# Patient Record
Sex: FEMALE | Race: WHITE | Employment: UNEMPLOYED | URBAN - NONMETROPOLITAN AREA
[De-identification: names, ages, dates, MRNs, and addresses within clinical notes are randomized per-mention and may not be internally consistent; named-entity substitution may affect disease eponyms.]

---

## 2018-04-22 ENCOUNTER — APPOINTMENT (OUTPATIENT)
Dept: CT IMAGING | Age: 48
End: 2018-04-22
Payer: COMMERCIAL

## 2018-04-22 ENCOUNTER — APPOINTMENT (OUTPATIENT)
Dept: GENERAL RADIOLOGY | Age: 48
End: 2018-04-22
Payer: COMMERCIAL

## 2018-04-22 ENCOUNTER — HOSPITAL ENCOUNTER (EMERGENCY)
Age: 48
Discharge: HOME | End: 2018-04-22
Attending: EMERGENCY MEDICINE
Payer: COMMERCIAL

## 2018-04-22 VITALS
TEMPERATURE: 97.9 F | HEART RATE: 118 BPM | HEIGHT: 65 IN | OXYGEN SATURATION: 98 % | DIASTOLIC BLOOD PRESSURE: 74 MMHG | BODY MASS INDEX: 46.65 KG/M2 | RESPIRATION RATE: 20 BRPM | SYSTOLIC BLOOD PRESSURE: 155 MMHG | WEIGHT: 280 LBS

## 2018-04-22 DIAGNOSIS — S40.012A CONTUSION OF LEFT SHOULDER, INITIAL ENCOUNTER: ICD-10-CM

## 2018-04-22 DIAGNOSIS — S09.90XA INJURY OF HEAD, INITIAL ENCOUNTER: Primary | ICD-10-CM

## 2018-04-22 LAB
BILIRUBIN URINE: NEGATIVE
COLOR: YELLOW
COMMENT UA: ABNORMAL
GLUCOSE URINE: NEGATIVE
KETONES, URINE: NEGATIVE
LEUKOCYTE ESTERASE, URINE: NEGATIVE
NITRITE, URINE: NEGATIVE
PH UA: 7 (ref 5–9)
PROTEIN UA: NEGATIVE
SPECIFIC GRAVITY UA: <1.005 (ref 1.01–1.02)
TURBIDITY: CLEAR
URINE HGB: NEGATIVE
UROBILINOGEN, URINE: NORMAL

## 2018-04-22 PROCEDURE — 73030 X-RAY EXAM OF SHOULDER: CPT

## 2018-04-22 PROCEDURE — 90471 IMMUNIZATION ADMIN: CPT | Performed by: EMERGENCY MEDICINE

## 2018-04-22 PROCEDURE — 81003 URINALYSIS AUTO W/O SCOPE: CPT

## 2018-04-22 PROCEDURE — 6360000002 HC RX W HCPCS: Performed by: EMERGENCY MEDICINE

## 2018-04-22 PROCEDURE — 70450 CT HEAD/BRAIN W/O DYE: CPT

## 2018-04-22 PROCEDURE — 90715 TDAP VACCINE 7 YRS/> IM: CPT | Performed by: EMERGENCY MEDICINE

## 2018-04-22 PROCEDURE — 99284 EMERGENCY DEPT VISIT MOD MDM: CPT

## 2018-04-22 RX ORDER — LISINOPRIL 20 MG/1
20 TABLET ORAL DAILY
Status: ON HOLD | COMMUNITY
End: 2021-01-04 | Stop reason: HOSPADM

## 2018-04-22 RX ADMIN — TETANUS TOXOID, REDUCED DIPHTHERIA TOXOID AND ACELLULAR PERTUSSIS VACCINE, ADSORBED 0.5 ML: 5; 2.5; 8; 8; 2.5 SUSPENSION INTRAMUSCULAR at 09:02

## 2018-04-22 ASSESSMENT — PAIN DESCRIPTION - LOCATION: LOCATION: GENERALIZED

## 2018-04-22 ASSESSMENT — PAIN SCALES - GENERAL: PAINLEVEL_OUTOF10: 4

## 2018-04-22 ASSESSMENT — PAIN DESCRIPTION - PAIN TYPE: TYPE: ACUTE PAIN

## 2019-05-13 ENCOUNTER — HOSPITAL ENCOUNTER (INPATIENT)
Age: 49
LOS: 1 days | Discharge: HOME | DRG: 206 | End: 2019-05-15
Attending: FAMILY MEDICINE | Admitting: FAMILY MEDICINE
Payer: COMMERCIAL

## 2019-05-13 ENCOUNTER — APPOINTMENT (OUTPATIENT)
Dept: CT IMAGING | Age: 49
DRG: 206 | End: 2019-05-13
Payer: COMMERCIAL
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
## 2019-05-13 DIAGNOSIS — D69.1 PLATELET DYSFUNCTION (HCC): ICD-10-CM

## 2019-05-13 DIAGNOSIS — D69.9 BLEEDING TENDENCY (HCC): ICD-10-CM

## 2019-05-13 DIAGNOSIS — S27.321A CONTUSION OF LEFT LUNG, INITIAL ENCOUNTER: ICD-10-CM

## 2019-05-13 PROBLEM — S22.42XD: Status: ACTIVE | Noted: 2019-05-13
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
## 2019-05-13 LAB
ANION GAP SERPL CALCULATED.3IONS-SCNC: 15 MMOL/L (ref 9–17)
BUN BLDV-MCNC: 8 MG/DL (ref 6–20)
BUN/CREAT BLD: 11 (ref 9–20)
CALCIUM SERPL-MCNC: 9.6 MG/DL (ref 8.6–10.4)
CHLORIDE BLD-SCNC: 95 MMOL/L (ref 98–107)
CO2: 23 MMOL/L (ref 20–31)
CREAT SERPL-MCNC: 0.76 MG/DL (ref 0.5–0.9)
GFR AFRICAN AMERICAN: >60 ML/MIN
GFR NON-AFRICAN AMERICAN: >60 ML/MIN
GFR SERPL CREATININE-BSD FRML MDRD: ABNORMAL ML/MIN/{1.73_M2}
GFR SERPL CREATININE-BSD FRML MDRD: ABNORMAL ML/MIN/{1.73_M2}
GLUCOSE BLD-MCNC: 86 MG/DL (ref 70–99)
HCT VFR BLD CALC: 34.5 % (ref 36.3–47.1)
HEMOGLOBIN: 11.4 G/DL (ref 11.9–15.1)
INR BLD: 1 (ref 0.9–1.2)
MCH RBC QN AUTO: 28.9 PG (ref 25.2–33.5)
MCHC RBC AUTO-ENTMCNC: 33 G/DL (ref 28.4–34.8)
MCV RBC AUTO: 87.3 FL (ref 82.6–102.9)
NRBC AUTOMATED: 0 PER 100 WBC
PDW BLD-RTO: 12.6 % (ref 11.8–14.4)
PLATELET # BLD: 496 K/UL (ref 138–453)
PMV BLD AUTO: 8.3 FL (ref 8.1–13.5)
POTASSIUM SERPL-SCNC: 4.3 MMOL/L (ref 3.7–5.3)
PROTHROMBIN TIME: 10.3 SEC (ref 9.7–12.2)
RBC # BLD: 3.95 M/UL (ref 3.95–5.11)
SODIUM BLD-SCNC: 133 MMOL/L (ref 135–144)
WBC # BLD: 10.5 K/UL (ref 3.5–11.3)

## 2019-05-13 PROCEDURE — G0378 HOSPITAL OBSERVATION PER HR: HCPCS

## 2019-05-13 PROCEDURE — 74177 CT ABD & PELVIS W/CONTRAST: CPT

## 2019-05-13 PROCEDURE — 36415 COLL VENOUS BLD VENIPUNCTURE: CPT

## 2019-05-13 PROCEDURE — 2580000003 HC RX 258: Performed by: FAMILY MEDICINE

## 2019-05-13 PROCEDURE — 70450 CT HEAD/BRAIN W/O DYE: CPT

## 2019-05-13 PROCEDURE — 72125 CT NECK SPINE W/O DYE: CPT

## 2019-05-13 PROCEDURE — 94664 DEMO&/EVAL PT USE INHALER: CPT

## 2019-05-13 PROCEDURE — 6370000000 HC RX 637 (ALT 250 FOR IP): Performed by: FAMILY MEDICINE

## 2019-05-13 PROCEDURE — 6360000004 HC RX CONTRAST MEDICATION: Performed by: PHYSICIAN ASSISTANT

## 2019-05-13 PROCEDURE — 93005 ELECTROCARDIOGRAM TRACING: CPT

## 2019-05-13 PROCEDURE — 85027 COMPLETE CBC AUTOMATED: CPT

## 2019-05-13 PROCEDURE — 99285 EMERGENCY DEPT VISIT HI MDM: CPT

## 2019-05-13 PROCEDURE — 85610 PROTHROMBIN TIME: CPT

## 2019-05-13 PROCEDURE — 80048 BASIC METABOLIC PNL TOTAL CA: CPT

## 2019-05-13 RX ORDER — SODIUM CHLORIDE 0.9 % (FLUSH) 0.9 %
10 SYRINGE (ML) INJECTION EVERY 12 HOURS SCHEDULED
Status: DISCONTINUED | OUTPATIENT
Start: 2019-05-13 | End: 2019-05-15 | Stop reason: HOSPADM

## 2019-05-13 RX ORDER — ONDANSETRON 2 MG/ML
4 INJECTION INTRAMUSCULAR; INTRAVENOUS EVERY 6 HOURS PRN
Status: DISCONTINUED | OUTPATIENT
Start: 2019-05-13 | End: 2019-05-15 | Stop reason: HOSPADM

## 2019-05-13 RX ORDER — SODIUM CHLORIDE 0.9 % (FLUSH) 0.9 %
10 SYRINGE (ML) INJECTION PRN
Status: DISCONTINUED | OUTPATIENT
Start: 2019-05-13 | End: 2019-05-15 | Stop reason: HOSPADM

## 2019-05-13 RX ORDER — DESMOPRESSIN ACETATE 150/SPRAY
2 AEROSOL, SPRAY WITH PUMP (EA) NASAL ONCE
Status: DISCONTINUED | OUTPATIENT
Start: 2019-05-13 | End: 2019-05-14

## 2019-05-13 RX ORDER — ALBUTEROL SULFATE 90 UG/1
2 AEROSOL, METERED RESPIRATORY (INHALATION) EVERY 6 HOURS PRN
Status: DISCONTINUED | OUTPATIENT
Start: 2019-05-13 | End: 2019-05-15 | Stop reason: HOSPADM

## 2019-05-13 RX ORDER — SERTRALINE HYDROCHLORIDE 25 MG/1
50 TABLET, FILM COATED ORAL DAILY
Status: DISCONTINUED | OUTPATIENT
Start: 2019-05-14 | End: 2019-05-15 | Stop reason: HOSPADM

## 2019-05-13 RX ORDER — HYDROCODONE BITARTRATE AND ACETAMINOPHEN 5; 325 MG/1; MG/1
1 TABLET ORAL EVERY 6 HOURS PRN
Status: DISCONTINUED | OUTPATIENT
Start: 2019-05-13 | End: 2019-05-15 | Stop reason: HOSPADM

## 2019-05-13 RX ORDER — FAMOTIDINE 20 MG/1
20 TABLET, FILM COATED ORAL 2 TIMES DAILY
Status: DISCONTINUED | OUTPATIENT
Start: 2019-05-13 | End: 2019-05-15 | Stop reason: HOSPADM

## 2019-05-13 RX ORDER — LISINOPRIL 20 MG/1
20 TABLET ORAL DAILY
Status: DISCONTINUED | OUTPATIENT
Start: 2019-05-14 | End: 2019-05-15 | Stop reason: HOSPADM

## 2019-05-13 RX ORDER — QUETIAPINE FUMARATE 200 MG/1
200 TABLET, FILM COATED ORAL NIGHTLY
Status: ON HOLD | COMMUNITY
End: 2021-01-04 | Stop reason: HOSPADM

## 2019-05-13 RX ORDER — SODIUM CHLORIDE 9 MG/ML
INJECTION, SOLUTION INTRAVENOUS CONTINUOUS
Status: DISCONTINUED | OUTPATIENT
Start: 2019-05-13 | End: 2019-05-14

## 2019-05-13 RX ORDER — AMINOCAPROIC ACID 500 MG/1
500 TABLET ORAL EVERY 6 HOURS
Status: DISCONTINUED | OUTPATIENT
Start: 2019-05-13 | End: 2019-05-14

## 2019-05-13 RX ORDER — BUPROPION HYDROCHLORIDE 150 MG/1
300 TABLET ORAL DAILY
Status: DISCONTINUED | OUTPATIENT
Start: 2019-05-14 | End: 2019-05-15 | Stop reason: HOSPADM

## 2019-05-13 RX ORDER — LAMOTRIGINE 100 MG/1
400 TABLET ORAL DAILY
Status: DISCONTINUED | OUTPATIENT
Start: 2019-05-14 | End: 2019-05-15 | Stop reason: HOSPADM

## 2019-05-13 RX ADMIN — IOPAMIDOL 100 ML: 755 INJECTION, SOLUTION INTRAVENOUS at 17:41

## 2019-05-13 RX ADMIN — SODIUM CHLORIDE: 9 INJECTION, SOLUTION INTRAVENOUS at 23:17

## 2019-05-13 RX ADMIN — FAMOTIDINE 20 MG: 20 TABLET ORAL at 23:17

## 2019-05-13 RX ADMIN — HYDROCODONE BITARTRATE AND ACETAMINOPHEN 1 TABLET: 5; 325 TABLET ORAL at 23:17

## 2019-05-13 ASSESSMENT — PAIN SCALES - GENERAL
PAINLEVEL_OUTOF10: 4
PAINLEVEL_OUTOF10: 7
PAINLEVEL_OUTOF10: 2

## 2019-05-13 ASSESSMENT — ENCOUNTER SYMPTOMS
NAUSEA: 0
VOMITING: 0
RHINORRHEA: 0
BACK PAIN: 0
ABDOMINAL PAIN: 0
WHEEZING: 0
CHEST TIGHTNESS: 0
EYE DISCHARGE: 0
CONSTIPATION: 0
SORE THROAT: 0
COUGH: 0
SHORTNESS OF BREATH: 0
BLOOD IN STOOL: 0
EYE REDNESS: 0
DIARRHEA: 0

## 2019-05-13 ASSESSMENT — PAIN DESCRIPTION - PAIN TYPE
TYPE: ACUTE PAIN
TYPE: ACUTE PAIN

## 2019-05-13 ASSESSMENT — PAIN DESCRIPTION - DESCRIPTORS: DESCRIPTORS: ACHING;SORE

## 2019-05-13 ASSESSMENT — PAIN DESCRIPTION - FREQUENCY
FREQUENCY: CONTINUOUS
FREQUENCY: CONTINUOUS

## 2019-05-13 ASSESSMENT — PAIN DESCRIPTION - ORIENTATION
ORIENTATION: RIGHT
ORIENTATION: RIGHT

## 2019-05-13 ASSESSMENT — PAIN DESCRIPTION - LOCATION
LOCATION: BREAST
LOCATION: BREAST

## 2019-05-13 ASSESSMENT — PAIN - FUNCTIONAL ASSESSMENT: PAIN_FUNCTIONAL_ASSESSMENT: PREVENTS OR INTERFERES SOME ACTIVE ACTIVITIES AND ADLS

## 2019-05-13 ASSESSMENT — PAIN DESCRIPTION - PROGRESSION: CLINICAL_PROGRESSION: GRADUALLY WORSENING

## 2019-05-13 NOTE — ED PROVIDER NOTES
redness and visual disturbance. Respiratory: Negative for cough, chest tightness, shortness of breath and wheezing. Cardiovascular: Negative for chest pain and palpitations. Gastrointestinal: Negative for abdominal pain, blood in stool, constipation, diarrhea, nausea and vomiting. Endocrine: Negative for polydipsia, polyphagia and polyuria. Genitourinary: Negative for decreased urine volume, difficulty urinating, dysuria, frequency and hematuria. Musculoskeletal: Positive for arthralgias. Negative for back pain and myalgias. Skin: Negative for pallor and rash. Allergic/Immunologic: Negative for food allergies and immunocompromised state. Neurological: Positive for dizziness. Negative for syncope, weakness and light-headedness. Hematological: Negative for adenopathy. Does not bruise/bleed easily. Psychiatric/Behavioral: Negative for behavioral problems and suicidal ideas. The patient is not nervous/anxious. Except as noted above the remainder of the review of systems was reviewed and negative.        PAST MEDICAL HISTORY     Past Medical History:   Diagnosis Date    Asthma     Bipolar 1 disorder ([facility] [address]     Delta storage pool disease ([facility] [address]     Hypertension     Psychiatric problem          SURGICALHISTORY       Past Surgical History:   Procedure Laterality Date     SECTION  ,    Miscarriage    [facility] GASTRIC BYPASS SURGERY      HYSTERECTOMY      KNEE SURGERY  1984    LAPAROSCOPY  1993    TONSILLECTOMY AND ADENOIDECTOMY  1988         CURRENT MEDICATIONS       Previous Medications    AMINOCAPROIC ACID (AMICAR) 500 MG TABLET    Take 1 tablet by mouth every 6 hours for 2 days After surgery    BUPROPION (WELLBUTRIN XL) 300 MG XL TABLET    Take 300 mg by mouth daily    DESMOPRESSIN (STIMATE) 1.5 MG/ML SOLN NASAL SOLUTION    2 sprays by Nasal route once for 1 dose The night before and the morning of surgery    LAMOTRIGINE (LAMICTAL) 200 MG TABLET    Take 400 mg by mouth daily 2 tabs    LISINOPRIL (PRINIVIL;ZESTRIL) 20 MG TABLET    Take 20 mg by mouth daily    PROAIR  (90 BASE) MCG/ACT INHALER    Inhale 2 puffs into the lungs every 6 hours as needed     SERTRALINE (ZOLOFT) 50 MG TABLET    Take 50 mg by mouth daily       ALLERGIES     Pcn [penicillins] and Sulfa antibiotics    FAMILY HISTORY       Family History   Adopted: Yes   Family history unknown: Yes          SOCIAL HISTORY       Social History     Socioeconomic History    Marital status:      Spouse name: None    Number of children: None    Years of education: None    Highest education level: None   Occupational History    None   Social Needs    Financial resource strain: None    Food insecurity:     Worry: None     Inability: None    Transportation needs:     Medical: None     Non-medical: None   Tobacco Use    Smoking status: Never Smoker    Smokeless tobacco: Never Used   Substance and Sexual Activity    Alcohol use:  Yes    Drug use: No    Sexual activity: Yes     Partners: Male   Lifestyle    Physical activity:     Days per week: None     Minutes per session: None    Stress: None   Relationships    Social connections:     Talks on phone: None     Gets together: None     Attends Judaism service: None     Active member of club or organization: None     Attends meetings of clubs or organizations: None     Relationship status: None    Intimate partner violence:     Fear of current or ex partner: None     Emotionally abused: None     Physically abused: None     Forced sexual activity: None   Other Topics Concern    None   Social History Narrative    None       SCREENINGS    [place] Coma Scale  Eye Opening: Spontaneous  Best Verbal Response: Oriented  Best Motor Response: Obeys commands  Antonia Coma Scale Score: 15 @FLOW([id number])@      PHYSICAL EXAM    (up to 7 for level 4, 8 or more for level 5)     ED Triage Vitals [05/13/19 1610]   BP Temp Temp Source Pulse Resp SpO2 Height Weight   (!) 153/97 98.5 °F (36.9 °C) Tympanic 90 18 100 % 5' 5\" (1.651 m) 275 lb (124.7 kg)       Physical Exam   Constitutional: She is oriented to person, place, and time. She appears well-developed and well-nourished. No distress. She is not intubated. HENT:   Head: Normocephalic and atraumatic. Right Ear: External ear normal.   Left Ear: External ear normal.   Mouth/Throat: Oropharynx is clear and moist. No oropharyngeal exudate. Eyes: Pupils are equal, round, and reactive to light. Conjunctivae and EOM are normal. Right eye exhibits no discharge. Left eye exhibits no discharge. No scleral icterus. Neck: Normal range of motion and full passive range of motion without pain. Neck supple. No spinous process tenderness and no muscular tenderness present. No neck rigidity. No tracheal deviation, no edema, no erythema and normal range of motion present. Cardiovascular: Normal rate, regular rhythm and intact distal pulses. Exam reveals no gallop and no friction rub. No murmur heard. Pulmonary/Chest: Effort normal and breath sounds normal. No accessory muscle usage or stridor. No apnea, no tachypnea and no bradypnea. She is not intubated. No respiratory distress. She has no decreased breath sounds. She has no wheezes. She has no rhonchi. She has no rales. She exhibits tenderness and bony tenderness. She exhibits no crepitus. Abdominal: Soft. Bowel sounds are normal. She exhibits no shifting dullness, no distension, no abdominal bruit and no mass. There is no tenderness. There is no rigidity, no rebound, no guarding, no CVA tenderness, no tenderness at McBurney's point and negative Salinas's sign. Musculoskeletal: Normal range of motion. She exhibits no edema, tenderness or deformity. Full range of motion of bilateral upper and lower extremity. There is no midline bony spinal tenderness. No step-off. Intact distal pulses and sensation of the less than 3 seconds.    Neurological:

## 2019-05-13 NOTE — ED NOTES
Patient provided with water at this time with verbal permission from [facility].      Abbie Maynard RN  05/13/19 [id number]

## 2019-05-13 NOTE — ED NOTES
Bed: 01B  Expected date:   Expected time:   Means of arrival:   Comments:  WILLIAN Oneil RN  05/13/19 [id number]

## 2019-05-13 NOTE — ED NOTES
Debora Parr at bedside updating patient and family on plan for admission.      Vila Phalen, RN  05/13/19 1477

## 2019-05-14 LAB
ABO/RH: NORMAL
ANTIBODY SCREEN: NEGATIVE
ARM BAND NUMBER: NORMAL
EKG ATRIAL RATE: 88 BPM
EKG P AXIS: 69 DEGREES
EKG P-R INTERVAL: 168 MS
EKG Q-T INTERVAL: 386 MS
EKG QRS DURATION: 110 MS
EKG QTC CALCULATION (BAZETT): 467 MS
EKG R AXIS: 46 DEGREES
EKG T AXIS: 10 DEGREES
EKG VENTRICULAR RATE: 88 BPM
EXPIRATION DATE: NORMAL
HCT VFR BLD CALC: 31.1 % (ref 36.3–47.1)
HCT VFR BLD CALC: 34 % (ref 36.3–47.1)
HEMOGLOBIN: 10.2 G/DL (ref 11.9–15.1)
HEMOGLOBIN: 11 G/DL (ref 11.9–15.1)
MCH RBC QN AUTO: 29.1 PG (ref 25.2–33.5)
MCHC RBC AUTO-ENTMCNC: 32.8 G/DL (ref 28.4–34.8)
MCV RBC AUTO: 88.9 FL (ref 82.6–102.9)
NRBC AUTOMATED: 0 PER 100 WBC
PDW BLD-RTO: 12.9 % (ref 11.8–14.4)
PLATELET # BLD: 441 K/UL (ref 138–453)
PMV BLD AUTO: 8.4 FL (ref 8.1–13.5)
RBC # BLD: 3.5 M/UL (ref 3.95–5.11)
WBC # BLD: 8.3 K/UL (ref 3.5–11.3)

## 2019-05-14 PROCEDURE — 36430 TRANSFUSION BLD/BLD COMPNT: CPT

## 2019-05-14 PROCEDURE — 86900 BLOOD TYPING SEROLOGIC ABO: CPT

## 2019-05-14 PROCEDURE — 86850 RBC ANTIBODY SCREEN: CPT

## 2019-05-14 PROCEDURE — 1200000000 HC SEMI PRIVATE

## 2019-05-14 PROCEDURE — 85018 HEMOGLOBIN: CPT

## 2019-05-14 PROCEDURE — 86901 BLOOD TYPING SEROLOGIC RH(D): CPT

## 2019-05-14 PROCEDURE — 85014 HEMATOCRIT: CPT

## 2019-05-14 PROCEDURE — P9037 PLATE PHERES LEUKOREDU IRRAD: HCPCS

## 2019-05-14 PROCEDURE — 6370000000 HC RX 637 (ALT 250 FOR IP): Performed by: FAMILY MEDICINE

## 2019-05-14 PROCEDURE — 85027 COMPLETE CBC AUTOMATED: CPT

## 2019-05-14 PROCEDURE — 2580000003 HC RX 258: Performed by: FAMILY MEDICINE

## 2019-05-14 PROCEDURE — G0378 HOSPITAL OBSERVATION PER HR: HCPCS

## 2019-05-14 PROCEDURE — 36415 COLL VENOUS BLD VENIPUNCTURE: CPT

## 2019-05-14 RX ORDER — HYDROCODONE BITARTRATE AND ACETAMINOPHEN 5; 325 MG/1; MG/1
2 TABLET ORAL EVERY 6 HOURS PRN
Status: DISCONTINUED | OUTPATIENT
Start: 2019-05-14 | End: 2019-05-15 | Stop reason: HOSPADM

## 2019-05-14 RX ORDER — 0.9 % SODIUM CHLORIDE 0.9 %
250 INTRAVENOUS SOLUTION INTRAVENOUS ONCE
Status: DISCONTINUED | OUTPATIENT
Start: 2019-05-14 | End: 2019-05-15 | Stop reason: HOSPADM

## 2019-05-14 RX ADMIN — HYDROCODONE BITARTRATE AND ACETAMINOPHEN 2 TABLET: 5; 325 TABLET ORAL at 09:53

## 2019-05-14 RX ADMIN — SERTRALINE HYDROCHLORIDE 50 MG: 25 TABLET ORAL at 21:47

## 2019-05-14 RX ADMIN — Medication 10 ML: at 21:48

## 2019-05-14 RX ADMIN — HYDROCODONE BITARTRATE AND ACETAMINOPHEN 1 TABLET: 5; 325 TABLET ORAL at 05:07

## 2019-05-14 RX ADMIN — LAMOTRIGINE 400 MG: 100 TABLET ORAL at 21:48

## 2019-05-14 RX ADMIN — BUPROPION HYDROCHLORIDE 300 MG: 150 TABLET, FILM COATED, EXTENDED RELEASE ORAL at 08:38

## 2019-05-14 RX ADMIN — FAMOTIDINE 20 MG: 20 TABLET ORAL at 08:38

## 2019-05-14 RX ADMIN — FAMOTIDINE 20 MG: 20 TABLET ORAL at 21:47

## 2019-05-14 RX ADMIN — HYDROCODONE BITARTRATE AND ACETAMINOPHEN 2 TABLET: 5; 325 TABLET ORAL at 16:12

## 2019-05-14 RX ADMIN — LISINOPRIL 20 MG: 20 TABLET ORAL at 08:38

## 2019-05-14 RX ADMIN — HYDROCODONE BITARTRATE AND ACETAMINOPHEN 2 TABLET: 5; 325 TABLET ORAL at 22:24

## 2019-05-14 ASSESSMENT — PAIN DESCRIPTION - ORIENTATION
ORIENTATION: RIGHT;LEFT
ORIENTATION: RIGHT

## 2019-05-14 ASSESSMENT — PAIN DESCRIPTION - FREQUENCY: FREQUENCY: CONTINUOUS

## 2019-05-14 ASSESSMENT — PAIN SCALES - GENERAL
PAINLEVEL_OUTOF10: 0
PAINLEVEL_OUTOF10: 4
PAINLEVEL_OUTOF10: 6
PAINLEVEL_OUTOF10: 9
PAINLEVEL_OUTOF10: 6
PAINLEVEL_OUTOF10: 4
PAINLEVEL_OUTOF10: 8

## 2019-05-14 ASSESSMENT — PAIN DESCRIPTION - LOCATION
LOCATION: BREAST
LOCATION: BREAST

## 2019-05-14 ASSESSMENT — PAIN - FUNCTIONAL ASSESSMENT: PAIN_FUNCTIONAL_ASSESSMENT: ACTIVITIES ARE NOT PREVENTED

## 2019-05-14 ASSESSMENT — PAIN DESCRIPTION - PAIN TYPE
TYPE: ACUTE PAIN;CHRONIC PAIN
TYPE: ACUTE PAIN

## 2019-05-14 ASSESSMENT — PAIN DESCRIPTION - DESCRIPTORS
DESCRIPTORS: ACHING
DESCRIPTORS: ACHING

## 2019-05-14 ASSESSMENT — PAIN DESCRIPTION - ONSET: ONSET: ON-GOING
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
## 2019-05-14 NOTE — PLAN OF CARE
Problem: Pain:  Goal: Pain level will decrease  Description  Pain level will decrease  Note:   Pain to right breast is being well managed with norco.     Problem: Bleeding:  Goal: Will show no signs and symptoms of excessive bleeding  Description  Will show no signs and symptoms of excessive bleeding  Note:   No active signs of bleeding     Problem: Gas Exchange - Impaired:  Goal: Levels of oxygenation will improve  Description  Levels of oxygenation will improve  Note:   O2 sat 98% on room air

## 2019-05-14 NOTE — CARE COORDINATION
Updated Dr Christal Yepez per phone- nurses concerns that patient seems paler and bruising darker. Advised platelets coming around 1-2:00. Also advised that lab stated the blood when spun seemed more anemic. Order received to draw hgb at this time.

## 2019-05-14 NOTE — PROGRESS NOTES
can use MP. Notify physician if condition deteriorates. MDI THERAPY with  2 actuations of [physician-ordered bronchodilator(s)] via spacer TID Albuterol and PRNq4 hrs. If unable to utilize MDI: HHN [physician-ordered bronchodilator(s)] TID and Albuterol PRN q4 hrs. Notify physician if condition deteriorates. MDI THERAPY with  [physician-ordered bronchodilator(s)] via spacer TID PRN. If unable to utilize MDI: HHN [physician-ordered bronchodilator(s)] TID PRN. Notify physician if condition deteriorates. If Acuity Level is 2, 3, or 4 in any of the following:    [] COUGH     [] SURGICAL HISTORY (SURG HX)  [] CHEST XRAY (CXR)    Goal: Improvement in sputum mobilization in patients with ineffective airway clearance. Reverse atelectasis. [] Bronchopulmonary Hygiene Protocol    Total Acuity:   16-32  []  Secondary Assessment in 24 hrs Total Acuity:  9-15  []  Secondary Assessment in 24 hrs Total Acuity:  4-8  []  Secondary Assessment in 48 hrs Total Acuity:  0-3  []  Secondary Assessment in 72 hrs   METANEB QID with [physician-ordered bronchodilator(s)] if CXR Acuity = 4; otherwise:  PD&P, PEP, or Vest QID & PRN  NT Sxn PRN for ineffective cough  METANEB QID with [physician-ordered bronchodilator(s)] if CXR Acuity = 4; otherwise:  PD&P, PEP, or Vest TID & PRN  NT Sxn PRN for ineffective cough  Instruct patient to self-perform IS q1hr WA   Directed Cough self-performed q1hr WA     If Acuity Level is 2 or above in the following:    [] PULMONARY HISTORY (PULM HX)    Goal: Assist patient in quitting smoking to slow or stop the progression of lung disease.     [] Smoking Cessation Protocol    SMOKING CESSATION EDUCATION provided according to policy KT_383: (noa with an X)  ____Yes    ____ No     ____ NA    Smoking Cessation Booklet given:  ____Yes  ____No ____Patient Nikki Garrison
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
## 2019-05-15 VITALS
SYSTOLIC BLOOD PRESSURE: 117 MMHG | TEMPERATURE: 97.9 F | HEART RATE: 88 BPM | HEIGHT: 65 IN | OXYGEN SATURATION: 96 % | WEIGHT: 257.1 LBS | RESPIRATION RATE: 18 BRPM | DIASTOLIC BLOOD PRESSURE: 76 MMHG | BODY MASS INDEX: 42.84 KG/M2

## 2019-05-15 LAB
ABSOLUTE EOS #: 0.21 K/UL (ref 0–0.44)
ABSOLUTE IMMATURE GRANULOCYTE: 0.03 K/UL (ref 0–0.3)
ABSOLUTE LYMPH #: 1.84 K/UL (ref 1.1–3.7)
ABSOLUTE MONO #: 0.59 K/UL (ref 0.1–1.2)
BASOPHILS # BLD: 1 % (ref 0–2)
BASOPHILS ABSOLUTE: 0.05 K/UL (ref 0–0.2)
BLD PROD TYP BPU: NORMAL
DIFFERENTIAL TYPE: ABNORMAL
DISPENSE STATUS BLOOD BANK: NORMAL
EOSINOPHILS RELATIVE PERCENT: 3 % (ref 1–4)
HCT VFR BLD CALC: 32.3 % (ref 36.3–47.1)
HEMOGLOBIN: 10.5 G/DL (ref 11.9–15.1)
IMMATURE GRANULOCYTES: 1 %
LYMPHOCYTES # BLD: 30 % (ref 24–43)
MCH RBC QN AUTO: 29 PG (ref 25.2–33.5)
MCHC RBC AUTO-ENTMCNC: 32.5 G/DL (ref 28.4–34.8)
MCV RBC AUTO: 89.2 FL (ref 82.6–102.9)
MONOCYTES # BLD: 10 % (ref 3–12)
NRBC AUTOMATED: 0 PER 100 WBC
PDW BLD-RTO: 13 % (ref 11.8–14.4)
PLATELET # BLD: 432 K/UL (ref 138–453)
PLATELET ESTIMATE: ABNORMAL
PMV BLD AUTO: 8.4 FL (ref 8.1–13.5)
RBC # BLD: 3.62 M/UL (ref 3.95–5.11)
RBC # BLD: ABNORMAL 10*6/UL
SEG NEUTROPHILS: 55 % (ref 36–65)
SEGMENTED NEUTROPHILS ABSOLUTE COUNT: 3.51 K/UL (ref 1.5–8.1)
TRANSFUSION STATUS: NORMAL
UNIT DIVISION: 0
UNIT NUMBER: NORMAL
WBC # BLD: 6.2 K/UL (ref 3.5–11.3)
WBC # BLD: ABNORMAL 10*3/UL

## 2019-05-15 PROCEDURE — 36415 COLL VENOUS BLD VENIPUNCTURE: CPT

## 2019-05-15 PROCEDURE — 85025 COMPLETE CBC W/AUTO DIFF WBC: CPT

## 2019-05-15 RX ORDER — HYDROCODONE BITARTRATE AND ACETAMINOPHEN 5; 325 MG/1; MG/1
1 TABLET ORAL EVERY 6 HOURS PRN
Qty: 20 TABLET | Refills: 0 | Status: SHIPPED | OUTPATIENT
Start: 2019-05-15 | End: 2019-05-20

## 2019-05-15 ASSESSMENT — PAIN SCALES - GENERAL: PAINLEVEL_OUTOF10: 3
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
## 2019-05-15 NOTE — CARE COORDINATION
Discussed case with [facility], will discuss with Dr Lillie Ordonez to reassure the patient.

## 2019-05-15 NOTE — PLAN OF CARE
Problem: Pain:  Goal: Pain level will decrease  Description  Pain level will decrease  Outcome: Ongoing  Note:   Pain assessed with hourly rounding and PRN. Pain meds adminstered as needed and reassessed. Pt offered diversional activities, repositioning, and comfort items. Problem: Bleeding:  Goal: Will show no signs and symptoms of excessive bleeding  Description  Will show no signs and symptoms of excessive bleeding  Outcome: Ongoing  Note:   Patient closely monitored for any signs of bleeding with labs. Problem: Gas Exchange - Impaired:  Goal: Levels of oxygenation will improve  Description  Levels of oxygenation will improve  Outcome: Ongoing  Note:   Patient's breathing is normal, O2 saturation is over 95% on Room Air. Will continue to monitor.

## 2019-05-15 NOTE — PROGRESS NOTES
Patient discharged home prior to Dani Joiner visit.     [address], Dani Joiner  5/15/2019
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
## 2019-05-21 ENCOUNTER — HOSPITAL ENCOUNTER (OUTPATIENT)
Age: 49
Discharge: HOME | End: 2019-05-21
Payer: COMMERCIAL

## 2019-05-21 DIAGNOSIS — D69.1 PLATELET DYSFUNCTION (HCC): ICD-10-CM

## 2019-05-21 DIAGNOSIS — D69.9 BLEEDING TENDENCY (HCC): ICD-10-CM

## 2019-05-21 LAB
ABSOLUTE EOS #: 0.1 K/UL (ref 0–0.44)
ABSOLUTE IMMATURE GRANULOCYTE: 0.1 K/UL (ref 0–0.3)
ABSOLUTE LYMPH #: 2.4 K/UL (ref 1.1–3.7)
ABSOLUTE MONO #: 0.4 K/UL (ref 0.1–1.2)
BASOPHILS # BLD: 0 % (ref 0–2)
BASOPHILS ABSOLUTE: 0 K/UL (ref 0–0.2)
DIFFERENTIAL TYPE: ABNORMAL
EOSINOPHILS RELATIVE PERCENT: 1 % (ref 1–4)
HCT VFR BLD CALC: 34 % (ref 36.3–47.1)
HEMOGLOBIN: 11 G/DL (ref 11.9–15.1)
IMMATURE GRANULOCYTES: 1 %
LYMPHOCYTES # BLD: 24 % (ref 24–43)
MCH RBC QN AUTO: 29.5 PG (ref 25.2–33.5)
MCHC RBC AUTO-ENTMCNC: 32.4 G/DL (ref 28.4–34.8)
MCV RBC AUTO: 91.2 FL (ref 82.6–102.9)
MONOCYTES # BLD: 4 % (ref 3–12)
MORPHOLOGY: NORMAL
NRBC AUTOMATED: 0 PER 100 WBC
PDW BLD-RTO: 12.7 % (ref 11.8–14.4)
PLATELET # BLD: 475 K/UL (ref 138–453)
PLATELET ESTIMATE: ABNORMAL
PMV BLD AUTO: 8.7 FL (ref 8.1–13.5)
RBC # BLD: 3.73 M/UL (ref 3.95–5.11)
RBC # BLD: ABNORMAL 10*6/UL
SEG NEUTROPHILS: 70 % (ref 36–65)
SEGMENTED NEUTROPHILS ABSOLUTE COUNT: 7 K/UL (ref 1.5–8.1)
WBC # BLD: 10 K/UL (ref 3.5–11.3)
WBC # BLD: ABNORMAL 10*3/UL

## 2019-05-21 PROCEDURE — 85025 COMPLETE CBC W/AUTO DIFF WBC: CPT

## 2019-05-21 PROCEDURE — 36415 COLL VENOUS BLD VENIPUNCTURE: CPT

## 2019-05-22 ENCOUNTER — OFFICE VISIT (OUTPATIENT)
Dept: ONCOLOGY | Age: 49
End: 2019-05-22
Payer: COMMERCIAL

## 2019-05-22 VITALS
TEMPERATURE: 98.6 F | HEIGHT: 65 IN | BODY MASS INDEX: 43.25 KG/M2 | WEIGHT: 259.6 LBS | RESPIRATION RATE: 20 BRPM | SYSTOLIC BLOOD PRESSURE: 145 MMHG | HEART RATE: 103 BPM | DIASTOLIC BLOOD PRESSURE: 83 MMHG

## 2019-05-22 DIAGNOSIS — D69.9 BLEEDING TENDENCY (HCC): ICD-10-CM

## 2019-05-22 DIAGNOSIS — D69.1 PLATELET DYSFUNCTION (HCC): Primary | ICD-10-CM
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
## 2019-05-22 PROCEDURE — 1111F DSCHRG MED/CURRENT MED MERGE: CPT | Performed by: INTERNAL MEDICINE

## 2019-05-22 PROCEDURE — 99214 OFFICE O/P EST MOD 30 MIN: CPT | Performed by: INTERNAL MEDICINE

## 2019-05-22 PROCEDURE — G8427 DOCREV CUR MEDS BY ELIG CLIN: HCPCS | Performed by: INTERNAL MEDICINE

## 2019-05-22 PROCEDURE — G8419 CALC BMI OUT NRM PARAM NOF/U: HCPCS | Performed by: INTERNAL MEDICINE

## 2019-05-22 PROCEDURE — 1036F TOBACCO NON-USER: CPT | Performed by: INTERNAL MEDICINE
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
## 2020-01-03 ENCOUNTER — TELEPHONE (OUTPATIENT)
Dept: GASTROENTEROLOGY | Age: 50
End: 2020-01-03

## 2020-01-03 NOTE — TELEPHONE ENCOUNTER
She has known platelet dysfunction with a \"delta granule storage pool deficiency\" and was last seen by hematology on 5/22/2019. At that time, she was premedicated for an upcoming surgery. Prior to scheduling her, we need input from hematology relative to the risk of bleeding and what needs to be be done prior to and during the procedure.

## 2020-01-09 RX ORDER — DESMOPRESSIN ACETATE 150/SPRAY
2 AEROSOL, SPRAY WITH PUMP (EA) NASAL 2 TIMES DAILY
Qty: 1 BOTTLE | Refills: 0 | Status: SHIPPED | OUTPATIENT
Start: 2020-01-09 | End: 2020-02-12

## 2020-01-09 NOTE — TELEPHONE ENCOUNTER
Patient called because she is going to have EGD/Colonoscopy by Dr Kilo Ramon and because of her delta granule storage pool deficiency they would like to know if Dr Les Kincaid would like to give any prophylactic medication before procedure. Discussed with Dr Les Kincaid and will give desmopressin nasal spray twice daily for 2 days prior to procedure. Called and spoke to Dr Keller Needs office and to patient. Prescription sent.

## 2020-01-09 NOTE — TELEPHONE ENCOUNTER
Patient called to see what the status was on getting schedule .  Explained to her we are waiting on a response for DR Varma

## 2020-01-21 NOTE — TELEPHONE ENCOUNTER
Dr. Roblero Estimable do you feel we are ready to schedule or do you need more information? Please advise.

## 2020-01-21 NOTE — TELEPHONE ENCOUNTER
I need to know whether there is anything more I need to do (such as testing or other precautionary measures at the time of or immediately before the procedure) or does the patient getting this medication from the hematology service allow me to proceed as usual?

## 2020-01-23 PROBLEM — R10.9 ABDOMINAL PAIN: Status: ACTIVE | Noted: 2020-01-23

## 2020-01-23 PROBLEM — R19.4 CHANGE IN BOWEL HABIT: Status: ACTIVE | Noted: 2020-01-23

## 2020-01-23 RX ORDER — SODIUM, POTASSIUM,MAG SULFATES 17.5-3.13G
SOLUTION, RECONSTITUTED, ORAL ORAL
Qty: 2 BOTTLE | Refills: 0 | Status: ON HOLD | OUTPATIENT
Start: 2020-01-23 | End: 2020-02-03 | Stop reason: HOSPADM

## 2020-01-23 NOTE — TELEPHONE ENCOUNTER
Discussed with Dr. Ani Kelley. He states no additional testing, interventions or precautions need to be taken aside from patient taking desmopressin nasal spray. Proceed with procedure.

## 2020-01-23 NOTE — TELEPHONE ENCOUNTER
Dr. Noman Roach, patient was wondering about EGD as well. It does look like EGD was marked on the referral sent to us. In the meantime, she has been scheduled 02/03/2020. Prep instructions mailed to patient and order faxed to surgery. If EGD approved we will also need to fax that order to surgery. Please advise.

## 2020-02-03 ENCOUNTER — ANESTHESIA (OUTPATIENT)
Dept: OPERATING ROOM | Age: 50
End: 2020-02-03
Payer: COMMERCIAL

## 2020-02-03 ENCOUNTER — HOSPITAL ENCOUNTER (OUTPATIENT)
Age: 50
Setting detail: OUTPATIENT SURGERY
Discharge: HOME | End: 2020-02-03
Attending: INTERNAL MEDICINE | Admitting: INTERNAL MEDICINE
Payer: COMMERCIAL

## 2020-02-03 ENCOUNTER — ANESTHESIA EVENT (OUTPATIENT)
Dept: OPERATING ROOM | Age: 50
End: 2020-02-03
Payer: COMMERCIAL

## 2020-02-03 VITALS
HEART RATE: 68 BPM | WEIGHT: 243 LBS | SYSTOLIC BLOOD PRESSURE: 122 MMHG | OXYGEN SATURATION: 98 % | HEIGHT: 65 IN | TEMPERATURE: 96.8 F | BODY MASS INDEX: 40.48 KG/M2 | RESPIRATION RATE: 16 BRPM | DIASTOLIC BLOOD PRESSURE: 76 MMHG

## 2020-02-03 VITALS
DIASTOLIC BLOOD PRESSURE: 59 MMHG | OXYGEN SATURATION: 96 % | RESPIRATION RATE: 13 BRPM | SYSTOLIC BLOOD PRESSURE: 129 MMHG

## 2020-02-03 PROCEDURE — 43239 EGD BIOPSY SINGLE/MULTIPLE: CPT | Performed by: INTERNAL MEDICINE

## 2020-02-03 PROCEDURE — 2709999900 HC NON-CHARGEABLE SUPPLY: Performed by: INTERNAL MEDICINE

## 2020-02-03 PROCEDURE — 7100000011 HC PHASE II RECOVERY - ADDTL 15 MIN: Performed by: INTERNAL MEDICINE

## 2020-02-03 PROCEDURE — 6360000002 HC RX W HCPCS: Performed by: NURSE ANESTHETIST, CERTIFIED REGISTERED

## 2020-02-03 PROCEDURE — 2580000003 HC RX 258: Performed by: INTERNAL MEDICINE

## 2020-02-03 PROCEDURE — 3700000000 HC ANESTHESIA ATTENDED CARE: Performed by: INTERNAL MEDICINE

## 2020-02-03 PROCEDURE — 88305 TISSUE EXAM BY PATHOLOGIST: CPT

## 2020-02-03 PROCEDURE — 87077 CULTURE AEROBIC IDENTIFY: CPT

## 2020-02-03 PROCEDURE — 2500000003 HC RX 250 WO HCPCS: Performed by: NURSE ANESTHETIST, CERTIFIED REGISTERED

## 2020-02-03 PROCEDURE — 45380 COLONOSCOPY AND BIOPSY: CPT | Performed by: INTERNAL MEDICINE

## 2020-02-03 PROCEDURE — 3609012400 HC EGD TRANSORAL BIOPSY SINGLE/MULTIPLE: Performed by: INTERNAL MEDICINE

## 2020-02-03 PROCEDURE — 7100000010 HC PHASE II RECOVERY - FIRST 15 MIN: Performed by: INTERNAL MEDICINE

## 2020-02-03 PROCEDURE — 3609010300 HC COLONOSCOPY W/BIOPSY SINGLE/MULTIPLE: Performed by: INTERNAL MEDICINE

## 2020-02-03 PROCEDURE — 3700000001 HC ADD 15 MINUTES (ANESTHESIA): Performed by: INTERNAL MEDICINE

## 2020-02-03 RX ORDER — PROPOFOL 10 MG/ML
INJECTION, EMULSION INTRAVENOUS CONTINUOUS PRN
Status: DISCONTINUED | OUTPATIENT
Start: 2020-02-03 | End: 2020-02-03 | Stop reason: SDUPTHER

## 2020-02-03 RX ORDER — LIDOCAINE HYDROCHLORIDE 20 MG/ML
INJECTION, SOLUTION EPIDURAL; INFILTRATION; INTRACAUDAL; PERINEURAL PRN
Status: DISCONTINUED | OUTPATIENT
Start: 2020-02-03 | End: 2020-02-03 | Stop reason: SDUPTHER

## 2020-02-03 RX ORDER — SODIUM CHLORIDE, SODIUM LACTATE, POTASSIUM CHLORIDE, CALCIUM CHLORIDE 600; 310; 30; 20 MG/100ML; MG/100ML; MG/100ML; MG/100ML
INJECTION, SOLUTION INTRAVENOUS CONTINUOUS
Status: DISCONTINUED | OUTPATIENT
Start: 2020-02-03 | End: 2020-02-03 | Stop reason: HOSPADM

## 2020-02-03 RX ORDER — AMLODIPINE BESYLATE 5 MG/1
5 TABLET ORAL DAILY
Status: ON HOLD | COMMUNITY
End: 2021-01-04 | Stop reason: HOSPADM

## 2020-02-03 RX ORDER — PROPOFOL 10 MG/ML
INJECTION, EMULSION INTRAVENOUS PRN
Status: DISCONTINUED | OUTPATIENT
Start: 2020-02-03 | End: 2020-02-03 | Stop reason: SDUPTHER

## 2020-02-03 RX ORDER — FENTANYL CITRATE 50 UG/ML
INJECTION, SOLUTION INTRAMUSCULAR; INTRAVENOUS PRN
Status: DISCONTINUED | OUTPATIENT
Start: 2020-02-03 | End: 2020-02-03 | Stop reason: SDUPTHER

## 2020-02-03 RX ADMIN — LIDOCAINE HYDROCHLORIDE 40 MG: 20 INJECTION, SOLUTION EPIDURAL; INFILTRATION; INTRACAUDAL; PERINEURAL at 12:45

## 2020-02-03 RX ADMIN — FENTANYL CITRATE 50 MCG: 50 INJECTION INTRAMUSCULAR; INTRAVENOUS at 12:53

## 2020-02-03 RX ADMIN — PROPOFOL 30 MG: 10 INJECTION, EMULSION INTRAVENOUS at 13:00

## 2020-02-03 RX ADMIN — FENTANYL CITRATE 25 MCG: 50 INJECTION INTRAMUSCULAR; INTRAVENOUS at 12:58

## 2020-02-03 RX ADMIN — PROPOFOL 50 MG: 10 INJECTION, EMULSION INTRAVENOUS at 12:52

## 2020-02-03 RX ADMIN — PROPOFOL 100 MG: 10 INJECTION, EMULSION INTRAVENOUS at 12:50

## 2020-02-03 RX ADMIN — PROPOFOL 40 MG: 10 INJECTION, EMULSION INTRAVENOUS at 13:10

## 2020-02-03 RX ADMIN — PROPOFOL 40 MG: 10 INJECTION, EMULSION INTRAVENOUS at 12:57

## 2020-02-03 RX ADMIN — LIDOCAINE HYDROCHLORIDE 100 MG: 20 INJECTION, SOLUTION EPIDURAL; INFILTRATION; INTRACAUDAL; PERINEURAL at 12:50

## 2020-02-03 RX ADMIN — PROPOFOL 50 MG: 10 INJECTION, EMULSION INTRAVENOUS at 12:54

## 2020-02-03 RX ADMIN — PROPOFOL 200 MCG/KG/MIN: 10 INJECTION, EMULSION INTRAVENOUS at 13:01

## 2020-02-03 RX ADMIN — SODIUM CHLORIDE, POTASSIUM CHLORIDE, SODIUM LACTATE AND CALCIUM CHLORIDE: 600; 310; 30; 20 INJECTION, SOLUTION INTRAVENOUS at 11:35

## 2020-02-03 RX ADMIN — FENTANYL CITRATE 25 MCG: 50 INJECTION INTRAMUSCULAR; INTRAVENOUS at 13:08

## 2020-02-03 RX ADMIN — PROPOFOL 50 MG: 10 INJECTION, EMULSION INTRAVENOUS at 12:53

## 2020-02-03 RX ADMIN — PROPOFOL 30 MG: 10 INJECTION, EMULSION INTRAVENOUS at 12:59

## 2020-02-03 ASSESSMENT — PAIN SCALES - GENERAL
PAINLEVEL_OUTOF10: 0

## 2020-02-03 ASSESSMENT — PAIN - FUNCTIONAL ASSESSMENT: PAIN_FUNCTIONAL_ASSESSMENT: 0-10

## 2020-02-03 NOTE — OP NOTE
PROCEDURE NOTE    DATE OF PROCEDURE: 2/3/2020    ENDOSCOPIST: Estephania Arreaga. Gris Nava MD, Adrian Shaw    ASSISTANT: None    PREOPERATIVE DIAGNOSIS: chronic diarrhea, chronic abdominal pain, history of gastric bypass    POSTOPERATIVE DIAGNOSIS: hemorrhoids internal, Small in size    OPERATION: Colonoscopy with biopsy    ANESTHESIA: MAC     ESTIMATED BLOOD LOSS: Minimal    COMPLICATIONS: None. SPECIMENS: were obtained    HISTORY: The patient is a 52y.o. year old female with history of above preop diagnosis. Colonoscopy with possible biopsy or polypectomy has been recommended and I explained the risk, benefits, expected outcome, and alternatives to the procedure. Risks include but are not limited to bleeding, infection, respiratory distress, hypotension, and perforation of the colon. The patient understands and is in agreement. PROCEDURE: The patient was given monitored anesthesia care. The patient was given oxygen by nasal cannula. The colonoscope was inserted per rectum and advanced under direct vision to the cecum without difficulty. Findings:  Cecum/Ascending colon: normal    Transverse colon: normal    Descending/Sigmoid colon: normal    Rectum/Anus: examined in normal and retroflexed positions and was abnormal: hemorrhoids    Random biopsies were taken throughout the colon    The colon was decompressed and the scope was removed. The patient tolerated the procedure well.      Electronically signed by Deidre Rosa MD  on 2/3/2020 at 1:42 PM

## 2020-02-03 NOTE — ANESTHESIA PRE PROCEDURE
Department of Anesthesiology  Preprocedure Note       Name:  Nicole Reich   Age:  52 y.o.  :  1970                                          MRN:  [id number]         Date:  2/3/2020      Surgeon: Christa Boast):  Brenden Perez MD    Procedure: COLONOSCOPY DIAGNOSTIC (N/A )  EGD ESOPHAGOGASTRODUODENOSCOPY (N/A )    Medications prior to admission:   Prior to Admission medications    Medication Sig Start Date End Date Taking? Authorizing Provider   amLODIPine (NORVASC) 5 MG tablet Take 5 mg by mouth daily   Yes Historical Provider, MD   desmopressin (STIMATE) 1.5 MG/ML SOLN nasal solution 2 sprays by Nasal route 2 times daily Starting 2 days before procedure 20  Yes Erik Voss MD   QUEtiapine (SEROQUEL) 200 MG tablet Take 200 mg by mouth nightly   Yes Historical Provider, MD   lisinopril (PRINIVIL;ZESTRIL) 20 MG tablet Take 20 mg by mouth daily   Yes Historical Provider, MD   buPROPion (WELLBUTRIN XL) 300 MG XL tablet Take 300 mg by mouth daily 3/5/16  Yes Historical Provider, MD   sertraline (ZOLOFT) 50 MG tablet Take 50 mg by mouth daily 3/5/16  Yes Historical Provider, MD   lamoTRIgine (LAMICTAL) 200 MG tablet Take 400 mg by mouth daily 2 tabs 14  Yes Historical Provider, MD   Na Sulfate-K Sulfate-Mg Sulf (SUPREP BOWEL PREP KIT) 17.5-3.13-1.6 GM/177ML SOLN Take as directed 20   Brenden Perez MD   PROAIR  (90 BASE) MCG/ACT inhaler Inhale 2 puffs into the lungs every 6 hours as needed  14   Historical Provider, MD       Current medications:    Current Facility-Administered Medications   Medication Dose Route Frequency Provider Last Rate Last Dose    lactated ringers infusion   Intravenous Continuous Brenden Perez  mL/hr at 20 1135         Allergies:     Allergies   Allergen Reactions    Pcn [Penicillins]     Sulfa Antibiotics Other (See Comments)     Inflammation in the eye       Problem List:    Patient Active Problem List   Diagnosis Code    Bleeding Replete as needed    Add oral potassium  Add oral minute magnesium  Follow-up BMP   34.0 05/21/2019    MCV 91.2 05/21/2019    RDW 12.7 05/21/2019     05/21/2019       CMP:   Lab Results   Component Value Date     05/13/2019    K 4.3 05/13/2019    CL 95 05/13/2019    CO2 23 05/13/2019    BUN 8 05/13/2019    CREATININE 0.76 05/13/2019    GFRAA >60 05/13/2019    LABGLOM >60 05/13/2019    GLUCOSE 86 05/13/2019    CALCIUM 9.6 05/13/2019       POC Tests: No results for input(s): POCGLU, POCNA, POCK, POCCL, POCBUN, POCHEMO, POCHCT in the last 72 hours. Coags:   Lab Results   Component Value Date    PROTIME 10.3 05/13/2019    INR 1.0 05/13/2019       HCG (If Applicable): No results found for: PREGTESTUR, PREGSERUM, HCG, HCGQUANT     ABGs: No results found for: PHART, PO2ART, GPE5XKE, PXV3AJD, BEART, Q3EYEDTC     Type & Screen (If Applicable):  No results found for: LABABO, LABRH    Anesthesia Evaluation   no history of anesthetic complications:   Airway: Mallampati: III  TM distance: <3 FB   Neck ROM: full  Mouth opening: > = 3 FB Dental: normal exam         Pulmonary:normal exam  breath sounds clear to auscultation  (+) asthma:                            Cardiovascular:  Exercise tolerance: good (>4 METS),   (+) hypertension:,                   Neuro/Psych:   Negative Neuro/Psych ROS              GI/Hepatic/Renal:   (+) GERD: poorly controlled,           Endo/Other:    (+) blood dyscrasia (delta storage pool disease)::., .                 Abdominal:   (+) obese,         Vascular: negative vascular ROS. Anesthesia Plan      general and TIVA     ASA 3       Induction: intravenous. Anesthetic plan and risks discussed with patient.                       KYLEE Morrissey - CRNA   2/3/2020

## 2020-02-03 NOTE — ANESTHESIA POSTPROCEDURE EVALUATION
Department of Anesthesiology  Postprocedure Note    Patient: Glen Cho  MRN: [id number]  YOB: 1970  Date of evaluation: 2/3/2020  Time:  2:07 PM     Procedure Summary     Date:  02/03/20 Room / Location:  [address] [place] [address]    Anesthesia Start:  1243 Anesthesia Stop:  1330    Procedures:       COLONOSCOPY WITH BIOPSY (N/A )      EGD BIOPSY (N/A ) Diagnosis:  (DIAGNOSTIC, ABDOMINAL PAIN,UNSPECIFIED ABDOMINAL LOCATION, CHANGE IN BOWEL HABIT)    Surgeon:  Gardenia Barron MD Responsible Provider:  KYLEE Gonzalez CRNA    Anesthesia Type:  general, TIVA ASA Status:  3          Anesthesia Type: general, TIVA    Cricket Phase I:      Cricket Phase II:      Last vitals: Reviewed and per EMR flowsheets.        Anesthesia Post Evaluation    Patient location during evaluation: PACU  Patient participation: complete - patient participated  Level of consciousness: awake and alert  Pain score: 0  Airway patency: patent  Nausea & Vomiting: no nausea and no vomiting  Complications: no  Cardiovascular status: blood pressure returned to baseline  Respiratory status: acceptable and room air  Hydration status: stable

## 2020-02-03 NOTE — H&P
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[facility]     Hypertension     Psychiatric problem      Past Surgical History:   Procedure Laterality Date     SECTION  ,    Miscarriage    [address]    GASTRIC BYPASS SURGERY      HYSTERECTOMY      KNEE SURGERY      LAPAROSCOPY      TONSILLECTOMY AND ADENOIDECTOMY       Current Facility-Administered Medications   Medication Dose Route Frequency Provider Last Rate Last Dose    lactated ringers infusion   Intravenous Continuous Carlin Monday,  mL/hr at 20 1135       Allergies   Allergen Reactions    Pcn [Penicillins]     Sulfa Antibiotics Other (See Comments)     Inflammation in the eye     Family History   Adopted: Yes   Family history unknown: Yes     Social History     Socioeconomic History    Marital status:      Spouse name: Not on file    Number of children: Not on file    Years of education: Not on file    Highest education level: Not on file   Occupational History    Not on file   Social Needs    Financial resource strain: Not on file    Food insecurity:     Worry: Not on file     Inability: Not on file    Transportation needs:     Medical: Not on file     Non-medical: Not on file   Tobacco Use    Smoking status: Never Smoker    Smokeless tobacco: Never Used   Substance and Sexual Activity    Alcohol use: Yes     Comment: no alcohol since Sept    Drug use: No    Sexual activity: Yes     Partners: Male   Lifestyle    Physical activity:     Days per week: Not on file     Minutes per session: Not on file    Stress: Not on file   Relationships    Social connections:     Talks on phone: Not on file     Gets together: Not on

## 2020-02-04 LAB
DIRECT EXAM: NEGATIVE
Lab: NORMAL
SPECIMEN DESCRIPTION: NORMAL

## 2020-02-05 LAB — SURGICAL PATHOLOGY REPORT: NORMAL

## 2020-02-12 RX ORDER — DESMOPRESSIN ACETATE 1.5 MG/ML
SPRAY, METERED NASAL
Qty: 3 ML | Refills: 0 | Status: ON HOLD
Start: 2020-02-12 | End: 2021-01-04 | Stop reason: HOSPADM

## 2020-11-30 ENCOUNTER — APPOINTMENT (OUTPATIENT)
Dept: CT IMAGING | Age: 50
DRG: 064 | End: 2020-11-30
Payer: COMMERCIAL

## 2020-11-30 ENCOUNTER — APPOINTMENT (OUTPATIENT)
Dept: MRI IMAGING | Age: 50
DRG: 064 | End: 2020-11-30
Payer: COMMERCIAL

## 2020-11-30 ENCOUNTER — HOSPITAL ENCOUNTER (INPATIENT)
Age: 50
LOS: 9 days | Discharge: NURSING, REHAB OR LONG-TERM CARE | DRG: 064 | End: 2020-12-09
Attending: EMERGENCY MEDICINE | Admitting: PSYCHIATRY & NEUROLOGY
Payer: COMMERCIAL

## 2020-11-30 PROBLEM — I62.9 INTRACRANIAL HEMORRHAGE (HCC): Status: ACTIVE | Noted: 2020-11-30

## 2020-11-30 LAB
% CKMB: 1.3 % (ref 0–3)
ABO/RH: NORMAL
ABSOLUTE EOS #: <0.03 K/UL (ref 0–0.44)
ABSOLUTE IMMATURE GRANULOCYTE: 0.04 K/UL (ref 0–0.3)
ABSOLUTE LYMPH #: 1.15 K/UL (ref 1.1–3.7)
ABSOLUTE MONO #: 0.73 K/UL (ref 0.1–1.2)
ALBUMIN SERPL-MCNC: 4.4 G/DL (ref 3.5–5.2)
ALBUMIN/GLOBULIN RATIO: 1.2 (ref 1–2.5)
ALLEN TEST: ABNORMAL
ALP BLD-CCNC: 126 U/L (ref 35–104)
ALT SERPL-CCNC: 24 U/L (ref 5–33)
AMPHETAMINE SCREEN URINE: NEGATIVE
ANION GAP SERPL CALCULATED.3IONS-SCNC: 20 MMOL/L (ref 9–17)
ANION GAP: 16 MMOL/L (ref 7–16)
ANTIBODY SCREEN: NEGATIVE
ARM BAND NUMBER: NORMAL
AST SERPL-CCNC: 51 U/L
BARBITURATE SCREEN URINE: NEGATIVE
BASOPHILS # BLD: 1 % (ref 0–2)
BASOPHILS ABSOLUTE: 0.06 K/UL (ref 0–0.2)
BENZODIAZEPINE SCREEN, URINE: NEGATIVE
BILIRUB SERPL-MCNC: 0.5 MG/DL (ref 0.3–1.2)
BILIRUBIN DIRECT: 0.17 MG/DL
BILIRUBIN URINE: NEGATIVE
BILIRUBIN, INDIRECT: 0.33 MG/DL (ref 0–1)
BUN BLDV-MCNC: 5 MG/DL (ref 6–20)
BUN/CREAT BLD: ABNORMAL (ref 9–20)
BUPRENORPHINE URINE: NORMAL
CALCIUM SERPL-MCNC: 9.1 MG/DL (ref 8.6–10.4)
CANNABINOID SCREEN URINE: NEGATIVE
CHLORIDE BLD-SCNC: 96 MMOL/L (ref 98–107)
CHOLESTEROL/HDL RATIO: 1.7
CHOLESTEROL: 247 MG/DL
CK MB: 3.8 NG/ML
CKMB INTERPRETATION: ABNORMAL
CO2: 21 MMOL/L (ref 20–31)
COCAINE METABOLITE, URINE: NEGATIVE
COLLAGEN ADENOSINE-5'-DIPHOSPHATE (ADP) TIME: 139 SEC (ref 67–112)
COLLAGEN EPINEPHRINE TIME: >300 SEC (ref 85–172)
COLOR: YELLOW
COMMENT UA: ABNORMAL
CREAT SERPL-MCNC: 0.56 MG/DL (ref 0.5–0.9)
DIFFERENTIAL TYPE: ABNORMAL
EOSINOPHILS RELATIVE PERCENT: 0 % (ref 1–4)
ESTIMATED AVERAGE GLUCOSE: 97 MG/DL
ETHANOL PERCENT: <0.01 %
ETHANOL: <10 MG/DL
EXPIRATION DATE: NORMAL
FIO2: ABNORMAL
GFR AFRICAN AMERICAN: >60 ML/MIN
GFR NON-AFRICAN AMERICAN: >60 ML/MIN
GFR NON-AFRICAN AMERICAN: >60 ML/MIN
GFR SERPL CREATININE-BSD FRML MDRD: >60 ML/MIN
GFR SERPL CREATININE-BSD FRML MDRD: ABNORMAL ML/MIN/{1.73_M2}
GFR SERPL CREATININE-BSD FRML MDRD: ABNORMAL ML/MIN/{1.73_M2}
GFR SERPL CREATININE-BSD FRML MDRD: NORMAL ML/MIN/{1.73_M2}
GLOBULIN: ABNORMAL G/DL (ref 1.5–3.8)
GLUCOSE BLD-MCNC: 155 MG/DL (ref 70–99)
GLUCOSE BLD-MCNC: 157 MG/DL (ref 74–100)
GLUCOSE URINE: NEGATIVE
HAV IGM SER IA-ACNC: NONREACTIVE
HBA1C MFR BLD: 5 % (ref 4–6)
HCO3 VENOUS: 24.3 MMOL/L (ref 22–29)
HCT VFR BLD CALC: 38.6 % (ref 36.3–47.1)
HDLC SERPL-MCNC: 142 MG/DL
HEMOGLOBIN: 12.9 G/DL (ref 11.9–15.1)
HEPATITIS B CORE IGM ANTIBODY: NONREACTIVE
HEPATITIS B SURFACE ANTIGEN: NONREACTIVE
HEPATITIS C ANTIBODY: NONREACTIVE
IMMATURE GRANULOCYTES: 0 %
INR BLD: 1
KETONES, URINE: ABNORMAL
LDL CHOLESTEROL: 90 MG/DL (ref 0–130)
LEUKOCYTE ESTERASE, URINE: NEGATIVE
LYMPHOCYTES # BLD: 11 % (ref 24–43)
MCH RBC QN AUTO: 29.4 PG (ref 25.2–33.5)
MCHC RBC AUTO-ENTMCNC: 33.4 G/DL (ref 28.4–34.8)
MCV RBC AUTO: 87.9 FL (ref 82.6–102.9)
MDMA URINE: NORMAL
METHADONE SCREEN, URINE: NEGATIVE
METHAMPHETAMINE, URINE: NORMAL
MODE: ABNORMAL
MONOCYTES # BLD: 7 % (ref 3–12)
MRSA, DNA, NASAL: NORMAL
MYOGLOBIN: 114 NG/ML (ref 25–58)
NEGATIVE BASE EXCESS, VEN: ABNORMAL (ref 0–2)
NITRITE, URINE: NEGATIVE
NRBC AUTOMATED: 0 PER 100 WBC
O2 DEVICE/FLOW/%: ABNORMAL
O2 SAT, VEN: 94 % (ref 60–85)
OPIATES, URINE: NEGATIVE
OXYCODONE SCREEN URINE: NEGATIVE
PARTIAL THROMBOPLASTIN TIME: 25.1 SEC (ref 20.5–30.5)
PATIENT TEMP: ABNORMAL
PCO2, VEN: 32 MM HG (ref 41–51)
PDW BLD-RTO: 13.3 % (ref 11.8–14.4)
PH UA: 7.5 (ref 5–8)
PH VENOUS: 7.49 (ref 7.32–7.43)
PHENCYCLIDINE, URINE: NEGATIVE
PLATELET # BLD: 307 K/UL (ref 138–453)
PLATELET ESTIMATE: ABNORMAL
PLATELET FUNCTION INTERP: ABNORMAL
PMV BLD AUTO: 8.9 FL (ref 8.1–13.5)
PO2, VEN: 63 MM HG (ref 30–50)
POC CHLORIDE: 99 MMOL/L (ref 98–107)
POC CREATININE: 0.58 MG/DL (ref 0.51–1.19)
POC HEMATOCRIT: 41 % (ref 36–46)
POC HEMOGLOBIN: 14 G/DL (ref 12–16)
POC IONIZED CALCIUM: 1.07 MMOL/L (ref 1.15–1.33)
POC LACTIC ACID: 3.82 MMOL/L (ref 0.56–1.39)
POC PCO2 TEMP: ABNORMAL MM HG
POC PH TEMP: ABNORMAL
POC PO2 TEMP: ABNORMAL MM HG
POC POTASSIUM: 2.9 MMOL/L (ref 3.5–4.5)
POC SODIUM: 139 MMOL/L (ref 138–146)
POSITIVE BASE EXCESS, VEN: 2 (ref 0–3)
POTASSIUM SERPL-SCNC: 3.1 MMOL/L (ref 3.7–5.3)
PROPOXYPHENE, URINE: NORMAL
PROTEIN UA: NEGATIVE
PROTHROMBIN TIME: 10.2 SEC (ref 9–12)
RBC # BLD: 4.39 M/UL (ref 3.95–5.11)
RBC # BLD: ABNORMAL 10*6/UL
SAMPLE SITE: ABNORMAL
SARS-COV-2, RAPID: NOT DETECTED
SARS-COV-2: NORMAL
SARS-COV-2: NORMAL
SEG NEUTROPHILS: 81 % (ref 36–65)
SEGMENTED NEUTROPHILS ABSOLUTE COUNT: 8.43 K/UL (ref 1.5–8.1)
SODIUM BLD-SCNC: 137 MMOL/L (ref 135–144)
SOURCE: NORMAL
SPECIFIC GRAVITY UA: 1.03 (ref 1–1.03)
SPECIMEN DESCRIPTION: NORMAL
TEST INFORMATION: NORMAL
TOTAL CK: 284 U/L (ref 26–192)
TOTAL CO2, VENOUS: 25 MMOL/L (ref 23–30)
TOTAL PROTEIN: 8.2 G/DL (ref 6.4–8.3)
TRICYCLIC ANTIDEPRESSANTS, UR: NORMAL
TRIGL SERPL-MCNC: 77 MG/DL
TROPONIN INTERP: ABNORMAL
TROPONIN T: ABNORMAL NG/ML
TROPONIN, HIGH SENSITIVITY: 9 NG/L (ref 0–14)
TSH SERPL DL<=0.05 MIU/L-ACNC: 1.34 MIU/L (ref 0.3–5)
TURBIDITY: CLEAR
URINE HGB: NEGATIVE
UROBILINOGEN, URINE: NORMAL
VLDLC SERPL CALC-MCNC: ABNORMAL MG/DL (ref 1–30)
WBC # BLD: 10.4 K/UL (ref 3.5–11.3)
WBC # BLD: ABNORMAL 10*3/UL

## 2020-11-30 PROCEDURE — 82565 ASSAY OF CREATININE: CPT

## 2020-11-30 PROCEDURE — 82803 BLOOD GASES ANY COMBINATION: CPT

## 2020-11-30 PROCEDURE — 81003 URINALYSIS AUTO W/O SCOPE: CPT

## 2020-11-30 PROCEDURE — 6370000000 HC RX 637 (ALT 250 FOR IP): Performed by: NURSE PRACTITIONER

## 2020-11-30 PROCEDURE — G0480 DRUG TEST DEF 1-7 CLASSES: HCPCS

## 2020-11-30 PROCEDURE — C9248 INJ, CLEVIDIPINE BUTYRATE: HCPCS | Performed by: STUDENT IN AN ORGANIZED HEALTH CARE EDUCATION/TRAINING PROGRAM

## 2020-11-30 PROCEDURE — 82553 CREATINE MB FRACTION: CPT

## 2020-11-30 PROCEDURE — 2580000003 HC RX 258: Performed by: NURSE PRACTITIONER

## 2020-11-30 PROCEDURE — 83874 ASSAY OF MYOGLOBIN: CPT

## 2020-11-30 PROCEDURE — 6360000002 HC RX W HCPCS: Performed by: NURSE PRACTITIONER

## 2020-11-30 PROCEDURE — 80307 DRUG TEST PRSMV CHEM ANLYZR: CPT

## 2020-11-30 PROCEDURE — 99255 IP/OBS CONSLTJ NEW/EST HI 80: CPT | Performed by: INTERNAL MEDICINE

## 2020-11-30 PROCEDURE — 83036 HEMOGLOBIN GLYCOSYLATED A1C: CPT

## 2020-11-30 PROCEDURE — 70553 MRI BRAIN STEM W/O & W/DYE: CPT

## 2020-11-30 PROCEDURE — 80061 LIPID PANEL: CPT

## 2020-11-30 PROCEDURE — 6360000002 HC RX W HCPCS: Performed by: STUDENT IN AN ORGANIZED HEALTH CARE EDUCATION/TRAINING PROGRAM

## 2020-11-30 PROCEDURE — 99291 CRITICAL CARE FIRST HOUR: CPT | Performed by: PSYCHIATRY & NEUROLOGY

## 2020-11-30 PROCEDURE — 87641 MR-STAPH DNA AMP PROBE: CPT

## 2020-11-30 PROCEDURE — 85610 PROTHROMBIN TIME: CPT

## 2020-11-30 PROCEDURE — 99285 EMERGENCY DEPT VISIT HI MDM: CPT

## 2020-11-30 PROCEDURE — 93005 ELECTROCARDIOGRAM TRACING: CPT | Performed by: PSYCHIATRY & NEUROLOGY

## 2020-11-30 PROCEDURE — 70450 CT HEAD/BRAIN W/O DYE: CPT

## 2020-11-30 PROCEDURE — 86901 BLOOD TYPING SEROLOGIC RH(D): CPT

## 2020-11-30 PROCEDURE — 99255 IP/OBS CONSLTJ NEW/EST HI 80: CPT | Performed by: PSYCHIATRY & NEUROLOGY

## 2020-11-30 PROCEDURE — 36430 TRANSFUSION BLD/BLD COMPNT: CPT

## 2020-11-30 PROCEDURE — 85025 COMPLETE CBC W/AUTO DIFF WBC: CPT

## 2020-11-30 PROCEDURE — 96375 TX/PRO/DX INJ NEW DRUG ADDON: CPT

## 2020-11-30 PROCEDURE — 6360000002 HC RX W HCPCS

## 2020-11-30 PROCEDURE — 85576 BLOOD PLATELET AGGREGATION: CPT

## 2020-11-30 PROCEDURE — 84443 ASSAY THYROID STIM HORMONE: CPT

## 2020-11-30 PROCEDURE — 80048 BASIC METABOLIC PNL TOTAL CA: CPT

## 2020-11-30 PROCEDURE — 84484 ASSAY OF TROPONIN QUANT: CPT

## 2020-11-30 PROCEDURE — 85014 HEMATOCRIT: CPT

## 2020-11-30 PROCEDURE — 2000000003 HC NEURO ICU R&B

## 2020-11-30 PROCEDURE — 86900 BLOOD TYPING SEROLOGIC ABO: CPT

## 2020-11-30 PROCEDURE — 82550 ASSAY OF CK (CPK): CPT

## 2020-11-30 PROCEDURE — 82435 ASSAY OF BLOOD CHLORIDE: CPT

## 2020-11-30 PROCEDURE — 84295 ASSAY OF SERUM SODIUM: CPT

## 2020-11-30 PROCEDURE — 85730 THROMBOPLASTIN TIME PARTIAL: CPT

## 2020-11-30 PROCEDURE — 36415 COLL VENOUS BLD VENIPUNCTURE: CPT

## 2020-11-30 PROCEDURE — 80074 ACUTE HEPATITIS PANEL: CPT

## 2020-11-30 PROCEDURE — 92523 SPEECH SOUND LANG COMPREHEN: CPT

## 2020-11-30 PROCEDURE — 82947 ASSAY GLUCOSE BLOOD QUANT: CPT

## 2020-11-30 PROCEDURE — 2500000003 HC RX 250 WO HCPCS: Performed by: STUDENT IN AN ORGANIZED HEALTH CARE EDUCATION/TRAINING PROGRAM

## 2020-11-30 PROCEDURE — 6360000004 HC RX CONTRAST MEDICATION: Performed by: STUDENT IN AN ORGANIZED HEALTH CARE EDUCATION/TRAINING PROGRAM

## 2020-11-30 PROCEDURE — C9248 INJ, CLEVIDIPINE BUTYRATE: HCPCS | Performed by: NURSE PRACTITIONER

## 2020-11-30 PROCEDURE — 51702 INSERT TEMP BLADDER CATH: CPT

## 2020-11-30 PROCEDURE — P9037 PLATE PHERES LEUKOREDU IRRAD: HCPCS

## 2020-11-30 PROCEDURE — 96374 THER/PROPH/DIAG INJ IV PUSH: CPT

## 2020-11-30 PROCEDURE — 6370000000 HC RX 637 (ALT 250 FOR IP): Performed by: PSYCHIATRY & NEUROLOGY

## 2020-11-30 PROCEDURE — 6370000000 HC RX 637 (ALT 250 FOR IP): Performed by: STUDENT IN AN ORGANIZED HEALTH CARE EDUCATION/TRAINING PROGRAM

## 2020-11-30 PROCEDURE — 70496 CT ANGIOGRAPHY HEAD: CPT

## 2020-11-30 PROCEDURE — A9579 GAD-BASE MR CONTRAST NOS,1ML: HCPCS | Performed by: STUDENT IN AN ORGANIZED HEALTH CARE EDUCATION/TRAINING PROGRAM

## 2020-11-30 PROCEDURE — 2500000003 HC RX 250 WO HCPCS: Performed by: NURSE PRACTITIONER

## 2020-11-30 PROCEDURE — 86850 RBC ANTIBODY SCREEN: CPT

## 2020-11-30 PROCEDURE — APPNB45 APP NON BILLABLE 31-45 MINUTES: Performed by: NURSE PRACTITIONER

## 2020-11-30 PROCEDURE — 84132 ASSAY OF SERUM POTASSIUM: CPT

## 2020-11-30 PROCEDURE — 80076 HEPATIC FUNCTION PANEL: CPT

## 2020-11-30 PROCEDURE — 99223 1ST HOSP IP/OBS HIGH 75: CPT | Performed by: NEUROLOGICAL SURGERY

## 2020-11-30 PROCEDURE — 82330 ASSAY OF CALCIUM: CPT

## 2020-11-30 PROCEDURE — 83605 ASSAY OF LACTIC ACID: CPT

## 2020-11-30 PROCEDURE — U0002 COVID-19 LAB TEST NON-CDC: HCPCS

## 2020-11-30 RX ORDER — BUPROPION HYDROCHLORIDE 150 MG/1
300 TABLET ORAL DAILY
Status: DISCONTINUED | OUTPATIENT
Start: 2020-11-30 | End: 2020-12-09 | Stop reason: HOSPADM

## 2020-11-30 RX ORDER — LISINOPRIL 20 MG/1
20 TABLET ORAL DAILY
Status: DISCONTINUED | OUTPATIENT
Start: 2020-11-30 | End: 2020-12-03

## 2020-11-30 RX ORDER — LAMOTRIGINE 100 MG/1
400 TABLET ORAL DAILY
Status: DISCONTINUED | OUTPATIENT
Start: 2020-11-30 | End: 2020-12-09 | Stop reason: HOSPADM

## 2020-11-30 RX ORDER — POTASSIUM CHLORIDE 7.45 MG/ML
10 INJECTION INTRAVENOUS
Status: DISCONTINUED | OUTPATIENT
Start: 2020-11-30 | End: 2020-11-30

## 2020-11-30 RX ORDER — SODIUM CHLORIDE 0.9 % (FLUSH) 0.9 %
10 SYRINGE (ML) INJECTION PRN
Status: DISCONTINUED | OUTPATIENT
Start: 2020-11-30 | End: 2020-12-09 | Stop reason: HOSPADM

## 2020-11-30 RX ORDER — FENTANYL CITRATE 50 UG/ML
50 INJECTION, SOLUTION INTRAMUSCULAR; INTRAVENOUS ONCE
Status: COMPLETED | OUTPATIENT
Start: 2020-11-30 | End: 2020-11-30

## 2020-11-30 RX ORDER — LORAZEPAM 2 MG/ML
1 INJECTION INTRAMUSCULAR ONCE
Status: DISCONTINUED | OUTPATIENT
Start: 2020-11-30 | End: 2020-11-30

## 2020-11-30 RX ORDER — FOLIC ACID 1 MG/1
1 TABLET ORAL DAILY
Status: DISCONTINUED | OUTPATIENT
Start: 2020-11-30 | End: 2020-12-09 | Stop reason: HOSPADM

## 2020-11-30 RX ORDER — ACETAMINOPHEN 325 MG/1
650 TABLET ORAL EVERY 4 HOURS PRN
Status: DISCONTINUED | OUTPATIENT
Start: 2020-11-30 | End: 2020-12-07

## 2020-11-30 RX ORDER — DEXMEDETOMIDINE HYDROCHLORIDE 4 UG/ML
0.2 INJECTION, SOLUTION INTRAVENOUS CONTINUOUS
Status: DISCONTINUED | OUTPATIENT
Start: 2020-11-30 | End: 2020-12-01

## 2020-11-30 RX ORDER — IPRATROPIUM BROMIDE AND ALBUTEROL SULFATE 2.5; .5 MG/3ML; MG/3ML
1 SOLUTION RESPIRATORY (INHALATION) EVERY 4 HOURS PRN
Status: DISCONTINUED | OUTPATIENT
Start: 2020-11-30 | End: 2020-12-09 | Stop reason: HOSPADM

## 2020-11-30 RX ORDER — MIDAZOLAM HYDROCHLORIDE 2 MG/2ML
1 INJECTION, SOLUTION INTRAMUSCULAR; INTRAVENOUS ONCE
Status: COMPLETED | OUTPATIENT
Start: 2020-11-30 | End: 2020-11-30

## 2020-11-30 RX ORDER — POTASSIUM CHLORIDE 20 MEQ/1
40 TABLET, EXTENDED RELEASE ORAL ONCE
Status: COMPLETED | OUTPATIENT
Start: 2020-11-30 | End: 2020-11-30

## 2020-11-30 RX ORDER — LORAZEPAM 1 MG/1
1 TABLET ORAL EVERY 4 HOURS
Status: DISCONTINUED | OUTPATIENT
Start: 2020-11-30 | End: 2020-11-30

## 2020-11-30 RX ORDER — SODIUM CHLORIDE 0.9 % (FLUSH) 0.9 %
10 SYRINGE (ML) INJECTION EVERY 12 HOURS SCHEDULED
Status: DISCONTINUED | OUTPATIENT
Start: 2020-11-30 | End: 2020-12-09 | Stop reason: HOSPADM

## 2020-11-30 RX ORDER — LORAZEPAM 2 MG/ML
1 INJECTION INTRAMUSCULAR ONCE
Status: COMPLETED | OUTPATIENT
Start: 2020-11-30 | End: 2020-11-30

## 2020-11-30 RX ORDER — LEVETIRACETAM 5 MG/ML
500 INJECTION INTRAVASCULAR EVERY 12 HOURS
Status: DISCONTINUED | OUTPATIENT
Start: 2020-11-30 | End: 2020-11-30

## 2020-11-30 RX ORDER — 0.9 % SODIUM CHLORIDE 0.9 %
20 INTRAVENOUS SOLUTION INTRAVENOUS ONCE
Status: DISCONTINUED | OUTPATIENT
Start: 2020-11-30 | End: 2020-12-09 | Stop reason: HOSPADM

## 2020-11-30 RX ORDER — ONDANSETRON 2 MG/ML
4 INJECTION INTRAMUSCULAR; INTRAVENOUS EVERY 6 HOURS PRN
Status: DISCONTINUED | OUTPATIENT
Start: 2020-11-30 | End: 2020-12-09 | Stop reason: HOSPADM

## 2020-11-30 RX ORDER — DEXMEDETOMIDINE HYDROCHLORIDE 4 UG/ML
0.2 INJECTION, SOLUTION INTRAVENOUS CONTINUOUS
Status: DISCONTINUED | OUTPATIENT
Start: 2020-11-30 | End: 2020-11-30

## 2020-11-30 RX ORDER — OXYCODONE HYDROCHLORIDE 5 MG/1
5 TABLET ORAL EVERY 6 HOURS PRN
Status: DISCONTINUED | OUTPATIENT
Start: 2020-11-30 | End: 2020-12-04

## 2020-11-30 RX ORDER — AMLODIPINE BESYLATE 5 MG/1
5 TABLET ORAL DAILY
Status: DISCONTINUED | OUTPATIENT
Start: 2020-11-30 | End: 2020-12-02

## 2020-11-30 RX ORDER — ROPINIROLE 1 MG/1
1 TABLET, FILM COATED ORAL NIGHTLY
Status: DISCONTINUED | OUTPATIENT
Start: 2020-11-30 | End: 2020-12-09 | Stop reason: HOSPADM

## 2020-11-30 RX ORDER — FENTANYL CITRATE 50 UG/ML
50 INJECTION, SOLUTION INTRAMUSCULAR; INTRAVENOUS ONCE
Status: DISCONTINUED | OUTPATIENT
Start: 2020-11-30 | End: 2020-11-30

## 2020-11-30 RX ORDER — MANNITOL 20 G/100ML
25 INJECTION, SOLUTION INTRAVENOUS ONCE
Status: COMPLETED | OUTPATIENT
Start: 2020-11-30 | End: 2020-11-30

## 2020-11-30 RX ORDER — SODIUM CHLORIDE 9 MG/ML
INJECTION, SOLUTION INTRAVENOUS CONTINUOUS
Status: DISCONTINUED | OUTPATIENT
Start: 2020-11-30 | End: 2020-12-03

## 2020-11-30 RX ORDER — POTASSIUM CHLORIDE 7.45 MG/ML
10 INJECTION INTRAVENOUS
Status: COMPLETED | OUTPATIENT
Start: 2020-11-30 | End: 2020-12-01

## 2020-11-30 RX ORDER — LORAZEPAM 1 MG/1
1 TABLET ORAL EVERY 4 HOURS PRN
Status: DISCONTINUED | OUTPATIENT
Start: 2020-11-30 | End: 2020-12-07

## 2020-11-30 RX ORDER — FENTANYL CITRATE 50 UG/ML
INJECTION, SOLUTION INTRAMUSCULAR; INTRAVENOUS
Status: COMPLETED
Start: 2020-11-30 | End: 2020-11-30

## 2020-11-30 RX ADMIN — THIAMINE HYDROCHLORIDE 500 MG: 100 INJECTION, SOLUTION INTRAMUSCULAR; INTRAVENOUS at 13:02

## 2020-11-30 RX ADMIN — CLEVIPIDINE 10 MG/HR: 0.5 EMULSION INTRAVENOUS at 12:05

## 2020-11-30 RX ADMIN — SODIUM CHLORIDE, PRESERVATIVE FREE 10 ML: 5 INJECTION INTRAVENOUS at 20:52

## 2020-11-30 RX ADMIN — FENTANYL CITRATE 50 MCG: 50 INJECTION, SOLUTION INTRAMUSCULAR; INTRAVENOUS at 11:27

## 2020-11-30 RX ADMIN — LAMOTRIGINE 400 MG: 100 TABLET ORAL at 12:54

## 2020-11-30 RX ADMIN — ONDANSETRON 4 MG: 2 INJECTION INTRAMUSCULAR; INTRAVENOUS at 11:22

## 2020-11-30 RX ADMIN — CLEVIPIDINE 2 MG/HR: 0.5 EMULSION INTRAVENOUS at 06:53

## 2020-11-30 RX ADMIN — LEVETIRACETAM 500 MG: 5 INJECTION INTRAVENOUS at 12:55

## 2020-11-30 RX ADMIN — MIDAZOLAM HYDROCHLORIDE 1 MG: 1 INJECTION, SOLUTION INTRAMUSCULAR; INTRAVENOUS at 15:42

## 2020-11-30 RX ADMIN — DESMOPRESSIN ACETATE 40 MCG: 4 SOLUTION INTRAVENOUS at 09:12

## 2020-11-30 RX ADMIN — ROPINIROLE HYDROCHLORIDE 1 MG: 1 TABLET, FILM COATED ORAL at 20:52

## 2020-11-30 RX ADMIN — POTASSIUM CHLORIDE 10 MEQ: 7.46 INJECTION, SOLUTION INTRAVENOUS at 20:52

## 2020-11-30 RX ADMIN — LISINOPRIL 20 MG: 20 TABLET ORAL at 12:54

## 2020-11-30 RX ADMIN — LORAZEPAM 1 MG: 2 INJECTION INTRAMUSCULAR; INTRAVENOUS at 07:51

## 2020-11-30 RX ADMIN — OXYCODONE HYDROCHLORIDE 5 MG: 5 TABLET ORAL at 16:18

## 2020-11-30 RX ADMIN — AMLODIPINE BESYLATE 5 MG: 5 TABLET ORAL at 12:54

## 2020-11-30 RX ADMIN — CLEVIPIDINE 10 MG/HR: 0.5 EMULSION INTRAVENOUS at 16:18

## 2020-11-30 RX ADMIN — POTASSIUM CHLORIDE 40 MEQ: 1500 TABLET, EXTENDED RELEASE ORAL at 20:50

## 2020-11-30 RX ADMIN — SERTRALINE 50 MG: 50 TABLET, FILM COATED ORAL at 12:54

## 2020-11-30 RX ADMIN — GADOTERIDOL 20 ML: 279.3 INJECTION, SOLUTION INTRAVENOUS at 16:03

## 2020-11-30 RX ADMIN — SODIUM CHLORIDE: 9 INJECTION, SOLUTION INTRAVENOUS at 09:13

## 2020-11-30 RX ADMIN — POTASSIUM CHLORIDE 10 MEQ: 7.46 INJECTION, SOLUTION INTRAVENOUS at 22:05

## 2020-11-30 RX ADMIN — LORAZEPAM 1 MG: 1 TABLET ORAL at 21:11

## 2020-11-30 RX ADMIN — DEXMEDETOMIDINE HYDROCHLORIDE 0.2 MCG/KG/HR: 400 INJECTION INTRAVENOUS at 11:32

## 2020-11-30 RX ADMIN — ACETAMINOPHEN 650 MG: 325 TABLET ORAL at 21:11

## 2020-11-30 RX ADMIN — LORAZEPAM 1 MG: 2 INJECTION INTRAMUSCULAR; INTRAVENOUS at 06:50

## 2020-11-30 RX ADMIN — FOLIC ACID 1 MG: 1 TABLET ORAL at 12:54

## 2020-11-30 RX ADMIN — IOPAMIDOL 90 ML: 755 INJECTION, SOLUTION INTRAVENOUS at 07:41

## 2020-11-30 RX ADMIN — BUPROPION HYDROCHLORIDE 300 MG: 150 TABLET, EXTENDED RELEASE ORAL at 12:54

## 2020-11-30 RX ADMIN — POTASSIUM CHLORIDE 10 MEQ: 7.46 INJECTION, SOLUTION INTRAVENOUS at 23:02

## 2020-11-30 RX ADMIN — MANNITOL 25 G: 20 INJECTION, SOLUTION INTRAVENOUS at 07:53

## 2020-11-30 ASSESSMENT — ENCOUNTER SYMPTOMS
SHORTNESS OF BREATH: 0
EYE REDNESS: 0
COUGH: 1
RHINORRHEA: 0
EYE ITCHING: 0
SORE THROAT: 0
VOMITING: 0
ABDOMINAL PAIN: 0
NAUSEA: 0

## 2020-11-30 ASSESSMENT — PAIN SCALES - GENERAL
PAINLEVEL_OUTOF10: 5
PAINLEVEL_OUTOF10: 4
PAINLEVEL_OUTOF10: 5

## 2020-11-30 ASSESSMENT — PAIN DESCRIPTION - FREQUENCY: FREQUENCY: CONTINUOUS

## 2020-11-30 ASSESSMENT — PAIN DESCRIPTION - LOCATION: LOCATION: HEAD;NECK

## 2020-11-30 ASSESSMENT — PAIN DESCRIPTION - PAIN TYPE: TYPE: ACUTE PAIN

## 2020-11-30 ASSESSMENT — PAIN DESCRIPTION - ORIENTATION: ORIENTATION: POSTERIOR

## 2020-11-30 ASSESSMENT — PAIN DESCRIPTION - DESCRIPTORS: DESCRIPTORS: THROBBING

## 2020-11-30 NOTE — ED NOTES
Report received from [facility]. Pt transferred from [facility] for hemorrhagic stroke. Pt is alert and oriented x4. Pt with left arm paralysis, decreased sensation. Pt with slight left facial droop. Pt is extremely restless and anxious.       Kacey Alexander RN  11/30/20 [id number]

## 2020-11-30 NOTE — ED NOTES
Neuro at bedside to evaluate pt.       [address] [facility], [address] [place] [address]  11/30/20 [id number]

## 2020-11-30 NOTE — CONSULTS
Department of Neurosurgery                                                 Reason for Consult:  [facility]  Requesting Physician:  ED  Neurosurgeon:  Dr Russ López From:  patient    CHIEF COMPLAINT:         Weakness and incontinence    HISTORY OF PRESENT ILLNESS:       48 y.o. female with history of platelet coagulopathy presenting from outside hospital.  Patient is a poor historian but reports that she went home last night with mild weakness in her left arm and leg but then progressed. As of this morning apparently she had fairly dense hemiplegia of the left side and subsequently presented to the hospital.  CT revealed large intra-axial hemorrhage of the right parieto-occipital region along with subarachnoid hemorrhage and surrounding perilesional edema. Some midline shift along with headache. Patient reportedly lucid and was started on nicardipine and Keppra. Patient able to provide any other further detailed history. States that she is numb on her left side and unable to move it.       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[facility]     Hypertension     Psychiatric problem        Past Surgical History:        Procedure Laterality Date     SECTION  1047,3032    Miscarriage     CHOLECYSTECTOMY      COLONOSCOPY N/A 2/3/2020    -random bx(normal)hemorrhoids    GASTRIC BYPASS SURGERY      HYSTERECTOMY      KNEE SURGERY      LAPAROSCOPY      TONSILLECTOMY AND ADENOIDECTOMY      UPPER GASTROINTESTINAL ENDOSCOPY N/A 2/3/2020    (normal,neg H-Pylori)normal post-bypass endoscopy       Social History:   Social History     Socioeconomic History    Marital status:      Spouse name: Not on file    Number of children: Not on file    Years of education: Not on file    Highest education level: Not on file   Occupational History    Not on file   Social Needs    Financial resource strain: Not on file    Food insecurity     Worry: Not on file     Inability: Not on file    Transportation needs     Medical: Not on file     Non-medical: Not on file   Tobacco Use    Smoking status: Never Smoker    Smokeless tobacco: Never Used   Substance and Sexual Activity    Alcohol use: Yes     Comment: no alcohol since Sept    Drug use: No    Sexual activity: Yes     Partners: Male   Lifestyle    Physical activity     Days per week: Not on file     Minutes per session: Not on file    Stress: Not on file   Relationships    Social connections     Talks on phone: Not on file     Gets together: Not on file     Attends Gnosticist service: Not on file     Active member of club or organization: Not on file     Attends meetings of clubs or organizations: Not on file     Relationship status: Not on file    Intimate partner violence     Fear of current or ex partner: Not on file     Emotionally abused: Not on file     Physically abused: Not on file     Forced sexual activity: Not on file   Other Topics Concern    Not on file   Social History Narrative    Not on file       Family History:       Adopted: Yes   Family history unknown: Yes       Allergies:  Pcn [penicillins] and Sulfa antibiotics    Home Medications:  Prior to Admission medications    Medication Sig Start Date End Date Taking?  Authorizing Provider   STIMATE 1.5 MG/ML SOLN nasal solution INSTILL 2 SPRAYS INTO THE NOSE TWICE DAILY STARTING TWO DAYS BEFORE PROCEDURE 2/12/20   Serafin Campos MD   amLODIPine (NORVASC) 5 MG tablet Take 5 mg by mouth daily    Historical Provider, MD   QUEtiapine (SEROQUEL) 200 MG tablet Take 200 mg by mouth nightly    Historical Provider, MD   lisinopril (PRINIVIL;ZESTRIL) 20 MG tablet Take 20 mg by mouth daily    Historical Provider, MD   buPROPion (WELLBUTRIN XL) 300 MG XL tablet Take 300 mg by mouth daily 3/5/16   Historical Provider, MD sertraline (ZOLOFT) 50 MG tablet Take 50 mg by mouth daily 3/5/16   Historical Provider, MD   PROAIR  (90 BASE) MCG/ACT inhaler Inhale 2 puffs into the lungs every 6 hours as needed  9/30/14   Historical Provider, MD   lamoTRIgine (LAMICTAL) 200 MG tablet Take 400 mg by mouth daily 2 tabs 11/5/14   Historical Provider, MD       Current Medications:   Current Facility-Administered Medications: clevidipine (CLEVIPREX) infusion, 2 mg/hr, Intravenous, Continuous  sodium chloride flush 0.9 % injection 10 mL, 10 mL, Intravenous, 2 times per day  sodium chloride flush 0.9 % injection 10 mL, 10 mL, Intravenous, PRN  acetaminophen (TYLENOL) tablet 650 mg, 650 mg, Oral, Q4H PRN  perflutren lipid microspheres (DEFINITY) injection 1.65 mg, 1.5 mL, Intravenous, ONCE PRN  0.9 % sodium chloride infusion, , Intravenous, Continuous  ondansetron (ZOFRAN) injection 4 mg, 4 mg, Intravenous, Q6H PRN  0.9 % sodium chloride bolus, 20 mL, Intravenous, Once  dexmedetomidine (PRECEDEX) 400 mcg in sodium chloride 0.9 % 100 mL infusion, 0.2 mcg/kg/hr, Intravenous, Continuous  LORazepam (ATIVAN) injection 1 mg, 1 mg, Intravenous, Once  desmopressin (DDAVP) 40 mcg in sodium chloride 0.9 % 50 mL IVPB, 40 mcg, Intravenous, Once    REVIEW OF SYSTEMS:       CONSTITUTIONAL: negative for fatigue, malaise, wt loss or gain   EYES: negative for double vision, photophobia, tunnel vision   HEENT: negative for tinnitus, hearing loss, sore throat, otorrhea, rhinorrhea   RESPIRATORY: negative for cough, shortness of breath, hemptysis   CARDIOVASCULAR: negative for chest pain, palpitations   GASTROINTESTINAL: negative for nausea, vomiting, diarrhea, hematamesis, melena   GENITOURINARY: negative for incontinence, urinary retention   MUSCULOSKELETAL: negative for new or worsened neck or back pain   NEUROLOGICAL: negative for seizures, new numbness, tingling, weakness, paresthesias   PSYCHIATRIC: negative for new or worsened anxiety or depression Review of systems otherwise negative. PHYSICAL EXAM:       BP (!) 168/88   Pulse 115   Temp 98.4 °F (36.9 °C)   Resp 22   Ht 5' 5\" (1.651 m)   Wt 235 lb (106.6 kg)   SpO2 96%   BMI 39.11 kg/m²     CONSTITUTIONAL: no apparent distress, appears stated age   HEAD: normocephalic, atraumatic, no kaur sign or racoon eyes   ENT: moist mucous membranes, no rhinorrhea or otorrhea   NECK: supple, symmetric, no midline tenderness to palpation   BACK: without midline tenderness, step-offs or deformities   LUNGS: Normoxic, no accessorizing, equal chest rise and fall   CARDIOVASCULAR: regular rate and rhythm per telemetry   ABDOMEN: Soft, non-tender, non-distended    NEUROLOGIC:  EYE OPENING     Spontaneous - 4 [x]       To voice - 3 []       To pain - 2 []       None - 1 []    VERBAL RESPONSE     Appropriate, oriented - 5 []       Dazed or confused - 4 [x]       Syllables, expletives - 3 []       Grunts - 2 []       None - 1 []    MOTOR RESPONSE     Spontaneous, command - 6 [x]       Localizes pain - 5 []       Withdraws pain - 4 []       Abnormal flexion - 3 []       Abnormal extension - 2 []       None - 1 []            Total GCS: 14   Mental Status: Awake alert slightly confused but I spontaneously open. Cranial Nerves:    Pupils equal and reactive to light at 4 mm  Extraocular motion intact  No nystagmus  Face symmetric  Shrug symmetric  Tongue and uvula midline   tone symmetric, V1-3 sensation intact/symmetric to light touch  Hearing symmetric    Motor Exam:    Followed commands readily with right side. 5 out of 5  biceps triceps wrist extension iliopsoas quadriceps plantar flexion dorsiflexion. Did not follow with the left side but briskly withdrew to painful stimulus. Appeared to be localizing while lying in bed spontaneously.     Sensory:    Right Upper Extremity:  normal  Left Upper Extremity: Abnormal  Right Lower Extremity:  normal  Left Lower Extremity: Abnormal  Hyporeflexic left side. SKIN: no rash       LABS AND IMAGING:     CBC with Differential:    Lab Results   Component Value Date    WBC 10.4 11/30/2020    RBC 4.39 11/30/2020    HGB 12.9 11/30/2020    HCT 38.6 11/30/2020     11/30/2020    MCV 87.9 11/30/2020    MCH 29.4 11/30/2020    MCHC 33.4 11/30/2020    RDW 13.3 11/30/2020    LYMPHOPCT 11 11/30/2020    MONOPCT 7 11/30/2020    BASOPCT 1 11/30/2020    MONOSABS 0.73 11/30/2020    LYMPHSABS 1.15 11/30/2020    EOSABS <0.03 11/30/2020    BASOSABS 0.06 11/30/2020    DIFFTYPE NOT REPORTED 11/30/2020     BMP:    Lab Results   Component Value Date     11/30/2020    K 3.1 11/30/2020    CL 96 11/30/2020    CO2 21 11/30/2020    BUN 5 11/30/2020    CREATININE 0.56 11/30/2020    CALCIUM 9.1 11/30/2020    GFRAA >60 11/30/2020    LABGLOM >60 11/30/2020    GLUCOSE 155 11/30/2020       Radiology Review: Head CT reveals large right parieto-occipital intraparenchymal hemorrhage with scattered subarachnoid along with perilesional edema. ASSESSMENT AND PLAN:       Spontaneous right parieto-occipital intraparenchymal hemorrhage (40cc/IPH with IVH; ICH 2)    Cta h/n now  Rpt scan 6hrs  Monitor clinically  SBP < 140  Platelet & ddavp transfusion. Hematology consult    Minimal effacement of posterior body of R lat ventricle, vasogenic edema with local mass effect/no MLS in GCS 14 patient -- no surgical indications at current & patient with inherent coagulopathy precluding surgery. Maximal efforts toward avoiding expansion & medical treatment of mass effect to avoid high risk surgical intervention.      DO KRYSTINA Lovelace pager [phone]  11/30/2020  8:57 AM

## 2020-11-30 NOTE — FLOWSHEET NOTE
[facility] CARE DEPARTMENT - [address]     Emergency/Trauma Note    PATIENT NAME: Anthony William    Shift date: 11/30/20  Shift day: Monday   Shift # 1    Room # 2187/0156-39   Name: Anthony William            Age: 48 y.o. Gender: female          Denominational: [address] [place] [address] of Buddhism: [address] [place] [id number]  Trauma/Incident type: Stroke Alert  Admit Date & Time: 11/30/2020  6:38 AM    PATIENT/EVENT DESCRIPTION:  Anthony William is a 48 y.o. female who arrived via ground ambulance as transfer from [facility]. Per report the patient arrives with left sided deficits, headache and facial droop. She is diagnosed with a hemorraghic stroke and is admitted to the Neuro ICU unit. Pt to be admitted to 0528/0528-01. SPIRITUAL ASSESSMENT/INTERVENTION:     11/30/20 1047   Encounter Summary   Services provided to: Patient and family together   Referral/Consult From: Multi-disciplinary team   Support System Spouse   Place of [address] [place] [address] Energy No   Continue Visiting   (11/30/20)   Complexity of Encounter Low   Length of Encounter 15 minutes   Spiritual Assessment Completed Yes   Crisis   Type Stroke Alert   Assessment Calm; Approachable;Coping   Intervention Active listening;Explored feelings, thoughts, concerns;Nurtured hope;Prayer;Sustaining presence/ Ministry of presence; Discussed belief system/Buddhist practices/yves   Outcome Acceptance;Comfort;Expressed gratitude     I spoke to the patient and her , Mera Perry. I provided emotional and spiritual support and they were grateful. Patient was laying in her bed with her eyes closed but talked to me and answered my questions. She expresses anxiety about her condition and plan of care. I asked if a prayer would be helpful to her and she agreed. PATIENT BELONGINGS:  With patient    ANY BELONGINGS OF SIGNIFICANT VALUE NOTED:  No.     REGISTRATION STAFF NOTIFIED?   Yes    WHAT IS YOUR SPIRITUAL CARE PLAN FOR THIS PATIENT?:  Chaplains are available for further spiritual and emotional support as requested by the patient or family.        Electronically signed by Aguilar Dia on 11/30/2020 at [facility]  [phone]

## 2020-11-30 NOTE — ED NOTES
RN banded patient, labeled pink top  and filled out blood bank form to be sent     Donn Rodriguez  11/30/20 [phone]
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
## 2020-11-30 NOTE — ED PROVIDER NOTES
[facility]     Emergency Department     Faculty Attestation    I performed a history and physical examination of the patient and discussed management with the resident. I have reviewed and agree with the residents findings including all diagnostic interpretations, and treatment plans as written. Any areas of disagreement are noted on the chart. I was personally present for the key portions of any procedures. I have documented in the chart those procedures where I was not present during the key portions. I have reviewed the emergency nurses triage note. I agree with the chief complaint, past medical history, past surgical history, allergies, medications, social and family history as documented unless otherwise noted below. Documentation of the HPI, Physical Exam and Medical Decision Making performed by scribmarcel is based on my personal performance of the HPI, PE and MDM. For Physician Assistant/ Nurse Practitioner cases/documentation I have personally evaluated this patient and have completed at least one if not all key elements of the E/M (history, physical exam, and MDM). Additional findings are as noted.     47 yo F transfer from [facility], last known well 0001, pt had L side weakness, incontinence, mild ha, hx htn, dx iph previous facility,   No injury, no cp, no fever,   pe sbp 180, gcs 15, l facial droop, L upper lower extremity weakness, limited L purposeful movement, spasm like movement to trunk, no cervical tenderness, abdomen non tender, no distension, no rigidity, no calf tenderness, no calf swelling,     -cardene gtt, stroke alert, eval started, neuro icu admit, care turned over to day shift, neuro critical care    EKG Interpretation    Interpreted by me  Sinus tachycardia, heart rate 120, no ischemia, normal axis, QT corrected 477    CRITICAL CARE: There was a high probability of clinically significant/life threatening deterioration in this patient's condition which required my urgent intervention. Total critical care time was 20 minutes. This excludes any time for separately reportable procedures.        [address], DO  11/30/20 [facility], DO  11/30/20 [facility], DO  11/30/20 [id number]

## 2020-11-30 NOTE — PROGRESS NOTES
Physical Therapy  DATE: 2020    NAME: Anthony William  MRN: [id number]   : 1970    Patient not seen this date for Physical Therapy due to:  [] Blood transfusion in progress  [] Hemodialysis  [] Patient Declined  [] Spine Precautions   [x] Strict Bedrest- RN notified. PT will check back as time allows. [] Surgery/ Procedure  [] Testing      [] Other        [] PT is being discontinued at this time. Patient independent. No further needs. [] PT is being discontinued at this time due to declining physical/ medical status. Therapy is not appropriate at this time.     Brina Barillas, PT

## 2020-11-30 NOTE — ED PROVIDER NOTES
[facility] ED  Emergency Department Encounter  Emergency Medicine Resident     Pt Name: Gissel Falk  MRN: [id number]  [place] 1970  Date ofevaluation: 20  PCP:  Prieto Swenson MD    CHIEF COMPLAINT       Chief Complaint   Patient presents with    Cerebrovascular Accident     LKW 0000 on 2020       HISTORY OF PRESENT ILLNESS  (Location/Symptom, Timing/Onset, Context/Setting, Quality, Duration, Modifying Factors, Severity, Associated signs/symptoms)     Gissel Falk is a 48 y.o. female who presents with intracranial hemorrhage. .  Patient apparently went to bed approximate last night with some weakness in her left arm and leg but this is progressed. She was seen at [facility] where she was found to have a large intra-axial hemorrhage in the right parieto-occipital lobe with a subarachnoid hemorrhage extending caudally. There was some resulting mild midline shift to the left. She was transferred here for further care. Patient reports a 4/10 headache as well as some \"jumpiness \". No history of similar problems. Does have history of bipolar disorder as well as hypertension and a coagulopathy. She is given IV Keppra and started on nicardipine drip and transferred here for neurosurgery evaluation. PAST MEDICAL / SURGICAL / SOCIAL / FAMILY HISTORY      has a past medical history of Asthma, Bipolar 1 disorder ([facility] [address] [employer] pool disease [facility]), Hypertension, and Psychiatric problem. has a past surgical history that includes  section (,); Gastric bypass surgery (); Tonsillectomy and adenoidectomy (); Cholecystectomy (); knee surgery (); Hysterectomy (); laparoscopy (); Colonoscopy (N/A, 2/3/2020); and Upper gastrointestinal endoscopy (N/A, 2/3/2020).     Social History     Socioeconomic History    Marital status:      Spouse name: Not on file    Number of children: Not on file    Years of education: Not on file    Highest education level: Not on file   Occupational History    Not on file   Social Needs    Financial resource strain: Not on file    Food insecurity     Worry: Not on file     Inability: Not on file    Transportation needs     Medical: Not on file     Non-medical: Not on file   Tobacco Use    Smoking status: Never Smoker    Smokeless tobacco: Never Used   Substance and Sexual Activity    Alcohol use: Yes     Comment: no alcohol since Sept    Drug use: No    Sexual activity: Yes     Partners: Male   Lifestyle    Physical activity     Days per week: Not on file     Minutes per session: Not on file    Stress: Not on file   Relationships    Social connections     Talks on phone: Not on file     Gets together: Not on file     Attends Jewish service: Not on file     Active member of club or organization: Not on file     Attends meetings of clubs or organizations: Not on file     Relationship status: Not on file    Intimate partner violence     Fear of current or ex partner: Not on file     Emotionally abused: Not on file     Physically abused: Not on file     Forced sexual activity: Not on file   Other Topics Concern    Not on file   Social History Narrative    Not on file       Family History   Adopted: Yes   Family history unknown: Yes       Allergies:  Pcn [penicillins] and Sulfa antibiotics    Home Medications:  Prior to Admission medications    Medication Sig Start Date End Date Taking?  Authorizing Provider   STIMATE 1.5 MG/ML SOLN nasal solution INSTILL 2 SPRAYS INTO THE NOSE TWICE DAILY STARTING TWO DAYS BEFORE PROCEDURE 2/12/20   Mary Jane Campos MD   amLODIPine (NORVASC) 5 MG tablet Take 5 mg by mouth daily    Historical Provider, MD   QUEtiapine (SEROQUEL) 200 MG tablet Take 200 mg by mouth nightly    Historical Provider, MD   lisinopril (PRINIVIL;ZESTRIL) 20 MG tablet Take 20 mg by mouth daily    Historical Provider, MD   buPROPion (WELLBUTRIN XL) 300 MG XL tablet Take 300 mg by mouth daily 3/5/16   Historical Provider, MD   sertraline (ZOLOFT) 50 MG tablet Take 50 mg by mouth daily 3/5/16   Historical Provider, MD   PROAIR  (90 BASE) MCG/ACT inhaler Inhale 2 puffs into the lungs every 6 hours as needed  9/30/14   Historical Provider, MD   lamoTRIgine (LAMICTAL) 200 MG tablet Take 400 mg by mouth daily 2 tabs 11/5/14   Historical Provider, MD       REVIEW OF SYSTEMS    (2-9 systems for level 4, 10 or more for level 5)      Review of Systems   Constitutional: Negative for chills and fever. HENT: Negative for rhinorrhea and sore throat. Eyes: Negative for redness and itching. Respiratory: Positive for cough. Negative for shortness of breath. Cardiovascular: Negative for chest pain. Gastrointestinal: Negative for abdominal pain, nausea and vomiting. Musculoskeletal: Negative for arthralgias and myalgias. Allergic/Immunologic: Negative for environmental allergies and food allergies. Neurological: Positive for weakness, numbness and headaches. Hematological: Bruises/bleeds easily. Psychiatric/Behavioral: The patient is nervous/anxious. PHYSICAL EXAM   (up to 7 for level 4, 8 or more for level 5)      INITIAL VITALS:   BP (!) 168/88   Pulse 115   Temp 98.4 °F (36.9 °C)   Resp 22   Ht 5' 5\" (1.651 m)   Wt 235 lb (106.6 kg)   SpO2 96%   BMI 39.11 kg/m²     Physical Exam  Vitals signs and nursing note reviewed. Constitutional:       General: She is not in acute distress. Appearance: Normal appearance. She is not ill-appearing, toxic-appearing or diaphoretic. HENT:      Head: Normocephalic and atraumatic. Mouth/Throat:      Mouth: Mucous membranes are moist.      Pharynx: Oropharynx is clear. No oropharyngeal exudate or posterior oropharyngeal erythema. Eyes:      General: No scleral icterus. Extraocular Movements: Extraocular movements intact. Pupils: Pupils are equal, round, and reactive to light.    Neck:      Musculoskeletal: Neck supple. Cardiovascular:      Rate and Rhythm: Normal rate and regular rhythm. Pulmonary:      Effort: Pulmonary effort is normal. No respiratory distress. Breath sounds: Normal breath sounds. No stridor. No wheezing, rhonchi or rales. Abdominal:      General: There is no distension. Palpations: Abdomen is soft. There is no mass. Tenderness: There is no abdominal tenderness. There is no guarding or rebound. Musculoskeletal:      Right lower leg: No edema. Left lower leg: No edema. Skin:     General: Skin is warm and dry. Findings: No rash (over exposed skin). Neurological:      Mental Status: She is alert and oriented to person, place, and time. Comments: EOMI. PERRL. Sensation intact throughout face. Smile symmetric. Uvula and palate rise midline. Shoulder shrug only on R. Tongue protrudes midline. Reports decreased sensation over the L arm and leg. Marked weakness in L arm and L leg. 5/5 strength in RUE and RLE.    Psychiatric:         Mood and Affect: Mood normal.         Behavior: Behavior normal.         DIAGNOSTICS     PLAN (LABS / IMAGING / EKG):  Orders Placed This Encounter   Procedures    CTA HEAD NECK W CONTRAST    CT HEAD WO CONTRAST    CT HEAD WO CONTRAST    STROKE PANEL    Hemoglobin and hematocrit, blood    SODIUM (POC)    POTASSIUM (POC)    CHLORIDE (POC)    CALCIUM, IONIC (POC)    COVID-19    PLATELET FUNCTION TEST    VITAL SIGNS PER TRANSFUSION PROTOCOL    TRANSFUSION REACTION MANAGEMENT    Verify informed consent    Inpatient consult to Stroke Team    Inpatient consult to Neurocritical care    Inpatient consult to Neurosurgery    Inpatient consult to Hem/Onc    Venous Blood Gas, POC    Creatinine W/GFR Point of Care    Lactic Acid, POC    POCT Glucose    Anion Gap (Calc) POC    TYPE AND SCREEN    PREPARE PLATELETS, 1 Product    PATIENT STATUS (FROM ED OR OR/PROCEDURAL) Inpatient       MEDICATIONS ORDERED:  Orders Placed This Encounter   Medications    clevidipine (CLEVIPREX) infusion     Order Specific Question:   Please select a reason the therapeutic interchange was not accepted:      Answer:   [facility] to [facility]hpande: fentaNYL (SUBLIMAZE) injection 50 mcg    LORazepam (ATIVAN) injection 1 mg    iopamidol (ISOVUE-370) 76 % injection 90 mL    mannitol 20 % IVPB 25 g    LORazepam (ATIVAN) injection 1 mg    0.9 % sodium chloride bolus    dexmedetomidine (PRECEDEX) 400 mcg in sodium chloride 0.9 % 100 mL infusion    LORazepam (ATIVAN) injection 1 mg       DIAGNOSTIC RESULTS / EMERGENCYDEPARTMENT COURSE / MDM     LABS:  Results for orders placed or performed during the hospital encounter of 11/30/20   STROKE PANEL   Result Value Ref Range    Glucose 155 (H) 70 - 99 mg/dL    BUN 5 (L) 6 - 20 mg/dL    CREATININE 0.56 0.50 - 0.90 mg/dL    Bun/Cre Ratio NOT REPORTED 9 - 20    Calcium 9.1 8.6 - 10.4 mg/dL    Sodium 137 135 - 144 mmol/L    Potassium 3.1 (L) 3.7 - 5.3 mmol/L    Chloride 96 (L) 98 - 107 mmol/L    CO2 21 20 - 31 mmol/L    Anion Gap 20 (H) 9 - 17 mmol/L    GFR Non-African American >60 >60 mL/min    GFR African American >60 >60 mL/min    GFR Comment          GFR Staging NOT REPORTED     WBC 10.4 3.5 - 11.3 k/uL    RBC 4.39 3.95 - 5.11 m/uL    Hemoglobin 12.9 11.9 - 15.1 g/dL    Hematocrit 38.6 36.3 - 47.1 %    MCV 87.9 82.6 - 102.9 fL    MCH 29.4 25.2 - 33.5 pg    MCHC 33.4 28.4 - 34.8 g/dL    RDW 13.3 11.8 - 14.4 %    Platelets 525 685 - 554 k/uL    MPV 8.9 8.1 - 13.5 fL    NRBC Automated 0.0 0.0 per 100 WBC    Total  (H) 26 - 192 U/L    CK-MB 3.8 <5.4 ng/mL    % CKMB 1.3 0.0 - 3.0 %    CKMB Interpretation COMPATIBLE WITH SKELETAL MUSCLE ORIGIN     Differential Type NOT REPORTED     Seg Neutrophils 81 (H) 36 - 65 %    Lymphocytes 11 (L) 24 - 43 %    Monocytes 7 3 - 12 %    Eosinophils % 0 (L) 1 - 4 %    Basophils 1 0 - 2 %    Immature Granulocytes 0 0 %    Segs Absolute 8.43 (H) 1.50 - 8.10 k/uL GFR Non-African American >60 >60 mL/min    GFR Comment         Lactic Acid, POC   Result Value Ref Range    POC Lactic Acid 3.82 (H) 0.56 - 1.39 mmol/L   POCT Glucose   Result Value Ref Range    POC Glucose 157 (H) 74 - 100 mg/dL   Anion Gap (Calc) POC   Result Value Ref Range    Anion Gap 16 7 - 16 mmol/L       RADIOLOGY:  CTA HEAD NECK W CONTRAST   Preliminary Result   No intracranial large vessel occlusion or aneurysm. No gross vascular   malformation is detected      No significant cervical carotid stenosis      Large right parietal intraparenchymal hematoma. Moderate subarachnoid   hemorrhage within the right sylvian fissure. 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 Romayne Schilling on 11/30/2020   at 8:10 a.m.         CT HEAD WO CONTRAST   Final Result   Large intraparenchymal hematoma within the right parietal lobe measuring 5.4   x 3.1 cm resulting in slight midline shift from right to left. Moderate subarachnoid hemorrhage within the sylvian fissure and moderate   intraventricular hemorrhage within the right occipital horn.       The results of the examination were discussed with Dr. Romayne Schilling on 11/30/2020   at 7:48 a.m.         CT HEAD WO CONTRAST    (Results Pending)      EKG  Rhythm: sinus tachycardia  Rate: tachycardia  Axis: normal  Ectopy: none  Conduction: normal  ST Segments: no acute change  T Waves: no acute change  Q Waves: none    Clinical Impression: When compared to EKG dated 5/13/19, no acute changes and non-specific EKG    Normal Interval Reference:  P-wave <110 ms  -200 ms  QRS <100 ms  QT <420 ms  QTc 330-470 ms    All EKG's are interpreted by the Emergency Department Physician who either signs or Co-signsthis chart in the absence of a cardiologist.    EMERGENCY DEPARTMENT COURSE:         MDM: 48year old female presenting with L intraparenchymal hemorrhage from [facility]. On exam she appears uncomfortable is nontoxic in no acute distress. Alert and oriented x3. She is hypertensive tachycardic vitals otherwise unremarkable. Heart regular rate and rhythm, lungs are vacillation bilateral.  And soft nontender. She does have marked weakness in her left arm and left leg. Is able to move her leg against gravity, however unable to wiggle toes. 0/5  strength on left. Full strength on right side. No dysarthria noted. Cranial nerves II through XII are intact with the exception of left shoulder shrug 0/5 strength. However able to fully rotate her neck. Will start clevidipine drip, call stroke alert, admit to neuro critical care for further evaluation and management. PROCEDURES:  none    CONSULTS:  IP CONSULT TO STROKE TEAM  IP CONSULT TO NEUROCRITICAL CARE  IP CONSULT TO NEUROSURGERY  IP CONSULT TO HEM/ONC  IP CONSULT TO PHARMACY  PHARMACY TO CHANGE BASE FLUIDS    FINAL IMPRESSION      1. Intracranial hemorrhage ([facility] [address]          DISPOSITION / PLAN     DISPOSITION Admitted 11/30/2020 07:21:31 AM      PATIENT REFERRED TO:  No follow-up provider specified.     DISCHARGE MEDICATIONS:  New Prescriptions    No medications on file       Evgeny Gonzalez DO  Emergency Medicine Resident  [facility]    (Please note that portions of this note were completed with a voice recognition program.  Efforts were made to edit thedictations but occasionally words are mis-transcribed.)       Evgeny Gonzalez DO  Resident  11/30/20 [id number]

## 2020-11-30 NOTE — H&P
Neuro ICU History & Physical    Patient Name: Lakia Esposito  Patient : 1970  Room/Bed:   Code Status: FULL  Allergies: Allergies   Allergen Reactions    Pcn [Penicillins]     Sulfa Antibiotics Other (See Comments)     Inflammation in the eye       CHIEF COMPLAINT     Left sided weakness, Headache    HPI    History Obtained From: Patient, EMR    The patient is a 48 y.o. female with a history of HTN, Delta storage pool disease, bipolar disorder, and alcohol abuse who presented as a transfer from [facility] ED after CT head revealed right parietal ICH. Initially presented to [facility] ED with left sided weakness and headache. LKW around midnight; patient states her left upper extremity \"went limp\" at that time. Patient states she went to sleep and woke up around 0400 this morning and noticed her left lower extremity was also weak. Reports she developed a headache by the time EMS arrived. CT Head at [facility] ED revealed large right parietal lobe ICH with moderate SAH. Noted to be hypertensive at [facility] ED, started on Cardene infusion. Patient had recently run out of her Lisinopril. Loaded with 1g Keppra. Transferred to [facility] for further evaluation and management. On arrival to ED, -190's. Unclear whether patient arrived on Cardene infusion but she was started on Clevidipine infusion in ED. CT Head showed stable right parietal ICH with SAH and new intraventricular hemorrhage in the right occipital horn. CTA Head/Neck unremarkable. Given 25g Mannitol x1 per Stroke team.  Neurosurgery and Hem/Onc consulted while in ED. Given 40mcg DDAVP x1 and transfused 1 pack of platelets. Patient denies head trauma or falls. GCS 14.  Opens eyes to voice, oriented x3. Noted to have dense left upper and lower extremity weaknes. Patient has a history of alcohol abuse.   She went to rehab last year and states she no longer drinks daily but still drinks occasionally, last drank vodka last night. ICH score: 2 (Volume ~41ml, +IVH)    Admitted to the Neuro ICU for close monitoring. Patient very restless and fidgity. Started on Precedex infusion. Patient reports history of restless legs but states she is not prescribed medication; started on Requip nightly. Concern for possible alcohol withdraw; Ativan 1mg Q4h ordered. Patient complained of headache and nausea. Clinical exam remains stable. Repeat CT head this afternoon stable. MRI Brain w/wo contrast with no underlying mass or AVM.     Admitted to ICU From: ED  Reason for ICU Admission:  [address]       PATIENT HISTORY   Past Medical History:        Diagnosis Date    Asthma     [facility] ([facility]     Delta storage pool disease ([facility]     Hypertension     Psychiatric problem        Past Surgical History:        Procedure Laterality Date     SECTION  9802,1381    Miscarriage     CHOLECYSTECTOMY      COLONOSCOPY N/A 2/3/2020     bx(normal)hemorrhoids    GASTRIC BYPASS SURGERY      HYSTERECTOMY      KNEE SURGERY      LAPAROSCOPY      TONSILLECTOMY AND ADENOIDECTOMY      UPPER GASTROINTESTINAL ENDOSCOPY N/A 2/3/2020    (normal,neg H-Pylori)normal post-bypass endoscopy       Social History:   Social History     Socioeconomic History    Marital status:      Spouse name: Not on file    Number of children: Not on file    Years of education: Not on file    Highest education level: Not on file   Occupational History    Not on file   Social Needs    Financial resource strain: Not on file    Food insecurity     Worry: Not on file     Inability: Not on file    Transportation needs     Medical: Not on file     Non-medical: Not on file   Tobacco Use    Smoking status: Never Smoker    Smokeless tobacco: Never Used   Substance and Sexual Activity    Alcohol use: Yes     Comment: no alcohol since Sept    Drug use: No    Sexual activity: Yes     Partners: Male   Lifestyle    Physical activity     Days per week: Not on file     Minutes per session: Not on file    Stress: Not on file   Relationships    Social connections     Talks on phone: Not on file     Gets together: Not on file     Attends [employer] service: Not on file     Active member of club or organization: Not on file     Attends meetings of clubs or organizations: Not on file     Relationship status: Not on file    Intimate partner violence     Fear of current or ex partner: Not on file     Emotionally abused: Not on file     Physically abused: Not on file     Forced sexual activity: Not on file   Other Topics Concern    Not on file   Social History Narrative    Not on file       Family History:       Adopted: Yes   Family history unknown: Yes       Allergies:    Pcn [penicillins] and Sulfa antibiotics    Medications Prior to Admission:    Not in a hospital admission. Current Medications:  Current Facility-Administered Medications: clevidipine (CLEVIPREX) infusion, 2 mg/hr, Intravenous, Continuous  iopamidol (ISOVUE-370) 76 % injection 90 mL, 90 mL, Intravenous, ONCE PRN  mannitol 20 % IVPB 25 g, 25 g, Intravenous, Once    REVIEW OF SYSTEMS     CONSTITUTIONAL: negative for fatigue and malaise   EYES: negative for double vision and photophobia    HEENT: negative for tinnitus and sore throat   RESPIRATORY: negative for cough, shortness of breath   CARDIOVASCULAR: negative for chest pain, palpitations, or syncope   GASTROINTESTINAL: negative for abdominal pain, nausea, vomiting, diarrhea, or constipation    GENITOURINARY: negative for incontinence or retention    MUSCULOSKELETAL: negative for neck or back pain, negative for extremity pain   NEUROLOGICAL: Positive left sided weakness and numbness. Negative for seizures, headaches, confusion, aphasia, dysarthria   PSYCHIATRIC: Positive restless, anxious.  negative for agitation, hallucination, SI/HI   SKIN Negative for spontaneous contusions, rashes, or lesions      PHYSICAL EXAM:     BP (!) 168/88   Pulse 115   Temp 98.4 °F (36.9 °C)   Resp 22   Ht 5' 5\" (1.651 m)   Wt 235 lb (106.6 kg)   SpO2 96%   BMI 39.11 kg/m²     PHYSICAL EXAM:  CONSTITUTIONAL:   female, appears state age. Drowsy but awakens briskly to voice and oriented x 3. GCS 14. Restless, moving around the bed. No dysarthria. No aphasia. HEAD:  normocephalic, atraumatic    EYES:  PERRL, Right gaze preference but able to cross midline   ENT:  moist mucous membranes   LUNGS:  Equal air entry bilaterally, clear   CARDIOVASCULAR:  normal s1 / s2, RRR, distal pulses intact   ABDOMEN:  Soft, no rigidity, normal bowel   NECK supple, symmetric   EXTREMITIES Normal ROM with no deformities   NEUROLOGIC:  Mental Status:  Drowsy but awakens to voice. Oriented x3. Cranial Nerves:    II: Visual fields:  Difficult to assess, absent blink to threat on left  III: Pupils:  equal, round, reactive to light  III,IV,VI: Extra Ocular Movements: abnormal - right gaze preference but able to cross midline  V: Facial sensation:  abnormal - decreased on the left  VII: Facial strength: abnormal - left facial droop    Motor Exam:    Drift:  present - left upper and lower extremity drift    Motor exam is 5 out of 5 right upper and right lower extremities  No movement to pain in the left upper extremity. 1/5 strength to left lower extremity, minimal movement, not able to antigravity. Sensory:    Touch:    Right Upper Extremity:  normal  Left Upper Extremity:  abnormal - unable to detect normal touch  Right Lower Extremity:  normal  Left Lower Extremity:  abnormal - unable to detect normal touch     SKIN Rash to bilateral upper extremities from hand to anticub   NIH Stroke Scale Total (if not done complete detailed one below):    1a.  Level of consciousness:  1 - not alert but arousable by minor stimulation to obey, answer or respond  1b.   Level of consciousness questions:  0 - answers both extension. Right parietal and sylvian fissure subarachnoid hemorrhage without significant change. 2. 3 mm right to left midline shift without significant change from the previous study. Ct Head Wo Contrast  Result Date: 11/30/2020  Large intraparenchymal hematoma within the right parietal lobe measuring 5.4 x 3.1 cm resulting in slight midline shift from right to left. Moderate subarachnoid hemorrhage within the sylvian fissure and moderate intraventricular hemorrhage within the right occipital horn. The results of the examination were discussed with Dr. Magdaleno Payan on 11/30/2020 at 7:48 a.m. Cta Head Neck W Contrast  Result Date: 11/30/2020  No intracranial large vessel occlusion or aneurysm. No gross vascular malformation is detected. No significant cervical carotid stenosis. Large right parietal intraparenchymal hematoma. Moderate subarachnoid hemorrhage within the right sylvian fissure. Moderate right occipital lobe hemorrhage. 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 The results of the examination were discussed with Dr. Magdaleno Payan on 11/30/2020 at 8:10 a.m.     Mri Brain W Wo Contrast  Result Date: 11/30/2020  No intracranial mass. Labs and Images reviewed with:    [] Fariba Montgomery MD    [] Teresa Gresham MD  [] Jose Carmona MD  --[] there are no new interval images to review. ASSESSMENT AND PLAN:       The patient is a 47 yo female with a history of HTN, Delta storage pool disease, bipolar disorder, and alcohol abuse who was admitted to the Neuro ICU for management of a large right parietal ICH with subarachnoid and intraventricular blood. Given DDAVP and 1 pack of platelets in setting of platelet disorder. Initially presented to [facility] ED with left sided weakness and headache. Hypertensive with -190's on arrival to ED.     NEUROLOGIC:  - Acute right parietal ICH with intraventricular extension and subarachnoid hemorrhage component  - Etiology unclear; possibly secondary to coaguloapthy secondary to platelet disorder and hypertension  - Transfused 1 pack platelets and given 46HDJ DDAVP x1  - CT Head this afternoon was stable  - MRI Brain with/without contrast showed no underlying mass or AVM  - Continue Keppra 500mg Q12h empirically x 7days  - F/U Repeat CT Head in AM  - Neurosurgery following; appreciate recs  - Goal SBP<160  - History of ETOH abuse; Folic Acid& Thiamine replacement, Ativan 1mg Q4h PRN, Precedex infusion for restlessness/concern for withdraw  - Requip 1mg QHS for restless legs  - Neuro checks per protocol    CARDIOVASCULAR:  - Goal SBP<160  - Wean Clevidipine infusion  - Started on home Norvasc 5mg QD and Lisinopril 20mg QD  - Trop 9  - F/U Echocardiogram  - Lipid panel pending  - Continue telemetry    PULMONARY:  - Maintaining O2 sats on room air  - History of asthma  - Duonebs PRN    RENAL/FLUID/ELECTROLYTE:  - Normal renal functioning  - BUN 5/ Creatinine 0.56  - Monitor I&O  - Patten in place for acute retention; consider void trial 12/1  - IVF: [email or web address]  - Hypokalemia, K 3.1; replace with total 80meq KCl  - Replace electrolytes PRN  - Daily BMP    GI/NUTRITION:  NUTRITION:  No diet orders on file   - Clear liquid diet  - Bowel regimen: Milk of Mag PRN  - GI prophylaxis: N/A    ID:  - Afebrile on arrival  - No leukocytosis, WBC   - Continue to monitor for fevers  - Daily CBC    HEME:   - History of delta storage pool disease  - Given 40mcg DDAVPx1, 1 pack platelets  - Hem/Onc following; appreciate recs  - H&H 12.9/38.6  - Platelets 351  - Daily CBC    ENDOCRINE:  - Continue to monitor blood glucose, goal <180  - Hemoglobin A1C 5.0  - F/U TSH    OTHER:  - History of bipolar disorder; Lamictal 400mg BID, Zoloft 50mg QD  - PT/OT/ST    PROPHYLAXIS:  Stress ulcer: N/A    DVT PROPHYLAXIS:  - SCD sleeves - Thigh High   - No chemoprophylaxis anticoagulation at this time due to [address]     DISPOSITION: Admit to Neuro ICU for management of ICH.       Elton Charles, APRN - [facility]  Neuro Critical Care Service   Pager [phone]  11/30/2020     7:41 AM

## 2020-11-30 NOTE — CONSULTS
Today's Date: 11/30/2020  Patient Name: Shiloh Caal  Date of admission: 11/30/2020  6:38 AM  Patient's age: 48 y.o., 1970  Admission Dx: Intracranial hemorrhage ([facility] [I62.9]    Reason for Consult: history of delta pool deficiency  Requesting Physician: Emily Escalera MD    CHIEF COMPLAINT:  Left sided weakness    History Obtained From:  patient, electronic medical record    HISTORY OF PRESENT ILLNESS:      The patient is a 48 y.o.  female who is admitted to the hospital for acute hemorrhagic CVA. She was transferred from [facility] for Neurosurgical evaluation. She reports waking up at midnight with LUE weakness, after which she went back to sleep. She woke around 3 AM and noticed LLE weakness in addition to previous LUE weakness. She also reports headache and tremors on the right side. She has a reported past medical history of Delta pool storage disease. However, records from [address] [place] [address] dated Aug 2011 showed pathology report showing average of 4.27 DG/PL which was indicated to be normal.     She reports prior major bleeding episode (retroperitoneal bleed) post gall bladder surgery requiring several days of hospitalization in 2011. [facility] dx testing was negative. No prior hx of brain bleeds. She denies taking NSAIDs and did not recently start any new medication. Other pertinent past medical hx includes essential hypertension. At presentation, found to be markedly hypertensive with LUE and RUE weakness. CT head WO contrast showed a large right parieto-occipital hemorrhage with moderated SAH, as well as a mild midline shift from right to left. Labs showed platelet count of 125. Platelet function testing ordered and pending. Platelet transfusion ordered. Past Medical History:   has a past medical history of Asthma, Bipolar 1 disorder ([facility] [address] Delta storage pool disease [facility]), Hypertension, and Psychiatric problem.     Past Surgical History:   has a past surgical history that includes  section (4869,0440); Gastric bypass surgery (); Tonsillectomy and adenoidectomy (); Cholecystectomy (); knee surgery (); Hysterectomy (); laparoscopy (); Colonoscopy (N/A, 2/3/2020); and Upper gastrointestinal endoscopy (N/A, 2/3/2020). Medications:    Prior to Admission medications    Medication Sig Start Date End Date Taking?  Authorizing Provider   STIMATE 1.5 MG/ML SOLN nasal solution INSTILL 2 SPRAYS INTO THE NOSE TWICE DAILY STARTING TWO DAYS BEFORE PROCEDURE 20   Chino Campos MD   amLODIPine (NORVASC) 5 MG tablet Take 5 mg by mouth daily    Historical Provider, MD   QUEtiapine (SEROQUEL) 200 MG tablet Take 200 mg by mouth nightly    Historical Provider, MD   lisinopril (PRINIVIL;ZESTRIL) 20 MG tablet Take 20 mg by mouth daily    Historical Provider, MD   buPROPion (WELLBUTRIN XL) 300 MG XL tablet Take 300 mg by mouth daily 3/5/16   Historical Provider, MD   sertraline (ZOLOFT) 50 MG tablet Take 50 mg by mouth daily 3/5/16   Historical Provider, MD   PROAIR  (90 BASE) MCG/ACT inhaler Inhale 2 puffs into the lungs every 6 hours as needed  14   Historical Provider, MD   lamoTRIgine (LAMICTAL) 200 MG tablet Take 400 mg by mouth daily 2 tabs 14   Historical Provider, MD     Current Facility-Administered Medications   Medication Dose Route Frequency Provider Last Rate Last Dose    clevidipine (CLEVIPREX) infusion  2 mg/hr Intravenous Continuous Glorine Cousin, APRN - CNP 20 mL/hr at 20 0852 10 mg/hr at 20 0852    sodium chloride flush 0.9 % injection 10 mL  10 mL Intravenous 2 times per day Glorine Cousin, APRN - CNP        sodium chloride flush 0.9 % injection 10 mL  10 mL Intravenous PRN Glorine Cousin, APRN - CNP        acetaminophen (TYLENOL) tablet 650 mg  650 mg Oral Q4H PRN Glorine Cousin, APRN - CNP        perflutren lipid microspheres (DEFINITY) injection 1.65 mg  1.5 mL Intravenous ONCE PRN Schriever Blonder, APRN - CNP        0.9 % sodium chloride infusion   Intravenous Continuous Elton Blonder, APRN - CNP        [facility] injection 4 mg  4 mg Intravenous Q6H PRN Elton Blonder, APRN - CNP        0.9 % sodium chloride bolus  20 mL Intravenous Once Francis Hare MD        dexmedetomidine (PRECEDEX) 400 mcg in sodium chloride 0.9 % 100 mL infusion  0.2 mcg/kg/hr Intravenous Continuous Jace , DO 5.3 mL/hr at 11/30/20 0855 0.2 mcg/kg/hr at 11/30/20 0855    LORazepam (ATIVAN) injection 1 mg  1 mg Intravenous Once Jace , DO        desmopressin (DDAVP) 40 mcg in sodium chloride 0.9 % 50 mL IVPB  40 mcg Intravenous Once Elton Willnder, APRN - CNP           Allergies:  Pcn [penicillins] and Sulfa antibiotics    Social History:   reports that she has never smoked. She has never used smokeless tobacco. She reports current alcohol use. She reports that she does not use drugs. Family History: She was adopted. Family history is unknown by patient. REVIEW OF SYSTEMS:      Constitutional: No fever or chills.  No night sweats, no weight loss   Eyes: No eye discharge, double vision, or eye pain   HEENT: negative for sore mouth, sore throat, hoarseness and voice change   Respiratory: negative for cough , sputum, dyspnea, wheezing, hemoptysis, chest pain   Cardiovascular: negative for chest pain, dyspnea, palpitations, orthopnea, PND   Gastrointestinal: negative for nausea, vomiting, diarrhea, constipation, abdominal pain, Dysphagia, hematemesis and hematochezia   Genitourinary: negative for frequency, dysuria, nocturia, and hematuria   Integument:  negative for rash, skin lesions  Hematologic/Lymphatic:  Positive for easy bruising, negative for open bleeding, lymphadenopathy, or petechiae   Endocrine: negative for heat or cold intolerance,weight changes, change in bowel habits and hair loss   Musculoskeletal: negative for myalgias, arthralgias, pain, joint swelling,and bone pain   Neurological: positive for restless leg, positive for headaches and weakness, negative for dizziness, seizures    PHYSICAL EXAM:        BP (!) 168/88   Pulse 115   Temp 98.4 °F (36.9 °C)   Resp 22   Ht 5' 5\" (1.651 m)   Wt 235 lb (106.6 kg)   SpO2 96%   BMI 39.11 kg/m²    Temp (24hrs), Av.4 °F (36.9 °C), Min:98.4 °F (36.9 °C), Max:98.4 °F (36.9 °C)      General appearance - well appearing, intermittently shaking right leg. Mental status - alert and cooperative   Eyes - pupils equal and reactive, extraocular eye movements intact   Ears - not examined  Mouth - mucous membranes dry, pharynx normal without lesions   Neck - supple, no significant adenopathy   Lymphatics - no palpable lymphadenopathy, no hepatosplenomegaly   Chest - clear to auscultation, no wheezes, rales or rhonchi, symmetric air entry   Heart - normal rate, regular rhythm, normal S1, S2, no murmurs  Abdomen - soft, nontender, nondistended, no masses or organomegaly   Neurological - alert, oriented, normal speech, LUE and LLE weakness. Musculoskeletal - no joint tenderness, deformity or swelling   Extremities - peripheral pulses normal, no pedal edema, no clubbing or cyanosis   Skin - normal coloration and turgor, no rashes, no suspicious skin lesions noted ,      DATA:      Labs:     CBC:   Recent Labs     2051   WBC 10.4   HGB 12.9   HCT 38.6        BMP:   Recent Labs     20  0639 2051   NA  --  137   K  --  3.1*   CO2  --  21   BUN  --  5*   CREATININE 0.58 0.56   LABGLOM >60 >60   GLUCOSE  --  155*     PT/INR:   Recent Labs     20   PROTIME 10.2   INR 1.0     APTT:  Recent Labs     20   APTT 25.1     LIVER PROFILE:No results for input(s): AST, ALT, LABALBU in the last 72 hours. IMAGING DATA:      Primary Problem  Acute intracranial hemorrhage.     Active Hospital Problems    Diagnosis Date Noted    [facility] [I62.9] 2020         IMPRESSION: 1. Acute right intraparenchymal and subarachnoid hemorrhage. Likely from poorly controlled hypertension. 2. Delta pool storage deficiency disorder. 3. Hypertensive emergency    RECOMMENDATIONS:  1. Transfuse with platelets. 2. Administer Desmopressin. 3. Follow platelet function studies  4. Management of intracranial hemorrhage and hypertensive emergency per Neurosurgery and Neuro-critical care. Thank you for asking us to see this patient. Domenic Del Castillo MD  PGY-3 IM Resident  11/30/2020,8:57 AM     Attending Physician Statement   I have discussed the care of Jamee Alberts, including pertinent history and exam findings with the resident. I have reviewed the key elements of all parts of the encounter with the resident. I have seen and examined the patient with the resident. I agree with the assessment and plan and status of the problem list as documented. Although patient is known to have platelet dysfunction, however this myself is not expected to be the only cause for patient's continuous intracranial hemorrhage. However for the time being we will continue DDAVP and platelet transfusion as needed. Close monitoring for her active intracranial hemorrhage. Surgical intervention will be determined by neurosurgery                            [address] [place] [address] [facility]                          This note is created with the assistance of a speech recognition program.  While intending to generate a document that actually reflects the content of the visit, the document can still have some errors including those of syntax and sound a like substitutions which may escape proof reading. It such instances, actual meaning can be extrapolated by contextual diversion.

## 2020-11-30 NOTE — ED NOTES
47 yo F   Last known well 0001 11/30/20, woke with L side weakness, incontinent,   Ct IPH R parietal & temporal, mild shift,   190 / 102 starting nicardipine, keppra 1 gm,   Hx of Delta pool storage deficiency,   Gcs 15,      Cloteal Georges [place], RN  11/30/20 8611

## 2020-12-01 ENCOUNTER — APPOINTMENT (OUTPATIENT)
Dept: CT IMAGING | Age: 50
DRG: 064 | End: 2020-12-01
Payer: COMMERCIAL

## 2020-12-01 LAB
ANION GAP SERPL CALCULATED.3IONS-SCNC: 14 MMOL/L (ref 9–17)
BLD PROD TYP BPU: NORMAL
BUN BLDV-MCNC: 13 MG/DL (ref 6–20)
BUN/CREAT BLD: ABNORMAL (ref 9–20)
CALCIUM SERPL-MCNC: 8 MG/DL (ref 8.6–10.4)
CHLORIDE BLD-SCNC: 100 MMOL/L (ref 98–107)
CO2: 22 MMOL/L (ref 20–31)
CREAT SERPL-MCNC: 0.86 MG/DL (ref 0.5–0.9)
DISPENSE STATUS BLOOD BANK: NORMAL
EKG ATRIAL RATE: 120 BPM
EKG P AXIS: 68 DEGREES
EKG P-R INTERVAL: 148 MS
EKG Q-T INTERVAL: 338 MS
EKG QRS DURATION: 70 MS
EKG QTC CALCULATION (BAZETT): 477 MS
EKG R AXIS: 66 DEGREES
EKG T AXIS: 42 DEGREES
EKG VENTRICULAR RATE: 120 BPM
GFR AFRICAN AMERICAN: >60 ML/MIN
GFR NON-AFRICAN AMERICAN: >60 ML/MIN
GFR SERPL CREATININE-BSD FRML MDRD: ABNORMAL ML/MIN/{1.73_M2}
GFR SERPL CREATININE-BSD FRML MDRD: ABNORMAL ML/MIN/{1.73_M2}
GLUCOSE BLD-MCNC: 107 MG/DL (ref 70–99)
HCT VFR BLD CALC: 33.2 % (ref 36.3–47.1)
HEMOGLOBIN: 10.4 G/DL (ref 11.9–15.1)
MCH RBC QN AUTO: 29.1 PG (ref 25.2–33.5)
MCHC RBC AUTO-ENTMCNC: 31.3 G/DL (ref 28.4–34.8)
MCV RBC AUTO: 93 FL (ref 82.6–102.9)
NRBC AUTOMATED: 0 PER 100 WBC
PDW BLD-RTO: 14 % (ref 11.8–14.4)
PLATELET # BLD: 219 K/UL (ref 138–453)
PMV BLD AUTO: 9.2 FL (ref 8.1–13.5)
POTASSIUM SERPL-SCNC: 3.8 MMOL/L (ref 3.7–5.3)
RBC # BLD: 3.57 M/UL (ref 3.95–5.11)
SODIUM BLD-SCNC: 136 MMOL/L (ref 135–144)
TRANSFUSION STATUS: NORMAL
UNIT DIVISION: 0
UNIT NUMBER: NORMAL
WBC # BLD: 9 K/UL (ref 3.5–11.3)

## 2020-12-01 PROCEDURE — 97163 PT EVAL HIGH COMPLEX 45 MIN: CPT

## 2020-12-01 PROCEDURE — 2580000003 HC RX 258: Performed by: STUDENT IN AN ORGANIZED HEALTH CARE EDUCATION/TRAINING PROGRAM

## 2020-12-01 PROCEDURE — 97535 SELF CARE MNGMENT TRAINING: CPT

## 2020-12-01 PROCEDURE — 2500000003 HC RX 250 WO HCPCS: Performed by: STUDENT IN AN ORGANIZED HEALTH CARE EDUCATION/TRAINING PROGRAM

## 2020-12-01 PROCEDURE — 99232 SBSQ HOSP IP/OBS MODERATE 35: CPT | Performed by: NEUROLOGICAL SURGERY

## 2020-12-01 PROCEDURE — 36430 TRANSFUSION BLD/BLD COMPNT: CPT

## 2020-12-01 PROCEDURE — APPSS15 APP SPLIT SHARED TIME 0-15 MINUTES: Performed by: NURSE PRACTITIONER

## 2020-12-01 PROCEDURE — APPNB45 APP NON BILLABLE 31-45 MINUTES: Performed by: NURSE PRACTITIONER

## 2020-12-01 PROCEDURE — 70450 CT HEAD/BRAIN W/O DYE: CPT

## 2020-12-01 PROCEDURE — 99254 IP/OBS CNSLTJ NEW/EST MOD 60: CPT | Performed by: PHYSICAL MEDICINE & REHABILITATION

## 2020-12-01 PROCEDURE — 97530 THERAPEUTIC ACTIVITIES: CPT

## 2020-12-01 PROCEDURE — 2000000003 HC NEURO ICU R&B

## 2020-12-01 PROCEDURE — P9037 PLATE PHERES LEUKOREDU IRRAD: HCPCS

## 2020-12-01 PROCEDURE — 36415 COLL VENOUS BLD VENIPUNCTURE: CPT

## 2020-12-01 PROCEDURE — 99233 SBSQ HOSP IP/OBS HIGH 50: CPT | Performed by: PSYCHIATRY & NEUROLOGY

## 2020-12-01 PROCEDURE — 6360000002 HC RX W HCPCS: Performed by: NURSE PRACTITIONER

## 2020-12-01 PROCEDURE — 80048 BASIC METABOLIC PNL TOTAL CA: CPT

## 2020-12-01 PROCEDURE — 2580000003 HC RX 258: Performed by: NURSE PRACTITIONER

## 2020-12-01 PROCEDURE — 6370000000 HC RX 637 (ALT 250 FOR IP): Performed by: GENERAL PRACTICE

## 2020-12-01 PROCEDURE — 85027 COMPLETE CBC AUTOMATED: CPT

## 2020-12-01 PROCEDURE — 99232 SBSQ HOSP IP/OBS MODERATE 35: CPT | Performed by: INTERNAL MEDICINE

## 2020-12-01 PROCEDURE — 6370000000 HC RX 637 (ALT 250 FOR IP): Performed by: NURSE PRACTITIONER

## 2020-12-01 PROCEDURE — 6360000002 HC RX W HCPCS

## 2020-12-01 PROCEDURE — 6370000000 HC RX 637 (ALT 250 FOR IP): Performed by: PSYCHIATRY & NEUROLOGY

## 2020-12-01 PROCEDURE — 6370000000 HC RX 637 (ALT 250 FOR IP): Performed by: STUDENT IN AN ORGANIZED HEALTH CARE EDUCATION/TRAINING PROGRAM

## 2020-12-01 PROCEDURE — 97167 OT EVAL HIGH COMPLEX 60 MIN: CPT

## 2020-12-01 RX ORDER — MENTHOL AND METHYL SALICYLATE 10; 30 G/100G; G/100G
CREAM TOPICAL 3 TIMES DAILY PRN
Status: DISCONTINUED | OUTPATIENT
Start: 2020-12-01 | End: 2020-12-09 | Stop reason: HOSPADM

## 2020-12-01 RX ORDER — DEXMEDETOMIDINE HYDROCHLORIDE 4 UG/ML
0.2 INJECTION, SOLUTION INTRAVENOUS CONTINUOUS
Status: DISCONTINUED | OUTPATIENT
Start: 2020-12-01 | End: 2020-12-03

## 2020-12-01 RX ORDER — FENTANYL CITRATE 50 UG/ML
INJECTION, SOLUTION INTRAMUSCULAR; INTRAVENOUS
Status: COMPLETED
Start: 2020-12-01 | End: 2020-12-01

## 2020-12-01 RX ORDER — 0.9 % SODIUM CHLORIDE 0.9 %
500 INTRAVENOUS SOLUTION INTRAVENOUS ONCE
Status: COMPLETED | OUTPATIENT
Start: 2020-12-01 | End: 2020-12-01

## 2020-12-01 RX ORDER — SENNA AND DOCUSATE SODIUM 50; 8.6 MG/1; MG/1
2 TABLET, FILM COATED ORAL DAILY
Status: DISCONTINUED | OUTPATIENT
Start: 2020-12-01 | End: 2020-12-09 | Stop reason: HOSPADM

## 2020-12-01 RX ORDER — FENTANYL CITRATE 50 UG/ML
25 INJECTION, SOLUTION INTRAMUSCULAR; INTRAVENOUS ONCE
Status: COMPLETED | OUTPATIENT
Start: 2020-12-01 | End: 2020-12-01

## 2020-12-01 RX ORDER — CALCIUM GLUCONATE 20 MG/ML
2 INJECTION, SOLUTION INTRAVENOUS ONCE
Status: COMPLETED | OUTPATIENT
Start: 2020-12-01 | End: 2020-12-01

## 2020-12-01 RX ORDER — CHLORDIAZEPOXIDE HYDROCHLORIDE 5 MG/1
5 CAPSULE, GELATIN COATED ORAL 4 TIMES DAILY
Status: DISCONTINUED | OUTPATIENT
Start: 2020-12-01 | End: 2020-12-05

## 2020-12-01 RX ORDER — 0.9 % SODIUM CHLORIDE 0.9 %
20 INTRAVENOUS SOLUTION INTRAVENOUS ONCE
Status: COMPLETED | OUTPATIENT
Start: 2020-12-01 | End: 2020-12-01

## 2020-12-01 RX ADMIN — DOCUSATE SODIUM 50MG AND SENNOSIDES 8.6MG 2 TABLET: 8.6; 5 TABLET, FILM COATED ORAL at 17:42

## 2020-12-01 RX ADMIN — AMLODIPINE BESYLATE 5 MG: 5 TABLET ORAL at 08:18

## 2020-12-01 RX ADMIN — SODIUM CHLORIDE, PRESERVATIVE FREE 10 ML: 5 INJECTION INTRAVENOUS at 08:19

## 2020-12-01 RX ADMIN — FENTANYL CITRATE 25 MCG: 50 INJECTION, SOLUTION INTRAMUSCULAR; INTRAVENOUS at 14:10

## 2020-12-01 RX ADMIN — ACETAMINOPHEN 650 MG: 325 TABLET ORAL at 01:27

## 2020-12-01 RX ADMIN — THIAMINE HYDROCHLORIDE 500 MG: 100 INJECTION, SOLUTION INTRAMUSCULAR; INTRAVENOUS at 08:19

## 2020-12-01 RX ADMIN — MENTHOL AND METHYL SALICYLATE: 10; 30 CREAM TOPICAL at 10:04

## 2020-12-01 RX ADMIN — POTASSIUM CHLORIDE 10 MEQ: 7.46 INJECTION, SOLUTION INTRAVENOUS at 00:10

## 2020-12-01 RX ADMIN — CHLORDIAZEPOXIDE HYDROCHLORIDE 5 MG: 5 CAPSULE ORAL at 20:41

## 2020-12-01 RX ADMIN — ROPINIROLE HYDROCHLORIDE 1 MG: 1 TABLET, FILM COATED ORAL at 20:41

## 2020-12-01 RX ADMIN — OXYCODONE HYDROCHLORIDE 5 MG: 5 TABLET ORAL at 11:38

## 2020-12-01 RX ADMIN — CALCIUM GLUCONATE 2 G: 20 INJECTION, SOLUTION INTRAVENOUS at 15:03

## 2020-12-01 RX ADMIN — CHLORDIAZEPOXIDE HYDROCHLORIDE 5 MG: 5 CAPSULE ORAL at 16:28

## 2020-12-01 RX ADMIN — SODIUM CHLORIDE, PRESERVATIVE FREE 10 ML: 5 INJECTION INTRAVENOUS at 21:35

## 2020-12-01 RX ADMIN — BUPROPION HYDROCHLORIDE 300 MG: 150 TABLET, EXTENDED RELEASE ORAL at 08:18

## 2020-12-01 RX ADMIN — DEXMEDETOMIDINE HYDROCHLORIDE 0.2 MCG/KG/HR: 400 INJECTION INTRAVENOUS at 19:36

## 2020-12-01 RX ADMIN — SERTRALINE 50 MG: 50 TABLET, FILM COATED ORAL at 08:18

## 2020-12-01 RX ADMIN — LORAZEPAM 1 MG: 1 TABLET ORAL at 17:37

## 2020-12-01 RX ADMIN — LAMOTRIGINE 400 MG: 100 TABLET ORAL at 08:19

## 2020-12-01 RX ADMIN — SODIUM CHLORIDE 500 ML: 0.9 INJECTION, SOLUTION INTRAVENOUS at 01:23

## 2020-12-01 RX ADMIN — SODIUM CHLORIDE 20 ML: 0.9 INJECTION, SOLUTION INTRAVENOUS at 12:41

## 2020-12-01 RX ADMIN — ACETAMINOPHEN 650 MG: 325 TABLET ORAL at 07:29

## 2020-12-01 RX ADMIN — LISINOPRIL 20 MG: 20 TABLET ORAL at 08:18

## 2020-12-01 RX ADMIN — FOLIC ACID 1 MG: 1 TABLET ORAL at 08:18

## 2020-12-01 RX ADMIN — OXYCODONE HYDROCHLORIDE 5 MG: 5 TABLET ORAL at 20:41

## 2020-12-01 RX ADMIN — ONDANSETRON 4 MG: 2 INJECTION INTRAMUSCULAR; INTRAVENOUS at 13:52

## 2020-12-01 ASSESSMENT — PAIN DESCRIPTION - LOCATION
LOCATION: BACK
LOCATION: BACK
LOCATION: HEAD
LOCATION: BACK
LOCATION: BACK

## 2020-12-01 ASSESSMENT — PAIN DESCRIPTION - PAIN TYPE
TYPE: CHRONIC PAIN
TYPE: ACUTE PAIN
TYPE: ACUTE PAIN

## 2020-12-01 ASSESSMENT — PAIN SCALES - GENERAL
PAINLEVEL_OUTOF10: 8
PAINLEVEL_OUTOF10: 5
PAINLEVEL_OUTOF10: 6
PAINLEVEL_OUTOF10: 6
PAINLEVEL_OUTOF10: 5
PAINLEVEL_OUTOF10: 4
PAINLEVEL_OUTOF10: 10
PAINLEVEL_OUTOF10: 5
PAINLEVEL_OUTOF10: 6

## 2020-12-01 ASSESSMENT — PAIN DESCRIPTION - ORIENTATION
ORIENTATION: LOWER
ORIENTATION: LOWER
ORIENTATION: OTHER (COMMENT)

## 2020-12-01 ASSESSMENT — PAIN DESCRIPTION - DESCRIPTORS: DESCRIPTORS: DISCOMFORT;HEADACHE

## 2020-12-01 ASSESSMENT — PAIN - FUNCTIONAL ASSESSMENT: PAIN_FUNCTIONAL_ASSESSMENT: ACTIVITIES ARE NOT PREVENTED

## 2020-12-01 ASSESSMENT — PAIN DESCRIPTION - FREQUENCY: FREQUENCY: CONTINUOUS

## 2020-12-01 NOTE — CARE COORDINATION
Case Management Initial Discharge Plan  Alecia Donaldson,             Met with:patient to discuss discharge plans. Information verified: address, contacts, phone number, , insurance Yes    Emergency Contact/Next of Kin name & number: timo Brand [phone]    PCP: Chula Villar MD  Date of last visit: 1 year ago    Insurance Provider: [employer] mutual    Discharge Planning    Living Arrangements:  Spouse/Significant Other   Support Systems:  Spouse/Significant Other    Home has 2 stories  few stairs to climb to get into front door, flightstairs to climb to reach second floor  Location of bedroom/bathroom in home upper level    Patient able to perform ADL's:Independent    Current Services (outpatient & in home) none   DME equipment: none   DME provider: n/a    Receiving oral anticoagulation therapy? No    If indicated:   Physician managing anticoagulation treatment:   Where does patient obtain lab work for ATC treatment? Potential Assistance Needed:  N/A    Patient agreeable to home care: if needed  Freedom of choice provided:  yes    Prior SNF/Rehab Placement and Facility:   Agreeable to SNF/Rehab: Yes  [place] of choice provided: yes     Evaluation: no    Expected Discharge date:  20    Patient expects to be discharged to:  home  Follow Up Appointment: Best Day/ Time: Monday AM    Transportation provider: self/family  Transportation arrangements needed for discharge: No    Readmission Risk              Risk of Unplanned Readmission:        13             Does patient have a readmission risk score greater than 14?: No  If yes, follow-up appointment must be made within 7 days of discharge.      Goals of Care: safety    Discharge Plan: ARU vs SNF pending therapies          Electronically signed by Sarabia RN on 20 at 1:12 PM EST

## 2020-12-01 NOTE — PROGRESS NOTES
Date:                           12/1/2020  Patient name:           Kylee Borjas  Date of admission:  11/30/2020  6:38 AM  MRN:   [id number]  YOB: 1970  PCP:                           Payton Whelan MD        Reason for consult: Delta Pool storage deficiency    Subjective:   Patient seen and examined at bedside. She reports back pain, as well as headache, mostly in the right temporal and occipital region. No further episodes of vomiting today. Repeat CT of the head WO contrast showed stable findings as compared to 11/30/2020. REVIEW OF SYSTEMS:  General: no fever or night sweats  ENT: No double or blurred vision, no hearing problem, no dysphagia or sore throat   Respiratory: No chest pain, no shortness of breath, no cough or hemoptysis. Cardiovascular: Denies chest pain, PND or orthopnea. No LE swelling or palpitations. Gastrointestinal:    Positive for nausea, no vomiting, abdominal pain, diarrhea or constipation. Genitourinary: Denies dysuria, hematuria, frequency, urgency or incontinence. Neurological: Positive for headaches, positive for left upper and lower limb hemiparesis, no decreased LOC   Musculoskeletal: Positive for back pain, no joint swelling.    Skin: Positive for rash on arms and legs  Psych: Denies hallucinations or intentions to harm self        Objective:     Vitals: /76   Pulse 60   Temp 98.2 °F (36.8 °C) (Oral)   Resp 16   Ht 5' 5\" (1.651 m)   Wt 235 lb (106.6 kg)   SpO2 99%   BMI 39.11 kg/m²   General appearance -appears slightly somnolent, no in pain or distress  Mental status -oriented x3  Eyes - pupils equal and reactive, extraocular eye movements intact  Mouth - mucous membranes moist, pharynx normal without lesions  Neck - supple, no significant adenopathy  Lymphatics - no palpable lymphadenopathy, no hepatosplenomegaly  Chest - clear to auscultation, no wheezes, rales or rhonchi, symmetric air entry  Heart - normal rate, regular rhythm, normal S1, S2, no murmurs  Abdomen - soft, nontender, nondistended, no masses or organomegaly  Neurological - alert, oriented, normal speech, no focal findings or movement disorder noted  Extremities - peripheral pulses normal, no pedal edema, no clubbing or cyanosis  Skin - normal coloration and turgor, no rashes, no suspicious skin lesions noted         Data:    No intake/output data recorded. In: 2366 [I.V.:2366]  Out: 535 [Urine:535]    CBC:   Recent Labs     11/30/20 0651 12/01/20 0422   WBC 10.4 9.0   HGB 12.9 10.4*    219     BMP:    Recent Labs     11/30/20 0639 11/30/20 0651 12/01/20 0422   NA  --  137 136   K  --  3.1* 3.8   CL  --  96* 100   CO2  --  21 22   BUN  --  5* 13   CREATININE 0.58 0.56 0.86   GLUCOSE  --  155* 107*     Hepatic:   Recent Labs     11/30/20 0651   AST 51*   ALT 24   BILITOT 0.50   ALKPHOS 126*     INR:   Recent Labs     11/30/20 0651   INR 1.0     PTT:No results for input(s): PTT in the last 72 hours. 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 Acute intracranial hemorrhage. 2. Delta pool storage disorder  3. Hypertensive emergency  4. Alcohol withdrawal        Plan     1. Transfuse with platelets and administer desmopressin as needed. Other management per neuro critical care.     Yoanna Linder MD  PGY-3 IM Resident  12/1/2020,10:52 AM      Attending Physician Statement   I have discussed the care of [employer], including pertinent history and exam findings with the resident. I have reviewed the key elements of all parts of the encounter with the resident. I have seen and examined the patient with the resident. I agree with the assessment and plan and status of the problem list as documented.                                [address] [facility]

## 2020-12-01 NOTE — PROGRESS NOTES
Physical Therapy    Facility/Department: [address]  Initial Assessment    NAME: Kemal Leiva  : 1970  MRN: [id number]    Date of Service: 2020   Initially presented to [facility] ED with left sided weakness and headache. LKW around midnight 2020; patient states her left upper extremity \"went limp\" at that time. Patient states she went to sleep and woke up around 0400 2020 and noticed her left lower extremity was also weak. Reports she developed a headache by the time EMS arrived. CT Head at [facility] ED revealed large right parietal lobe ICH with moderate SAH. Discharge Recommendations:    Further therapy recommended at discharge and the patient should be able to tolerate at least 3 hours per day over 5 days or 15 hours over 7 days. PT Equipment Recommendations  Other: TBD    Assessment   Body structures, Functions, Activity limitations: Decreased functional mobility ; Decreased strength;Decreased safe awareness;Decreased endurance;Decreased balance; Increased pain;Decreased sensation  Assessment: Patient pleasant and cooperative throughout PT evaluation. Pt required maxAx2 for bed mobility and demo'd poor sitting tolerance. Pt would benefit from continued therapy to increase independence and return to previous level of function. Prognosis: Fair  Decision Making: High Complexity  PT Education: Goals; General Safety;PT Role;Plan of Care;Transfer Training  REQUIRES PT FOLLOW UP: Yes  Activity Tolerance  Activity Tolerance: Patient limited by fatigue       Patient Diagnosis(es): The encounter diagnosis was Intracranial hemorrhage ([facility] [address] has a past medical history of Asthma, Bipolar 1 disorder ([facility] [address] Delta storage pool disease [facility]), Hypertension, and Psychiatric problem. has a past surgical history that includes  section ([id number],[id number]); Gastric bypass surgery (); Tonsillectomy and adenoidectomy (); Cholecystectomy (); knee surgery ();  Hysterectomy (); laparoscopy (1993); Colonoscopy (N/A, 2/3/2020); and Upper gastrointestinal endoscopy (N/A, 2/3/2020). Restrictions  Restrictions/Precautions  Restrictions/Precautions: General Precautions, Seizure, Fall Risk, Up as Tolerated  Required Braces or Orthoses?: No  Position Activity Restriction  Other position/activity restrictions: up with assist; 3L O2 via nasal cannula  Vision/Hearing  Vision: Impaired(pt has difficulty reaching for/finding objects that are in front of her)  Hearing: Within functional limits     Subjective  General  Patient assessed for rehabilitation services?: Yes  Response To Previous Treatment: Not applicable  Family / Caregiver Present: No  Pain Screening  Patient Currently in Pain: Yes  Pain Assessment  Pain Assessment: 0-10  Pain Level: 5  Pain Type: Chronic pain  Pain Location: Back  Vital Signs  Patient Currently in Pain: Yes     Orientation  Orientation  Overall Orientation Status: Within Functional Limits  Social/Functional History  Social/Functional History  Lives With: Spouse(2 adult children, neither lives at home)  Type of Home: House  Home Layout: Multi-level, Bed/Bath upstairs(3 levels. Half bath on main level.)  Home Access: Stairs to enter without rails  Entrance Stairs - Number of Steps: 4  Bathroom Shower/Tub: Tub/Shower unit  Bathroom Toilet: Standard  Bathroom Equipment: (No equiptment)  Home Equipment: (No AD)  ADL Assistance: Independent  Homemaking Assistance: Independent  Homemaking Responsibilities: Yes  Ambulation Assistance: Independent  Transfer Assistance: Independent  Active : Yes  Mode of Transportation: SUV  Occupation: Part time employment  Type of occupation:   Leisure & Hobbies: relax  Additional Comments:  is a teacher and works full time.   Objective     Observation/Palpation  Posture: Fair    AROM RLE (degrees)  RLE AROM: WFL  PROM LLE (degrees)  LLE PROM: WFL  AROM RUE (degrees)  RUE AROM : WFL  PROM LUE (degrees)  LUE PROM: [facility]  Strength RLE  Strength RLE: WFL  Strength LLE  Strength LLE: Exception  Comment: no active movement noted  Strength RUE  Strength RUE: WFL  Strength LUE  Strength LUE: Exception  Comment: no active movement noted  Tone RLE  RLE Tone: Normotonic  Tone LLE  LLE Tone: Normotonic  Sensation  Overall Sensation Status: Impaired(no sensation in LUE/LLE)  Bed mobility  Supine to Sit: Maximum assistance;2 Person assistance(HOB max elevated)  Sit to Supine: Maximum assistance;2 Person assistance  Transfers  Comment: did not attempt d/t poor sitting tolerance  Ambulation  Ambulation?: No(did not attempt d/t poor sitting tolerance)     Balance  Posture: Fair  Sitting - Static: Poor;+(pt dangled EOB for 8' with maxA to maintain balance)  Sitting - Dynamic: Poor;-  Exercises  Comments: L calf stretch 3x20s, LLE PROM x10     Plan   Plan  Times per week: 5-6x per week  Times per day: Daily  Current Treatment Recommendations: Strengthening, ROM, Balance Training, Functional Mobility Training, Transfer Training  Safety Devices  Type of devices:  All fall risk precautions in place, Bed alarm in place, Call light within reach, Left in bed, Nurse notified  Restraints  Initially in place: No  AM-PAC Score  AM-PAC Inpatient Mobility Raw Score : 8 (12/01/20 1123)  AM-PAC Inpatient T-Scale Score : 28.52 (12/01/20 1123)  Mobility Inpatient CMS 0-100% Score: 86.62 (12/01/20 1123)  Mobility Inpatient CMS G-Code Modifier : CM (12/01/20 1123)  Goals  Short term goals  Time Frame for Short term goals: 12 visits  Short term goal 1: pt will perform bed mobility with SBA  Short term goal 2: pt will dangle EOB for 20 minutes with SBA  Short term goal 3: pt will perform transfers with [place]  Short term goal 4: progress mobility as appropriate  Patient Goals   Patient goals : to regain use of L arm and L leg     Therapy Time   Individual Concurrent Group Co-treatment   Time In 0922         Time Out 0959         Minutes 37         Timed Code Treatment Minutes: 12 Minutes    This treatment/evaluation completed by signing SPT. Signing PT agrees with treatment and documentation.   Myra Leigh, Student Physical Therapist

## 2020-12-01 NOTE — PROGRESS NOTES
Daily Progress Note  Neuro Critical Care    Patient Name: Gabe Zarco  Patient : 1970  Room/Bed: 4384/3927-92  Code Status: FULL  Allergies: Allergies   Allergen Reactions    Pcn [Penicillins]     Sulfa Antibiotics Other (See Comments)     Inflammation in the eye       CHIEF COMPLAINT:      Headache, Right sided weakness     INTERVAL HISTORY    Initial Presentation (Admitted 20): The patient is a 48 y.o. female with a history of HTN, Delta storage pool disease, bipolar disorder, and alcohol abuse who presented as a transfer from [facility] ED after CT head revealed right parietal ICH. Initially presented to [facility] ED with left sided weakness and headache. LKW around midnight; patient states her left upper extremity \"went limp\" at that time. Patient states she went to sleep and woke up around 0400 this morning and noticed her left lower extremity was also weak. Reports she developed a headache by the time EMS arrived. CT Head at [facility] ED revealed large right parietal lobe ICH with moderate SAH. Noted to be hypertensive at [facility] ED, started on Cardene infusion. Patient had recently run out of her Lisinopril. Loaded with 1g Keppra. Transferred to [facility] for further evaluation and management. On arrival to ED, -190's. Unclear whether patient arrived on Cardene infusion but she was started on Clevidipine infusion in ED. CT Head showed stable right parietal ICH with SAH and new intraventricular hemorrhage in the right occipital horn. CTA Head/Neck unremarkable. Given 25g Mannitol x1 per Stroke team.  Neurosurgery and Hem/Onc consulted while in ED. Given 40mcg DDAVP x1 and transfused 1 pack of platelets. Patient denies head trauma or falls. GCS 14.  Opens eyes to voice, oriented x3. Noted to have dense left upper and lower extremity weaknes. Patient has a history of alcohol abuse.   She went to rehab last year and states she no longer drinks daily but still drinks occasionally, last drank vodka last night.       ICH score: 2 (Volume ~41ml, +IVH)     Admitted to the Neuro ICU for close monitoring. Patient very restless and fidgity. Started on Precedex infusion. Patient reports history of restless legs but states she is not prescribed medication; started on Requip nightly. Concern for possible alcohol withdraw; Ativan 1mg Q4h ordered. Patient complained of headache and nausea. Clinical exam remains stable. Repeat CT head this afternoon stable. MRI Brain w/wo contrast with no underlying mass or AVM. Last 24h:   Remained on Precedex infusion overnight. Nursing reports patient less restless and slept well overnight. Mildly hypotensive overnight, improved with 500cc fluid bolus. Repeat CT Head this morning was overall stable except small amount of acute hemorrhage in left occipital horn. Given 1 pack platelets. Clinical exam remains stable. In the afternoon patient complained of severe headache with nausea. Repeat CT head stable. Patient getting more restless this afternoon; start Librium 5mg 4XD for suspected alcohol withdraw.     CURRENT MEDICATIONS:  SCHEDULED MEDICATIONS:   sodium chloride flush  10 mL Intravenous 2 times per day    sodium chloride  20 mL Intravenous Once    folic acid  1 mg Oral Daily    thiamine (VITAMIN B1) IVPB  500 mg Intravenous Q24H    amLODIPine  5 mg Oral Daily    buPROPion  300 mg Oral Daily    lamoTRIgine  400 mg Oral Daily    lisinopril  20 mg Oral Daily    sertraline  50 mg Oral Daily    rOPINIRole  1 mg Oral Nightly     CONTINUOUS INFUSIONS:   clevidipine Stopped (11/30/20 1717)    sodium chloride 75 mL/hr at 11/30/20 0913    dexmedetomidine 0.2 mcg/kg/hr (11/30/20 1747)     PRN MEDICATIONS:   sodium chloride flush, acetaminophen, perflutren lipid microspheres, ondansetron, ipratropium-albuterol, LORazepam, [Held by provider] oxyCODONE, sodium chloride flush, magnesium hydroxide    VITALS:  Temperature Range: Temp: 98.2 °F (36.8 °C) Temp  Av.5 °F (36.9 °C)  Min: 98.2 °F (36.8 °C)  Max: 98.8 °F (37.1 °C)  BP Range: Systolic (93MLR), OXC:159 , Min:85 , GRS:684     Diastolic (89VKT), OOJ:93, Min:37, Max:134    Pulse Range: Pulse  Av.7  Min: 64  Max: 139  Respiration Range: Resp  Av.7  Min: 16  Max: 29  Current Pulse Ox: SpO2: 97 %  24HR Pulse Ox Range: SpO2  Av.8 %  Min: 92 %  Max: 100 %  Patient Vitals for the past 12 hrs:   BP Temp Temp src Pulse Resp SpO2   20 0700 123/75 -- -- 66 17 97 %   20 0600 (!) 102/51 -- -- 64 16 99 %   20 0520 (!) 100/52 -- -- -- -- --   20 0400 (!) 94/50 98.2 °F (36.8 °C) Oral 65 17 99 %   20 0300 (!) 95/48 -- -- 66 17 99 %   20 0245 (!) 97/50 -- -- 66 17 --   20 0230 93/66 -- -- 67 18 --   20 0215 103/61 -- -- 70 19 --   20 0200 (!) 99/51 -- -- 77 16 99 %   20 0145 (!) 110/55 -- -- 70 17 --   20 0100 (!) 89/48 -- -- 71 17 98 %   20 0000 117/66 98.6 °F (37 °C) Oral 74 17 100 %   20 2300 (!) 119/52 -- -- 72 18 100 %   20 2200 (!) 116/38 -- -- 76 18 98 %   20 2100 (!) 155/88 -- -- 92 17 99 %   11/30/20 2000 129/71 98.3 °F (36.8 °C) Oral 98 20 99 %     Estimated body mass index is 39.11 kg/m² as calculated from the following:    Height as of this encounter: 5' 5\" (1.651 m).     Weight as of this encounter: 235 lb (106.6 kg).  []<16 Severe malnutrition  []16-16.99 Moderate malnutrition  []17-18.49 Mild malnutrition  []18.5-24.9 Normal  []25-29.9 Overweight (not obese)  []30-34.9 Obese class 1 (Low Risk)  [x]35-39.9 Obese class 2 (Moderate Risk)  []?40 Obese class 3 (High Risk)    RECENT LABS:   Lab Results   Component Value Date    WBC 9.0 2020    HGB 10.4 (L) 2020    HCT 33.2 (L) 2020     2020    CHOL 247 (H) 2020    TRIG 77 2020     2020    ALT 24 2020    AST 51 (H) 2020     2020    K 3.8 2020  12/01/2020    CREATININE 0.86 12/01/2020    BUN 13 12/01/2020    CO2 22 12/01/2020    TSH 1.34 11/30/2020    INR 1.0 11/30/2020    LABA1C 5.0 11/30/2020     24 HOUR INTAKE/OUTPUT:    Intake/Output Summary (Last 24 hours) at 12/1/2020 5057  Last data filed at 12/1/2020 0600  Gross per 24 hour   Intake 3194 ml   Output 1735 ml   Net 1459 ml       IMAGING:   Ct Head Wo Contrast  Result Date: 12/1/2020  Redemonstration of intraparenchymal hemorrhage in the right parietooccipital region that is stable compared to prior exam.  Stable edema and similar leftward midline shift. Similar right cerebral hemisphere subarachnoid hemorrhage. Similar intraventricular hemorrhagic products layering in the lateral ventricles. Findings were discussed with Dr. Duy Wyman At 2:53 pm on 12/1/2020. Ct Head Wo Contrast  Result Date: 12/1/2020  No significant interval change compared to CT head done November 30, 2020. Similar-appearing acute intraparenchymal hematoma centered in the right parietal lobe with surrounding vasogenic edema as well as scattered right-sided acute subarachnoid hemorrhage and hemorrhage layering in the occipital horns of the lateral ventricles. Stable right to left midline shift measuring 3 mm. Mri Brain W Wo Contrast  Result Date: 11/30/2020  No intracranial mass. Labs and Images reviewed with:  [] Dr. Yari Fernandez    [x] Dr. Ashu Jay  [] Dr. Telma Valencia  [] There are no new interval images to review. PHYSICAL EXAM       CONSTITUTIONAL:  Alert and oriented x 3. Resting comfortably. GCS 15. Nontoxic. No dysarthria. No aphasia.    HEAD:  normocephalic, atraumatic    EYES:  PERRL, EOMI.   ENT:  moist mucous membranes   NECK:  supple, symmetric   LUNGS:  Equal air entry bilaterally, clear   CARDIOVASCULAR:  normal s1 / s2, RRR, distal pulses intact   ABDOMEN:  Soft, no rigidity   NEUROLOGIC:  Mental Status:  A & O x3, Awake             Cranial Nerves:    II: Visual fields:  Normal, intact blink to threat  III: Pupils:  equal, round, reactive to light  III,IV,VI: Extra Ocular Movements: intact  V: Facial sensation:  abnormal - decreased sensation on the left  VII: Facial strength: abnormal - left facial droop    Motor Exam:    Drift:  present - left upper and left lower extremities  Tone:  abnormal - decreased LUE    5/5 strength to the right upper and right lower extremities  0/5 strength to left upper extremity  1/5 strength to left lower extremity, withdraws to pain, minimal spontaneous movement    Sensory:    Touch:    Right Upper Extremity:  normal  Left Upper Extremity:  abnormal - severely decreased  Right Lower Extremity:  normal  Left Lower Extremity:  abnormal - severely decreased     NIH Stroke Scale Total (if not done complete detailed one below):    1a.  Level of consciousness:  0 - alert; keenly responsive  1b. Level of consciousness questions:  0 - answers both questions correctly  1c. Level of consciousness questions:  0 - performs both tasks correctly  2. Best Gaze:  0 - normal  3. Visual:  0 - no visual loss  4. Facial Palsy:  2 - partial paralysis (total or near total paralysis of the lower face)  5a. Motor left arm:  4 - no movement  5b. Motor right arm:  0 - no drift, limb holds 90 (or 45) degrees for full 10 seconds  6a. Motor left leg:  3 - no effort against gravity; leg falls to bed immediately  6b. Motor right le - no drift; leg holds 30 degree position for full 5 seconds  7. Limb Ataxia:  0 - absent  8. Sensory:  2 - severe to total sensory loss; patient is not aware of being touched in face, arm, leg  9. Best Language:  0 - no aphasia, normal  10. Dysarthria:  0 - normal  11. Extinction and Inattention:  1 - visual, tactile, auditory, spatial or personal inattention or extinction to bilateral simultaneous stimulation in one of the sensory modalities   TOTAL:   12    DRAINS:  [x] There are no drains for Neuro Critical Care to monitor at this time. ASSESSMENT AND PLAN:       The patient is a 49 yo female with a history of HTN, Delta storage pool disease, bipolar disorder, and alcohol abuse who was admitted to the Neuro ICU for management of a large right parietal ICH with subarachnoid and intraventricular blood. Given DDAVP and 1 pack of platelets in setting of platelet disorder. Initially presented to [facility] ED with left sided weakness and headache. Hypertensive with -190's on arrival to ED.     NEUROLOGIC:  - Acute right parietal ICH with intraventricular extension and subarachnoid hemorrhage component  - Etiology unclear; possibly secondary to coaguloapthy in setting of platelet disorder and hypertension  - No underlying mass or vascular malformation on MRI brain with/without contrast  - CT Head stable except some acute hemorrhage now in the left occipital horn  - Transfuse 1 pack platelets today  - Stop Keppra as patient is already on Lamictal for bipolar which covers her for seizure prophylaxis   - Neurosurgery following; appreciate recs  - Goal SBP<160  - History of ETOH abuse;  Folic Acid& Thiamine replacement, Librium 5mg 4XD, Ativan 1mg Q4h PRN  - Roxicodone 6mg Q6h PRN for headache  - Requip 1mg QHS for restless legs  - Neuro checks per protocol     CARDIOVASCULAR:  - Goal SBP<160  - Continue home Norvasc 5mg QD and Lisinopril 20mg QD  - Trop 9  - F/U Echocardiogram  - Lipid panel; LDL 90, Cholesterol 247  - Consider statin therapy  - Continue telemetry     PULMONARY:  - Maintaining O2 sats on room air  - History of asthma  - Duonebs PRN     RENAL/FLUID/ELECTROLYTE:  - Normal renal functioning  - BUN 13/ Creatinine 0.86  - Monitor I&O; 3343/1735  - Remove wall catheter  - IVF: [email or web address], stop when taking adequate PO  - Hypocalcemia, Ca 8.0; give 2g calcium gluconate  - Replace electrolytes PRN  - Daily BMP     GI/NUTRITION:  NUTRITION:  - Advance to General Diet  - Poor appetite; start nutrition supplements  - Bowel regimen: Senokot-S daily, Milk of Mag PRN  - GI prophylaxis: N/A     ID:  - Afebrile, Tmax 37.0  - No leukocytosis, WBC 9.0  - UA 11/30 negative  - COVID-19 negative 11/30  - Continue to monitor for fevers  - Daily CBC     HEME:   - History of delta storage pool disease  - Transfuse 1 pack platelets today  - Hem/Onc following; appreciate recs  - H&H 10.4/33.2 (stable looking back through labs)  - Platelets 930  - Daily CBC     ENDOCRINE:  - Continue to monitor blood glucose, goal <180  - Hemoglobin A1C 5.0  - TSH 1.34     OTHER:  - History of bipolar disorder; Lamictal 400mg BID, Zoloft 50mg QD  - PT/OT/ST   - PM&R consulted     PROPHYLAXIS:  Stress ulcer: N/A     DVT PROPHYLAXIS:  - SCD sleeves - Thigh High   - No chemoprophylaxis anticoagulation at this time due to [address], consider 12/2.     DISPOSITION:  [x] To remain ICU for close neurological monitoring. We will continue to follow along. For any changes in exam or patient status please contact Neuro Critical Care.       KYLEE Parra - [facility]  Neuro Critical Care  Pager [phone]  12/1/2020     7:29 AM

## 2020-12-01 NOTE — PROGRESS NOTES
disorder ([facility] [address] Delta storage pool disease [facility]), Hypertension, and Psychiatric problem. has a past surgical history that includes  section (4352,8665); Gastric bypass surgery (); Tonsillectomy and adenoidectomy (); Cholecystectomy (); knee surgery (); Hysterectomy (); laparoscopy (); Colonoscopy (N/A, 2/3/2020); and Upper gastrointestinal endoscopy (N/A, 2/3/2020). Restrictions  Restrictions/Precautions  Restrictions/Precautions: General Precautions, Seizure, Fall Risk, Up as Tolerated  Required Braces or Orthoses?: No  Position Activity Restriction  Other position/activity restrictions: up with assist; 3L O2 via nasal cannula    Subjective   General  Patient assessed for rehabilitation services?: Yes  Family / Caregiver Present: No  General Comment  Comments: RN ok'd for therapy visit this AM. Pt agreeable to session, pleasent/cooperative throughout. Patient Currently in Pain: Yes  Pain Assessment  Pain Assessment: 0-10  Pain Level: 6  Pain Type: Chronic pain  Pain Location: Back  Pain Orientation: Lower  Vital Signs  Pulse: 108  Resp: 20  BP: (!) 141/58  MAP (mmHg): 89  Patient Currently in Pain: Yes  Oxygen Therapy  SpO2: 99 %     Social/Functional History  Social/Functional History  Lives With: Spouse(2 adult children, neither lives at home)  Type of Home: House  Home Layout: Multi-level, Bed/Bath upstairs(3 levels.  Half bath on main level.)  Home Access: Stairs to enter without rails  Entrance Stairs - Number of Steps: 4  Bathroom Shower/Tub: Tub/Shower unit  Bathroom Toilet: Standard  Bathroom Equipment: (No equiptment)  Home Equipment: (No AD)  ADL Assistance: Independent  Homemaking Assistance: Independent  Homemaking Responsibilities: Yes  Ambulation Assistance: Independent  Transfer Assistance: Independent  Active : Yes  Mode of Transportation: SUV  Occupation: Part time employment  Type of occupation:   Leisure & Hobbies: relax  Additional Comments:  is a teacher and works full time. Objective   Vision: Impaired(WFL for simple vision screening; however, pt observed having difficult time seeing objects placed in front of her.)  Vision Exceptions: Wears glasses at all times(Does not have glasses with her)    Hearing: Within functional limits    Orientation  Overall Orientation Status: Within Functional Limits     Observation/Palpation  Posture: Fair    Balance  Sitting Balance: Maximum assistance(Max A to maintain sitting balance for ~10 minutes. Static sitting EOB and to complete simple grooming task. Pt with significant L lateral lean. PROM completed.)  Standing Balance  Comment: UAT    Functional Mobility  Functional Mobility Comments: UAT    ADL  Grooming: Maximum assistance;Stand by assistance(Required MAX A for sitting balance to don chapstick sitting EOB. Pt opened and closed chapstick with R hand without assistance. When provided chapstick pt asked to grab it on L side. Pt negleting but with cues attended to L side.)  UE Bathing: Setup; Increased time to complete;Maximum assistance;Verbal cueing  LE Bathing: Maximum assistance; Increased time to complete;Setup;Verbal cueing  UE Dressing: Moderate assistance;Setup;Verbal cueing; Increased time to complete  LE Dressing: Maximum assistance;Verbal cueing; Increased time to complete;Setup  Toileting: Maximum assistance;Setup; Increased time to complete     Tone RUE  RUE Tone: Normotonic  Tone LUE  LUE Tone: Hypertonic(Slight spasticity noted during PROM.)    Coordination  Movements Are Fluid And Coordinated: Yes  Coordination and Movement description: Left UE;Fine motor impairments  Quality of Movement Other  Comment: Pt unable to demonstrate any coordination with LUE d/t unable to move LUE.      Bed mobility  Rolling to Left: Moderate assistance(Assisted with RUE)  Rolling to Right: Maximum assistance(Unable to assist with LUE to  hold onto bed rail)  Supine to Sit: Maximum assistance;2 Person assistance  Sit to Supine: Maximum assistance;2 Person assistance  Scooting: Maximal assistance(Attempted to scoot, but unable to requiring Max A.)  Comment: HOB elevated completely. Max A x 2 required. Pt with posterior and L lateral leaning requiring Max A to maintain sitting after supine to sit transfer. Pt slightly drowsy throughout requiring cues for safety, initiation and sequencing. Transfers  Transfer Comments: Pt not appropriate to attempt stand this date d/t being unable to tolerate sitting balance and reporting significant low back pain. Cognition  Overall Cognitive Status: Exceptions  Initiation: Requires cues for some  Sequencing: Requires cues for some  Cognition Comment: Pt drowsy throughout session requiring repeated cues and some cueing for intiation and sequencing of tasks. Sensation  Overall Sensation Status: Impaired(no sensation in LUE/LLE)        LUE PROM (degrees)  LUE PROM: WFL  LUE AROM (degrees)  LUE AROM : Exceptions(No AROM or AAROM in LUE)    Left Hand PROM (degrees)  Left Hand PROM: WFL  Left Hand AROM (degrees)  Left Hand AROM: Exceptions(No AROM or AAROM)    RUE PROM (degrees)  RUE PROM: WFL  RUE AROM (degrees)  RUE AROM : WFL  Right Hand AROM (degrees)  Right Hand AROM: WFL    LUE Strength  Gross LUE Strength: Exceptions to [facility]  L Hand General: 0/5  LUE Strength Comment: No strength or contraction observed in LUE this date.   RUE Strength  Gross RUE Strength: WFL  R Hand General: 5/5  RUE Strength Comment: Grossly 5/5        Plan   Plan  Times per week: 4-5x/week  Specific instructions for Next Treatment: Introduce adaptive strategies and techniques for ADLs with LUE deficits, continue to address L side neglect  Current Treatment Recommendations: Strengthening, Endurance Training, Patient/Caregiver Education & Training, ROM, Equipment Evaluation, Education, & procurement, Self-Care / ADL, Balance Training, Functional Mobility Training, Safety Education & Training, Home Management Training, Cognitive/Perceptual Training, Positioning      AM-PAC Score        AM-PAC Inpatient Daily Activity Raw Score: 13 (12/01/20 1221)  AM-PAC Inpatient ADL T-Scale Score : 32.03 (12/01/20 1221)  ADL Inpatient CMS 0-100% Score: 63.03 (12/01/20 1221)  ADL Inpatient CMS G-Code Modifier : CL (12/01/20 1221)    Goals  Short term goals  Time Frame for Short term goals: By discharge, pt will:  Short term goal 1: Demonstrate bed mobility with Mod A and use of bedrails PRN  Short term goal 2: Demostrate sitting balance for +10 minutes with Min A  Short term goal 3: Complete simple ADL sitting EOB with Min A, setup provided, use of DME and adpative strategis/techniques PRN, <3 cues  Short term goal 4: Attend to L side with <2 VCs during ADL/functional tasks       Therapy Time   Individual Concurrent Group Co-treatment   Time In 0922         Time Out 0959         Minutes 37         Timed Code Treatment Minutes: 23 Minutes       RUBI Ignacio/L

## 2020-12-01 NOTE — PROGRESS NOTES
Neurosurgery AALIYAH/Resident    Daily Progress Note   CC:  Chief Complaint   Patient presents with   Blase Liming Cerebrovascular Accident     LKW 0000 on 11/30/2020 12/1/2020  6:22 AM    Chart reviewed. No acute events overnight. No new complaints. sitting at side of bed this morning with PT. Reports she is feeling a little better than yesterday. Still having left sided weakness.  Still reports headache which is stable and has not become any worse     Vitals:    12/01/20 0300 12/01/20 0400 12/01/20 0520 12/01/20 0600   BP: (!) 95/48 (!) 94/50 (!) 100/52 (!) 102/51   Pulse: 66 65  64   Resp: 17 17  16   Temp:  98.2 °F (36.8 °C)     TempSrc:  Oral     SpO2: 99% 99%  99%   Weight:       Height:           PE:   AOx3   PERRL, EOMI, 4 mm reactive bilaterally   Cranial Nerves:    II: Visual acuity:  normal  III: Pupils:  equal, round, reactive to light  III,IV,VI: Extra Ocular Movements:intact  V: Facial sensation:  decreased on left side   VII: Facial strength: left side facial droop  VIII: Hearing:  intact  IX: Palate:  intact  XI: Shoulder shrug: decreased on left side   XII: Tongue movement: intact      Motor   Left side flaccid, unable to raise arm and move left leg  Full movement to right arm and leg   Sensation: decreased on left side         Lab Results   Component Value Date    WBC 9.0 12/01/2020    HGB 10.4 (L) 12/01/2020    HCT 33.2 (L) 12/01/2020     12/01/2020    CHOL 247 (H) 11/30/2020    TRIG 77 11/30/2020     11/30/2020    ALT 24 11/30/2020    AST 51 (H) 11/30/2020     12/01/2020    K 3.8 12/01/2020     12/01/2020    CREATININE 0.86 12/01/2020    BUN 13 12/01/2020    CO2 22 12/01/2020    TSH 1.34 11/30/2020    INR 1.0 11/30/2020    LABA1C 5.0 11/30/2020       A/P  48 y.o. female who presents with Large right parieto-opoccipital intraparenchymal hemorrhage, scattered subarachnoid along with perilesional edema    -  Repeat CT head from this morning is stable in appearance of intraparenchymal hematoma  - received DDAVP and platelets yesterday  - SBP <140  - every hour neuro checks   - call neurosurgery if there is a change in patients neurological exam       Please contact neurosurgery with any changes in patients neurologic status.        Ilan Rodriguez CNP  12/1/20  6:22 AM

## 2020-12-01 NOTE — CONSULTS
Physical Medicine & Rehabilitation  Consult Note      Admitting Physician: Yeimy Comer MD    Primary Care Provider: Brad Castro MD     Reason for Consult:  Acute Inpatient Rehabilitation    Chief Complaint: CVA    History of Present Illness:  Referring Provider is requesting an evaluation for appropriate placement upon discharge from acute care. Ms. Ellen Foster is a 48 y.o. handed female who was admitted to [facility] on 11/30/2020 with Cerebrovascular Accident ([address] on 11/30/2020)       51-year-old female with history of delta storage pool disease, bipolar disorder and alcohol abuse who developed acute left-sided weakness and found have a right parieto-occipital intraparenchymal hemorrhage CT head outlying ED revealed large right parietal lobe intracranial hemorrhage with moderate subarachnoid hemorrhage. She is notably hypertensive. Cardene infusion started. Repeat CT showed right parietal and cranial hemorrhage subarachnoid hemorrhage and new intraventricular hemorrhage in the right occipital horn. CTA head and neck unremarkable. [place] Coma Scale 4. There is no to have dense left upper and lower extreme weakness. She has history of alcohol abuse-notes she was in rehab but no longer drinks daily but still drinks occasionally-last drank vodka at night prior to incident. .  She was started on Precedex infusion. Concern of alcohol withdrawal.    Neurosurgery-repeat CT head stable, received DDAVP and platelets 58/05 continue hourly neuro checks    Hematology/oncology-Dr. Cruz Small disorder-transfuse platelets and administer desmopressin as needed    CT head 12/1/2020  Impression:          No significant interval change compared to CT head done November 30, 2020.    Similar-appearing acute intraparenchymal hematoma centered in the right   parietal lobe with surrounding vasogenic edema as well as scattered   right-sided acute subarachnoid hemorrhage and hemorrhage layering in the   occipital horns of the lateral ventricles. Stable right to left midline shift measuring 3 mm. Review of Systems:  Constitutional: negative for anorexia, chills, fatigue, fevers, sweats and weight loss  Eyes: negative for redness and visual disturbance  Ears, nose, mouth, throat, and face: negative for earaches, sore throat and tinnitus  Respiratory: negative for cough and shortness of breath  Cardiovascular: negative for chest pain, dyspnea and palpitations  Gastrointestinal: negative for abdominal pain, change in bowel habits, constipation, nausea and vomiting  Genitourinary:negative for dysuria, frequency, hesitancy and urinary incontinence  Integument/breast: negative for pruritus and rash  Musculoskeletal:negative for muscle weakness and stiff joints  Neurological: negative for dizziness, headaches and weakness  Behavioral/Psych: negative for decreased appetite, depression and fatigue    Functional History:  PTA: Independent with all activities.     Current:  PT:  Restrictions/Precautions: General Precautions, Seizure, Fall Risk, Up as Tolerated  Other position/activity restrictions: up with assist; 3L O2 via nasal cannula   Transfers  Comment: did not attempt d/t poor sitting tolerance       Transfers  Comment: did not attempt d/t poor sitting tolerance  Ambulation  Ambulation?: No(did not attempt d/t poor sitting tolerance)     Overall Sensation Status: Impaired(no sensation in LUE/LLE)  Bed mobility  Supine to Sit: Maximum assistance;2 Person assistance(HOB max elevated)  Sit to Supine: Maximum assistance;2 Person assistance  Transfers  Comment: did not attempt d/t poor sitting tolerance  Ambulation  Ambulation?: No(did not attempt d/t poor sitting tolerance)  Balance  Posture: Fair  Sitting - Static: Poor;+(pt dangled EOB for 8' with maxA to maintain balance)  Sitting - Dynamic: Poor;-       OT:   ADL  Grooming: Maximum assistance;Stand by assistance(Required MAX A for sitting balance to don chapstick sitting EOB. Pt opened and closed chapstick with R hand without assistance. When provided chapstick pt asked to grab it on L side. Pt negleting but with cues attended to L side.)  UE Bathing: Setup; Increased time to complete;Maximum assistance;Verbal cueing  LE Bathing: Maximum assistance; Increased time to complete;Setup;Verbal cueing  UE Dressing: Moderate assistance;Setup;Verbal cueing; Increased time to complete  LE Dressing: Maximum assistance;Verbal cueing; Increased time to complete;Setup  Toileting: Maximum assistance;Setup; Increased time to complete       ST:    Pt presents with no apparent cognitive deficits at this time. No dysarthria noted, no oral motor deficits. No further ST is recommended. Verbal and written education provided    Past Medical History:        Diagnosis Date    Asthma     Bipolar 1 disorder ([facility] [address]     Delta storage pool disease ([facility] [address]     Hypertension     Psychiatric problem        Past Surgical History:        Procedure Laterality Date     SECTION  2436,8847    Miscarriage     CHOLECYSTECTOMY      COLONOSCOPY N/A 2/3/2020     bx(normal)hemorrhoids    GASTRIC BYPASS SURGERY      HYSTERECTOMY      KNEE SURGERY      LAPAROSCOPY      TONSILLECTOMY AND ADENOIDECTOMY      UPPER GASTROINTESTINAL ENDOSCOPY N/A 2/3/2020    (normal,neg H-Pylori)normal post-bypass endoscopy       Allergies:     Allergies   Allergen Reactions    Pcn [Penicillins]     Sulfa Antibiotics Other (See Comments)     Inflammation in the eye        Current Medications:   Current Facility-Administered Medications: menthol-methyl salicylate (ICY HOT) 76-95 % external cream, , Apply externally, TID PRN  calcium gluconate 2 g in sodium chloride 100 mL, 2 g, Intravenous, Once  0.9 % sodium chloride bolus, 20 mL, Intravenous, Once  sodium chloride flush 0.9 % injection 10 mL, 10 mL, Intravenous, 2 times per day  sodium chloride flush 0.9 % injection 10 mL, 10 mL, Intravenous, PRN  acetaminophen (TYLENOL) tablet 650 mg, 650 mg, Oral, Q4H PRN  perflutren lipid microspheres (DEFINITY) injection 1.65 mg, 1.5 mL, Intravenous, ONCE PRN  0.9 % sodium chloride infusion, , Intravenous, Continuous  ondansetron (ZOFRAN) injection 4 mg, 4 mg, Intravenous, Q6H PRN  0.9 % sodium chloride bolus, 20 mL, Intravenous, Once  folic acid (FOLVITE) tablet 1 mg, 1 mg, Oral, Daily  thiamine (B-1) 500 mg in sodium chloride 0.9 % 100 mL IVPB, 500 mg, Intravenous, Q24H  amLODIPine (NORVASC) tablet 5 mg, 5 mg, Oral, Daily  buPROPion (WELLBUTRIN XL) extended release tablet 300 mg, 300 mg, Oral, Daily  lamoTRIgine (LAMICTAL) tablet 400 mg, 400 mg, Oral, Daily  lisinopril (PRINIVIL;ZESTRIL) tablet 20 mg, 20 mg, Oral, Daily  ipratropium-albuterol (DUONEB) nebulizer solution 1 ampule, 1 ampule, Inhalation, Q4H PRN  sertraline (ZOLOFT) tablet 50 mg, 50 mg, Oral, Daily  LORazepam (ATIVAN) tablet 1 mg, 1 mg, Oral, Q4H PRN  rOPINIRole (REQUIP) tablet 1 mg, 1 mg, Oral, Nightly  oxyCODONE (ROXICODONE) immediate release tablet 5 mg, 5 mg, Oral, Q6H PRN  sodium chloride flush 0.9 % injection 10 mL, 10 mL, Intravenous, PRN  magnesium hydroxide (MILK OF MAGNESIA) 400 MG/5ML suspension 30 mL, 30 mL, Oral, Daily PRN    Social History:  Social History     Socioeconomic History    Marital status:      Spouse name: Not on file    Number of children: Not on file    Years of education: Not on file    Highest education level: Not on file   Occupational History    Not on file   Social Needs    Financial resource strain: Not on file    Food insecurity     Worry: Not on file     Inability: Not on file    Transportation needs     Medical: Not on file     Non-medical: Not on file   Tobacco Use    Smoking status: Never Smoker    Smokeless tobacco: Never Used   Substance and Sexual Activity    Alcohol use: Yes     Comment: no alcohol since Sept    Drug use: No    Sexual activity: Yes     Partners: Male   Lifestyle    Physical activity     Days per week: Not on file     Minutes per session: Not on file    Stress: Not on file   Relationships    Social connections     Talks on phone: Not on file     Gets together: Not on file     Attends Sabianism service: Not on file     Active member of club or organization: Not on file     Attends meetings of clubs or organizations: Not on file     Relationship status: Not on file    Intimate partner violence     Fear of current or ex partner: Not on file     Emotionally abused: Not on file     Physically abused: Not on file     Forced sexual activity: Not on file   Other Topics Concern    Not on file   Social History Narrative    Not on file     Lives With: Spouse(2 adult children, neither lives at home)  Type of Home: House  Home Layout: Multi-level, Bed/Bath upstairs(3 levels. 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 Family History:       Adopted: Yes   Family history unknown: Yes           Physical Exam:    BP (!) 163/77   Pulse 80   Temp 97.7 °F (36.5 °C) (Oral)   Resp 19   Ht 5' 5\" (1.651 m)   Wt 235 lb (106.6 kg)   SpO2 96%   BMI 39.11 kg/m²     General appearance: alert, appears stated age, cooperative, and no distress  Head: Normocephalic, without obvious abnormality, atraumatic  Eyes: conjunctivae clear.    Throat: lips, mucosa, and tongue normal.  Neck: no adenopathy and supple, symmetrical, trachea midline. Lungs: clear to auscultation bilaterally. Heart: regular rate and rhythm, no murmur. Abdomen: soft, non-tender; bowel sounds normal.  Extremities: extremities normal, atraumatic, no edema, normal tone. Mental status: Alert, orientedX3, thought content appropriate. Sensory: Right upper lower extremity normal sensation, left upper and lower extremity questionable decreased  Motor: Muscle tone and bulk are normal bilaterally. No pronator drift. Left upper and lower extremity 0/5, right side 5/5          Diagnostics:  CBC   Lab Results   Component Value Date    WBC 9.0 12/01/2020    RBC 3.57 12/01/2020    HGB 10.4 12/01/2020    HCT 33.2 12/01/2020    MCV 93.0 12/01/2020    RDW 14.0 12/01/2020     12/01/2020     BMP    Lab Results   Component Value Date     12/01/2020    K 3.8 12/01/2020     12/01/2020    CO2 22 12/01/2020    BUN 13 12/01/2020     Uric Acid  No components found for: URIC  VITAMIN B12 No components found for: B12  PT/INR  No results found for: PTINR    Radiology:     Impression: Ms. Génesis Kahn is a 48 y.o. male with a history of <principal problem not specified>    1. Large right parietal lobe intracranial hemorrhage with moderate subarachnoid hemorrhage and intraventricular hemorrhage in the right occipital lobe status post TPA  2. Bipolar disorder-Wellbutrin, Zoloft, Ativan as needed for agitation/anxiety  3. Alcohol abuse-being monitored for withdrawal, has the shakes-nursing notes possible alcohol withdrawal  Delta storage pool disease, tach platelets today Keppra stopped as patient on Lamictal. Diagnosis: Hemorrhagic CVA  2. Therapy: Max assist 2 person supine to sit, did not attempt ambulation, mod to max assist ADLs  3. Medical  Necessity: As above  4. Support: Clarify,  works  11. Rehab recommendation: Would benefit from acute inpatient rehabilitation when medically ready  6.  DVT proph: DVT prophylaxis when cleared by neurosurgery, if no DVT prophylaxis-will need Doppler screen    It was my pleasure to evaluate Rebeca Donaldson today. Please call with questions. Charly Ambrose. Paul Ojeda MD          This note is created with the assistance of a speech recognition program.  While intending to generate a document that actually reflects the content of the visit, the document can still have some errors including those of syntax and sound a like substitutions which may escape proof reading.   In such instances, actual meaning can be extrapolated by contextual diversion

## 2020-12-02 LAB
ANION GAP SERPL CALCULATED.3IONS-SCNC: 13 MMOL/L (ref 9–17)
BLD PROD TYP BPU: NORMAL
BUN BLDV-MCNC: 6 MG/DL (ref 6–20)
BUN/CREAT BLD: ABNORMAL (ref 9–20)
CALCIUM SERPL-MCNC: 8.9 MG/DL (ref 8.6–10.4)
CHLORIDE BLD-SCNC: 95 MMOL/L (ref 98–107)
CO2: 26 MMOL/L (ref 20–31)
CREAT SERPL-MCNC: 0.53 MG/DL (ref 0.5–0.9)
DISPENSE STATUS BLOOD BANK: NORMAL
FOLATE: 14.7 NG/ML
GFR AFRICAN AMERICAN: >60 ML/MIN
GFR NON-AFRICAN AMERICAN: >60 ML/MIN
GFR SERPL CREATININE-BSD FRML MDRD: ABNORMAL ML/MIN/{1.73_M2}
GFR SERPL CREATININE-BSD FRML MDRD: ABNORMAL ML/MIN/{1.73_M2}
GLUCOSE BLD-MCNC: 100 MG/DL (ref 70–99)
HCT VFR BLD CALC: 34.9 % (ref 36.3–47.1)
HEMOGLOBIN: 11.1 G/DL (ref 11.9–15.1)
IRON SATURATION: 19 % (ref 20–55)
IRON: 65 UG/DL (ref 37–145)
LV EF: 55 %
LVEF MODALITY: NORMAL
MCH RBC QN AUTO: 29 PG (ref 25.2–33.5)
MCHC RBC AUTO-ENTMCNC: 31.8 G/DL (ref 28.4–34.8)
MCV RBC AUTO: 91.1 FL (ref 82.6–102.9)
NRBC AUTOMATED: 0 PER 100 WBC
PDW BLD-RTO: 13.3 % (ref 11.8–14.4)
PLATELET # BLD: 234 K/UL (ref 138–453)
PMV BLD AUTO: 9.4 FL (ref 8.1–13.5)
POTASSIUM SERPL-SCNC: 3.3 MMOL/L (ref 3.7–5.3)
RBC # BLD: 3.83 M/UL (ref 3.95–5.11)
SODIUM BLD-SCNC: 134 MMOL/L (ref 135–144)
TOTAL IRON BINDING CAPACITY: 345 UG/DL (ref 250–450)
TRANSFUSION STATUS: NORMAL
UNIT DIVISION: 0
UNIT NUMBER: NORMAL
UNSATURATED IRON BINDING CAPACITY: 280 UG/DL (ref 112–347)
VITAMIN B-12: 208 PG/ML (ref 232–1245)
WBC # BLD: 7 K/UL (ref 3.5–11.3)

## 2020-12-02 PROCEDURE — 6370000000 HC RX 637 (ALT 250 FOR IP): Performed by: NURSE PRACTITIONER

## 2020-12-02 PROCEDURE — APPNB60 APP NON BILLABLE TIME 46-60 MINS: Performed by: NURSE PRACTITIONER

## 2020-12-02 PROCEDURE — 83550 IRON BINDING TEST: CPT

## 2020-12-02 PROCEDURE — 2500000003 HC RX 250 WO HCPCS: Performed by: STUDENT IN AN ORGANIZED HEALTH CARE EDUCATION/TRAINING PROGRAM

## 2020-12-02 PROCEDURE — 2000000003 HC NEURO ICU R&B

## 2020-12-02 PROCEDURE — 97140 MANUAL THERAPY 1/> REGIONS: CPT

## 2020-12-02 PROCEDURE — 93306 TTE W/DOPPLER COMPLETE: CPT

## 2020-12-02 PROCEDURE — 6360000002 HC RX W HCPCS: Performed by: PSYCHIATRY & NEUROLOGY

## 2020-12-02 PROCEDURE — 2580000003 HC RX 258: Performed by: NURSE PRACTITIONER

## 2020-12-02 PROCEDURE — 99232 SBSQ HOSP IP/OBS MODERATE 35: CPT | Performed by: PSYCHIATRY & NEUROLOGY

## 2020-12-02 PROCEDURE — 36415 COLL VENOUS BLD VENIPUNCTURE: CPT

## 2020-12-02 PROCEDURE — 82607 VITAMIN B-12: CPT

## 2020-12-02 PROCEDURE — 6370000000 HC RX 637 (ALT 250 FOR IP): Performed by: PSYCHIATRY & NEUROLOGY

## 2020-12-02 PROCEDURE — 6360000002 HC RX W HCPCS: Performed by: NURSE PRACTITIONER

## 2020-12-02 PROCEDURE — 6370000000 HC RX 637 (ALT 250 FOR IP): Performed by: STUDENT IN AN ORGANIZED HEALTH CARE EDUCATION/TRAINING PROGRAM

## 2020-12-02 PROCEDURE — 97535 SELF CARE MNGMENT TRAINING: CPT

## 2020-12-02 PROCEDURE — 82746 ASSAY OF FOLIC ACID SERUM: CPT

## 2020-12-02 PROCEDURE — 85027 COMPLETE CBC AUTOMATED: CPT

## 2020-12-02 PROCEDURE — 99232 SBSQ HOSP IP/OBS MODERATE 35: CPT | Performed by: INTERNAL MEDICINE

## 2020-12-02 PROCEDURE — 97110 THERAPEUTIC EXERCISES: CPT

## 2020-12-02 PROCEDURE — 97530 THERAPEUTIC ACTIVITIES: CPT

## 2020-12-02 PROCEDURE — 83540 ASSAY OF IRON: CPT

## 2020-12-02 PROCEDURE — 80048 BASIC METABOLIC PNL TOTAL CA: CPT

## 2020-12-02 RX ORDER — DIAZEPAM 5 MG/1
5 TABLET ORAL EVERY 8 HOURS PRN
Status: DISCONTINUED | OUTPATIENT
Start: 2020-12-02 | End: 2020-12-03

## 2020-12-02 RX ORDER — THIAMINE MONONITRATE (VIT B1) 100 MG
100 TABLET ORAL DAILY
Status: DISCONTINUED | OUTPATIENT
Start: 2020-12-03 | End: 2020-12-09 | Stop reason: HOSPADM

## 2020-12-02 RX ORDER — HEPARIN SODIUM 5000 [USP'U]/ML
5000 INJECTION, SOLUTION INTRAVENOUS; SUBCUTANEOUS EVERY 8 HOURS SCHEDULED
Status: DISCONTINUED | OUTPATIENT
Start: 2020-12-02 | End: 2020-12-08

## 2020-12-02 RX ORDER — POTASSIUM CHLORIDE 20 MEQ/1
40 TABLET, EXTENDED RELEASE ORAL ONCE
Status: COMPLETED | OUTPATIENT
Start: 2020-12-02 | End: 2020-12-02

## 2020-12-02 RX ORDER — CYANOCOBALAMIN 1000 UG/ML
1000 INJECTION INTRAMUSCULAR; SUBCUTANEOUS WEEKLY
Status: DISCONTINUED | OUTPATIENT
Start: 2020-12-09 | End: 2020-12-09 | Stop reason: HOSPADM

## 2020-12-02 RX ORDER — CYANOCOBALAMIN 1000 UG/ML
1000 INJECTION INTRAMUSCULAR; SUBCUTANEOUS DAILY
Status: DISCONTINUED | OUTPATIENT
Start: 2020-12-02 | End: 2020-12-08

## 2020-12-02 RX ORDER — AMLODIPINE BESYLATE 10 MG/1
10 TABLET ORAL DAILY
Status: DISCONTINUED | OUTPATIENT
Start: 2020-12-02 | End: 2020-12-09 | Stop reason: HOSPADM

## 2020-12-02 RX ORDER — CYANOCOBALAMIN 1000 UG/ML
1000 INJECTION INTRAMUSCULAR; SUBCUTANEOUS
Status: DISCONTINUED | OUTPATIENT
Start: 2021-02-03 | End: 2020-12-09 | Stop reason: HOSPADM

## 2020-12-02 RX ADMIN — POTASSIUM CHLORIDE 40 MEQ: 1500 TABLET, EXTENDED RELEASE ORAL at 08:21

## 2020-12-02 RX ADMIN — AMLODIPINE BESYLATE 10 MG: 10 TABLET ORAL at 08:29

## 2020-12-02 RX ADMIN — DEXMEDETOMIDINE HYDROCHLORIDE 0.2 MCG/KG/HR: 400 INJECTION INTRAVENOUS at 10:26

## 2020-12-02 RX ADMIN — ROPINIROLE HYDROCHLORIDE 1 MG: 1 TABLET, FILM COATED ORAL at 20:07

## 2020-12-02 RX ADMIN — OXYCODONE HYDROCHLORIDE 5 MG: 5 TABLET ORAL at 03:08

## 2020-12-02 RX ADMIN — SODIUM CHLORIDE, PRESERVATIVE FREE 10 ML: 5 INJECTION INTRAVENOUS at 20:10

## 2020-12-02 RX ADMIN — HEPARIN SODIUM 5000 UNITS: 5000 INJECTION INTRAVENOUS; SUBCUTANEOUS at 13:34

## 2020-12-02 RX ADMIN — LAMOTRIGINE 400 MG: 100 TABLET ORAL at 08:22

## 2020-12-02 RX ADMIN — ACETAMINOPHEN 650 MG: 325 TABLET ORAL at 20:07

## 2020-12-02 RX ADMIN — SERTRALINE 50 MG: 50 TABLET, FILM COATED ORAL at 08:30

## 2020-12-02 RX ADMIN — LORAZEPAM 1 MG: 1 TABLET ORAL at 20:07

## 2020-12-02 RX ADMIN — DIAZEPAM 5 MG: 5 TABLET ORAL at 08:29

## 2020-12-02 RX ADMIN — HEPARIN SODIUM 5000 UNITS: 5000 INJECTION INTRAVENOUS; SUBCUTANEOUS at 20:07

## 2020-12-02 RX ADMIN — CHLORDIAZEPOXIDE HYDROCHLORIDE 5 MG: 5 CAPSULE ORAL at 17:10

## 2020-12-02 RX ADMIN — CYANOCOBALAMIN 1000 MCG: 1000 INJECTION, SOLUTION INTRAMUSCULAR at 11:09

## 2020-12-02 RX ADMIN — DIAZEPAM 5 MG: 5 TABLET ORAL at 17:10

## 2020-12-02 RX ADMIN — CHLORDIAZEPOXIDE HYDROCHLORIDE 5 MG: 5 CAPSULE ORAL at 12:35

## 2020-12-02 RX ADMIN — DOCUSATE SODIUM 50MG AND SENNOSIDES 8.6MG 2 TABLET: 8.6; 5 TABLET, FILM COATED ORAL at 08:22

## 2020-12-02 RX ADMIN — OXYCODONE HYDROCHLORIDE 5 MG: 5 TABLET ORAL at 16:24

## 2020-12-02 RX ADMIN — CHLORDIAZEPOXIDE HYDROCHLORIDE 5 MG: 5 CAPSULE ORAL at 20:07

## 2020-12-02 RX ADMIN — BUPROPION HYDROCHLORIDE 300 MG: 150 TABLET, EXTENDED RELEASE ORAL at 08:22

## 2020-12-02 RX ADMIN — SODIUM CHLORIDE, PRESERVATIVE FREE 10 ML: 5 INJECTION INTRAVENOUS at 08:39

## 2020-12-02 RX ADMIN — FOLIC ACID 1 MG: 1 TABLET ORAL at 08:22

## 2020-12-02 RX ADMIN — LISINOPRIL 20 MG: 20 TABLET ORAL at 08:22

## 2020-12-02 RX ADMIN — OXYCODONE HYDROCHLORIDE 5 MG: 5 TABLET ORAL at 10:26

## 2020-12-02 RX ADMIN — HEPARIN SODIUM 5000 UNITS: 5000 INJECTION INTRAVENOUS; SUBCUTANEOUS at 08:31

## 2020-12-02 RX ADMIN — THIAMINE HYDROCHLORIDE 500 MG: 100 INJECTION, SOLUTION INTRAMUSCULAR; INTRAVENOUS at 08:30

## 2020-12-02 RX ADMIN — CHLORDIAZEPOXIDE HYDROCHLORIDE 5 MG: 5 CAPSULE ORAL at 08:20

## 2020-12-02 ASSESSMENT — PAIN DESCRIPTION - PROGRESSION: CLINICAL_PROGRESSION: NOT CHANGED

## 2020-12-02 ASSESSMENT — PAIN SCALES - GENERAL
PAINLEVEL_OUTOF10: 5
PAINLEVEL_OUTOF10: 3
PAINLEVEL_OUTOF10: 7
PAINLEVEL_OUTOF10: 7
PAINLEVEL_OUTOF10: 6
PAINLEVEL_OUTOF10: 7
PAINLEVEL_OUTOF10: 7

## 2020-12-02 ASSESSMENT — PAIN DESCRIPTION - FREQUENCY: FREQUENCY: CONTINUOUS

## 2020-12-02 ASSESSMENT — PAIN DESCRIPTION - ONSET: ONSET: ON-GOING

## 2020-12-02 ASSESSMENT — PAIN - FUNCTIONAL ASSESSMENT: PAIN_FUNCTIONAL_ASSESSMENT: PREVENTS OR INTERFERES SOME ACTIVE ACTIVITIES AND ADLS

## 2020-12-02 ASSESSMENT — PAIN DESCRIPTION - LOCATION
LOCATION: HEAD
LOCATION: ARM;HEAD

## 2020-12-02 ASSESSMENT — PAIN DESCRIPTION - PAIN TYPE
TYPE: ACUTE PAIN

## 2020-12-02 ASSESSMENT — PAIN DESCRIPTION - ORIENTATION
ORIENTATION: LEFT
ORIENTATION: ANTERIOR

## 2020-12-02 ASSESSMENT — PAIN SCALES - WONG BAKER: WONGBAKER_NUMERICALRESPONSE: 8

## 2020-12-02 ASSESSMENT — PAIN DESCRIPTION - DESCRIPTORS
DESCRIPTORS: HEADACHE
DESCRIPTORS: HEADACHE

## 2020-12-02 NOTE — PROGRESS NOTES
Physician Progress Note      Kalli Forrester  CSN #:                  [id number]  :                       1970  ADMIT DATE:       2020 6:38 AM  DISCH DATE:  RESPONDING  PROVIDER #:        Hayde PICHARDO - THIAGO          QUERY TEXT:    Pt admitted with Right SAH. Pt noted to have CT head-  with vasogenic   edema surrounding the relative size of the hematoma,  slight midline shift   right to left of 3 mm, Sulcal effacement throughout the right cerebral   hemisphere. Mass effect on the right lateral ventricle MRI brain - same   vasogenic edema and slight midline shift which appears stable from initial   bleed. and the use of Mannitol IV x 1 in the ED on initial evaluation from   transfer from OSH. If clinically significant, please document in progress   notes and discharge summary if you are evaluating/treating any     The medical record reflects the following:  Risk Factors: alcohol abuse with withdrawal, HTN emergency, platelet disorder  Clinical Indicators: LUE and LLE weakness,  CT head-  with vasogenic edema   surrounding the relative size of the hematoma,  slight midline shift right to   left of 3 mm, Sulcal effacement throughout the right cerebral hemisphere. Mass effect on the right lateral ventricle MRI brain - same vasogenic edema   and slight midline shift which appears stable from initial bleed. SBP -   120-175, left hemiplegia. Treatment: IV - Mannitol, radiology series to f/u on SAH, NS - no surgical   intervention, IV Precedex gtt. Thank You,  contact if questions or concerns  SURI Stewart-  email - [email or web address]  cell -[phone]  office - [phone]  Options provided:  -- Cerebral edema  -- Brain compression  -- Cerebral edema and Brain compression  -- Other - I will add my own diagnosis  -- Disagree - Not applicable / Not valid  -- Disagree - Clinically unable to determine / Unknown  -- Refer to Clinical Documentation Reviewer    PROVIDER RESPONSE TEXT:    This patient has cerebral edema.     Query created by: Pricilla Ramires on 12/1/2020 12:52 PM      Electronically signed by:  Vanesa Porras CNP 12/2/2020 2:31 PM

## 2020-12-02 NOTE — PROGRESS NOTES
Daily Progress Note  Neuro Critical Care    Patient Name: Shiloh Caal  Patient : 1970  Room/Bed: 7183/4041-85  Code Status: Full  Allergies: Allergies   Allergen Reactions    Pcn [Penicillins]     Sulfa Antibiotics Other (See Comments)     Inflammation in the eye       CHIEF COMPLAINT:      Headache, left sided weakness     INTERVAL HISTORY    Initial Presentation (Admitted 20): The patient is a 49 yo female with history of HTN, delta storage pool disease, bipolar disorder, and alcohol abuse who presented as a transfer from [facility] ED after CT head revealed right parietal ICH. Initially presented to [facility] with left sided weakness and headache. LKW around midnight, patient states her left upper extremity \"went limp\" at that time. Patient states she went to sleep and woke up around 0400 and noticed her left lower extremity was also weak. Reports she developed a headache by the time EMS arrived. CT head at [facility] ED revealed large right parietal lobe ICH with moderate SAH. Noted to be hypertensive at [facility] and started on Cardene infusion. Patient had recently run out of her Lisinopril. Loaded with 1g Keppra. Transferred to [facility]  for further evaluation and management. On arrival to ED, -190's. Unclear whether patient arrived on Cardene infusion but she was stared on Clevidipine infusion in ED. CT Head showed stable right parietal ICH with SAH and new intraventricular hemorrhage in the right occipital horn.  CTA Head/Neck unremarkable.  Given 25g Mannitol x1 per Stroke team.  Neurosurgery and Hem/Onc consulted while in ED.  Given 40mcg DDAVP x1 and transfused 1 pack of platelets.  Patient denies head trauma or falls.   GCS 14.  Opens eyes to voice, oriented x3.  Noted to have dense left upper and lower extremity weaknes.  Patient has a history of alcohol abuse.  She went to rehab last year and states she no longer drinks daily but still drinks occasionally, last drank vodka last night.       ICH score: 2 (Volume ~41ml, +IVH)    Hospital Course:   : Repeat CT Head this morning was overall stable except small amount of acute hemorrhage in left occipital horn. Given 1 pack platelets. Restless and agitated at times with diaphoresis and tachycardia. Precedex infusion continued and started on Librium 5mg QID. Last 24h:   Overnight, patient continued to complain of headaches, responded well to roxicodone. Neck stiffness noted this morning, will start Valium 5 mg q8h prn. Vitamin B12 level low, will start replacement. Echocardiogram revealed normal ventricular systolic function, EF 55%. Norvasc increased to 10 mg daily to keep SBP less than 160. Continue Precedex and Librium for now. PM&R recommending IP rehab when appropriate.      CURRENT MEDICATIONS:  SCHEDULED MEDICATIONS:   amLODIPine  10 mg Oral Daily    heparin (porcine)  5,000 Units Subcutaneous 3 times per day    potassium chloride  40 mEq Oral Once    [START ON 12/3/2020] thiamine  100 mg Oral Daily    chlordiazePOXIDE  5 mg Oral 4x Daily    sennosides-docusate sodium  2 tablet Oral Daily    sodium chloride flush  10 mL Intravenous 2 times per day    sodium chloride  20 mL Intravenous Once    folic acid  1 mg Oral Daily    thiamine (VITAMIN B1) IVPB  500 mg Intravenous Q24H    buPROPion  300 mg Oral Daily    lamoTRIgine  400 mg Oral Daily    lisinopril  20 mg Oral Daily    sertraline  50 mg Oral Daily    rOPINIRole  1 mg Oral Nightly     CONTINUOUS INFUSIONS:   dexmedetomidine 0.2 mcg/kg/hr (20 1936)    sodium chloride 75 mL/hr at 20 0913     PRN MEDICATIONS:   menthol-methyl salicylate, sodium chloride flush, acetaminophen, perflutren lipid microspheres, ondansetron, ipratropium-albuterol, LORazepam, oxyCODONE, sodium chloride flush, magnesium hydroxide    VITALS:  Temperature Range: Temp: 97.9 °F (36.6 °C) Temp  Av.9 °F (36.6 °C)  Min: 97.7 °F (36.5 °C)  Max: 98.1 °F (36.7 °C)  BP Range: Systolic (24BME), APM:797 , Min:117 , QNA:583     Diastolic (77VUY), HBJ:81, Min:54, Max:129    Pulse Range: Pulse  Av.1  Min: 59  Max: 108  Respiration Range: Resp  Av.7  Min: 14  Max: 24  Current Pulse Ox: SpO2: 94 %  24HR Pulse Ox Range: SpO2  Av.5 %  Min: 93 %  Max: 100 %  Patient Vitals for the past 12 hrs:   BP Temp Temp src Pulse Resp SpO2   20 0635 (!) 142/81 -- -- 60 14 --   20 0535 124/68 -- -- 63 16 --   20 0435 123/70 97.9 °F (36.6 °C) Oral 62 16 --   20 0335 136/81 -- -- 65 17 --   20 0235 (!) 151/91 -- -- 84 21 --   20 0135 130/77 -- -- 64 16 --   20 0100 -- -- -- 65 16 94 %   20 0050 (!) 143/74 -- -- 65 16 --   20 0045 -- -- -- 66 17 --   20 0040 -- -- -- 71 17 --   20 0035 (!) 164/95 -- -- 86 17 --   20 0000 -- -- -- 65 16 95 %   20 2335 (!) 150/90 98.1 °F (36.7 °C) Oral 67 18 --   20 2300 -- -- -- 67 17 94 %   20 2235 135/78 -- -- 69 17 --   20 2200 -- -- -- 71 18 93 %   20 2135 (!) 148/80 -- -- 75 19 --   20 2100 (!) 166/78 -- -- 87 14 98 %   20 203 (!) 175/104 97.9 °F (36.6 °C) Oral 94 23 --   20 -- -- -- -- -- 97 %     Estimated body mass index is 39.11 kg/m² as calculated from the following:    Height as of this encounter: 5' 5\" (1.651 m).     Weight as of this encounter: 235 lb (106.6 kg).  []<16 Severe malnutrition  []16-16.99 Moderate malnutrition  []17-18.49 Mild malnutrition  []18.5-24.9 Normal  []25-29.9 Overweight (not obese)  []30-34.9 Obese class 1 (Low Risk)  [x]35-39.9 Obese class 2 (Moderate Risk)  []?40 Obese class 3 (High Risk)    RECENT LABS:   Lab Results   Component Value Date    WBC 7.0 2020    HGB 11.1 (L) 2020    HCT 34.9 (L) 2020     2020    CHOL 247 (H) 2020    TRIG 77 2020     2020    ALT 24 2020    AST 51 (H) 2020     (L) 2020    K 3.3 (L) 12/02/2020    CL 95 (L) 12/02/2020    CREATININE 0.53 12/02/2020    BUN 6 12/02/2020    CO2 26 12/02/2020    TSH 1.34 11/30/2020    INR 1.0 11/30/2020    LABA1C 5.0 11/30/2020     24 HOUR INTAKE/OUTPUT:    Intake/Output Summary (Last 24 hours) at 12/2/2020 6132  Last data filed at 12/2/2020 0500  Gross per 24 hour   Intake 2839 ml   Output 2925 ml   Net -86 ml       IMAGING:   CT HEAD WO CONTRAST   Final Result   Redemonstration of intraparenchymal hemorrhage in the right parietooccipital   region that is stable compared to prior exam.  Stable edema and similar   leftward midline shift. Similar right cerebral hemisphere subarachnoid hemorrhage. Similar intraventricular hemorrhagic products layering in the lateral   ventricles. Findings were discussed with Dr. Beryle Contras At 2:53 pm on 12/1/2020. CT HEAD WO CONTRAST   Final Result   No significant interval change compared to CT head done November 30, 2020. Similar-appearing acute intraparenchymal hematoma centered in the right   parietal lobe with surrounding vasogenic edema as well as scattered   right-sided acute subarachnoid hemorrhage and hemorrhage layering in the   occipital horns of the lateral ventricles. Stable right to left midline shift measuring 3 mm. MRI BRAIN W WO CONTRAST   Final Result   No intracranial mass. CT HEAD WO CONTRAST   Final Result   1. Overall stable exam with a large acute right parietal intraparenchymal   hematoma and intraventricular extension. Right parietal and sylvian fissure   subarachnoid hemorrhage without significant change. 2. 3 mm right to left midline shift without significant change from the   previous study. CTA HEAD NECK W CONTRAST   Final Result   No intracranial large vessel occlusion or aneurysm. No gross vascular   malformation is detected. No significant cervical carotid stenosis. Large right parietal intraparenchymal hematoma.   Moderate subarachnoid hemorrhage within the right sylvian fissure. Moderate right occipital lobe   hemorrhage. 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 The results of the examination were discussed with Dr. Miryam Posey on 11/30/2020   at 8:10 a.m.         CT HEAD WO CONTRAST   Final Result   Large intraparenchymal hematoma within the right parietal lobe measuring 5.4   x 3.1 cm resulting in slight midline shift from right to left. Moderate subarachnoid hemorrhage within the sylvian fissure and moderate   intraventricular hemorrhage within the right occipital horn. The results of the examination were discussed with Dr. Miryam Posey on 11/30/2020   at 7:48 a.m.         CT HEAD WO CONTRAST    (Results Pending)       Labs and Images reviewed with:  [] Dr. Gerardo Skaggs. Rebecca    [x] Dr. Libby Steel  [] Dr. Rosalind Jackson  [] There are no new interval images to review. PHYSICAL EXAM       CONSTITUTIONAL:  Well developed, well nourished, alert and oriented x 3, in no acute distress. GCS 15. Nontoxic. No dysarthria. No aphasia. HEAD:  normocephalic, atraumatic    EYES:  PERRLA, EOMI.   ENT:  moist mucous membranes   NECK:  supple, symmetric   LUNGS:  Equal air entry bilaterally, clear   CARDIOVASCULAR:  normal s1 / s2, RRR, distal pulses intact   ABDOMEN:  Soft, no rigidity   NEUROLOGIC:  Mental Status:  A & O x3,awake             Cranial Nerves:    III: Pupils:  equal, round, reactive to light  III,IV,VI: Extra Ocular Movements: intact  V: Facial sensation:  intact  VII: Facial strength: abnormal left facial droop    Motor Exam:    Drift:  present - left upper and left lower  Tone:  abnormal - increased left upper and left lower    Motor exam is 5 out of 5 all extremities with the exception of 0 out of 5 left upper and 1 out of 5 left lower extremity.      Sensory:    Touch:    Right Upper Extremity:  normal  Left Upper Extremity:  abnormal - decreased  Right Lower Extremity:  normal  Left Lower Extremity:  abnormal - decreased       DRAINS:  [x] There are no drains for Neuro Critical Care to monitor at this time. The patient is a 47 yo female with a history of HTN, Delta storage pool disease, bipolar disorder, and alcohol abuse who was admitted to the Neuro ICU for management of a large right parietal ICH with subarachnoid and intraventricular blood.  Given DDAVP and 1 pack of platelets in setting of platelet disorder.  Initially presented to [facility] ED with left sided weakness and headache.  Hypertensive with -190's on arrival to ED.     NEUROLOGIC:  - Acute right parietal ICH with intraventricular extension and subarachnoid hemorrhage component with surrounding cerebral edema  - Etiology unclear; possibly secondary to coaguloapthy in setting of platelet disorder and hypertension  - No underlying mass or vascular malformation on MRI brain with/without contrast  - CT Head stable except some acute hemorrhage now in the left occipital horn  - s/p Platelet transfusion 12/1  - Stop Keppra as patient is already on Lamictal for bipolar which covers her for seizure prophylaxis   - Neurosurgery following; appreciate recs  - Goal SBP<160  - History of ETOH abuse;  Folic Acid& Thiamine replacement, Librium 5mg 4XD, Ativan 1mg Q4h PRN, Precedex prn  - Roxicodone 6mg Q6h PRN for headache  - Requip 1mg QHS for restless legs  - Neuro checks per protocol     CARDIOVASCULAR:  - Goal SBP<160  - Continue home Norvasc 10mg QD and Lisinopril 20mg QD  - Trop 9  - Echocardiogram: normal function, EF 55%  - Lipid panel; LDL 90, Cholesterol 247  - Consider statin therapy  - Continue telemetry     PULMONARY:  - Maintaining O2 sats on room air  - History of asthma  - Duonebs PRN     RENAL/FLUID/ELECTROLYTE:  - Normal renal functioning  - BUN 6/ Creatinine 0.53  - Monitor I&O; 0894/0448  - IVF: [email or web address]  - Hypokalemia, 3.3; replaced with 40 mEq potassium chloride  - Replace electrolytes PRN  - Daily BMP     GI/NUTRITION:  NUTRITION:  - Advance to General Diet  - Poor appetite; continue nutrition supplements  - Bowel regimen: Senokot-S daily, Milk of Mag PRN  - GI prophylaxis: N/A     ID:  - Afebrile, Tmax 36.7  - No leukocytosis, WBC 7.0  - UA 11/30 negative  - COVID-19 negative 11/30  - Continue to monitor for fevers  - Daily CBC     HEME:   - History of delta storage pool disease  - Hem/Onc following; appreciate recs  - H&H 11.1/34.9  - Platelets 234  - Daily CBC     ENDOCRINE:  - Continue to monitor blood glucose, goal <180  - Hemoglobin A1C 5.0  - TSH 1.34     OTHER:  - History of bipolar disorder; Lamictal 400mg BID, Zoloft 50mg QD  - PT/OT/ST   - PM&R consulted - recommend IP rehab     PROPHYLAXIS:  Stress ulcer: N/A     DVT PROPHYLAXIS:  - SCD sleeves - Thigh High   - Start Heparin 5000 units Q8    DISPOSITION:  [x] To remain ICU: close neurological monitoring  [] OK for out of ICU from Neuro Critical Care standpoint    We will continue to follow along. For any changes in exam or patient status please contact Neuro Critical Care.       KYLEE García - CNP  Neuro Critical Care  Pager [phone]  12/2/2020     7:33 AM

## 2020-12-02 NOTE — PROGRESS NOTES
Date:                           12/2/2020  Patient name:           Danyel Washburn  Date of admission:  11/30/2020  6:38 AM  MRN:   [id number]  YOB: 1970  PCP:                           Clive Anderson MD        Reason for consult: Delta Pool storage deficiency    Subjective:   Patient seen and examined at bedside. She reports no new complaints today. Still has a headache. Otherwise feels better. REVIEW OF SYSTEMS:  General: no fever or night sweats  ENT: No double or blurred vision, no hearing problem, no dysphagia or sore throat   Respiratory: No chest pain, no shortness of breath, no cough or hemoptysis. Cardiovascular: Denies chest pain, PND or orthopnea. No LE swelling or palpitations. Gastrointestinal:    Positive for nausea, no vomiting, abdominal pain, diarrhea or constipation. Genitourinary: Denies dysuria, hematuria, frequency, urgency or incontinence. Neurological: Positive for headaches, positive for left upper and lower limb hemiparesis, no decreased LOC   Musculoskeletal: Positive for back pain, no joint swelling.    Skin: Positive for rash on arms and legs  Psych: Denies hallucinations or intentions to harm self        Objective:     Vitals: /78   Pulse 72   Temp 97.9 °F (36.6 °C) (Oral)   Resp 14   Ht 5' 5\" (1.651 m)   Wt 235 lb (106.6 kg)   SpO2 98%   BMI 39.11 kg/m²   General appearance -alert, in mild painful distress  Mental status -oriented x3  Eyes - pupils equal and reactive, extraocular eye movements intact  Mouth - mucous membranes moist, pharynx normal without lesions  Neck - supple, no significant adenopathy  Lymphatics - no palpable lymphadenopathy, no hepatosplenomegaly  Chest - clear to auscultation, no wheezes, rales or rhonchi, symmetric air entry  Heart - normal rate, regular rhythm, normal S1, S2, no murmurs  Abdomen - soft, nontender, nondistended, no masses or organomegaly  Neurological - alert, oriented, normal speech, no focal findings or movement disorder noted  Extremities - peripheral pulses normal, no pedal edema, no clubbing or cyanosis  Skin - normal coloration and turgor, no rashes, no suspicious skin lesions noted         Data:    No intake/output data recorded. In: 2839 [P.O.:720; I.V.:2119]  Out: 2400 [Urine:2400]    CBC:   Recent Labs     11/30/20  0651 12/01/20 0422 12/02/20 0423   WBC 10.4 9.0 7.0   HGB 12.9 10.4* 11.1*    219 234     BMP:    Recent Labs     11/30/20  0651 12/01/20 0422 12/02/20 0423    136 134*   K 3.1* 3.8 3.3*   CL 96* 100 95*   CO2 21 22 26   BUN 5* 13 6   CREATININE 0.56 0.86 0.53   GLUCOSE 155* 107* 100*     Hepatic:   Recent Labs     11/30/20  0651   AST 51*   ALT 24   BILITOT 0.50   ALKPHOS 126*     INR:   Recent Labs     11/30/20  0651   INR 1.0     PTT:No results for input(s): PTT in the last 72 hours. Problem Lists:   Primary Problem:  Acute intracranial hemorrhage  Current Problems:  Active Hospital Problems    Diagnosis Date Noted    Vasogenic edema (HCC) [G93.6]     IVH (intraventricular hemorrhage) ([address] [I61.5]     Intraparenchymal hemorrhage of brain ([address] [I61.9]     Intracranial hemorrhage ([address] [I62.9] 11/30/2020    Alcohol withdrawal syndrome without complication (HCC) [K02.449]      PMH:  Past Medical History:   Diagnosis Date    Asthma     Bipolar 1 disorder ([address]     Delta storage pool disease ([address]     Hypertension     Psychiatric problem       Allergies: Allergies   Allergen Reactions    Pcn [Penicillins]     Sulfa Antibiotics Other (See Comments)     Inflammation in the eye        Assessment    1. Acute intracranial hemorrhage. 2. Delta pool storage disorder  3. Hypertensive emergency  4. Alcohol withdrawal        Plan     1. Transfuse with platelets and administer desmopressin as needed. Other management per neuro critical care.     Thaddeus Lucio MD  PGY-3 IM Resident  12/2/2020,9:28 AM    Attending Physician Statement   I have discussed the care of [address], including pertinent history and exam findings with the resident. I have reviewed the key elements of all parts of the encounter with the resident. I have seen and examined the patient with the resident. I agree with the assessment and plan and status of the problem list as documented.                                [facility]

## 2020-12-02 NOTE — PROGRESS NOTES
position/activity restrictions: up with assist; 3L O2 via nasal cannula  Subjective   General  Response To Previous Treatment: Patient with no complaints from previous session.   Family / Caregiver Present: No  Pain Screening  Patient Currently in Pain: Yes  Pain Assessment  Pain Assessment: 0-10  Pain Level: 7  Patient's Stated Pain Goal: No pain  Pain Type: Acute pain  Pain Location: Head  Pain Descriptors: Headache  Pain Frequency: Continuous  Pain Onset: On-going  Clinical Progression: Not changed  Functional Pain Assessment: Prevents or interferes some active activities and ADLs  Vital Signs  Patient Currently in Pain: Yes       Orientation  Orientation  Overall Orientation Status: Within Functional Limits     Objective   Bed mobility  Rolling to Left: Moderate assistance  Rolling to Right: Maximum assistance  Supine to Sit: Moderate assistance(HOB elevated)  Sit to Supine: Maximum assistance  Scooting: Maximal assistance;2 Person assistance(to boost up in bed)  Transfers  Sit to Stand: Unable to assess  Stand to sit: Unable to assess  Bed to Chair: Unable to assess  Stand Pivot Transfers: Unable to assess  Comment: unsafe to attempt with only one staff member  Ambulation  Ambulation?: No  Stairs/Curb  Stairs?: No     Balance  Posture: Poor  Sitting - Static: Poor  Sitting - Dynamic: Poor  Other exercises  Other exercises 1: PROM MARIO/LE's x 10 reps; increased flexor tone LUE, increased extensor tone LLE  Other exercises 2: worked on trunk control ~25 minutes, daquan lateral lean on R elbow to improve trunk posture, 10 reps x 2 with mod A+1/ also A/P trunk     Goals  Short term goals  Time Frame for Short term goals: 12 visits  Short term goal 1: pt will perform bed mobility with min A+2  Short term goal 2: pt will dangle EOB for 20 minutes with SBA  Short term goal 3: transfers with mod A+2  Short term goal 4: WC mobility x 25' with SBA+1  Short term goal 5: progress to standing/gait activities with mod A+2 with appropriate device--start with lg lopez  Patient Goals   Patient goals : to regain use of L arm and L leg    Plan    Plan  Times per week: 5-6x per week  Times per day: Daily  Current Treatment Recommendations: Strengthening, ROM, Balance Training, Functional Mobility Training, Transfer Training, Wheelchair Mobility Training, Gait Training, Neuromuscular Re-education, Pain Management, Home Exercise Program, Safety Education & Training, Patient/Caregiver Education & Training, Positioning  Safety Devices  Type of devices: Bed alarm in place, Call light within reach, Patient at risk for falls, Left in bed, Nurse notified  Restraints  Initially in place: No     Therapy Time   Individual Concurrent Group Co-treatment   Time In 1017         Time Out 1101         Minutes 44                 Yaneli Jain, PT

## 2020-12-02 NOTE — PROGRESS NOTES
Occupational Therapy  [address]  Daily [address] Note  NAME: Merlene Frankel  : 1970  MRN: [id number]    Date of Service: 2020    Discharge Recommendations:  Patient would benefit from continued therapy after discharge     Assessment   Performance deficits / Impairments: Decreased functional mobility ; Decreased safe awareness;Decreased balance;Decreased ADL status; Decreased coordination;Decreased cognition;Decreased vision/visual deficit; Decreased posture;Decreased ROM; Decreased endurance;Decreased high-level IADLs;Decreased strength;Decreased sensation;Decreased fine motor control  Prognosis: Good  Decision Making: High Complexity  Patient Education: importance of OOB activity, importance of L UE positioning and ROM, proprioceptive input,  visual scanning - fair/poor return  REQUIRES OT FOLLOW UP: Yes  Activity Tolerance  Activity Tolerance: Patient limited by pain; Patient limited by fatigue  Activity Tolerance: Limited by decreased LUE/LLE  deficits  Safety Devices  Safety Devices in place: Yes  Type of devices: Nurse notified; All fall risk precautions in place; Bed alarm in place; Patient at risk for falls;Call light within reach; Left in bed  Restraints  Initially in place: No         Patient Diagnosis(es): The encounter diagnosis was Intracranial hemorrhage ([facility] [address] has a past medical history of Asthma, Bipolar 1 disorder ([facility] [address] Delta storage pool disease [facility]), Hypertension, and Psychiatric problem. has a past surgical history that includes  section (7629,5356); Gastric bypass surgery (); Tonsillectomy and adenoidectomy (); Cholecystectomy (); knee surgery (); Hysterectomy (); laparoscopy (); Colonoscopy (N/A, 2/3/2020); and Upper gastrointestinal endoscopy (N/A, 2/3/2020).     Restrictions  Restrictions/Precautions  Restrictions/Precautions: General Precautions, Seizure, Fall Risk, Up as Tolerated  Required Braces or Orthoses?: No  Position Activity Restriction  Other position/activity restrictions: up with assistance; L hypertonicity    Subjective   General  Patient assessed for rehabilitation services?: Yes  Family / Caregiver Present: No  General Comment  Comments: RN ok'd patient for OT treatment. Pt cooperative and agreeable. Pain Assessment  Pain Assessment: Faces  Atwood-Baker Pain Rating: Hurts whole lot  Pain Location: Arm;Head  Pain Orientation: Left  Pain Descriptors: Headache  Non-Pharmaceutical Pain Intervention(s): Emotional support; Ambulation/Increased Activity; Therapeutic presence  Response to Pain Intervention: Patient Satisfied  Vital Signs  Patient Currently in Pain: Yes   Orientation     Objective    ADL  Feeding: Setup;Modified independent (Pt able to reach for cup of water with HOB raised using the R hand)  Grooming: Moderate assistance(OT facilitated combing hair sitting EOB using the R UE; Pt fatiguing quickly noted to get hypertonic on the side and was retired to supine d/t reports of increased pain)  Additional Comments: Pt able to reach for chapstick on the L side, noted to have visual motor deficits; unidirectional nystagmus observed to the L        Balance  Sitting Balance: Maximum assistance(Pt sat EOB for ~6 minutes at Mod A, progressing to CGA with use of R UE for support.  Noted to have posterior L lateral lean affecting baalance)  Standing Balance: Unable to assess(comment)(d/t safety concerns)  Bed mobility  Sit to Supine: Maximum assistance;2 Person assistance  Scooting: Dependent/Total;2 Person assistance        Cognition  Overall Cognitive Status: Exceptions  Safety Judgement: Decreased awareness of need for safety  Insights: Decreased awareness of deficits  Initiation: Requires cues for some  Sequencing: Requires cues for some     Type of ROM/Therapeutic Exercise  Type of ROM/Therapeutic Exercise: PROM  Comment: OT facilitated L UE PROM prior to activity engagement and positioning with towels to facilitate functional position of the L UE  Exercises  Shoulder Flexion: X  Elbow Flexion: X  Elbow Extension: X  Wrist Extension: X  Finger Extension: X       Plan   Plan  Times per week: 4-5x/week  Specific instructions for Next Treatment: Introduce adaptive strategies and techniques for ADLs with LUE deficits, continue to address L side neglect  Current Treatment Recommendations: Strengthening, Endurance Training, Patient/Caregiver Education & Training, ROM, Equipment Evaluation, Education, & procurement, Self-Care / ADL, Balance Training, Functional Mobility Training, Safety Education & Training, Home Management Training, Cognitive/Perceptual Training, Positioning    Goals  Short term goals  Time Frame for Short term goals: By discharge, pt will:  Short term goal 1: Demonstrate bed mobility with Mod A and use of bedrails PRN  Short term goal 2: Demostrate sitting balance for +10 minutes with Min A  Short term goal 3: Complete simple ADL sitting EOB with Min A, setup provided, use of DME and adpative strategis/techniques PRN, <3 cues  Short term goal 4: Attend to L side with <2 VCs during ADL/functional tasks  Short term goal 5: participate in 5+ min of visual motor/scanning tasks to increase independence with functional tasks       Therapy Time   Individual Concurrent Group Co-treatment   Time In 1446         Time Out 1514         Minutes 28         Timed Code Treatment Minutes: 25 Minutes       RUBI Kc/JACK

## 2020-12-02 NOTE — PLAN OF CARE
Problem: Pain:  Goal: Pain level will decrease  Description: Pain level will decrease  12/2/2020 1138 by Minnie Hazel RN  Outcome: Ongoing  12/2/2020 0745 by Radha Cabrera RN  Outcome: Ongoing  Goal: Control of acute pain  Description: Control of acute pain  12/2/2020 1138 by Minnie Hazel RN  Outcome: Ongoing  12/2/2020 0745 by Radha Cabrera RN  Outcome: Ongoing  Goal: Control of chronic pain  Description: Control of chronic pain  12/2/2020 1138 by Minnie Hazel RN  Outcome: Ongoing  12/2/2020 0745 by Radha Cabrera RN  Outcome: Ongoing

## 2020-12-03 LAB
-: NORMAL
ANION GAP SERPL CALCULATED.3IONS-SCNC: 13 MMOL/L (ref 9–17)
BUN BLDV-MCNC: 6 MG/DL (ref 6–20)
BUN/CREAT BLD: ABNORMAL (ref 9–20)
CALCIUM SERPL-MCNC: 9.4 MG/DL (ref 8.6–10.4)
CHLORIDE BLD-SCNC: 102 MMOL/L (ref 98–107)
CO2: 22 MMOL/L (ref 20–31)
CREAT SERPL-MCNC: 0.56 MG/DL (ref 0.5–0.9)
GFR AFRICAN AMERICAN: >60 ML/MIN
GFR NON-AFRICAN AMERICAN: >60 ML/MIN
GFR SERPL CREATININE-BSD FRML MDRD: ABNORMAL ML/MIN/{1.73_M2}
GFR SERPL CREATININE-BSD FRML MDRD: ABNORMAL ML/MIN/{1.73_M2}
GLUCOSE BLD-MCNC: 100 MG/DL (ref 70–99)
HCT VFR BLD CALC: 34.5 % (ref 36.3–47.1)
HEMOGLOBIN: 11.5 G/DL (ref 11.9–15.1)
MCH RBC QN AUTO: 29.5 PG (ref 25.2–33.5)
MCHC RBC AUTO-ENTMCNC: 33.3 G/DL (ref 28.4–34.8)
MCV RBC AUTO: 88.5 FL (ref 82.6–102.9)
NRBC AUTOMATED: 0 PER 100 WBC
PDW BLD-RTO: 13.3 % (ref 11.8–14.4)
PLATELET # BLD: 254 K/UL (ref 138–453)
PMV BLD AUTO: 9.4 FL (ref 8.1–13.5)
POTASSIUM SERPL-SCNC: 3.4 MMOL/L (ref 3.7–5.3)
RBC # BLD: 3.9 M/UL (ref 3.95–5.11)
REASON FOR REJECTION: NORMAL
SODIUM BLD-SCNC: 137 MMOL/L (ref 135–144)
WBC # BLD: 5.5 K/UL (ref 3.5–11.3)
ZZ NTE CLEAN UP: ORDERED TEST: NORMAL
ZZ NTE WITH NAME CLEAN UP: SPECIMEN SOURCE: NORMAL

## 2020-12-03 PROCEDURE — 99231 SBSQ HOSP IP/OBS SF/LOW 25: CPT | Performed by: INTERNAL MEDICINE

## 2020-12-03 PROCEDURE — 97530 THERAPEUTIC ACTIVITIES: CPT

## 2020-12-03 PROCEDURE — 6370000000 HC RX 637 (ALT 250 FOR IP): Performed by: NURSE PRACTITIONER

## 2020-12-03 PROCEDURE — 99232 SBSQ HOSP IP/OBS MODERATE 35: CPT | Performed by: PHYSICAL MEDICINE & REHABILITATION

## 2020-12-03 PROCEDURE — 2000000003 HC NEURO ICU R&B

## 2020-12-03 PROCEDURE — 80048 BASIC METABOLIC PNL TOTAL CA: CPT

## 2020-12-03 PROCEDURE — 2580000003 HC RX 258: Performed by: NURSE PRACTITIONER

## 2020-12-03 PROCEDURE — 99232 SBSQ HOSP IP/OBS MODERATE 35: CPT | Performed by: PSYCHIATRY & NEUROLOGY

## 2020-12-03 PROCEDURE — 6370000000 HC RX 637 (ALT 250 FOR IP): Performed by: STUDENT IN AN ORGANIZED HEALTH CARE EDUCATION/TRAINING PROGRAM

## 2020-12-03 PROCEDURE — 6360000002 HC RX W HCPCS: Performed by: PSYCHIATRY & NEUROLOGY

## 2020-12-03 PROCEDURE — 6370000000 HC RX 637 (ALT 250 FOR IP): Performed by: PSYCHIATRY & NEUROLOGY

## 2020-12-03 PROCEDURE — 36415 COLL VENOUS BLD VENIPUNCTURE: CPT

## 2020-12-03 PROCEDURE — 97110 THERAPEUTIC EXERCISES: CPT

## 2020-12-03 PROCEDURE — 2500000003 HC RX 250 WO HCPCS: Performed by: STUDENT IN AN ORGANIZED HEALTH CARE EDUCATION/TRAINING PROGRAM

## 2020-12-03 PROCEDURE — 6360000002 HC RX W HCPCS: Performed by: STUDENT IN AN ORGANIZED HEALTH CARE EDUCATION/TRAINING PROGRAM

## 2020-12-03 PROCEDURE — 85027 COMPLETE CBC AUTOMATED: CPT

## 2020-12-03 PROCEDURE — 97535 SELF CARE MNGMENT TRAINING: CPT

## 2020-12-03 RX ORDER — MAGNESIUM SULFATE 1 G/100ML
1 INJECTION INTRAVENOUS ONCE
Status: COMPLETED | OUTPATIENT
Start: 2020-12-03 | End: 2020-12-04

## 2020-12-03 RX ORDER — LISINOPRIL 20 MG/1
40 TABLET ORAL DAILY
Status: DISCONTINUED | OUTPATIENT
Start: 2020-12-03 | End: 2020-12-09 | Stop reason: HOSPADM

## 2020-12-03 RX ORDER — DIAZEPAM 5 MG/1
5 TABLET ORAL EVERY 12 HOURS PRN
Status: DISCONTINUED | OUTPATIENT
Start: 2020-12-03 | End: 2020-12-03

## 2020-12-03 RX ORDER — METHOCARBAMOL 500 MG/1
1000 TABLET, FILM COATED ORAL 4 TIMES DAILY
Status: DISCONTINUED | OUTPATIENT
Start: 2020-12-03 | End: 2020-12-03

## 2020-12-03 RX ORDER — BACLOFEN 10 MG/1
5 TABLET ORAL 3 TIMES DAILY
Status: DISCONTINUED | OUTPATIENT
Start: 2020-12-03 | End: 2020-12-09 | Stop reason: HOSPADM

## 2020-12-03 RX ORDER — LABETALOL HYDROCHLORIDE 5 MG/ML
10 INJECTION, SOLUTION INTRAVENOUS
Status: DISPENSED | OUTPATIENT
Start: 2020-12-03 | End: 2020-12-04

## 2020-12-03 RX ADMIN — OXYCODONE HYDROCHLORIDE 5 MG: 5 TABLET ORAL at 12:31

## 2020-12-03 RX ADMIN — ACETAMINOPHEN 650 MG: 325 TABLET ORAL at 03:36

## 2020-12-03 RX ADMIN — CYANOCOBALAMIN 1000 MCG: 1000 INJECTION, SOLUTION INTRAMUSCULAR at 09:05

## 2020-12-03 RX ADMIN — LORAZEPAM 1 MG: 1 TABLET ORAL at 15:52

## 2020-12-03 RX ADMIN — SERTRALINE 50 MG: 50 TABLET, FILM COATED ORAL at 09:06

## 2020-12-03 RX ADMIN — AMLODIPINE BESYLATE 10 MG: 10 TABLET ORAL at 09:06

## 2020-12-03 RX ADMIN — CHLORDIAZEPOXIDE HYDROCHLORIDE 5 MG: 5 CAPSULE ORAL at 09:05

## 2020-12-03 RX ADMIN — BACLOFEN 5 MG: 10 TABLET ORAL at 20:19

## 2020-12-03 RX ADMIN — LISINOPRIL 40 MG: 20 TABLET ORAL at 12:31

## 2020-12-03 RX ADMIN — ACETAMINOPHEN 650 MG: 325 TABLET ORAL at 10:31

## 2020-12-03 RX ADMIN — SODIUM CHLORIDE, PRESERVATIVE FREE 10 ML: 5 INJECTION INTRAVENOUS at 09:10

## 2020-12-03 RX ADMIN — HEPARIN SODIUM 5000 UNITS: 5000 INJECTION INTRAVENOUS; SUBCUTANEOUS at 14:35

## 2020-12-03 RX ADMIN — LORAZEPAM 1 MG: 1 TABLET ORAL at 20:19

## 2020-12-03 RX ADMIN — POTASSIUM BICARBONATE 40 MEQ: 782 TABLET, EFFERVESCENT ORAL at 17:49

## 2020-12-03 RX ADMIN — MAGNESIUM SULFATE HEPTAHYDRATE 1 G: 1 INJECTION, SOLUTION INTRAVENOUS at 23:07

## 2020-12-03 RX ADMIN — Medication 10 MG: at 23:07

## 2020-12-03 RX ADMIN — CHLORDIAZEPOXIDE HYDROCHLORIDE 5 MG: 5 CAPSULE ORAL at 20:19

## 2020-12-03 RX ADMIN — LAMOTRIGINE 400 MG: 100 TABLET ORAL at 09:06

## 2020-12-03 RX ADMIN — CHLORDIAZEPOXIDE HYDROCHLORIDE 5 MG: 5 CAPSULE ORAL at 17:49

## 2020-12-03 RX ADMIN — HEPARIN SODIUM 5000 UNITS: 5000 INJECTION INTRAVENOUS; SUBCUTANEOUS at 20:26

## 2020-12-03 RX ADMIN — ROPINIROLE HYDROCHLORIDE 1 MG: 1 TABLET, FILM COATED ORAL at 20:19

## 2020-12-03 RX ADMIN — OXYCODONE HYDROCHLORIDE 5 MG: 5 TABLET ORAL at 06:14

## 2020-12-03 RX ADMIN — BUPROPION HYDROCHLORIDE 300 MG: 150 TABLET, EXTENDED RELEASE ORAL at 09:06

## 2020-12-03 RX ADMIN — DIAZEPAM 5 MG: 5 TABLET ORAL at 03:36

## 2020-12-03 RX ADMIN — Medication 100 MG: at 12:31

## 2020-12-03 RX ADMIN — FOLIC ACID 1 MG: 1 TABLET ORAL at 09:06

## 2020-12-03 RX ADMIN — SODIUM CHLORIDE, PRESERVATIVE FREE 10 ML: 5 INJECTION INTRAVENOUS at 20:19

## 2020-12-03 RX ADMIN — BACLOFEN 5 MG: 10 TABLET ORAL at 10:31

## 2020-12-03 RX ADMIN — DOCUSATE SODIUM 50MG AND SENNOSIDES 8.6MG 2 TABLET: 8.6; 5 TABLET, FILM COATED ORAL at 09:06

## 2020-12-03 RX ADMIN — OXYCODONE HYDROCHLORIDE 5 MG: 5 TABLET ORAL at 18:28

## 2020-12-03 RX ADMIN — HEPARIN SODIUM 5000 UNITS: 5000 INJECTION INTRAVENOUS; SUBCUTANEOUS at 05:54

## 2020-12-03 RX ADMIN — CHLORDIAZEPOXIDE HYDROCHLORIDE 5 MG: 5 CAPSULE ORAL at 14:35

## 2020-12-03 RX ADMIN — ACETAMINOPHEN 650 MG: 325 TABLET ORAL at 20:19

## 2020-12-03 RX ADMIN — OXYCODONE HYDROCHLORIDE 5 MG: 5 TABLET ORAL at 00:58

## 2020-12-03 RX ADMIN — BACLOFEN 5 MG: 10 TABLET ORAL at 14:35

## 2020-12-03 ASSESSMENT — PAIN DESCRIPTION - LOCATION
LOCATION: HEAD

## 2020-12-03 ASSESSMENT — PAIN SCALES - GENERAL
PAINLEVEL_OUTOF10: 4
PAINLEVEL_OUTOF10: 9
PAINLEVEL_OUTOF10: 7
PAINLEVEL_OUTOF10: 5
PAINLEVEL_OUTOF10: 7
PAINLEVEL_OUTOF10: 7
PAINLEVEL_OUTOF10: 8
PAINLEVEL_OUTOF10: 8
PAINLEVEL_OUTOF10: 9

## 2020-12-03 ASSESSMENT — PAIN DESCRIPTION - PAIN TYPE
TYPE: ACUTE PAIN

## 2020-12-03 ASSESSMENT — PAIN DESCRIPTION - ONSET
ONSET: ON-GOING
ONSET: ON-GOING

## 2020-12-03 ASSESSMENT — PAIN DESCRIPTION - PROGRESSION
CLINICAL_PROGRESSION: NOT CHANGED
CLINICAL_PROGRESSION: NOT CHANGED

## 2020-12-03 ASSESSMENT — PAIN - FUNCTIONAL ASSESSMENT
PAIN_FUNCTIONAL_ASSESSMENT: ACTIVITIES ARE NOT PREVENTED
PAIN_FUNCTIONAL_ASSESSMENT: ACTIVITIES ARE NOT PREVENTED

## 2020-12-03 ASSESSMENT — PAIN DESCRIPTION - FREQUENCY
FREQUENCY: CONTINUOUS

## 2020-12-03 ASSESSMENT — PAIN DESCRIPTION - DESCRIPTORS
DESCRIPTORS: HEADACHE
DESCRIPTORS: ACHING;HEADACHE
DESCRIPTORS: ACHING;HEADACHE

## 2020-12-03 ASSESSMENT — PAIN DESCRIPTION - ORIENTATION
ORIENTATION: RIGHT;LEFT;POSTERIOR
ORIENTATION: POSTERIOR

## 2020-12-03 NOTE — PROGRESS NOTES
Occupational Therapy  Facility/Department: [address]  Daily Treatment Note  NAME: Araceli Hall  : 1970  MRN: [id number]    Date of Service: 12/3/2020    Discharge Recommendations:  Patient would benefit from continued therapy after discharge   Ed on OT services, bed mob, transfer safety, ADLs, lg jessica technique- good return       Assessment   Performance deficits / Impairments: Decreased functional mobility ; Decreased ADL status; Decreased ROM; Decreased strength;Decreased safe awareness;Decreased endurance;Decreased balance;Decreased high-level IADLs;Decreased fine motor control;Decreased coordination;Decreased posture  Prognosis: Good  REQUIRES OT FOLLOW UP: Yes  Activity Tolerance  Activity Tolerance: Patient limited by pain; Patient limited by fatigue  Safety Devices  Safety Devices in place: Yes  Type of devices: All fall risk precautions in place; Bed alarm in place;Call light within reach; Left in bed;Nurse notified         Patient Diagnosis(es): The encounter diagnosis was Intracranial hemorrhage ([facility] [address] has a past medical history of Asthma, Bipolar 1 disorder ([facility] [address] Delta storage pool disease [facility]), Hypertension, and Psychiatric problem. has a past surgical history that includes  section (9488,4668); Gastric bypass surgery (); Tonsillectomy and adenoidectomy (); Cholecystectomy (); knee surgery (); Hysterectomy (); laparoscopy (); Colonoscopy (N/A, 2/3/2020); and Upper gastrointestinal endoscopy (N/A, 2/3/2020).     Restrictions  Restrictions/Precautions  Restrictions/Precautions: General Precautions, Seizure, Fall Risk, Up as Tolerated  Required Braces or Orthoses?: No  Position Activity Restriction  Other position/activity restrictions: up with assistance; L hypertonicity  Subjective   General  Patient assessed for rehabilitation services?: Yes  Family / Caregiver Present: No    Pain Assessment  Pain Level: 8  Pain Type: Acute pain  Pain Location: Head  Pain & Training, Home Management Training, Cognitive/Perceptual Training, Positioning             Goals  Short term goals  Time Frame for Short term goals: By discharge, pt will:  Short term goal 1: Demonstrate bed mobility with Mod A and use of bedrails PRN  Short term goal 2: Demostrate sitting balance for +10 minutes with Min A  Short term goal 3: Complete simple ADL sitting EOB with Min A, setup provided, use of DME and adpative strategis/techniques PRN, <3 cues  Short term goal 4: Attend to L side with <2 VCs during ADL/functional tasks  Short term goal 5: participate in 5+ min of visual motor/scanning tasks to increase independence with functional tasks       Therapy Time   Individual Concurrent Group Co-treatment   Time In 1450         Time Out 1540         Minutes 50               Time code min:  50 min  ROBERTA Santana /JACK

## 2020-12-03 NOTE — PROGRESS NOTES
using the R hand)  Grooming: Moderate assistance(OT facilitated combing hair sitting EOB using the R UE; Pt fatiguing quickly noted to get hypertonic on the side and was retired to supine d/t reports of increased pain)  UE Bathing: Setup, Increased time to complete, Maximum assistance, Verbal cueing  LE Bathing: Maximum assistance, Increased time to complete, Setup, Verbal cueing  UE Dressing: Moderate assistance, Setup, Verbal cueing, Increased time to complete  LE Dressing: Maximum assistance, Verbal cueing, Increased time to complete, Setup  Toileting: Maximum assistance, Setup, Increased time to complete  Additional Comments: Pt able to reach for chapstick on the L side, noted to have visual motor deficits; unidirectional nystagmus observed to the L         Balance  Sitting Balance: Maximum assistance(Pt sat EOB for ~6 minutes at Mod A, progressing to CGA with use of R UE for support. Noted to have posterior L lateral lean affecting baalance)  Standing Balance: Unable to assess(comment)(d/t safety concerns)   Standing Balance  Comment: UAT  Functional Mobility  Functional Mobility Comments: UAT  Apparatus Needs  Apparatus Needs: O2  Bed mobility  Rolling to Left: Moderate assistance  Rolling to Right: Maximum assistance  Supine to Sit: Moderate assistance(HOB elevated)  Sit to Supine: Maximum assistance, 2 Person assistance  Scooting: Dependent/Total, 2 Person assistance  Comment: HOB elevated completely. Max A x 2 required. Pt with posterior and L lateral leaning requiring Max A to maintain sitting after supine to sit transfer. Pt slightly drowsy throughout requiring cues for safety, initiation and sequencing. Transfers  Transfer Comments: Pt not appropriate to attempt stand this date d/t being unable to tolerate sitting balance and reporting significant low back pain.                    ST:            Objective:  BP (!) 145/74   Pulse 68   Temp 97.9 °F (36.6 °C) (Oral)   Resp 17   Ht 5' 5\" (1.651 m)   Wt WBC 9.0 7.0 5.5   RBC 3.57* 3.83* 3.90*   HGB 10.4* 11.1* 11.5*   HCT 33.2* 34.9* 34.5*   MCV 93.0 91.1 88.5   RDW 14.0 13.3 13.3    234 254     BMP:   Recent Labs     12/01/20  0422 12/02/20  0423    134*   K 3.8 3.3*    95*   CO2 22 26   BUN 13 6   CREATININE 0.86 0.53     BNP: No results for input(s): BNP in the last 72 hours. PT/INR: No results for input(s): PROTIME, INR in the last 72 hours. APTT: No results for input(s): APTT in the last 72 hours. CARDIAC ENZYMES: No results for input(s): CKMB, CKMBINDEX, TROPONINT in the last 72 hours. Invalid input(s): CKTOTAL;3  FASTING LIPID PANEL:  Lab Results   Component Value Date    CHOL 247 (H) 11/30/2020     11/30/2020    TRIG 77 11/30/2020     LIVER PROFILE: No results for input(s): AST, ALT, ALB, BILIDIR, BILITOT, ALKPHOS in the last 72 hours. I/O (24Hr): Intake/Output Summary (Last 24 hours) at 12/3/2020 1216  Last data filed at 12/3/2020 0400  Gross per 24 hour   Intake 1490 ml   Output 2450 ml   Net -960 ml       Glu last 24 hour  No results for input(s): POCGLU in the last 72 hours. No results for input(s): CLARITYU, COLORU, PHUR, SPECGRAV, PROTEINU, RBCUA, BLOODU, BACTERIA, NITRU, WBCUA, LEUKOCYTESUR, YEAST, GLUCOSEU, BILIRUBINUR in the last 72 hours. Lives With: Spouse(2 adult children, neither lives at home)  Type of Home: House  Home Layout: Multi-level, Bed/Bath upstairs(3 levels.  Half bath on main level.)  Home Access: Stairs to enter without rails  Entrance Stairs - Number of Steps: 4  Bathroom Shower/Tub: Tub/Shower unit  Bathroom Toilet: Standard  Bathroom Equipment: (No equiptment)  Home Equipment: (No AD)  ADL Assistance: Independent  Homemaking Assistance: Independent  Homemaking Responsibilities: Yes  Ambulation Assistance: Independent  Transfer Assistance: Independent  Active : Yes  Mode of Transportation: SUV  Occupation: Part time employment  Type of occupation:   Leisure & Hobbies: relax  Additional Comments:  is a teacher and works full time. Impression/Plan:    Impression: Ms. Lakia Esposito is a 48 y.o. male with a history of <principal problem not specified>     1. Large right parietal lobe intracranial hemorrhage with moderate subarachnoid hemorrhage and intraventricular hemorrhage in the right occipital lobe status post TPA mild spasticity started baclofen  2. Bipolar disorder-Wellbutrin, Zoloft, Ativan, Librium as needed for agitation/anxiety  3. Alcohol abuse-being monitored for withdrawal, has the shakes-nursing notes possible alcohol withdrawal  Delta storage pool disease, tach platelets today Keppra stopped as patient on Lamictal.   4.   Hypertension-Norvasc, lisinopril  5. Restless leg-Requip    Recommendations  1. Diagnosis-hemorrhagic CVA  2. Therapy: Max assist 2 person supine to sit, did not attempt stand/ambulation, mod to max assist ADLs  3. Medical  Necessity: As above  4. Support: Clarify,  works  11. Rehab recommendation: Would benefit from acute inpatient rehabilitation when medically ready depending on support  6. DVT proph:  Subcu heparin     It was my pleasure to evaluate Milagros Jayant Anika today. Please call with questions. Luisa Ganser. Anibal Garcia MD       This note is created with the assistance of a speech recognition program.  While intending to generate a document that actually reflects the content of the visit, the document can still have some errors including those of syntax and sound a like substitutions which may escape proof reading.   In such instances, actual meaning can be extrapolated by contextual diversion

## 2020-12-03 NOTE — PROGRESS NOTES
Daily Progress Note  Neuro Critical Care    Patient Name: Rui Begum  Patient : 1970  Room/Bed: 3303/4818-87  Code Status: Full  Allergies: Allergies   Allergen Reactions    Pcn [Penicillins]     Sulfa Antibiotics Other (See Comments)     Inflammation in the eye       CHIEF COMPLAINT:      HA, left sided weakness     INTERVAL HISTORY    Initial Presentation (Admitted 20): The patient is a 47 yo female with history of HTN, delta storage pool disease, bipolar disorder, and alcohol abuse who presented as a transfer from [facility] ED after CT head revealed right parietal ICH. Initially presented to [facility] with left sided weakness and headache. LKW around midnight, patient states her left upper extremity \"went limp\" at that time. Patient states she went to sleep and woke up around 0400 and noticed her left lower extremity was also weak. Reports she developed a headache by the time EMS arrived. CT head at [facility] ED revealed large right parietal lobe ICH with moderate SAH. Noted to be hypertensive at [facility] and started on Cardene infusion. Patient had recently run out of her Lisinopril. Loaded with 1g Keppra. Transferred to [facility] for further evaluation and management. On arrival to ED, -190's. Unclear whether patient arrived on Cardene infusion but she was stared on Clevidipine infusion in ED. CT Head showed stable right parietal ICH with SAH and new intraventricular hemorrhage in the right occipital horn.  CTA Head/Neck unremarkable.  Given 25g Mannitol x1 per Stroke team.  Neurosurgery and Hem/Onc consulted while in ED.  Given 40mcg DDAVP x1 and transfused 1 pack of platelets.  Patient denies head trauma or falls.   GCS 14.  Opens eyes to voice, oriented x3.  Noted to have dense left upper and lower extremity weaknes.  Patient has a history of alcohol abuse.  She went to rehab last year and states she no longer drinks daily but still drinks occasionally, last drank vodka last night.       ICH score: 2 (Volume ~41ml, +IVH)     Hospital Course:   12/1: Repeat CT Head this morning was overall stable except small amount of acute hemorrhage in left occipital horn.  Given 1 pack platelets. Restless and agitated at times with diaphoresis and tachycardia. Precedex infusion continued and started on Librium 5mg QID.     12/2: intermittent HA. Started roxicocone. Started valium 5m q8h PRN. Vit B12, low and replaced. Echo showed normal EF 55%. Norvasc increased to 10mg daily to keep SBP < 160. Continue Precedex and librium to help with restlessness and withdrawal sx.      Last 24h:   No acute events overnight. Did state that headache has been worse throughout the night despite alternating doses of Tylenol and oxycodone. Per nursing staff CIWA score between 8 and 10 throughout the night. Mild right upper extremity tremor and muscle spasms. Patient complaining of being very thirsty and drinking a lot of fluid. IV fluids discontinued as patient's urine output is greater than 1 L overnight. Continues to be on Precedex 0.2 mcg/kg/h. Did receive Librium for withdrawal symptoms and 1 dose of Ativan early last night for anxiety. Today will add baclofen for increased spacticity and DC valium as pt is also receiving librium and ativan. Will also put left wrist and left foot/ankle in brace. Start on ensure. Continue to monitor for constipation; no bowel movement since admission. Increased lisinopril. Encouraged pt to work with PT to get up to chair and increase mobility.      CURRENT MEDICATIONS:  SCHEDULED MEDICATIONS:   lisinopril  40 mg Oral Daily    amLODIPine  10 mg Oral Daily    heparin (porcine)  5,000 Units Subcutaneous 3 times per day    thiamine  100 mg Oral Daily    cyanocobalamin  1,000 mcg Intramuscular Daily    Followed by   Tinnie Free ON 12/9/2020] cyanocobalamin  1,000 mcg Intramuscular Weekly    Followed by   Tinnie Free ON 2/3/2021] cyanocobalamin  1,000 mcg Intramuscular Q30 Days    chlordiazePOXIDE  5 mg Oral 4x Daily    sennosides-docusate sodium  2 tablet Oral Daily    sodium chloride flush  10 mL Intravenous 2 times per day    sodium chloride  20 mL Intravenous Once    folic acid  1 mg Oral Daily    buPROPion  300 mg Oral Daily    lamoTRIgine  400 mg Oral Daily    sertraline  50 mg Oral Daily    rOPINIRole  1 mg Oral Nightly     CONTINUOUS INFUSIONS:   dexmedetomidine 0.2 mcg/kg/hr (20 1026)     PRN MEDICATIONS:   diazePAM, menthol-methyl salicylate, sodium chloride flush, acetaminophen, perflutren lipid microspheres, ondansetron, ipratropium-albuterol, LORazepam, oxyCODONE, sodium chloride flush, magnesium hydroxide    VITALS:  Temperature Range: Temp: 98.2 °F (36.8 °C) Temp  Av.9 °F (36.6 °C)  Min: 97.6 °F (36.4 °C)  Max: 98.2 °F (36.8 °C)  BP Range: Systolic (57VQK), EMMANUEL:491 , Min:122 , CUO:629     Diastolic (04OPB), MWE:27, Min:64, Max:86    Pulse Range: Pulse  Av.2  Min: 59  Max: 81  Respiration Range: Resp  Av.3  Min: 15  Max: 22  Current Pulse Ox: SpO2: 99 %  24HR Pulse Ox Range: SpO2  Av.1 %  Min: 94 %  Max: 99 %  Patient Vitals for the past 12 hrs:   BP Temp Temp src Pulse Resp SpO2   20 0626 (!) 152/73 -- -- 59 15 --   20 0526 (!) 140/76 -- -- 69 22 99 %   20 0426 (!) 158/73 98.2 °F (36.8 °C) Oral 64 15 98 %   20 0326 (!) 154/83 -- -- 64 15 95 %   20 0226 (!) 159/84 -- -- 67 18 96 %   20 0126 (!) 152/82 -- -- 68 19 96 %   20 0026 137/86 98 °F (36.7 °C) Oral 67 18 94 %   12/02/20 2326 (!) 150/76 -- -- 67 18 95 %   20 (!) 151/68 -- -- 70 18 96 %   20 125/68 -- -- 65 18 95 %   20 -- -- -- -- -- 95 %   20 (!) 162/85 97.6 °F (36.4 °C) Oral 75 18 --     Estimated body mass index is 39.11 kg/m² as calculated from the following:    Height as of this encounter: 5' 5\" (1.651 m).     Weight as of this encounter: 235 lb (106.6 kg).  []<16 Severe large acute right parietal intraparenchymal   hematoma and intraventricular extension. Right parietal and sylvian fissure   subarachnoid hemorrhage without significant change. 2. 3 mm right to left midline shift without significant change from the   previous study. CTA HEAD NECK W CONTRAST   Final Result   No intracranial large vessel occlusion or aneurysm. No gross vascular   malformation is detected. No significant cervical carotid stenosis. Large right parietal intraparenchymal hematoma. Moderate subarachnoid   hemorrhage within the right sylvian fissure. Moderate right occipital lobe   hemorrhage. 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 The results of the examination were discussed with Dr. Magdaleno Payan on 11/30/2020   at 8:10 a.m.         CT HEAD WO CONTRAST   Final Result   Large intraparenchymal hematoma within the right parietal lobe measuring 5.4   x 3.1 cm resulting in slight midline shift from right to left. Moderate subarachnoid hemorrhage within the sylvian fissure and moderate   intraventricular hemorrhage within the right occipital horn. The results of the examination were discussed with Dr. Magdaleno Payan on 11/30/2020   at 7:48 a.m.         CT HEAD WO CONTRAST    (Results Pending)         Labs and Images reviewed with:  [] Dr. Maurice Becker. Rebecca    [x] Dr. Lester Bone  [] Dr. Jose Carmona  [] There are no new interval images to review. PHYSICAL EXAM       CONSTITUTIONAL:  Well developed, well nourished, alert and oriented x 3, in no acute distress. GCS 15. Nontoxic. No dysarthria. No aphasia.    HEAD:  normocephalic, atraumatic    EYES:  PERRLA, EOMI.   ENT:  moist mucous membranes   NECK:  supple, symmetric   LUNGS:  Equal air entry bilaterally   CARDIOVASCULAR:  normal s1 / s2, RRR, distal pulses intact   ABDOMEN:  Soft, no rigidity   NEUROLOGIC:  Mental Status:  A & O x3, somnolent but wakes briskly to verbal stimuli Cranial Nerves:    III: Pupils:  equal, round, reactive to light  III,IV,VI: Extra Ocular Movements: intact  V: Facial sensation:  abnormal, subjective paresthesias left side of face  VII: Facial strength: abnormal, mild left facial droop  XI: Shoulder shrug:  abnormal, left-sided weakness    Motor Exam:    Drift: Absent drift right upper extremity  Tone: Hypertonicity of left lower extremity    5/5 strength right upper and right lower extremity. 0/5 strength in left upper and left lower extremity. Sensory:    Touch:    Right Upper Extremity:  normal  Left Upper Extremity:  abnormal -decreased sensation to light touch  Right Lower Extremity:  normal  Left Lower Extremity:  abnormal -decrease sensation to light touch    Plantar Response:  Right:  downgoing  Left:  upgoing    Coordination/Dysmetria:  Finger to Nose:   Right:  normal          DRAINS:  [x] There are no drains for Neuro Critical Care to monitor at this time. ASSESSMENT AND PLAN:       The patient is a 49 yo female with a history of HTN, Delta storage pool disease, bipolar disorder, and alcohol abuse who was admitted to the Neuro ICU for management of a large right parietal ICH with subarachnoid and intraventricular blood.  Given DDAVP and 1 pack of platelets in setting of platelet disorder.  Initially presented to [facility] ED with left sided weakness and headache.  Hypertensive with -190's on arrival to ED.     NEUROLOGIC:  - Acute right parietal ICH with intraventricular extension and subarachnoid hemorrhage component with surrounding cerebral edema  - Etiology unclear; possibly secondary to coaguloapthy in setting of platelet disorder and hypertension  - No underlying mass or vascular malformation on MRI brain with/without contrast  - CT Head 12/1 stable except some acute hemorrhage now in the left occipital horn  - s/p Platelet transfusion 12/1  - continue Lamictal for seizure prophylaxis  - Neurosurgery following; appreciate recs  - Goal SBP<160  - History of ETOH abuse; Folic Acid& Thiamine replacement, Librium 5mg 4XD, Ativan 1mg Q4h PRN, Precedex prn  - Roxicodone 6mg Q6h PRN for headache  - Requip 1mg QHS for restless legs  - Neuro checks per protocol    CARDIOVASCULAR:  - Goal SBP<160  - Continue home Norvasc 10mg QD  - increase Lisinopril to 40mg QD  - Echocardiogram: normal function, EF 55%  - Lipid panel; LDL 90, Cholesterol 247  - Consider statin therapy  - Continue telemetry    PULMONARY:  - Maintaining O2 sats on room air  - History of asthma  - Duonebs PRN    RENAL/FLUID/ELECTROLYTE:  - hypokalemia yesterda 3.3; BMP pending   - Urine output overnight > 1L  - IVF: DC due to pt tolerating PO and drinking a lot  - Replace electrolytes PRN  - Daily BMP    GI/NUTRITION:  NUTRITION:  DIET GENERAL;  - Advance to General Diet; will add ensure   - Bowel regimen: Senokot-S daily, Milk of Mag PRN   - no BM since admission, will monitor  - GI prophylaxis: N/A    ID:  - Afebrile, Tmax 36.6  - No leukocytosis, WBC 5.5  - UA 11/30 negative  - COVID-19 negative 11/30  - Continue to monitor for fevers  - Daily CBC    HEME:   - H&H 11.5/34.5  - Platelets 032  - Daily CBC    ENDOCRINE:  - Continue to monitor blood glucose, goal <180  - A1C 5.0    OTHER:  - History of bipolar disorder; Lamictal 400mg BID, Zoloft 50mg QD  - PT/OT/ST   - PM&R consulted - recommend IP rehab    PROPHYLAXIS:  Stress ulcer: N/A     DVT PROPHYLAXIS:  - SCD sleeves - Thigh High   - Start Heparin 5000 units Q8    DISPOSITION:  [x] To remain ICU: continue monitoring  [] OK for out of ICU from Neuro Critical Care standpoint    We will continue to follow along. For any changes in exam or patient status please contact Neuro Critical Care.       Merlene Echevarria DO  Neuro Critical Care  Pager [phone]  12/3/2020     8:07 AM

## 2020-12-03 NOTE — PROGRESS NOTES
Date:                           12/3/2020  Patient name:           Angelica Machado  Date of admission:  11/30/2020  6:38 AM  MRN:   [id number]  YOB: 1970  PCP:                           Mei Frey MD        Reason for consult: Delta Pool storage deficiency    Subjective: The patient is seen and examined, she is in ICU complaining of some headache. She just received some pain medication. No fever or chills, no night sweats, no bleeding or bruising. REVIEW OF SYSTEMS:  General: no fever or night sweats  ENT: No double or blurred vision, no hearing problem, no dysphagia or sore throat   Respiratory: No chest pain, no shortness of breath, no cough or hemoptysis. Cardiovascular: Denies chest pain, PND or orthopnea. No LE swelling or palpitations. Gastrointestinal:    Positive for nausea, no vomiting, abdominal pain, diarrhea or constipation. Genitourinary: Denies dysuria, hematuria, frequency, urgency or incontinence. Neurological: Positive for headaches, positive for left upper and lower limb hemiparesis, no decreased LOC   Musculoskeletal: Positive for back pain, no joint swelling.    Skin: Positive for rash on arms and legs  Psych: Denies hallucinations or intentions to harm self        Objective:     Vitals: BP (!) 158/77   Pulse 82   Temp 98.2 °F (36.8 °C) (Oral)   Resp 18   Ht 5' 5\" (1.651 m)   Wt 235 lb (106.6 kg)   SpO2 97%   BMI 39.11 kg/m²   General appearance -alert, has moderate headache  Mental status -oriented x3  Eyes - pupils equal and reactive, extraocular eye movements intact  Mouth - mucous membranes moist, pharynx normal without lesions  Neck - supple, no significant adenopathy  Lymphatics - no palpable lymphadenopathy, no hepatosplenomegaly  Chest - clear to auscultation, no wheezes, rales or rhonchi, symmetric air entry  Heart - normal rate, regular rhythm, normal S1, S2, no murmurs  Abdomen - soft, nontender, nondistended, no masses or organomegaly  Neurological - alert, oriented, normal speech, no focal findings or movement disorder noted  Extremities - peripheral pulses normal, no pedal edema, no clubbing or cyanosis  Skin - normal coloration and turgor, no rashes, no suspicious skin lesions noted         Data:    No intake/output data recorded. In: 767 [P.O.:480; I.V.:287]  Out: 1850 [Urine:1850]    CBC:   Recent Labs     12/01/20 0422 12/02/20 0423 12/03/20  0340   WBC 9.0 7.0 5.5   HGB 10.4* 11.1* 11.5*    234 254     BMP:    Recent Labs     12/01/20 0422 12/02/20 0423 12/03/20  1114    134* 137   K 3.8 3.3* 3.4*    95* 102   CO2 22 26 22   BUN 13 6 6   CREATININE 0.86 0.53 0.56   GLUCOSE 107* 100* 100*             Problem Lists:   Primary Problem:  Acute intracranial hemorrhage  Current Problems:  Active Hospital Problems    Diagnosis Date Noted    Vasogenic edema (HCC) [G93.6]     IVH (intraventricular hemorrhage) (HCC) [I61.5]     Intraparenchymal hemorrhage of brain (HCC) [I61.9]     Intracranial hemorrhage ([facility] [I62.9] 11/30/2020    Alcohol withdrawal syndrome without complication (HCC) [W13.851]      PMH:  Past Medical History:   Diagnosis Date    Asthma     Bipolar 1 disorder ([facility]     Delta storage pool disease ([facility] [facility]     Hypertension     Psychiatric problem       Allergies: Allergies   Allergen Reactions    Pcn [Penicillins]     Sulfa Antibiotics Other (See Comments)     Inflammation in the eye        Assessment    1. Acute intracranial hemorrhage. 2. Delta pool storage disorder  3. Hypertensive emergency  4. Alcohol withdrawal        Plan     The patient condition seems to be stable  3 with current treatment plan, plan to go to rehab. No evidence of active bleeding. I would recommend transfusion of platelets if there is any suspicion of more bleeding.

## 2020-12-03 NOTE — PROGRESS NOTES
Cholecystectomy (); knee surgery (); Hysterectomy (); laparoscopy (); Colonoscopy (N/A, 2/3/2020); and Upper gastrointestinal endoscopy (N/A, 2/3/2020). Restrictions  Restrictions/Precautions  Restrictions/Precautions: General Precautions, Seizure, Fall Risk, Up as Tolerated  Required Braces or Orthoses?: No  Position Activity Restriction  Other position/activity restrictions: up with assistance; L hypertonicity  Subjective   General  Response To Previous Treatment: Patient with no complaints from previous session.   Family / Caregiver Present: No  Pain Screening  Patient Currently in Pain: Yes  Pain Assessment  Patient's Stated Pain Goal: No pain  Pain Type: Acute pain  Pain Location: Head  Pain Descriptors: Headache  Pain Frequency: Continuous  Pain Onset: On-going  Clinical Progression: Not changed  Functional Pain Assessment: Activities are not prevented  Vital Signs  Patient Currently in Pain: Yes       Orientation  Orientation  Overall Orientation Status: Within Functional Limits     Objective   Bed mobility  Rolling to Right: Maximum assistance(pt needed assistance with her MARIO/LEs to roll to her R)  Supine to Sit: Moderate assistance;Maximum assistance  Scootin Person assistance;Maximal assistance  Transfers  Sit to Stand: Maximum Assistance;2 Person Assistance(lg stedy)  Stand to sit: Maximum Assistance;2 Person Assistance(lg stedy)  Bed to Chair: Dependent/Total;2 Person Assistance(lg stedy)  Stand Pivot Transfers: Dependent/Total;2 Person Assistance(lg stedy)  Ambulation  Ambulation?: No  Stairs/Curb  Stairs?: No     Balance  Posture: Poor  Sitting - Static: Poor  Sitting - Dynamic: Poor  Standing - Static: Poor  Standing - Dynamic: Poor  Exercises  Comments: L calf stretch 3x20s, LLE PROM x10  Other exercises  Other exercises 1: PROM MARIO/LE's x 10 reps; increased flexor tone LUE, increased extensor tone LLE  Other exercises 2: worked on trunk control ~10 minutes, daquan lateral

## 2020-12-03 NOTE — PLAN OF CARE
Problem: Falls - Risk of:  Goal: Will remain free from falls  Description: Will remain free from falls  Outcome: Ongoing  Note: Patient remains free from falls this shift. Problem: HEMODYNAMIC STATUS  Goal: Patient has stable vital signs and fluid balance  Outcome: Ongoing  Note: Patient with stable vital signs and fluid balance.

## 2020-12-04 LAB
ANION GAP SERPL CALCULATED.3IONS-SCNC: 14 MMOL/L (ref 9–17)
BUN BLDV-MCNC: 7 MG/DL (ref 6–20)
BUN/CREAT BLD: ABNORMAL (ref 9–20)
CALCIUM SERPL-MCNC: 9.2 MG/DL (ref 8.6–10.4)
CHLORIDE BLD-SCNC: 99 MMOL/L (ref 98–107)
CO2: 24 MMOL/L (ref 20–31)
CREAT SERPL-MCNC: 0.51 MG/DL (ref 0.5–0.9)
GFR AFRICAN AMERICAN: >60 ML/MIN
GFR NON-AFRICAN AMERICAN: >60 ML/MIN
GFR SERPL CREATININE-BSD FRML MDRD: ABNORMAL ML/MIN/{1.73_M2}
GFR SERPL CREATININE-BSD FRML MDRD: ABNORMAL ML/MIN/{1.73_M2}
GLUCOSE BLD-MCNC: 113 MG/DL (ref 70–99)
HCT VFR BLD CALC: 38 % (ref 36.3–47.1)
HEMOGLOBIN: 12.2 G/DL (ref 11.9–15.1)
MCH RBC QN AUTO: 29 PG (ref 25.2–33.5)
MCHC RBC AUTO-ENTMCNC: 32.1 G/DL (ref 28.4–34.8)
MCV RBC AUTO: 90.3 FL (ref 82.6–102.9)
NRBC AUTOMATED: 0 PER 100 WBC
PDW BLD-RTO: 13.2 % (ref 11.8–14.4)
PLATELET # BLD: 302 K/UL (ref 138–453)
PMV BLD AUTO: 8.9 FL (ref 8.1–13.5)
POTASSIUM SERPL-SCNC: 3.5 MMOL/L (ref 3.7–5.3)
RBC # BLD: 4.21 M/UL (ref 3.95–5.11)
SODIUM BLD-SCNC: 137 MMOL/L (ref 135–144)
WBC # BLD: 8.2 K/UL (ref 3.5–11.3)

## 2020-12-04 PROCEDURE — 99232 SBSQ HOSP IP/OBS MODERATE 35: CPT | Performed by: INTERNAL MEDICINE

## 2020-12-04 PROCEDURE — 6370000000 HC RX 637 (ALT 250 FOR IP): Performed by: GENERAL PRACTICE

## 2020-12-04 PROCEDURE — 6360000002 HC RX W HCPCS: Performed by: PSYCHIATRY & NEUROLOGY

## 2020-12-04 PROCEDURE — 36415 COLL VENOUS BLD VENIPUNCTURE: CPT

## 2020-12-04 PROCEDURE — 97535 SELF CARE MNGMENT TRAINING: CPT

## 2020-12-04 PROCEDURE — 6370000000 HC RX 637 (ALT 250 FOR IP): Performed by: NURSE PRACTITIONER

## 2020-12-04 PROCEDURE — 6370000000 HC RX 637 (ALT 250 FOR IP): Performed by: STUDENT IN AN ORGANIZED HEALTH CARE EDUCATION/TRAINING PROGRAM

## 2020-12-04 PROCEDURE — 6360000002 HC RX W HCPCS

## 2020-12-04 PROCEDURE — 80048 BASIC METABOLIC PNL TOTAL CA: CPT

## 2020-12-04 PROCEDURE — 2580000003 HC RX 258: Performed by: NURSE PRACTITIONER

## 2020-12-04 PROCEDURE — 6370000000 HC RX 637 (ALT 250 FOR IP): Performed by: PSYCHIATRY & NEUROLOGY

## 2020-12-04 PROCEDURE — 99232 SBSQ HOSP IP/OBS MODERATE 35: CPT | Performed by: PHYSICAL MEDICINE & REHABILITATION

## 2020-12-04 PROCEDURE — 97110 THERAPEUTIC EXERCISES: CPT

## 2020-12-04 PROCEDURE — 85027 COMPLETE CBC AUTOMATED: CPT

## 2020-12-04 PROCEDURE — 97530 THERAPEUTIC ACTIVITIES: CPT

## 2020-12-04 PROCEDURE — 2060000000 HC ICU INTERMEDIATE R&B

## 2020-12-04 PROCEDURE — 2500000003 HC RX 250 WO HCPCS: Performed by: STUDENT IN AN ORGANIZED HEALTH CARE EDUCATION/TRAINING PROGRAM

## 2020-12-04 RX ORDER — OXYCODONE HYDROCHLORIDE 5 MG/1
5 TABLET ORAL EVERY 4 HOURS
Status: DISCONTINUED | OUTPATIENT
Start: 2020-12-04 | End: 2020-12-06

## 2020-12-04 RX ORDER — FENTANYL CITRATE 50 UG/ML
INJECTION, SOLUTION INTRAMUSCULAR; INTRAVENOUS
Status: COMPLETED
Start: 2020-12-04 | End: 2020-12-04

## 2020-12-04 RX ORDER — LABETALOL HYDROCHLORIDE 5 MG/ML
10 INJECTION, SOLUTION INTRAVENOUS
Status: DISPENSED | OUTPATIENT
Start: 2020-12-04 | End: 2020-12-05

## 2020-12-04 RX ORDER — FENTANYL CITRATE 50 UG/ML
25 INJECTION, SOLUTION INTRAMUSCULAR; INTRAVENOUS ONCE
Status: COMPLETED | OUTPATIENT
Start: 2020-12-04 | End: 2020-12-04

## 2020-12-04 RX ORDER — LABETALOL 100 MG/1
100 TABLET, FILM COATED ORAL EVERY 8 HOURS SCHEDULED
Status: DISCONTINUED | OUTPATIENT
Start: 2020-12-04 | End: 2020-12-05

## 2020-12-04 RX ADMIN — FENTANYL CITRATE 25 MCG: 50 INJECTION, SOLUTION INTRAMUSCULAR; INTRAVENOUS at 00:45

## 2020-12-04 RX ADMIN — Medication 10 MG: at 01:03

## 2020-12-04 RX ADMIN — LAMOTRIGINE 400 MG: 100 TABLET ORAL at 08:47

## 2020-12-04 RX ADMIN — SERTRALINE 50 MG: 50 TABLET, FILM COATED ORAL at 08:46

## 2020-12-04 RX ADMIN — BUPROPION HYDROCHLORIDE 300 MG: 150 TABLET, EXTENDED RELEASE ORAL at 08:47

## 2020-12-04 RX ADMIN — OXYCODONE HYDROCHLORIDE 5 MG: 5 TABLET ORAL at 08:48

## 2020-12-04 RX ADMIN — Medication 10 MG: at 03:07

## 2020-12-04 RX ADMIN — Medication 100 MG: at 08:46

## 2020-12-04 RX ADMIN — POTASSIUM BICARBONATE 40 MEQ: 782 TABLET, EFFERVESCENT ORAL at 08:47

## 2020-12-04 RX ADMIN — CHLORDIAZEPOXIDE HYDROCHLORIDE 5 MG: 5 CAPSULE ORAL at 08:47

## 2020-12-04 RX ADMIN — HEPARIN SODIUM 5000 UNITS: 5000 INJECTION INTRAVENOUS; SUBCUTANEOUS at 21:08

## 2020-12-04 RX ADMIN — CHLORDIAZEPOXIDE HYDROCHLORIDE 5 MG: 5 CAPSULE ORAL at 21:08

## 2020-12-04 RX ADMIN — BACLOFEN 5 MG: 10 TABLET ORAL at 21:07

## 2020-12-04 RX ADMIN — BACLOFEN 5 MG: 10 TABLET ORAL at 13:22

## 2020-12-04 RX ADMIN — BACLOFEN 5 MG: 10 TABLET ORAL at 08:48

## 2020-12-04 RX ADMIN — FOLIC ACID 1 MG: 1 TABLET ORAL at 08:47

## 2020-12-04 RX ADMIN — LABETALOL HCL 100 MG: 100 TABLET, FILM COATED ORAL at 21:32

## 2020-12-04 RX ADMIN — HEPARIN SODIUM 5000 UNITS: 5000 INJECTION INTRAVENOUS; SUBCUTANEOUS at 06:16

## 2020-12-04 RX ADMIN — LISINOPRIL 40 MG: 20 TABLET ORAL at 08:46

## 2020-12-04 RX ADMIN — ROPINIROLE HYDROCHLORIDE 1 MG: 1 TABLET, FILM COATED ORAL at 21:08

## 2020-12-04 RX ADMIN — OXYCODONE HYDROCHLORIDE 5 MG: 5 TABLET ORAL at 16:54

## 2020-12-04 RX ADMIN — CHLORDIAZEPOXIDE HYDROCHLORIDE 5 MG: 5 CAPSULE ORAL at 16:54

## 2020-12-04 RX ADMIN — SODIUM CHLORIDE, PRESERVATIVE FREE 10 ML: 5 INJECTION INTRAVENOUS at 21:09

## 2020-12-04 RX ADMIN — OXYCODONE HYDROCHLORIDE 5 MG: 5 TABLET ORAL at 03:03

## 2020-12-04 RX ADMIN — SODIUM CHLORIDE, PRESERVATIVE FREE 10 ML: 5 INJECTION INTRAVENOUS at 09:16

## 2020-12-04 RX ADMIN — LABETALOL HCL 100 MG: 100 TABLET, FILM COATED ORAL at 12:00

## 2020-12-04 RX ADMIN — OXYCODONE HYDROCHLORIDE 5 MG: 5 TABLET ORAL at 21:08

## 2020-12-04 RX ADMIN — CHLORDIAZEPOXIDE HYDROCHLORIDE 5 MG: 5 CAPSULE ORAL at 13:22

## 2020-12-04 RX ADMIN — LORAZEPAM 1 MG: 1 TABLET ORAL at 03:03

## 2020-12-04 RX ADMIN — AMLODIPINE BESYLATE 10 MG: 10 TABLET ORAL at 08:47

## 2020-12-04 RX ADMIN — ACETAMINOPHEN 650 MG: 325 TABLET ORAL at 06:21

## 2020-12-04 RX ADMIN — OXYCODONE HYDROCHLORIDE 5 MG: 5 TABLET ORAL at 13:25

## 2020-12-04 RX ADMIN — CYANOCOBALAMIN 1000 MCG: 1000 INJECTION, SOLUTION INTRAMUSCULAR at 08:48

## 2020-12-04 RX ADMIN — MENTHOL AND METHYL SALICYLATE: 10; 30 CREAM TOPICAL at 21:29

## 2020-12-04 RX ADMIN — HEPARIN SODIUM 5000 UNITS: 5000 INJECTION INTRAVENOUS; SUBCUTANEOUS at 13:22

## 2020-12-04 ASSESSMENT — PAIN DESCRIPTION - LOCATION
LOCATION: HEAD

## 2020-12-04 ASSESSMENT — PAIN DESCRIPTION - PAIN TYPE
TYPE: ACUTE PAIN

## 2020-12-04 ASSESSMENT — PAIN SCALES - GENERAL
PAINLEVEL_OUTOF10: 8
PAINLEVEL_OUTOF10: 10
PAINLEVEL_OUTOF10: 8
PAINLEVEL_OUTOF10: 10
PAINLEVEL_OUTOF10: 9
PAINLEVEL_OUTOF10: 8
PAINLEVEL_OUTOF10: 9
PAINLEVEL_OUTOF10: 6
PAINLEVEL_OUTOF10: 10

## 2020-12-04 ASSESSMENT — PAIN DESCRIPTION - ORIENTATION
ORIENTATION: POSTERIOR
ORIENTATION: POSTERIOR

## 2020-12-04 ASSESSMENT — PAIN DESCRIPTION - FREQUENCY: FREQUENCY: CONTINUOUS

## 2020-12-04 ASSESSMENT — PAIN DESCRIPTION - DESCRIPTORS: DESCRIPTORS: ACHING;HEADACHE

## 2020-12-04 NOTE — PROGRESS NOTES
Physical Medicine & Rehabilitation  Progress Note    12/4/2020 2:02 PM     CC: Ambulatory and ADL dysfunction due to large right parietal lobe intracranial hemorrhage    3year-old female with history of delta storage pool disease, bipolar disorder, alcohol abuse who developed acute left-sided weakness-found to have intraparenchymal hemorrhage, right parietal lobe hemorrhage and moderate subarachnoid hemorrhage. She was hypertensive. .  Per hematology oncology transfuse platelets as needed and administer desmopressin as needed. Subjective:   No complaints. Working with PT/OT  - frustrated did not do as well today, unable to get to chair. Cont with HA     ROS:  Denies fevers, chills, sweats. No chest pain, palpitations, lightheadedness. Denies coughing, wheezing or shortness of breath. Denies abdominal pain, nausea, diarrhea or constipation. No new areas of joint pain. Denies new areas of numbness or weakness. Denies new anxiety or depression issues. No new skin problems.     Rehabilitation:   PT:  Restrictions/Precautions: General Precautions, Seizure, Fall Risk, Up as Tolerated  Other position/activity restrictions: up with assistance; L hypertonicity   Transfers  Sit to Stand: Maximum Assistance, 2 Person Assistance(lg stedy)  Stand to sit: Maximum Assistance, 2 Person Assistance(lg stedy)  Bed to Chair: Dependent/Total, 2 Person Assistance(lg stedy)  Stand Pivot Transfers: Dependent/Total, 2 Person Assistance(lg stedy)  Comment: unsafe to attempt with only one staff member       OT:  ADL  Feeding: Setup, Modified independent (Pt able to reach for cup of water with HOB raised using the R hand)  Grooming: Setup, Stand by assistance  UE Bathing: Setup, Increased time to complete, Maximum assistance, Verbal cueing  LE Bathing: Setup, Maximum assistance  UE Dressing: Setup, Minimal assistance  LE Dressing: Setup, Maximum assistance  Toileting: Increased time to complete, Setup, Maximum assistance, Dependent/Total(pt used bed pan and brief mgt)  Additional Comments: Pt able to reach for chapstick on the L side, noted to have visual motor deficits; unidirectional nystagmus observed to the L         Balance  Sitting Balance: Maximum assistance(seated on EOB)  Standing Balance: Dependent/Total(+ person assistance w/ lg lpoez)   Standing Balance  Time: stood 2 x for less less than 1 min  Activity: lg steady used  Comment: pt has a verry STRONG lean to the L  Functional Mobility  Functional Mobility Comments: UAT  Apparatus Needs  Apparatus Needs: O2  Bed mobility  Rolling to Left: Maximum assistance  Rolling to Right: Dependent/Total  Supine to Sit: Moderate assistance, Maximum assistance  Sit to Supine: Dependent/Total, 2 Person assistance  Scootin Person assistance, Maximal assistance  Comment: HOB elevated completely. Max A x 2 required. Pt with posterior and L lateral leaning requiring Max A to maintain sitting after supine to sit transfer. Pt slightly drowsy throughout requiring cues for safety, initiation and sequencing. Transfers  Sit to stand: Dependent/Total, 2 Person assistance  Stand to sit: Dependent/Total, 2 Person assistance  Transfer Comments: Pt not appropriate to attempt stand this date d/t being unable to tolerate sitting balance and reporting significant low back pain. ST:            Objective:  BP (!) 142/59   Pulse 89   Temp 98.7 °F (37.1 °C) (Oral)   Resp 16   Ht 5' 5\" (1.651 m)   Wt 235 lb (106.6 kg)   SpO2 97%   BMI 39.11 kg/m²  I Body mass index is 39.11 kg/m². I   Wt Readings from Last 1 Encounters:   20 235 lb (106.6 kg)      Temp (24hrs), Av.5 °F (36.9 °C), Min:98.1 °F (36.7 °C), Max:98.7 °F (37.1 °C)         GEN: well developed, well nourished,   HEENT: Normocephalic atraumatic, EOMI, mucous membranes pink and moist  CV: RRR, no murmurs, rubs or gallops  PULM: CTAB, no rales or rhonchi.  Respirations WNL and unlabored  ABD: soft, NT, ND, +BS and equal  NEURO: A&O x3. Sensation decreased LUE/LLE  MSK: Dense left hemiparesis  EXTREMITIES: No calf tenderness to palpation bilaterally. No edema BLEs  SKIN: warm dry and intact with good turgor  PSYCH: appropriately interactive. Affect WNL. Medications   Scheduled Meds:   labetalol  100 mg Oral 3 times per day    oxyCODONE  5 mg Oral Q4H    lisinopril  40 mg Oral Daily    baclofen  5 mg Oral TID    potassium bicarb-citric acid  40 mEq Oral Daily    amLODIPine  10 mg Oral Daily    heparin (porcine)  5,000 Units Subcutaneous 3 times per day    thiamine  100 mg Oral Daily    cyanocobalamin  1,000 mcg Intramuscular Daily    Followed by   Mara Gianfranco ON 12/9/2020] cyanocobalamin  1,000 mcg Intramuscular Weekly    Followed by   Mara Gianfranco ON 2/3/2021] cyanocobalamin  1,000 mcg Intramuscular Q30 Days    chlordiazePOXIDE  5 mg Oral 4x Daily    sennosides-docusate sodium  2 tablet Oral Daily    sodium chloride flush  10 mL Intravenous 2 times per day    sodium chloride  20 mL Intravenous Once    folic acid  1 mg Oral Daily    buPROPion  300 mg Oral Daily    lamoTRIgine  400 mg Oral Daily    sertraline  50 mg Oral Daily    rOPINIRole  1 mg Oral Nightly     Continuous Infusions:    PRN Meds:.labetalol, menthol-methyl salicylate, sodium chloride flush, acetaminophen, perflutren lipid microspheres, ondansetron, ipratropium-albuterol, LORazepam, sodium chloride flush, magnesium hydroxide     Diagnostics:     CBC:   Recent Labs     12/02/20  0423 12/03/20  0340 12/04/20  0345   WBC 7.0 5.5 8.2   RBC 3.83* 3.90* 4.21   HGB 11.1* 11.5* 12.2   HCT 34.9* 34.5* 38.0   MCV 91.1 88.5 90.3   RDW 13.3 13.3 13.2    254 302     BMP:   Recent Labs     12/02/20  0423 12/03/20  1114 12/04/20  0345   * 137 137   K 3.3* 3.4* 3.5*   CL 95* 102 99   CO2 26 22 24   BUN 6 6 7   CREATININE 0.53 0.56 0.51     BNP: No results for input(s): BNP in the last 72 hours.   PT/INR: No results for input(s): PROTIME, INR in the last 72 hours. APTT: No results for input(s): APTT in the last 72 hours. CARDIAC ENZYMES: No results for input(s): CKMB, CKMBINDEX, TROPONINT in the last 72 hours. Invalid input(s): CKTOTAL;3  FASTING LIPID PANEL:  Lab Results   Component Value Date    CHOL 247 (H) 11/30/2020     11/30/2020    TRIG 77 11/30/2020     LIVER PROFILE: No results for input(s): AST, ALT, ALB, BILIDIR, BILITOT, ALKPHOS in the last 72 hours. I/O (24Hr): Intake/Output Summary (Last 24 hours) at 12/4/2020 1402  Last data filed at 12/4/2020 1200  Gross per 24 hour   Intake 3029 ml   Output 4650 ml   Net -1621 ml       Glu last 24 hour  No results for input(s): POCGLU in the last 72 hours. No results for input(s): CLARITYU, COLORU, PHUR, SPECGRAV, PROTEINU, RBCUA, BLOODU, BACTERIA, NITRU, WBCUA, LEUKOCYTESUR, YEAST, GLUCOSEU, BILIRUBINUR in the last 72 hours. Lives With: Spouse(2 adult children, neither lives at home)  Type of Home: House  Home Layout: Multi-level, Bed/Bath upstairs(3 levels. Half bath on main level.)  Home Access: Stairs to enter without rails  Entrance Stairs - Number of Steps: 4  Bathroom Shower/Tub: Tub/Shower unit  Bathroom Toilet: Standard  Bathroom Equipment: (No equiptment)  Home Equipment: (No AD)  ADL Assistance: Independent  Homemaking Assistance: Independent  Homemaking Responsibilities: Yes  Ambulation Assistance: Independent  Transfer Assistance: Independent  Active : Yes  Mode of Transportation: [facility]  Occupation: Part time employment  Type of occupation:   Leisure & Hobbies: relax  Additional Comments:  is a teacher and works full time.     Impression/Plan:    Impression: Ms. Gissel Falk is a 48 y.o. male with a history of <principal problem not specified>     1. Large right parietal lobe intracranial hemorrhage with moderate subarachnoid hemorrhage and intraventricular hemorrhage in the right occipital lobe status post TPA mild spasticity -started baclofen  2. Bipolar disorder-Wellbutrin, Zoloft,   3. Alcohol abuse-being monitored for withdrawal, still receiving IV ativan and Librum- plan to wean begin 12/5, will need to monitor as cont wean   4. 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 Hypertension-Norvasc, lisinopril, given labetol iv x 4 last night per Neuro note  6. Restless leg-Requip  67. HA- givin 25 mcg fentanyl last night ,cont on roxidcone and baclofen( spasticity)    Recommendations  1. Diagnosis-hemorrhagic CVA  2. Therapy: Max assist 2 person supine to sit, did not attempt stand/ambulation, mod to max assist ADLs  3. Medical  Necessity: As above  4. Support: Clarify,  works  11. Rehab recommendation: Would benefit from acute inpatient rehabilitation when medically ready depending on support, need to out of DT, off IV pain meds and Ativan x24 hours, ( received IV fentanyl, IV labetol x4 and IV ativan), clarify support  6. DVT proph:  Subcu heparin     It was my pleasure to evaluate Andrew Donaldson today. Please call with questions. Tyrese Machado. Shruti Peters MD       This note is created with the assistance of a speech recognition program.  While intending to generate a document that actually reflects the content of the visit, the document can still have some errors including those of syntax and sound a like substitutions which may escape proof reading.   In such instances, actual meaning can be extrapolated by contextual diversion

## 2020-12-04 NOTE — CARE COORDINATION
Spoke to patient's  Nora High, He has agreed to [facility].   Probable discharge on Monday

## 2020-12-04 NOTE — PLAN OF CARE
Nutrition Problem #1: Inadequate oral intake  Intervention: Food and/or Nutrient Delivery: Continue Current Diet, Modify Oral Nutrition Supplement  Nutritional Goals: PO intake to meet greater than 50% of estimated nutrient needs

## 2020-12-04 NOTE — PROGRESS NOTES
Spoke with HORACIO Farley CM, who requests precert be initiated. Writer initiated precert for ARU with MMO via [address] [place] [address] with [employer]  [id number]. Pt's benefits for ARU verified via [employer] and are as follows:  80/20 after $400 deductible. Per [employer], pt has active MMO coverage but precert determination will be provided by another company. Writer notified Angelika Tovar who states pt will not be medically ready until next week. Writer will f/u Monday.

## 2020-12-04 NOTE — PROGRESS NOTES
Daily Progress Note  Neuro Critical Care    Patient Name: Babatunde Alan  Patient : 1970  Room/Bed: 0457/8307-23  Code Status: Full code  Allergies: Allergies   Allergen Reactions    Pcn [Penicillins]     Sulfa Antibiotics Other (See Comments)     Inflammation in the eye       CHIEF COMPLAINT:      HA, left sided weakness     INTERVAL HISTORY    Initial Presentation (Admitted 20): The patient is a 49 yo female with history of HTN, delta storage pool disease, bipolar disorder, and alcohol abuse who presented as a transfer from [facility] ED after CT head revealed right parietal ICH. Initially presented to [facility] with left sided weakness and headache. LKW around midnight, patient states her left upper extremity \"went limp\" at that time. Patient states she went to sleep and woke up around 0400 and noticed her left lower extremity was also weak. Reports she developed a headache by the time EMS arrived. CT head at [facility] ED revealed large right parietal lobe ICH with moderate SAH. Noted to be hypertensive at [facility] and started on Cardene infusion. Patient had recently run out of her Lisinopril. Loaded with 1g Keppra. Transferred to [facility] for further evaluation and management. On arrival to ED, -190's. Unclear whether patient arrived on Cardene infusion but she was stared on Clevidipine infusion in ED. CT Head showed stable right parietal ICH with SAH and new intraventricular hemorrhage in the right occipital horn.  CTA Head/Neck unremarkable.  Given 25g Mannitol x1 per Stroke team.  Neurosurgery and Hem/Onc consulted while in ED.  Given 40mcg DDAVP x1 and transfused 1 pack of platelets.  Patient denies head trauma or falls.   GCS 14.  Opens eyes to voice, oriented x3.  Noted to have dense left upper and lower extremity weaknes.  Patient has a history of alcohol abuse.  She went to rehab last year and states she no longer drinks daily but still drinks occasionally, last drank vodka last night.       ICH score: 2 (Volume ~41ml, +IVH)     Hospital Course:   12/1: Repeat CT Head this morning was overall stable except small amount of acute hemorrhage in left occipital horn.  Given 1 pack platelets. Restless and agitated at times with diaphoresis and tachycardia. Precedex infusion continued and started on Librium 5mg QID.      12/2: intermittent HA. Started roxicocone. Started valium 5m q8h PRN. Vit B12, low and replaced. Echo showed normal EF 55%. Norvasc increased to 10mg daily to keep SBP < 160. Continue Precedex and librium to help with restlessness and withdrawal sx.      12/3: Off precedex. Discontinue Valium and start on baclofen. Braces for left foot and left wrist.  Started on Ensure due to decreased p.o. intake. Up to chair with PT. Lisinopril increased. Last 24h:   No acute events overnight. Continue to plan of severe headache and neck pain for which patient is receiving oxycodone, Tylenol, baclofen. Was given one-time dose of 25 mcg fentanyl as well as 1 g of magnesium with minimal improvement of headache. No new numbness, tingling or weakness. Continues to tolerate p.o. intake and drank Ensure last night. Did have a bowel movement last night. Afebrile throughout the night. CIWA score 11-12 and required Ativan x2. Consistently hypertensive throughout the night requiring 4 doses of as needed labetalol.     CURRENT MEDICATIONS:  SCHEDULED MEDICATIONS:   labetalol  100 mg Oral 3 times per day    lisinopril  40 mg Oral Daily    baclofen  5 mg Oral TID    potassium bicarb-citric acid  40 mEq Oral Daily    amLODIPine  10 mg Oral Daily    heparin (porcine)  5,000 Units Subcutaneous 3 times per day    thiamine  100 mg Oral Daily    cyanocobalamin  1,000 mcg Intramuscular Daily    Followed by   [employer] ON 12/9/2020] cyanocobalamin  1,000 mcg Intramuscular Weekly    Followed by   [employer] ON 2/3/2021] cyanocobalamin  1,000 mcg Intramuscular Q30 Days    chlordiazePOXIDE  5 mg Oral 4x Daily    sennosides-docusate sodium  2 tablet Oral Daily    sodium chloride flush  10 mL Intravenous 2 times per day    sodium chloride  20 mL Intravenous Once    folic acid  1 mg Oral Daily    buPROPion  300 mg Oral Daily    lamoTRIgine  400 mg Oral Daily    sertraline  50 mg Oral Daily    rOPINIRole  1 mg Oral Nightly     CONTINUOUS INFUSIONS:    PRN MEDICATIONS:   labetalol, menthol-methyl salicylate, sodium chloride flush, acetaminophen, perflutren lipid microspheres, ondansetron, ipratropium-albuterol, LORazepam, oxyCODONE, sodium chloride flush, magnesium hydroxide    VITALS:  Temperature Range: Temp: 98.4 °F (36.9 °C) Temp  Av.3 °F (36.8 °C)  Min: 98.1 °F (36.7 °C)  Max: 98.6 °F (37 °C)  BP Range: Systolic (36KMW), LGT:591 , Min:134 , PJU:005     Diastolic (14TXK), LTQ:98, Min:70, Max:146    Pulse Range: Pulse  Av.5  Min: 68  Max: 116  Respiration Range: Resp  Av.2  Min: 16  Max: 21  Current Pulse Ox: SpO2: 96 %  24HR Pulse Ox Range: SpO2  Av.9 %  Min: 94 %  Max: 99 %  Patient Vitals for the past 12 hrs:   BP Temp Temp src Pulse Resp SpO2   20 0605 (!) 152/111 -- -- 80 18 --   20 0505 (!) 159/81 -- -- 79 18 --   20 0405 (!) 155/73 98.4 °F (36.9 °C) Oral 73 19 96 %   20 0305 (!) 173/106 -- -- 81 18 98 %   20 0205 (!) 189/87 -- -- 80 21 --   20 0105 (!) 176/84 -- -- 80 17 94 %   20 0005 (!) 193/98 98.6 °F (37 °C) Oral 82 18 --   20 2305 (!) 180/94 -- -- 96 18 99 %   20 (!) 177/100 -- -- 80 -- 98 %   20 (!) 174/146 -- -- 97 18 98 %   20 (!) 156/93 98.3 °F (36.8 °C) Oral 99 20 94 %     Estimated body mass index is 39.11 kg/m² as calculated from the following:    Height as of this encounter: 5' 5\" (1.651 m).     Weight as of this encounter: 235 lb (106.6 kg).  []<16 Severe malnutrition  []16-16.99 Moderate malnutrition  []17-18.49 Mild malnutrition  []18.5-24.9 Normal  []25-29.9 Overweight (not obese)  []30-34.9 Obese class 1 (Low Risk)  [x]35-39.9 Obese class 2 (Moderate Risk)  []?40 Obese class 3 (High Risk)    RECENT LABS:   Lab Results   Component Value Date    WBC 8.2 12/04/2020    HGB 12.2 12/04/2020    HCT 38.0 12/04/2020     12/04/2020    CHOL 247 (H) 11/30/2020    TRIG 77 11/30/2020     11/30/2020    ALT 24 11/30/2020    AST 51 (H) 11/30/2020     12/04/2020    K 3.5 (L) 12/04/2020    CL 99 12/04/2020    CREATININE 0.51 12/04/2020    BUN 7 12/04/2020    CO2 24 12/04/2020    TSH 1.34 11/30/2020    INR 1.0 11/30/2020    LABA1C 5.0 11/30/2020     24 HOUR INTAKE/OUTPUT:    Intake/Output Summary (Last 24 hours) at 12/4/2020 0741  Last data filed at 12/4/2020 0400  Gross per 24 hour   Intake 3029 ml   Output 5150 ml   Net -2121 ml       IMAGING:   Labs and Images reviewed with:  [] Dr. Lovell Favre. Rebecca    [x] Dr. Katrin Saleh  [] Dr. Marielena Russo  [x] There are no new interval images to review. PHYSICAL EXAM       CONSTITUTIONAL:  Well developed, well nourished, alert and oriented x 3, in no acute distress. GCS 15. Nontoxic. No dysarthria. No aphasia. HEAD:  normocephalic, atraumatic    EYES:  PERRLA, EOMI.   ENT:  moist mucous membranes   NECK:  supple, symmetric   LUNGS:  Equal air entry bilaterally   CARDIOVASCULAR:  normal s1 / s2, RRR, distal pulses intact   ABDOMEN:  Soft, no rigidity   NEUROLOGIC:  Mental Status:  A & O x3. Answering questions appropriately.               Cranial Nerves:    III: Pupils:  equal, round, reactive to light  III,IV,VI: Extra Ocular Movements: intact  V: Facial sensation:  abnormal, subjective paresthesias left side of face  VII: Facial strength: abnormal, mild left facial droop  XI: Shoulder shrug:  abnormal, left-sided weakness     Motor Exam:    Drift: Absent drift right upper extremity  Tone: Hypertonicity of left lower extremity and left upper extremity     5/5 strength right upper and right lower extremity. 0/5 strength in left upper. Able to move toes in left foot but otherwise not movement/strenght in LLE     Sensory:    Touch:    Right Upper Extremity:  normal  Left Upper Extremity:  abnormal -decreased sensation to light touch  Right Lower Extremity:  normal  Left Lower Extremity:  abnormal -decrease sensation to light touch     Plantar Response:  Right:  downgoing  Left:  upgoing     Coordination/Dysmetria:  Finger to Nose:   Right:  normal          ASSESSMENT AND PLAN:       The patient is a 49 yo female with a history of HTN, Delta storage pool disease, bipolar disorder, and alcohol abuse who was admitted to the Neuro ICU for management of a large right parietal ICH with subarachnoid and intraventricular blood.  Given DDAVP and 1 pack of platelets in setting of platelet disorder.  Initially presented to [facility] ED with left sided weakness and headache.  Hypertensive with -190's on arrival to ED.     NEUROLOGIC:  - Acute right parietal ICH with intraventricular extension and subarachnoid hemorrhage component with surrounding cerebral edema  - Etiology unclear; possibly secondary to coaguloapthy in setting of platelet disorder and hypertension  - No underlying mass or vascular malformation on MRI brain with/without contrast  - CT Head 12/1 stable except some acute hemorrhage now in the left occipital horn  - s/p Platelet transfusion 12/1 for hx of Delta Pool Deficiency; also given DDAVP  - continue Lamictal for seizure prophylaxis  - Neurosurgery following; appreciate recs  - Goal SBP<160  - History of ETOH abuse;  Folic Acid& Thiamine replacement, Librium 5mg 4XD, Ativan 1mg Q4h PRN, Precedex prn   - day 4 of admission/no EtOH   - CIWA score of 12 overnight; ativan x2   - continue to monitor   - plan to wean librium tomorrow 12/5  - Persistent right sided HA and neck pain   - given 1g mag overnight and 25mcg fentanyl   - Will make tylenol scheduled: tylenol 1g q8h   - Roxicodone 4mg q4h scheduled; will continue for 2 days and wean   - continue Baclofen; will consider increase as needed for hypertonicity     - Foot/ankle brace and wrist splint   - Requip 1mg QHS for restless legs  - Neuro checks per protocol    CARDIOVASCULAR:  - Goal SBP<160  - Persistent HTN   - Labetalol IV push x4 overnight for hypertension   - Continue home Norvasc 10mg QD   -  Lisinopril 40mg QD   - start on labetalol 100mg q8h   -We will continue to monitor, unclear if hypertension is due to uncontrolled essential hypertension at baseline, pain or alcohol withdrawal.  We will continue multimodal management.   - Echocardiogram: normal function, EF 55%  - Lipid panel; LDL 90, Cholesterol 247  - Consider statin therapy  - Continue telemetry    PULMONARY:  - Maintaining O2 sats on room air  - History of asthma  - Duonebs PRN    RENAL/FLUID/ELECTROLYTE:  - BUN 7/ Creatinine 0.51  - adequate urine output  -Mild hypokalemia 3.4 yesterday  - Replace electrolytes PRN  - Daily BMP    GI/NUTRITION:  NUTRITION:  DIET GENERAL;  Dietary Nutrition Supplements: Standard High Calorie Oral Supplement  - Continue Ensure shakes for additional calories given decreased p.o. intake  -Dietary consult due to overall poor p.o. intake  - Bowel regimen: Senokot-S daily, Milk of Mag PRN              - BM evening of 12/3  - GI prophylaxis: N/A    ID:  - Tmax 36.9  - WBC 8.2  - Continue to monitor for fevers  - Daily CBC    HEME:   - H&H 12.2/38  - Platelets 253  - Daily CBC    ENDOCRINE:  - Continue to monitor blood glucose, goal <180  - A1C 5.0    OTHER:  - History of bipolar disorder; Lamictal 400mg BID, Zoloft 50mg QD  - PT/OT/ST; encourage pt to be up to chair and increase mobility  - PM&R consulted - recommend IP rehab    PROPHYLAXIS:  Stress ulcer: N/A    DVT PROPHYLAXIS:  - SCD sleeves - Thigh High   - Start Heparin 5000 units Q8    DISPOSITION:  [x] To remain ICU but ok to make stepdown status  [] OK for out of ICU from Neuro Critical Care standpoint    We will continue to follow along. For any changes in exam or patient status please contact Neuro Critical Care.       Alexander Angel DO  Neuro Critical Care  Pager [phone]  12/4/2020     7:41 AM

## 2020-12-04 NOTE — PROGRESS NOTES
Comprehensive Nutrition Assessment    Type and Reason for Visit:  Initial, Consult(Poor PO intake)    Nutrition Recommendations/Plan:    - Send Ensure High Protein with all meals. - Assist with meals as tolerated. - Obtain current weight   (Recommendations discussed with RN)    Nutrition Assessment:  Pt admitted with intracranial hemorrhage. Poor appetite/PO intake since admission (4 days) d/t headaches and occasional nausea. Pt also having difficulty feeding self. Breakfast this morning observed - had cup of oatmeal, grape juice, and ~70% of a carton of milk. Ensure ONS ordered yesterday by MD - pt states she drank one last night for dinner and is agreeable to continue receiving. Pt denies any decrease in appetite/PO intake PTA.     Malnutrition Assessment:  Malnutrition Status:  Insufficient data    Context:  Acute Illness     Findings of the 6 clinical characteristics of malnutrition:  Energy Intake:  Mild decrease in energy intake (Comment)  Weight Loss:  Unable to assess     Body Fat Loss:  No significant body fat loss     Muscle Mass Loss:  No significant muscle mass loss    Fluid Accumulation:  No significant fluid accumulation     Strength:  Not Performed    Estimated Daily Nutrient Needs:  Energy (kcal):  6480-0147 kcals/day; Weight Used for Energy Requirements:  Admission     Protein (g):  1.3-1.5 gm/kg = 75-85 gm/day; Weight Used for Protein Requirements:  Ideal          Nutrition Related Findings:  Meds/labs reviewed      Wounds:  None       Current Nutrition Therapies:    DIET GENERAL;  Dietary Nutrition Supplements: Standard Oral Supplement BID    Anthropometric Measures:  · Height: 5' 5\" (165.1 cm)  · Current Body Weight: 235 lb (106.6 kg)('estimated')   · Usual Body Weight: (232-236 lbs)     · Ideal Body Weight: 125 lbs; % Ideal Body Weight 188 %   · BMI: 39.1  · BMI Categories: Obese Class 2 (BMI 35.0 -39.9)       Nutrition Diagnosis:   · Inadequate oral intake related to pain(appetite, nausea, self feeding difficulty) as evidenced by (current decreased PO intake)      Nutrition Interventions:   Food and/or Nutrient Delivery:  Continue Current Diet, Modify Oral Nutrition Supplement  Nutrition Education/Counseling:  No recommendation at this time   Coordination of Nutrition Care:  Continue to monitor while inpatient    Goals:  PO intake to meet greater than 50% of estimated nutrient needs       Nutrition Monitoring and Evaluation:   Behavioral-Environmental Outcomes:  None Identified   Food/Nutrient Intake Outcomes:  Food and Nutrient Intake, Supplement Intake  Physical Signs/Symptoms Outcomes:  Nutrition Focused Physical Findings, Biochemical Data, Weight     Discharge Planning:     Too soon to determine     Electronically signed by Nelda Daniel, MS, RD, LD on 12/4/20 at 12:19 PM EST    Contact: [phone]

## 2020-12-04 NOTE — PROGRESS NOTES
Date:                           12/4/2020  Patient name:           Charlene Saint  Date of admission:  11/30/2020  6:38 AM  MRN:   [id number]  YOB: 1970  PCP:                           Navdeep Moncada MD        Reason for consult: Delta Pool storage deficiency    Subjective:   Patient seen and examined at bedside. She reports no new complaints today. She reports posterior headache and neck pain. Still weak on the left side. No reported bleeding. REVIEW OF SYSTEMS:  General: no fever or night sweats  ENT: No double or blurred vision, no hearing problem, no dysphagia or sore throat   Respiratory: No chest pain, no shortness of breath, no cough or hemoptysis. Cardiovascular: Denies chest pain, PND or orthopnea. No LE swelling or palpitations. Gastrointestinal:    Positive for nausea, no vomiting, abdominal pain, diarrhea or constipation. Genitourinary: Denies dysuria, hematuria, frequency, urgency or incontinence. Neurological: Positive for headaches, positive for left upper and lower limb hemiparesis, no decreased LOC   Musculoskeletal: Positive for back pain, no joint swelling.    Skin: Positive for rash on arms and legs  Psych: Denies hallucinations or intentions to harm self        Objective:     Vitals: BP (!) 144/62   Pulse 81   Temp 98.6 °F (37 °C) (Oral)   Resp 18   Ht 5' 5\" (1.651 m)   Wt 235 lb (106.6 kg)   SpO2 97%   BMI 39.11 kg/m²   General appearance -alert, not in obvious distress  Mental status -oriented x3  Eyes - pupils equal and reactive, extraocular eye movements intact  Mouth - mucous membranes moist, pharynx normal without lesions  Neck - supple, no significant adenopathy  Lymphatics - no palpable lymphadenopathy, no hepatosplenomegaly  Chest - clear to auscultation, no wheezes, rales or rhonchi, symmetric air entry  Heart - normal rate, regular rhythm, normal S1, S2, no murmurs  Abdomen - soft, nontender, nondistended, no masses or organomegaly  Neurological - alert, oriented, normal speech, left hemiparesis  Extremities - peripheral pulses normal, no pedal edema, no clubbing or cyanosis  Skin - normal coloration and turgor, no rashes, no suspicious skin lesions noted         Data:    No intake/output data recorded. In: 6673 [P.O.:2900; I.V.:129]  Out: 3050 [Urine:3050]    CBC:   Recent Labs     12/02/20  0423 12/03/20  0340 12/04/20  0345   WBC 7.0 5.5 8.2   HGB 11.1* 11.5* 12.2    254 302     BMP:    Recent Labs     12/02/20  0423 12/03/20  1114 12/04/20  0345   * 137 137   K 3.3* 3.4* 3.5*   CL 95* 102 99   CO2 26 22 24   BUN 6 6 7   CREATININE 0.53 0.56 0.51   GLUCOSE 100* 100* 113*     Hepatic:   No results for input(s): AST, ALT, ALB, BILITOT, ALKPHOS in the last 72 hours. INR:   No results for input(s): INR in the last 72 hours. PTT:No results for input(s): PTT in the last 72 hours. Problem Lists:   Primary Problem:  Acute intracranial hemorrhage  Current Problems:  Active Hospital Problems    Diagnosis Date Noted    Vasogenic edema (HCC) [G93.6]     IVH (intraventricular hemorrhage) ([address] [I61.5]     Intraparenchymal hemorrhage of brain ([address] [I61.9]     Intracranial hemorrhage ([address] [I62.9] 11/30/2020    Alcohol withdrawal syndrome without complication (HCC) [O72.561]      PMH:  Past Medical History:   Diagnosis Date    Asthma     Bipolar 1 disorder ([address]     Delta storage pool disease ([address]     Hypertension     Psychiatric problem       Allergies: Allergies   Allergen Reactions    Pcn [Penicillins]     Sulfa Antibiotics Other (See Comments)     Inflammation in the eye        Assessment    1. Acute intracranial hemorrhage. 2. Delta pool storage disorder  3. Hypertensive emergency  4. Alcohol withdrawal        Plan     1. Transfuse with platelets and administer desmopressin as needed. 2. No current evidence of active bleeding. Patient appears clinically stable.       Kevin Zhou, MD  PGY-3 IM Resident  12/4/2020,11:43 AM    Attending Physician Statement   I have discussed the care of [address], including pertinent history and exam findings with the resident. I have reviewed the key elements of all parts of the encounter with the resident. I have seen and examined the patient with the resident. I agree with the assessment and plan and status of the problem list as documented.                                [facility]

## 2020-12-04 NOTE — PROGRESS NOTES
Occupational Therapy  Facility/Department: [facility]  Daily Treatment Note  NAME: Yaya Henley  : 1970  MRN: [id number]    Date of Service: 2020    Discharge Recommendations:  Patient would benefit from continued therapy after discharge   Ed on OT services, bed mob, transfers, orientation review,       Assessment   Performance deficits / Impairments: Decreased functional mobility ; Decreased ADL status; Decreased ROM; Decreased strength;Decreased safe awareness;Decreased endurance;Decreased balance;Decreased high-level IADLs;Decreased fine motor control;Decreased coordination;Decreased posture  Prognosis: Good  REQUIRES OT FOLLOW UP: Yes  Activity Tolerance  Activity Tolerance: Patient limited by fatigue;Patient limited by pain  Activity Tolerance: Limited by decreased LUE/LLE  deficits  Safety Devices  Safety Devices in place: Yes  Type of devices: All fall risk precautions in place; Bed alarm in place;Call light within reach; Left in bed;Nurse notified         Patient Diagnosis(es): The encounter diagnosis was Intracranial hemorrhage ([facility] has a past medical history of Asthma, Bipolar 1 disorder ([facility] Delta storage pool disease [facility]), Hypertension, and Psychiatric problem. has a past surgical history that includes  section (3314,3410); Gastric bypass surgery (); Tonsillectomy and adenoidectomy (); Cholecystectomy (); knee surgery (); Hysterectomy (); laparoscopy (); Colonoscopy (N/A, 2/3/2020); and Upper gastrointestinal endoscopy (N/A, 2/3/2020).     Restrictions  Restrictions/Precautions  Restrictions/Precautions: General Precautions, Seizure, Fall Risk, Up as Tolerated  Required Braces or Orthoses?: No  Position Activity Restriction  Other position/activity restrictions: up with assistance; L hypertonicity  Subjective   General  Patient assessed for rehabilitation services?: Yes  Family / Caregiver Present: No    Pain Assessment  Pain Level: 8  Pain Type: Acute pain  Pain Location: Head  Pain Orientation: Posterior  Pain Descriptors: Aching;Headache  Pain Frequency: Continuous  Response to Pain Intervention: Patient Satisfied(pt recieved pain meds)  Vital Signs  Patient Currently in Pain: Yes   Orientation  Orientation  Overall Orientation Status: Within Functional Limits  Objective    Pt in bed upon arrival awake and alert receiving pain meds. Pt demo bed mob and tolerated sitting on EOB for ~ 3-4 min before retiring to bed for brief mgt. Pt demo poor sitting balance. Pt unable to use L UE functionally. Pt L UE demo increased hypertonicity noted. ADL  Toileting: Setup; Increased time to complete;Maximum assistance(brief mgt while supine in bed)   Balance  Sitting Balance: Maximum assistance(+2 person assistance)  Standing Balance: Unable to assess(comment)(d/t poor sitting balance on EOB, strong L lean)  Bed mobility  Rolling to Left: Maximum assistance pt rolled 2 x for brief placement and new bed pads replaced  Rolling to Right: Maximum assistance  Supine to Sit: 2 Person assistance;Dependent/Total  Sit to Supine: 2 Person assistance;Dependent/Total  Scooting: Dependent/Total;2 Person assistance  Comment: HOB elevated.       Attendance  Participation: Active participation   Plan   Plan  Times per week: 4-5x/week  Specific instructions for Next Treatment: Introduce adaptive strategies and techniques for ADLs with LUE deficits, continue to address L side neglect  Current Treatment Recommendations: Strengthening, Endurance Training, Patient/Caregiver Education & Training, ROM, Equipment Evaluation, Education, & procurement, Self-Care / ADL, Balance Training, Functional Mobility Training, Safety Education & Training, Home Management Training, Cognitive/Perceptual Training, Positioning    Goals  Short term goals  Time Frame for Short term goals: By discharge, pt will:  Short term goal 1: Demonstrate bed mobility with Mod A and use of bedrails PRN  Short term goal 2: Demostrate sitting balance for +10 minutes with Min A  Short term goal 3: Complete simple ADL sitting EOB with Min A, setup provided, use of DME and adpative strategis/techniques PRN, <3 cues  Short term goal 4: Attend to L side with <2 VCs during ADL/functional tasks  Short term goal 5: participate in 5+ min of visual motor/scanning tasks to increase independence with functional tasks       Therapy Time   Individual Concurrent Group Co-treatment   Time In 1315         Time Out 1358         Minutes 43             time code min: [address], GRANADOS/L

## 2020-12-04 NOTE — PROGRESS NOTES
Physical Therapy  Facility/Department: [address]  Daily Treatment Note  NAME: Danyel Washburn  : 1970  MRN: [id number]    Date of Service: 2020    Discharge Recommendations:  Patient would benefit from continued therapy after discharge        Assessment   Body structures, Functions, Activity limitations: Decreased functional mobility ; Decreased strength;Decreased safe awareness;Decreased balance; Increased pain;Decreased sensation;Decreased fine motor control;Decreased coordination;Decreased posture  Assessment: Patient very pleasant and cooperative throughout session. Pt required MaxA for bed mobility and MaxA/Dependent for supine to sit. Pt unable to acheive a full sitting position today, therefore unsafe to attempt transfer with the lg steady. Pt would benefit from continued therapy to increase independence and return to PLOF. Prognosis: Good  PT Education: Goals; General Safety;PT Role;Plan of Care;Transfer Training;Precautions; Adaptive Device Training;Gait Training;Disease Specific Education; Functional Mobility Training; Injury Prevention  REQUIRES PT FOLLOW UP: Yes  Activity Tolerance  Activity Tolerance: Patient Tolerated treatment well     Patient Diagnosis(es): The encounter diagnosis was Intracranial hemorrhage ([facility] has a past medical history of Asthma, Bipolar 1 disorder ([facility] Delta storage pool disease [facility]), Hypertension, and Psychiatric problem. has a past surgical history that includes  section (9518,7260); Gastric bypass surgery (); Tonsillectomy and adenoidectomy (); Cholecystectomy (); knee surgery (); Hysterectomy (); laparoscopy (); Colonoscopy (N/A, 2/3/2020); and Upper gastrointestinal endoscopy (N/A, 2/3/2020).     Restrictions  Restrictions/Precautions  Restrictions/Precautions: General Precautions, Seizure, Fall Risk, Up as Tolerated  Required Braces or Orthoses?: No  Position Activity Restriction  Other position/activity restrictions: up with assistance; L hypertonicity  Subjective   General  Chart Reviewed: Yes  Response To Previous Treatment: Patient with no complaints from previous session. Family / Caregiver Present: No  Subjective  Subjective: RN and pt agreeable to therapy this afternoon. Pt awake in bed upon arrival with c/o and 8/10 headache. Co-treat with OT. Pain Screening  Patient Currently in Pain: Yes  Pain Assessment  Pain Assessment: 0-10  Pain Level: 8  Pain Location: Head  Vital Signs  Patient Currently in Pain: Yes       Orientation  Orientation  Overall Orientation Status: Within Functional Limits  Objective   Bed mobility  Rolling to Left: Maximum assistance  Rolling to Right: Maximum assistance  Supine to Sit: Maximum assistance;2 Person assistance  Sit to Supine: 2 Person assistance;Maximum assistance  Scootin Person assistance;Maximal assistance  Comment: HOB elevated. MaxA x2 required. Pt with posterior and L lateral lean. Pt unable to acheive a full sitting position. Transfers  Comment: Unsafe to attempt transfers today d/t pt unable to acheive a full sitting position. Ambulation  Ambulation?: No  Stairs/Curb  Stairs?: No     Balance  Posture: Poor  Sitting - Static: Poor         Exercises:  Supine Exercises: Ankle Pumps, Heel Slides, Hip ABD/ADD, SAQ.  Reps: 15x each LE  Comments: Pts RLE AROM, LLE PROM    Goals  Short term goals  Time Frame for Short term goals: 12 visits  Short term goal 1: pt will perform bed mobility with min A+2  Short term goal 2: pt will dangle EOB for 20 minutes with SBA  Short term goal 3: transfers with mod A+2  Short term goal 4: WC mobility x 25' with SBA+1  Short term goal 5: progress to standing/gait activities with mod A+2 with appropriate device--start with lg lopez  Patient Goals   Patient goals : to regain use of L arm and L leg    Plan    Plan  Times per week: 5-6x per week  Times per day: Daily  Current Treatment Recommendations: Strengthening, ROM, Balance Training, Functional Mobility Training, Transfer Training, Wheelchair Mobility Training, Gait Training, Neuromuscular Re-education, Pain Management, Home Exercise Program, Safety Education & Training, Patient/Caregiver Education & Training, Positioning, Endurance Training  Safety Devices  Type of devices: Call light within reach, Gait belt, Patient at risk for falls, Nurse notified, Left in bed  Restraints  Initially in place: No     Therapy Time   Individual Concurrent Group Co-treatment   Time In 1310         Time Out 1401         Minutes 51         Timed Code Treatment Minutes: [address] [place], [facility]

## 2020-12-05 LAB
ANION GAP SERPL CALCULATED.3IONS-SCNC: 15 MMOL/L (ref 9–17)
BUN BLDV-MCNC: 7 MG/DL (ref 6–20)
BUN/CREAT BLD: ABNORMAL (ref 9–20)
CALCIUM SERPL-MCNC: 9.7 MG/DL (ref 8.6–10.4)
CHLORIDE BLD-SCNC: 98 MMOL/L (ref 98–107)
CO2: 24 MMOL/L (ref 20–31)
CREAT SERPL-MCNC: 0.51 MG/DL (ref 0.5–0.9)
GFR AFRICAN AMERICAN: >60 ML/MIN
GFR NON-AFRICAN AMERICAN: >60 ML/MIN
GFR SERPL CREATININE-BSD FRML MDRD: ABNORMAL ML/MIN/{1.73_M2}
GFR SERPL CREATININE-BSD FRML MDRD: ABNORMAL ML/MIN/{1.73_M2}
GLUCOSE BLD-MCNC: 117 MG/DL (ref 70–99)
HCT VFR BLD CALC: 40.5 % (ref 36.3–47.1)
HEMOGLOBIN: 12.7 G/DL (ref 11.9–15.1)
MCH RBC QN AUTO: 29.6 PG (ref 25.2–33.5)
MCHC RBC AUTO-ENTMCNC: 31.4 G/DL (ref 28.4–34.8)
MCV RBC AUTO: 94.4 FL (ref 82.6–102.9)
NRBC AUTOMATED: 0 PER 100 WBC
PDW BLD-RTO: 13.4 % (ref 11.8–14.4)
PLATELET # BLD: 282 K/UL (ref 138–453)
PMV BLD AUTO: 8.8 FL (ref 8.1–13.5)
POTASSIUM SERPL-SCNC: 4 MMOL/L (ref 3.7–5.3)
RBC # BLD: 4.29 M/UL (ref 3.95–5.11)
SODIUM BLD-SCNC: 137 MMOL/L (ref 135–144)
WBC # BLD: 10.3 K/UL (ref 3.5–11.3)

## 2020-12-05 PROCEDURE — 6370000000 HC RX 637 (ALT 250 FOR IP): Performed by: PSYCHIATRY & NEUROLOGY

## 2020-12-05 PROCEDURE — 2580000003 HC RX 258: Performed by: NURSE PRACTITIONER

## 2020-12-05 PROCEDURE — 6360000002 HC RX W HCPCS: Performed by: PSYCHIATRY & NEUROLOGY

## 2020-12-05 PROCEDURE — 6370000000 HC RX 637 (ALT 250 FOR IP): Performed by: NURSE PRACTITIONER

## 2020-12-05 PROCEDURE — 6360000002 HC RX W HCPCS: Performed by: STUDENT IN AN ORGANIZED HEALTH CARE EDUCATION/TRAINING PROGRAM

## 2020-12-05 PROCEDURE — 2580000003 HC RX 258: Performed by: STUDENT IN AN ORGANIZED HEALTH CARE EDUCATION/TRAINING PROGRAM

## 2020-12-05 PROCEDURE — 2060000000 HC ICU INTERMEDIATE R&B

## 2020-12-05 PROCEDURE — 80048 BASIC METABOLIC PNL TOTAL CA: CPT

## 2020-12-05 PROCEDURE — 99232 SBSQ HOSP IP/OBS MODERATE 35: CPT | Performed by: PSYCHIATRY & NEUROLOGY

## 2020-12-05 PROCEDURE — 36415 COLL VENOUS BLD VENIPUNCTURE: CPT

## 2020-12-05 PROCEDURE — APPNB45 APP NON BILLABLE 31-45 MINUTES: Performed by: NURSE PRACTITIONER

## 2020-12-05 PROCEDURE — 85027 COMPLETE CBC AUTOMATED: CPT

## 2020-12-05 PROCEDURE — 6370000000 HC RX 637 (ALT 250 FOR IP): Performed by: STUDENT IN AN ORGANIZED HEALTH CARE EDUCATION/TRAINING PROGRAM

## 2020-12-05 PROCEDURE — 99232 SBSQ HOSP IP/OBS MODERATE 35: CPT | Performed by: INTERNAL MEDICINE

## 2020-12-05 PROCEDURE — 2500000003 HC RX 250 WO HCPCS: Performed by: STUDENT IN AN ORGANIZED HEALTH CARE EDUCATION/TRAINING PROGRAM

## 2020-12-05 RX ORDER — METHYLPREDNISOLONE 4 MG/1
4 TABLET ORAL NIGHTLY
Status: DISCONTINUED | OUTPATIENT
Start: 2020-12-07 | End: 2020-12-09 | Stop reason: HOSPADM

## 2020-12-05 RX ORDER — METHYLPREDNISOLONE 4 MG/1
4 TABLET ORAL
Status: DISCONTINUED | OUTPATIENT
Start: 2020-12-06 | End: 2020-12-08

## 2020-12-05 RX ORDER — LANOLIN ALCOHOL/MO/W.PET/CERES
3 CREAM (GRAM) TOPICAL NIGHTLY
Status: DISCONTINUED | OUTPATIENT
Start: 2020-12-05 | End: 2020-12-09 | Stop reason: HOSPADM

## 2020-12-05 RX ORDER — LABETALOL 200 MG/1
200 TABLET, FILM COATED ORAL EVERY 8 HOURS SCHEDULED
Status: DISCONTINUED | OUTPATIENT
Start: 2020-12-05 | End: 2020-12-09 | Stop reason: HOSPADM

## 2020-12-05 RX ORDER — METHYLPREDNISOLONE 4 MG/1
4 TABLET ORAL
Status: DISPENSED | OUTPATIENT
Start: 2020-12-06 | End: 2020-12-08

## 2020-12-05 RX ORDER — METOCLOPRAMIDE HYDROCHLORIDE 5 MG/ML
10 INJECTION INTRAMUSCULAR; INTRAVENOUS ONCE
Status: COMPLETED | OUTPATIENT
Start: 2020-12-05 | End: 2020-12-05

## 2020-12-05 RX ORDER — CHLORDIAZEPOXIDE HYDROCHLORIDE 5 MG/1
5 CAPSULE, GELATIN COATED ORAL EVERY 12 HOURS
Status: DISCONTINUED | OUTPATIENT
Start: 2020-12-05 | End: 2020-12-06

## 2020-12-05 RX ORDER — METHYLPREDNISOLONE 4 MG/1
8 TABLET ORAL NIGHTLY
Status: COMPLETED | OUTPATIENT
Start: 2020-12-06 | End: 2020-12-06

## 2020-12-05 RX ORDER — SODIUM CHLORIDE 9 MG/ML
INJECTION, SOLUTION INTRAVENOUS CONTINUOUS
Status: DISCONTINUED | OUTPATIENT
Start: 2020-12-05 | End: 2020-12-07

## 2020-12-05 RX ORDER — METHYLPREDNISOLONE 4 MG/1
4 TABLET ORAL
Status: DISCONTINUED | OUTPATIENT
Start: 2020-12-06 | End: 2020-12-09 | Stop reason: HOSPADM

## 2020-12-05 RX ADMIN — Medication 10 MG: at 00:21

## 2020-12-05 RX ADMIN — BACLOFEN 5 MG: 10 TABLET ORAL at 08:59

## 2020-12-05 RX ADMIN — OXYCODONE HYDROCHLORIDE 5 MG: 5 TABLET ORAL at 04:47

## 2020-12-05 RX ADMIN — BACLOFEN 5 MG: 10 TABLET ORAL at 20:44

## 2020-12-05 RX ADMIN — OXYCODONE HYDROCHLORIDE 5 MG: 5 TABLET ORAL at 20:45

## 2020-12-05 RX ADMIN — SODIUM CHLORIDE, PRESERVATIVE FREE 10 ML: 5 INJECTION INTRAVENOUS at 09:17

## 2020-12-05 RX ADMIN — OXYCODONE HYDROCHLORIDE 5 MG: 5 TABLET ORAL at 16:53

## 2020-12-05 RX ADMIN — LABETALOL HCL 200 MG: 200 TABLET, FILM COATED ORAL at 13:51

## 2020-12-05 RX ADMIN — BUPROPION HYDROCHLORIDE 300 MG: 150 TABLET, EXTENDED RELEASE ORAL at 08:59

## 2020-12-05 RX ADMIN — ACETAMINOPHEN 650 MG: 325 TABLET ORAL at 00:22

## 2020-12-05 RX ADMIN — CHLORDIAZEPOXIDE HYDROCHLORIDE 5 MG: 5 CAPSULE ORAL at 08:59

## 2020-12-05 RX ADMIN — HEPARIN SODIUM 5000 UNITS: 5000 INJECTION INTRAVENOUS; SUBCUTANEOUS at 13:52

## 2020-12-05 RX ADMIN — POTASSIUM BICARBONATE 40 MEQ: 782 TABLET, EFFERVESCENT ORAL at 09:00

## 2020-12-05 RX ADMIN — SODIUM CHLORIDE: 9 INJECTION, SOLUTION INTRAVENOUS at 11:53

## 2020-12-05 RX ADMIN — METOCLOPRAMIDE 10 MG: 5 INJECTION, SOLUTION INTRAMUSCULAR; INTRAVENOUS at 10:32

## 2020-12-05 RX ADMIN — LAMOTRIGINE 400 MG: 100 TABLET ORAL at 08:59

## 2020-12-05 RX ADMIN — HEPARIN SODIUM 5000 UNITS: 5000 INJECTION INTRAVENOUS; SUBCUTANEOUS at 20:45

## 2020-12-05 RX ADMIN — ACETAMINOPHEN 650 MG: 325 TABLET ORAL at 16:04

## 2020-12-05 RX ADMIN — Medication 3 MG: at 20:44

## 2020-12-05 RX ADMIN — OXYCODONE HYDROCHLORIDE 5 MG: 5 TABLET ORAL at 13:51

## 2020-12-05 RX ADMIN — LABETALOL HCL 100 MG: 100 TABLET, FILM COATED ORAL at 06:06

## 2020-12-05 RX ADMIN — Medication 10 MG: at 04:51

## 2020-12-05 RX ADMIN — ROPINIROLE HYDROCHLORIDE 1 MG: 1 TABLET, FILM COATED ORAL at 20:44

## 2020-12-05 RX ADMIN — FOLIC ACID 1 MG: 1 TABLET ORAL at 08:59

## 2020-12-05 RX ADMIN — LABETALOL HCL 200 MG: 200 TABLET, FILM COATED ORAL at 20:44

## 2020-12-05 RX ADMIN — LISINOPRIL 40 MG: 20 TABLET ORAL at 08:59

## 2020-12-05 RX ADMIN — HEPARIN SODIUM 5000 UNITS: 5000 INJECTION INTRAVENOUS; SUBCUTANEOUS at 06:06

## 2020-12-05 RX ADMIN — BACLOFEN 5 MG: 10 TABLET ORAL at 13:51

## 2020-12-05 RX ADMIN — SERTRALINE 50 MG: 50 TABLET, FILM COATED ORAL at 08:59

## 2020-12-05 RX ADMIN — CYANOCOBALAMIN 1000 MCG: 1000 INJECTION, SOLUTION INTRAMUSCULAR at 10:29

## 2020-12-05 RX ADMIN — Medication 100 MG: at 08:59

## 2020-12-05 RX ADMIN — OXYCODONE HYDROCHLORIDE 5 MG: 5 TABLET ORAL at 09:00

## 2020-12-05 RX ADMIN — AMLODIPINE BESYLATE 10 MG: 10 TABLET ORAL at 08:59

## 2020-12-05 RX ADMIN — CHLORDIAZEPOXIDE HYDROCHLORIDE 5 MG: 5 CAPSULE ORAL at 20:45

## 2020-12-05 RX ADMIN — DOCUSATE SODIUM 50MG AND SENNOSIDES 8.6MG 2 TABLET: 8.6; 5 TABLET, FILM COATED ORAL at 09:17

## 2020-12-05 RX ADMIN — METHYLPREDNISOLONE 24 MG: 16 TABLET ORAL at 10:32

## 2020-12-05 ASSESSMENT — PAIN DESCRIPTION - PAIN TYPE: TYPE: ACUTE PAIN

## 2020-12-05 ASSESSMENT — PAIN SCALES - GENERAL
PAINLEVEL_OUTOF10: 8
PAINLEVEL_OUTOF10: 4
PAINLEVEL_OUTOF10: 8
PAINLEVEL_OUTOF10: 6
PAINLEVEL_OUTOF10: 9
PAINLEVEL_OUTOF10: 8
PAINLEVEL_OUTOF10: 7
PAINLEVEL_OUTOF10: 4

## 2020-12-05 ASSESSMENT — PAIN DESCRIPTION - LOCATION: LOCATION: HEAD

## 2020-12-05 NOTE — PROGRESS NOTES
Daily Progress Note  Neuro Critical Care    Patient Name: Anthony William  Patient : 1970  Room/Bed: 9165/2233-54  Code Status: FULL  Allergies: Allergies   Allergen Reactions    Pcn [Penicillins]     Sulfa Antibiotics Other (See Comments)     Inflammation in the eye       CHIEF COMPLAINT:      Headache, Right sided weakness     INTERVAL HISTORY    Initial Presentation (Admitted 20): The patient is a 48 y.o. female with a history of HTN, Delta storage pool disease, bipolar disorder, and alcohol abuse who presented as a transfer from [facility] ED after CT head revealed right parietal ICH. Initially presented to [facility] ED with left sided weakness and headache. LKW around midnight; patient states her left upper extremity \"went limp\" at that time. Patient states she went to sleep and woke up around 0400 this morning and noticed her left lower extremity was also weak. Reports she developed a headache by the time EMS arrived. CT Head at [facility] ED revealed large right parietal lobe ICH with moderate SAH. Noted to be hypertensive at [facility] ED, started on Cardene infusion. Patient had recently run out of her Lisinopril. Loaded with 1g Keppra. Transferred to [facility] for further evaluation and management. On arrival to ED, -190's. Unclear whether patient arrived on Cardene infusion but she was started on Clevidipine infusion in ED. CT Head showed stable right parietal ICH with SAH and new intraventricular hemorrhage in the right occipital horn. CTA Head/Neck unremarkable. Given 25g Mannitol x1 per Stroke team.  Neurosurgery and Hem/Onc consulted while in ED. Given 40mcg DDAVP x1 and transfused 1 pack of platelets. Patient denies head trauma or falls. GCS 14.  Opens eyes to voice, oriented x3. Noted to have dense left upper and lower extremity weaknes. Patient has a history of alcohol abuse.   She went to rehab last year and states she no longer drinks daily but decreased to 5mg Q12h with plans to wean off if tolerated. Continues to complain of severe headache and neck pain despite scheduled Baclofen and Roxicodone. Start Medrol dose pack. Hypertensive overnight requiring 2 doses of PRN Labetalol; PO Labetalol increased to 200mg Q8h. Clinical exam remains stable. Will discuss appropriate braces for left arm and left leg weakness with PT/OT team today. OK for transfer out of the Neuro ICU.     CURRENT MEDICATIONS:  SCHEDULED MEDICATIONS:   labetalol  200 mg Oral 3 times per day    oxyCODONE  5 mg Oral Q4H    lisinopril  40 mg Oral Daily    baclofen  5 mg Oral TID    potassium bicarb-citric acid  40 mEq Oral Daily    amLODIPine  10 mg Oral Daily    heparin (porcine)  5,000 Units Subcutaneous 3 times per day    thiamine  100 mg Oral Daily    cyanocobalamin  1,000 mcg Intramuscular Daily    Followed by   Milo Carson ON 2020] cyanocobalamin  1,000 mcg Intramuscular Weekly    Followed by   Milo Carson ON 2/3/2021] cyanocobalamin  1,000 mcg Intramuscular Q30 Days    chlordiazePOXIDE  5 mg Oral 4x Daily    sennosides-docusate sodium  2 tablet Oral Daily    sodium chloride flush  10 mL Intravenous 2 times per day    sodium chloride  20 mL Intravenous Once    folic acid  1 mg Oral Daily    buPROPion  300 mg Oral Daily    lamoTRIgine  400 mg Oral Daily    sertraline  50 mg Oral Daily    rOPINIRole  1 mg Oral Nightly     CONTINUOUS INFUSIONS:    PRN MEDICATIONS:   labetalol, menthol-methyl salicylate, sodium chloride flush, acetaminophen, perflutren lipid microspheres, ondansetron, ipratropium-albuterol, LORazepam, sodium chloride flush, magnesium hydroxide    VITALS:  Temperature Range: Temp: 98.4 °F (36.9 °C) Temp  Av.5 °F (36.9 °C)  Min: 97.9 °F (36.6 °C)  Max: 98.9 °F (37.2 °C)  BP Range: Systolic (09EFL), WVC:983 , Min:131 , EFD:845     Diastolic (17OQO), VKV:84, Min:53, Max:128    Pulse Range: Pulse  Av.7  Min: 75  Max: 89  Respiration Range: Resp Av.5  Min: 15  Max: 23  Current Pulse Ox: SpO2: 93 %  24HR Pulse Ox Range: SpO2  Av.4 %  Min: 93 %  Max: 100 %  Patient Vitals for the past 12 hrs:   BP Temp Temp src Pulse Resp SpO2   20 0645 (!) 185/94 -- -- 76 19 93 %   20 0545 (!) 182/95 -- -- 81 20 97 %   20 0445 (!) 187/85 98.4 °F (36.9 °C) Oral 83 22 100 %   20 0345 (!) 175/90 -- -- 82 17 --   20 0245 (!) 159/83 -- -- 80 17 99 %   20 0145 (!) 163/82 -- -- 75 18 99 %   20 0045 (!) 158/92 -- -- 76 18 98 %   20 2349 (!) 178/95 97.9 °F (36.6 °C) Oral 77 18 97 %   20 2345 (!) 183/128 -- -- 82 23 98 %   20 2300 -- -- -- 76 -- --   20 2253 (!) 161/89 -- -- 78 19 --   20 224 (!) 161/89 -- -- 78 19 --   20 214 (!) 191/97 -- -- 80 15 --   20 -- -- -- -- -- 97 %   20 (!) 171/113 -- -- 85 19 --   20 -- -- -- -- -- 99 %   20 -- -- -- 86 -- --   20 (!) 143/72 98.3 °F (36.8 °C) Oral 79 19 --     Estimated body mass index is 39.11 kg/m² as calculated from the following:    Height as of this encounter: 5' 5\" (1.651 m).     Weight as of this encounter: 235 lb (106.6 kg).  []<16 Severe malnutrition  []16-16.99 Moderate malnutrition  []17-18.49 Mild malnutrition  []18.5-24.9 Normal  []25-29.9 Overweight (not obese)  []30-34.9 Obese class 1 (Low Risk)  [x]35-39.9 Obese class 2 (Moderate Risk)  []?40 Obese class 3 (High Risk)    RECENT LABS:   Lab Results   Component Value Date    WBC 10.3 2020    HGB 12.7 2020    HCT 40.5 2020     2020    CHOL 247 (H) 2020    TRIG 77 2020     2020    ALT 24 2020    AST 51 (H) 2020     2020    K 4.0 2020    CL 98 2020    CREATININE 0.51 2020    BUN 7 2020    CO2 24 2020    TSH 1.34 2020    INR 1.0 2020    LABA1C 5.0 2020     24 HOUR INTAKE/OUTPUT:    Intake/Output Summary (Last 24 subarachnoid and intraventricular blood. Given DDAVP and 1 pack of platelets in setting of platelet disorder. Initially presented to [facility] ED with left sided weakness and headache. Hypertensive with -190's on arrival to ED.     NEUROLOGIC:  - Acute right parietal ICH with intraventricular extension and subarachnoid hemorrhage component  - Etiology possibly secondary to coaguloapthy in setting of platelet disorder and hypertension  - No underlying mass or vascular malformation on MRI brain with/without contrast  - F/U Neurosurgery outpatient  - Goal SBP<160  - History of ETOH abuse; Folic Acid& Thiamine replacement, Decrease Librium to 5mg Q12h, Ativan 1mg Q4h PRN  - Headache/Neck pain;  Baclofen 5mg TID, Roxicodone 5mg Q4h scheduled through 12/6, start Medrol dose pack  - Requip 1mg QHS for restless legs  - Neuro checks per protocol     CARDIOVASCULAR:  - Goal SBP<160  - Persistent hypertension  - Increase Labetalol to 200mg Q8h  - Continue home Norvasc 10mg QD, Lisinopril 40mg QD  - Echocardiogram EF 55%  - Lipid panel; LDL 90, Cholesterol 247  - Continue telemetry     PULMONARY:  - Maintaining O2 sats on room air  - History of asthma  - Duonebs PRN     RENAL/FLUID/ELECTROLYTE:  - Normal renal functioning  - BUN 7/ Creatinine 0.51  - Monitor I&O; 3600/2975  - IVF: Corry@[employer] to help with hydration in setting of headache  - Replace electrolytes PRN  - Daily BMP     GI/NUTRITION:  NUTRITION:  - General Diet   - Poor appetite but tolerating supplements  - Last BM yesterday  - Bowel regimen: Senokot-S daily, Milk of Mag PRN  - GI prophylaxis: N/A     ID:  - Afebrile, Tmax 37.2C  - No leukocytosis, WBC 10.3  - UA 11/30 negative  - COVID-19 negative 11/30  - Continue to monitor for fevers  - Daily CBC     HEME:   - History of delta storage pool disease  - Transfused total 2 packs of platelets during admission, received DDAVP on arrival  - Hem/Onc following; appreciate recs  - H&H 12.7/40.5  - Platelets 950  - Daily CBC     ENDOCRINE:  - Continue to monitor blood glucose, goal <180  - Hemoglobin A1C 5.0  - TSH 1.34     OTHER:  - History of bipolar disorder; Lamictal 400mg BID, Zoloft 50mg QD  - PT/OT/ST; discuss left hand/foot braces  - PM&R following; appropriate candidate for inpatient rehab     PROPHYLAXIS:  Stress ulcer: N/A     DVT PROPHYLAXIS:  - SCD sleeves - Thigh High   - Heparin 5000u Q8h     DISPOSITION:  [x] OK for transfer out of the Neuro ICU. We will continue to follow along as the primary team.    For any changes in exam or patient status please contact Neuro Critical Care.       KYLEE Ortiz - [facility]  Neuro Critical Care  Pager [phone]  12/5/2020     7:10 AM

## 2020-12-05 NOTE — PROGRESS NOTES
Date:                           12/5/2020  Patient name:           Fannie Granado  Date of admission:  11/30/2020  6:38 AM  MRN:   [id number]  YOB: 1970  PCP:                           Anderson Dockery MD        Reason for consult: Delta Pool storage deficiency    Subjective:   Patient seen and examined at bedside. She continues to complain of headache. No fever or chills, no night sweats, no bleeding or bruising. BRIEF CLINICAL HISTORY  12-year-old  female presented to hospital for acute hemorrhagic CVA. Transferred from [facility] for neurosurgical evaluation. She has a past medical history of delta pool storage disease and has previously been seen by our group. She has previously had a retroperitoneal bleed postoperative surgery in 2011. Von Willebrand disease testing was negative. No prior history of brain bleeds. She reported waking up at night with left upper extremity weakness after which she went back to sleep. She woke up again around 3 AM and noticed left lower extremity weakness and persistence of left upper extremity weakness. Also reported headache and tremors in the right side. CT head without contrast showed a large right parieto-occipital hemorrhage with moderate subarachnoid hemorrhage as well as mild midline shift from right to left. .  Platelet count at presentation was 307. She got a dose of DDAVP and 1 unit of platelets. Neurosurgery followed with serial brain imaging. No acute intervention due to stable hematoma. Patient managed in neuro ICU. REVIEW OF SYSTEMS:  General: no fever or night sweats  ENT: No double or blurred vision, no hearing problem, no dysphagia or sore throat   Respiratory: No chest pain, no shortness of breath, no cough or hemoptysis. Cardiovascular: Denies chest pain, PND or orthopnea. No LE swelling or palpitations.   Gastrointestinal: Negative for nausea, no vomiting, abdominal pain, diarrhea or constipation. Genitourinary: Denies dysuria, hematuria, frequency, urgency or incontinence. Neurological: Positive for headaches, positive for left upper and lower limb weakness, no decreased LOC   Musculoskeletal: Positive for back pain, no joint swelling. Skin: Negative for rash on arms and legs  Psych: Denies hallucinations or intentions to harm self        Objective:     Vitals: BP (!) 174/87   Pulse 76   Temp 98.4 °F (36.9 °C) (Oral)   Resp 19   Ht 5' 5\" (1.651 m)   Wt 235 lb (106.6 kg)   SpO2 93%   BMI 39.11 kg/m²   General appearance -somnolent, in mild painful distress due to headache  Mental status -oriented x3  Eyes - pupils equal and reactive, extraocular eye movements intact  Mouth - mucous membranes moist, pharynx normal without lesions  Neck - supple, no significant adenopathy  Lymphatics - no palpable lymphadenopathy, no hepatosplenomegaly  Chest - clear to auscultation, no wheezes, rales or rhonchi, symmetric air entry  Heart - normal rate, regular rhythm, normal S1, S2, no murmurs  Abdomen - soft, nontender, nondistended, no masses or organomegaly  Neurological -somnolent but oriented, normal speech, left hemiplegia. Extremities - peripheral pulses normal, no pedal edema, no clubbing or cyanosis  Skin - normal coloration and turgor, no rashes, no suspicious skin lesions noted         Data:    No intake/output data recorded.   In: 2600 [P.O.:2600]  Out: 4017 [Urine:2475]    CBC:   Recent Labs     12/03/20  0340 12/04/20  0345 12/05/20  0429   WBC 5.5 8.2 10.3   HGB 11.5* 12.2 12.7    302 282     BMP:    Recent Labs     12/03/20  1114 12/04/20  0345 12/05/20  0429    137 137   K 3.4* 3.5* 4.0    99 98   CO2 22 24 24   BUN 6 7 7   CREATININE 0.56 0.51 0.51   GLUCOSE 100* 113* 117*             Problem Lists:   Primary Problem:  Acute intracranial hemorrhage  Current Problems:  Active Hospital Problems    Diagnosis Date Noted    Vasogenic edema (Merribeth Graft) [G93.6]

## 2020-12-06 LAB
ANION GAP SERPL CALCULATED.3IONS-SCNC: 13 MMOL/L (ref 9–17)
BUN BLDV-MCNC: 13 MG/DL (ref 6–20)
BUN/CREAT BLD: ABNORMAL (ref 9–20)
CALCIUM SERPL-MCNC: 9.3 MG/DL (ref 8.6–10.4)
CHLORIDE BLD-SCNC: 101 MMOL/L (ref 98–107)
CO2: 22 MMOL/L (ref 20–31)
CREAT SERPL-MCNC: 0.6 MG/DL (ref 0.5–0.9)
GFR AFRICAN AMERICAN: >60 ML/MIN
GFR NON-AFRICAN AMERICAN: >60 ML/MIN
GFR SERPL CREATININE-BSD FRML MDRD: ABNORMAL ML/MIN/{1.73_M2}
GFR SERPL CREATININE-BSD FRML MDRD: ABNORMAL ML/MIN/{1.73_M2}
GLUCOSE BLD-MCNC: 104 MG/DL (ref 70–99)
HCT VFR BLD CALC: 38.9 % (ref 36.3–47.1)
HEMOGLOBIN: 12 G/DL (ref 11.9–15.1)
MCH RBC QN AUTO: 28.8 PG (ref 25.2–33.5)
MCHC RBC AUTO-ENTMCNC: 30.8 G/DL (ref 28.4–34.8)
MCV RBC AUTO: 93.3 FL (ref 82.6–102.9)
NRBC AUTOMATED: 0 PER 100 WBC
PDW BLD-RTO: 13.5 % (ref 11.8–14.4)
PLATELET # BLD: 353 K/UL (ref 138–453)
PMV BLD AUTO: 8.6 FL (ref 8.1–13.5)
POTASSIUM SERPL-SCNC: 3.8 MMOL/L (ref 3.7–5.3)
RBC # BLD: 4.17 M/UL (ref 3.95–5.11)
SODIUM BLD-SCNC: 136 MMOL/L (ref 135–144)
WBC # BLD: 9.8 K/UL (ref 3.5–11.3)

## 2020-12-06 PROCEDURE — 2060000000 HC ICU INTERMEDIATE R&B

## 2020-12-06 PROCEDURE — 99232 SBSQ HOSP IP/OBS MODERATE 35: CPT | Performed by: PSYCHIATRY & NEUROLOGY

## 2020-12-06 PROCEDURE — 2580000003 HC RX 258: Performed by: NURSE PRACTITIONER

## 2020-12-06 PROCEDURE — 6370000000 HC RX 637 (ALT 250 FOR IP): Performed by: STUDENT IN AN ORGANIZED HEALTH CARE EDUCATION/TRAINING PROGRAM

## 2020-12-06 PROCEDURE — 85027 COMPLETE CBC AUTOMATED: CPT

## 2020-12-06 PROCEDURE — 99232 SBSQ HOSP IP/OBS MODERATE 35: CPT | Performed by: INTERNAL MEDICINE

## 2020-12-06 PROCEDURE — 6360000002 HC RX W HCPCS: Performed by: PSYCHIATRY & NEUROLOGY

## 2020-12-06 PROCEDURE — 97110 THERAPEUTIC EXERCISES: CPT

## 2020-12-06 PROCEDURE — 6370000000 HC RX 637 (ALT 250 FOR IP): Performed by: PSYCHIATRY & NEUROLOGY

## 2020-12-06 PROCEDURE — 97530 THERAPEUTIC ACTIVITIES: CPT

## 2020-12-06 PROCEDURE — 6370000000 HC RX 637 (ALT 250 FOR IP): Performed by: NURSE PRACTITIONER

## 2020-12-06 PROCEDURE — 80048 BASIC METABOLIC PNL TOTAL CA: CPT

## 2020-12-06 PROCEDURE — APPNB45 APP NON BILLABLE 31-45 MINUTES: Performed by: NURSE PRACTITIONER

## 2020-12-06 PROCEDURE — 36415 COLL VENOUS BLD VENIPUNCTURE: CPT

## 2020-12-06 PROCEDURE — 97535 SELF CARE MNGMENT TRAINING: CPT

## 2020-12-06 RX ORDER — OXYCODONE HYDROCHLORIDE 5 MG/1
5 TABLET ORAL EVERY 4 HOURS PRN
Status: DISCONTINUED | OUTPATIENT
Start: 2020-12-06 | End: 2020-12-06

## 2020-12-06 RX ORDER — POLYETHYLENE GLYCOL 3350 17 G/17G
17 POWDER, FOR SOLUTION ORAL DAILY
Status: DISCONTINUED | OUTPATIENT
Start: 2020-12-06 | End: 2020-12-09 | Stop reason: HOSPADM

## 2020-12-06 RX ORDER — CHLORDIAZEPOXIDE HYDROCHLORIDE 5 MG/1
5 CAPSULE, GELATIN COATED ORAL DAILY
Status: DISCONTINUED | OUTPATIENT
Start: 2020-12-06 | End: 2020-12-07

## 2020-12-06 RX ORDER — OXYCODONE HYDROCHLORIDE 5 MG/1
5 TABLET ORAL EVERY 4 HOURS PRN
Status: DISCONTINUED | OUTPATIENT
Start: 2020-12-06 | End: 2020-12-07

## 2020-12-06 RX ADMIN — BACLOFEN 5 MG: 10 TABLET ORAL at 21:17

## 2020-12-06 RX ADMIN — METHYLPREDNISOLONE 8 MG: 4 TABLET ORAL at 21:17

## 2020-12-06 RX ADMIN — HEPARIN SODIUM 5000 UNITS: 5000 INJECTION INTRAVENOUS; SUBCUTANEOUS at 15:02

## 2020-12-06 RX ADMIN — DOCUSATE SODIUM 50MG AND SENNOSIDES 8.6MG 2 TABLET: 8.6; 5 TABLET, FILM COATED ORAL at 09:17

## 2020-12-06 RX ADMIN — ROPINIROLE HYDROCHLORIDE 1 MG: 1 TABLET, FILM COATED ORAL at 21:17

## 2020-12-06 RX ADMIN — LABETALOL HCL 200 MG: 200 TABLET, FILM COATED ORAL at 15:02

## 2020-12-06 RX ADMIN — BACLOFEN 5 MG: 10 TABLET ORAL at 09:14

## 2020-12-06 RX ADMIN — OXYCODONE HYDROCHLORIDE 5 MG: 5 TABLET ORAL at 04:47

## 2020-12-06 RX ADMIN — HEPARIN SODIUM 5000 UNITS: 5000 INJECTION INTRAVENOUS; SUBCUTANEOUS at 21:18

## 2020-12-06 RX ADMIN — SERTRALINE 50 MG: 50 TABLET, FILM COATED ORAL at 09:15

## 2020-12-06 RX ADMIN — BUPROPION HYDROCHLORIDE 300 MG: 150 TABLET, EXTENDED RELEASE ORAL at 09:15

## 2020-12-06 RX ADMIN — POLYETHYLENE GLYCOL 3350 17 G: 17 POWDER, FOR SOLUTION ORAL at 17:57

## 2020-12-06 RX ADMIN — LISINOPRIL 40 MG: 20 TABLET ORAL at 09:14

## 2020-12-06 RX ADMIN — CHLORDIAZEPOXIDE HYDROCHLORIDE 5 MG: 5 CAPSULE ORAL at 09:15

## 2020-12-06 RX ADMIN — METHYLPREDNISOLONE 4 MG: 4 TABLET ORAL at 17:57

## 2020-12-06 RX ADMIN — LAMOTRIGINE 400 MG: 100 TABLET ORAL at 09:14

## 2020-12-06 RX ADMIN — METHYLPREDNISOLONE 4 MG: 4 TABLET ORAL at 15:02

## 2020-12-06 RX ADMIN — ACETAMINOPHEN 650 MG: 325 TABLET ORAL at 21:39

## 2020-12-06 RX ADMIN — Medication 3 MG: at 21:18

## 2020-12-06 RX ADMIN — METHYLPREDNISOLONE 4 MG: 4 TABLET ORAL at 06:58

## 2020-12-06 RX ADMIN — SODIUM CHLORIDE, PRESERVATIVE FREE 10 ML: 5 INJECTION INTRAVENOUS at 09:17

## 2020-12-06 RX ADMIN — LABETALOL HCL 200 MG: 200 TABLET, FILM COATED ORAL at 21:18

## 2020-12-06 RX ADMIN — CYANOCOBALAMIN 1000 MCG: 1000 INJECTION, SOLUTION INTRAMUSCULAR at 09:16

## 2020-12-06 RX ADMIN — OXYCODONE HYDROCHLORIDE 5 MG: 5 TABLET ORAL at 15:37

## 2020-12-06 RX ADMIN — HEPARIN SODIUM 5000 UNITS: 5000 INJECTION INTRAVENOUS; SUBCUTANEOUS at 06:58

## 2020-12-06 RX ADMIN — POTASSIUM BICARBONATE 40 MEQ: 782 TABLET, EFFERVESCENT ORAL at 09:15

## 2020-12-06 RX ADMIN — LABETALOL HCL 200 MG: 200 TABLET, FILM COATED ORAL at 06:58

## 2020-12-06 RX ADMIN — OXYCODONE HYDROCHLORIDE 5 MG: 5 TABLET ORAL at 09:15

## 2020-12-06 RX ADMIN — Medication 100 MG: at 09:15

## 2020-12-06 RX ADMIN — FOLIC ACID 1 MG: 1 TABLET ORAL at 09:15

## 2020-12-06 RX ADMIN — OXYCODONE HYDROCHLORIDE 5 MG: 5 TABLET ORAL at 00:11

## 2020-12-06 RX ADMIN — SODIUM CHLORIDE, PRESERVATIVE FREE 10 ML: 5 INJECTION INTRAVENOUS at 21:38

## 2020-12-06 RX ADMIN — AMLODIPINE BESYLATE 10 MG: 10 TABLET ORAL at 09:15

## 2020-12-06 RX ADMIN — SODIUM CHLORIDE, PRESERVATIVE FREE 10 ML: 5 INJECTION INTRAVENOUS at 00:09

## 2020-12-06 RX ADMIN — BACLOFEN 5 MG: 10 TABLET ORAL at 15:02

## 2020-12-06 ASSESSMENT — PAIN SCALES - GENERAL
PAINLEVEL_OUTOF10: 7
PAINLEVEL_OUTOF10: 9
PAINLEVEL_OUTOF10: 4
PAINLEVEL_OUTOF10: 7
PAINLEVEL_OUTOF10: 5

## 2020-12-06 ASSESSMENT — PAIN DESCRIPTION - PAIN TYPE
TYPE: ACUTE PAIN
TYPE: ACUTE PAIN

## 2020-12-06 ASSESSMENT — PAIN DESCRIPTION - LOCATION
LOCATION: HEAD
LOCATION: HEAD

## 2020-12-06 ASSESSMENT — PAIN DESCRIPTION - FREQUENCY
FREQUENCY: CONTINUOUS
FREQUENCY: CONTINUOUS

## 2020-12-06 ASSESSMENT — PAIN DESCRIPTION - PROGRESSION: CLINICAL_PROGRESSION: NOT CHANGED

## 2020-12-06 ASSESSMENT — PAIN DESCRIPTION - ORIENTATION: ORIENTATION: POSTERIOR

## 2020-12-06 ASSESSMENT — PAIN DESCRIPTION - DESCRIPTORS
DESCRIPTORS: HEADACHE
DESCRIPTORS: HEADACHE

## 2020-12-06 ASSESSMENT — PAIN - FUNCTIONAL ASSESSMENT: PAIN_FUNCTIONAL_ASSESSMENT: ACTIVITIES ARE NOT PREVENTED

## 2020-12-06 NOTE — PROGRESS NOTES
Daily Progress Note  Neuro Critical Care    Patient Name: Priscila Lopez  Patient : 1970  Room/Bed: 7541/4087-81  Code Status: FULL  Allergies: Allergies   Allergen Reactions    Pcn [Penicillins]     Sulfa Antibiotics Other (See Comments)     Inflammation in the eye       CHIEF COMPLAINT:      Headache, Right sided weakness     INTERVAL HISTORY    Initial Presentation (Admitted 20): The patient is a 48 y.o. female with a history of HTN, Delta storage pool disease, bipolar disorder, and alcohol abuse who presented as a transfer from [facility] ED after CT head revealed right parietal ICH. Initially presented to [facility] ED with left sided weakness and headache. LKW around midnight; patient states her left upper extremity \"went limp\" at that time. Patient states she went to sleep and woke up around 0400 this morning and noticed her left lower extremity was also weak. Reports she developed a headache by the time EMS arrived. CT Head at [facility] ED revealed large right parietal lobe ICH with moderate SAH. Noted to be hypertensive at [facility] ED, started on Cardene infusion. Patient had recently run out of her Lisinopril. Loaded with 1g Keppra. Transferred to [facility] for further evaluation and management. On arrival to ED, -190's. Unclear whether patient arrived on Cardene infusion but she was started on Clevidipine infusion in ED. CT Head showed stable right parietal ICH with SAH and new intraventricular hemorrhage in the right occipital horn. CTA Head/Neck unremarkable. Given 25g Mannitol x1 per Stroke team.  Neurosurgery and Hem/Onc consulted while in ED. Given 40mcg DDAVP x1 and transfused 1 pack of platelets. Patient denies head trauma or falls. GCS 14.  Opens eyes to voice, oriented x3. Noted to have dense left upper and lower extremity weaknes. Patient has a history of alcohol abuse.   She went to rehab last year and states she no longer drinks daily but still drinks occasionally, last drank vodka last night.       ICH score: 2 (Volume ~41ml, +IVH)     Hospital Course:  11/30: Admitted to the Neuro ICU for close monitoring. Patient extremely restless and fidgity with associated tachycardia. Started on Precedex infusion. Patient reports history of restless legs but states she is not prescribed medication; started on Requip nightly. Concern for possible alcohol withdraw; Ativan 1mg Q4h ordered. Patient complained of headache and nausea. Clinical exam remains stable. Repeat CT head this afternoon stable. MRI Brain w/wo contrast with no underlying mass or   12/1: Mildly hypotensive overnight, improved with 500cc fluid bolus. Repeat CT Head this morning was overall stable except small amount of acute hemorrhage in left occipital horn. Given 1 pack platelets. Clinical exam remains stable. In the afternoon patient complained of severe headache with nausea. Repeat CT head stable. Patient getting more restless this afternoon off Precedex. Started on Librium 5mg 4XD for suspected alcohol withdraw. Precedex infusion restarted. 12/2: Constant headache; PRN Roxicodone. Started Valium 5m q8h PRN. Vit B12, low and replaced. Echo showed normal EF 55%. Norvasc increased to 10mg daily to keep SBP < 160. Continue Precedex and librium to help with restlessness and withdrawal sx.    12/3: Precedex stopped. Valium changed to Baclofen 5mg TID. Braces for left foot and left wrist.  Up to chair with PT. Lisinopril increased due to persistent hypertension. 12/4: Complained of severe headache overnight. Minimal improvement with Fentanyl 25mcg IV x1 and Magnesium. Started on scheduled Roxicodone 5mg Q4h x2 days. CIWA score 11-12 overnight, received Ativan x2. Persistent hypertension requiring 4 doses of PRN Labetalol overnight, started on Labetalol 100mg Q8h.  12/5: Patient did well overnight and did not require any PRN Ativan for withdraw symptoms.  Librium decreased to 5mg Q12h with plans to wean off if tolerated. Continues to complain of severe headache and neck pain despite scheduled Baclofen and Roxicodone; stared on Medrol dose pack. Hypertensive overnight requiring 2 doses of PRN Labetalol; PO Labetalol increased to 200mg Q8h. Transferred to stepdown. Last 24h:  Patient reports improvement in headache yesterday with addition of steroids. Roxicodone changed to 5mg Q4h PRN, plan to wean down in next few days. No concern for withdraw symptoms; Librium further decreased to 5mg QD. Clinical exam remains stable. Blood pressure trend after Labetalol increased yesterday. Discussed dispo planning with Case Management; plan for discharge to [address] [place] [address].     CURRENT MEDICATIONS:  SCHEDULED MEDICATIONS:   chlordiazePOXIDE  5 mg Oral Daily    labetalol  200 mg Oral 3 times per day    methylPREDNISolone  4 mg Oral QAM AC    methylPREDNISolone  4 mg Oral Lunch    methylPREDNISolone  4 mg Oral Dinner    methylPREDNISolone  8 mg Oral Nightly    [START ON 12/7/2020] methylPREDNISolone  4 mg Oral Nightly    melatonin  3 mg Oral Nightly    lisinopril  40 mg Oral Daily    baclofen  5 mg Oral TID    potassium bicarb-citric acid  40 mEq Oral Daily    amLODIPine  10 mg Oral Daily    heparin (porcine)  5,000 Units Subcutaneous 3 times per day    thiamine  100 mg Oral Daily    cyanocobalamin  1,000 mcg Intramuscular Daily    Followed by   Concepción Diaz ON 12/9/2020] cyanocobalamin  1,000 mcg Intramuscular Weekly    Followed by   Concepción Diaz ON 2/3/2021] cyanocobalamin  1,000 mcg Intramuscular Q30 Days    sennosides-docusate sodium  2 tablet Oral Daily    sodium chloride flush  10 mL Intravenous 2 times per day    sodium chloride  20 mL Intravenous Once    folic acid  1 mg Oral Daily    buPROPion  300 mg Oral Daily    lamoTRIgine  400 mg Oral Daily    sertraline  50 mg Oral Daily    rOPINIRole  1 mg Oral Nightly     CONTINUOUS INFUSIONS:   sodium chloride 75 mL/hr at 20 1153     PRN MEDICATIONS:   oxyCODONE, menthol-methyl salicylate, sodium chloride flush, acetaminophen, perflutren lipid microspheres, ondansetron, ipratropium-albuterol, LORazepam, sodium chloride flush, magnesium hydroxide    VITALS:  Temperature Range: Temp: 98.5 °F (36.9 °C) Temp  Av.3 °F (36.8 °C)  Min: 97.9 °F (36.6 °C)  Max: 98.5 °F (36.9 °C)  BP Range: Systolic (79SGY), MKX:078 , Min:112 , QUU:530     Diastolic (87NCA), MITCH:63, Min:57, Max:104    Pulse Range: Pulse  Av.1  Min: 65  Max: 81  Respiration Range: Resp  Av.3  Min: 19  Max: 24  Current Pulse Ox: SpO2: 100 %  24HR Pulse Ox Range: SpO2  Av.8 %  Min: 92 %  Max: 100 %  Patient Vitals for the past 12 hrs:   BP Temp Temp src Pulse Resp SpO2   20 1207 (!) 112/57 98.5 °F (36.9 °C) Oral 76 20 100 %   20 0741 (!) 131/58 98.4 °F (36.9 °C) Oral 65 24 100 %   20 0658 (!) 146/69 -- -- 69 -- --   20 0500 (!) 146/65 98.3 °F (36.8 °C) Oral 73 20 --     Estimated body mass index is 39.11 kg/m² as calculated from the following:    Height as of this encounter: 5' 5\" (1.651 m).     Weight as of this encounter: 235 lb (106.6 kg).  []<16 Severe malnutrition  []16-16.99 Moderate malnutrition  []17-18.49 Mild malnutrition  []18.5-24.9 Normal  []25-29.9 Overweight (not obese)  []30-34.9 Obese class 1 (Low Risk)  [x]35-39.9 Obese class 2 (Moderate Risk)  []?40 Obese class 3 (High Risk)    RECENT LABS:   Lab Results   Component Value Date    WBC 9.8 2020    HGB 12.0 2020    HCT 38.9 2020     2020    CHOL 247 (H) 2020    TRIG 77 2020     2020    ALT 24 2020    AST 51 (H) 2020     2020    K 3.8 2020     2020    CREATININE 0.60 2020    BUN 13 2020    CO2 22 2020    TSH 1.34 2020    INR 1.0 2020    LABA1C 5.0 2020     24 HOUR INTAKE/OUTPUT:    Intake/Output Summary (Last 24 hours) at 2020 blood.  Given DDAVP and 1 pack of platelets in setting of platelet disorder. Initially presented to [facility] ED with left sided weakness and headache. Hypertensive with -190's on arrival to ED.     NEUROLOGIC:  - Acute right parietal ICH with intraventricular extension and subarachnoid hemorrhage component  - Etiology possibly secondary to coaguloapthy in setting of platelet disorder and hypertension  - No underlying mass or vascular malformation on MRI brain with/without contrast  - F/U Neurosurgery outpatient  - Goal SBP<160  - History of ETOH abuse; Folic Acid& Thiamine replacement, Decrease Librium to 5mg QD,  Ativan 1mg Q4h PRN (last dose 12/4)  - Headache/Neck pain;  Baclofen 5mg TID, continue Medrol dose pack, Roxicodone 5mg Q4h PRN  - Requip 1mg QHS for restless legs  - Neuro checks per protocol     CARDIOVASCULAR:  - Goal SBP<160  - Persistent hypertension  - Continue Norvasc 10mg QD, Lisinopril 40mg QD and Labetalol to 200mg Q8h  - Echocardiogram EF 55%  - Lipid panel; LDL 90, Cholesterol 247  - Continue telemetry     PULMONARY:  - Maintaining O2 sats on room air  - History of asthma  - Duonebs PRN     RENAL/FLUID/ELECTROLYTE:  - Normal renal functioning  - BUN 13/ Creatinine 0.60  - Monitor I&O; 1800/2400  - IVF: Zoey@[employer] to help with hydration in setting of headache through 12/7  - Replace electrolytes PRN  - Daily BMP     GI/NUTRITION:  NUTRITION:  - General Diet   - Appetite improving slowly, tolerating supplements  - Last BM 12/4  - Bowel regimen: Senokot-S daily, add Glycolax daily, Milk of Mag PRN  - GI prophylaxis: N/A     ID:  - Afebrile, Tmax 36.9C  - No leukocytosis, WBC 9.8  - UA 11/30 negative  - COVID-19 negative 11/30  - Continue to monitor for fevers  - Daily CBC     HEME:   - History of delta storage pool disease  - Transfused total 2 packs of platelets during admission, received DDAVP on arrival  - Hem/Onc following; appreciate recs  - H&H 12.0/38.9  - Platelets 419  - Daily CBC     ENDOCRINE:  - Continue to monitor blood glucose, goal <180  - Hemoglobin A1C 5.0  - TSH 1.34     OTHER:  - Melatonin 3mg QHS to promote normal sleep/wake cycle  - History of bipolar disorder; Lamictal 400mg BID, Zoloft 50mg QD  - PT/OT/ST; foot drop boot on L, discuss left wrist splint  - PM&R following; appropriate candidate for inpatient rehab  - Discussed discharge planning with Case management; poss D/C to [address] [place] [address] rehab 12/7     PROPHYLAXIS:  Stress ulcer: N/A     DVT PROPHYLAXIS:  - SCD sleeves - Thigh High   - Heparin 5000u Q8h     DISPOSITION:  [x] Stepdown status. We will continue to follow along as the primary team.    For any changes in exam or patient status please contact Neuro Critical Care.       KYLEE Arizmendi - [place]  Neuro Critical Care  Pager [phone]  12/6/2020     4:16 PM

## 2020-12-06 NOTE — PROGRESS NOTES
Occupational Therapy  Facility/Department: [facility] NEURO  Daily Treatment Note  NAME: Bertha Wilson  : 1970  MRN: [id number]    Date of Service: 2020    Discharge Recommendations: Further therapy recommended at discharge. The patient should be able to tolerate at least three hours of therapy per day over 5 days or 15 hours over 7 days. Assessment   Performance deficits / Impairments: Decreased functional mobility ; Decreased ADL status; Decreased ROM; Decreased strength;Decreased safe awareness;Decreased endurance;Decreased balance;Decreased high-level IADLs;Decreased fine motor control;Decreased coordination;Decreased posture  Assessment: Pt presents with LLE and LUE deficits. Pt demonstrating some L neglect and had difficulty finding objects placed right. Pt is expected to require skilled OT services during her acute hosptilization stay to maximize safety and increase independence in ADLs/IADLs and functional mobility tasks. Prognosis: Good  REQUIRES OT FOLLOW UP: Yes  Activity Tolerance  Activity Tolerance: Patient Tolerated treatment well;Patient limited by fatigue;Patient limited by pain  Activity Tolerance: Limited by decreased LUE/LLE  deficits  Safety Devices  Safety Devices in place: Yes  Type of devices: All fall risk precautions in place; Bed alarm in place;Call light within reach; Patient at risk for falls; Left in bed;Nurse notified         Patient Diagnosis(es): The encounter diagnosis was Intracranial hemorrhage ([facility] [address] has a past medical history of Asthma, Bipolar 1 disorder ([facility] [address] [employer] disease [facility]), Hypertension, and Psychiatric problem. has a past surgical history that includes  section (1708,6115); Gastric bypass surgery (); Tonsillectomy and adenoidectomy (); Cholecystectomy (); knee surgery (); Hysterectomy (); laparoscopy ();  Colonoscopy (N/A, 2/3/2020); and Upper gastrointestinal endoscopy (N/A, 2/3/2020). Restrictions  Restrictions/Precautions  Restrictions/Precautions: Seizure, General Precautions, Fall Risk, Up as Tolerated  Required Braces or Orthoses?: No  Position Activity Restriction  Other position/activity restrictions: L hypertonicity  Subjective   General  Patient assessed for rehabilitation services?: Yes  Family / Caregiver Present: No  General Comment  Comments: RN ok'd patient for OT treatment. Pt cooperative and agreeable. Pain Assessment  Pain Assessment: 0-10  Pain Level: 4  Patient's Stated Pain Goal: No pain  Pain Type: Acute pain  Pain Location: Head  Pain Orientation: Posterior  Pain Descriptors: Headache  Pain Frequency: Continuous  Clinical Progression: Not changed  Response to Pain Intervention: Patient Satisfied(RN notified of need for pain meds)  Vital Signs  Patient Currently in Pain: Yes      Objective    ADL  Feeding: Minimal assistance;Setup; Increased time to complete;Scoop assist;Beverage management(assist to open containers, cut food, scoop some foods)  Grooming: Stand by assistance;Minimal assistance;Setup; Increased time to complete(SBA-wash face, Min-brush teeth)  UE Bathing: Moderate assistance;Setup;Verbal cueing; Increased time to complete(w/back, sides and underarms)  LE Bathing: Moderate assistance;Setup; Increased time to complete(w/lower legs and feet)  UE Dressing: Minimal assistance;Setup; Increased time to complete(w/gown)  LE Dressing: Maximum assistance;Setup; Increased time to complete(w/footies)  Toileting: Maximum assistance;Setup; Increased time to complete(wearing brief, purewick, bed rico w/assist w/elizabeth care )      Pt in bed upon arrival. Pt adjusted in bed and setup at tray table for self care(see above for LOF).       Balance  Sitting Balance: Unable to assess(sitting in bed, HOB elevated)  Standing Balance: Unable to assess(pt requires max +2 to attempt standing )  Bed mobility  Rolling to Left: Modified independent(  Rolling to Right: Maximum assistance    Bed Mobility  Max x2 to boost up into bed and reposition     Plan   Plan  Times per week: 4-5x/week  Specific instructions for Next Treatment: Introduce adaptive strategies and techniques for ADLs with LUE deficits, continue to address L side neglect  Current Treatment Recommendations: Strengthening, Endurance Training, Patient/Caregiver Education & Training, ROM, Equipment Evaluation, Education, & procurement, Self-Care / ADL, Balance Training, Functional Mobility Training, Safety Education & Training, Home Management Training, Cognitive/Perceptual Training, Positioning    Goals  Short term goals  Time Frame for Short term goals: By discharge, pt will:  Short term goal 1: Demonstrate bed mobility with Mod A and use of bedrails PRN  Short term goal 2: Demostrate sitting balance for +10 minutes with Min A  Short term goal 3: Complete simple ADL sitting EOB with Min A, setup provided, use of DME and adpative strategis/techniques PRN, <3 cues  Short term goal 4: Attend to L side with <2 VCs during ADL/functional tasks  Short term goal 5: participate in 5+ min of visual motor/scanning tasks to increase independence with functional tasks     Therapy Time   Individual Concurrent Group Co-treatment   Time In  1420         Time Out  1539         Minutes  79 total tx time                 [address]/L

## 2020-12-06 NOTE — PROGRESS NOTES
Physical Therapy  Facility/Department: [facility] NEURO  Daily Treatment Note  NAME: Rui Begum  : 1970  MRN: [id number]    Date of Service: 2020    Discharge Recommendations:  Patient would benefit from continued therapy after discharge   PT Equipment Recommendations  Other: TBD    Assessment   Body structures, Functions, Activity limitations: Decreased functional mobility ; Decreased strength;Decreased safe awareness;Decreased balance; Increased pain;Decreased sensation;Decreased fine motor control;Decreased coordination;Decreased posture  Assessment: The pt would benefit from intensive therapy to address deficits. pt is very motivated , was limited this date by head aches. Prognosis: Good  PT Education: Goals; General Safety;PT Role;Plan of Care;Precautions; Adaptive Device Training;Disease Specific Education; Functional Mobility Training; Injury Prevention;Home Exercise Program  REQUIRES PT FOLLOW UP: Yes  Activity Tolerance  Activity Tolerance: Patient limited by fatigue;Patient limited by pain  Activity Tolerance: Head ache     Patient Diagnosis(es): The encounter diagnosis was Intracranial hemorrhage ([facility] [address] has a past medical history of Asthma, Bipolar 1 disorder ([facility] [address] [employer] disease [facility]), Hypertension, and Psychiatric problem. has a past surgical history that includes  section (5239,3601); Gastric bypass surgery (); Tonsillectomy and adenoidectomy (); Cholecystectomy (); knee surgery (); Hysterectomy (); laparoscopy (); Colonoscopy (N/A, 2/3/2020); and Upper gastrointestinal endoscopy (N/A, 2/3/2020).     Restrictions  Restrictions/Precautions  Restrictions/Precautions: General Precautions, Seizure, Fall Risk, Up as Tolerated  Required Braces or Orthoses?: No  Position Activity Restriction  Other position/activity restrictions: up with assistance; L hypertonicity  Subjective   General  Chart Reviewed: Yes  Response To Previous Treatment: Patient with no complaints from previous session. Family / Caregiver Present: No  Subjective  Subjective: RN and pt agreeable to PT. Pt alert in bed upon arrival, with ice on side of head  stating that she is having head ache, but would like to try this date. Pain Screening  Patient Currently in Pain: Yes  Pain Assessment  Pain Level: 7  Pain Type: Acute pain  Pain Location: Head  Pain Descriptors: Headache  Pain Frequency: Continuous  Functional Pain Assessment: Activities are not prevented  Non-Pharmaceutical Pain Intervention(s): Cold applied; Rest  Vital Signs  Patient Currently in Pain: Yes       Orientation  Orientation  Overall Orientation Status: Within Functional Limits  Cognition      Objective   Bed mobility  Supine to Sit: Maximum assistance; Moderate assistance  Sit to Supine: Maximum assistance; Moderate assistance  Scooting: Maximal assistance  Comment: HOB elavated , very motivated . Pt perfomed bed mobility x2 and demo much improvement with effort during second attempt requiring Mod A to come to full sitting. Transfers  Sit to Stand: (limited by headache.)        Balance  Sitting - Static: Poor; +(Pt sat at EOB x2 for at total of 10 mins , weighgt shifting to improve sitting balance, requiring MAx A intially progressing to MOD A , while demonstrating excessive Left lean.)  Exercises  Core Strengthening: 10mins at EOb to improve trunkl control  Comments: L calf stretch 3x20s, PRROM to LUE and LUE x15 reps . Pt educated on HEP , pt demo good effort with using Right hand to assist ROM on LUE; demo difficulty with using R leg to assist LLE. FDS done to LLE at end of session.             Goals  Short term goals  Time Frame for Short term goals: 12 visits  Short term goal 1: pt will perform bed mobility with min A+2  Short term goal 2: pt will dangle EOB for 20 minutes with SBA  Short term goal 3: transfers with mod A+2  Short term goal 4: WC mobility x 25' with SBA+1  Short term goal 5: progress to standing/gait activities with mod A+2 with appropriate device--start with lg lopez  Patient Goals   Patient goals : to regain use of L arm and L leg    Plan    Plan  Times per week: 5-6x per week  Times per day: Daily  Current Treatment Recommendations: Strengthening, ROM, Balance Training, Functional Mobility Training, Transfer Training, Wheelchair Mobility Training, Gait Training, Neuromuscular Re-education, Pain Management, Home Exercise Program, Safety Education & Training, Patient/Caregiver Education & Training, Positioning, Endurance Training  Safety Devices  Type of devices: Call light within reach, Gait belt, Patient at risk for falls, Nurse notified, Left in bed  Restraints  Initially in place: No     Therapy Time   Individual Concurrent Group Co-treatment   Time In 0825         Time Out 0903         Minutes 38         Timed Code Treatment Minutes: Eron Roberson, JANINE

## 2020-12-07 LAB
ANION GAP SERPL CALCULATED.3IONS-SCNC: 13 MMOL/L (ref 9–17)
BUN BLDV-MCNC: 12 MG/DL (ref 6–20)
BUN/CREAT BLD: NORMAL (ref 9–20)
CALCIUM SERPL-MCNC: 10 MG/DL (ref 8.6–10.4)
CHLORIDE BLD-SCNC: 103 MMOL/L (ref 98–107)
CO2: 20 MMOL/L (ref 20–31)
CREAT SERPL-MCNC: 0.5 MG/DL (ref 0.5–0.9)
GFR AFRICAN AMERICAN: >60 ML/MIN
GFR NON-AFRICAN AMERICAN: >60 ML/MIN
GFR SERPL CREATININE-BSD FRML MDRD: NORMAL ML/MIN/{1.73_M2}
GFR SERPL CREATININE-BSD FRML MDRD: NORMAL ML/MIN/{1.73_M2}
GLUCOSE BLD-MCNC: 97 MG/DL (ref 70–99)
HCT VFR BLD CALC: 39.9 % (ref 36.3–47.1)
HEMOGLOBIN: 12.2 G/DL (ref 11.9–15.1)
MCH RBC QN AUTO: 28.8 PG (ref 25.2–33.5)
MCHC RBC AUTO-ENTMCNC: 30.6 G/DL (ref 28.4–34.8)
MCV RBC AUTO: 94.3 FL (ref 82.6–102.9)
NRBC AUTOMATED: 0 PER 100 WBC
PDW BLD-RTO: 13.5 % (ref 11.8–14.4)
PLATELET # BLD: 396 K/UL (ref 138–453)
PMV BLD AUTO: 8.8 FL (ref 8.1–13.5)
POTASSIUM SERPL-SCNC: 4.4 MMOL/L (ref 3.7–5.3)
RBC # BLD: 4.23 M/UL (ref 3.95–5.11)
SODIUM BLD-SCNC: 136 MMOL/L (ref 135–144)
WBC # BLD: 9.2 K/UL (ref 3.5–11.3)

## 2020-12-07 PROCEDURE — 6370000000 HC RX 637 (ALT 250 FOR IP): Performed by: NURSE PRACTITIONER

## 2020-12-07 PROCEDURE — 97535 SELF CARE MNGMENT TRAINING: CPT

## 2020-12-07 PROCEDURE — 85027 COMPLETE CBC AUTOMATED: CPT

## 2020-12-07 PROCEDURE — 36415 COLL VENOUS BLD VENIPUNCTURE: CPT

## 2020-12-07 PROCEDURE — 2060000000 HC ICU INTERMEDIATE R&B

## 2020-12-07 PROCEDURE — 2580000003 HC RX 258: Performed by: NURSE PRACTITIONER

## 2020-12-07 PROCEDURE — 97110 THERAPEUTIC EXERCISES: CPT

## 2020-12-07 PROCEDURE — 6370000000 HC RX 637 (ALT 250 FOR IP): Performed by: STUDENT IN AN ORGANIZED HEALTH CARE EDUCATION/TRAINING PROGRAM

## 2020-12-07 PROCEDURE — 6370000000 HC RX 637 (ALT 250 FOR IP): Performed by: PSYCHIATRY & NEUROLOGY

## 2020-12-07 PROCEDURE — 97530 THERAPEUTIC ACTIVITIES: CPT

## 2020-12-07 PROCEDURE — 80048 BASIC METABOLIC PNL TOTAL CA: CPT

## 2020-12-07 PROCEDURE — APPNB45 APP NON BILLABLE 31-45 MINUTES: Performed by: NURSE PRACTITIONER

## 2020-12-07 PROCEDURE — 6360000002 HC RX W HCPCS: Performed by: PSYCHIATRY & NEUROLOGY

## 2020-12-07 PROCEDURE — 99232 SBSQ HOSP IP/OBS MODERATE 35: CPT | Performed by: PHYSICAL MEDICINE & REHABILITATION

## 2020-12-07 PROCEDURE — 99231 SBSQ HOSP IP/OBS SF/LOW 25: CPT | Performed by: INTERNAL MEDICINE

## 2020-12-07 PROCEDURE — 99232 SBSQ HOSP IP/OBS MODERATE 35: CPT | Performed by: PSYCHIATRY & NEUROLOGY

## 2020-12-07 RX ORDER — LISINOPRIL 20 MG/1
40 TABLET ORAL DAILY
Status: CANCELLED | OUTPATIENT
Start: 2020-12-08

## 2020-12-07 RX ORDER — BACLOFEN 10 MG/1
5 TABLET ORAL 3 TIMES DAILY
Status: CANCELLED | OUTPATIENT
Start: 2020-12-07

## 2020-12-07 RX ORDER — POLYETHYLENE GLYCOL 3350 17 G/17G
17 POWDER, FOR SOLUTION ORAL DAILY
Status: CANCELLED | OUTPATIENT
Start: 2020-12-08

## 2020-12-07 RX ORDER — THIAMINE MONONITRATE (VIT B1) 100 MG
100 TABLET ORAL DAILY
Status: CANCELLED | OUTPATIENT
Start: 2020-12-08

## 2020-12-07 RX ORDER — IPRATROPIUM BROMIDE AND ALBUTEROL SULFATE 2.5; .5 MG/3ML; MG/3ML
1 SOLUTION RESPIRATORY (INHALATION) EVERY 4 HOURS PRN
Status: CANCELLED | OUTPATIENT
Start: 2020-12-07

## 2020-12-07 RX ORDER — AMLODIPINE BESYLATE 10 MG/1
10 TABLET ORAL DAILY
Status: CANCELLED | OUTPATIENT
Start: 2020-12-08

## 2020-12-07 RX ORDER — ACETAMINOPHEN 325 MG/1
650 TABLET ORAL EVERY 4 HOURS PRN
Status: DISCONTINUED | OUTPATIENT
Start: 2020-12-07 | End: 2020-12-09 | Stop reason: HOSPADM

## 2020-12-07 RX ORDER — METHYLPREDNISOLONE 4 MG/1
4 TABLET ORAL
Status: CANCELLED | OUTPATIENT
Start: 2020-12-08 | End: 2020-12-11

## 2020-12-07 RX ORDER — MENTHOL AND METHYL SALICYLATE 10; 30 G/100G; G/100G
CREAM TOPICAL 3 TIMES DAILY PRN
Status: CANCELLED | OUTPATIENT
Start: 2020-12-07

## 2020-12-07 RX ORDER — SENNA AND DOCUSATE SODIUM 50; 8.6 MG/1; MG/1
2 TABLET, FILM COATED ORAL DAILY
Status: CANCELLED | OUTPATIENT
Start: 2020-12-08

## 2020-12-07 RX ORDER — GABAPENTIN 300 MG/1
300 CAPSULE ORAL NIGHTLY
Status: CANCELLED | OUTPATIENT
Start: 2020-12-07

## 2020-12-07 RX ORDER — ACETAMINOPHEN 325 MG/1
650 TABLET ORAL EVERY 4 HOURS PRN
Status: CANCELLED | OUTPATIENT
Start: 2020-12-07

## 2020-12-07 RX ORDER — METHYLPREDNISOLONE 4 MG/1
4 TABLET ORAL
Status: CANCELLED | OUTPATIENT
Start: 2020-12-08 | End: 2020-12-09

## 2020-12-07 RX ORDER — LANOLIN ALCOHOL/MO/W.PET/CERES
3 CREAM (GRAM) TOPICAL NIGHTLY
Status: CANCELLED | OUTPATIENT
Start: 2020-12-07

## 2020-12-07 RX ORDER — LABETALOL 200 MG/1
200 TABLET, FILM COATED ORAL EVERY 8 HOURS SCHEDULED
Status: CANCELLED | OUTPATIENT
Start: 2020-12-07

## 2020-12-07 RX ORDER — OXYCODONE HYDROCHLORIDE 5 MG/1
5 TABLET ORAL EVERY 8 HOURS PRN
Status: DISCONTINUED | OUTPATIENT
Start: 2020-12-07 | End: 2020-12-09 | Stop reason: HOSPADM

## 2020-12-07 RX ORDER — BUPROPION HYDROCHLORIDE 150 MG/1
300 TABLET ORAL DAILY
Status: CANCELLED | OUTPATIENT
Start: 2020-12-08

## 2020-12-07 RX ORDER — CYANOCOBALAMIN 1000 UG/ML
1000 INJECTION INTRAMUSCULAR; SUBCUTANEOUS WEEKLY
Status: CANCELLED | OUTPATIENT
Start: 2020-12-09 | End: 2021-02-03

## 2020-12-07 RX ORDER — METHYLPREDNISOLONE 4 MG/1
4 TABLET ORAL NIGHTLY
Status: CANCELLED | OUTPATIENT
Start: 2020-12-07 | End: 2020-12-10

## 2020-12-07 RX ORDER — HEPARIN SODIUM 5000 [USP'U]/ML
5000 INJECTION, SOLUTION INTRAVENOUS; SUBCUTANEOUS EVERY 8 HOURS SCHEDULED
Status: CANCELLED | OUTPATIENT
Start: 2020-12-07

## 2020-12-07 RX ORDER — CYANOCOBALAMIN 1000 UG/ML
1000 INJECTION INTRAMUSCULAR; SUBCUTANEOUS
Status: CANCELLED | OUTPATIENT
Start: 2021-02-03

## 2020-12-07 RX ORDER — GABAPENTIN 300 MG/1
300 CAPSULE ORAL NIGHTLY
Status: DISCONTINUED | OUTPATIENT
Start: 2020-12-07 | End: 2020-12-09

## 2020-12-07 RX ORDER — FOLIC ACID 1 MG/1
1 TABLET ORAL DAILY
Status: CANCELLED | OUTPATIENT
Start: 2020-12-08

## 2020-12-07 RX ORDER — OXYCODONE HYDROCHLORIDE 5 MG/1
5 TABLET ORAL EVERY 8 HOURS PRN
Status: CANCELLED | OUTPATIENT
Start: 2020-12-07 | End: 2020-12-10

## 2020-12-07 RX ORDER — ROPINIROLE 1 MG/1
1 TABLET, FILM COATED ORAL NIGHTLY
Status: CANCELLED | OUTPATIENT
Start: 2020-12-07

## 2020-12-07 RX ORDER — LAMOTRIGINE 100 MG/1
400 TABLET ORAL DAILY
Status: CANCELLED | OUTPATIENT
Start: 2020-12-08

## 2020-12-07 RX ORDER — CYANOCOBALAMIN 1000 UG/ML
1000 INJECTION INTRAMUSCULAR; SUBCUTANEOUS DAILY
Status: CANCELLED | OUTPATIENT
Start: 2020-12-08 | End: 2020-12-09

## 2020-12-07 RX ADMIN — SODIUM CHLORIDE, PRESERVATIVE FREE 10 ML: 5 INJECTION INTRAVENOUS at 08:53

## 2020-12-07 RX ADMIN — Medication 3 MG: at 20:46

## 2020-12-07 RX ADMIN — AMLODIPINE BESYLATE 10 MG: 10 TABLET ORAL at 08:47

## 2020-12-07 RX ADMIN — LISINOPRIL 40 MG: 20 TABLET ORAL at 08:43

## 2020-12-07 RX ADMIN — FOLIC ACID 1 MG: 1 TABLET ORAL at 08:45

## 2020-12-07 RX ADMIN — METHYLPREDNISOLONE 4 MG: 4 TABLET ORAL at 14:22

## 2020-12-07 RX ADMIN — HEPARIN SODIUM 5000 UNITS: 5000 INJECTION INTRAVENOUS; SUBCUTANEOUS at 07:09

## 2020-12-07 RX ADMIN — METHYLPREDNISOLONE 4 MG: 4 TABLET ORAL at 20:45

## 2020-12-07 RX ADMIN — GABAPENTIN 300 MG: 300 CAPSULE ORAL at 20:46

## 2020-12-07 RX ADMIN — ACETAMINOPHEN 650 MG: 325 TABLET ORAL at 19:47

## 2020-12-07 RX ADMIN — HEPARIN SODIUM 5000 UNITS: 5000 INJECTION INTRAVENOUS; SUBCUTANEOUS at 22:52

## 2020-12-07 RX ADMIN — ACETAMINOPHEN 650 MG: 325 TABLET ORAL at 14:26

## 2020-12-07 RX ADMIN — BACLOFEN 5 MG: 10 TABLET ORAL at 19:42

## 2020-12-07 RX ADMIN — OXYCODONE HYDROCHLORIDE 5 MG: 5 TABLET ORAL at 08:44

## 2020-12-07 RX ADMIN — BUPROPION HYDROCHLORIDE 300 MG: 150 TABLET, EXTENDED RELEASE ORAL at 08:44

## 2020-12-07 RX ADMIN — SERTRALINE 50 MG: 50 TABLET, FILM COATED ORAL at 08:44

## 2020-12-07 RX ADMIN — ACETAMINOPHEN 650 MG: 325 TABLET ORAL at 05:58

## 2020-12-07 RX ADMIN — LABETALOL HCL 200 MG: 200 TABLET, FILM COATED ORAL at 14:22

## 2020-12-07 RX ADMIN — LAMOTRIGINE 400 MG: 100 TABLET ORAL at 08:43

## 2020-12-07 RX ADMIN — METHYLPREDNISOLONE 4 MG: 4 TABLET ORAL at 08:43

## 2020-12-07 RX ADMIN — CYANOCOBALAMIN 1000 MCG: 1000 INJECTION, SOLUTION INTRAMUSCULAR at 08:52

## 2020-12-07 RX ADMIN — BACLOFEN 5 MG: 10 TABLET ORAL at 08:44

## 2020-12-07 RX ADMIN — Medication 100 MG: at 08:44

## 2020-12-07 RX ADMIN — ROPINIROLE HYDROCHLORIDE 1 MG: 1 TABLET, FILM COATED ORAL at 19:43

## 2020-12-07 RX ADMIN — BACLOFEN 5 MG: 10 TABLET ORAL at 14:22

## 2020-12-07 RX ADMIN — POTASSIUM BICARBONATE 40 MEQ: 782 TABLET, EFFERVESCENT ORAL at 08:46

## 2020-12-07 RX ADMIN — SODIUM CHLORIDE, PRESERVATIVE FREE 10 ML: 5 INJECTION INTRAVENOUS at 20:50

## 2020-12-07 RX ADMIN — HEPARIN SODIUM 5000 UNITS: 5000 INJECTION INTRAVENOUS; SUBCUTANEOUS at 14:23

## 2020-12-07 ASSESSMENT — PAIN SCALES - GENERAL
PAINLEVEL_OUTOF10: 8
PAINLEVEL_OUTOF10: 5

## 2020-12-07 NOTE — DISCHARGE INSTR - DIET

## 2020-12-07 NOTE — DISCHARGE INSTR - ACTIVITY
Activity as tolerated. No weight bearing restrictions. Please wear foot drop brace on two hours, off two hours.

## 2020-12-07 NOTE — PROGRESS NOTES
Physical Therapy  Facility/Department: [facility] NEURO  Daily Treatment Note  NAME: Rui Begum  : 1970  MRN: [id number]    Date of Service: 2020    Discharge Recommendations:  Patient would benefit from continued therapy after discharge        Assessment   Body structures, Functions, Activity limitations: Decreased functional mobility ; Decreased strength;Decreased safe awareness;Decreased balance; Increased pain;Decreased sensation;Decreased fine motor control;Decreased coordination;Decreased posture  Assessment: Pt tolerated treatment well but limited by L side weakness and poor sitting tolerance. Pt would benefit from continued therapy after d/c to continue addressing defecits. Prognosis: Good  REQUIRES PT FOLLOW UP: Yes  Activity Tolerance  Activity Tolerance: Patient limited by fatigue;Patient Tolerated treatment well     Patient Diagnosis(es): The encounter diagnosis was Intracranial hemorrhage ([facility] [address] has a past medical history of Asthma, Bipolar 1 disorder ([facility] [address] [employer] pool disease [facility]), Hypertension, and Psychiatric problem. has a past surgical history that includes  section (3856,5392); Gastric bypass surgery (); Tonsillectomy and adenoidectomy (); Cholecystectomy (); knee surgery (); Hysterectomy (); laparoscopy (); Colonoscopy (N/A, 2/3/2020); and Upper gastrointestinal endoscopy (N/A, 2/3/2020). Restrictions  Restrictions/Precautions  Restrictions/Precautions: Seizure, General Precautions, Fall Risk, Up as Tolerated  Required Braces or Orthoses?: No  Position Activity Restriction  Other position/activity restrictions: p with assistance; L hypertonicity  Subjective   General  Chart Reviewed: Yes  Response To Previous Treatment: Patient with no complaints from previous session. Family / Caregiver Present: No  Subjective  Subjective: Pt was in bed finishing OT upon arrival. Agreeable to PT.   Pain Screening  Patient Currently in Pain: No  Vital Signs  Patient Currently in Pain: No       Orientation  Orientation  Overall Orientation Status: Within Normal Limits  Cognition      Objective   Bed mobility  Supine to Sit: Moderate assistance  Sit to Supine: Moderate assistance  Comment: HOB elevated, use of bed rail. Transfers  Comment: Unsafe to attempt transfers today d/t poor sitting tolerance  Ambulation  Ambulation?: No     Balance  Posture: Fair  Sitting - Static: Fair;-  Sitting - Dynamic: Poor  Comments: Pt sat EOB for 10 minutes performing weight shifts in all directions and seated exercises with RLE. ModA for balance. Exercises  Seated LE exercise program: Long Arc Quads, hip abduction/adduction, heel/toe raises, and marches.  Reps: x10 RLE  PROM BLE/BUE  Goals  Short term goals  Time Frame for Short term goals: 12 visits  Short term goal 1: pt will perform bed mobility with min A+2  Short term goal 2: pt will dangle EOB for 20 minutes with SBA  Short term goal 3: transfers with mod A+2  Short term goal 4: WC mobility x 25' with SBA+1  Short term goal 5: progress to standing/gait activities with mod A+2 with appropriate device--start with lg olpez  Patient Goals   Patient goals : to regain use of L arm and L leg    Plan    Plan  Times per week: 5-6x per week  Times per day: Daily  Current Treatment Recommendations: Strengthening, ROM, Balance Training, Functional Mobility Training, Transfer Training, Wheelchair Mobility Training, Gait Training, Neuromuscular Re-education, Pain Management, Home Exercise Program, Safety Education & Training, Patient/Caregiver Education & Training, Positioning, Endurance Training  Safety Devices  Type of devices: Call light within reach, Gait belt, Patient at risk for falls, Nurse notified, Left in bed  Restraints  Initially in place: No     Therapy Time   Individual Concurrent Group Co-treatment   Time In 1025         Time Out 1048         Minutes 23         Timed Code Treatment Minutes: [address] Sprain, PTA

## 2020-12-07 NOTE — PROGRESS NOTES
Daily Progress Note  Neuro Critical Care    Patient Name: Aruna Wilburn  Patient : 1970  Room/Bed: 2564/9509-60  Code Status: FULL  Allergies: Allergies   Allergen Reactions    Pcn [Penicillins]     Sulfa Antibiotics Other (See Comments)     Inflammation in the eye       CHIEF COMPLAINT:      Headache, Right sided weakness     INTERVAL HISTORY    Initial Presentation (Admitted 20): The patient is a 48 y.o. female with a history of HTN, Delta storage pool disease, bipolar disorder, and alcohol abuse who presented as a transfer from [facility] ED after CT head revealed right parietal ICH. Initially presented to [facility] ED with left sided weakness and headache. LKW around midnight; patient states her left upper extremity \"went limp\" at that time. Patient states she went to sleep and woke up around 0400 this morning and noticed her left lower extremity was also weak. Reports she developed a headache by the time EMS arrived. CT Head at [facility] ED revealed large right parietal lobe ICH with moderate SAH. Noted to be hypertensive at [facility] ED, started on Cardene infusion. Patient had recently run out of her Lisinopril. Loaded with 1g Keppra. Transferred to [facility] for further evaluation and management. On arrival to ED, -190's. Unclear whether patient arrived on Cardene infusion but she was started on Clevidipine infusion in ED. CT Head showed stable right parietal ICH with SAH and new intraventricular hemorrhage in the right occipital horn. CTA Head/Neck unremarkable. Given 25g Mannitol x1 per Stroke team.  Neurosurgery and Hem/Onc consulted while in ED. Given 40mcg DDAVP x1 and transfused 1 pack of platelets. Patient denies head trauma or falls. GCS 14.  Opens eyes to voice, oriented x3. Noted to have dense left upper and lower extremity weaknes. Patient has a history of alcohol abuse.   She went to rehab last year and states she no longer drinks daily but to 5mg Q12h with plans to wean off if tolerated. Continues to complain of severe headache and neck pain despite scheduled Baclofen and Roxicodone; stared on Medrol dose pack. Hypertensive overnight requiring 2 doses of PRN Labetalol; PO Labetalol increased to 200mg Q8h. Transferred to stepdown. 12/6: Patient reports improvement in headache yesterday with addition of steroids. Roxicodone changed to 5mg Q4h PRN, plan to wean down in next few days. No concern for withdraw symptoms; Librium further decreased to 5mg QD. Last 24h:  No acute events overnight. Blood pressure remains well controlled. Headache being managed with Medrol dose pack and PRN Tylenol. Roxicodone decreased to 5mg Q8h PRN. Add Neurontin 300mg QHS. Continue Baclofen 5mg TID for spasticity and muscle spasms. No further concerns for withdraw symptoms; Librium discontinued, PRN Ativan stopped. Clinical exam remains stable. Patient stable for discharge to inpatient rehab when accepted.       CURRENT MEDICATIONS:  SCHEDULED MEDICATIONS:   polyethylene glycol  17 g Oral Daily    labetalol  200 mg Oral 3 times per day    methylPREDNISolone  4 mg Oral QAM AC    methylPREDNISolone  4 mg Oral Lunch    methylPREDNISolone  4 mg Oral Dinner    methylPREDNISolone  4 mg Oral Nightly    melatonin  3 mg Oral Nightly    lisinopril  40 mg Oral Daily    baclofen  5 mg Oral TID    potassium bicarb-citric acid  40 mEq Oral Daily    amLODIPine  10 mg Oral Daily    heparin (porcine)  5,000 Units Subcutaneous 3 times per day    thiamine  100 mg Oral Daily    cyanocobalamin  1,000 mcg Intramuscular Daily    Followed by   Harini Easley ON 12/9/2020] cyanocobalamin  1,000 mcg Intramuscular Weekly    Followed by   Harini Easley ON 2/3/2021] cyanocobalamin  1,000 mcg Intramuscular Q30 Days    sennosides-docusate sodium  2 tablet Oral Daily    sodium chloride flush  10 mL Intravenous 2 times per day    sodium chloride  20 mL Intravenous Once    folic acid  1 mg Oral Daily    buPROPion  300 mg Oral Daily    lamoTRIgine  400 mg Oral Daily    sertraline  50 mg Oral Daily    rOPINIRole  1 mg Oral Nightly     CONTINUOUS INFUSIONS:    PRN MEDICATIONS:   oxyCODONE, acetaminophen, menthol-methyl salicylate, sodium chloride flush, perflutren lipid microspheres, ondansetron, ipratropium-albuterol, sodium chloride flush, magnesium hydroxide    VITALS:  Temperature Range: Temp: 98.7 °F (37.1 °C) Temp  Av.6 °F (37 °C)  Min: 98.4 °F (36.9 °C)  Max: 98.7 °F (37.1 °C)  BP Range: Systolic (05GOA), BEZ:910 , Min:112 , CCC:974     Diastolic (80HVF), MFK:94, Min:55, Max:102    Pulse Range: Pulse  Av.8  Min: 59  Max: 85  Respiration Range: Resp  Av.5  Min: 20  Max: 24  Current Pulse Ox: SpO2: 99 %  24HR Pulse Ox Range: SpO2  Av %  Min: 97 %  Max: 100 %  Patient Vitals for the past 12 hrs:   BP Temp Temp src Pulse Resp SpO2   20 0704 (!) 123/102 -- -- 59 -- --   20 1946 (!) 129/55 98.7 °F (37.1 °C) Oral 85 24 99 %     Estimated body mass index is 39.11 kg/m² as calculated from the following:    Height as of this encounter: 5' 5\" (1.651 m).     Weight as of this encounter: 235 lb (106.6 kg).  []<16 Severe malnutrition  []16-16.99 Moderate malnutrition  []17-18.49 Mild malnutrition  []18.5-24.9 Normal  []25-29.9 Overweight (not obese)  []30-34.9 Obese class 1 (Low Risk)  [x]35-39.9 Obese class 2 (Moderate Risk)  []?40 Obese class 3 (High Risk)    RECENT LABS:   Lab Results   Component Value Date    WBC 9.2 2020    HGB 12.2 2020    HCT 39.9 2020     2020    CHOL 247 (H) 2020    TRIG 77 2020     2020    ALT 24 2020    AST 51 (H) 2020     2020    K 4.4 2020     2020    CREATININE 0.50 2020    BUN 12 2020    CO2 20 2020    TSH 1.34 2020    INR 1.0 2020    LABA1C 5.0 2020     24 HOUR INTAKE/OUTPUT:  No intake or output data in the 24 hours ending 12/07/20 0725    IMAGING:   No new imaging. Labs and Images reviewed with:  [] Dr. Maurice Becker. Rebecca    [x] Dr. Lester Bone  [] Dr. Jose Carmona  [] There are no new interval images to review. PHYSICAL EXAM       CONSTITUTIONAL:   female. Alert and oriented x 3. Appears to be resting comfortably. GCS 15. Nontoxic. No dysarthria. No aphasia. HEAD:  normocephalic, atraumatic    EYES:  PERRL, EOMI   ENT:  moist mucous membranes, chipped right front tooth   NECK:  supple, symmetric   LUNGS:  Equal air entry bilaterally, clear   CARDIOVASCULAR:  normal s1 / s2, RRR, distal pulses intact   ABDOMEN:  Soft, no rigidity, normal bowel sounds   NEUROLOGIC:  Mental Status:  A & O x3, Awake             Cranial Nerves:    II: Visual fields:  Normal  III: Pupils:  equal, round, reactive to light  III,IV,VI: Extra Ocular Movements: intact  V: Facial sensation:  abnormal - decreased sensation on the left  VII: Facial strength: abnormal - left facial droop    Motor Exam:    Drift:  present - left upper and left lower extremities  Tone:  abnormal - increased tone left upper extremity    5/5 strength to the right upper and right lower extremities  0/5 strength to left upper extremity, no movement to pain. 1/5 strength to left lower extremity, withdraws to pain, no spontaneous movement. Sensory:    Touch:    Right Upper Extremity:  normal  Left Upper Extremity:  abnormal - severely decreased  Right Lower Extremity:  normal  Left Lower Extremity:  abnormal - severely decreased       DRAINS:  [x] There are no drains for Neuro Critical Care to monitor at this time. ASSESSMENT AND PLAN:       The patient is a 49 yo female with a history of HTN, Delta storage pool disease, bipolar disorder, and alcohol abuse who was admitted to the Neuro ICU for management of a large right parietal ICH with subarachnoid and intraventricular blood. Given DDAVP and 1 pack of platelets in setting of platelet disorder. Initially presented to [facility] ED with left sided weakness and headache. Hypertensive with -190's on arrival to ED.     NEUROLOGIC:  - Acute right parietal ICH with intraventricular extension and subarachnoid hemorrhage component  - Etiology likely secondary to coaguloapthy in setting of platelet disorder and hypertension  - No underlying mass or vascular malformation on MRI brain with/without contrast  - F/U Neurosurgery outpatient  - Goal SBP<160  - History of ETOH abuse; Folic Acid& Thiamine replacement  - No further signs/symptoms of withdraw; stop Librium& Ativan   - Headache/Neck pain;  Baclofen 5mg TID, continue Medrol dose pack, start Neurontin 300mg QHS, decrease Roxicodone 5mg Q8h PRN  - Requip 1mg QHS for restless legs  - Neuro checks per protocol     CARDIOVASCULAR:  - Goal SBP<160  - Persistent hypertension  - Continue Norvasc 10mg QD, Lisinopril 40mg QD and Labetalol 200mg Q8h  - Echocardiogram EF 55%  - Lipid panel; LDL 90, Cholesterol 247  - Continue telemetry     PULMONARY:  - Maintaining O2 sats on room air  - History of asthma  - Duonebs PRN     RENAL/FLUID/ELECTROLYTE:  - Normal renal functioning  - BUN 12/ Creatinine 0.50  - Adequate urine output per nursing  - Taking adequate PO fluids, stop IVF  - Replace electrolytes PRN  - Stop daily labs     GI/NUTRITION:  NUTRITION:  - General Diet   - Appetite improving slowly, tolerating supplements  - Last BM 12/6  - Bowel regimen: Senokot-S &Glycolax daily, Milk of Mag PRN  - GI prophylaxis: N/A     ID:  - Afebrile, Tmax 37.1C  - No leukocytosis, WBC 9.2  - UA 11/30 negative  - COVID-19 negative 11/30  - Continue to monitor for fevers  - Daily CBC     HEME:   - History of delta storage pool disease  - Transfused total 2 packs of platelets during admission, received DDAVP on arrival  - Hem/Onc following; appreciate recs  - H&H 12.2/39.9, stable  - Platelets 691  - Daily CBC     ENDOCRINE:  - Continue to monitor blood glucose, goal <180  - Hemoglobin A1C 5.0  - TSH 1.34     OTHER:  - Melatonin 3mg QHS to promote normal sleep/wake cycle  - History of bipolar disorder; Lamictal 400mg BID, Zoloft 50mg QD  - PT/OT/ST; foot drop boot on L, discuss left wrist splint  - PM&R following; appropriate candidate for inpatient rehab  - Discharge planning; [place] when accepted     PROPHYLAXIS:  Stress ulcer: N/A     DVT PROPHYLAXIS:  - SCD sleeves - Thigh High   - Heparin 5000u Q8h     DISPOSITION:  [x] Stepdown status. Medically ready for discharge when accepted. We will continue to follow along as the primary team.    For any changes in exam or patient status please contact Neuro Critical Care.       KYLEE Munson - [place]  Neuro Critical Care  Pager [phone]  12/7/2020     7:25 AM

## 2020-12-07 NOTE — PROGRESS NOTES
Date:                           12/7/2020  Patient name:           Germaine Williamson  Date of admission:  11/30/2020  6:38 AM  MRN:   [id number]  YOB: 1970  PCP:                           Meera Aragon MD        Reason for consult: Delta Pool storage deficiency    Subjective:   Patient seen and examined at bedside. Headaches are better. On steroids, and gabapentin started. On SSRI  Left hemiplegia unchanged     BRIEF CLINICAL HISTORY  63-year-old  female presented to hospital for acute hemorrhagic CVA. Transferred from [facility] for neurosurgical evaluation. She has a past medical history of delta pool storage disease and has previously been seen by our group. She has previously had a retroperitoneal bleed postoperative surgery in 2011. Von Willebrand disease testing was negative. No prior history of brain bleeds. She reported waking up at night with left upper extremity weakness after which she went back to sleep. She woke up again around 3 AM and noticed left lower extremity weakness and persistence of left upper extremity weakness. Also reported headache and tremors in the right side. CT head without contrast showed a large right parieto-occipital hemorrhage with moderate subarachnoid hemorrhage as well as mild midline shift from right to left. .  Platelet count at presentation was 307. She got a dose of DDAVP and 1 unit of platelets. Neurosurgery followed with serial brain imaging. No acute intervention due to stable hematoma. Patient managed in neuro ICU. REVIEW OF SYSTEMS:  General: no fever or night sweats  ENT: No double or blurred vision, no hearing problem, no dysphagia or sore throat   Respiratory: No chest pain, no shortness of breath, no cough or hemoptysis. Cardiovascular: Denies chest pain, PND or orthopnea. No LE swelling or palpitations.   Gastrointestinal: Negative for nausea, no vomiting, abdominal pain, diarrhea or constipation. Genitourinary: Denies dysuria, hematuria, frequency, urgency or incontinence. Neurological: Positive for headaches, positive for left upper and lower limb weakness, no decreased LOC   Musculoskeletal: Positive for back pain, no joint swelling. Skin: Negative for rash on arms and legs  Psych: Denies hallucinations or intentions to harm self        Objective:     Vitals: BP (!) 114/48   Pulse 72   Temp 98 °F (36.7 °C) (Oral)   Resp 18   Ht 5' 5\" (1.651 m)   Wt 235 lb (106.6 kg)   SpO2 97%   BMI 39.11 kg/m²   General appearance -somnolent, in mild painful distress due to headache  Mental status -oriented x3  Eyes - pupils equal and reactive, extraocular eye movements intact  Mouth - mucous membranes moist, pharynx normal without lesions  Neck - supple, no significant adenopathy  Lymphatics - no palpable lymphadenopathy, no hepatosplenomegaly  Chest - clear to auscultation, no wheezes, rales or rhonchi, symmetric air entry  Heart - normal rate, regular rhythm, normal S1, S2, no murmurs  Abdomen - soft, nontender, nondistended, no masses or organomegaly  Neurological -somnolent but oriented, normal speech, left hemiplegia. Extremities - peripheral pulses normal, no pedal edema, no clubbing or cyanosis  Skin - normal coloration and turgor, no rashes, no suspicious skin lesions noted         Data:    No intake/output data recorded.   In: -   Out: 1750 [OUWCF:2936]    CBC:   Recent Labs     12/05/20  0429 12/06/20  0431 12/07/20  0530   WBC 10.3 9.8 9.2   HGB 12.7 12.0 12.2    353 396     BMP:    Recent Labs     12/05/20  0429 12/06/20  0431 12/07/20  0530    136 136   K 4.0 3.8 4.4   CL 98 101 103   CO2 24 22 20   BUN 7 13 12   CREATININE 0.51 0.60 0.50   GLUCOSE 117* 104* 97             Problem Lists:   Primary Problem:  Acute intracranial hemorrhage  Current Problems:  Active Hospital Problems    Diagnosis Date Noted    [facility]ogenic edema ([facility] [G93.6]     IVH (intraventricular hemorrhage) (HCC) [I61.5]     Intraparenchymal hemorrhage of brain ([address] [I61.9]     Intracranial hemorrhage ([address] [I62.9] 11/30/2020    Alcohol withdrawal syndrome without complication (HCC) [F59911]      PMH:  Past Medical History:   Diagnosis Date    Asthma     Bipolar 1 disorder ([address]     Delta storage pool disease ([address]     Hypertension     Psychiatric problem       Allergies: Allergies   Allergen Reactions    Pcn [Penicillins]     Sulfa Antibiotics Other (See Comments)     Inflammation in the eye        Assessment    1. Acute intracranial hemorrhage. 2. Delta pool storage disorder  3. Hypertensive emergency  4. Alcohol withdrawal        Plan     The patient condition seems to be stable  Continue with current treatment plan.  she will  go to rehab once precert is available  Will follow from a distance              Richard Aguillon MD  Hematology Oncology  [phone]  Electronically signed by Alonso Zhong MD on 12/7/2020 at 5:59 PM
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
## 2020-12-07 NOTE — PROGRESS NOTES
Occupational Therapy  Facility/Department: [facility] NEURO  Daily Treatment Note  NAME: Araceli Hall  : 1970  MRN: [id number]    Date of Service: 2020    Discharge Recommendations:  Patient would benefit from continued therapy after discharge   Ed on OT services, transfer safety/bed mob, ADLs, orientation review, one handed compensatory strategies- good return       Assessment   Performance deficits / Impairments: Decreased functional mobility ; Decreased ADL status; Decreased ROM; Decreased strength;Decreased endurance;Decreased balance;Decreased high-level IADLs;Decreased fine motor control;Decreased coordination;Decreased posture  Prognosis: Good  REQUIRES OT FOLLOW UP: Yes  Activity Tolerance  Activity Tolerance: Patient Tolerated treatment well  Activity Tolerance: Limited by decreased LUE/LLE  deficits  Safety Devices  Safety Devices in place: Yes  Type of devices: All fall risk precautions in place; Bed alarm in place;Call light within reach; Left in bed;Nurse notified(PTA remained in room to provide care)         Patient Diagnosis(es): The encounter diagnosis was [facility]ac[facility] ([facility] has a past medical history of Asthma, Bipolar 1 disorder ([facility] Delta storage pool disease [facility]), Hypertension, and Psychiatric problem. has a past surgical history that includes  section (0611,9537); Gastric bypass surgery (); Tonsillectomy and adenoidectomy (); Cholecystectomy (); knee surgery (); Hysterectomy (); laparoscopy (); Colonoscopy (N/A, 2/3/2020); and Upper gastrointestinal endoscopy (N/A, 2/3/2020).     Restrictions  Restrictions/Precautions  Restrictions/Precautions: Seizure, General Precautions, Fall Risk, Up as Tolerated  Required Braces or Orthoses?: No  Position Activity Restriction  Other position/activity restrictions:  L hypertonicity  Subjective   General  Patient assessed for rehabilitation services?: Yes  Family / Caregiver Present: No    Vital Signs  Patient Currently in Pain: No   Orientation  Orientation  Overall Orientation Status: Within Functional Limits  Objective    Pt in bed upon arrival and had wet brief. Pt completed brief mgt supine in bed and new pure wick placed. Pt able to bend R knee to push while using R UE to pull HOB bed rail to reposition to the [address] [facility] for self. Increased time given to sequence and practice- good return  Pt completed simple grooming seated in bed w/ HOB elevated. PTA arrived to assist w/ bed mob. Pt tolerated sitting on EOB for ~ 10-15 min to weight shift. Pt demo improved posture on this date compared to the last 2 sessions. Pt still not able to use L UE/LE functionally on this date. ADL  Grooming: Setup; Increased time to complete;Stand by assistance;Minimal assistance (oral care, washed hair w/ non rinse hair cap, combed hair, washed face)   UE Dressing: Setup;Minimal assistance; Increased time to complete  LE Dressing: Setup;Maximum assistance  Toileting: Setup;Maximum assistance; Increased time to complete (brief mgt completed supine in bed)   Balance  Sitting Balance: Contact guard assistance / min A seated on EOB  Bed mobility  Rolling to Left: Stand by assistance  Rolling to Right: Maximum assistance  Supine to Sit: Moderate assistance  Sit to Supine:  Moderate assistance  Scooting: Maximal assistance  Comment: HOB elevated and pt used bedrails to assist in bed mob     Attendance  Participation: Active participation      Plan   Plan  Times per week: 4-5x/week  Specific instructions for Next Treatment: Introduce adaptive strategies and techniques for ADLs with LUE deficits, continue to address L side neglect  Current Treatment Recommendations: Strengthening, Endurance Training, Patient/Caregiver Education & Training, ROM, Equipment Evaluation, Education, & procurement, Self-Care / ADL, Balance Training, Functional Mobility Training, Safety Education & Training, Home Management Training, Cognitive/Perceptual Training, Positioning    Goals  Short term goals  Time Frame for Short term goals: By discharge, pt will:  Short term goal 1: Demonstrate bed mobility with Mod A and use of bedrails PRN  Short term goal 2: Demostrate sitting balance for +10 minutes with Min A  Short term goal 3: Complete simple ADL sitting EOB with Min A, setup provided, use of DME and adpative strategis/techniques PRN, <3 cues  Short term goal 4: Attend to L side with <2 VCs during ADL/functional tasks  Short term goal 5: participate in 5+ min of visual motor/scanning tasks to increase independence with functional tasks       Therapy Time   Individual Concurrent Group Co-treatment   Time In 0940      PTA   Time Out 1035         Minutes 55              time code min: 40 min   Uziel SANTIAGO

## 2020-12-07 NOTE — PROGRESS NOTES
Physical Medicine & Rehabilitation  Progress Note    12/7/2020 1:11 PM     CC: Ambulatory and ADL dysfunction due to large right parietal lobe intracranial hemorrhage    59-year-old female with history of delta storage pool disease, bipolar disorder, alcohol abuse who developed acute left-sided weakness-found to have intraparenchymal hemorrhage, right parietal lobe hemorrhage and moderate subarachnoid hemorrhage. She was hypertensive. .  Per hematology oncology transfuse platelets as needed and administer desmopressin as needed. Subjective:   No complaints. Working with PT/OT  -motivated. ROS:  Denies fevers, chills, sweats. No chest pain, palpitations, lightheadedness. Denies coughing, wheezing or shortness of breath. Denies abdominal pain, nausea, diarrhea or constipation. No new areas of joint pain. Denies new areas of numbness or weakness. Denies new anxiety or depression issues. No new skin problems. Rehabilitation:   PT:  Restrictions/Precautions: Seizure, General Precautions, Fall Risk, Up as Tolerated  Other position/activity restrictions: p with assistance; L hypertonicity   Transfers  Sit to Stand: (limited by headache.)  Stand to sit: Maximum Assistance, 2 Person Assistance(lg stedy)  Bed to Chair: Dependent/Total, 2 Person Assistance(lg stedy)  Stand Pivot Transfers: Dependent/Total, 2 Person Assistance(lg stedy)  Comment: Unsafe to attempt transfers today d/t poor sitting tolerance       OT:  ADL  Feeding: Minimal assistance, Setup, Increased time to complete, Scoop assist, Beverage management(assist to open containers, cut food, scoop some foods)  Grooming: Stand by assistance, Minimal assistance, Setup, Increased time to complete(SBA-wash face, Min-brush teeth)  UE Bathing: Moderate assistance, Setup, Verbal cueing, Increased time to complete(w/back, sides and underarms)  LE Bathing:  Moderate assistance, Setup, Increased time to complete(w/lower legs and feet)  UE Dressing: Minimal assistance, Setup, Increased time to complete(w/gown)  LE Dressing: Maximum assistance, Setup, Increased time to complete(w/footies)  Toileting: Maximum assistance, Setup, Increased time to complete(wearing brief, purewick, bed rico w/assist w/elizabeth care )  Additional Comments: Pt able to reach for chapstick on the L side, noted to have visual motor deficits; unidirectional nystagmus observed to the L         Balance  Sitting Balance: Unable to assess(comment)(sitting in bed, HOB elevated)  Standing Balance: Unable to assess(comment)(pt requires max +2 to attempt standing )   Standing Balance  Time: stood 2 x for less less than 1 min  Activity: lg steady used  Comment: pt has a verry STRONG lean to the L  Functional Mobility  Functional Mobility Comments: UAT  Apparatus Needs  Apparatus Needs: O2  Bed mobility  Rolling to Left: Modified independent  Rolling to Right: Maximum assistance  Supine to Sit: Moderate assistance  Sit to Supine: Moderate assistance  Scooting: Maximal assistance  Comment: HOB elevated, use of bed rail. Transfers  Sit to stand: Dependent/Total, 2 Person assistance  Stand to sit: Dependent/Total, 2 Person assistance  Transfer Comments: Pt not appropriate to attempt stand this date d/t being unable to tolerate sitting balance and reporting significant low back pain. ST:            Objective:  BP (!) 133/57   Pulse 78   Temp 98.1 °F (36.7 °C) (Oral)   Resp 25   Ht 5' 5\" (1.651 m)   Wt 235 lb (106.6 kg)   SpO2 98%   BMI 39.11 kg/m²  I Body mass index is 39.11 kg/m². I   Wt Readings from Last 1 Encounters:   20 235 lb (106.6 kg)      Temp (24hrs), Av.3 °F (36.8 °C), Min:97.8 °F (36.6 °C), Max:98.7 °F (37.1 °C)         GEN: well developed, well nourished,   HEENT: Normocephalic atraumatic, EOMI, mucous membranes pink and moist  CV: RRR, no murmurs, rubs or gallops  PULM: CTAB, no rales or rhonchi.  Respirations WNL and unlabored  ABD: soft, NT, ND, +BS and equal  NEURO: A&O x3. Sensation decreased LUE/LLE  MSK: Dense left hemiparesis, mild tone  EXTREMITIES: No calf tenderness to palpation bilaterally. No edema BLEs  SKIN: warm dry and intact with good turgor  PSYCH: appropriately interactive. Affect WNL.          Medications   Scheduled Meds:   polyethylene glycol  17 g Oral Daily    labetalol  200 mg Oral 3 times per day    methylPREDNISolone  4 mg Oral QAM AC    methylPREDNISolone  4 mg Oral Lunch    methylPREDNISolone  4 mg Oral Dinner    methylPREDNISolone  4 mg Oral Nightly    melatonin  3 mg Oral Nightly    lisinopril  40 mg Oral Daily    baclofen  5 mg Oral TID    potassium bicarb-citric acid  40 mEq Oral Daily    amLODIPine  10 mg Oral Daily    heparin (porcine)  5,000 Units Subcutaneous 3 times per day    thiamine  100 mg Oral Daily    cyanocobalamin  1,000 mcg Intramuscular Daily    Followed by   Estephania Diaz ON 12/9/2020] cyanocobalamin  1,000 mcg Intramuscular Weekly    Followed by   Estephania Diaz ON 2/3/2021] cyanocobalamin  1,000 mcg Intramuscular Q30 Days    sennosides-docusate sodium  2 tablet Oral Daily    sodium chloride flush  10 mL Intravenous 2 times per day    sodium chloride  20 mL Intravenous Once    folic acid  1 mg Oral Daily    buPROPion  300 mg Oral Daily    lamoTRIgine  400 mg Oral Daily    sertraline  50 mg Oral Daily    rOPINIRole  1 mg Oral Nightly     Continuous Infusions:    PRN Meds:.oxyCODONE, acetaminophen, menthol-methyl salicylate, sodium chloride flush, perflutren lipid microspheres, ondansetron, ipratropium-albuterol, sodium chloride flush, magnesium hydroxide     Diagnostics:     CBC:   Recent Labs     12/05/20 0429 12/06/20  0431 12/07/20  0530   WBC 10.3 9.8 9.2   RBC 4.29 4.17 4.23   HGB 12.7 12.0 12.2   HCT 40.5 38.9 39.9   MCV 94.4 93.3 94.3   RDW 13.4 13.5 13.5    353 396     BMP:   Recent Labs     12/05/20 0429 12/06/20  0431 12/07/20  0530    136 136   K 4.0 3.8 4.4   CL 98 101 103   CO2 24 22 20   BUN 7 13 12   CREATININE 0.51 0.60 0.50     BNP: No results for input(s): BNP in the last 72 hours. PT/INR: No results for input(s): PROTIME, INR in the last 72 hours. APTT: No results for input(s): APTT in the last 72 hours. CARDIAC ENZYMES: No results for input(s): CKMB, CKMBINDEX, TROPONINT in the last 72 hours. Invalid input(s): CKTOTAL;3  FASTING LIPID PANEL:  Lab Results   Component Value Date    CHOL 247 (H) 11/30/2020     11/30/2020    TRIG 77 11/30/2020     LIVER PROFILE: No results for input(s): AST, ALT, ALB, BILIDIR, BILITOT, ALKPHOS in the last 72 hours. I/O (24Hr): Intake/Output Summary (Last 24 hours) at 12/7/2020 1311  Last data filed at 12/7/2020 1201  Gross per 24 hour   Intake --   Output 1750 ml   Net -1750 ml       Glu last 24 hour  No results for input(s): POCGLU in the last 72 hours. No results for input(s): CLARITYU, COLORU, PHUR, SPECGRAV, PROTEINU, RBCUA, BLOODU, BACTERIA, NITRU, WBCUA, LEUKOCYTESUR, YEAST, GLUCOSEU, BILIRUBINUR in the last 72 hours. Lives With: Spouse(2 adult children, neither lives at home)  Type of Home: House  Home Layout: Multi-level, Bed/Bath upstairs(3 levels. Half bath on main level.)  Home Access: Stairs to enter without rails  Entrance Stairs - Number of Steps: 4  Bathroom Shower/Tub: Tub/Shower unit  Bathroom Toilet: Standard  Bathroom Equipment: (No equiptment)  Home Equipment: (No AD)  ADL Assistance: Independent  Homemaking Assistance: Independent  Homemaking Responsibilities: Yes  Ambulation Assistance: Independent  Transfer Assistance: Independent  Active : Yes  Mode of Transportation: SUV  Occupation: Part time employment  Type of occupation:   Leisure & Hobbies: relax  Additional Comments:  is a teacher and works full time.     Impression/Plan:    Impression: Ms. Ephraim Baldwin is a 48 y.o. male with a history of <principal problem not specified>     1. Large right parietal lobe intracranial hemorrhage with moderate subarachnoid hemorrhage and intraventricular hemorrhage in the right occipital lobe status post TPA mild spasticity - baclofen  2. Bipolar disorder-Wellbutrin, Zoloft,   3. Alcohol abuse-being monitored for withdrawal, still receiving IV ativan and Librum- plan to wean begin 12/5, will need to monitor as cont wean B62, folic acid  4. 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 Hypertension-Norvasc, lisinopril, given labetol iv x 4 last night per Neuro note  6. Restless leg-Requip  7. HA- ? Medrol Dosepak  8. Pain-Roxicodone IcyHot      Recommendations  1. Diagnosis-hemorrhagic CVA  2. Therapy:  Lower level though well motivated, has PT and OT needs  3. Medical  Necessity: As above  4. Support: Clarify,  works  11. Rehab recommendation: Would benefit from acute inpatient rehabilitation when medically ready   6. DVT proph:  Subcu heparin     It was my pleasure to evaluate Armida Donaldson today. Please call with questions. Tin Ortiz. Luiz Steinberg MD       This note is created with the assistance of a speech recognition program.  While intending to generate a document that actually reflects the content of the visit, the document can still have some errors including those of syntax and sound a like substitutions which may escape proof reading.   In such instances, actual meaning can be extrapolated by contextual diversion

## 2020-12-08 PROCEDURE — 99232 SBSQ HOSP IP/OBS MODERATE 35: CPT | Performed by: PHYSICAL MEDICINE & REHABILITATION

## 2020-12-08 PROCEDURE — 99232 SBSQ HOSP IP/OBS MODERATE 35: CPT | Performed by: PSYCHIATRY & NEUROLOGY

## 2020-12-08 PROCEDURE — 6370000000 HC RX 637 (ALT 250 FOR IP): Performed by: STUDENT IN AN ORGANIZED HEALTH CARE EDUCATION/TRAINING PROGRAM

## 2020-12-08 PROCEDURE — 97530 THERAPEUTIC ACTIVITIES: CPT

## 2020-12-08 PROCEDURE — 6360000002 HC RX W HCPCS: Performed by: PSYCHIATRY & NEUROLOGY

## 2020-12-08 PROCEDURE — 6370000000 HC RX 637 (ALT 250 FOR IP): Performed by: NURSE PRACTITIONER

## 2020-12-08 PROCEDURE — 97110 THERAPEUTIC EXERCISES: CPT

## 2020-12-08 PROCEDURE — 6360000002 HC RX W HCPCS: Performed by: NURSE PRACTITIONER

## 2020-12-08 PROCEDURE — 2580000003 HC RX 258: Performed by: NURSE PRACTITIONER

## 2020-12-08 PROCEDURE — 2060000000 HC ICU INTERMEDIATE R&B

## 2020-12-08 PROCEDURE — 6370000000 HC RX 637 (ALT 250 FOR IP): Performed by: PSYCHIATRY & NEUROLOGY

## 2020-12-08 PROCEDURE — 97535 SELF CARE MNGMENT TRAINING: CPT

## 2020-12-08 RX ADMIN — ENOXAPARIN SODIUM 30 MG: 30 INJECTION SUBCUTANEOUS at 13:51

## 2020-12-08 RX ADMIN — SERTRALINE 50 MG: 50 TABLET, FILM COATED ORAL at 10:05

## 2020-12-08 RX ADMIN — ACETAMINOPHEN 650 MG: 325 TABLET ORAL at 18:28

## 2020-12-08 RX ADMIN — LABETALOL HCL 200 MG: 200 TABLET, FILM COATED ORAL at 20:30

## 2020-12-08 RX ADMIN — METHYLPREDNISOLONE 4 MG: 4 TABLET ORAL at 10:05

## 2020-12-08 RX ADMIN — METHYLPREDNISOLONE 4 MG: 4 TABLET ORAL at 20:30

## 2020-12-08 RX ADMIN — BUPROPION HYDROCHLORIDE 300 MG: 150 TABLET, EXTENDED RELEASE ORAL at 10:07

## 2020-12-08 RX ADMIN — GABAPENTIN 300 MG: 300 CAPSULE ORAL at 20:31

## 2020-12-08 RX ADMIN — BACLOFEN 5 MG: 10 TABLET ORAL at 10:07

## 2020-12-08 RX ADMIN — ACETAMINOPHEN 650 MG: 325 TABLET ORAL at 10:03

## 2020-12-08 RX ADMIN — BACLOFEN 5 MG: 10 TABLET ORAL at 20:31

## 2020-12-08 RX ADMIN — POTASSIUM BICARBONATE 40 MEQ: 782 TABLET, EFFERVESCENT ORAL at 10:04

## 2020-12-08 RX ADMIN — METHYLPREDNISOLONE 4 MG: 4 TABLET ORAL at 12:20

## 2020-12-08 RX ADMIN — OXYCODONE HYDROCHLORIDE 5 MG: 5 TABLET ORAL at 12:20

## 2020-12-08 RX ADMIN — BACLOFEN 5 MG: 10 TABLET ORAL at 13:51

## 2020-12-08 RX ADMIN — DOCUSATE SODIUM 50MG AND SENNOSIDES 8.6MG 2 TABLET: 8.6; 5 TABLET, FILM COATED ORAL at 10:05

## 2020-12-08 RX ADMIN — ENOXAPARIN SODIUM 30 MG: 30 INJECTION SUBCUTANEOUS at 20:31

## 2020-12-08 RX ADMIN — LAMOTRIGINE 400 MG: 100 TABLET ORAL at 10:05

## 2020-12-08 RX ADMIN — AMLODIPINE BESYLATE 10 MG: 10 TABLET ORAL at 10:07

## 2020-12-08 RX ADMIN — Medication 3 MG: at 20:30

## 2020-12-08 RX ADMIN — FOLIC ACID 1 MG: 1 TABLET ORAL at 10:07

## 2020-12-08 RX ADMIN — CYANOCOBALAMIN 1000 MCG: 1000 INJECTION, SOLUTION INTRAMUSCULAR at 10:04

## 2020-12-08 RX ADMIN — HEPARIN SODIUM 5000 UNITS: 5000 INJECTION INTRAVENOUS; SUBCUTANEOUS at 10:06

## 2020-12-08 RX ADMIN — Medication 100 MG: at 10:05

## 2020-12-08 RX ADMIN — LABETALOL HCL 200 MG: 200 TABLET, FILM COATED ORAL at 10:03

## 2020-12-08 RX ADMIN — SODIUM CHLORIDE, PRESERVATIVE FREE 10 ML: 5 INJECTION INTRAVENOUS at 20:42

## 2020-12-08 RX ADMIN — POLYETHYLENE GLYCOL 3350 17 G: 17 POWDER, FOR SOLUTION ORAL at 10:04

## 2020-12-08 RX ADMIN — LISINOPRIL 40 MG: 20 TABLET ORAL at 10:03

## 2020-12-08 RX ADMIN — SODIUM CHLORIDE, PRESERVATIVE FREE 10 ML: 5 INJECTION INTRAVENOUS at 10:05

## 2020-12-08 ASSESSMENT — PAIN DESCRIPTION - LOCATION
LOCATION: HEAD;BACK
LOCATION: HEAD

## 2020-12-08 ASSESSMENT — PAIN SCALES - GENERAL
PAINLEVEL_OUTOF10: 2
PAINLEVEL_OUTOF10: 2
PAINLEVEL_OUTOF10: 5
PAINLEVEL_OUTOF10: 7

## 2020-12-08 ASSESSMENT — PAIN DESCRIPTION - PAIN TYPE
TYPE: ACUTE PAIN
TYPE: ACUTE PAIN

## 2020-12-08 ASSESSMENT — PAIN DESCRIPTION - DESCRIPTORS
DESCRIPTORS: ACHING
DESCRIPTORS: ACHING;CRAMPING

## 2020-12-08 NOTE — PROGRESS NOTES
Occupational Therapy  Facility/Department: [facility] NEURO  Daily Treatment Note  NAME: Angelica Machado  : 1970  MRN: [id number]    Date of Service: 2020    Discharge Recommendations:  Patient would benefit from continued therapy after discharge   Ed on OT services, bed mob, transfers safety/technique w/ lg lopez, ADLs, orientation review- good return       Assessment   Performance deficits / Impairments: Decreased functional mobility ; Decreased ADL status; Decreased ROM; Decreased strength;Decreased endurance;Decreased balance;Decreased high-level IADLs;Decreased fine motor control;Decreased coordination;Decreased posture  Prognosis: Good  REQUIRES OT FOLLOW UP: Yes  Activity Tolerance  Activity Tolerance: Patient Tolerated treatment well  Activity Tolerance: Limited by decreased LUE/LLE  deficits  Safety Devices  Safety Devices in place: Yes  Type of devices: All fall risk precautions in place; Bed alarm in place;Call light within reach; Left in bed;Nurse notified         Patient Diagnosis(es): The encounter diagnosis was Intracranial hemorrhage ([facility] has a past medical history of Asthma, Bipolar 1 disorder ([facility] Delta storage pool disease [facility]), Hypertension, and Psychiatric problem. has a past surgical history that includes  section (7251,1897); Gastric bypass surgery (); Tonsillectomy and adenoidectomy (); Cholecystectomy (); knee surgery (); Hysterectomy (); laparoscopy (); Colonoscopy (N/A, 2/3/2020); and Upper gastrointestinal endoscopy (N/A, 2/3/2020).     Restrictions  Restrictions/Precautions  Restrictions/Precautions: Seizure, General Precautions, Fall Risk, Up as Tolerated  Required Braces or Orthoses?: No  Position Activity Restriction  Other position/activity restrictions:  L hypertonicity  Subjective   General  Patient assessed for rehabilitation services?: Yes  Family / Caregiver Present: No    Vital Signs  Patient Currently in Pain: No Orientation  Orientation  Overall Orientation Status: Within Functional Limits  Objective    Pt in bed upon arrival awake and alert. PTA in room providing care and co-tx w/ bed mob/standing. New brief and pure wick placed d/t pt being wet. Pt retired to bed after [employer] standing while using the [address] stedy. Pt does have the ability to reach and use bed rails to boost self to the [facility] independently using RUE/RLE. Pt engaged in simple grooming and self feeding seated in bed w/ HOB elevated. ADL  Feeding: Setup;Minimal assistance- HOB elevated   Grooming: Setup; Increased time to complete;Minimal assistance - HOB elevated  LE Dressing: Setup;Maximum assistance  Toileting: Setup; Increased time to complete;Maximum assistance(brief mgt completed supine in bed, new pure wick placed)  Additional Comments: pt sat on EOB for ~ 10 min and pt tolerated standing in the LAI steady 3 x to weight shift    Balance  Sitting Balance: Contact guard assistance(seated on EOB)  Standing Balance: Minimal assistance/ mod A  + 2 person assistance, lg stedy used. Pt tolerated standing for ~ 8-10 min, took seated rest breaks. Pt required assistance w/L UE being set up on front bar of lg stedy. Sit<>stand: min/mod A +2 person assistance    Bed mobility  Rolling to Left: Stand by assistance  Rolling to Right: Maximum assistance  Supine to Sit: Minimal assistance;2 Person assistance  Sit to Supine: Moderate assistance  Scooting:  Moderate assistance;Maximal assistance  Comment: HOB elevated and pt used bedrails to assist in bed mob     Attendance  Participation: Active participation      Plan   Plan  Times per week: 4-5x/week  Specific instructions for Next Treatment: Introduce adaptive strategies and techniques for ADLs with LUE deficits, continue to address L side neglect  Current Treatment Recommendations: Strengthening, Endurance Training, Patient/Caregiver Education & Training, ROM, Equipment Evaluation, Education, & procurement, Self-Care / ADL, Balance Training, Functional Mobility Training, Safety Education & Training, Home Management Training, Cognitive/Perceptual Training, Positioning       Goals  Short term goals  Time Frame for Short term goals: By discharge, pt will:  Short term goal 1: Demonstrate bed mobility with Mod A and use of bedrails PRN  Short term goal 2: Demostrate sitting balance for +10 minutes with Min A  Short term goal 3: Complete simple ADL sitting EOB with Min A, setup provided, use of DME and adpative strategis/techniques PRN, <3 cues  Short term goal 4: Attend to L side with <2 VCs during ADL/functional tasks  Short term goal 5: participate in 5+ min of visual motor/scanning tasks to increase independence with functional tasks       Therapy Time   Individual Concurrent Group Co-treatment   Time In 0900      PTA   Time Out 1000         Minutes 60             time code min: 38 min d/t co-tx w/PTA    [address], GRANADOS/L

## 2020-12-08 NOTE — PROGRESS NOTES
Comprehensive Nutrition Assessment    Type and Reason for Visit:  Reassess    Nutrition Recommendations/Plan: Continue Ensure High Protein ONS TID. Encourage PO intake as tolerated. Obtain current weight. Nutrition Assessment:  Pt drinking ONS well, but still not eating much of meals (observed intake of less than 25% of breakfast this morning). Malnutrition Assessment:  Malnutrition Status:   At risk for malnutrition (Comment)    Context:  Acute Illness     Findings of the 6 clinical characteristics of malnutrition:  Energy Intake:  1 - 75% or less of estimated energy requirements for 7 or more days  Weight Loss:  Unable to assess     Body Fat Loss:  No significant body fat loss     Muscle Mass Loss:  No significant muscle mass loss    Fluid Accumulation:  No significant fluid accumulation     Strength:  Not Performed    Estimated Daily Nutrient Needs:  Energy (kcal):  9231-7501 kcals/day; Weight Used for Energy Requirements:  Admission     Protein (g):  1.3-1.5 gm/kg = 75-85 gm/day; Weight Used for Protein Requirements:  Ideal          Nutrition Related Findings:  Meds/labs reviewed      Wounds:  None       Current Nutrition Therapies:    DIET GENERAL;  Dietary Nutrition Supplements: Low Calorie High Protein Supplement    Anthropometric Measures:  · Height: 5' 5\" (165.1 cm)  · Current/Admission Body Weight: 235 lb (106.6 kg)('estimated')   · Usual Body Weight: (232-236 lbs)     · Ideal Body Weight: 125 lbs; % Ideal Body Weight 188 %   · BMI: 39.1  · BMI Categories: Obese Class 2 (BMI 35.0 -39.9)       Nutrition Diagnosis:   · Inadequate oral intake related to pain(appetite, nausea, self feeding difficulty) as evidenced by (current decreased PO intake)      Nutrition Interventions:   Food and/or Nutrient Delivery:  Continue Current Diet, Continue Oral Nutrition Supplement  Nutrition Education/Counseling:  No recommendation at this time   Coordination of Nutrition Care:  Continue to monitor while inpatient, Feeding Assistance/Environment Change    Goals:  PO intake to meet greater than 50% of estimated nutrient needs       Nutrition Monitoring and Evaluation:   Behavioral-Environmental Outcomes:  None Identified   Food/Nutrient Intake Outcomes:  Food and Nutrient Intake, Supplement Intake  Physical Signs/Symptoms Outcomes:  Weight, Biochemical Data, Nutrition Focused Physical Findings, Nausea or Vomiting     Discharge Planning:     Too soon to determine     Electronically signed by Naina Cao MS, RD, LD on 12/8/20 at 11:42 AM EST    Contact: [phone]

## 2020-12-08 NOTE — CARE COORDINATION
Contacted [facility] to inquire about pre cert, they have not yet received, [facility] will contact me once approved.

## 2020-12-08 NOTE — PROGRESS NOTES
Increased time to complete  LE Dressing: Setup, Maximum assistance  Toileting: Setup, Increased time to complete, Maximum assistance(brief mgt completed supine in bed, new pure wick placed)  Additional Comments: pt sat on EOB for ~ 10 min, pt tolerated standing in the LATryolabsI steady 3 x to weight shift          Balance  Sitting Balance: Contact guard assistance(seated on EOB)  Standing Balance: Minimal assistance(/ mod A  + 2 person assistance, lg lopez used)   Standing Balance  Time: stood 2 x for less less than 1 min  Activity: lg steady used  Comment: pt has a verry STRONG lean to the L  Functional Mobility  Functional Mobility Comments: UAT  Apparatus Needs  Apparatus Needs: O2  Bed mobility  Rolling to Left: Stand by assistance  Rolling to Right: Moderate assistance  Supine to Sit: Minimal assistance(MIN trunk support)  Sit to Supine: Minimal assistance(B LE support)  Scooting: Moderate assistance, Maximal assistance  Comment: HOB elevated and pt used bedrails to assist in bed mob  Transfers  Sit to stand: Dependent/Total, 2 Person assistance  Stand to sit: Dependent/Total, 2 Person assistance  Transfer Comments: Pt not appropriate to attempt stand this date d/t being unable to tolerate sitting balance and reporting significant low back pain. ST:            Objective:  BP (!) 112/50   Pulse 77   Temp 97.7 °F (36.5 °C) (Oral)   Resp 18   Ht 5' 5\" (1.651 m)   Wt 233 lb 7.5 oz (105.9 kg)   SpO2 98%   BMI 38.85 kg/m²  I Body mass index is 38.85 kg/m². I   Wt Readings from Last 1 Encounters:   20 233 lb 7.5 oz (105.9 kg)      Temp (24hrs), Av.8 °F (36.6 °C), Min:97.2 °F (36.2 °C), Max:98.1 °F (36.7 °C)         GEN: well developed, well nourished,   HEENT: Normocephalic atraumatic, EOMI, mucous membranes pink and moist  CV: RRR, no murmurs, rubs or gallops  PULM: CTAB, no rales or rhonchi. Respirations WNL and unlabored  ABD: soft, NT, ND, +BS and equal  NEURO: A&O x3.  Sensation decreased LUE/LLE  MSK: Dense left hemiparesis, mild tone  EXTREMITIES: No calf tenderness to palpation bilaterally. No edema BLEs  SKIN: warm dry and intact with good turgor  PSYCH: appropriately interactive. Affect WNL. Medications   Scheduled Meds:   enoxaparin  30 mg Subcutaneous BID    gabapentin  300 mg Oral Nightly    polyethylene glycol  17 g Oral Daily    labetalol  200 mg Oral 3 times per day    methylPREDNISolone  4 mg Oral QAM AC    methylPREDNISolone  4 mg Oral Dinner    methylPREDNISolone  4 mg Oral Nightly    melatonin  3 mg Oral Nightly    lisinopril  40 mg Oral Daily    baclofen  5 mg Oral TID    potassium bicarb-citric acid  40 mEq Oral Daily    amLODIPine  10 mg Oral Daily    thiamine  100 mg Oral Daily    [START ON 12/9/2020] cyanocobalamin  1,000 mcg Intramuscular Weekly    Followed by   Glenroy Fossa ON 2/3/2021] cyanocobalamin  1,000 mcg Intramuscular Q30 Days    sennosides-docusate sodium  2 tablet Oral Daily    sodium chloride flush  10 mL Intravenous 2 times per day    sodium chloride  20 mL Intravenous Once    folic acid  1 mg Oral Daily    buPROPion  300 mg Oral Daily    lamoTRIgine  400 mg Oral Daily    sertraline  50 mg Oral Daily    rOPINIRole  1 mg Oral Nightly     Continuous Infusions:    PRN Meds:.oxyCODONE, acetaminophen, menthol-methyl salicylate, sodium chloride flush, perflutren lipid microspheres, ondansetron, ipratropium-albuterol, sodium chloride flush, magnesium hydroxide     Diagnostics:     CBC:   Recent Labs     12/06/20  0431 12/07/20  0530   WBC 9.8 9.2   RBC 4.17 4.23   HGB 12.0 12.2   HCT 38.9 39.9   MCV 93.3 94.3   RDW 13.5 13.5    396     BMP:   Recent Labs     12/06/20  0431 12/07/20  0530    136   K 3.8 4.4    103   CO2 22 20   BUN 13 12   CREATININE 0.60 0.50     BNP: No results for input(s): BNP in the last 72 hours. PT/INR: No results for input(s): PROTIME, INR in the last 72 hours. APTT: No results for input(s):  APTT in the last 72 hours. CARDIAC ENZYMES: No results for input(s): CKMB, CKMBINDEX, TROPONINT in the last 72 hours. Invalid input(s): CKTOTAL;3  FASTING LIPID PANEL:  Lab Results   Component Value Date    CHOL 247 (H) 11/30/2020     11/30/2020    TRIG 77 11/30/2020     LIVER PROFILE: No results for input(s): AST, ALT, ALB, BILIDIR, BILITOT, ALKPHOS in the last 72 hours. I/O (24Hr): Intake/Output Summary (Last 24 hours) at 12/8/2020 1554  Last data filed at 12/8/2020 0930  Gross per 24 hour   Intake 1600 ml   Output --   Net 1600 ml       Glu last 24 hour  No results for input(s): POCGLU in the last 72 hours. No results for input(s): CLARITYU, COLORU, PHUR, SPECGRAV, PROTEINU, RBCUA, BLOODU, BACTERIA, NITRU, WBCUA, LEUKOCYTESUR, YEAST, GLUCOSEU, BILIRUBINUR in the last 72 hours. Lives With: Spouse(2 adult children, neither lives at home)  Type of Home: House  Home Layout: Multi-level, Bed/Bath upstairs(3 levels. Half bath on main level.)  Home Access: Stairs to enter without rails  Entrance Stairs - Number of Steps: 4  Bathroom Shower/Tub: Tub/Shower unit  Bathroom Toilet: Standard  Bathroom Equipment: (No equiptment)  Home Equipment: (No AD)  ADL Assistance: Independent  Homemaking Assistance: Independent  Homemaking Responsibilities: Yes  Ambulation Assistance: Independent  Transfer Assistance: Independent  Active : Yes  Mode of Transportation: [facility]  Occupation: Part time employment  Type of occupation:   Leisure & Hobbies: relax  Additional Comments:  is a teacher and works full time. Impression/Plan:    Impression: Ms. Heidi German is a 48 y.o. male with a history of <principal problem not specified>     1. Large right parietal lobe intracranial hemorrhage with moderate subarachnoid hemorrhage and intraventricular hemorrhage in the right occipital lobe status post TPA mild spasticity - baclofen  2. Bipolar disorder-Wellbutrin, Zoloft,   3.  Alcohol abuse-being monitored for withdrawal, still receiving IV ativan and Librum- plan to wean begin 12/5, will need to monitor as cont wean M99, folic acid  4. 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 Hypertension-Norvasc, lisinopril, given labetol iv x 4 last night per Neuro note  6. Restless leg-Requip  7. HA- ? Medrol Dosepak-complete course  8. Pain-Roxicodone IcyHot, gabapentin      Recommendations  1. Diagnosis-hemorrhagic CVA  2. Therapy:  Lower level though well motivated, has PT and OT needs  3. Medical  Necessity: As above  4. Support: Clarify,  works  11. Rehab recommendation: Would benefit from acute inpatient rehabilitation when medically ready   6. DVT proph:  Lovenox     It was my pleasure to evaluate Laci Donaldson today. Please call with questions. Bruna Betancourt. Rina Subramanian MD       This note is created with the assistance of a speech recognition program.  While intending to generate a document that actually reflects the content of the visit, the document can still have some errors including those of syntax and sound a like substitutions which may escape proof reading.   In such instances, actual meaning can be extrapolated by contextual diversion

## 2020-12-08 NOTE — PLAN OF CARE
Problem: Pain:  Goal: Pain level will decrease  Description: Pain level will decrease  Outcome: Ongoing  Goal: Control of acute pain  Description: Control of acute pain  Outcome: Ongoing  Goal: Control of chronic pain  Description: Control of chronic pain  Outcome: Ongoing     Problem: Skin Integrity:  Goal: Will show no infection signs and symptoms  Description: Will show no infection signs and symptoms  Outcome: Ongoing  Goal: Absence of new skin breakdown  Description: Absence of new skin breakdown  Outcome: Ongoing     Problem: Falls - Risk of:  Goal: Will remain free from falls  Description: Will remain free from falls  Outcome: Ongoing  Goal: Absence of physical injury  Description: Absence of physical injury  Outcome: Ongoing     Problem: HEMODYNAMIC STATUS  Goal: Patient has stable vital signs and fluid balance  Outcome: Ongoing     Problem: ACTIVITY INTOLERANCE/IMPAIRED MOBILITY  Goal: Mobility/activity is maintained at optimum level for patient  Outcome: Ongoing     Problem: Nutrition  Goal: Optimal nutrition therapy  Description: Nutrition Problem #1: Inadequate oral intake  Intervention: Food and/or Nutrient Delivery: Continue Current Diet, Modify Oral Nutrition Supplement  Nutritional Goals: PO intake to meet greater than 50% of estimated nutrient needs     Outcome: Ongoing   Questions and concerns addressed as needed. Anticipated d/c today.

## 2020-12-08 NOTE — PROGRESS NOTES
Daily Progress Note  Neuro Critical Care    Patient Name: Ellen Foster  Patient : 1970  Room/Bed: 9684/5249-45  Code Status:  Full code   Allergies: Allergies   Allergen Reactions    Pcn [Penicillins]     Sulfa Antibiotics Other (See Comments)     Inflammation in the eye       CHIEF COMPLAINT:      Headache, Right sided weakness      INTERVAL HISTORY     Initial Presentation (Admitted 20): The patient is a 48 y.o. female with a history of HTN, Delta storage pool disease, bipolar disorder, and alcohol abuse who presented as a transfer from [facility] ED after CT head revealed right parietal ICH. Initially presented to [facility] ED with left sided weakness and headache. LKW around midnight; patient states her left upper extremity \"went limp\" at that time. Patient states she went to sleep and woke up around 0400 this morning and noticed her left lower extremity was also weak. Reports she developed a headache by the time EMS arrived. CT Head at [facility] ED revealed large right parietal lobe ICH with moderate SAH. Noted to be hypertensive at [facility] ED, started on Cardene infusion. Patient had recently run out of her Lisinopril. Loaded with 1g Keppra. Transferred to [facility] for further evaluation and management. On arrival to ED, -190's. Unclear whether patient arrived on Cardene infusion but she was started on Clevidipine infusion in ED. CT Head showed stable right parietal ICH with SAH and new intraventricular hemorrhage in the right occipital horn. CTA Head/Neck unremarkable. Given 25g Mannitol x1 per Stroke team.  Neurosurgery and Hem/Onc consulted while in ED. Given 40mcg DDAVP x1 and transfused 1 pack of platelets. Patient denies head trauma or falls. GCS 14.  Opens eyes to voice, oriented x3. Noted to have dense left upper and lower extremity weaknes. Patient has a history of alcohol abuse.   She went to rehab last year and states she no longer drinks daily but still drinks occasionally, last drank vodka last night. ICH score: 2 (Volume ~41ml, +IVH)     Hospital Course:  11/30: Admitted to the Neuro ICU for close monitoring. Patient extremely restless and fidgity with associated tachycardia. Started on Precedex infusion. Patient reports history of restless legs but states she is not prescribed medication; started on Requip nightly. Concern for possible alcohol withdraw; Ativan 1mg Q4h ordered. Patient complained of headache and nausea. Clinical exam remains stable. Repeat CT head this afternoon stable. MRI Brain w/wo contrast with no underlying mass or   12/1: Mildly hypotensive overnight, improved with 500cc fluid bolus. Repeat CT Head this morning was overall stable except small amount of acute hemorrhage in left occipital horn. Given 1 pack platelets. Clinical exam remains stable. In the afternoon patient complained of severe headache with nausea. Repeat CT head stable. Patient getting more restless this afternoon off Precedex. Started on Librium 5mg 4XD for suspected alcohol withdraw. Precedex infusion restarted. 12/2: Constant headache; PRN Roxicodone. Started Valium 5m q8h PRN. Vit B12, low and replaced. Echo showed normal EF 55%. Norvasc increased to 10mg daily to keep SBP < 160. Continue Precedex and librium to help with restlessness and withdrawal sx.    12/3: Precedex stopped. Valium changed to Baclofen 5mg TID. Braces for left foot and left wrist.  Up to chair with PT. Lisinopril increased due to persistent hypertension. 12/4: Complained of severe headache overnight. Minimal improvement with Fentanyl 25mcg IV x1 and Magnesium. Started on scheduled Roxicodone 5mg Q4h x2 days. CIWA score 11-12 overnight, received Ativan x2. Persistent hypertension requiring 4 doses of PRN Labetalol overnight, started on Labetalol 100mg Q8h.  12/5: Patient did well overnight and did not require any PRN Ativan for withdraw symptoms.  Librium decreased to 5mg Q12h with plans to wean off if tolerated. Continues to complain of severe headache and neck pain despite scheduled Baclofen and Roxicodone; stared on Medrol dose pack. Hypertensive overnight requiring 2 doses of PRN Labetalol; PO Labetalol increased to 200mg Q8h. Transferred to stepdown. 12/6: Patient reports improvement in headache yesterday with addition of steroids. Roxicodone changed to 5mg Q4h PRN, plan to wean down in next few days. No concern for withdraw symptoms; Librium further decreased to 5mg QD.  12/7:No acute events overnight. Blood pressure remains well controlled. Headache being managed with Medrol dose pack and PRN Tylenol. Roxicodone decreased to 5mg Q8h PRN. Add Neurontin 300mg QHS. Continue Baclofen 5mg TID for spasticity and muscle spasms. No further concerns for withdraw symptoms; Librium discontinued, PRN Ativan stopped. Clinical exam remains stable. Patient stable for discharge to inpatient rehab when accepted. Last 24h:    No acute events overnight. Patient was having mild headache overnight received Tylenol for that. Blood patient remained well controlled. Clinical exam remained stable patient. Is stable for discharge to inpatient rehab, waiting for please set. Spoke to  hopefully patient will be accepted today.     CURRENT MEDICATIONS:  SCHEDULED MEDICATIONS:   gabapentin  300 mg Oral Nightly    polyethylene glycol  17 g Oral Daily    labetalol  200 mg Oral 3 times per day    methylPREDNISolone  4 mg Oral QAM AC    methylPREDNISolone  4 mg Oral Lunch    methylPREDNISolone  4 mg Oral Dinner    methylPREDNISolone  4 mg Oral Nightly    melatonin  3 mg Oral Nightly    lisinopril  40 mg Oral Daily    baclofen  5 mg Oral TID    potassium bicarb-citric acid  40 mEq Oral Daily    amLODIPine  10 mg Oral Daily    heparin (porcine)  5,000 Units Subcutaneous 3 times per day    thiamine  100 mg Oral Daily    cyanocobalamin  1,000 mcg Intramuscular Daily    Followed by   Aidee Whitney ON 2020] cyanocobalamin  1,000 mcg Intramuscular Weekly    Followed by   Aidee Whitney ON 2/3/2021] cyanocobalamin  1,000 mcg Intramuscular Q30 Days    sennosides-docusate sodium  2 tablet Oral Daily    sodium chloride flush  10 mL Intravenous 2 times per day    sodium chloride  20 mL Intravenous Once    folic acid  1 mg Oral Daily    buPROPion  300 mg Oral Daily    lamoTRIgine  400 mg Oral Daily    sertraline  50 mg Oral Daily    rOPINIRole  1 mg Oral Nightly     CONTINUOUS INFUSIONS:    PRN MEDICATIONS:   oxyCODONE, acetaminophen, menthol-methyl salicylate, sodium chloride flush, perflutren lipid microspheres, ondansetron, ipratropium-albuterol, sodium chloride flush, magnesium hydroxide    VITALS:  Temperature Range: Temp: 97.2 °F (36.2 °C) Temp  Av.9 °F (36.6 °C)  Min: 97.2 °F (36.2 °C)  Max: 98.1 °F (36.7 °C)  BP Range: Systolic (12SGR), XQQ:138 , Min:101 , IVD:438     Diastolic (85ZXI), JAO:68, Min:48, Max:59    Pulse Range: Pulse  Av.8  Min: 72  Max: 78  Respiration Range: Resp  Av.5  Min: 15  Max: 25  Current Pulse Ox: SpO2: 98 %  24HR Pulse Ox Range: SpO2  Av.5 %  Min: 97 %  Max: 98 %  Patient Vitals for the past 12 hrs:   BP Temp Temp src Pulse Resp SpO2   20 0747 (!) 128/59 97.2 °F (36.2 °C) Oral 74 16 98 %     Estimated body mass index is 39.11 kg/m² as calculated from the following:    Height as of this encounter: 5' 5\" (1.651 m).     Weight as of this encounter: 235 lb (106.6 kg).  []<16 Severe malnutrition  []16-16.99 Moderate malnutrition  []17-18.49 Mild malnutrition  []18.5-24.9 Normal  []25-29.9 Overweight (not obese)  []30-34.9 Obese class 1 (Low Risk)  [x]35-39.9 Obese class 2 (Moderate Risk)  []?40 Obese class 3 (High Risk)    RECENT LABS:   Lab Results   Component Value Date    WBC 9.2 2020    HGB 12.2 2020    HCT 39.9 2020     2020    CHOL 247 (H) 2020    TRIG 77 2020 Delta storage pool disease, bipolar disorder, and alcohol abuse who was admitted to the Neuro ICU for management of a large right parietal ICH with subarachnoid and intraventricular blood. Given DDAVP and 1 pack of platelets in setting of platelet disorder. Initially presented to [facility] ED with left sided weakness and headache. Hypertensive with -190's on arrival to ED. NEUROLOGIC:  - Acute right parietal ICH with intraventricular extension and subarachnoid hemorrhage component  - Etiology likely secondary to coaguloapthy in setting of platelet disorder and hypertension  - No underlying mass or vascular malformation on MRI brain with/without contrast  - F/U Neurosurgery outpatient  - Goal SBP<160  - History of ETOH abuse; Folic Acid& Thiamine replacement  - No further signs/symptoms of withdraw; stop Librium& Ativan   - Headache/Neck pain;  Baclofen 5mg TID, continue Medrol dose pack, start Neurontin 300mg QHS, decrease Roxicodone 5mg Q8h PRN  - Requip 1mg QHS for restless legs  - Neuro checks per protocol     CARDIOVASCULAR:  - Goal SBP<160  - Persistent hypertension  - Continue Norvasc 10mg QD, Lisinopril 40mg QD and Labetalol 200mg Q8h  - Echocardiogram EF 55%  - Lipid panel; LDL 90, Cholesterol 247  - Continue telemetry     PULMONARY:  - Maintaining O2 sats on room air  - History of asthma  - Duonebs PRN     RENAL/FLUID/ELECTROLYTE:  - Normal renal functioning  - BUN 12/ Creatinine 0.50  - Adequate urine output per nursing  - Taking adequate PO fluids, stop IVF  - Replace electrolytes PRN  - Stop daily labs     GI/NUTRITION:  NUTRITION:  - General Diet   - Appetite improving slowly, tolerating supplements  - Last BM 12/6  - Bowel regimen: Senokot-S &Glycolax daily, Milk of Mag PRN  - GI prophylaxis: N/A     ID:  - Afebrile, Tmax 37.1C  - No leukocytosis, WBC 9.2  - UA 11/30 negative  - COVID-19 negative 11/30  - Continue to monitor for fevers  - Daily CBC     HEME:   - History of delta storage pool disease  - Transfused total 2 packs of platelets during admission, received DDAVP on arrival  - Hem/Onc following; appreciate recs  - H&H 12.2/39.9, stable  - Platelets 983  - Daily CBC     ENDOCRINE:  - Continue to monitor blood glucose, goal <180  - Hemoglobin A1C 5.0  - TSH 1.34     OTHER:  - Melatonin 3mg QHS to promote normal sleep/wake cycle  - History of bipolar disorder; Lamictal 400mg BID, Zoloft 50mg QD  - PT/OT/ST; foot drop boot on L, discuss left wrist splint  - PM&R following; appropriate candidate for inpatient rehab  - Discharge planning; [place] when accepted     PROPHYLAXIS:  Stress ulcer: N/A     DVT PROPHYLAXIS:  - SCD sleeves - Thigh High   - Heparin 5000u Q8h     DISPOSITION:  [x] Stepdown status. Medically ready for discharge when accepted. We will continue to follow along as the primary team.         We will continue to follow along. For any changes in exam or patient status please contact Neuro Critical Care.       Jessica Soria MD  Neuro Critical Care  Pager [phone]  12/8/2020     9:44 AM

## 2020-12-09 ENCOUNTER — HOSPITAL ENCOUNTER (INPATIENT)
Age: 50
LOS: 27 days | Discharge: HOME HEALTH | DRG: 057 | End: 2021-01-05
Attending: PHYSICAL MEDICINE & REHABILITATION | Admitting: PHYSICAL MEDICINE & REHABILITATION
Payer: COMMERCIAL

## 2020-12-09 VITALS
RESPIRATION RATE: 17 BRPM | WEIGHT: 233.47 LBS | OXYGEN SATURATION: 98 % | DIASTOLIC BLOOD PRESSURE: 45 MMHG | HEIGHT: 65 IN | HEART RATE: 75 BPM | BODY MASS INDEX: 38.9 KG/M2 | TEMPERATURE: 98.2 F | SYSTOLIC BLOOD PRESSURE: 113 MMHG
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
## 2020-12-09 PROBLEM — Z86.79 H/O INTRACRANIAL HEMORRHAGE: Status: ACTIVE | Noted: 2020-12-09

## 2020-12-09 PROCEDURE — 6370000000 HC RX 637 (ALT 250 FOR IP): Performed by: GENERAL PRACTICE

## 2020-12-09 PROCEDURE — 6370000000 HC RX 637 (ALT 250 FOR IP): Performed by: NURSE PRACTITIONER

## 2020-12-09 PROCEDURE — 6360000002 HC RX W HCPCS: Performed by: NURSE PRACTITIONER

## 2020-12-09 PROCEDURE — 97530 THERAPEUTIC ACTIVITIES: CPT

## 2020-12-09 PROCEDURE — 6360000002 HC RX W HCPCS: Performed by: PSYCHIATRY & NEUROLOGY

## 2020-12-09 PROCEDURE — 6360000002 HC RX W HCPCS: Performed by: PHYSICAL MEDICINE & REHABILITATION

## 2020-12-09 PROCEDURE — 99232 SBSQ HOSP IP/OBS MODERATE 35: CPT | Performed by: PSYCHIATRY & NEUROLOGY

## 2020-12-09 PROCEDURE — 2580000003 HC RX 258: Performed by: NURSE PRACTITIONER

## 2020-12-09 PROCEDURE — 6370000000 HC RX 637 (ALT 250 FOR IP): Performed by: STUDENT IN AN ORGANIZED HEALTH CARE EDUCATION/TRAINING PROGRAM

## 2020-12-09 PROCEDURE — 97110 THERAPEUTIC EXERCISES: CPT

## 2020-12-09 PROCEDURE — 99231 SBSQ HOSP IP/OBS SF/LOW 25: CPT | Performed by: INTERNAL MEDICINE

## 2020-12-09 PROCEDURE — 6370000000 HC RX 637 (ALT 250 FOR IP): Performed by: PSYCHIATRY & NEUROLOGY

## 2020-12-09 PROCEDURE — 6370000000 HC RX 637 (ALT 250 FOR IP): Performed by: PHYSICAL MEDICINE & REHABILITATION

## 2020-12-09 PROCEDURE — 99238 HOSP IP/OBS DSCHRG MGMT 30/<: CPT | Performed by: PSYCHIATRY & NEUROLOGY

## 2020-12-09 PROCEDURE — 1180000000 HC REHAB R&B

## 2020-12-09 RX ORDER — ROPINIROLE 1 MG/1
1 TABLET, FILM COATED ORAL NIGHTLY
Status: DISCONTINUED | OUTPATIENT
Start: 2020-12-09 | End: 2021-01-05 | Stop reason: HOSPADM

## 2020-12-09 RX ORDER — CYANOCOBALAMIN 1000 UG/ML
1000 INJECTION INTRAMUSCULAR; SUBCUTANEOUS
Status: DISCONTINUED | OUTPATIENT
Start: 2021-02-05 | End: 2021-01-05 | Stop reason: HOSPADM

## 2020-12-09 RX ORDER — BISACODYL 10 MG
10 SUPPOSITORY, RECTAL RECTAL DAILY PRN
Status: DISCONTINUED | OUTPATIENT
Start: 2020-12-09 | End: 2021-01-05 | Stop reason: HOSPADM

## 2020-12-09 RX ORDER — MUSCLE RUB CREAM 100; 150 MG/G; MG/G
CREAM TOPICAL 3 TIMES DAILY PRN
Status: DISCONTINUED | OUTPATIENT
Start: 2020-12-09 | End: 2021-01-05 | Stop reason: HOSPADM

## 2020-12-09 RX ORDER — GABAPENTIN 300 MG/1
300 CAPSULE ORAL
Status: DISCONTINUED | OUTPATIENT
Start: 2020-12-09 | End: 2020-12-09 | Stop reason: HOSPADM

## 2020-12-09 RX ORDER — THIAMINE MONONITRATE (VIT B1) 100 MG
100 TABLET ORAL DAILY
Status: DISCONTINUED | OUTPATIENT
Start: 2020-12-10 | End: 2020-12-17

## 2020-12-09 RX ORDER — BACLOFEN 10 MG/1
5 TABLET ORAL 3 TIMES DAILY
Status: DISCONTINUED | OUTPATIENT
Start: 2020-12-09 | End: 2020-12-11

## 2020-12-09 RX ORDER — LISINOPRIL 20 MG/1
40 TABLET ORAL DAILY
Status: DISCONTINUED | OUTPATIENT
Start: 2020-12-10 | End: 2020-12-13

## 2020-12-09 RX ORDER — SENNA PLUS 8.6 MG/1
2 TABLET ORAL DAILY PRN
Status: DISCONTINUED | OUTPATIENT
Start: 2020-12-09 | End: 2021-01-05 | Stop reason: HOSPADM

## 2020-12-09 RX ORDER — CYCLOBENZAPRINE HCL 10 MG
10 TABLET ORAL DAILY PRN
Status: DISCONTINUED | OUTPATIENT
Start: 2020-12-09 | End: 2020-12-09 | Stop reason: HOSPADM

## 2020-12-09 RX ORDER — AMLODIPINE BESYLATE 10 MG/1
10 TABLET ORAL DAILY
Status: DISCONTINUED | OUTPATIENT
Start: 2020-12-10 | End: 2020-12-13

## 2020-12-09 RX ORDER — METHYLPREDNISOLONE 4 MG/1
4 TABLET ORAL
Status: COMPLETED | OUTPATIENT
Start: 2020-12-10 | End: 2020-12-12

## 2020-12-09 RX ORDER — METHYLPREDNISOLONE 4 MG/1
4 TABLET ORAL NIGHTLY
Status: COMPLETED | OUTPATIENT
Start: 2020-12-09 | End: 2020-12-11

## 2020-12-09 RX ORDER — LANOLIN ALCOHOL/MO/W.PET/CERES
3 CREAM (GRAM) TOPICAL NIGHTLY
Status: DISCONTINUED | OUTPATIENT
Start: 2020-12-09 | End: 2021-01-05 | Stop reason: HOSPADM

## 2020-12-09 RX ORDER — METHYLPREDNISOLONE 4 MG/1
4 TABLET ORAL
Status: DISPENSED | OUTPATIENT
Start: 2020-12-09 | End: 2020-12-10

## 2020-12-09 RX ORDER — FOLIC ACID 1 MG/1
1 TABLET ORAL DAILY
Status: DISCONTINUED | OUTPATIENT
Start: 2020-12-10 | End: 2021-01-05 | Stop reason: HOSPADM

## 2020-12-09 RX ORDER — ACETAMINOPHEN 325 MG/1
650 TABLET ORAL EVERY 4 HOURS PRN
Status: DISCONTINUED | OUTPATIENT
Start: 2020-12-09 | End: 2021-01-05 | Stop reason: HOSPADM

## 2020-12-09 RX ORDER — IPRATROPIUM BROMIDE AND ALBUTEROL SULFATE 2.5; .5 MG/3ML; MG/3ML
1 SOLUTION RESPIRATORY (INHALATION) EVERY 4 HOURS PRN
Status: DISCONTINUED | OUTPATIENT
Start: 2020-12-09 | End: 2021-01-05 | Stop reason: HOSPADM

## 2020-12-09 RX ORDER — CYANOCOBALAMIN 1000 UG/ML
1000 INJECTION INTRAMUSCULAR; SUBCUTANEOUS DAILY
Status: COMPLETED | OUTPATIENT
Start: 2020-12-10 | End: 2020-12-10

## 2020-12-09 RX ORDER — BUPROPION HYDROCHLORIDE 300 MG/1
300 TABLET ORAL DAILY
Status: DISCONTINUED | OUTPATIENT
Start: 2020-12-10 | End: 2021-01-05 | Stop reason: HOSPADM

## 2020-12-09 RX ORDER — CYANOCOBALAMIN 1000 UG/ML
1000 INJECTION INTRAMUSCULAR; SUBCUTANEOUS WEEKLY
Status: DISCONTINUED | OUTPATIENT
Start: 2020-12-11 | End: 2021-01-05 | Stop reason: HOSPADM

## 2020-12-09 RX ORDER — LABETALOL 100 MG/1
200 TABLET, FILM COATED ORAL EVERY 8 HOURS SCHEDULED
Status: DISCONTINUED | OUTPATIENT
Start: 2020-12-09 | End: 2020-12-18

## 2020-12-09 RX ORDER — BACLOFEN 10 MG/1
5 TABLET ORAL ONCE
Status: COMPLETED | OUTPATIENT
Start: 2020-12-09 | End: 2020-12-09

## 2020-12-09 RX ORDER — OXYCODONE HYDROCHLORIDE 5 MG/1
5 TABLET ORAL EVERY 8 HOURS PRN
Status: DISPENSED | OUTPATIENT
Start: 2020-12-09 | End: 2020-12-12

## 2020-12-09 RX ORDER — SENNA AND DOCUSATE SODIUM 50; 8.6 MG/1; MG/1
2 TABLET, FILM COATED ORAL DAILY
Status: DISCONTINUED | OUTPATIENT
Start: 2020-12-10 | End: 2021-01-05 | Stop reason: HOSPADM

## 2020-12-09 RX ORDER — POLYETHYLENE GLYCOL 3350 17 G/17G
17 POWDER, FOR SOLUTION ORAL DAILY
Status: DISCONTINUED | OUTPATIENT
Start: 2020-12-10 | End: 2021-01-05 | Stop reason: HOSPADM

## 2020-12-09 RX ADMIN — METHYLPREDNISOLONE 4 MG: 4 TABLET ORAL at 10:41

## 2020-12-09 RX ADMIN — BUPROPION HYDROCHLORIDE 300 MG: 150 TABLET, EXTENDED RELEASE ORAL at 10:41

## 2020-12-09 RX ADMIN — DOCUSATE SODIUM 50MG AND SENNOSIDES 8.6MG 2 TABLET: 8.6; 5 TABLET, FILM COATED ORAL at 10:41

## 2020-12-09 RX ADMIN — POTASSIUM BICARBONATE 40 MEQ: 782 TABLET, EFFERVESCENT ORAL at 10:43

## 2020-12-09 RX ADMIN — BACLOFEN 5 MG: 10 TABLET ORAL at 23:24

## 2020-12-09 RX ADMIN — ENOXAPARIN SODIUM 30 MG: 30 INJECTION SUBCUTANEOUS at 23:25

## 2020-12-09 RX ADMIN — METHYLPREDNISOLONE 4 MG: 4 TABLET ORAL at 23:25

## 2020-12-09 RX ADMIN — LABETALOL HCL 200 MG: 200 TABLET, FILM COATED ORAL at 09:02

## 2020-12-09 RX ADMIN — ACETAMINOPHEN 650 MG: 325 TABLET ORAL at 13:55

## 2020-12-09 RX ADMIN — LISINOPRIL 40 MG: 20 TABLET ORAL at 10:41

## 2020-12-09 RX ADMIN — BACLOFEN 5 MG: 10 TABLET ORAL at 23:28

## 2020-12-09 RX ADMIN — LABETALOL HCL 200 MG: 200 TABLET, FILM COATED ORAL at 13:54

## 2020-12-09 RX ADMIN — LABETALOL HYDROCHLORIDE 200 MG: 100 TABLET, FILM COATED ORAL at 23:26

## 2020-12-09 RX ADMIN — SERTRALINE 50 MG: 50 TABLET, FILM COATED ORAL at 10:41

## 2020-12-09 RX ADMIN — SODIUM CHLORIDE, PRESERVATIVE FREE 10 ML: 5 INJECTION INTRAVENOUS at 10:53

## 2020-12-09 RX ADMIN — AMLODIPINE BESYLATE 10 MG: 10 TABLET ORAL at 10:41

## 2020-12-09 RX ADMIN — CYANOCOBALAMIN 1000 MCG: 1000 INJECTION, SOLUTION INTRAMUSCULAR at 10:42

## 2020-12-09 RX ADMIN — ROPINIROLE HYDROCHLORIDE 1 MG: 1 TABLET, FILM COATED ORAL at 23:25

## 2020-12-09 RX ADMIN — ENOXAPARIN SODIUM 30 MG: 30 INJECTION SUBCUTANEOUS at 10:42

## 2020-12-09 RX ADMIN — ACETAMINOPHEN 650 MG: 325 TABLET, FILM COATED ORAL at 23:24

## 2020-12-09 RX ADMIN — Medication 3 MG: at 23:24

## 2020-12-09 RX ADMIN — GABAPENTIN 300 MG: 300 CAPSULE ORAL at 18:10

## 2020-12-09 RX ADMIN — BACLOFEN 5 MG: 10 TABLET ORAL at 13:54

## 2020-12-09 RX ADMIN — CYCLOBENZAPRINE 10 MG: 10 TABLET, FILM COATED ORAL at 12:30

## 2020-12-09 RX ADMIN — OXYCODONE HYDROCHLORIDE 5 MG: 5 TABLET ORAL at 12:32

## 2020-12-09 RX ADMIN — Medication 100 MG: at 10:41

## 2020-12-09 RX ADMIN — MENTHOL AND METHYL SALICYLATE: 10; 30 CREAM TOPICAL at 12:32

## 2020-12-09 RX ADMIN — OXYCODONE HYDROCHLORIDE 5 MG: 5 TABLET ORAL at 01:01

## 2020-12-09 RX ADMIN — ACETAMINOPHEN 650 MG: 325 TABLET ORAL at 00:18

## 2020-12-09 RX ADMIN — BACLOFEN 5 MG: 10 TABLET ORAL at 09:02

## 2020-12-09 RX ADMIN — FOLIC ACID 1 MG: 1 TABLET ORAL at 10:41

## 2020-12-09 RX ADMIN — LAMOTRIGINE 400 MG: 100 TABLET ORAL at 10:41

## 2020-12-09 ASSESSMENT — PAIN SCALES - GENERAL
PAINLEVEL_OUTOF10: 7
PAINLEVEL_OUTOF10: 1
PAINLEVEL_OUTOF10: 0
PAINLEVEL_OUTOF10: 5
PAINLEVEL_OUTOF10: 9
PAINLEVEL_OUTOF10: 0
PAINLEVEL_OUTOF10: 6

## 2020-12-09 ASSESSMENT — PAIN DESCRIPTION - FREQUENCY: FREQUENCY: INTERMITTENT

## 2020-12-09 NOTE — PROGRESS NOTES
Gustavo'd vm from [place] @ [facility] with approval for ARU. Notified HORACIO Balbuena CM. Will need d/c readmit completed with continuation of DVT prophylaxis and [place] called to [phone]. Notified Ena Settles that pt might not be able to admit until tomorrow d/t staffing @ [facility], and writer will update Esha by 4:30 today. Admission Assessment completed and Dr Debbie Wayne notified. Notified HORACIO Benjamin CM, that HORACIO CEDILLO is to call ARU @ 5:30 to determine if pt is able to admit today or wait until tomorrow.

## 2020-12-09 NOTE — PROGRESS NOTES
 Alcohol withdrawal syndrome without complication (HCC) [Q20.058]      PMH:  Past Medical History:   Diagnosis Date    Asthma     Bipolar 1 disorder ([facility]     Delta storage pool disease ([facility]     Hypertension     Psychiatric problem       Allergies: Allergies   Allergen Reactions    Pcn [Penicillins]     Sulfa Antibiotics Other (See Comments)     Inflammation in the eye        Assessment    1. Acute intracranial hemorrhage. 2. Delta pool storage disorder  3. Hypertensive emergency  4. Alcohol withdrawal        Plan     The patient condition seems to be stable  Continue with current treatment plan.  she will  go to rehab once precert is available  Please call tasha allison question              Antionette Sage MD  Hematology Oncology  [phone]  Electronically signed by Merritt Stringer MD on 12/9/2020 at 4:54 PM

## 2020-12-09 NOTE — CARE COORDINATION
Received call from Gisell at [facility] that pt has been accepted and precert obtained. Call to Leona at [facility] to ensure they have appropriate staff to accept pt tonight. She stated they do and are expecting pt tonight. Transport arranged with Kym Del Valle at [facility] for around 7:30pm. They will call unit with exact time when able to transport.  Notified Mercedes Toribio RN pt and  Karen Ocampo

## 2020-12-09 NOTE — PROGRESS NOTES
Patient is very restless and is c/o leg cramp in left leg. Writer has been in room multiple times since start of shift to educate patient on keeping external catheter and monitor leads on. Patient constantly rolls in bed and stated that she \"can't get any relief\" and the medication writer administered isn't \"working. \" Neuro surg notified of situation. No new orders received. Will continue to monitor.

## 2020-12-09 NOTE — PROGRESS NOTES
and works full time. History of current illness:  80-year-old female with history of delta storage pool disease, bipolar disorder and alcohol abuse who developed acute left-sided weakness and found have a right parieto-occipital intraparenchymal hemorrhage CT head outlying ED revealed large right parietal lobe intracranial hemorrhage with moderate subarachnoid hemorrhage. She is notably hypertensive. Cardene infusion started. Repeat CT showed right parietal and cranial hemorrhage subarachnoid hemorrhage and new intraventricular hemorrhage in the right occipital horn. CTA head and neck unremarkable. [place] Coma Scale 4. There is no to have dense left upper and lower extreme weakness. She has history of alcohol abuse-notes she was in rehab but no longer drinks daily but still drinks occasionally-last drank vodka at night prior to incident. .  She was started on Precedex infusion. Concern of alcohol withdrawal.     Neurosurgery-repeat CT head stable, received DDAVP and platelets 69/84 continue hourly neuro checks     Hematology/oncology-Dr. Mariya Stokes disorder-transfuse platelets and administer desmopressin as needed       Current functional status for upper extremity ADLs:  UE Bathing: Setup, Verbal cueing, Increased time to complete, Minimal assistance  UE Dressing: Setup, Minimal assistance, Increased time to complete    Current functional status for lower extremity ADLs:  LE Bathing: Moderate assistance, Setup, Increased time to complete  LE Dressing: Setup, Maximum assistance    Current functional status for bed, chair, wheelchair transfers:  Transfers  Sit to Stand: Minimal Assistance, Moderate Assistance, 2 Person Assistance  Stand to sit: Minimal Assistance, Moderate Assistance, 2 Person Assistance  Bed to Chair: Dependent/Total, 2 Person Assistance(lg lopez)  Stand Pivot Transfers: Dependent/Total, 2 Person Assistance(lg lopez)  Comment: STS tranfers performed x2 in lg steady.     Current functional status for toilet transfers:   Minimal Assistance, Moderate Assistance, 2 Person Assistance      Current functional status for locomotion:  Ambulation  Ambulation?: No    Current functional status for comprehension: Complete independence    Current functional status for expression: Complete independence    Current functional status for social interaction: Complete independence    Current functional status for problem solving: Complete independence    Current functional status for memory: Complete independence    Current Deficits R/T Impairment: Impaired Functional Mobility and Decreased ADLs    Required Therapy:   [x] Physical Therapy  [x] Occupational Therapy   [] Speech Therapy, as appropriate    Additional Services:  [x]   [] Recreational Therapy, as appropriate  [x] Nutrition  [] Dialysis  [] Other:     Rehab Justification:  Needs 3 hrs therapy per day or 15 hours per week:  Yes  Identified Rehab Nursing needs: Yes  Intense Interdisciplinary need:  Yes  Need for 24 hr physician supervision:  Yes  Measurable improved quality of life:  Yes  Willingness to participate:  Yes  Medical Necessity:  Yes  Patient able to tolerate care proposed:  Yes    Expected Discharge Destination/Functional Level:  Home with assist  Expected length of time to achieve that level of improvement: 1-2 weeks  Expected Post Discharge Treatments: Home with possible Home Care    Other information relevant to the care needs:  n/a    Acute Inpatient Rehabilitation Disclosure Statement provided to patient. Patient verbalized understanding. Copy placed on patient's light chart. I have reviewed and concur with the findings and results of the pre-admission screening assessment completed by the Inpatient Rehabilitation Admissions Coordinator.

## 2020-12-09 NOTE — PROGRESS NOTES
Occupational Therapy  Facility/Department: [facility] NEURO  Daily Treatment Note  NAME: Kemal Leiva  : 1970  MRN: [id number]  Date of Service: 2020  Discharge Recommendations:  Patient would benefit from continued therapy after discharge  Assessment   Performance deficits / Impairments: Decreased functional mobility ; Decreased ADL status; Decreased ROM; Decreased strength;Decreased endurance;Decreased balance;Decreased high-level IADLs;Decreased fine motor control;Decreased coordination;Decreased posture  Prognosis: Good  Decision Making: Medium Complexity  OT Education: OT Role;Plan of Care  Patient Education: importance of OOB activity, importance of L UE positioning and ROM, - G return  REQUIRES OT FOLLOW UP: Yes  Activity Tolerance  Activity Tolerance: Patient Tolerated treatment well  Safety Devices  Safety Devices in place: Yes  Type of devices: All fall risk precautions in place; Bed alarm in place;Call light within reach; Left in bed;Nurse notified  Restraints  Initially in place: No     Patient Diagnosis(es): The encounter diagnosis was Intracranial hemorrhage ([facility] [address] has a past medical history of Asthma, Bipolar 1 disorder ([facility] [address] Delta storage pool disease [facility]), Hypertension, and Psychiatric problem. has a past surgical history that includes  section (4746,7280); Gastric bypass surgery (); Tonsillectomy and adenoidectomy (); Cholecystectomy (); knee surgery (); Hysterectomy (); laparoscopy (); Colonoscopy (N/A, 2/3/2020); and Upper gastrointestinal endoscopy (N/A, 2/3/2020).   Restrictions  Restrictions/Precautions  Restrictions/Precautions: Seizure, General Precautions, Fall Risk, Up as Tolerated  Required Braces or Orthoses?: No  Position Activity Restriction  Other position/activity restrictions: L hypertonicity, with noted imporvement this date  Subjective   General  Patient assessed for rehabilitation services?: Yes  Family / Caregiver Present: No  General Comment  Comments: RN ok'd patient for OT treatment. Pt cooperative and agreeable. Pain Assessment  Pain Level: 0   Orientation  Orientation  Overall Orientation Status: Within Functional Limits  Objective    ADL  Grooming: Setup; Increased time to complete;Contact guard assistance  UE Bathing: Setup;Verbal cueing; Increased time to complete;Minimal assistance  LE Bathing: Moderate assistance;Setup; Increased time to complete  UE Dressing: Setup;Minimal assistance; Increased time to complete  LE Dressing: Setup;Maximum assistance  Additional Comments: Pt. sat EOB 25 minutes with ADL act. grooming CGA-max A x 1 person sitting EOB d/t L lateral/posterion LOB. UB bath needing min A sitting EOB with increased time and set up and VC for compensotory stratigies. LB bath mod A using 4 figure tech to bath R LE, needing A to bath below L knee. LE dressing max A in lg steady d/t R UE support needed and LE UE non functional at this time. Balance  Sitting Balance: Maximum assistance  Standing Balance: Minimal assistance  Standing Balance  Time: stood 3 minutes first stand, 1 minute second stand with lg steady with min A x 2 persons. Sitting balance EOB ranging from max A-CGA 25 minutes. Activity: lg steady used with LB ADL acticvity  Comment: pt has a verry STRONG lean to the L, min A x 1+max A x 1 d/t L lateral lean  Bed mobility  Rolling to Right: Moderate assistance  Supine to Sit: Maximum assistance;2 Person assistance  Sit to Supine: Maximum assistance;2 Person assistance  Scooting:  Moderate assistance;Maximal assistance  Comment: HOB elevated + bed rails  Transfers  Sit to stand: 2 Person assistance;Minimal assistance  Stand to sit: Minimal assistance  Transfer Comments: Min A x 2 persons with lg steady   Cognition  Overall Cognitive Status: [facility]   Plan   Plan  Times per week: 4-5x/week  Specific instructions for Next Treatment: Introduce adaptive strategies and techniques for ADLs with LUE deficits, continue to

## 2020-12-09 NOTE — PROGRESS NOTES
Daily Progress Note  Neuro Critical Care    Patient Name: Shiloh Caal  Patient : 1970  Room/Bed: 1724/4402-89  Code Status: FULL  Allergies: Allergies   Allergen Reactions    Pcn [Penicillins]     Sulfa Antibiotics Other (See Comments)     Inflammation in the eye       CHIEF COMPLAINT:      Headache, Right sided weakness     INTERVAL HISTORY    Initial Presentation (Admitted 20): The patient is a 48 y.o. female with a history of HTN, Delta storage pool disease, bipolar disorder, and alcohol abuse who presented as a transfer from [facility] ED after CT head revealed right parietal ICH. Initially presented to [facility] ED with left sided weakness and headache. LKW around midnight; patient states her left upper extremity \"went limp\" at that time. Patient states she went to sleep and woke up around 0400 this morning and noticed her left lower extremity was also weak. Reports she developed a headache by the time EMS arrived. CT Head at [facility] ED revealed large right parietal lobe ICH with moderate SAH. Noted to be hypertensive at [facility] ED, started on Cardene infusion. Patient had recently run out of her Lisinopril. Loaded with 1g Keppra. Transferred to [facility] for further evaluation and management. On arrival to ED, -190's. Unclear whether patient arrived on Cardene infusion but she was started on Clevidipine infusion in ED. CT Head showed stable right parietal ICH with SAH and new intraventricular hemorrhage in the right occipital horn. CTA Head/Neck unremarkable. Given 25g Mannitol x1 per Stroke team.  Neurosurgery and Hem/Onc consulted while in ED. Given 40mcg DDAVP x1 and transfused 1 pack of platelets. Patient denies head trauma or falls. GCS 14.  Opens eyes to voice, oriented x3. Noted to have dense left upper and lower extremity weaknes. Patient has a history of alcohol abuse.   She went to rehab last year and states she no longer drinks daily but still drinks occasionally, last drank vodka last night.       ICH score: 2 (Volume ~41ml, +IVH)     Hospital Course:  11/30: Admitted to the Neuro ICU for close monitoring. Patient extremely restless and fidgity with associated tachycardia. Started on Precedex infusion. Patient reports history of restless legs but states she is not prescribed medication; started on Requip nightly. Concern for possible alcohol withdraw; Ativan 1mg Q4h ordered. Patient complained of headache and nausea. Clinical exam remains stable. Repeat CT head this afternoon stable. MRI Brain w/wo contrast with no underlying mass or   12/1: Mildly hypotensive overnight, improved with 500cc fluid bolus. Repeat CT Head this morning was overall stable except small amount of acute hemorrhage in left occipital horn. Given 1 pack platelets. Clinical exam remains stable. In the afternoon patient complained of severe headache with nausea. Repeat CT head stable. Patient getting more restless this afternoon off Precedex. Started on Librium 5mg 4XD for suspected alcohol withdraw. Precedex infusion restarted. 12/2: Constant headache; PRN Roxicodone. Started Valium 5m q8h PRN. Vit B12, low and replaced. Echo showed normal EF 55%. Norvasc increased to 10mg daily to keep SBP < 160. Continue Precedex and librium to help with restlessness and withdrawal sx.    12/3: Precedex stopped. Valium changed to Baclofen 5mg TID. Braces for left foot and left wrist.  Up to chair with PT. Lisinopril increased due to persistent hypertension. 12/4: Complained of severe headache overnight. Minimal improvement with Fentanyl 25mcg IV x1 and Magnesium. Started on scheduled Roxicodone 5mg Q4h x2 days. CIWA score 11-12 overnight, received Ativan x2. Persistent hypertension requiring 4 doses of PRN Labetalol overnight, started on Labetalol 100mg Q8h.  12/5: Patient did well overnight and did not require any PRN Ativan for withdraw symptoms.  Librium decreased to 5mg Q12h with plans to wean off if tolerated. Continues to complain of severe headache and neck pain despite scheduled Baclofen and Roxicodone; stared on Medrol dose pack. Hypertensive overnight requiring 2 doses of PRN Labetalol; PO Labetalol increased to 200mg Q8h. Transferred to stepdown. 12/6: Patient reports improvement in headache yesterday with addition of steroids. Roxicodone changed to 5mg Q4h PRN, plan to wean down in next few days. No concern for withdraw symptoms; Librium further decreased to 5mg QD.  12/7: Headache being managed with Medrol dose pack and PRN Tylenol. Roxicodone decreased to 5mg Q8h PRN. Add Neurontin 300mg QHS. Continue Baclofen 5mg TID for spasticity and muscle spasms. No further concerns for withdraw symptoms; Librium discontinued, PRN Ativan stopped. 92/9: Awaiting precert to [facility]. Last 24h:  Patient reports severe muscle spasms nightly affecting mostly her left arm and leg. States they have been starting around 5PM.  Will start Flexeril 10mg QD PRN. Continue Baclofen 5mg TID and move up administration of Neurontin to dinner time. Discussed left upper extremity spasticity with concern for contracture of the hand; recommend keeping towel in hand and keeping arm in a neutral position. Patient is stable for discharge, precert to [facility] pending.       CURRENT MEDICATIONS:  SCHEDULED MEDICATIONS:   enoxaparin  30 mg Subcutaneous BID    gabapentin  300 mg Oral Nightly    polyethylene glycol  17 g Oral Daily    labetalol  200 mg Oral 3 times per day    methylPREDNISolone  4 mg Oral QAM AC    methylPREDNISolone  4 mg Oral Nightly    melatonin  3 mg Oral Nightly    lisinopril  40 mg Oral Daily    baclofen  5 mg Oral TID    potassium bicarb-citric acid  40 mEq Oral Daily    amLODIPine  10 mg Oral Daily    thiamine  100 mg Oral Daily    cyanocobalamin  1,000 mcg Intramuscular Weekly    Followed by   Julienne Teran ON 2/3/2021] cyanocobalamin  1,000 mcg 12/07/2020     12/07/2020    CREATININE 0.50 12/07/2020    BUN 12 12/07/2020    CO2 20 12/07/2020    TSH 1.34 11/30/2020    INR 1.0 11/30/2020    LABA1C 5.0 11/30/2020     24 HOUR INTAKE/OUTPUT:    Intake/Output Summary (Last 24 hours) at 12/9/2020 0736  Last data filed at 12/8/2020 1748  Gross per 24 hour   Intake 1360 ml   Output 1450 ml   Net -90 ml       IMAGING:   No new imaging. Labs and Images reviewed with:  [] Dr. Agustin Fernandez    [x] Dr. Emeterio Hewitt  [] Dr. Dameon Norris  [] There are no new interval images to review. PHYSICAL EXAM       CONSTITUTIONAL:   female. Alert and oriented x 3. In no acute distress. GCS 15. Nontoxic. No dysarthria. No aphasia. HEAD:  normocephalic, atraumatic    EYES:  PERRL, EOMI   ENT:  moist mucous membranes, chipped right front tooth   NECK:  supple, symmetric   LUNGS:  Equal air entry bilaterally, clear   CARDIOVASCULAR:  normal s1 / s2, RRR, distal pulses intact   ABDOMEN:  Soft, no rigidity, normal bowel sounds   NEUROLOGIC:  Mental Status:  A & O x3, Awake             Cranial Nerves:    II: Visual fields:  Normal  III: Pupils:  equal, round, reactive to light  III,IV,VI: Extra Ocular Movements: intact  V: Facial sensation:  abnormal - decreased sensation on the left  VII: Facial strength: abnormal - left facial droop    Motor Exam:    Drift:  present - left upper and left lower extremities  Tone:  abnormal - increased tone left upper extremity    5/5 strength to the right upper and right lower extremities  0/5 strength to left upper extremity, no movement to pain. 1/5 strength to left lower extremity, withdraws to pain, no spontaneous movement. Sensory:    Touch:    Right Upper Extremity:  normal  Left Upper Extremity:  abnormal - severely decreased  Right Lower Extremity:  normal  Left Lower Extremity:  abnormal - severely decreased       DRAINS:  [x] There are no drains for Neuro Critical Care to monitor at this time.      ASSESSMENT AND PLAN:       The patient is a 47 yo female with a history of HTN, Delta storage pool disease, bipolar disorder, and alcohol abuse who was admitted to the Neuro ICU for management of a large right parietal ICH with subarachnoid and intraventricular blood. Given DDAVP and 1 pack of platelets in setting of platelet disorder. Initially presented to [facility] ED with left sided weakness and headache. Hypertensive with -190's on arrival to ED.     NEUROLOGIC:  - Acute right parietal ICH with intraventricular extension and subarachnoid hemorrhage component  - Etiology likely secondary to coaguloapthy in setting of platelet disorder and hypertension  - No underlying mass or vascular malformation on MRI brain with/without contrast  - F/U Neurosurgery outpatient 12/16, repeat CT head 12/15  - Goal SBP<160  - History of ETOH abuse; Folic Acid& Thiamine supplementation  - Headache/Neck pain; Medrol dose pack, Neurontin 300mg QD, Roxicodine 5mg Q8h PRN, PRN Tylenol  - Spasticity/Muscle spasms;  Baclofen 5mg TID, Start Flexeril 10mg QD PRN   - Requip 1mg QHS for restless legs  - Neuro checks per protocol     CARDIOVASCULAR:  - Goal SBP<160  - Persistent hypertension  - Continue Norvasc 10mg QD, Lisinopril 40mg QD and Labetalol 200mg Q8h  - Echocardiogram EF 55%  - Lipid panel; LDL 90, Cholesterol 247  - Continue telemetry     PULMONARY:  - Maintaining O2 sats on room air  - History of asthma  - Duonebs PRN     RENAL/FLUID/ELECTROLYTE:  - Normal renal functioning  - Adequate urine output per nursing  - No maintenance IVF  - BMP PRN     GI/NUTRITION:  NUTRITION:  - General Diet   - Appetite improving,  tolerating supplements  - Denies constipation, Large BM last night per patient  - Bowel regimen: Senokot-S daily, hold daily Glycolax, continue Milk of Mag PRN  - GI prophylaxis: N/A     ID:  - Afebrile, Tmax 36.6  - UA 11/30 negative  - COVID-19 negative 11/30  - No signs/symptoms of infection  - CBC as needed     HEME:   - History of delta storage pool disease  - Transfused total 2 packs of platelets during admission, received DDAVP on arrival  - Hem/Onc following; appreciate recs  - Stable H&H/platelet count throughout admit  - CBC as needed     ENDOCRINE:  - Continue to monitor blood glucose, goal <180  - Hemoglobin A1C 5.0  - TSH 1.34     OTHER:  - Melatonin 3mg QHS to promote normal sleep/wake cycle  - History of bipolar disorder; Lamictal 400mg BID, Zoloft 50mg QD  - PT/OT/ST; foot drop boot on L, towel in left hand/neutral position  - PM&R following; appropriate candidate for inpatient rehab  - Discharge planning; [facility] Rehab when precert obtained     PROPHYLAXIS:  Stress ulcer: N/A     DVT PROPHYLAXIS:  - SCD sleeves - Thigh High   - Heparin 5000u Q8h     DISPOSITION:  [x] Stepdown status. Medically ready for discharge when accepted. We will continue to follow along as the primary team.    For any changes in exam or patient status please contact Neuro Critical Care.       Daya Chester, APRN - [address] [facility]  Neuro Critical Care  Pager [phone]  12/9/2020     7:36 AM

## 2020-12-09 NOTE — PROGRESS NOTES
Physical Therapy  Facility/Department: [facility] NEURO  Daily Treatment Note  NAME: Hollis Horton  : 1970  MRN: [id number]    Date of Service: 2020    Discharge Recommendations:  Patient would benefit from continued therapy after discharge        Assessment   Body structures, Functions, Activity limitations: Decreased functional mobility ; Decreased strength;Decreased safe awareness;Decreased balance; Increased pain;Decreased sensation;Decreased fine motor control;Decreased coordination;Decreased posture  Assessment: Pt performed STS transfer x2 in lg steady Stephen x2. While sanding pt required MaxA to maintain upright position d/t L lateral lean. Pt stood ~5 minutes total. Pt would be unsafe to return to her prior living arrangements and Would benefit from aggresssive PT after d/c to address deficits. Pt with high PLOF and motivated to participate. Prognosis: Good  PT Education: Goals; General Safety;PT Role;Plan of Care;Precautions; Adaptive Device Training;Disease Specific Education; Functional Mobility Training; Injury Prevention;Home Exercise Program  REQUIRES PT FOLLOW UP: Yes  Activity Tolerance  Activity Tolerance: Patient limited by fatigue;Patient Tolerated treatment well     Patient Diagnosis(es): The encounter diagnosis was Intracranial hemorrhage ([facility] [address] has a past medical history of Asthma, Bipolar 1 disorder ([facility] [address] Delta storage pool disease [facility]), Hypertension, and Psychiatric problem. has a past surgical history that includes  section (0645,4092); Gastric bypass surgery (); Tonsillectomy and adenoidectomy (); Cholecystectomy (); knee surgery (); Hysterectomy (); laparoscopy (); Colonoscopy (N/A, 2/3/2020); and Upper gastrointestinal endoscopy (N/A, 2/3/2020).     Restrictions  Restrictions/Precautions  Restrictions/Precautions: Seizure, General Precautions, Fall Risk, Up as Tolerated  Required Braces or Orthoses?: No  Position Activity Restriction  Other position/activity restrictions: L hypertonicity, with noted imporvement this date  Subjective   General  Chart Reviewed: Yes  Response To Previous Treatment: Patient with no complaints from previous session. Family / Caregiver Present: No  Subjective  Subjective: Pt and RN agreeable to therapy this morning. Pt resting in bed upon arrival, very eager to participate in PT. Co-treat with OT          Orientation  Orientation  Overall Orientation Status: Within Normal Limits  Cognition   Cognition  Overall Cognitive Status: WNL  Objective   Bed mobility  Rolling to Left: Stand by assistance  Supine to Sit: Maximum assistance;2 Person assistance  Sit to Supine: Maximum assistance;2 Person assistance  Scooting: Moderate assistance;Maximal assistance  Comment: HOB elevated and pt used bedrails to assisnt in bed mobility. Transfers  Sit to Stand: Minimal Assistance; Moderate Assistance;2 Person Assistance  Stand to sit: Minimal Assistance; Moderate Assistance;2 Person Assistance  Comment: STS tranfers performed x2 in lg steady. Ambulation  Ambulation?: No  Stairs/Curb  Stairs?: No     Balance  Posture: Good  Sitting - Static: Fair;+  Sitting - Dynamic: Fair  Standing - Static: Fair;-  Standing - Dynamic: Poor  Comments: Pt sat EOB ~25 minutes performing weight shifts in all directions and also performed self care with OT. Pt ranged from CGA to Max A while sitting EOB. Pt required MaxA for support while using RUE for self care activities. Exercises:  Supine Exercises: Ankle Pumps, Heel Slides, Hip ABD/ADD. Reps: 15x each LE  Comments: LLE PROM.     Goals  Short term goals  Time Frame for Short term goals: 12 visits  Short term goal 1: pt will perform bed mobility with min A+2  Short term goal 2: pt will dangle EOB for 20 minutes with SBA  Short term goal 3: transfers with mod A+2  Short term goal 4: WC mobility x 25' with SBA+1  Short term goal 5: progress to standing/gait activities with mod A+2 with appropriate device--start with lg [id number]  Patient Goals   Patient goals : to regain use of L arm and L leg    Plan    Plan  Times per week: 5-6x per week  Times per day: Daily  Current Treatment Recommendations: Strengthening, ROM, Balance Training, Functional Mobility Training, Transfer Training, Wheelchair Mobility Training, Gait Training, Neuromuscular Re-education, Pain Management, Home Exercise Program, Safety Education & Training, Patient/Caregiver Education & Training, Positioning, Endurance Training  Safety Devices  Type of devices: Call light within reach, Gait belt, Patient at risk for falls, Nurse notified, Left in bed  Restraints  Initially in place: No     Therapy Time   Individual Concurrent Group Co-treatment   Time In 0920         Time Out 1013         Minutes 53         Timed Code Treatment Minutes: [facility]

## 2020-12-10 LAB
ANION GAP SERPL CALCULATED.3IONS-SCNC: 13 MMOL/L (ref 9–17)
BUN BLDV-MCNC: 25 MG/DL (ref 6–20)
BUN/CREAT BLD: ABNORMAL (ref 9–20)
CALCIUM SERPL-MCNC: 10.2 MG/DL (ref 8.6–10.4)
CHLORIDE BLD-SCNC: 98 MMOL/L (ref 98–107)
CO2: 21 MMOL/L (ref 20–31)
CREAT SERPL-MCNC: 1.11 MG/DL (ref 0.5–0.9)
GFR AFRICAN AMERICAN: >60 ML/MIN
GFR NON-AFRICAN AMERICAN: 52 ML/MIN
GFR SERPL CREATININE-BSD FRML MDRD: ABNORMAL ML/MIN/{1.73_M2}
GFR SERPL CREATININE-BSD FRML MDRD: ABNORMAL ML/MIN/{1.73_M2}
GLUCOSE BLD-MCNC: 101 MG/DL (ref 70–99)
HCT VFR BLD CALC: 37.4 % (ref 36–46)
HEMOGLOBIN: 12.7 G/DL (ref 12–16)
MCH RBC QN AUTO: 30.5 PG (ref 26–34)
MCHC RBC AUTO-ENTMCNC: 34.1 G/DL (ref 31–37)
MCV RBC AUTO: 89.4 FL (ref 80–100)
NRBC AUTOMATED: ABNORMAL PER 100 WBC
PDW BLD-RTO: 14.5 % (ref 11.5–14.9)
PLATELET # BLD: 477 K/UL (ref 150–450)
PMV BLD AUTO: 6.8 FL (ref 6–12)
POTASSIUM SERPL-SCNC: 4.6 MMOL/L (ref 3.7–5.3)
RBC # BLD: 4.18 M/UL (ref 4–5.2)
SODIUM BLD-SCNC: 132 MMOL/L (ref 135–144)
WBC # BLD: 10.4 K/UL (ref 3.5–11)

## 2020-12-10 PROCEDURE — 6360000002 HC RX W HCPCS: Performed by: NURSE PRACTITIONER

## 2020-12-10 PROCEDURE — 97530 THERAPEUTIC ACTIVITIES: CPT

## 2020-12-10 PROCEDURE — 97162 PT EVAL MOD COMPLEX 30 MIN: CPT

## 2020-12-10 PROCEDURE — 6370000000 HC RX 637 (ALT 250 FOR IP): Performed by: NURSE PRACTITIONER

## 2020-12-10 PROCEDURE — 85027 COMPLETE CBC AUTOMATED: CPT

## 2020-12-10 PROCEDURE — 97166 OT EVAL MOD COMPLEX 45 MIN: CPT

## 2020-12-10 PROCEDURE — 97112 NEUROMUSCULAR REEDUCATION: CPT

## 2020-12-10 PROCEDURE — 6360000002 HC RX W HCPCS: Performed by: PHYSICAL MEDICINE & REHABILITATION

## 2020-12-10 PROCEDURE — 1180000000 HC REHAB R&B

## 2020-12-10 PROCEDURE — 80048 BASIC METABOLIC PNL TOTAL CA: CPT

## 2020-12-10 PROCEDURE — 97535 SELF CARE MNGMENT TRAINING: CPT

## 2020-12-10 PROCEDURE — 36415 COLL VENOUS BLD VENIPUNCTURE: CPT

## 2020-12-10 PROCEDURE — 99223 1ST HOSP IP/OBS HIGH 75: CPT | Performed by: PHYSICAL MEDICINE & REHABILITATION

## 2020-12-10 PROCEDURE — 97110 THERAPEUTIC EXERCISES: CPT

## 2020-12-10 RX ADMIN — LABETALOL HYDROCHLORIDE 200 MG: 100 TABLET, FILM COATED ORAL at 12:41

## 2020-12-10 RX ADMIN — CYANOCOBALAMIN 1000 MCG: 1000 INJECTION, SOLUTION INTRAMUSCULAR at 07:27

## 2020-12-10 RX ADMIN — LABETALOL HYDROCHLORIDE 200 MG: 100 TABLET, FILM COATED ORAL at 21:18

## 2020-12-10 RX ADMIN — SERTRALINE HYDROCHLORIDE 50 MG: 50 TABLET ORAL at 07:20

## 2020-12-10 RX ADMIN — Medication 3 MG: at 21:17

## 2020-12-10 RX ADMIN — FOLIC ACID 1 MG: 1 TABLET ORAL at 07:19

## 2020-12-10 RX ADMIN — THIAMINE HCL TAB 100 MG 100 MG: 100 TAB at 07:19

## 2020-12-10 RX ADMIN — ENOXAPARIN SODIUM 30 MG: 30 INJECTION SUBCUTANEOUS at 07:18

## 2020-12-10 RX ADMIN — BACLOFEN 5 MG: 10 TABLET ORAL at 21:18

## 2020-12-10 RX ADMIN — BACLOFEN 5 MG: 10 TABLET ORAL at 07:19

## 2020-12-10 RX ADMIN — METHYLPREDNISOLONE 4 MG: 4 TABLET ORAL at 05:42

## 2020-12-10 RX ADMIN — ENOXAPARIN SODIUM 30 MG: 30 INJECTION SUBCUTANEOUS at 21:17

## 2020-12-10 RX ADMIN — OXYCODONE HYDROCHLORIDE 5 MG: 5 TABLET ORAL at 14:26

## 2020-12-10 RX ADMIN — ROPINIROLE HYDROCHLORIDE 1 MG: 1 TABLET, FILM COATED ORAL at 21:17

## 2020-12-10 RX ADMIN — BACLOFEN 5 MG: 10 TABLET ORAL at 12:40

## 2020-12-10 RX ADMIN — BUPROPION HYDROCHLORIDE 300 MG: 300 TABLET, FILM COATED, EXTENDED RELEASE ORAL at 07:28

## 2020-12-10 RX ADMIN — METHYLPREDNISOLONE 4 MG: 4 TABLET ORAL at 21:17

## 2020-12-10 RX ADMIN — LAMOTRIGINE 400 MG: 100 TABLET ORAL at 07:26

## 2020-12-10 ASSESSMENT — PAIN SCALES - WONG BAKER: WONGBAKER_NUMERICALRESPONSE: 8

## 2020-12-10 NOTE — PROGRESS NOTES
Pt arrived to room 2612 via stretcher from [facility]. Pt was transferred to bed from stretcher via nursing staff and EMS staff. Pt is A&OX4 at this time. Vitals complete and admission questions started. Pt was educated on the policy and procedures of the acute rehab unit. All questions answered at this time. Pt also educated on fall prevention, bed alarm, and black therapy binder. Admission papers signed and placed in pts chart. Will continue to monitor.

## 2020-12-10 NOTE — PLAN OF CARE
Problem: Skin Integrity:  Goal: Absence of new skin breakdown  Description: Absence of new skin breakdown  Outcome: Met This Shift  Skin assessment completed this shift. Nutrition and Hydration status assessed with adequate intake. Epifanio Score as charted. Patient tolerates repositioning by staff at least every 2 hours. Patient able to reposition self for comfort and to prevent breakdown. Patient verbalizes understanding of pressure ulcer prevention measures. Skin integrity maintained. No new skin breakdown noted. Skin to high risk pressure areas including coccyx and heels are clear. Kelly / Incontinence care provided as needed throughout the shift. Aloe Vesta Moisture Barrier ointment applied to buttocks as a preventative measure. Problem: Falls - Risk of:  Goal: Will remain free from falls  Description: Will remain free from falls  Outcome: Met This Shift   No falls or injuries sustained at this time. No attempts to get out of bed without nursing assistance. Call light within reach and pt. uses appropriately for assistance. Siderails up x 2. Nonskid footwear remains on. Bed in low and locked position. Hourly nursing rounds made. Pt. Alert and oriented, aware of limitations, and exhibits good safety judgement. Pt. uses assistive devices appropriately. Pt. understands individual fall risk factors. Pt.  reminded to use call light with each nurse/patient interaction. Bed alarm remains engaged throughout the shift as a precaution. Problem: Pain:  Goal: Pain level will decrease  Description: Pain level will decrease  Outcome: Ongoing   Pain assessment and reassessment completed so far this shift. Pt. able to rest after the use of pain medication. Patient medicated with 650mg of tylenol Q4hrs PRN fror c/o pain right lower back. Pt. Repositions per self for comfort. Nonverbal cues indicate pain relief. Pt. Rests quietly with eyes closed after pain medication administration. Respirations easy and unlabored. Appears free from distress.

## 2020-12-10 NOTE — PROGRESS NOTES
Physical Therapy    Facility/Department: [facility] ACUTE REHAB  Initial Assessment    NAME: Seth Lesch  : 1970  MRN: [id number]    Date of Service: 12/10/2020    Discharge Recommendations:  Patient would benefit from continued therapy after discharge, Home with assist PRN   PT Equipment Recommendations  Other: TBD    Assessment   Body structures, Functions, Activity limitations: Decreased functional mobility ; Decreased strength;Decreased safe awareness;Decreased balance; Increased pain;Decreased sensation;Decreased fine motor control;Decreased coordination;Decreased posture;Decreased endurance;Decreased vision/visual deficit; Decreased ROM  Assessment: Pt needs max a for bed mobility, max a for sit to stand with lg lopez, dependent for transfers, due to left side weakness and left neglect from CVA. Will continue to faciliate active movement left hemibody, work on left neglect to faciliate safe mobility. Treatment Diagnosis: Impaired fucntion. Prognosis: Good  Decision Making: Medium Complexity  History: medical history of delta storage pool disease, bipolar disorder   Exam: ROM, MMT, fucntion  Barriers to Learning: none  REQUIRES PT FOLLOW UP: Yes  Activity Tolerance  Activity Tolerance: Patient Tolerated treatment well       Patient Diagnosis(es): There were no encounter diagnoses. has a past medical history of Asthma, Bipolar 1 disorder ([facility] [address] Delta storage pool disease [facility]), Hypertension, and Psychiatric problem. has a past surgical history that includes  section ([id number]); Gastric bypass surgery (); Tonsillectomy and adenoidectomy (); Cholecystectomy (); knee surgery (); Hysterectomy (); laparoscopy (); Colonoscopy (N/A, 2/3/2020); and Upper gastrointestinal endoscopy (N/A, 2/3/2020).     Restrictions  Restrictions/Precautions  Restrictions/Precautions: Fall Risk  Required Braces or Orthoses?: No  Vision/Hearing  Vision: (\"If I'm reading an email, I keep reading the same line over and over and over again\")  Vision Exceptions: Wears glasses at all times  Hearing: Within functional limits     Subjective  General  Chart Reviewed: Yes  Patient assessed for rehabilitation services?: Yes  Additional Pertinent Hx: 59-year-old female with history of delta storage pool disease, bipolar disorder, alcohol abuse who developed acute left-sided weakness-found to have intraparenchymal hemorrhage, right parietal lobe hemorrhage and moderate subarachnoid hemorrhage. Pt admitted to rehab unit on 12/9/20.   Family / Caregiver Present: No  Referral Date : 12/09/20  Diagnosis: CVA  Pain Screening  Patient Currently in Pain: Denies  Pain Assessment  [place] Pain Rating: Hurts whole lot  Clinical Progression: Not changed  Response to Pain Intervention: Asleep with RR greater than 10  Vital Signs  BP Location: Right Arm  Level of Consciousness: Alert (0)  Patient Currently in Pain: Denies  Oxygen Therapy  O2 Device: None (Room air)       Orientation  Orientation  Overall Orientation Status: Within Normal Limits  Social/Functional History  Social/Functional History  Lives With: Spouse  Type of Home: House  Home Layout: Two level, Bed/Bath upstairs, 1/2 bath on main level(finished basement for recreation)  Home Access: Stairs to enter without rails, Stairs to enter with rails  Entrance Stairs - Number of Steps: 3-5 steps to enter with 0 HR; 6 + 1 + 8 steps to 2nd floor with R HR for all 15 steps  Entrance Stairs - Rails: Right(R rail going up to 2nd floor.)  Bathroom Shower/Tub: Tub/Shower unit, Curtain  Bathroom Toilet: Standard  Bathroom Equipment: (no DME)  Bathroom Accessibility: Walker accessible, Accessible  Home Equipment: (no DME)  ADL Assistance: Independent  Homemaking Assistance: Independent  Homemaking Responsibilities: Yes  Ambulation Assistance: Independent  Transfer Assistance: Independent  Active : Yes  Mode of Transportation: SUV  Occupation: Part time employment  Type of occupation:  (former )  Leisure & Hobbies: Hanging out with family, bingeing Netflix series, playing Poker with family  Additional Comments: Pt's spouse is a teacher and works full time. Pt has 2 adult sons who live nearby and stop by the home. Cognition        Objective          AROM RLE (degrees)  RLE AROM: WFL  PROM LLE (degrees)  LLE PROM: WFL  AROM RUE (degrees)  RUE AROM : WFL  PROM LUE (degrees)  LUE PROM: WFL  Strength RLE  Strength RLE: WFL  Strength LLE  Strength LLE: Exception  Comment: Hip extension 2-/5, no active knee extension/flexion or Dorsiflexion at foot noticed., Hip ABd/adduction 0/5  Strength RUE  Strength RUE: WFL  Strength LUE  Comment: No active movement noted. Tone RLE  RLE Tone: Normotonic  Tone LLE  LLE Tone: Hypotonic  Sensation  Overall Sensation Status: Impaired(reports no sensation LUE/LLE)  Bed mobility  Rolling to Left: Minimal assistance  Rolling to Right: Maximum assistance  Supine to Sit: Maximum assistance  Sit to Supine: Maximum assistance  Scooting: Maximal assistance  Comment: Pt uses bed rail for rolling to eft side. pt moderately leans to left side, pt able to correct posture, improve seating balance once feet postioned on floor, CGA for static balance with R UE support. mod cues for attending to left side due to left neglect. Transfers  Sit to Stand: Maximum Assistance  Stand to sit: Maximum Assistance  Bed to Chair: Dependent/Total(lg stedy)  Stand Pivot Transfers: Dependent/Total(lg stedy)  Comment: Pt able to extend hips, but not knee due to not active quad contractions noted. Ambulation  Ambulation?: No  Stairs/Curb  Stairs?: No     Balance  Posture: Good  Sitting - Static: Fair  Sitting - Dynamic: Poor  Standing - Static: Poor(Heavy lean to left side on sarastedy.)  Comments: Static balance CGAwith R UE support, max A without R UE support, as pt with heavy left side lean. Exercises  Comments: .   Other exercises  Other exercises 1: PROM L Hip/knee/ankle 10 reps  Other exercises 2: Bridging 10 reps, supported L Knee flexed while pt performing bridging. Other exercises 3: Seated at EOB-weight shifting , attending to left side, looking straight out, turning head to left side. Plan   Plan  Times per week: 1.5hr/day, 5 to 7 days/week. Current Treatment Recommendations: Strengthening, ROM, Balance Training, Functional Mobility Training, Transfer Training, Wheelchair Mobility Training, Gait Training, Neuromuscular Re-education, Pain Management, Home Exercise Program, Safety Education & Training, Patient/Caregiver Education & Training, Positioning, Endurance Training, Stair training  Safety Devices  Type of devices: Call light within reach, Gait belt, Patient at risk for falls, Left in bed  Restraints  Initially in place: No    G-Code       OutComes Score  Postural Assessment Scale for Stroke Patients   (PASS) Scoring Form     Give the subject instructions for each item as written below. When scoring the item, record the lowest response category that applies for each item. Maintaining a Posture  1. Sitting Without Support  Examiner: Have the subject sit on a bench/mat without support and with feet flat on the floor. 0 Cannot sit  2. Standing with Support Examiner: Have the subject stand, providing support as needed. Evaluate only the ability to stand with or without support. Do not consider the quality of the stance. 1     Can stand with strong support of 2 people   3. Standing Without Support  Examiner:  Have the subject stand without support. Evaluate only the ability to stand with or without support. Do not consider the quality of the stance. 0  Cannot stand without support  4. Standing on Non-paretic Leg  Examiner: Have the subject stand on the non-paretic leg. Evaluate only the ability to bear weight entirely on the non-paretic leg. Do not consider how the subject accomplishes the task.   0  Cannot stand on non-paretic leg    5. Standing on Paretic Leg  Examiner: Have the subject stand on the paretic leg. Evaluate only the ability to bear weight entirely on the paretic leg. Do not consider how the subject accomplishes the task. 0  Cannot stand of paretic leg     Maintaining Posture SUBTOTAL     1/15    Changing a Posture  6. Supine to Paretic Side Lateral  Examiner:  Begin with the subject in supine on a treatment mat. Instruct the subject to roll to the paretic side (lateral movement). Assist as necessary. Evaluated the subject's performance on the amount of help required. Do not consider the quality of performance. 2  Can perform with little help  7. Supine to Non-paretic Side Lateral  Examiner:  Begin with the subject in supine on a treatment mat. Instruct the subject to roll to the non-paretic side (lateral movement). Assist as necessary. Evaluate the subject's Performance on the amount of help required. Do not consider the quality of performance. 1  Can perform with much help   8. Supine to Sitting up on the Edge of the Mat   Examiner:  Begin with the subject in supine on a treatment mat. Instruct the subject to come to sitting on the edge of the mat. Assist as necessary. Evaluate the subject's performance on the amount of help required. Do not considered the quality performance. 1  Can perform with much help    9. Sitting on the Edge of the Mat to Supine  Examiner: Begin with the subject sitting on the edge of a treatment mat. Instruct the subject to return to supine. Assist as necessary. Evaluate the subjects performance on the amount of help required. Co not consider the quality of performance. 1  Can perform with much help   10. Sitting to Standing Up  Examiner:  Begin with the subject sitting on the edge of a treatment mat. Instruct the subject to stand up without support. Assist if necessary. Evaluated the subject's performance on the amount ot help required.   Do not consider the quality of the performance. 1  Can perform with much help   11. Standing Up to Sitting Down  Examiner:  Begin with the subject standing by the edge of a treatment mat. Instruct the subject to sit on the edge of mat without support. Assist if necessary. Evaluated the subject's performance on the amount of help required. Do not consider the quality of the performance. 1  Can perform with much help   12 . Standing, Picking up a Pencil from the Floor  Examiner:  Begin with the subject standing. Instruct the subject to  a pencil from the floor without support. Assist if necessary. Evaluate the subject's performance on the amount of help required. Do Not consider the quality of the performance. 0    Changing Posture SUBTOTAL   7/21    PASS TOTAL   8/36                                                     AM-PAC Score             Goals  Short term goals  Time Frame for Short term goals: 10 days  Short term goal 1: pt will perform bed mobility with min A  Short term goal 2: pt will dangle EOB/EOM for 20 to 25 minutes with SBA, performing challenged seated balance/corestrengthening  Short term goal 3: Pivot transfers with mod A+2  Short term goal 4: WC mobility x 100' with min A  Short term goal 5: progress to standing/pre-gait activities in // bars to facilitate L LE motor fucntion. Long term goals  Time Frame for Long term goals : By LOS  Long term goal 1: Pt able to perform bed mobility independently  Long term goal 2: Pt able to perform transfers at [address] term goal 3: Pt able to progress to ambulation with appropriate device dist of 30 to 50 ft, mod A   Long term goal 4: Pt able to propel w/c level surfaces dist of 150 ft , mod-I. Long term goal 5: Improve L LE strength to atleast 2 to 2+/5 to improve fucntion. Long term goal 6: Improve PASS score from 8/30 to 23/36 to improve overall fucntion. Long term goal 7: Pt able to go up and down 5 steps with rail, mod A.   Patient Goals   Patient goals : to regain use of L arm and L leg       Therapy Time   Individual Concurrent Group Co-treatment   Time In 1300         Time Out 1340         Minutes 40         Timed Code Treatment Minutes: 30 Minutes       Abbe Chambers, PT

## 2020-12-10 NOTE — H&P
Physical Medicine & Rehabilitation History and Physical  [facility] Acute Rehabilitation Unit     Primary care provider: Pauletta Severin, MD     Chief Complaint and Reason for Rehabilitation Admission:   ADL and Mobility deficits secondary to hemorrhagic CVA    History of Present Illness:  Smitha Ellison  is a 48 y.o. right-handed     female admitted to the [address] [place] [address] unit on 12/9/2020. She was originally admitted to [facility] on 11/30/2020 for L sided weakness and headache. She presented originally to [facility] ED. CT confirmed R parietal ICH and moderate SAH. She was treated initially with cardene infusion for hypertension and loaded with Keppra. She was transferred to [facility] for further neurologic care. She was given DDAVP and 1 pack of platelets. ICH score 2. NIHSS 15. Neurosurgery evaluated - managed medically. 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 She is currently requiring assistance for self-care activities and mobility prompting this admission. She denies pain. Premorbid function:  Independent    Current Function:  PT:  Restrictions/Precautions: Fall Risk  Other position/activity restrictions: L hypertonicity, left neglect, signficant left lean sitting/standing   Transfers  Sit to Stand: Maximum Assistance  Stand to sit: Maximum Assistance  Bed to Chair: Dependent/Total, 2 Person Assistance(lg steady, 2 assist for safety)  Stand Pivot Transfers: Dependent/Total(lg steady, 2 assist for safety)  Comment: Pt able to extend hips, but not knee due to not active quad contractions noted. Hard left lateral lean.  Able to correct with verbal and tactile cues         Transfers  Sit to Stand: Maximum Assistance  Stand to sit: Maximum Assistance  Bed to Chair: Dependent/Total, 2 Person Assistance(lg steady, 2 assist for safety)  Stand Pivot Transfers: Dependent/Total(lg steady, 2 assist for safety)  Comment: Pt able to extend hips, but not knee due to not active quad contractions noted. Hard left lateral lean. Able to correct with verbal and tactile cues    Ambulation  Ambulation?: No                 OT:   ADL  Feeding: Setup, Increased time to complete  Grooming: Stand by assistance, Setup, Increased time to complete  UE Bathing: Moderate assistance, Setup, Increased time to complete(assist for RUE, and LUE positioning)  LE Bathing: Dependent/Total  UE Dressing: Maximum assistance, Setup, Verbal cueing, Increased time to complete(assist to don brace, RUE, overhead, pull down over trunk)  LE Dressing: Dependent/Total  Toileting: Dependent/Total  Additional Comments: OT facilitated Pt engagement in self-care routine including bathing, dressing, grooming and toileting tasks. Bathing and dressing performed at sink for just-right challenge. Please see above for details. Balance  Sitting Balance: Maximum assistance(left lateral lean)  Standing Balance: Dependent/Total(left lateral lean noted in Topher Llanos; Max A x 2 in PM)   Standing Balance  Time: less than 30 second increments in AM and PM  Activity: self-care        Bed mobility  Bridging: Contact guard assistance(to stabilize LLE)  Rolling to Left: Minimal assistance  Rolling to Right: Maximum assistance  Supine to Sit: Maximum assistance  Sit to Supine: Maximum assistance  Scooting: Maximal assistance(to EOM and in w/c)  Comment: Pt uses bed rail for rolling to eft side. pt moderately leans to left side, pt able to correct posture, improve seating balance once feet postioned on floor, CGA for static balance with R UE support. mod cues for attending to left side due to left neglect.   Transfers  Stand Pivot Transfers: Dependent/Total  Sit to stand: 2 Person assistance, Maximum assistance(Max A x 1 in AM; Max A x 2 in PM due to fatigue)  Stand to sit: 2 Person assistance, Maximum assistance(Max A x 1 in AM; Max A x 2 in PM due to fatigue)  Transfer Comments: all transfers performed with [facility]   Toilet Transfers  Equipment Used: Milagros [place])  Toilet Transfer: Dependent/Total  Toilet Transfers Comments: Maximum assist x 2 in PM due to Pt's fatigue     Shower Transfers  Shower Transfers Comments: Deferred due to safety concerns with Pt's transfers and sitting balance  Wheelchair Bed Transfers  Equipment Used:  Other, Wheelchair, Bed(Tameka Boojerilyn)  Level of Asssistance: Dependent/Total    SPEECH:      Past Medical History:      Diagnosis Date    Asthma     B[facility] 1 disorder [facility] [address]     Delta storage pool disease ([facility] [address]     Hypertension     Psychiatric problem        Past Surgical History:      Procedure Laterality Date     SECTION  6301,3895    Miscarriage    Emilio Majoey    COLONOSCOPY N/A 2/3/2020     bx(normal)hemorrhoids    GASTRIC BYPASS SURGERY      HYSTERECTOMY      KNEE SURGERY      LAPAROSCOPY      TONSILLECTOMY AND ADENOIDECTOMY      UPPER GASTROINTESTINAL ENDOSCOPY N/A 2/3/2020    (normal,neg H-Pylori)normal post-bypass endoscopy       Allergies:    Pcn [penicillins] and Sulfa antibiotics    Medications   Scheduled Meds:   amLODIPine  10 mg Oral Daily    baclofen  5 mg Oral TID    buPROPion  300 mg Oral Daily    [START ON 2020] cyanocobalamin  1,000 mcg Intramuscular Weekly    Followed by   Guerline Mealing ON 2021] cyanocobalamin  1,000 mcg Intramuscular H86 Days    folic acid  1 mg Oral Daily    labetalol  200 mg Oral 3 times per day    lamoTRIgine  400 mg Oral Daily    lisinopril  40 mg Oral Daily    melatonin  3 mg Oral Nightly    methylPREDNISolone  4 mg Oral QAM AC    methylPREDNISolone  4 mg Oral Nightly    potassium bicarb-citric acid  40 mEq Oral Daily    rOPINIRole  1 mg Oral Nightly    sennosides-docusate sodium  2 tablet Oral Daily    sertraline  50 mg Oral Daily    thiamine  100 mg Oral Daily    polyethylene glycol 17 g Oral Daily    enoxaparin  30 mg Subcutaneous BID     Continuous Infusions:  PRN Meds:.acetaminophen, ipratropium-albuterol, magnesium hydroxide, muscle rub, oxyCODONE, bisacodyl, senna     Social History:  Lives With: Spouse(2 adult children, neither lives at home)  Type of Home: House  Home Layout: Multi-level, Bed/Bath upstairs(3 levels. Half bath on main level.)  Home Access: Stairs to enter without rails  Entrance Stairs - Number of Steps: 4  Bathroom Shower/Tub: Tub/Shower unit  Bathroom Toilet: Standard  Bathroom Equipment: (No equiptment)  Home Equipment: (No AD)  ADL Assistance: Independent  Homemaking Assistance: Independent  Homemaking Responsibilities: Yes  Ambulation Assistance: Independent  Transfer Assistance: Independent  Active : Yes  Mode of Transportation: SUV  Occupation: Part time employment  Type of occupation:   Leisure & Hobbies: relax  Additional Comments:  is a teacher and works full time.   Social History     Socioeconomic History    Marital status:      Spouse name: Not on file    Number of children: Not on file    Years of education: Not on file    Highest education level: Not on file   Occupational History    Not on file   Social Needs    Financial resource strain: Not on file    Food insecurity     Worry: Not on file     Inability: Not on file    Transportation needs     Medical: Not on file     Non-medical: Not on file   Tobacco Use    Smoking status: Never Smoker    Smokeless tobacco: Never Used   Substance and Sexual Activity    Alcohol use: Yes     Comment: no alcohol since Sept    Drug use: No    Sexual activity: Yes     Partners: Male   Lifestyle    Physical activity     Days per week: Not on file     Minutes per session: Not on file    Stress: Not on file   Relationships    Social connections     Talks on phone: Not on file     Gets together: Not on file     Attends Jainism service: Not on file     Active member of club or organization: Not on file     Attends meetings of clubs or organizations: Not on file     Relationship status: Not on file    Intimate partner violence     Fear of current or ex partner: Not on file     Emotionally abused: Not on file     Physically abused: Not on file     Forced sexual activity: Not on file   Other Topics Concern    Not on file   Social History Narrative    Not on file       Family History:       Adopted: Yes   Family history unknown: Yes       Diagnostics:     Ct Head Wo Contrast  Result Date: 11/30/2020  1. Overall stable exam with a large acute right parietal intraparenchymal hematoma and intraventricular extension. Right parietal and sylvian fissure subarachnoid hemorrhage without significant change. 2. 3 mm right to left midline shift without significant change from the previous study.      Ct Head Wo Contrast  Result Date: 11/30/2020  Large intraparenchymal hematoma within the right parietal lobe measuring 5.4 x 3.1 cm resulting in slight midline shift from right to left. Moderate subarachnoid hemorrhage within the sylvian fissure and moderate intraventricular hemorrhage within the right occipital horn. The results of the examination were discussed with Dr. Ping Gr on 11/30/2020 at 7:48 a.m.      Cta Head Neck W Contrast  Result Date: 11/30/2020  No intracranial large vessel occlusion or aneurysm. No gross vascular malformation is detected. No significant cervical carotid stenosis. Large right parietal intraparenchymal hematoma. Moderate subarachnoid hemorrhage within the right sylvian fissure. Moderate right occipital lobe hemorrhage. 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 The results of the examination were discussed with Dr. Ping Gr on 11/30/2020 at 8:10 a.m.      Mri Brain W Wo Contrast  Result Date: 11/30/2020  No intracranial mass.      CBC:   Recent Labs     12/10/20  0616   WBC 10.4   RBC 4.18   HGB 12.7   HCT 37.4   MCV 89.4   RDW 14.5 *     BMP:   Recent Labs     12/10/20  0616   *   K 4.6   CL 98   CO2 21   BUN 25*   CREATININE 1.11*   GLUCOSE 101*     HbA1c:   Lab Results   Component Value Date    LABA1C 5.0 11/30/2020     BNP: No results for input(s): BNP in the last 72 hours. PT/INR: No results for input(s): PROTIME, INR in the last 72 hours. APTT: No results for input(s): APTT in the last 72 hours. CARDIAC ENZYMES: No results for input(s): CKMB, CKMBINDEX, TROPONINT in the last 72 hours. Invalid input(s): CKTOTAL;3  FASTING LIPID PANEL:  Lab Results   Component Value Date    CHOL 247 (H) 11/30/2020     11/30/2020    TRIG 77 11/30/2020     LIVER PROFILE: No results for input(s): AST, ALT, ALB, BILIDIR, BILITOT, ALKPHOS in the last 72 hours. Review of Systems:  CONSTITUTIONAL:  Denies fevers, chills, sweats or fatigue. EYES:  Denies diplopia, blind spots, blurring. HEENT:  Denies hearing loss, trouble chewing or swallowing. RESPIRATORY:  No wheezing, coughing, shortness of breath. CARDIOVASCULAR:  Denies chest pain, palpitations, lightheadedness. GASTROINTESTINAL:  Denies heartburn, nausea, constipation, diarrhea, abdominal pain. GENITOURINARY:  No urgency, frequency, incontinence, dysuria. ENDOCRINE:  Denies hot or cold intolerance. MUSCULOSKELETAL:  Denies focal joint pain, back pain, neck pain. But she does note intermittent low back spasms and worsening of her restless leg symptoms. NEUROLOGICAL:  Denies focal numbness, tingling, balance loss, headache. BEHAVIOR/PSYCH:  Denies depression, anxiety, memory loss, insomnia. SKIN:  No ulcers, rash, bruises. Physical Exam:  BP (!) 142/65   Pulse 88   Temp 98.4 °F (36.9 °C) (Oral)   Resp 18   Ht 5' 5\" (1.651 m)   SpO2 97%   BMI 38.85 kg/m²     GEN: Well developed, well nourished, in NAD  HEENT:  NCAT. PERRL. EOMI. Mucous membranes pink and moist.   PULM:  Clear to ausculation. No rales or rhonchi. Respirations WNL and unlabored.    CV: Minimal Assist  Supervision at Discharge: None      Maxime Mora MD     This note is created with the assistance of a speech recognition program.  While intending to generate a document that actually reflects the content of the visit, the document can still have some errors including those of syntax and sound a like substitutions which may escape proof reading. In such instances, actual meaning can be extrapolated by contextual diversion.

## 2020-12-10 NOTE — PROGRESS NOTES
Comprehensive Nutrition Assessment    Type and Reason for Visit:  Initial, Consult(Rehab)    Nutrition Recommendations/Plan: Recommend continue General diet with Ensure Enlive on all trays    Nutrition Assessment:  Pt was admited to [facility] due to CVA. She is now admited to Rehab. She did not eat well at [facility] but took the Ensure HIgh Protein supplements well. Nursing is documenting intake greater than 50% since rehab admit. Pt states no complaints. Malnutrition Assessment:  Malnutrition Status:   At risk for malnutrition (Comment)    Context:  Acute Illness     Findings of the 6 clinical characteristics of malnutrition:  Energy Intake:  Mild decrease in energy intake (Comment)  Weight Loss:  Unable to assess(stated wts)     Body Fat Loss:  No significant body fat loss     Muscle Mass Loss:  No significant muscle mass loss    Fluid Accumulation:  No significant fluid accumulation     Strength:  Not Performed    Estimated Daily Nutrient Needs:  Energy (kcal):  1900 kcal= 18 kcal /kg; Weight Used for Energy Requirements:  Current     Protein (g):  1.5g/kg=85 g or more; Weight Used for Protein Requirements:  Ideal          Nutrition Related Findings:  Edema: none, Labs (12/10) Na 132, Glu 101 Meds: Reviewed, BM 12/9      Wounds:  None       Current Nutrition Therapies:    DIET GENERAL;  Dietary Nutrition Supplements: Low Calorie High Protein Supplement    Anthropometric Measures:  · Height: 5' 5\" (165.1 cm)  · Current Body Weight: 233 lb (105.7 kg)   · Usual Body Weight: 243 lb (110.2 kg)(stated)     · Ideal Body Weight: 125 lbs; % Ideal Body Weight 186.4 %   · BMI: 38.8  · BMI Categories: Obese Class 2 (BMI 35.0 -39.9)       Nutrition Diagnosis:   · Inadequate energy intake related to (decreased appetite) as evidenced by poor intake prior to admission    Nutrition Interventions:   Food and/or Nutrient Delivery:  Continue Current Diet, Continue Oral Nutrition Supplement  Nutrition Education/Counseling:  Education not indicated   Coordination of Nutrition Care:  Continue to monitor while inpatient    Goals:  po intake greater than 75%       Nutrition Monitoring and Evaluation:   Behavioral-Environmental Outcomes:  None Identified   Food/Nutrient Intake Outcomes:  Food and Nutrient Intake, Supplement Intake  Physical Signs/Symptoms Outcomes:  Biochemical Data, GI Status, Skin, Weight, Fluid Status or Edema     Discharge Planning:    Continue current diet     Electronically signed by Nette Aleman RD, LD on 12/10/20 at 12:16 PM EST    Contact: [phone]

## 2020-12-10 NOTE — PROGRESS NOTES
Physical Therapy    [address]   Acute Rehabilitation Physical Therapy Progress Note    Date: 12/10/20  Patient Name: Seth Lesch       Room: 2612/2612-01  MRN: [id number]   Account: [de-identified]   : 1970  (48 y.o.) Gender: female     Referring Practitioner: Estrella Hurst MD  Diagnosis: Intracranial hemmorrhage  Past Medical History:  has a past medical history of Asthma, Bipolar 1 disorder ([facility] [address] Delta storage pool disease [facility]), Hypertension, and Psychiatric problem. Past Surgical History:   has a past surgical history that includes  section ([id number],[phone]); Gastric bypass surgery (); Tonsillectomy and adenoidectomy (); Cholecystectomy (); knee surgery (); Hysterectomy (); laparoscopy (); Colonoscopy (N/A, 2/3/2020); and Upper gastrointestinal endoscopy (N/A, 2/3/2020). Additional Pertinent Hx: 66-year-old female with history of delta storage pool disease, bipolar disorder, alcohol abuse who developed acute left-sided weakness-found to have intraparenchymal hemorrhage, right parietal lobe hemorrhage and moderate subarachnoid hemorrhage. Pt admitted to rehab unit on 20. Restrictions/Precautions  Restrictions/Precautions: Fall Risk  Required Braces or Orthoses?: No  Position Activity Restriction  Other position/activity restrictions: L hypertonicity, with noted imporvement this date            Vital Signs  Patient Currently in Pain: Denies                   Bed Mobility:   Bed Mobility  Bridging: Contact guard assistance(to stabilize LLE)  Rolling: Minimal assistance;Rolling Left  Supine to Sit: Maximal assistance  Sit to Supine:  Moderate assistance(x 2 person assist)  Scooting: Maximal assistance(to EOM and in w/c)  Comment: completed on mat with 2 pillows  Bed mobility  Bridging: Contact guard assistance(to stabilize LLE)  Scooting: Maximal assistance(to EOM and in w/c)    Transfers:  Sit to Stand: Maximum Assistance  Stand to sit: 1300   Time Out 1340   Minutes 40       Electronically signed by Davis Jameson PTA on 12/10/20 at 4:26 PM EST

## 2020-12-10 NOTE — PLAN OF CARE
Nutrition Problem #1: Inadequate energy intake  Intervention: Food and/or Nutrient Delivery: Continue Current Diet, Continue Oral Nutrition Supplement  Nutritional Goals: po intake greater than 75%

## 2020-12-10 NOTE — PROGRESS NOTES
limits  Vision - Basic Assessment  Prior Vision: Wears glasses all the time  Patient Visual Report: Difficulty maintaining concentration with focus  Social/Functional History  Lives With: Spouse  Type of Home: House  Home Layout: Two level, Bed/Bath upstairs, 1/2 bath on main level(finished basement for recreation)  Home Access: Stairs to enter without rails  Entrance Stairs - Number of Steps: 3-5 steps to enter with 0 HR; 6 + 1 + 8 steps to 2nd floor with R HR for all 15 steps  Bathroom Shower/Tub: Tub/Shower unit, Curtain  Bathroom Toilet: Standard  Bathroom Equipment: (no DME)  Bathroom Accessibility: Walker accessible, Accessible  Home Equipment: (no DME)  ADL Assistance: Independent  Homemaking Assistance: Independent  Homemaking Responsibilities: Yes  Ambulation Assistance: Independent  Transfer Assistance: Independent  Active : Yes  Mode of Transportation: SUV  Occupation: Part time employment  Type of occupation:  (former )  Leisure & Hobbies: Hanging out with family, bingeing [employer] series, playing Poker with family  Additional Comments: Pt's spouse is a teacher and works full time. Pt has 2 adult sons who live nearby and stop by the home.        Objective  Vision - Basic Assessment  Prior Vision: Wears glasses all the time  Patient Visual Report: Difficulty maintaining concentration with focus   Cognition  Overall Cognitive Status: WFL   Perception  Overall Perceptual Status: Impaired  Unilateral Attention: Cues to attend left visual field, Cues to attend to left side of body, Cues to maintain midline in sitting, Cues to maintain midline in standing  Sensation  Overall Sensation Status: Impaired(reports no sensation LUE/LLE)     UE Function  Hand Dominance  Hand Dominance: Right        LUE Strength  Gross LUE Strength: Exceptions to [facility]  L Hand General: 1/5     LUE Tone: Hypertonic  LUE PROM (degrees)  LUE PROM: WFL  LUE AROM (degrees)  LUE AROM : Exceptions(Possible Setup; Increased time to complete  Grooming: Stand by assistance;Setup; Increased time to complete  UE Bathing: Moderate assistance;Setup; Increased time to complete(assist for RUE, and LUE positioning)  LE Bathing: Dependent/Total  UE Dressing: Maximum assistance;Setup;Verbal cueing; Increased time to complete(assist to don brace, RUE, overhead, pull down over trunk)  LE Dressing: Dependent/Total  Toileting: Dependent/Total  Additional Comments: OT facilitated Pt engagement in self-care routine including bathing, dressing, grooming and toileting tasks. Bathing and dressing performed at sink for just-right challenge. Please see above for details. OT scores   Eating  Assistance Needed: Setup or clean-up assistance  CARE Score: 5  Discharge Goal: Independent  Oral Hygiene  Assistance Needed: Supervision or touching assistance  CARE Score: 4  Discharge Goal: Independent  Toileting Hygiene  Assistance Needed: Dependent  CARE Score: 1  Discharge Goal: Independent  Shower/Bathe Self  Assistance Needed: Dependent  Comment: two assist for buttocks/perineal area  CARE Score: 1  Discharge Goal: Independent  Upper Body Dressing  Assistance Needed: Substantial/maximal assistance  CARE Score: 2  Discharge Goal: Independent  Lower Body Dressing  Assistance Needed: Dependent  CARE Score: 1  Discharge Goal: Independent  Putting On/Taking Off Footwear  Assistance Needed: Dependent  CARE Score: 1  Discharge Goal: Partial/moderate assistance  Toilet Transfer  Assistance Needed: Dependent  CARE Score: 1  Discharge Goal: Independent      Goals  Patient Goals   Patient goals : To discharge home with family support  Short term goals  Time Frame for Short term goals: 7 to 10 days  Short term goal 1: Pt will perform upper body bathing/dressing with stand by assist.  Short term goal 2: Pt will perform lower body bathing and dressing with Moderate assist and Fair safety.   Short term goal 3: Pt will perform toileting tasks with Moderate assist.  Short term goal 4: Pt will verbalize/demonstrate Good understanding of assistive equipment/durable medical equipment/modified techniques for increased independence with self-care and mobility. Short term goal 5: Pt will perform functional transfers with Moderate assist to toilet/wheelchair/shower with Fair safety. Short term goal 6: Pt will actively participate in 30+ minutes of therapeutic exercise/functional activities to promote increased independence with self-care and mobility. Long term goals  Time Frame for Long term goals : By discharge  Long term goal 1: Pt will perform BADLs with modified independence and Good safety with assist PRN for HORTENSIA hose. Long term goal 2: Pt will perform functional transfers and mobility with modified independence, least restrictive mobility device, and Good safety. Long term goal 3: Pt will attend to L side of body 100% of the time during self-care, transfers, and mobility with 1-2 verbal cues PRN. Long term goal 4: Pt will stand for 5+ minutes with 1-2 UE support, modified independence and no loss of balance while engaging in functional activity of choice. Long term goal 5: Pt will verbalize/demonstrate Good understanding of home exercise program for LUE. Long term goals 6: 9 hole peg, box and block to be assessed for LUE as appropriate    Assessment  Performance deficits / Impairments: Decreased functional mobility , Decreased ADL status, Decreased ROM, Decreased strength, Decreased endurance, Decreased balance, Decreased high-level IADLs, Decreased fine motor control, Decreased coordination, Decreased posture, Decreased safe awareness, Decreased vision/visual deficit  Treatment Diagnosis: Impaired self-care status.   Prognosis: Good  Decision Making: Medium Complexity  REQUIRES OT FOLLOW UP: Yes  Discharge Recommendations: Home with assist PRN, Patient would benefit from continued therapy after discharge  Plan  Times per week: 5-7  Times per day: Twice a day  Current Treatment Recommendations: Self-Care / ADL, Home Management Training, Strengthening, Balance Training, Functional Mobility Training, Endurance Training, Wheelchair Mobility Training, Neuromuscular Re-education, Pain Management, Safety Education & Training, Patient/Caregiver Education & Training, Equipment Evaluation, Education, & procurement, Cognitive/Perceptual Training             12/10/20 0830 12/10/20 1343   OT Individual Minutes   Time In 8517 [id number]   Time Out [id number]   NDBAR 12 25   Time Code Minutes    Timed Code Treatment Minutes 55 Minutes 25 Minutes     Electronically signed by Michele Hays OT on 12/10/20 at 4:51 PM EST

## 2020-12-11 PROBLEM — R53.1 ACUTE LEFT-SIDED WEAKNESS: Status: ACTIVE | Noted: 2020-12-11

## 2020-12-11 PROBLEM — I10 ESSENTIAL HYPERTENSION: Status: ACTIVE | Noted: 2020-12-11

## 2020-12-11 PROCEDURE — 6360000002 HC RX W HCPCS: Performed by: NURSE PRACTITIONER

## 2020-12-11 PROCEDURE — 97112 NEUROMUSCULAR REEDUCATION: CPT

## 2020-12-11 PROCEDURE — 1180000000 HC REHAB R&B

## 2020-12-11 PROCEDURE — 97530 THERAPEUTIC ACTIVITIES: CPT

## 2020-12-11 PROCEDURE — 6370000000 HC RX 637 (ALT 250 FOR IP): Performed by: PHYSICAL MEDICINE & REHABILITATION

## 2020-12-11 PROCEDURE — 6360000002 HC RX W HCPCS: Performed by: PHYSICAL MEDICINE & REHABILITATION

## 2020-12-11 PROCEDURE — 97116 GAIT TRAINING THERAPY: CPT

## 2020-12-11 PROCEDURE — 99232 SBSQ HOSP IP/OBS MODERATE 35: CPT | Performed by: PHYSICAL MEDICINE & REHABILITATION

## 2020-12-11 PROCEDURE — 97535 SELF CARE MNGMENT TRAINING: CPT

## 2020-12-11 PROCEDURE — 6370000000 HC RX 637 (ALT 250 FOR IP): Performed by: NURSE PRACTITIONER

## 2020-12-11 RX ORDER — BACLOFEN 10 MG/1
5 TABLET ORAL NIGHTLY
Status: DISCONTINUED | OUTPATIENT
Start: 2020-12-11 | End: 2020-12-12

## 2020-12-11 RX ORDER — BACLOFEN 10 MG/1
5 TABLET ORAL 3 TIMES DAILY
Status: DISCONTINUED | OUTPATIENT
Start: 2020-12-11 | End: 2020-12-12

## 2020-12-11 RX ADMIN — THIAMINE HCL TAB 100 MG 100 MG: 100 TAB at 08:05

## 2020-12-11 RX ADMIN — LISINOPRIL 40 MG: 20 TABLET ORAL at 08:04

## 2020-12-11 RX ADMIN — SERTRALINE HYDROCHLORIDE 50 MG: 50 TABLET ORAL at 08:14

## 2020-12-11 RX ADMIN — BACLOFEN 5 MG: 10 TABLET ORAL at 19:58

## 2020-12-11 RX ADMIN — ACETAMINOPHEN 650 MG: 325 TABLET, FILM COATED ORAL at 13:54

## 2020-12-11 RX ADMIN — ACETAMINOPHEN 650 MG: 325 TABLET, FILM COATED ORAL at 05:59

## 2020-12-11 RX ADMIN — METHYLPREDNISOLONE 4 MG: 4 TABLET ORAL at 19:59

## 2020-12-11 RX ADMIN — METHYLPREDNISOLONE 4 MG: 4 TABLET ORAL at 05:40

## 2020-12-11 RX ADMIN — FOLIC ACID 1 MG: 1 TABLET ORAL at 08:05

## 2020-12-11 RX ADMIN — ROPINIROLE HYDROCHLORIDE 1 MG: 1 TABLET, FILM COATED ORAL at 19:59

## 2020-12-11 RX ADMIN — BACLOFEN 5 MG: 10 TABLET ORAL at 22:14

## 2020-12-11 RX ADMIN — POTASSIUM BICARBONATE 40 MEQ: 782 TABLET, EFFERVESCENT ORAL at 08:04

## 2020-12-11 RX ADMIN — LABETALOL HYDROCHLORIDE 200 MG: 100 TABLET, FILM COATED ORAL at 22:13

## 2020-12-11 RX ADMIN — ENOXAPARIN SODIUM 30 MG: 30 INJECTION SUBCUTANEOUS at 19:59

## 2020-12-11 RX ADMIN — SENNOSIDES AND DOCUSATE SODIUM 2 TABLET: 8.6; 5 TABLET ORAL at 08:04

## 2020-12-11 RX ADMIN — BACLOFEN 5 MG: 10 TABLET ORAL at 13:50

## 2020-12-11 RX ADMIN — LAMOTRIGINE 400 MG: 100 TABLET ORAL at 13:50

## 2020-12-11 RX ADMIN — BACLOFEN 5 MG: 10 TABLET ORAL at 08:06

## 2020-12-11 RX ADMIN — BUPROPION HYDROCHLORIDE 300 MG: 300 TABLET, FILM COATED, EXTENDED RELEASE ORAL at 08:08

## 2020-12-11 RX ADMIN — Medication 3 MG: at 19:59

## 2020-12-11 RX ADMIN — OXYCODONE HYDROCHLORIDE 5 MG: 5 TABLET ORAL at 16:18

## 2020-12-11 RX ADMIN — ENOXAPARIN SODIUM 30 MG: 30 INJECTION SUBCUTANEOUS at 08:05

## 2020-12-11 RX ADMIN — AMLODIPINE BESYLATE 10 MG: 10 TABLET ORAL at 08:04

## 2020-12-11 RX ADMIN — LABETALOL HYDROCHLORIDE 200 MG: 100 TABLET, FILM COATED ORAL at 13:52

## 2020-12-11 RX ADMIN — POLYETHYLENE GLYCOL 3350 17 G: 17 POWDER, FOR SOLUTION ORAL at 08:04

## 2020-12-11 ASSESSMENT — PAIN SCALES - GENERAL
PAINLEVEL_OUTOF10: 1
PAINLEVEL_OUTOF10: 1
PAINLEVEL_OUTOF10: 2
PAINLEVEL_OUTOF10: 8
PAINLEVEL_OUTOF10: 2
PAINLEVEL_OUTOF10: 3
PAINLEVEL_OUTOF10: 0

## 2020-12-11 NOTE — PROGRESS NOTES
[address] [facility]    ACUTE REHABILITATION OCCUPATIONAL THERAPY  DAILY NOTE    Date: 20  Patient Name: Alexandru White      Room: 2612/2612-01    MRN: [id number]   : 1970  (48 y.o.)  Gender: female   Referring Practitioner: Angie Soria MD  Diagnosis: Intracranial hemmorrhage  Additional Pertinent Hx: 75-year-old female with history of delta storage pool disease, bipolar disorder, alcohol abuse who developed acute left-sided weakness-found to have intraparenchymal hemorrhage, right parietal lobe hemorrhage and moderate subarachnoid hemorrhage. Pt admitted to rehab unit on 20. Restrictions  Restrictions/Precautions: Fall Risk  Other position/activity restrictions: L hypertonicity, with noted imporvement this date  Required Braces or Orthoses?: No  Equipment Used:  Other, Wheelchair, [employer])    Subjective  Subjective: \"I don't want it to look like I'm giving up\"   Comments: pt very motivated, heavy fatigue towards end of session, requesting to be done for the morning to rest  Patient Currently in Pain: Yes  Pain Level: 1  Pain Location: Arm  Pain Orientation: Left  Restrictions/Precautions: Fall Risk  Overall Orientation Status: Within Functional Limits  Patient Observation  Observations: L side neglect  Pain Assessment  Pain Assessment: 0-10  Pain Level: 1  Pain Type: Acute pain  Pain Location: Arm  Pain Orientation: Left  Pain Descriptors: Aching, Sore    Objective  Cognition  Overall Cognitive Status: WFL  Perception  Overall Perceptual Status: Impaired  Unilateral Attention: Cues to attend left visual field;Cues to attend to left side of body;Cues to maintain midline in sitting;Cues to maintain midline in standing  Balance  Sitting Balance: Stand by assistance(seated in w/c)                    Additional Activities: w/c positioning with adjustments for recline, pt very uncomfortable, pt able to \"scoot\" R side back in w/c, assist for positoning on L side, cuing for LUE and explanation       Outcome: verbalized concerns, demonstrated understanding, needs reinforcement and asked questions        Plan  Plan  Times per week: 5-7  Times per day: Twice a day  Current Treatment Recommendations: Self-Care / ADL, Home Management Training, Strengthening, Balance Training, Functional Mobility Training, Endurance Training, Wheelchair Mobility Training, Neuromuscular Re-education, Pain Management, Safety Education & Training, Patient/Caregiver Education & Training, Equipment Evaluation, Education, & procurement, Cognitive/Perceptual Training  Patient Goals   Patient goals : To discharge home with family support  Short term goals  Time Frame for Short term goals: 7 to 10 days  Short term goal 1: Pt will perform upper body bathing/dressing with stand by assist.  Short term goal 2: Pt will perform lower body bathing and dressing with Moderate assist and Fair safety. Short term goal 3: Pt will perform toileting tasks with Moderate assist.  Short term goal 4: Pt will verbalize/demonstrate Good understanding of assistive equipment/durable medical equipment/modified techniques for increased independence with self-care and mobility. Short term goal 5: Pt will perform functional transfers with Moderate assist to toilet/wheelchair/shower with Fair safety. Short term goal 6: Pt will actively participate in 30+ minutes of therapeutic exercise/functional activities to promote increased independence with self-care and mobility. Long term goals  Time Frame for Long term goals : By discharge  Long term goal 1: Pt will perform BADLs with modified independence and Good safety with assist PRN for HORTENSIA hose. Long term goal 2: Pt will perform functional transfers and mobility with modified independence, least restrictive mobility device, and Good safety. Long term goal 3: Pt will attend to L side of body 100% of the time during self-care, transfers, and mobility with 1-2 verbal cues PRN.   Long term goal 4: Pt will stand for 5+ minutes with 1-2 UE support, modified independence and no loss of balance while engaging in functional activity of choice. Long term goal 5: Pt will verbalize/demonstrate Good understanding of home exercise program for LUE.   Long term goals 6: 9 hole peg, box and block to be assessed for LUE as appropriate        12/11/20 1340 12/11/20 1522   OT Individual Minutes   Time In [address] [place] [address]   Minutes 40 54     Electronically signed by JEAN CARLOS Yun on 12/11/20 at 3:23 PM EST

## 2020-12-11 NOTE — CARE COORDINATION
Tom Pool RN    Registered Nurse    Case Management    Progress Notes    Signed    Date of Service:  2020  3:43 PM          Related encounter: ED to Hosp-Admission (Discharged) from 2020 in . Ellie Saenz [facility]  Acute Inpatient Rehab Preadmission Assessment     Patient Name: Bel Corea        MRN:   [id number]    : 1970  (48 y.o.)  Gender: female      [place]   [place]  [place]? [facility]   []? [facility]   []? [facility]   []? Outside Admission - Location:                                 [x]? Initial         []? Updated     Date of Onset / Admission to the acute hospital:  20     Inpatient Rehabilitation Admitting Diagnosis:  ICH     Did patient have surgery? [x]? No  []? Yes:       Physicians: Deana Marin Zaidat     Risk for clinical complications: Moderate to High     Co-morbidities:  Bipolar disorder, alcohol abuse, delta storage pool disease     Financial Information  Primary insurance:  []? Medicare     []? Medicare HMO      [x]? [employer]    []? Medicaid      []? Medicaid HMO       []? Workers Compensation        []? Personal Pay     Secondary Insurance:  []? Medicare     []? Medicare HMO      []? [employer]    []? Medicaid      []? Medicaid HMO        []? Workers Compensation      [x]? None     Precautions:   []? Cardiac Precautions:            []? Total hip precautions:           []? Weight Bearing status:  [x]? Safety Precautions/Concerns:  Fall Risk, General Precautions, Seizure Precautions  [x]? Visually impaired:   [facility] for simple vision screening; however, pt observed having difficult time seeing objects placed in front of her.)     []? Hard of Hearing:      Isolation Precautions:         []? Yes              [x]? No  If Yes:   []? Droplet  []? Contact           []? Airborne     []? VRE     []? MRSA        []? C-diff         []? TB             []? ESBL         []? MDRO          []?  Other: Physiatrist:  [x]?      []? Dr. Hare Client  []? Dr. Sachi Mckeon  []? Dr. Jazmine Gamez     Patients Occupation: Employed part time     Reviewed Lab and Diagnostic reports from Current Admission: Yes     Patients Prior Functional  Level: Prior Function  ADL Assistance: Independent  Homemaking Assistance: Independent  Ambulation Assistance: Independent  Transfer Assistance: Independent  Additional Comments:  is a teacher and works full time.     History of current illness:  59-year-old female with history of delta storage pool disease, bipolar disorder and alcohol abuse who developed acute left-sided weakness and found have a right parieto-occipital intraparenchymal hemorrhage CT head outlying ED revealed large right parietal lobe intracranial hemorrhage with moderate subarachnoid hemorrhage.  She is notably hypertensive.  Cardene infusion started.  Repeat CT showed right parietal and cranial hemorrhage subarachnoid hemorrhage and new intraventricular hemorrhage in the right occipital horn.  CTA head and neck unremarkable.  [place] Coma Scale 4.  There is no to have dense left upper and lower extreme weakness.  She has history of alcohol abuse-notes she was in rehab but no longer drinks daily but still drinks occasionally-last drank vodka at night prior to incident. Roberto David was started on Precedex infusion.  Concern of alcohol withdrawal.     Neurosurgery-repeat CT head stable, received DDAVP and platelets 51/01 continue hourly neuro checks     Hematology/oncology-Dr. Erik Islas disorder-transfuse platelets and administer desmopressin as needed        Current functional status for upper extremity ADLs:  UE Bathing: Setup, Verbal cueing, Increased time to complete, Minimal assistance  UE Dressing: Setup, Minimal assistance, Increased time to complete     Current functional status for lower extremity ADLs:  LE Bathing:  Moderate assistance, Setup, Increased time to complete  LE Dressing: Setup, Maximum assistance     Current functional status for bed, chair, wheelchair transfers:  Transfers  Sit to Stand: Minimal Assistance, Moderate Assistance, 2 Person Assistance  Stand to sit: Minimal Assistance, Moderate Assistance, 2 Person Assistance  Bed to Chair: Dependent/Total, 2 Person Assistance(lg lopez)  Stand Pivot Transfers: Dependent/Total, 2 Person Assistance(lg lopez)  Comment: STS tranfers performed x2 in lg steady.     Current functional status for toilet transfers:   Minimal Assistance, Moderate Assistance, 2 Person Assistance        Current functional status for locomotion:  Ambulation  Ambulation?: No     Current functional status for comprehension: Complete independence     Current functional status for expression: Complete independence     Current functional status for social interaction: Complete independence     Current functional status for problem solving: Complete independence     Current functional status for memory: Complete independence     Current Deficits R/T Impairment: Impaired Functional Mobility and Decreased ADLs     Required Therapy:   [x]? Physical Therapy  [x]? Occupational Therapy   []? Speech Therapy, as appropriate     Additional Services:  [x]?   []? Recreational Therapy, as appropriate  [x]? Nutrition  []? Dialysis  []? Other:      Rehab Justification:  Needs 3 hrs therapy per day or 15 hours per week:  Yes  Identified Rehab Nursing needs: Yes  Intense Interdisciplinary need:  Yes  Need for 24 hr physician supervision:  Yes  Measurable improved quality of life:  Yes  Willingness to participate:  Yes  Medical Necessity:  Yes  Patient able to tolerate care proposed:   Yes     Expected Discharge Destination/Functional Level:  Home with assist  Expected length of time to achieve that level of improvement: 1-2 weeks  Expected Post Discharge Treatments: Home with possible Home Care     Other information relevant to the care needs:  n/a     Acute Inpatient Rehabilitation Disclosure Statement provided to patient. Patient verbalized understanding.   Copy placed on patient's light chart.     I have reviewed and concur with the findings and results of the pre-admission screening assessment completed by the Inpatient Rehabilitation Admissions Coordinator.                  Cosigned by:  Ivelisse Massey MD at 12/9/2020  4:09 PM

## 2020-12-11 NOTE — PROGRESS NOTES
Physical Medicine & Rehabilitation  Progress Note      Subjective:      48year-old female with hemorrhagic CVA and L nondominant hemiparesis. Patient is well, and has had no acute complaints or problems. She denies any new issues with sleep, appetite, bowel, or bladder. L trunk lean is improved today. ROS:  Denies fevers, chills, sweats. No chest pain, palpitations, lightheadedness. Denies coughing, wheezing or shortness of breath. Denies abdominal pain, nausea, diarrhea or constipation. No new areas of joint pain. Denies new areas of numbness or weakness. Denies new anxiety or depression issues. No new skin problems. Rehabilitation:   Progressing in therapies. PT:  Restrictions/Precautions: Fall Risk  Other position/activity restrictions: L hypertonicity, with noted imporvement this date   Transfers  Sit to Stand: Moderate Assistance, 2 Person Assistance(To Steve Garre; assist for L UE/hand)  Stand to sit: Moderate Assistance, 2 Person Assistance(SS; Cues to sit back with fair return; assist for L UE/hand)  Bed to Chair: Dependent/Total, 2 Person Assistance(lg steady, 2 assist for safety)  Stand Pivot Transfers: Dependent/Total(lg steady, 2 assist for safety)  Comment: Pt not able to extend L knee; hard L lateral lean, corrects with verbal, tactile & visual cues         Transfers  Sit to Stand:  Moderate Assistance, 2 Person Assistance(To Steve Garre; assist for L UE/hand)  Stand to sit: Moderate Assistance, 2 Person Assistance(SS; Cues to sit back with fair return; assist for L UE/hand)  Bed to Chair: Dependent/Total, 2 Person Assistance(lg steady, 2 assist for safety)  Stand Pivot Transfers: Dependent/Total(lg steady, 2 assist for safety)  Comment: Pt not able to extend L knee; hard L lateral lean, corrects with verbal, tactile & visual cues    Ambulation  Ambulation?: No               OT:  ADL  Feeding: Setup, Increased time to complete Grooming: Stand by assistance, Setup, Increased time to complete  UE Bathing: Moderate assistance, Setup, Increased time to complete(assist for RUE, and LUE positioning)  LE Bathing: Dependent/Total  UE Dressing: Maximum assistance, Setup, Verbal cueing, Increased time to complete(assist to don brace, RUE, overhead, pull down over trunk)  LE Dressing: Dependent/Total  Toileting: Dependent/Total  Additional Comments: OT facilitated Pt engagement in self-care routine including bathing, dressing, grooming and toileting tasks. Bathing and dressing performed at sink for just-right challenge. Please see above for details. Balance  Sitting Balance: Maximum assistance(left lateral lean)  Standing Balance: Dependent/Total(left lateral lean noted in [facility]; Max A x 2 in PM)   Standing Balance  Time: less than 30 second increments in AM and PM  Activity: self-care        Bed mobility  Bridging: Contact guard assistance(to stabilize LLE)  Rolling to Left: Minimal assistance  Rolling to Right: Maximum assistance  Supine to Sit: Maximum assistance  Sit to Supine: Maximum assistance  Scooting: Maximal assistance(to EOM and in w/c)  Comment: Pt uses bed rail for rolling to eft side. pt moderately leans to left side, pt able to correct posture, improve seating balance once feet postioned on floor, CGA for static balance with R UE support. mod cues for attending to left side due to left neglect.   Transfers  Stand Pivot Transfers: Dependent/Total  Sit to stand: 2 Person assistance, Maximum assistance(Max A x 1 in AM; Max A x 2 in PM due to fatigue)  Stand to sit: 2 Person assistance, Maximum assistance(Max A x 1 in AM; Max A x 2 in PM due to fatigue)  Transfer Comments: [facility]   Toilet Transfers  Equipment Used: [employer])  Toilet Transfer: Dependent/Total  Toilet Transfers Comments: Maximum assist x 2 in PM due to Pt's fatigue     Shower Transfers Shower Transfers Comments: Deferred due to safety concerns with Pt's transfers and sitting balance  Wheelchair Bed Transfers  Equipment Used: Other, Wheelchair, Bed(Tameka Camp)  Level of Asssistance: Dependent/Total    SPEECH:      Objective:  /73   Pulse 74   Temp 97.9 °F (36.6 °C) (Oral)   Resp 19   Ht 5' 5\" (1.651 m)   SpO2 97%   BMI 38.85 kg/m²       GEN: Well developed, well nourished, in NAD  HEENT:  NCAT. PERRL. EOMI. Mucous membranes pink and moist.   PULM:  Clear to ausculation. No rales or rhonchi. Respirations WNL and unlabored. CV:  Regular rate rhythm. No murmurs or gallops. GI:  Abdomen soft. Nontender. Non-distended. BS + and equal.    NEUROLOGICAL: A&O x3. Sensation intact to light touch. Impaired L CN V and VII.   MSK:  Functional ROM RUE and RLEs. Impaired AROM LUE and LLEs. Motor testing 5/5 key muscles RUE and RLEs. 1/5 L shoulder shrug, 1/5 L hip flexion. SKIN: Warm dry and intact. Good turgor. EXTREMITIES:  No calf tenderness to palpation. No edema BLEs. Florencebeth Carrel PSYCH: Mood WNL. Appropriately interactive. Affect WNL. Diagnostics:     CBC:   Recent Labs     12/10/20  0616   WBC 10.4   RBC 4.18   HGB 12.7   HCT 37.4   MCV 89.4   RDW 14.5   *     BMP:   Recent Labs     12/10/20  0616   *   K 4.6   CL 98   CO2 21   BUN 25*   CREATININE 1.11*   GLUCOSE 101*     BNP: No results for input(s): BNP in the last 72 hours. PT/INR: No results for input(s): PROTIME, INR in the last 72 hours. APTT: No results for input(s): APTT in the last 72 hours. CARDIAC ENZYMES: No results for input(s): CKMB, CKMBINDEX, TROPONINT in the last 72 hours. Invalid input(s): CKTOTAL;3  FASTING LIPID PANEL:  Lab Results   Component Value Date    CHOL 247 (H) 11/30/2020     11/30/2020    TRIG 77 11/30/2020     LIVER PROFILE: No results for input(s): AST, ALT, ALB, BILIDIR, BILITOT, ALKPHOS in the last 72 hours.      Current Medications: Current Facility-Administered Medications: acetaminophen (TYLENOL) tablet 650 mg, 650 mg, Oral, Q4H PRN  amLODIPine (NORVASC) tablet 10 mg, 10 mg, Oral, Daily  baclofen (LIORESAL) tablet 5 mg, 5 mg, Oral, TID  buPROPion (WELLBUTRIN XL) extended release tablet 300 mg, 300 mg, Oral, Daily  [COMPLETED] cyanocobalamin injection 1,000 mcg, 1,000 mcg, Intramuscular, Daily **FOLLOWED BY** cyanocobalamin injection 1,000 mcg, 1,000 mcg, Intramuscular, Weekly **FOLLOWED BY** [START ON 2/5/2021] cyanocobalamin injection 1,000 mcg, 1,000 mcg, Intramuscular, M63 Days  folic acid (FOLVITE) tablet 1 mg, 1 mg, Oral, Daily  ipratropium-albuterol (DUONEB) nebulizer solution 1 ampule, 1 ampule, Inhalation, Q4H PRN  labetalol (NORMODYNE) tablet 200 mg, 200 mg, Oral, 3 times per day  lamoTRIgine (LAMICTAL) tablet 400 mg, 400 mg, Oral, Daily  lisinopril (PRINIVIL;ZESTRIL) tablet 40 mg, 40 mg, Oral, Daily  magnesium hydroxide (MILK OF MAGNESIA) 400 MG/5ML suspension 30 mL, 30 mL, Oral, Daily PRN  melatonin tablet 3 mg, 3 mg, Oral, Nightly  muscle rub cream, , Topical, TID PRN  methylPREDNISolone (MEDROL) tablet 4 mg, 4 mg, Oral, QAM AC  methylPREDNISolone (MEDROL) tablet 4 mg, 4 mg, Oral, Nightly  oxyCODONE (ROXICODONE) immediate release tablet 5 mg, 5 mg, Oral, Q8H PRN  potassium bicarb-citric acid (EFFER-K) effervescent tablet 40 mEq, 40 mEq, Oral, Daily  rOPINIRole (REQUIP) tablet 1 mg, 1 mg, Oral, Nightly  sennosides-docusate sodium (SENOKOT-S) 8.6-50 MG tablet 2 tablet, 2 tablet, Oral, Daily  sertraline (ZOLOFT) tablet 50 mg, 50 mg, Oral, Daily  vitamin B-1 (THIAMINE) tablet 100 mg, 100 mg, Oral, Daily  polyethylene glycol (GLYCOLAX) packet 17 g, 17 g, Oral, Daily  bisacodyl (DULCOLAX) suppository 10 mg, 10 mg, Rectal, Daily PRN  senna (SENOKOT) tablet 17.2 mg, 2 tablet, Oral, Daily PRN  enoxaparin (LOVENOX) injection 30 mg, 30 mg, Subcutaneous, BID      Impression/Plan:   Impaired ADLs, gait, and mobility due to:

## 2020-12-11 NOTE — CARE COORDINATION
CASE MANAGEMENT NOTE:    Admission Date:  12/9/2020 Lyla Phipps is a 48 y.o.  female    Admitted for : H/O intracranial hemorrhage [Z86.79]    Met with:  Patient and sister Asiya Lovell     PCP:  Sita Jacome MD                                Insurance:  MMO      Current Residence/ Living Arrangements:  independently at home             Current Services PTA:  No    Is patient agreeable to VNS: Yes    Freedom of choice provided:  Yes    List of [address] provided: No    VNS chosen:  No    DME:  none    Home Oxygen: No    Nebulizer: No    CPAP/BIPAP: No    Supplier: N/A    Potential Assistance Needed: Yes    SNF needed: No    Freedom of choice and list provided: Yes    Pharmacy:  [facility]       Does Patient want to use MEDS to BEDS? No    Is patient currently receiving oral anticoagulation therapy? NA    Is the Patient an [facility] with Readmission Risk Score greater than 14%? Yes  If yes, pt needs a follow up appointment made within 7 days. Family Members/Caregivers that pt would like involved in their care:    Yes    If yes, list name here:  Spouse and sister Asiya Lovell [phone]    Transportation Provider:  Family             Is patient in Isolation/One on One/Altered Mental Status? No  If yes, skip next question. If no, would they like an I-Pad to  use? No  If yes, call [phone]. Discharge Plan:  Home with spouse uses first floor and 1/2 bath and bedroom downstairs at discharge. Pt has mental health providers on board. .  The Plan for Transition of Care is related to the following treatment goals: plan home with spouse at discharge. The Patient and/or patient representative patient and sister was provided with a choice of provider and agrees   with the discharge plan.  [x] Yes [] No    Freedom of choice list was provided with basic dialogue that supports the patient's individualized plan of care/goals, treatment preferences and shares the quality data associated with the providers.  [x] Yes [] No                 Electronically signed by: Cara Salinas MSW, DAYNAW on 12/11/2020 at 4:48 PM

## 2020-12-11 NOTE — PROGRESS NOTES
Physical Therapy  [address] [facility]   Acute Rehabilitation Physical Therapy Progress Note    Date: 20  Patient Name: Venkat Rivas       Room: 2612/2612-01  MRN: [id number]   Account: [de-identified]   : 1970  (48 y.o.) Gender: female     Referring Practitioner: Sandi Arteaga MD  Diagnosis: Intracranial hemmorrhage  Past Medical History:  has a past medical history of Asthma, Bipolar 1 disorder ([facility] [address] Delta storage pool disease [facility]), Hypertension, and Psychiatric problem. Past Surgical History:   has a past surgical history that includes  section (9849,0945); Gastric bypass surgery (); Tonsillectomy and adenoidectomy (); Cholecystectomy (); knee surgery (); Hysterectomy (); laparoscopy (); Colonoscopy (N/A, 2/3/2020); and Upper gastrointestinal endoscopy (N/A, 2/3/2020). Additional Pertinent Hx: 66-year-old female with history of delta storage pool disease, bipolar disorder, alcohol abuse who developed acute left-sided weakness-found to have intraparenchymal hemorrhage, right parietal lobe hemorrhage and moderate subarachnoid hemorrhage. Pt admitted to rehab unit on 20. Restrictions/Precautions  Restrictions/Precautions: Fall Risk  Required Braces or Orthoses?: No  Position Activity Restriction  Other position/activity restrictions: L hypertonicity, with noted imporvement this date    Subjective: Pt c/o intense cramps and discomfort in L UE/LE after yesterday's therapies, none in AM; voicing fear of falling out of [facility] due to R lean, but also strong desire to do what she needs to improve. Comments: Debora Knight with Pt at end of AM & PM PT. Pt seen additionally at end of day. Vital Signs  Patient Currently in Pain: Denies    Patient Observation  Observations: L neglect    Bed Mobility  Bridging: Stand by assistance  Sit to Supine:  Moderate assistance(Bilateral LE assist (Pt sweeping R ankle under L))  Scooting: Stand by assistance  Comment: Bed flat, 1 pillow, rail. Transfers:  Sit to Stand: Moderate Assistance;2 Person Assistance(To Mando Ora; assist for L UE/hand)  Stand to sit: Moderate Assistance;2 Person Assistance(SS; Cues to sit back with fair return; assist for L UE/hand)  Bed to Chair: Dependent/Total;2 Person Assistance(lg steady, 2 assist for safety)  Stand pivot transfers: Dependent/Total;2 Person Assistance(lg steady, 2 assist for safety)  Comment: Pt demonstrates ability to extend L knee 2x today with downward pressure through hip & mirror feedback, fatigued quickly & no return in PM attempts without mirror feedback; hard L lateral lean, corrects with verbal, tactile & visual (in front of mirror) cues      Stairs/Curb  Stairs?: No    BALANCE Posture: Fair(Requires cues for correction with fair return)  Sitting - Static: Fair(Edge of mat, 0-2 UE support/CGA, no back support)  Sitting - Dynamic: Fair;-(Reaching across midline bilaterally with CGA)  Standing - Static: Poor(lacks consistent core control, L knee flexed; parallel bars)  Standing - Dynamic: Poor(lacks consistent core control, L knee flexed; parallel bars)    EXERCISES    Other exercises?: Yes  Other exercises 1: static sitting bal maintaining midline at edge of mat table, 5 min. total; dynamic sitting balance, 5 min. Other exercises 3: Lateral lean onto elbow and push back upright, to R with contact guard, to L with min. A, x5 each dir. Other exercises 4: Sit to stand, various heights, using Mando Ora x4; parallel bars x2  Other exercises 5: Standing weight shifting 1 min. x2 laterally, 30 sec.  forward/retro + lateral(Pt c/o fatigue in R shoulder from /posture maintenance)  Other exercises 6: Standing tolerance in Mando Ora, 3 min. x2; working on postural control/maintaining midline with and without mirror feedback    Activity Tolerance: Patient Tolerated treatment well     PT Equipment Recommendations  Other: TBD    Patient Education  New Education Provided:  Pregait activities  [address] and patient  Method: demonstration and explanation       Outcome: acknowledged understanding of, verbalized concerns and demonstrated understanding     Current Treatment Recommendations: Strengthening, ROM, Balance Training, Functional Mobility Training, Transfer Training, Wheelchair Mobility Training, Gait Training, Neuromuscular Re-education, Pain Management, Home Exercise Program, Safety Education & Training, Patient/Caregiver Education & Training, Positioning, Endurance Training, Stair training    Conditions Requiring Skilled Therapeutic Intervention  Body structures, Functions, Activity limitations: Decreased functional mobility ; Decreased strength;Decreased safe awareness;Decreased balance; Increased pain;Decreased sensation;Decreased fine motor control;Decreased coordination;Decreased posture;Decreased endurance;Decreased vision/visual deficit; Decreased ROM  Assessment: Pt demonstrated improved core control this date, partially due to relying more heavily on R UE support. REQUIRES PT FOLLOW UP: Yes  Discharge Recommendations: Patient would benefit from continued therapy after discharge;Home with assist PRN    Goals  Short term goals  Time Frame for Short term goals: 10 days  Short term goal 1: pt will perform bed mobility with min A  Short term goal 2: pt will dangle EOB/EOM for 20 to 25 minutes with SBA, performing challenged seated balance/corestrengthening  Short term goal 3: Pivot transfers with mod A+2  Short term goal 4: WC mobility x 100' with min A  Short term goal 5: progress to standing/pre-gait activities in // bars to facilitate L LE motor fucntion.   Long term goals  Time Frame for Long term goals : By LOS  Long term goal 1: Pt able to perform bed mobility independently  Long term goal 2: Pt able to perform transfers at [address] term goal 3: Pt able to progress to ambulation with appropriate device dist of 30 to 50 ft, mod A   Long term goal 4: Pt able to propel w/c level surfaces dist of 150 ft , mod-I. Long term goal 5: Improve L LE strength to atleast 2 to 2+/5 to improve fucntion. Long term goal 6: Improve PASS score from 8/30 to 23/36 to improve overall fucntion.   Long term goal 7: Pt able to go up and down 5 steps with rail, mod A.        12/11/20 0855 12/11/20 1346 12/11/20 1625   PT Individual Minutes   Time In [id number]   Time Out 0940 1343 1530   Minutes 45 38 15     Electronically signed by Jennifer Lara PTA on 12/11/20 at 5:58 PM EST

## 2020-12-11 NOTE — CARE COORDINATION
Social work: spoke to pt who uses [address] in [facility]. Has suffered from mental health conditions and is working currently with practioners in [facility] as they just moved to [facility]. She is not depressed per pt today. Sister is in room [phone]. Lives with spouse. Kvng lees

## 2020-12-11 NOTE — CONSULTS
[address]  / FOLLOW UP VISIT       Date:   12/11/2020  Patient name:  Radha Lux  Date of admission:  12/9/2020  8:30 PM  MRN:   [id number]  Account:  [de-identified]  YOB: 1970  PCP:    Tati Ervin MD  Room:   [address] [place] [address]  Code Status:    Full Code    Physician Requesting Consult: Tejas Nagy MD    History of Present Illness:      C/C ;     REASON FOR CONSULT;    Medical comorbidity and med management  Medical comorbidity and medication management ; Active Problems:    Platelet dysfunction (HCC)    Bipolar 1 disorder (HCC)    Intracranial hemorrhage (HCC)    Vasogenic edema (HCC)    H/O intracranial hemorrhage    Essential hypertension    Acute left-sided weakness  Resolved Problems:    * No resolved hospital problems. *            Subjective:     No chief complaint on file. Left sided weakness   Leg spasms   Active Problems:    Platelet dysfunction (HCC)    Bipolar 1 disorder (HCC)    Intracranial hemorrhage (HCC)    Vasogenic edema (HCC)    H/O intracranial hemorrhage    Essential hypertension    Acute left-sided weakness  Resolved Problems:    * No resolved hospital problems. *       Transfer from [facility] ;  78-year-old female with history of delta storage pool disease, bipolar disorder, alcohol abuse who developed acute left-sided weakness-found to have intraparenchymal hemorrhage, right parietal lobe hemorrhage and moderate subarachnoid hemorrhage. She was hypertensive. .  Per hematology oncology transfuse platelets as needed and administer desmopressin as needed.            Significant last 24 hr data reviewed [x] yes        Significant last 24 hr data reviewed ;   Vitals:    12/10/20 1846 12/10/20 2117 12/11/20 0540 12/11/20 0707   BP: (!) 128/50 (!) 142/65 (!) 111/47 125/73   Pulse: 80 88 74 74   Resp: 18   19   Temp: 98.4 °F (36.9 °C)   97.9 °F (36.6 °C) TempSrc: Oral   Oral   SpO2: 97%   97%   Height:         No results for input(s): POCGLU in the last 72 hours. No results found for this or any previous visit (from the past 24 hour(s)). No results for input(s): POCGLU in the last 72 hours. No results found. --  Hx on Admission;        Past Medical History:   Diagnosis Date    Asthma     Bipolar 1 disorder ([facility] [address]     Delta storage pool disease ([facility]     Hypertension     Psychiatric problem        Family History   Adopted: Yes   Family history unknown: Yes       Social History:     Tobacco:    reports that she has never smoked. She has never used smokeless tobacco.  Alcohol:      reports current alcohol use. Neurological ;                 No focal motor deficit ,                 No focal sensory deficit ,  Drug Use: reports no history of drug use. Review of Systems:     POSITIVE AND NEGATIVES AS DESCRIBED IN HISTORY OF PRESENT ILLNESS ;  IN ADDITION ;   Review of Systems          All other systems negative              Physical Exam:     Vitals:  /73   Pulse 74   Temp 97.9 °F (36.6 °C) (Oral)   Resp 19   Ht 5' 5\" (1.651 m)   SpO2 97%   BMI 38.85 kg/m²                 Body mass index is 38.85 kg/m². General Appearance:   Alert , CO-OPERATIVE ,     H E:E N T      No icterus, no pallor . No ptosis. No gross asymmetry  Or abnormality face            Skin:                             No rash or erythema  Neck:                            No mass , no thyroid enlargement                                           Pulmonary/Chest:        Clear to auscultation bilaterally . No wheezes, rales or rhonchi . Cardiovascular:            Normal rate, regular rhythm,                                          No murmur orGallop .                                   Abdomen:                       Soft, non-tender                                           Normalbowels sounds, Extremities:                    No  Edema . NEUROLOGICAL: A&O x3. Sensation intact to light touch. DTRs 2+. Impaired L CN V and VII. Severe L trunk lean. MSK:  Functional ROM RUE and RLEs. Impaired AROM LUE and LLEs. Motor testing 5/5 key muscles RUE and RLEs. 1/5 L shoulder shrug, 1/5 L hip flexion. [facility] SKIN: Warm dry and intact. Good turgor. EXTREMITIES:  No calf tenderness to palpation. No edema BLEs. Jossy Nikolski PSYCH: Mood WNL. Appropriately interactive. Affect WNL. Data:     Labs:    URINE ANALYSIS: No results found for: LABURIN     CBC:  Lab Results   Component Value Date    WBC 10.4 12/10/2020    HGB 12.7 12/10/2020     12/10/2020        BMP:    Lab Results   Component Value Date     12/10/2020    K 4.6 12/10/2020    CL 98 12/10/2020    CO2 21 12/10/2020    BUN 25 12/10/2020    CREATININE 1.11 12/10/2020    GLUCOSE 101 12/10/2020      LIVER PROFILE:  Lab Results   Component Value Date    ALT 24 11/30/2020    AST 51 11/30/2020    PROT 8.2 11/30/2020    BILITOT 0.50 11/30/2020    BILIDIR 0.17 11/30/2020    LABALBU 4.4 11/30/2020              Radiology:       Assessment :      Primary Problem  Active Problems:    Platelet dysfunction (HCC)    Bipolar 1 disorder (HCC)    Intracranial hemorrhage (HCC)    Vasogenic edema (HCC)    H/O intracranial hemorrhage    Essential hypertension    Acute left-sided weakness  Resolved Problems:    * No resolved hospital problems. *       No results for input(s): POCGLU in the last 72 hours. Vitals:    12/10/20 1846 12/10/20 2117 12/11/20 0540 12/11/20 0707   BP: (!) 128/50 (!) 142/65 (!) 111/47 125/73   Pulse: 80 88 74 74   Resp: 18   19   Temp: 98.4 °F (36.9 °C)   97.9 °F (36.6 °C)   TempSrc: Oral   Oral   SpO2: 97%   97%   Height:             Plan:     1. bleedoing diathesis . 2. Platelet dys f .    23/63/16    · Has spasms and leg weakness left side . Left upper ext also 1. Will monitor                   Medications:      Allergies: Allergies   Allergen Reactions    Pcn [Penicillins]     Sulfa Antibiotics Other (See Comments)     Inflammation in the eye       Current Meds:   Scheduled Meds:    amLODIPine  10 mg Oral Daily    baclofen  5 mg Oral TID    buPROPion  300 mg Oral Daily    cyanocobalamin  1,000 mcg Intramuscular Weekly    Followed by   Les Hernandes ON 2/5/2021] cyanocobalamin  1,000 mcg Intramuscular M26 Days    folic acid  1 mg Oral Daily    labetalol  200 mg Oral 3 times per day    lamoTRIgine  400 mg Oral Daily    lisinopril  40 mg Oral Daily    melatonin  3 mg Oral Nightly    methylPREDNISolone  4 mg Oral QAM AC    methylPREDNISolone  4 mg Oral Nightly    potassium bicarb-citric acid  40 mEq Oral Daily    rOPINIRole  1 mg Oral Nightly    sennosides-docusate sodium  2 tablet Oral Daily    sertraline  50 mg Oral Daily    thiamine  100 mg Oral Daily    polyethylene glycol  17 g Oral Daily    enoxaparin  30 mg Subcutaneous BID     Continuous Infusions:   PRN Meds: acetaminophen, ipratropium-albuterol, magnesium hydroxide, muscle rub, oxyCODONE, bisacodyl, senna       Thanks for consulting us . Will monitor vitals and clinical course , and  Optimize therapy  as needed . Carole Acosta MD      Copy sentto Dr. Yaneth Hinton MD    Please note that this chart was generated using voice recognition Dragon dictation software. Although every effort was made to ensure the accuracy of this automated transcription, some errors in transcription may have occurred.

## 2020-12-11 NOTE — PLAN OF CARE
In order to achieve these goals, nursing interventions may include bowel/bladder training, education for medical assistive devices, medication education, O2 saturation management, energy conservation, stress management techniques, fall prevention, alarms protocol, seating and positioning, skin/wound care, pressure relief instruction, dressing changes, infection protection, DVT prophylaxis, assistance with safe transfers , and/or assistance with bathroom activities and hygiene. PHYSICAL THERAPY:  Goals:        Short term goals  Time Frame for Short term goals: 10 days  Short term goal 1: pt will perform bed mobility with min A  Short term goal 2: pt will dangle EOB/EOM for 20 to 25 minutes with SBA, performing challenged seated balance/corestrengthening  Short term goal 3: Pivot transfers with mod A+2  Short term goal 4: WC mobility x 100' with min A  Short term goal 5: progress to standing/pre-gait activities in // bars to facilitate L LE motor fucntion. Long term goals  Time Frame for Long term goals : By LOS  Long term goal 1: Pt able to perform bed mobility independently  Long term goal 2: Pt able to perform transfers at [address] term goal 3: Pt able to progress to ambulation with appropriate device dist of 30 to 50 ft, mod A   Long term goal 4: Pt able to propel w/c level surfaces dist of 150 ft , mod-I. Long term goal 5: Improve L LE strength to atleast 2 to 2+/5 to improve fucntion. Long term goal 6: Improve PASS score from 8/30 to 23/36 to improve overall fucntion. Long term goal 7: Pt able to go up and down 5 steps with rail, mod A. Plan of Care: Pt to be seen by physical therapy services 1 Hour 30 Minutes per day at least 5 out of 7 days per week     Anticipated interventions may include therapeutic exercises, gait training, neuromuscular re-ed, transfer training, community reintegration, bed mobility, w/c mobility and training.       OCCUPATIONAL THERAPY:  Goals:             Short term goals Time Frame for Short term goals: 7 to 10 days  Short term goal 1: Pt will perform upper body bathing/dressing with stand by assist.  Short term goal 2: Pt will perform lower body bathing and dressing with Moderate assist and Fair safety. Short term goal 3: Pt will perform toileting tasks with Moderate assist.  Short term goal 4: Pt will verbalize/demonstrate Good understanding of assistive equipment/durable medical equipment/modified techniques for increased independence with self-care and mobility. Short term goal 5: Pt will perform functional transfers with Moderate assist to toilet/wheelchair/shower with Fair safety. Short term goal 6: Pt will actively participate in 30+ minutes of therapeutic exercise/functional activities to promote increased independence with self-care and mobility. Long term goals  Time Frame for Long term goals : By discharge  Long term goal 1: Pt will perform BADLs with modified independence and Good safety with assist PRN for HORTENSIA hose. Long term goal 2: Pt will perform functional transfers and mobility with modified independence, least restrictive mobility device, and Good safety. Long term goal 3: Pt will attend to L side of body 100% of the time during self-care, transfers, and mobility with 1-2 verbal cues PRN. Long term goal 4: Pt will stand for 5+ minutes with 1-2 UE support, modified independence and no loss of balance while engaging in functional activity of choice. Long term goal 5: Pt will verbalize/demonstrate Good understanding of home exercise program for LUE.   Long term goals 6: 9 hole peg, box and block to be assessed for LUE as appropriate    Plan of Care: Patient to be seen by occupational therapy services 1 Hour 30 Minutes per day at least 5 out of 7 days per week Anticipated interventions may include ADL and IADL retraining, strengthening, safety education and training, patient/caregiver education and training, equipment evaluation/ training/procurement, neuromuscular reeducation, wheelchair mobility training. SPEECH THERAPY:   Goals:                      Plan of Care:     CASE MANAGEMENT:  Goals:   Assist patient/family with discharge planning, patient/family counseling,  and coordination with insurance during the inpatient rehabilitation stay. Other members of the multidisciplinary rehabilitation team that will be involved in the patient's plan of care include recreational therapy, dietary, respiratory therapy, and neuropsychology. Medical issues being managed closely and that require 24 hour availability of a physician:  Infection protection, DVT prophylaxis, Fall precautions, Fluid/Electrolyte balance, Nutritional status and History of heart disease                                           Physician anticipated functional outcomes: Improved independence with functional measures   Estimated length of stay for this admission 2 weeks  Medical Prognosis: Fair  Anticipated disposition: Home. The potential to achieve the above medical and rehabilitative goals is fair. This plan of care has been developed with the assistance and input of the multidisciplinary rehabilitation team.  The plan was reviewed with the patient on 12/11/2020. The patient has had the opportunity to provide input to the therapy team.    I have reviewed this Individualized Plan of Care and agree with its contents. Above documentation has been expanded, modified, adjusted to reflect the findings of my evaluations and goals for the patient.     Physician:  Electronically signed by Ruth Ann Hodgson MD on 12/11/20 at 10:34 AM EST

## 2020-12-12 PROCEDURE — 97112 NEUROMUSCULAR REEDUCATION: CPT

## 2020-12-12 PROCEDURE — 6360000002 HC RX W HCPCS: Performed by: NURSE PRACTITIONER

## 2020-12-12 PROCEDURE — 99232 SBSQ HOSP IP/OBS MODERATE 35: CPT | Performed by: PHYSICAL MEDICINE & REHABILITATION

## 2020-12-12 PROCEDURE — 6370000000 HC RX 637 (ALT 250 FOR IP): Performed by: NURSE PRACTITIONER

## 2020-12-12 PROCEDURE — 97530 THERAPEUTIC ACTIVITIES: CPT

## 2020-12-12 PROCEDURE — 6360000002 HC RX W HCPCS: Performed by: PHYSICAL MEDICINE & REHABILITATION

## 2020-12-12 PROCEDURE — 97110 THERAPEUTIC EXERCISES: CPT

## 2020-12-12 PROCEDURE — 1180000000 HC REHAB R&B

## 2020-12-12 PROCEDURE — 6370000000 HC RX 637 (ALT 250 FOR IP): Performed by: PHYSICAL MEDICINE & REHABILITATION

## 2020-12-12 PROCEDURE — 97535 SELF CARE MNGMENT TRAINING: CPT

## 2020-12-12 PROCEDURE — 97116 GAIT TRAINING THERAPY: CPT

## 2020-12-12 PROCEDURE — 99254 IP/OBS CNSLTJ NEW/EST MOD 60: CPT | Performed by: INTERNAL MEDICINE

## 2020-12-12 PROCEDURE — 97542 WHEELCHAIR MNGMENT TRAINING: CPT

## 2020-12-12 RX ORDER — BACLOFEN 10 MG/1
10 TABLET ORAL 3 TIMES DAILY
Status: DISCONTINUED | OUTPATIENT
Start: 2020-12-12 | End: 2020-12-13

## 2020-12-12 RX ORDER — BACLOFEN 10 MG/1
5 TABLET ORAL ONCE
Status: DISCONTINUED | OUTPATIENT
Start: 2020-12-12 | End: 2020-12-12

## 2020-12-12 RX ORDER — BACLOFEN 10 MG/1
10 TABLET ORAL ONCE
Status: COMPLETED | OUTPATIENT
Start: 2020-12-12 | End: 2020-12-12

## 2020-12-12 RX ADMIN — BACLOFEN 10 MG: 10 TABLET ORAL at 15:29

## 2020-12-12 RX ADMIN — POTASSIUM BICARBONATE 40 MEQ: 782 TABLET, EFFERVESCENT ORAL at 10:12

## 2020-12-12 RX ADMIN — LAMOTRIGINE 400 MG: 100 TABLET ORAL at 10:20

## 2020-12-12 RX ADMIN — BUPROPION HYDROCHLORIDE 300 MG: 300 TABLET, FILM COATED, EXTENDED RELEASE ORAL at 10:20

## 2020-12-12 RX ADMIN — LABETALOL HYDROCHLORIDE 200 MG: 100 TABLET, FILM COATED ORAL at 15:28

## 2020-12-12 RX ADMIN — LABETALOL HYDROCHLORIDE 200 MG: 100 TABLET, FILM COATED ORAL at 06:40

## 2020-12-12 RX ADMIN — ENOXAPARIN SODIUM 30 MG: 30 INJECTION SUBCUTANEOUS at 20:37

## 2020-12-12 RX ADMIN — BACLOFEN 5 MG: 10 TABLET ORAL at 10:11

## 2020-12-12 RX ADMIN — SERTRALINE HYDROCHLORIDE 50 MG: 50 TABLET ORAL at 10:11

## 2020-12-12 RX ADMIN — SENNOSIDES AND DOCUSATE SODIUM 2 TABLET: 8.6; 5 TABLET ORAL at 10:12

## 2020-12-12 RX ADMIN — FOLIC ACID 1 MG: 1 TABLET ORAL at 10:11

## 2020-12-12 RX ADMIN — ENOXAPARIN SODIUM 30 MG: 30 INJECTION SUBCUTANEOUS at 10:12

## 2020-12-12 RX ADMIN — ROPINIROLE HYDROCHLORIDE 1 MG: 1 TABLET, FILM COATED ORAL at 20:41

## 2020-12-12 RX ADMIN — METHYLPREDNISOLONE 4 MG: 4 TABLET ORAL at 06:39

## 2020-12-12 RX ADMIN — THIAMINE HCL TAB 100 MG 100 MG: 100 TAB at 10:08

## 2020-12-12 RX ADMIN — OXYCODONE HYDROCHLORIDE 5 MG: 5 TABLET ORAL at 20:38

## 2020-12-12 RX ADMIN — Medication 3 MG: at 20:38

## 2020-12-12 ASSESSMENT — PAIN SCALES - GENERAL
PAINLEVEL_OUTOF10: 6
PAINLEVEL_OUTOF10: 8

## 2020-12-12 ASSESSMENT — PAIN DESCRIPTION - PAIN TYPE: TYPE: ACUTE PAIN

## 2020-12-12 ASSESSMENT — PAIN DESCRIPTION - LOCATION: LOCATION: BACK

## 2020-12-12 NOTE — PROGRESS NOTES
Plan  Plan  Times per week: 5-7  Times per day: Twice a day  Current Treatment Recommendations: Self-Care / ADL, Home Management Training, Strengthening, Balance Training, Functional Mobility Training, Endurance Training, Wheelchair Mobility Training, Neuromuscular Re-education, Pain Management, Safety Education & Training, Patient/Caregiver Education & Training, Equipment Evaluation, Education, & procurement, Cognitive/Perceptual Training  Patient Goals   Patient goals : To discharge home with family support  Short term goals  Time Frame for Short term goals: 7 to 10 days  Short term goal 1: Pt will perform upper body bathing/dressing with stand by assist.  Short term goal 2: Pt will perform lower body bathing and dressing with Moderate assist and Fair safety. Short term goal 3: Pt will perform toileting tasks with Moderate assist.  Short term goal 4: Pt will verbalize/demonstrate Good understanding of assistive equipment/durable medical equipment/modified techniques for increased independence with self-care and mobility. Short term goal 5: Pt will perform functional transfers with Moderate assist to toilet/wheelchair/shower with Fair safety. Short term goal 6: Pt will actively participate in 30+ minutes of therapeutic exercise/functional activities to promote increased independence with self-care and mobility. Long term goals  Time Frame for Long term goals : By discharge  Long term goal 1: Pt will perform BADLs with modified independence and Good safety with assist PRN for HORTENSIA hose. Long term goal 2: Pt will perform functional transfers and mobility with modified independence, least restrictive mobility device, and Good safety. Long term goal 3: Pt will attend to L side of body 100% of the time during self-care, transfers, and mobility with 1-2 verbal cues PRN. Long term goal 4: Pt will stand for 5+ minutes with 1-2 UE support, modified independence and no loss of balance while engaging in functional activity of choice. Long term goal 5: Pt will verbalize/demonstrate Good understanding of home exercise program for LUE.   Long term goals 6: 9 hole peg, box and block to be assessed for LUE as appropriate       Electronically signed by Rosmery Becker OT on 12/12/20 at 4:26 PM EST

## 2020-12-12 NOTE — PLAN OF CARE
Problem: Skin Integrity:  Goal: Will show no infection signs and symptoms  Description: Will show no infection signs and symptoms  12/12/2020 1421 by Daily Mata RN  Note: Patient remains afebrile; WBC count is within normal limits; no signs of erythema, edema, or warmth. Will continue to monitor for signs/symptoms of infection. 12/12/2020 0520 by Yee Wilks RN  Outcome: Ongoing  Note: No new s/s of infection. Will continue to monitor     Goal: Absence of new skin breakdown  Description: Absence of new skin breakdown  12/12/2020 0520 by Yee Wilks RN  Outcome: Ongoing  Note: Pt skin integrity remained intact, no new alterations noted. Head to toe completed, see chart assessment. Problem: Falls - Risk of:  Goal: Will remain free from falls  Description: Will remain free from falls  12/12/2020 1421 by Daily Mata RN  Note: Pt medicated with pain medication prn. Assessed all pain characteristics including level, type, location, frequency, and onset. Non-pharmacologic interventions offered to pt as well. Pt states pain is tolerable at this time. Will continue to monitor. 12/12/2020 0520 by Yee Wilks RN  Outcome: Ongoing  Note: Free of falls  Goal: Absence of physical injury  Description: Absence of physical injury  12/12/2020 0520 by Yee Wilks RN  Outcome: Ongoing  Note: Patient remains free of injury. safe environment maintained       Problem: Pain:  Goal: Pain level will decrease  Description: Pain level will decrease  12/12/2020 1421 by Daily Mata RN  Outcome: Ongoing  12/12/2020 0520 by Yee Wilks RN  Outcome: Ongoing  Note: Spasms. Medicated as ordered. Patient tolerating   Goal: Control of acute pain  Description: Control of acute pain  12/12/2020 0520 by Yee Wilks RN  Outcome: Ongoing  Note: Spasms. Medicated as ordered.  Patient tolerating   Goal: Control of chronic pain  Description: Control of chronic pain  12/12/2020 0520 by Yee Wilks RN Outcome: Ongoing  Note: Spasms. Medicated as ordered.  Patient tolerating      Problem: Neurological  Goal: Maximum potential motor/sensory/cognitive function  12/12/2020 1421 by Nena Ghosh RN  Note: Able to maintain maximum cognitive level   12/12/2020 0520 by Gerardo Celeste RN  Outcome: Ongoing  Note: Functional level unchanged      Problem: Nutrition  Goal: Optimal nutrition therapy  Description: Nutrition Problem #1: Inadequate energy intake  Intervention: Food and/or Nutrient Delivery: Continue Current Diet, Continue Oral Nutrition Supplement  Nutritional Goals: po intake greater than 75%     12/12/2020 1421 by Nena Ghosh RN  Outcome: Ongoing  12/12/2020 0520 by Gerardo Celeste RN  Outcome: Ongoing  Note: Adequate nutrition maintained      Problem: Neurological  Goal: Maximum potential motor/sensory/cognitive function  Outcome: Ongoing

## 2020-12-12 NOTE — PROGRESS NOTES
Comment: Pt demonstrates ability to extend L knee 2x today with downward pressure through hip & mirror feedback, fatigued quickly & no return in PM attempts without mirror feedback; hard L lateral lean, corrects with verbal, tactile & visual (in front of mirror) cues    Ambulation  Ambulation?: No               OT:  ADL  Feeding: Setup, Increased time to complete(per pt report)  Grooming: Stand by assistance, Setup, Increased time to complete(oral care setup, washing face/combing hair, seated)  UE Bathing: Setup, Moderate assistance(assist to wash RUE and lift/support LUE)  LE Bathing: None(pt declines due to fatigue)  UE Dressing: Maximum assistance, Setup, Verbal cueing, Increased time to complete(donning bra/hook in back, threading LUE/adjustments, VCs)  LE Dressing: Dependent/Total(footies and TEDs only)  Toileting: Dependent/Total(per pt report)  Additional Comments: OT facilitated Pt engagement in self-care routine including bathing, dressing, grooming and toileting tasks. Bathing and dressing performed at sink for just-right challenge. Please see above for details. Balance  Sitting Balance: Stand by assistance(seated in w/c)  Standing Balance: Dependent/Total(left lateral lean noted in Nafisa Bud; Max A x 2 in PM)   Standing Balance  Time: less than 30 second increments in AM and PM  Activity: self-care        Bed mobility  Bridging: Stand by assistance  Rolling to Left: Minimal assistance  Rolling to Right: Maximum assistance  Supine to Sit: Maximum assistance  Sit to Supine: Maximum assistance  Scooting: Stand by assistance  Comment: Pt uses bed rail for rolling to eft side. pt moderately leans to left side, pt able to correct posture, improve seating balance once feet postioned on floor, CGA for static balance with R UE support. mod cues for attending to left side due to left neglect.   Transfers  Stand Pivot Transfers: Dependent/Total Sit to stand: 2 Person assistance, Maximum assistance(Max A x 1 in AM; Max A x 2 in PM due to fatigue)  Stand to sit: 2 Person assistance, Maximum assistance(Max A x 1 in AM; Max A x 2 in PM due to fatigue)  Transfer Comments: all transfers performed with [employer]   Toilet Transfers  Equipment Used: [employer])  Toilet Transfer: Dependent/Total  Toilet Transfers Comments: Maximum assist x 2 in PM due to Pt's fatigue     Shower Transfers  Shower Transfers Comments: Deferred due to safety concerns with Pt's transfers and sitting balance  Wheelchair Bed Transfers  Equipment Used: Other, Wheelchair, Bed(Tameka Camp)  Level of Asssistance: Dependent/Total    SPEECH:      Objective:  BP (!) 105/58   Pulse 82   Temp 97.9 °F (36.6 °C) (Oral)   Resp 20   Ht 5' 5\" (1.651 m)   SpO2 97%   BMI 38.85 kg/m²       GEN: Well developed, well nourished, in NAD  HEENT:  NCAT. PERRL. EOMI. Mucous membranes pink and moist.   PULM:  Clear to ausculation. No rales or rhonchi. Respirations WNL and unlabored. CV:  Regular rate rhythm. No murmurs or gallops. GI:  Abdomen soft. Nontender. Non-distended. BS + and equal.    NEUROLOGICAL: A&O x3. Sensation intact to light touch. Impaired L CN V and VII - stable. Intermittent generalized myoclonus. MSK:  Functional ROM RUE and RLEs. Impaired AROM LUE and LLEs. Motor testing 5/5 key muscles RUE and RLEs. 1/5 L shoulder shrug, 1/5 L hip flexion. SKIN: Warm dry and intact. Good turgor. EXTREMITIES:  No calf tenderness to palpation. No edema BLEs. Asiya Dues PSYCH: Mood WNL. Appropriately interactive. Affect WNL. Diagnostics:     CBC:   Recent Labs     12/10/20  0616   WBC 10.4   RBC 4.18   HGB 12.7   HCT 37.4   MCV 89.4   RDW 14.5   *     BMP:   Recent Labs     12/10/20  0616   *   K 4.6   CL 98   CO2 21   BUN 25*   CREATININE 1.11*   GLUCOSE 101*     BNP: No results for input(s): BNP in the last 72 hours. PT/INR: No results for input(s): PROTIME, INR in the last 72 hours. APTT: No results for input(s): APTT in the last 72 hours. CARDIAC ENZYMES: No results for input(s): CKMB, CKMBINDEX, TROPONINT in the last 72 hours. Invalid input(s): CKTOTAL;3  FASTING LIPID PANEL:  Lab Results   Component Value Date    CHOL 247 (H) 11/30/2020     11/30/2020    TRIG 77 11/30/2020     LIVER PROFILE: No results for input(s): AST, ALT, ALB, BILIDIR, BILITOT, ALKPHOS in the last 72 hours.      Current Medications:   Current Facility-Administered Medications: baclofen (LIORESAL) tablet 5 mg, 5 mg, Oral, TID **AND** baclofen (LIORESAL) tablet 5 mg, 5 mg, Oral, Nightly  acetaminophen (TYLENOL) tablet 650 mg, 650 mg, Oral, Q4H PRN  amLODIPine (NORVASC) tablet 10 mg, 10 mg, Oral, Daily  buPROPion (WELLBUTRIN XL) extended release tablet 300 mg, 300 mg, Oral, Daily  [COMPLETED] cyanocobalamin injection 1,000 mcg, 1,000 mcg, Intramuscular, Daily **FOLLOWED BY** cyanocobalamin injection 1,000 mcg, 1,000 mcg, Intramuscular, Weekly **FOLLOWED BY** [START ON 2/5/2021] cyanocobalamin injection 1,000 mcg, 1,000 mcg, Intramuscular, A19 Days  folic acid (FOLVITE) tablet 1 mg, 1 mg, Oral, Daily  ipratropium-albuterol (DUONEB) nebulizer solution 1 ampule, 1 ampule, Inhalation, Q4H PRN  labetalol (NORMODYNE) tablet 200 mg, 200 mg, Oral, 3 times per day  lamoTRIgine (LAMICTAL) tablet 400 mg, 400 mg, Oral, Daily  lisinopril (PRINIVIL;ZESTRIL) tablet 40 mg, 40 mg, Oral, Daily  magnesium hydroxide (MILK OF MAGNESIA) 400 MG/5ML suspension 30 mL, 30 mL, Oral, Daily PRN  melatonin tablet 3 mg, 3 mg, Oral, Nightly  muscle rub cream, , Topical, TID PRN  oxyCODONE (ROXICODONE) immediate release tablet 5 mg, 5 mg, Oral, Q8H PRN  potassium bicarb-citric acid (EFFER-K) effervescent tablet 40 mEq, 40 mEq, Oral, Daily  rOPINIRole (REQUIP) tablet 1 mg, 1 mg, Oral, Nightly sennosides-docusate sodium (SENOKOT-S) 8.6-50 MG tablet 2 tablet, 2 tablet, Oral, Daily  sertraline (ZOLOFT) tablet 50 mg, 50 mg, Oral, Daily  vitamin B-1 (THIAMINE) tablet 100 mg, 100 mg, Oral, Daily  polyethylene glycol (GLYCOLAX) packet 17 g, 17 g, Oral, Daily  bisacodyl (DULCOLAX) suppository 10 mg, 10 mg, Rectal, Daily PRN  senna (SENOKOT) tablet 17.2 mg, 2 tablet, Oral, Daily PRN  enoxaparin (LOVENOX) injection 30 mg, 30 mg, Subcutaneous, BID      Impression/Plan:   Impaired ADLs, gait, and mobility due to:      1. Hemorrhagic CVA:  PT/OT for gait, mobility, strengthening, endurance, ADLs, and self care. On Keppra. Tapering medrol until 12/13. 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 Monitoring platelets  3. Muscle spasm/myoclonus: no spasticity currently appreciated on exam. Increased baclofen dose to 10 mg TID. 4. HTN: amlodipine, lisinopril,   5. ETOH withdrawal/history of abuse: was treated with IV ativan and librium. On Y07 and folic acid now  6. Bipolar Disorder: on wellbutrin, zoloft  7. Restless Leg Syndrome: on requip  8. Bowel Management: Miralax daily, senokot prn, dulcolax prn.  9. DVT Prophylaxis:  low molecular weight heparin, SCD's while in bed and HORTENSIA's   10. Internal medicine for medical management    Electronically signed by Emelia Garcia MD on 12/12/2020 at 11:11 AM      This note is created with the assistance of a speech recognition program.  While intending to generate a document that actually reflects the content of the visit, the document can still have some errors including those of syntax and sound a like substitutions which may escape proof reading. In such instances, actual meaning can be extrapolated by contextual diversion.

## 2020-12-12 NOTE — CONSULTS
[address]  / FOLLOW UP VISIT       Date:   12/12/2020  Patient name:  Magdalene Osuna  Date of admission:  12/9/2020  8:30 PM  MRN:   [id number]  Account:  [de-identified]  YOB: 1970  PCP:    Henna Hernandez MD  Room:   [address] [place] [address]  Code Status:    Full Code    Physician Requesting Consult: Lise Luna MD    History of Present Illness:      C/C ;     REASON FOR CONSULT;    Medical comorbidity and med management  Medical comorbidity and medication management ; Active Problems:    Platelet dysfunction (HCC)    Bipolar 1 disorder (HCC)    Intracranial hemorrhage (HCC)    Vasogenic edema (HCC)    H/O intracranial hemorrhage    Essential hypertension    Acute left-sided weakness  Resolved Problems:    * No resolved hospital problems. *            Subjective:     No chief complaint on file. Left sided weakness   Leg spasms   Active Problems:    Platelet dysfunction (HCC)    Bipolar 1 disorder (HCC)    Intracranial hemorrhage (HCC)    Vasogenic edema (HCC)    H/O intracranial hemorrhage    Essential hypertension    Acute left-sided weakness  Resolved Problems:    * No resolved hospital problems. *       Transfer from [facility] ;  55-year-old female with history of delta storage pool disease, bipolar disorder, alcohol abuse who developed acute left-sided weaknessfound to have intraparenchymal hemorrhage, right parietal lobe hemorrhage and moderate subarachnoid hemorrhage. She was hypertensive. .  Per hematology oncology transfuse platelets as needed and administer desmopressin as needed.            Significant last 24 hr data reviewed [x] yes        Significant last 24 hr data reviewed ;   Vitals:    12/11/20 1904 12/11/20 2213 12/12/20 0615 12/12/20 1012   BP: 139/70 124/72 125/62 (!) 105/58   Pulse: 91  82    Resp: 19  20    Temp: 97.9 °F (36.6 °C)  97.9 °F (36.6 °C) TempSrc: Oral  Oral    SpO2: 97%  97%    Height:         No results for input(s): POCGLU in the last 72 hours. No results found for this or any previous visit (from the past 24 hour(s)). No results for input(s): POCGLU in the last 72 hours. No results found. --  Hx on Admission;        Past Medical History:   Diagnosis Date    Asthma     Bipolar 1 disorder ([facility] [facility]     Delta storage pool disease ([facility]     Hypertension     Psychiatric problem        Family History   Adopted: Yes   Family history unknown: Yes       Social History:     Tobacco:    reports that she has never smoked. She has never used smokeless tobacco.  Alcohol:      reports current alcohol use. Neurological ;                 No focal motor deficit ,                 No focal sensory deficit ,  Drug Use: reports no history of drug use. Review of Systems:     POSITIVE AND NEGATIVES AS DESCRIBED IN HISTORY OF PRESENT ILLNESS ;  IN ADDITION ;   Review of Systems          All other systems negative              Physical Exam:     Vitals:  BP (!) 105/58   Pulse 82   Temp 97.9 °F (36.6 °C) (Oral)   Resp 20   Ht 5' 5\" (1.651 m)   SpO2 97%   BMI 38.85 kg/m²                 Body mass index is 38.85 kg/m². General Appearance:   Alert , CO-OPERATIVE ,     H E:E N T      No icterus, no pallor . No ptosis. No gross asymmetry  Or abnormality face            Skin:                             No rash or erythema  Neck:                            No mass , no thyroid enlargement                                           Pulmonary/Chest:        Clear to auscultation bilaterally . No wheezes, rales or rhonchi . Cardiovascular:            Normal rate, regular rhythm,                                          No murmur orGallop .                                   Abdomen:                       Soft, non-tender                                           Normalbowels sounds, Extremities:                    No  Edema . NEUROLOGICAL: A&O x3. Sensation intact to light touch. DTRs 2+. Impaired L CN V and VII. Severe L trunk lean. MSK:  Functional ROM RUE and RLEs. Impaired AROM LUE and LLEs. Motor testing 5/5 key muscles RUE and RLEs. 1/5 L shoulder shrug, 1/5 L hip flexion. Leland Fallow SKIN: Warm dry and intact. Good turgor. EXTREMITIES:  No calf tenderness to palpation. No edema BLEs. Leland Fallow PSYCH: Mood WNL. Appropriately interactive. Affect WNL. Data:     Labs:    URINE ANALYSIS: No results found for: LABURIN     CBC:  Lab Results   Component Value Date    WBC 10.4 12/10/2020    HGB 12.7 12/10/2020     12/10/2020        BMP:    Lab Results   Component Value Date     12/10/2020    K 4.6 12/10/2020    CL 98 12/10/2020    CO2 21 12/10/2020    BUN 25 12/10/2020    CREATININE 1.11 12/10/2020    GLUCOSE 101 12/10/2020      LIVER PROFILE:  Lab Results   Component Value Date    ALT 24 11/30/2020    AST 51 11/30/2020    PROT 8.2 11/30/2020    BILITOT 0.50 11/30/2020    BILIDIR 0.17 11/30/2020    LABALBU 4.4 11/30/2020              Radiology:       Assessment :      Primary Problem  Active Problems:    Platelet dysfunction (HCC)    Bipolar 1 disorder (HCC)    Intracranial hemorrhage (HCC)    Vasogenic edema (HCC)    H/O intracranial hemorrhage    Essential hypertension    Acute left-sided weakness  Resolved Problems:    * No resolved hospital problems. *       No results for input(s): POCGLU in the last 72 hours. Vitals:    12/11/20 1904 12/11/20 2213 12/12/20 0615 12/12/20 1012   BP: 139/70 124/72 125/62 (!) 105/58   Pulse: 91  82    Resp: 19  20    Temp: 97.9 °F (36.6 °C)  97.9 °F (36.6 °C)    TempSrc: Oral  Oral    SpO2: 97%  97%    Height:             Plan:     1. bleedoing diathesis . 2. Platelet dys f .    14/81/95    · Has spasms and leg weakness left side . Left upper ext also 1.    Will monitor                   Medications: Allergies: Allergies   Allergen Reactions    Pcn [Penicillins]     Sulfa Antibiotics Other (See Comments)     Inflammation in the eye       Current Meds:   Scheduled Meds:    baclofen  5 mg Oral TID    And    baclofen  5 mg Oral Nightly    amLODIPine  10 mg Oral Daily    buPROPion  300 mg Oral Daily    cyanocobalamin  1,000 mcg Intramuscular Weekly    Followed by   Nahed Velasco ON 2/5/2021] cyanocobalamin  1,000 mcg Intramuscular A87 Days    folic acid  1 mg Oral Daily    labetalol  200 mg Oral 3 times per day    lamoTRIgine  400 mg Oral Daily    lisinopril  40 mg Oral Daily    melatonin  3 mg Oral Nightly    potassium bicarb-citric acid  40 mEq Oral Daily    rOPINIRole  1 mg Oral Nightly    sennosides-docusate sodium  2 tablet Oral Daily    sertraline  50 mg Oral Daily    thiamine  100 mg Oral Daily    polyethylene glycol  17 g Oral Daily    enoxaparin  30 mg Subcutaneous BID     Continuous Infusions:   PRN Meds: acetaminophen, ipratropium-albuterol, magnesium hydroxide, muscle rub, oxyCODONE, bisacodyl, senna       Thanks for consulting us . Will monitor vitals and clinical course , and  Optimize therapy  as needed . Alexandru Jones MD      Copy sentto Dr. Prosper Schmidt MD    Please note that this chart was generated using voice recognition Dragon dictation software. Although every effort was made to ensure the accuracy of this automated transcription, some errors in transcription may have occurred.

## 2020-12-13 PROCEDURE — 97535 SELF CARE MNGMENT TRAINING: CPT

## 2020-12-13 PROCEDURE — 97542 WHEELCHAIR MNGMENT TRAINING: CPT

## 2020-12-13 PROCEDURE — 6370000000 HC RX 637 (ALT 250 FOR IP): Performed by: NURSE PRACTITIONER

## 2020-12-13 PROCEDURE — 6360000002 HC RX W HCPCS: Performed by: PHYSICAL MEDICINE & REHABILITATION

## 2020-12-13 PROCEDURE — 97112 NEUROMUSCULAR REEDUCATION: CPT

## 2020-12-13 PROCEDURE — 97110 THERAPEUTIC EXERCISES: CPT

## 2020-12-13 PROCEDURE — 97530 THERAPEUTIC ACTIVITIES: CPT

## 2020-12-13 PROCEDURE — 6370000000 HC RX 637 (ALT 250 FOR IP): Performed by: PHYSICAL MEDICINE & REHABILITATION

## 2020-12-13 PROCEDURE — 1180000000 HC REHAB R&B

## 2020-12-13 PROCEDURE — 97116 GAIT TRAINING THERAPY: CPT

## 2020-12-13 PROCEDURE — 99231 SBSQ HOSP IP/OBS SF/LOW 25: CPT | Performed by: INTERNAL MEDICINE

## 2020-12-13 RX ORDER — TIZANIDINE 4 MG/1
4 TABLET ORAL EVERY 6 HOURS PRN
Status: DISCONTINUED | OUTPATIENT
Start: 2020-12-13 | End: 2020-12-17

## 2020-12-13 RX ORDER — AMLODIPINE BESYLATE 5 MG/1
5 TABLET ORAL DAILY
Status: DISCONTINUED | OUTPATIENT
Start: 2020-12-14 | End: 2020-12-18

## 2020-12-13 RX ORDER — OXYCODONE HYDROCHLORIDE 5 MG/1
5 TABLET ORAL EVERY 8 HOURS PRN
Status: DISCONTINUED | OUTPATIENT
Start: 2020-12-13 | End: 2021-01-05 | Stop reason: HOSPADM

## 2020-12-13 RX ORDER — GABAPENTIN 300 MG/1
300 CAPSULE ORAL NIGHTLY
Status: DISCONTINUED | OUTPATIENT
Start: 2020-12-13 | End: 2021-01-05 | Stop reason: HOSPADM

## 2020-12-13 RX ORDER — LISINOPRIL 20 MG/1
20 TABLET ORAL DAILY
Status: DISCONTINUED | OUTPATIENT
Start: 2020-12-14 | End: 2021-01-05 | Stop reason: HOSPADM

## 2020-12-13 RX ADMIN — TIZANIDINE 4 MG: 4 TABLET ORAL at 17:05

## 2020-12-13 RX ADMIN — SENNOSIDES AND DOCUSATE SODIUM 2 TABLET: 8.6; 5 TABLET ORAL at 08:15

## 2020-12-13 RX ADMIN — LAMOTRIGINE 400 MG: 100 TABLET ORAL at 08:27

## 2020-12-13 RX ADMIN — THIAMINE HCL TAB 100 MG 100 MG: 100 TAB at 08:15

## 2020-12-13 RX ADMIN — Medication 3 MG: at 22:06

## 2020-12-13 RX ADMIN — ENOXAPARIN SODIUM 30 MG: 30 INJECTION SUBCUTANEOUS at 08:14

## 2020-12-13 RX ADMIN — ACETAMINOPHEN 650 MG: 325 TABLET, FILM COATED ORAL at 17:05

## 2020-12-13 RX ADMIN — ENOXAPARIN SODIUM 30 MG: 30 INJECTION SUBCUTANEOUS at 19:11

## 2020-12-13 RX ADMIN — SERTRALINE HYDROCHLORIDE 50 MG: 50 TABLET ORAL at 08:15

## 2020-12-13 RX ADMIN — GABAPENTIN 300 MG: 300 CAPSULE ORAL at 19:12

## 2020-12-13 RX ADMIN — AMLODIPINE BESYLATE 10 MG: 10 TABLET ORAL at 08:14

## 2020-12-13 RX ADMIN — FOLIC ACID 1 MG: 1 TABLET ORAL at 08:14

## 2020-12-13 RX ADMIN — POTASSIUM BICARBONATE 40 MEQ: 782 TABLET, EFFERVESCENT ORAL at 08:15

## 2020-12-13 RX ADMIN — BUPROPION HYDROCHLORIDE 300 MG: 300 TABLET, FILM COATED, EXTENDED RELEASE ORAL at 08:27

## 2020-12-13 RX ADMIN — LISINOPRIL 40 MG: 20 TABLET ORAL at 08:15

## 2020-12-13 RX ADMIN — OXYCODONE HYDROCHLORIDE 5 MG: 5 TABLET ORAL at 19:26

## 2020-12-13 RX ADMIN — ACETAMINOPHEN 650 MG: 325 TABLET, FILM COATED ORAL at 07:11

## 2020-12-13 RX ADMIN — ROPINIROLE HYDROCHLORIDE 1 MG: 1 TABLET, FILM COATED ORAL at 19:13

## 2020-12-13 RX ADMIN — TIZANIDINE 4 MG: 4 TABLET ORAL at 11:31

## 2020-12-13 ASSESSMENT — PAIN SCALES - GENERAL
PAINLEVEL_OUTOF10: 8
PAINLEVEL_OUTOF10: 6
PAINLEVEL_OUTOF10: 4

## 2020-12-13 ASSESSMENT — PAIN DESCRIPTION - PAIN TYPE: TYPE: ACUTE PAIN

## 2020-12-13 ASSESSMENT — PAIN DESCRIPTION - LOCATION
LOCATION: ARM
LOCATION: ARM

## 2020-12-13 ASSESSMENT — PAIN DESCRIPTION - ORIENTATION: ORIENTATION: LEFT

## 2020-12-13 NOTE — PROGRESS NOTES
[id number]    ACUTE REHABILITATION OCCUPATIONAL THERAPY  DAILY NOTE    Date: 20  Patient Name: Bora Fontaine      Room: 2612/2612-01    MRN: [id number]   : 1970  (48 y.o.)  Gender: female   Referring Practitioner: Best Churchill MD  Diagnosis: Intracranial hemmorrhage       Restrictions  Restrictions/Precautions: Fall Risk  Other position/activity restrictions: Occasional L hypertonicity. Required Braces or Orthoses?: No  Equipment Used: Other, Wheelchair, Bed(Lg Stedy)    Subjective  Subjective: \"It's pretty good. \"  pt notes vision is good, denies L neglect, none observed during adls today. Comments: pt very motivated, pleasant am and pm, encouragement provided when pt feeling bad about level of assist currently needed. Objective  Cognition  Overall Cognitive Status: WFL  Perception  Overall Perceptual Status: (rotation errors noted during dressing)  Unilateral Attention: (fair to good)                             Neuromuscular Education  Vibration: gravity eliminated vibration muscle facilitation shoulders to hands, good response to shoulder adduction, elbow extension, wrist extension, fair to wrist flex and finger flex.            ADL  Feeding: Setup  Grooming: Modified independent (wash face and hand, brush teeth and hair)  UE Bathing: None  LE Bathing: Dependent/Total(bathed elizabeth, buttocks  lg stedy, pt maintained balance well while OT completed bathing.)  UE Dressing: Maximum assistance;Setup;Verbal cueing(pullover sports bra and long sleeve t shirt.)  Toileting: Dependent/Total Additional Comments: pt was assisted to wash hair with shampoo at sink and shave B axilla. pt requesting toileting, completed with lg lopez assist of 1, bathed elizabeth area. pt wearing yobany hose, pt with long therapy break so OT suggested rest in bed, pt practiced doff B shoes seated edge of bed using long shoe horn and min assist LLE. OT showed pt long sponge, dress stick and reachers and ed re use during adls. pm, pt able to doff B socks, don R with ara technique post ed, min assist to don on L, (to hold in crossed position)          Assessment     Activity Tolerance: Patient limited by fatigue;Patient Tolerated treatment well  Activity Tolerance: OT assisted pt to bed at end am OT to rest for pm therapies. 12/13/20 1229 12/13/20 1627   OT Individual Minutes   Time In [id number]   Time Out 1052 1350   Minutes 76 45             Patient Education:  Patient Goals   Patient goals : To discharge home with family support  Learner:patient and significant other  Method: demonstration, explanation and handout       Outcome: acknowledged understanding  and demonstrated understanding   OT Education  OT Education: Family Education; ADL Adaptive Strategies;IADL Safety;Equipment  Patient Education: post ed: pt practiced doff B shoes seated edge of bed using long shoe horn and min assist LLE. OT showed pt long sponge, dress stick and reachers and ed re use during adls. pm spouse present, ed re cross BLE and don sock with ara technique. pt practiced. ed pt and spouse re support available if want to resume drive in future, written info re OT drive program provided. pt, spouse observed demo of extended tub bench transfer into tub. (tub is upstairs) ed re sponge bathe and stand to wash hair with assist at kitchen sink with sprayer until able to access 2nd floor. spouse notes ideas to provide ramp, addition of shower to main floor, advised to hold off and see how mobility progresses.     Plan  Plan  Times per week: 5-7 Times per day: Twice a day  Current Treatment Recommendations: Self-Care / ADL, Home Management Training, Strengthening, Balance Training, Functional Mobility Training, Endurance Training, Wheelchair Mobility Training, Neuromuscular Re-education, Pain Management, Safety Education & Training, Patient/Caregiver Education & Training, Equipment Evaluation, Education, & procurement, Cognitive/Perceptual Training  Patient Goals   Patient goals : To discharge home with family support  Short term goals  Time Frame for Short term goals: 7 to 10 days  Short term goal 1: Pt will perform upper body bathing/dressing with stand by assist.  Short term goal 2: Pt will perform lower body bathing and dressing with Moderate assist and Fair safety. Short term goal 3: Pt will perform toileting tasks with Moderate assist.  Short term goal 4: Pt will verbalize/demonstrate Good understanding of assistive equipment/durable medical equipment/modified techniques for increased independence with self-care and mobility. Short term goal 5: Pt will perform functional transfers with Moderate assist to toilet/wheelchair/shower with Fair safety. Short term goal 6: Pt will actively participate in 30+ minutes of therapeutic exercise/functional activities to promote increased independence with self-care and mobility. Long term goals  Time Frame for Long term goals : By discharge  Long term goal 1: Pt will perform BADLs with modified independence and Good safety with assist PRN for HORTENSIA hose. Long term goal 2: Pt will perform functional transfers and mobility with modified independence, least restrictive mobility device, and Good safety. Long term goal 3: Pt will attend to L side of body 100% of the time during self-care, transfers, and mobility with 1-2 verbal cues PRN. Long term goal 4: Pt will stand for 5+ minutes with 1-2 UE support, modified independence and no loss of balance while engaging in functional activity of choice. Long term goal 5: Pt will verbalize/demonstrate Good understanding of home exercise program for LUE.   Long term goals 6: 9 hole peg, box and block to be assessed for LUE as appropriate       Electronically signed by Donn Santos OT on 12/13/20 at 4:39 PM EST

## 2020-12-13 NOTE — PROGRESS NOTES
Physical Therapy  [address]  Acute Rehabilitation Physical Therapy Progress Note    Date: 20  Patient Name: Remberto Paez       Room: 2612/2612-01  MRN: [id number]   Account: [de-identified]   : 1970  (48 y.o.) Gender: female     Referring Practitioner: Marc Larson MD  Diagnosis: Intracranial hemmorrhage  Past Medical History:  has a past medical history of Asthma, Bipolar 1 disorder ([facility] [address] Delta storage pool disease [facility]), Hypertension, and Psychiatric problem. Past Surgical History:   has a past surgical history that includes  section (5679,[id number]); Gastric bypass surgery (); Tonsillectomy and adenoidectomy (); Cholecystectomy (); knee surgery (); Hysterectomy (); laparoscopy (); Colonoscopy (N/A, 2/3/2020); and Upper gastrointestinal endoscopy (N/A, 2/3/2020). Additional Pertinent Hx: 51-year-old female with history of delta storage pool disease, bipolar disorder, alcohol abuse who developed acute left-sided weaknessfound to have intraparenchymal hemorrhage, right parietal lobe hemorrhage and moderate subarachnoid hemorrhage. Pt admitted to rehab unit on 20. Restrictions/Precautions  Restrictions/Precautions: Fall Risk  Required Braces or Orthoses?: No  Position Activity Restriction  Other position/activity restrictions: Occasional L hypertonicity. Subjective: Pt states she did not take muscle relaxer overnight and had fewer spasms than with increased dose of day before; also reports Dr. Michael Flores to change medications for day/night. Comments: Pt demonstrating hip/knee extension during squat pivot transfers.      Vital Signs  Patient Currently in Pain: Denies    Patient Observation  Observations: L neglect    Bed Mobility  Sit to Supine: Minimal assistance(L LE assist)  Scooting: Contact guard assistance(Safety issue due to L UE/LE tone. ) Comment: Bed nearly flat, rail, 2 pillows. Pt states she prefers all four rails up because she \"flops around so much,\" has awakened with LEs dangling off bed. Transfers:  Sit to Stand: Minimal Assistance(Walker lift. L knee blocked, assist for L UE. )  Stand to sit: 2 Person Assistance;Minimal Assistance(Walker lift. Requires L UE assist.)  Bed to Chair: Dependent/Total(Tameka Jurado Locks, 1 assist)  Squat Pivot Transfers: 2 Person Assistance; Moderate Assistance(Wheelchair <> mat, armrest removed, to Pt's R. )    Ambulation 1  Surface: level tile  Device: (walker lift)  Other Apparatus: Left;Slider  Assistance: 2 Person assistance;Maximum assistance(Max x3)  Quality of Gait: Cues for sequencing, weight-shifting with good return. Max A to advance/control L LE, though Pt is initiating hip & knee flexion to initiate L LE swing with limited ability to complete range of motion;  assist for L knee & hip extension in stance as well. Max A for strong upper body L lateral lean & UE support. Max A to steer/control walker. L ankle supinated x1, dependent to correct. Gait Deviations: Decreased step length;Decreased step height;Slow Meseret  Distance: 25'  Comments: Pt states she felt good about effort/was hard work; noted improvement from rhythm attained over longer distance. Stairs/Curb  Stairs?: No    Wheelchair Activities  Wheelchair Parts Management: (Brakes posterior to chair. )  Propulsion: Yes  Propulsion 1  Propulsion: Manual  Level: Level Tile  Method: RLE;RUE  Level of Assistance: Moderate assistance  Description/ Details: Pt able to maintain straight path, requires initial assist to avoid obstacle (cue to stop also required) 2x.  Pulled on wheel rim to back up and adjust direction, 90* turn  Distance: 149'    BALANCE Posture: Fair(Requires cues for correction with fair return)  Sitting - Static: Fair(Edge of mat, 0-1 UE support/SBA, no back support) Sitting - Dynamic: Fair;-(Reaching across midline, with R UE and R-supported L UE.)  Standing - Static: Poor(lacks consistent core control, L knee & hip extension. )  Standing - Dynamic: Poor(lacks consistent core control, L knee & hip extension. )  Comments: Standing balance in walker lift    EXERCISES Exercises  Neurodevelopmental Techniques: Alternating vibration to L quads/hamstrings for seated knee flexion/extension (gravity minimized) with little return. 1 min   Other exercises?: Yes  Other exercises 1: Positional changes in wheelchair to maintain skin integrity, equilateral weight bearing/maintaining midline posture. (Improved L UE/LE control noted. )  Other exercises 2: Seated bilateral LE exercises, 1.5# R LE, active assisted/passive ROM L LE, orange (minimal) resistance band, 15-20x each(Including vibration as described above; active L hip flexion x5 with ROM decreasing with fatigue.)  Other exercises 3: Lateral lean onto elbow and push back upright, to R stand by, to L with min. A, 2x5 each dir.; forward and retro lean 10x each; clockwise/counter clockwise trunk movement, 5x each dir.  (Min A at most. )  Other exercises 4: Sit <> stand, Rogue Dus x1; walker lift x1  Other exercises 5: Seated balance edge of mat, 10 min., including reaching outside base of support (F+ core control, no loss of balance.)    Activity Tolerance: Patient Tolerated treatment well, Patient limited by endurance     PT Equipment Recommendations  Other: TBD    Current Treatment Recommendations: Strengthening, ROM, Balance Training, Functional Mobility Training, Transfer Training, Wheelchair Mobility Training, Gait Training, Neuromuscular Re-education, Pain Management, Home Exercise Program, Safety Education & Training, Patient/Caregiver Education & Training, Positioning, Endurance Training, Stair training    Conditions Requiring Skilled Therapeutic Intervention Body structures, Functions, Activity limitations: Decreased functional mobility ; Decreased strength;Decreased safe awareness;Decreased balance; Increased pain;Decreased sensation;Decreased fine motor control;Decreased coordination;Decreased posture;Decreased endurance;Decreased vision/visual deficit; Decreased ROM  REQUIRES PT FOLLOW UP: Yes  Discharge Recommendations: Patient would benefit from continued therapy after discharge;Home with assist PRN    Goals  Short term goals  Time Frame for Short term goals: 10 days  Short term goal 1: pt will perform bed mobility with min A  Short term goal 2: pt will dangle EOB/EOM for 20 to 25 minutes with SBA, performing challenged seated balance/corestrengthening  Short term goal 3: Pivot transfers with mod A+2  Short term goal 4: WC mobility x 100' with min A  Short term goal 5: progress to standing/pre-gait activities in // bars to facilitate L LE motor fucntion. Long term goals  Time Frame for Long term goals : By LOS  Long term goal 1: Pt able to perform bed mobility independently  Long term goal 2: Pt able to perform transfers at [address] term goal 3: Pt able to progress to ambulation with appropriate device dist of 30 to 50 ft, mod A   Long term goal 4: Pt able to propel w/c level surfaces dist of 150 ft , mod-I. Long term goal 5: Improve L LE strength to atleast 2 to 2+/5 to improve fucntion. Long term goal 6: Improve PASS score from 8/30 to 23/36 to improve overall fucntion.   Long term goal 7: Pt able to go up and down 5 steps with rail, mod A.       12/13/20 0842 12/13/20 1420   PT Individual Minutes   Time In 2238 [id number]   Time Out 0935 1505   Minutes 48 50     Electronically signed by Elvi Canada PTA on 12/13/20 at 6:58 PM EST

## 2020-12-13 NOTE — PROGRESS NOTES
[place] [address] Internal Medicine    Progress Note  Additional Pertinent Hx: 60-year-old female with history of delta storage pool disease, bipolar disorder, alcohol abuse who developed acute left-sided weaknessfound to have intraparenchymal hemorrhage, right parietal lobe hemorrhage and moderate subarachnoid hemorrhage. Pt admitted to rehab unit on 12/9/20. Family / Caregiver Present: No  General Comment  Comments: Naomi Lopez noting good safety awareness. L UE tone/spasm + L neglect can affect Pt safety  12/13/2020    12:59 PM    Name:   Nieves Deshpande  MRN:     [id number]     Acct:      [de-identified]   Room:   [address] [place], [place] [id number]   Day:  4  Admit Date:  12/9/2020  8:30 PM    PCP:   Murtaza Berrios MD  Code Status:  Full Code    Subjective:     C/C: No chief complaint on file. Active Problems:    Platelet dysfunction (HCC)    Bipolar 1 disorder (HCC)    Intracranial hemorrhage (HCC)    Vasogenic edema (HCC)    H/O intracranial hemorrhage    Essential hypertension    Acute left-sided weakness  Resolved Problems:    * No resolved hospital problems. *      Interval History Status: improved. Patient is feeling better  Vitals are stable blood pressure was somewhat low in the morning blood pressure meds were held  We will review the meds and adjust          Significant last 24 hr data reviewed ;   Vitals:    12/12/20 1832 12/12/20 2200 12/13/20 0541 12/13/20 0622   BP: 115/62 (!) 108/52 (!) 107/55 (!) 146/76   Pulse: 88   73   Resp: 18   16   Temp: 98.4 °F (36.9 °C)   98.1 °F (36.7 °C)   TempSrc: Oral   Oral   SpO2: 96%   96%   Height:          No results found for this or any previous visit (from the past 24 hour(s)). No results for input(s): POCGLU in the last 72 hours. No results found.           HPI:   See history in H and P       Transfer from [facility] ; Heart:  regular rate and rhythm, no murmur  Abdomen:  soft, nontender, nondistended, normal bowel sounds, no masses, hepatomegaly, splenomegaly  Extremities:  no edema, redness, tenderness in the calves  Skin:  no gross lesions, rashes, induration    Assessment:        Primary Problem  <principal problem not specified>    Active Hospital Problems    Diagnosis Date Noted    Essential hypertension [I10] 12/11/2020    Acute left-sided weakness [R53.1] 12/11/2020    H/O intracranial hemorrhage [Z86.79] 12/09/2020    Vasogenic edema ([address] [G93.6]     Intracranial hemorrhage ([address] [I62.9] 11/30/2020    Bipolar 1 disorder ([address] [F31.9]     Platelet dysfunction ([address] [D69.1] 05/26/2016       Plan:        1. Patient has history of intracranial hemorrhage  2. Pulmonary hypertension  3. Platelet dysfunction  12/13/20  Her blood pressure has been on the low side  Will reduce dose of amlodipine to 5 mg daily  Also reduce dose of lisinopril from 40-20  Continue labetalol   holding  Parameters have been ordered      Medications: Allergies:     Allergies   Allergen Reactions    Pcn [Penicillins]     Sulfa Antibiotics Other (See Comments)     Inflammation in the eye       Current Meds:   Scheduled Meds:    gabapentin  300 mg Oral Nightly    amLODIPine  10 mg Oral Daily    buPROPion  300 mg Oral Daily    cyanocobalamin  1,000 mcg Intramuscular Weekly    Followed by   Kiara Hazel ON 2/5/2021] cyanocobalamin  1,000 mcg Intramuscular R16 Days    folic acid  1 mg Oral Daily    labetalol  200 mg Oral 3 times per day    lamoTRIgine  400 mg Oral Daily    lisinopril  40 mg Oral Daily    melatonin  3 mg Oral Nightly    potassium bicarb-citric acid  40 mEq Oral Daily    rOPINIRole  1 mg Oral Nightly    sennosides-docusate sodium  2 tablet Oral Daily    sertraline  50 mg Oral Daily    thiamine  100 mg Oral Daily    polyethylene glycol  17 g Oral Daily    enoxaparin  30 mg Subcutaneous BID     Continuous Infusions: PRN Meds: tiZANidine, acetaminophen, ipratropium-albuterol, magnesium hydroxide, muscle rub, bisacodyl, senna       Sujata Dailey MD  12/13/2020  12:59 PM

## 2020-12-14 LAB
ANION GAP SERPL CALCULATED.3IONS-SCNC: 12 MMOL/L (ref 9–17)
BUN BLDV-MCNC: 17 MG/DL (ref 6–20)
BUN/CREAT BLD: ABNORMAL (ref 9–20)
CALCIUM SERPL-MCNC: 10 MG/DL (ref 8.6–10.4)
CHLORIDE BLD-SCNC: 97 MMOL/L (ref 98–107)
CO2: 24 MMOL/L (ref 20–31)
CREAT SERPL-MCNC: 0.61 MG/DL (ref 0.5–0.9)
GFR AFRICAN AMERICAN: >60 ML/MIN
GFR NON-AFRICAN AMERICAN: >60 ML/MIN
GFR SERPL CREATININE-BSD FRML MDRD: ABNORMAL ML/MIN/{1.73_M2}
GFR SERPL CREATININE-BSD FRML MDRD: ABNORMAL ML/MIN/{1.73_M2}
GLUCOSE BLD-MCNC: 89 MG/DL (ref 70–99)
POTASSIUM SERPL-SCNC: 4.3 MMOL/L (ref 3.7–5.3)
SODIUM BLD-SCNC: 133 MMOL/L (ref 135–144)

## 2020-12-14 PROCEDURE — 99231 SBSQ HOSP IP/OBS SF/LOW 25: CPT | Performed by: INTERNAL MEDICINE

## 2020-12-14 PROCEDURE — 97112 NEUROMUSCULAR REEDUCATION: CPT

## 2020-12-14 PROCEDURE — 99231 SBSQ HOSP IP/OBS SF/LOW 25: CPT | Performed by: STUDENT IN AN ORGANIZED HEALTH CARE EDUCATION/TRAINING PROGRAM

## 2020-12-14 PROCEDURE — 97530 THERAPEUTIC ACTIVITIES: CPT

## 2020-12-14 PROCEDURE — 80048 BASIC METABOLIC PNL TOTAL CA: CPT

## 2020-12-14 PROCEDURE — 6370000000 HC RX 637 (ALT 250 FOR IP): Performed by: NURSE PRACTITIONER

## 2020-12-14 PROCEDURE — 97110 THERAPEUTIC EXERCISES: CPT

## 2020-12-14 PROCEDURE — 36415 COLL VENOUS BLD VENIPUNCTURE: CPT

## 2020-12-14 PROCEDURE — 97542 WHEELCHAIR MNGMENT TRAINING: CPT

## 2020-12-14 PROCEDURE — 97535 SELF CARE MNGMENT TRAINING: CPT

## 2020-12-14 PROCEDURE — 1180000000 HC REHAB R&B

## 2020-12-14 PROCEDURE — 6360000002 HC RX W HCPCS: Performed by: PHYSICAL MEDICINE & REHABILITATION

## 2020-12-14 PROCEDURE — 6370000000 HC RX 637 (ALT 250 FOR IP): Performed by: INTERNAL MEDICINE

## 2020-12-14 PROCEDURE — 6370000000 HC RX 637 (ALT 250 FOR IP): Performed by: PHYSICAL MEDICINE & REHABILITATION

## 2020-12-14 PROCEDURE — 97116 GAIT TRAINING THERAPY: CPT

## 2020-12-14 RX ADMIN — LABETALOL HYDROCHLORIDE 200 MG: 100 TABLET, FILM COATED ORAL at 15:47

## 2020-12-14 RX ADMIN — TIZANIDINE 4 MG: 4 TABLET ORAL at 18:13

## 2020-12-14 RX ADMIN — GABAPENTIN 300 MG: 300 CAPSULE ORAL at 20:13

## 2020-12-14 RX ADMIN — FOLIC ACID 1 MG: 1 TABLET ORAL at 09:33

## 2020-12-14 RX ADMIN — OXYCODONE HYDROCHLORIDE 5 MG: 5 TABLET ORAL at 11:54

## 2020-12-14 RX ADMIN — ENOXAPARIN SODIUM 30 MG: 30 INJECTION SUBCUTANEOUS at 09:34

## 2020-12-14 RX ADMIN — BUPROPION HYDROCHLORIDE 300 MG: 300 TABLET, FILM COATED, EXTENDED RELEASE ORAL at 09:39

## 2020-12-14 RX ADMIN — SERTRALINE HYDROCHLORIDE 50 MG: 50 TABLET ORAL at 09:34

## 2020-12-14 RX ADMIN — THIAMINE HCL TAB 100 MG 100 MG: 100 TAB at 09:44

## 2020-12-14 RX ADMIN — ENOXAPARIN SODIUM 30 MG: 30 INJECTION SUBCUTANEOUS at 20:13

## 2020-12-14 RX ADMIN — ROPINIROLE HYDROCHLORIDE 1 MG: 1 TABLET, FILM COATED ORAL at 20:13

## 2020-12-14 RX ADMIN — AMLODIPINE BESYLATE 5 MG: 10 TABLET ORAL at 09:33

## 2020-12-14 RX ADMIN — LAMOTRIGINE 400 MG: 100 TABLET ORAL at 09:38

## 2020-12-14 RX ADMIN — OXYCODONE HYDROCHLORIDE 5 MG: 5 TABLET ORAL at 20:13

## 2020-12-14 RX ADMIN — POTASSIUM BICARBONATE 40 MEQ: 782 TABLET, EFFERVESCENT ORAL at 09:34

## 2020-12-14 RX ADMIN — LISINOPRIL 20 MG: 20 TABLET ORAL at 09:36

## 2020-12-14 RX ADMIN — Medication 3 MG: at 20:13

## 2020-12-14 RX ADMIN — TIZANIDINE 4 MG: 4 TABLET ORAL at 11:45

## 2020-12-14 ASSESSMENT — PAIN SCALES - GENERAL: PAINLEVEL_OUTOF10: 0

## 2020-12-14 ASSESSMENT — PAIN DESCRIPTION - PROGRESSION: CLINICAL_PROGRESSION: NOT CHANGED

## 2020-12-14 ASSESSMENT — PAIN DESCRIPTION - ONSET: ONSET: ON-GOING

## 2020-12-14 ASSESSMENT — PAIN DESCRIPTION - PAIN TYPE: TYPE: ACUTE PAIN

## 2020-12-14 ASSESSMENT — PAIN DESCRIPTION - LOCATION: LOCATION: ARM

## 2020-12-14 ASSESSMENT — PAIN DESCRIPTION - DESCRIPTORS: DESCRIPTORS: ACHING;DISCOMFORT;SPASM

## 2020-12-14 NOTE — PATIENT CARE CONFERENCE
[address] [facility]   ACUTE REHABILITATION  TEAM CONFERENCE NOTE  Date: 12/15/20  Patient Name: Zarina Thurston       Room: 2612/2612-01  MRN: [id number]       : 1970  (48 y.o.)     Gender: female       H/O intracranial hemorrhage [Z86.79]  Diagnosis: Intracranial hemmorrhage     NURSING  Bladder  Continent  Bowel   Continent  Intervention    Both Bowel & Bladder Program     Wounds/Incisions/Ulcers: No skin issues identified  Medication Education Program: Patient able to manage medications and being educated by nursing  Pain: Patient's pain is currently controlled with -  Neurontin 300 mg q hs, oxycodone 5 mg 8 hours prn    Fall Risk:  Falling star program initiated    PHYSICAL THERAPY  Bed mobility  Bridging: Stand by assistance  Rolling to Left: Minimal assistance  Rolling to Right: Maximum assistance  Supine to Sit: Maximum assistance  Sit to Supine: Maximum assistance        Transfers:  Sit to Stand: Minimal Assistance(Walker lift. L knee blocked, assist for L UE. )  Stand to sit: 2 Person Assistance;Minimal Assistance(Walker lift. Requires L UE assist.)  Bed to Chair: Dependent/Total(Tameka Greene, 1 assist)  Squat Pivot Transfers: 2 Person Assistance; Moderate Assistance(Wheelchair <> mat, armrest removed, to Pt's R. )    Ambulation 1  Surface: level tile  Device: (walker lift)  Other Apparatus: Left;Slider  Assistance: 3 Person assistance;Maximum assistance(Max x3)  Quality of Gait: Cues for sequencing, weight-shifting with good return. Max A to advance/control L LE, though Pt is initiating hip & knee flexion to initiate L LE swing with limited ability to complete range of motion;  assist for L knee & hip extension in stance as well. Max A for strong upper body L lateral lean & UE support. Max A to steer/control walker. L ankle supinated x1, dependent to correct.    Gait Deviations: Decreased step length;Decreased step height;Slow Meseret  Distance: 22' Comments: Pt states she felt good about effort/was hard work; noted improvement from rhythm attained over longer distance. Walk   Walk 10 Feet           Walk 50 feet with 2 Turns          Walk 150 Feet          Walking 10 feet Uneven Surface            Steps  1 Step (Curb)           4 Steps           12 Steps             Wheelchair Ability   Wheel 50 Feet - 2 turns           Wheel 150 Feet            Other: TBD    Active seated L hip flexion x5; vibration elicited erratic L quad & hamstring contraction in seated. Goals  Time Frame for Short term goals: 10 days  Short term goal 1: pt will perform bed mobility with min A  Short term goal 2: pt will dangle EOB/EOM for 20 to 25 minutes with SBA, performing challenged seated balance/corestrengthening  Short term goal 3: Pivot transfers with mod A+2  Short term goal 4: WC mobility x 100' with min A  Short term goal 5: progress to standing/pre-gait activities in // bars to facilitate L LE motor fucntion.       OCCUPATIONAL THERAPY  SELF CARE      Eating            Setup   Oral Hygiene            Modified independent (seated sinkside for oral care/comb hair and wash face)   Shower/Bathe Self             UE Bathing: Minimal assistance;Setup;Verbal cueing(VCs for ara tech, assist washing RUE/support LUE)  LE Bathing: Dependent/Total(assist for BLE's-thigh down, standing in lg stedy)   Upper Body Dressing            Maximum assistance;Setup;Verbal cueing(max cuing for ara tech, assist threading LUE/overhead/adjus)   Lower Body Dressing            Putting On/Taking Off Footwear             Dependent/Total   Toilet Transfer             Toilet - Technique: (using lg stedy)  Equipment Used: Standard toilet  Toilet Transfer: Dependent/Total  Toilet Transfers Comments: assist x1, using lg stedy   Toileting Hygiene            Dependent/Total(using lg stedy)    Bed mobility  Sit to Supine: Maximum assistance(pt layed on R side)        Balance Sitting Balance: Stand by assistance(seated edge of bed to remove shoes)  Standing Balance: Dependent/Total(min assist in standing using lg stedy)  Standing Balance  Time: AM: 1 min x4  Activity: AM: transfers, ADL and toileting tasks  Comment: assist x1 using lg stedy, assist to place LUE on front bar    Equipment Recommendations  Equipment Needed: (TBD)       Short term goals  Time Frame for Short term goals: 7 to 10 days  Short term goal 1: Pt will perform upper body bathing/dressing with stand by assist.  Short term goal 2: Pt will perform lower body bathing and dressing with Moderate assist and Fair safety. Short term goal 3: Pt will perform toileting tasks with Moderate assist.  Short term goal 4: Pt will verbalize/demonstrate Good understanding of assistive equipment/durable medical equipment/modified techniques for increased independence with self-care and mobility. Short term goal 5: Pt will perform functional transfers with Moderate assist to toilet/wheelchair/shower with Fair safety. Short term goal 6: Pt will actively participate in 30+ minutes of therapeutic exercise/functional activities to promote increased independence with self-care and mobility. SPEECH THERAPY      NUTRITION  Diet Rx: General with Ensure High Protein x 3 daily. PO intake had been decreased at [facility] but now has improved to % of meals consumed. Please see nutrition note for details.     SOCIAL WORK ASSESSMENT  Assessment: With patient   Pre-Admission Status:  Lives With: Spouse  Type of Home: House  Home Layout: Two level, Bed/Bath upstairs, 1/2 bath on main level(finished basement for recreation)  Home Access: Stairs to enter without rails, Stairs to enter with rails  Entrance Stairs - Number of Steps: 3-5 steps to enter with 0 HR; 6 + 1 + 8 steps to 2nd floor with R HR for all 15 steps  Entrance Stairs - Rails: Right(R rail going up to 2nd floor.)  Bathroom Shower/Tub: Tub/Shower unit, Curtain Bathroom Toilet: Standard  Bathroom Equipment: (no DME)  Bathroom Accessibility: Walker accessible, Accessible  Home Equipment: (no DME)  ADL Assistance: Independent  Homemaking Assistance: Independent  Homemaking Responsibilities: Yes  Ambulation Assistance: Independent  Transfer Assistance: Independent  Active : Yes  Mode of Transportation: SUV  Occupation: Part time employment  Type of occupation:  (former )  Leisure & Hobbies: Hanging out with family, bingeing Netflix series, playing Poker with family  Additional Comments: Pt's spouse is a teacher and works full time. Pt has 2 adult sons who live nearby and stop by the home. Family Education: Need to make contact with family to initiate education    Percentage Risk for Readmission: Low 0 - 18%   Readmission Risk              Risk of Unplanned Readmission:        13       %    Critical Items: None       Problem / Barrier Intervention / Plan  Results   Impaired function related to deficits from stroke Strengthening, facilitating motor return Left Hemibody, functional mobility training with appropriate device    Pt slight impulsive Educate pt in safety    Left neglect Pt education, work on scanning left side environment.     Impaired independence and safety with self-care tasks ADL training, AE training, education re: modified techniques, safety, and energy conservation as needed                     Total Self Care Score    Total Mobility Score  Admission Score:  15      Admission Score:  18  Goal:  39/42         Goal:  55/90   `  Discharge Plan   Estimated Discharge Date: 1/5/21  Overnight or Day Pass: No  Factors facilitating achievement of predicted outcomes: Family support, Motivated, Cooperative and Pleasant  Barriers to the achievement of predicted outcomes: Decreased endurance, Upper extremity weakness, Lower extremity weakness, Skin Care and Medication managment CMG:  Therese Arredondo RN    I approve the established interdisciplinary plan of care as documented within the medical record of [address].     Jackelyn Yoon MD

## 2020-12-14 NOTE — PROGRESS NOTES
[place] [address] Internal Medicine    CONSULTATION / HISTORY AND PHYSICAL EXAMINATION            Date:   2020  Patient name:  Valentine Dailey  Date of admission:  2020  8:30 PM  MRN:   [id number]  Account:  [de-identified]  YOB: 1970  PCP:    Paul Chowdhury MD  Room:   [facility]-  Code Status:    Full Code    Physician Requesting Consult: Alinda Romberg, MD    Reason for Consult: Medical management    Chief Complaint:     No chief complaint on file. Deconditioning    History Obtained From:     patient, electronic medical record    History of Present Illness/ Interval History:   57-year-old female with history of delta storage pool disease, bipolar disorder, alcohol abuse who developed acute left-sided weaknessfound to have intraparenchymal hemorrhage, right parietal lobe hemorrhage and moderate subarachnoid hemorrhage. Pt admitted to rehab unit on 20. Improving with PT/OT      Past Medical History:     Past Medical History:   Diagnosis Date    Asthma     B[facility] 1 disorder ([facility]     Delta storage pool disease ([facility] [facility]     Hypertension     Psychiatric problem         Past Surgical History:     Past Surgical History:   Procedure Laterality Date     SECTION  6610,4976    Miscarriage     CHOLECYSTECTOMY      COLONOSCOPY N/A 2/3/2020    -random bx(normal)hemorrhoids    GASTRIC BYPASS SURGERY      HYSTERECTOMY      KNEE SURGERY      LAPAROSCOPY      TONSILLECTOMY AND ADENOIDECTOMY      UPPER GASTROINTESTINAL ENDOSCOPY N/A 2/3/2020    (normal,neg H-Pylori)normal post-bypass endoscopy        Medications Prior to Admission:     Prior to Admission medications    Medication Sig Start Date End Date Taking?  Authorizing Provider   STIMATE 1.5 MG/ML SOLN nasal solution INSTILL 2 SPRAYS INTO THE NOSE TWICE DAILY STARTING TWO DAYS BEFORE PROCEDURE 20   Andrez Matthew MD amLODIPine (NORVASC) 5 MG tablet Take 5 mg by mouth daily    Historical Provider, MD   QUEtiapine (SEROQUEL) 200 MG tablet Take 200 mg by mouth nightly    Historical Provider, MD   lisinopril (PRINIVIL;ZESTRIL) 20 MG tablet Take 20 mg by mouth daily    Historical Provider, MD   buPROPion (WELLBUTRIN XL) 300 MG XL tablet Take 300 mg by mouth daily 3/5/16   Historical Provider, MD   sertraline (ZOLOFT) 50 MG tablet Take 50 mg by mouth daily 3/5/16   Historical Provider, MD   PROAIR  (90 BASE) MCG/ACT inhaler Inhale 2 puffs into the lungs every 6 hours as needed  14   Historical Provider, MD   lamoTRIgine (LAMICTAL) 200 MG tablet Take 400 mg by mouth daily 2 tabs 14   Historical Provider, MD        Allergies:     Pcn [penicillins] and Sulfa antibiotics    Social History:     Tobacco:    reports that she has never smoked. She has never used smokeless tobacco.  Alcohol:      reports current alcohol use. Drug Use:  reports no history of drug use. Family History:     Family History   Adopted: Yes   Family history unknown: Yes       Review of Systems:     Positive and Negative as described in HPI. Denies any shortness of breath or cough  Denies chest pain or palpitations  Denies abdominal pain, diarrhea vomiting  Denies any new numbness tremors or weakness. Physical Exam:     BP (!) 126/57   Pulse 89   Temp 97.9 °F (36.6 °C) (Oral)   Resp 19   Ht 5' 5\" (1.651 m)   SpO2 100%   BMI 38.85 kg/m²   Temp (24hrs), Av.3 °F (36.8 °C), Min:97.9 °F (36.6 °C), Max:98.6 °F (37 °C)      General appearance:  alert, cooperative and no distress  Eyes: Anicteric sclera. Pupils are equally round and reactive to light. Extraocular movements are intact.   Lungs:  clear to auscultation bilaterally, normal effort  Heart:  regular rate and rhythm, no murmur  Abdomen:  soft, nontender, nondistended, normal bowel sounds, no masses, hepatomegaly, splenomegaly

## 2020-12-14 NOTE — PROGRESS NOTES
[address] [facility]    ACUTE REHABILITATION OCCUPATIONAL THERAPY  DAILY NOTE    Date: 20  Patient Name: Chito Greenwood      Room: 2612/2612-01    MRN: [id number]   : 1970  (48 y.o.)  Gender: female   Referring Practitioner: Win De La Cruz MD  Diagnosis: Intracranial hemmorrhage  Additional Pertinent Hx: 60-year-old female with history of delta storage pool disease, bipolar disorder, alcohol abuse who developed acute left-sided weaknessfound to have intraparenchymal hemorrhage, right parietal lobe hemorrhage and moderate subarachnoid hemorrhage. Pt admitted to rehab unit on 20. Restrictions  Restrictions/Precautions: Fall Risk  Other position/activity restrictions: Occasional L hypertonicity. Required Braces or Orthoses?: No  Equipment Used: Other, Wheelchair, [employer])    Subjective  Subjective: \"this arm just gets really sore, sometimes painful right here\" pt states in regards to LUE near shoulder  Comments: pt very motivated, gives good efforts, pleasant  Patient Currently in Pain: Denies  Restrictions/Precautions: Fall Risk  Overall Orientation Status: Within Functional Limits  Patient Observation  Observations: L neglect at times  Pain Assessment  Pain Assessment: 0-10  Pain Level: 1  Pain Type: Acute pain  Pain Location: Arm  Pain Orientation: Left  Pain Descriptors: Aching, Sore    Objective  Cognition  Overall Cognitive Status: WFL  Perception  Overall Perceptual Status: Impaired  Unilateral Attention: Cues to attend left visual field;Cues to attend to left side of body;Cues to maintain midline in sitting;Cues to maintain midline in standing  Balance  Sitting Balance: Stand by assistance(seated edge of bed to remove shoes)  Standing Balance: Dependent/Total(min assist in standing using lg stedy)  Bed mobility  Sit to Supine: Maximum assistance(pt layed on R side)  Transfers  Sit to stand:  Moderate assistance  Stand to sit: Moderate assistance Transfer Comments: using lg TDI [place]  Standing Balance  Time: AM: 1 min x4  Activity: AM: transfers, ADL and toileting tasks  Comment: assist x1 using lg stedy, assist to place LUE on front bar  Functional Mobility  Functional - Mobility Device: Wheelchair  Activity: To/from bathroom; Other  Assist Level: Moderate assistance  Functional Mobility Comments: assist for turns on L side, VCs req for visual scanning with poor return noted on L side  Toilet Transfers  Toilet - Technique: (using lg stedy)  Equipment Used: Standard toilet  Toilet Transfer: Dependent/Total  Toilet Transfers Comments: assist x1, using lg stedy  Upper Extremity Function  NDT Treatment: Upper extremity; Sitting  Type of ROM/Therapeutic Exercise  Type of ROM/Therapeutic Exercise: Free weights(2-3# RUE, 20 reps x2)  Comment: pt engaged in RUE ex's to support mobility/transfers and overall endurance, rest breaks req due to fatigue, good tolerance noted  Exercises  Scapular Protraction: x  Scapular Retraction: x  Shoulder Flexion: x  Shoulder Extension: x  Horizontal ABduction: x  Horizontal ADduction: x  Elbow Flexion: x  Elbow Extension: x  Other: skateboard with LUE utilized to faciliate functional movements for horizontal ab/adduction and scapular protraction and retraction, AAROM and PROM req for intermittent movements, pt giving good efforts req VCs for rest breaks           Neuromuscular Education  Neuromuscular education: Yes  NDT Treatment: Upper extremity; Sitting  Vibration: vibrations applied to LUE while using skateboard to increase movements for scapular pro/retraction and horizontal ab/adduction, increased movements noted when vibrations applied, good tolerance noted           ADL  Feeding: Setup  Grooming: Modified independent (seated sinkside for oral care/comb hair and wash face)  UE Bathing: Minimal assistance;Setup;Verbal cueing(VCs for ara tech, assist washing RUE/support LUE) LE Bathing: Dependent/Total(assist for BLE's-thigh down, standing in lg stedy)  UE Dressing: Maximum assistance;Setup;Verbal cueing(max cuing for ara tech, assist threading LUE/overhead/adjustments around trunk )  LE Dressing: Dependent/Total  Toileting: Dependent/Total(using lg stedy)          Assessment  Performance deficits / Impairments: Decreased functional mobility ; Decreased ADL status; Decreased ROM; Decreased strength;Decreased endurance;Decreased balance;Decreased high-level IADLs;Decreased fine motor control;Decreased coordination;Decreased posture;Decreased safe awareness;Decreased vision/visual deficit  Prognosis: Good  Discharge Recommendations: Home with assist PRN;Patient would benefit from continued therapy after discharge  Activity Tolerance: Patient limited by pain; Patient limited by fatigue;Patient Tolerated treatment well  Safety Devices in place: Yes  Type of devices: Nurse notified; Left in bed;Patient at risk for falls;Call light within reach; Bed alarm in place  Equipment Recommendations  Equipment Needed: (TBD)       Patient Education: transfer safety, ADL tasks, ara tech, LUE awareness, strengthening, visual scanning   Patient Goals   Patient goals : To discharge home with family support  Learner:patient  Method: demonstration and explanation       Outcome: verbalized concerns, demonstrated understanding, needs reinforcement and asked questions        Plan  Plan  Times per week: 5-7  Times per day: Twice a day  Current Treatment Recommendations: Self-Care / ADL, Home Management Training, Strengthening, Balance Training, Functional Mobility Training, Endurance Training, Wheelchair Mobility Training, Neuromuscular Re-education, Pain Management, Safety Education & Training, Patient/Caregiver Education & Training, Equipment Evaluation, Education, & procurement, Cognitive/Perceptual Training  Patient Goals   Patient goals :  To discharge home with family support  Short term goals Time Frame for Short term goals: 7 to 10 days  Short term goal 1: Pt will perform upper body bathing/dressing with stand by assist.  Short term goal 2: Pt will perform lower body bathing and dressing with Moderate assist and Fair safety. Short term goal 3: Pt will perform toileting tasks with Moderate assist.  Short term goal 4: Pt will verbalize/demonstrate Good understanding of assistive equipment/durable medical equipment/modified techniques for increased independence with self-care and mobility. Short term goal 5: Pt will perform functional transfers with Moderate assist to toilet/wheelchair/shower with Fair safety. Short term goal 6: Pt will actively participate in 30+ minutes of therapeutic exercise/functional activities to promote increased independence with self-care and mobility. Long term goals  Time Frame for Long term goals : By discharge  Long term goal 1: Pt will perform BADLs with modified independence and Good safety with assist PRN for HORTENSIA hose. Long term goal 2: Pt will perform functional transfers and mobility with modified independence, least restrictive mobility device, and Good safety. Long term goal 3: Pt will attend to L side of body 100% of the time during self-care, transfers, and mobility with 1-2 verbal cues PRN. Long term goal 4: Pt will stand for 5+ minutes with 1-2 UE support, modified independence and no loss of balance while engaging in functional activity of choice. Long term goal 5: Pt will verbalize/demonstrate Good understanding of home exercise program for LUE.   Long term goals 6: 9 hole peg, box and block to be assessed for LUE as appropriate        12/14/20 0940 12/14/20 1422   OT Individual Minutes   Time In [address] [place] [address] Road   Time Out [id number]   Minutes 58 33     Electronically signed by JEAN CARLOS Cavazos on 12/14/20 at 3:40 PM EST

## 2020-12-14 NOTE — PLAN OF CARE
Problem: Skin Integrity:  Goal: Will show no infection signs and symptoms  Description: Will show no infection signs and symptoms  Outcome: Ongoing  Goal: Absence of new skin breakdown  Description: Absence of new skin breakdown  Outcome: Ongoing     Problem: Falls - Risk of:  Goal: Will remain free from falls  Description: Will remain free from falls  Outcome: Ongoing  Goal: Absence of physical injury  Description: Absence of physical injury  Outcome: Ongoing     Problem: Pain:  Goal: Pain level will decrease  Description: Pain level will decrease  Outcome: Ongoing  Goal: Control of acute pain  Description: Control of acute pain  Outcome: Ongoing  Goal: Control of chronic pain  Description: Control of chronic pain  Outcome: Ongoing     Problem: Neurological  Goal: Maximum potential motor/sensory/cognitive function  Outcome: Ongoing     Problem: Nutrition  Goal: Optimal nutrition therapy  Description: Nutrition Problem #1: Inadequate energy intake  Intervention: Food and/or Nutrient Delivery: Continue Current Diet, Continue Oral Nutrition Supplement  Nutritional Goals: po intake greater than 75%     Outcome: Ongoing     Problem: Neurological  Goal: Maximum potential motor/sensory/cognitive function  Outcome: Ongoing

## 2020-12-14 NOTE — PROGRESS NOTES
Assistance: 2 Person assistance, Maximum assistance(Max x3)  Quality of Gait: Cues for sequencing, weight-shifting with good return. Max A to advance/control L LE, though Pt is initiating hip & knee flexion to initiate L LE swing with limited ability to complete range of motion;  assist for L knee & hip extension in stance as well. Max A for strong upper body L lateral lean & UE support. Max A to steer/control walker. L ankle supinated x1, dependent to correct. Gait Deviations: Decreased step length, Decreased step height, Slow Meseret  Distance: 25'  Comments: Pt states she felt good about effort/was hard work; noted improvement from rhythm attained over longer distance. Transfers  Sit to Stand: Minimal Assistance(Walker lift. L knee blocked, assist for L UE. )  Stand to sit: 2 Person Assistance, Minimal Assistance(Walker lift.  Requires L UE assist.)  Bed to Chair: Dependent/Total(Lg Camp, 1 assist)  Stand Pivot Transfers: Dependent/Total(lg camp, improved core control; 1 assist.)  Squat Pivot Transfers: 2 Person Assistance, Moderate Assistance(Wheelchair <> mat, armrest removed, to Pt's R. )  Comment: Pt demonstrates ability to extend L knee 2x today with downward pressure through hip & mirror feedback, fatigued quickly & no return in PM attempts without mirror feedback; hard L lateral lean, corrects with verbal, tactile & visual (in front of mirror) cues    Ambulation  Ambulation?: Yes  Ambulation 1  Surface: level tile  Device: (walker lift)  Other Apparatus: Left, Slider  Assistance: 2 Person assistance, Maximum assistance(Max x3) Quality of Gait: Cues for sequencing, weight-shifting with good return. Max A to advance/control L LE, though Pt is initiating hip & knee flexion to initiate L LE swing with limited ability to complete range of motion;  assist for L knee & hip extension in stance as well. Max A for strong upper body L lateral lean & UE support. Max A to steer/control walker. L ankle supinated x1, dependent to correct. Gait Deviations: Decreased step length, Decreased step height, Slow Meseret  Distance: 25'  Comments: Pt states she felt good about effort/was hard work; noted improvement from rhythm attained over longer distance. Surface: level tile  Ambulation 1  Surface: level tile  Device: (walker lift)  Other Apparatus: Left, Slider  Assistance: 2 Person assistance, Maximum assistance(Max x3)  Quality of Gait: Cues for sequencing, weight-shifting with good return. Max A to advance/control L LE, though Pt is initiating hip & knee flexion to initiate L LE swing with limited ability to complete range of motion;  assist for L knee & hip extension in stance as well. Max A for strong upper body L lateral lean & UE support. Max A to steer/control walker. L ankle supinated x1, dependent to correct. Gait Deviations: Decreased step length, Decreased step height, Slow Meseret  Distance: 25'  Comments: Pt states she felt good about effort/was hard work; noted improvement from rhythm attained over longer distance.      OT:  ADL  Feeding: Setup  Grooming: Modified independent (seated sinkside for oral care/comb hair and wash face)  UE Bathing: Minimal assistance, Setup, Verbal cueing(VCs for ara tech, assist washing RUE/support LUE)  LE Bathing: Dependent/Total(assist for BLE's-thigh down, standing in lg stedy)  UE Dressing: Maximum assistance, Setup, Verbal cueing(max cuing for ara tech, assist threading LUE/overhead/adjus)  LE Dressing: Dependent/Total  Toileting: Dependent/Total(using lg stedy) Additional Comments: pt was assisted to wash hair with shampoo at sink and shave B axilla. pt requesting toileting, completed with lg lopez assist of 1, bathed elizabeth area. pt wearing yobany hose, pt with long therapy break so OT suggested rest in bed, pt practiced doff B shoes seated edge of bed using long shoe horn and min assist LLE. OT showed pt long sponge, dress stick and reachers and ed re use during adls. pm, pt able to doff B socks, don R with ara technique post ed, min assist to don on L, (to hold in crossed position)         Balance  Sitting Balance: Stand by assistance(seated edge of bed to remove shoes)  Standing Balance: Dependent/Total(min assist in standing using lg lopez)   Standing Balance  Time: AM: 1 min x4  Activity: AM: transfers, ADL and toileting tasks  Comment: assist x1 using lg lopez, assist to place LUE on front bar  Functional Mobility  Functional - Mobility Device: Wheelchair  Activity: To/from bathroom, Other  Assist Level: Moderate assistance  Functional Mobility Comments: assist for turns on L side, VCs req for visual scanning with poor return noted on L side     Bed mobility  Bridging: Stand by assistance  Rolling to Left: Minimal assistance  Rolling to Right: Maximum assistance  Supine to Sit: Maximum assistance  Sit to Supine: Maximum assistance  Scooting: Contact guard assistance(Safety issue due to L UE/LE tone. )  Comment: Pt uses bed rail for rolling to eft side. pt moderately leans to left side, pt able to correct posture, improve seating balance once feet postioned on floor, CGA for static balance with R UE support. mod cues for attending to left side due to left neglect. Transfers  Stand Pivot Transfers: Dependent/Total  Sit to stand:  Moderate assistance  Stand to sit: Moderate assistance  Transfer Comments: using lg lopez   Toilet Transfers  Toilet - Technique: (using lg lopez)  Equipment Used: Standard toilet  Toilet Transfer: Dependent/Total Toilet Transfers Comments: assist x1, using lg stedy     Shower Transfers  Shower Transfers Comments: Deferred due to safety concerns with Pt's transfers and sitting balance  Wheelchair Bed Transfers  Equipment Used:  Other, Wheelchair, [employer])  Level of Asssistance: Dependent/Total    SPEECH:      Current Medications:   Current Facility-Administered Medications: gabapentin (NEURONTIN) capsule 300 mg, 300 mg, Oral, Nightly  tiZANidine (ZANAFLEX) tablet 4 mg, 4 mg, Oral, Q6H PRN  amLODIPine (NORVASC) tablet 5 mg, 5 mg, Oral, Daily  lisinopril (PRINIVIL;ZESTRIL) tablet 20 mg, 20 mg, Oral, Daily  oxyCODONE (ROXICODONE) immediate release tablet 5 mg, 5 mg, Oral, Q8H PRN  acetaminophen (TYLENOL) tablet 650 mg, 650 mg, Oral, Q4H PRN  buPROPion (WELLBUTRIN XL) extended release tablet 300 mg, 300 mg, Oral, Daily  [COMPLETED] cyanocobalamin injection 1,000 mcg, 1,000 mcg, Intramuscular, Daily **FOLLOWED BY** cyanocobalamin injection 1,000 mcg, 1,000 mcg, Intramuscular, Weekly **FOLLOWED BY** [START ON 2/5/2021] cyanocobalamin injection 1,000 mcg, 1,000 mcg, Intramuscular, X17 Days  folic acid (FOLVITE) tablet 1 mg, 1 mg, Oral, Daily  ipratropium-albuterol (DUONEB) nebulizer solution 1 ampule, 1 ampule, Inhalation, Q4H PRN  labetalol (NORMODYNE) tablet 200 mg, 200 mg, Oral, 3 times per day  lamoTRIgine (LAMICTAL) tablet 400 mg, 400 mg, Oral, Daily  magnesium hydroxide (MILK OF MAGNESIA) 400 MG/5ML suspension 30 mL, 30 mL, Oral, Daily PRN  melatonin tablet 3 mg, 3 mg, Oral, Nightly  muscle rub cream, , Topical, TID PRN  potassium bicarb-citric acid (EFFER-K) effervescent tablet 40 mEq, 40 mEq, Oral, Daily  rOPINIRole (REQUIP) tablet 1 mg, 1 mg, Oral, Nightly  sennosides-docusate sodium (SENOKOT-S) 8.6-50 MG tablet 2 tablet, 2 tablet, Oral, Daily  sertraline (ZOLOFT) tablet 50 mg, 50 mg, Oral, Daily  vitamin B-1 (THIAMINE) tablet 100 mg, 100 mg, Oral, Daily polyethylene glycol (GLYCOLAX) packet 17 g, 17 g, Oral, Daily  bisacodyl (DULCOLAX) suppository 10 mg, 10 mg, Rectal, Daily PRN  senna (SENOKOT) tablet 17.2 mg, 2 tablet, Oral, Daily PRN  enoxaparin (LOVENOX) injection 30 mg, 30 mg, Subcutaneous, BID      Objective:  BP (!) 110/59   Pulse 80   Temp 97.9 °F (36.6 °C) (Oral)   Resp 18   Ht 5' 5\" (1.651 m)   SpO2 100%   BMI 38.85 kg/m²       GEN: Well developed, well nourished, no acute distress  HEENT: NCAT. EOM grossly intact. Hearing grossly intact. Mucous membranes pink and moist.  RESP: Normal breath sounds with no wheezing, rales, or rhonchi. Respirations WNL and unlabored. CV: Regular rate and rhythm. Systolic murmur present (chronic). ABD: Soft, non-distended, BS+ and equal.  NEURO: Alert, oriented to person, place, and time. Speech fluent with no aphasia or dysarthria noted. Sensation to light touch decreased in the left lower limb > left upper limb. Sensation to light touch intact in the right upper and lower limbs. MSK: Decreased AROM in the left upper and lower limbs due to weakness. Otherwise functional ROM. Muscle bulk is normal bilaterally. Left hemiparesis - minimal movement noted in the left upper limb with finger flexion and in the left lower limb with ankle dorsiflexion. LIMBS: No edema in bilateral lower limbs. SKIN: Warm and dry with good turgor. PSYCH: Mood WNL. Affect WNL. Appropriately interactive. Diagnostics:     CBC: No results for input(s): WBC, RBC, HGB, HCT, MCV, RDW, PLT in the last 72 hours. BMP:   Recent Labs     12/14/20  1536   *   K 4.3   CL 97*   CO2 24   BUN 17   CREATININE 0.61   GLUCOSE 89     BNP: No results for input(s): BNP in the last 72 hours. PT/INR: No results for input(s): PROTIME, INR in the last 72 hours. APTT: No results for input(s): APTT in the last 72 hours. CARDIAC ENZYMES: No results for input(s): CKMB, CKMBINDEX, TROPONINT in the last 72 hours.     Invalid input(s): CKTOTAL;3 FASTING LIPID PANEL:  Lab Results   Component Value Date    CHOL 247 (H) 11/30/2020     11/30/2020    TRIG 77 11/30/2020     LIVER PROFILE: No results for input(s): AST, ALT, ALB, BILIDIR, BILITOT, ALKPHOS in the last 72 hours. Impression/Plan:   Impaired ADLs, gait, and mobility due to:    1. Hemorrhagic CVA:  PT/OT for gait, mobility, strengthening, endurance, ADLs, and self care. On Keppra. Medrol tapered until 12/13. 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 Monitoring platelets  3. Muscle spasm/myoclonus: Now off baclofen. Tizanidine as needed. 4. HTN: amlodipine (decreased 12/14 by IM), lisinopril (decreased 12/14 by IM), labetalol  5. ETOH withdrawal/history of abuse: was treated with IV ativan and librium. On X31 and folic acid now  6. Bipolar Disorder: on wellbutrin, zoloft, lamictal  7. Restless Leg Syndrome: on requip  8. Insomnia:  On melatonin, gabapentin nightly. Takes trazodone at home. 9. Bowel Management: Miralax daily, senokot prn, dulcolax prn. 10. DVT Prophylaxis:  low molecular weight heparin, SCD's while in bed and HORTENSIA's   11.  Internal medicine for medical management         Electronically signed by Evie Rene MD on 12/14/2020 at 9:07 PM

## 2020-12-14 NOTE — PROGRESS NOTES
Physical Therapy  Facility/Department: [facility] ACUTE REHAB  Daily Treatment Note  NAME: Juan Boudreaux  : 1970  MRN: [id number]    Date of Service: 2020    Discharge Recommendations:  Patient would benefit from continued therapy after discharge, Home with assist PRN        Assessment      PT Education: General Safety;Gait Training  Activity Tolerance  Activity Tolerance: Patient Tolerated treatment well;Patient limited by endurance     Patient Diagnosis(es): There were no encounter diagnoses. has a past medical history of Asthma, [facility] ([facility] Delta storage pool disease [facility]), Hypertension, and Psychiatric problem. has a past surgical history that includes  section (3,); Gastric bypass surgery (); Tonsillectomy and adenoidectomy (); Cholecystectomy (); knee surgery (); Hysterectomy (); laparoscopy (); Colonoscopy (N/A, 2/3/2020); and Upper gastrointestinal endoscopy (N/A, 2/3/2020). Restrictions  Restrictions/Precautions  Restrictions/Precautions: Fall Risk  Required Braces or Orthoses?: No  Position Activity Restriction  Other position/activity restrictions: Occasional L hypertonicity. Subjective   General  Chart Reviewed: Yes  Additional Pertinent Hx: 59-year-old female with history of delta storage pool disease, bipolar disorder, alcohol abuse who developed acute left-sided weaknessfound to have intraparenchymal hemorrhage, right parietal lobe hemorrhage and moderate subarachnoid hemorrhage. Pt admitted to rehab unit on 20. Response To Previous Treatment: Patient reporting fatigue but able to participate. Subjective  Subjective: Pt states she did not take muscle relaxer overnight and had fewer spasms than with increased dose of day before; also reports Dr. Kam Carrilol to change medications for day/night.    Pain Assessment  Response to Pain Intervention: Patient Satisfied       Orientation     Cognition      Objective   Bed mobility Bridging: Stand by assistance  Rolling to Left: Minimal assistance  Rolling to Right: Maximum assistance  Supine to Sit: Maximum assistance  Sit to Supine: Maximum assistance  Transfers  Sit to Stand: Minimal Assistance(Walker lift. L knee blocked, assist for L UE. )  Stand to sit: 2 Person Assistance;Minimal Assistance(Walker lift. Requires L UE assist.)  Bed to Chair: Dependent/Total(Tameka Miguel Chelle, 1 assist)  Squat Pivot Transfers: 2 Person Assistance; Moderate Assistance(Wheelchair <> mat, armrest removed, to Pt's R. )  Ambulation  Ambulation?: Yes  Ambulation 1  Surface: level tile  Device: (walker lift)  Other Apparatus: Left;Slider  Assistance: 2 Person assistance;Maximum assistance(Max x3)  Quality of Gait: Cues for sequencing, weight-shifting with good return. Max A to advance/control L LE, though Pt is initiating hip & knee flexion to initiate L LE swing with limited ability to complete range of motion;  assist for L knee & hip extension in stance as well. Max A for strong upper body L lateral lean & UE support. Max A to steer/control walker. L ankle supinated x1, dependent to correct. Gait Deviations: Decreased step length;Decreased step height;Slow M[place]  Distance: 25'  Comments: Pt states she felt good about effort/was hard work; noted improvement from rhythm attained over longer distance. Stairs/Curb  Stairs?: No  Wheelchair Activities  Wheelchair Parts Management: (Brakes posterior to chair. )  Propulsion: Yes  Propulsion 1  Propulsion: Manual  Level: Level Tile  Method: RLE;RUE  Level of Assistance: Moderate assistance  Description/ Details: Pt able to maintain straight path, requires initial assist to avoid obstacle (cue to stop also required) 2x. Pulled on wheel rim to back up and adjust direction, 90* turn  Distance: 149'        Other exercises  Other exercises 1: Positional changes in wheelchair to maintain skin integrity, equilateral weight bearing/maintaining midline posture. Other exercises 2: Seated bilateral LE exercises, 1.5# R LE, active assisted/passive ROM L LE, orange (minimal) resistance band, 15-20x each  Other exercises 3: Lateral lean onto elbow and push back upright, to R stand by, to L with min. A, 2x5 each dir.; forward and retro lean 10x each; clockwise/counter clockwise trunk movement, 5x each dir. Other exercises 4: Sit <> stand, Rondel Dach x1; walker lift x1  Other exercises 5: Seated balance edge of mat, 10 min., including reaching outside base of support                         G-Code     OutComes Score                                                     AM-PAC Score             Goals  Short term goals  Time Frame for Short term goals: 10 days  Short term goal 1: pt will perform bed mobility with min A  Short term goal 2: pt will dangle EOB/EOM for 20 to 25 minutes with SBA, performing challenged seated balance/corestrengthening  Short term goal 3: Pivot transfers with mod A+2  Short term goal 4: WC mobility x 100' with min A  Short term goal 5: progress to standing/pre-gait activities in // bars to facilitate L LE motor fucntion. Long term goals  Time Frame for Long term goals : By LOS  Long term goal 1: Pt able to perform bed mobility independently  Long term goal 2: Pt able to perform transfers at [address] term goal 3: Pt able to progress to ambulation with appropriate device dist of 30 to 50 ft, mod A   Long term goal 4: Pt able to propel w/c level surfaces dist of 150 ft , mod-I. Long term goal 5: Improve L LE strength to atleast 2 to 2+/5 to improve fucntion. Long term goal 6: Improve PASS score from 8/30 to 23/36 to improve overall fucntion. Long term goal 7: Pt able to go up and down 5 steps with rail, mod A. Patient Goals   Patient goals : to regain use of L arm and L leg    Plan    Plan  Times per week: 1.5hr/day, 5 to 7 days/week.   Times per day: Daily Current Treatment Recommendations: Strengthening, ROM, Balance Training, Functional Mobility Training, Transfer Training, Wheelchair Mobility Training, Gait Training, Neuromuscular Re-education, Pain Management, Home Exercise Program, Safety Education & Training, Patient/Caregiver Education & Training, Positioning, Endurance Training, Stair training  Safety Devices  Type of devices: Gait belt, All fall risk precautions in place, Patient at risk for falls, Left in bed, Call light within reach, Bed alarm in place  Restraints  Initially in place: No     Therapy Time     12/14/20 0820 12/14/20 1423   PT Individual Minutes   Time In [facility] [id number]   [id number] 40 04   PT Concurrent Minutes   Time In  --  1340   Time Out  --  1348   Minutes  --  8     Ngoc Callahan, PTA

## 2020-12-15 PROCEDURE — 1180000000 HC REHAB R&B

## 2020-12-15 PROCEDURE — 97530 THERAPEUTIC ACTIVITIES: CPT

## 2020-12-15 PROCEDURE — 97542 WHEELCHAIR MNGMENT TRAINING: CPT

## 2020-12-15 PROCEDURE — 97112 NEUROMUSCULAR REEDUCATION: CPT

## 2020-12-15 PROCEDURE — 6370000000 HC RX 637 (ALT 250 FOR IP): Performed by: STUDENT IN AN ORGANIZED HEALTH CARE EDUCATION/TRAINING PROGRAM

## 2020-12-15 PROCEDURE — 97110 THERAPEUTIC EXERCISES: CPT

## 2020-12-15 PROCEDURE — 97116 GAIT TRAINING THERAPY: CPT

## 2020-12-15 PROCEDURE — 6370000000 HC RX 637 (ALT 250 FOR IP): Performed by: NURSE PRACTITIONER

## 2020-12-15 PROCEDURE — 6360000002 HC RX W HCPCS: Performed by: PHYSICAL MEDICINE & REHABILITATION

## 2020-12-15 PROCEDURE — 6370000000 HC RX 637 (ALT 250 FOR IP): Performed by: PHYSICAL MEDICINE & REHABILITATION

## 2020-12-15 PROCEDURE — 99231 SBSQ HOSP IP/OBS SF/LOW 25: CPT | Performed by: INTERNAL MEDICINE

## 2020-12-15 PROCEDURE — 97535 SELF CARE MNGMENT TRAINING: CPT

## 2020-12-15 PROCEDURE — 99232 SBSQ HOSP IP/OBS MODERATE 35: CPT | Performed by: STUDENT IN AN ORGANIZED HEALTH CARE EDUCATION/TRAINING PROGRAM

## 2020-12-15 PROCEDURE — 6370000000 HC RX 637 (ALT 250 FOR IP): Performed by: INTERNAL MEDICINE

## 2020-12-15 RX ORDER — TRAZODONE HYDROCHLORIDE 50 MG/1
25 TABLET ORAL NIGHTLY
Status: DISCONTINUED | OUTPATIENT
Start: 2020-12-15 | End: 2021-01-05 | Stop reason: HOSPADM

## 2020-12-15 RX ADMIN — Medication 3 MG: at 20:47

## 2020-12-15 RX ADMIN — TIZANIDINE 4 MG: 4 TABLET ORAL at 10:52

## 2020-12-15 RX ADMIN — TIZANIDINE 4 MG: 4 TABLET ORAL at 17:25

## 2020-12-15 RX ADMIN — SENNOSIDES AND DOCUSATE SODIUM 2 TABLET: 8.6; 5 TABLET ORAL at 07:53

## 2020-12-15 RX ADMIN — GABAPENTIN 300 MG: 300 CAPSULE ORAL at 20:47

## 2020-12-15 RX ADMIN — AMLODIPINE BESYLATE 5 MG: 10 TABLET ORAL at 07:53

## 2020-12-15 RX ADMIN — FOLIC ACID 1 MG: 1 TABLET ORAL at 07:53

## 2020-12-15 RX ADMIN — OXYCODONE HYDROCHLORIDE 5 MG: 5 TABLET ORAL at 16:24

## 2020-12-15 RX ADMIN — ENOXAPARIN SODIUM 30 MG: 30 INJECTION SUBCUTANEOUS at 20:47

## 2020-12-15 RX ADMIN — ENOXAPARIN SODIUM 30 MG: 30 INJECTION SUBCUTANEOUS at 07:53

## 2020-12-15 RX ADMIN — THIAMINE HCL TAB 100 MG 100 MG: 100 TAB at 07:58

## 2020-12-15 RX ADMIN — LAMOTRIGINE 400 MG: 100 TABLET ORAL at 07:52

## 2020-12-15 RX ADMIN — TRAZODONE HYDROCHLORIDE 25 MG: 50 TABLET ORAL at 20:47

## 2020-12-15 RX ADMIN — POTASSIUM BICARBONATE 40 MEQ: 782 TABLET, EFFERVESCENT ORAL at 07:52

## 2020-12-15 RX ADMIN — BUPROPION HYDROCHLORIDE 300 MG: 300 TABLET, FILM COATED, EXTENDED RELEASE ORAL at 07:52

## 2020-12-15 RX ADMIN — POLYETHYLENE GLYCOL 3350 17 G: 17 POWDER, FOR SOLUTION ORAL at 07:53

## 2020-12-15 RX ADMIN — ACETAMINOPHEN 650 MG: 325 TABLET, FILM COATED ORAL at 10:52

## 2020-12-15 RX ADMIN — ROPINIROLE HYDROCHLORIDE 1 MG: 1 TABLET, FILM COATED ORAL at 20:47

## 2020-12-15 RX ADMIN — LISINOPRIL 20 MG: 20 TABLET ORAL at 07:52

## 2020-12-15 RX ADMIN — SERTRALINE HYDROCHLORIDE 50 MG: 50 TABLET ORAL at 07:53

## 2020-12-15 ASSESSMENT — PAIN SCALES - GENERAL
PAINLEVEL_OUTOF10: 0
PAINLEVEL_OUTOF10: 6
PAINLEVEL_OUTOF10: 0
PAINLEVEL_OUTOF10: 3
PAINLEVEL_OUTOF10: 8

## 2020-12-15 NOTE — PROGRESS NOTES
Physical Therapy  Facility/Department: [facility] ACUTE REHAB  Daily Treatment Note  NAME: Quin Lewis  : 1970  MRN: [id number]    Date of Service: 12/15/2020    Discharge Recommendations:  Patient would benefit from continued therapy after discharge, Home with assist PRN        Assessment      Activity Tolerance  Activity Tolerance: Patient Tolerated treatment well;Patient limited by endurance     Patient Diagnosis(es): There were no encounter diagnoses. has a past medical history of Asthma, Bipolar 1 disorder ([facility] Delta storage pool disease [facility]), Hypertension, and Psychiatric problem. has a past surgical history that includes  section (3732,6975); Gastric bypass surgery (); Tonsillectomy and adenoidectomy (); Cholecystectomy (); knee surgery (); Hysterectomy (); laparoscopy (); Colonoscopy (N/A, 2/3/2020); and Upper gastrointestinal endoscopy (N/A, 2/3/2020). Restrictions  Restrictions/Precautions  Restrictions/Precautions: Fall Risk  Required Braces or Orthoses?: No  Position Activity Restriction  Other position/activity restrictions: Occasional L hypertonicity. Subjective              Orientation     Cognition      Objective   Bed mobility  Bridging: Stand by assistance  Rolling to Left: Minimal assistance  Rolling to Right: Maximum assistance  Supine to Sit: Maximum assistance  Sit to Supine: Minimal assistance  Transfers  Sit to Stand: Moderate Assistance  Stand to sit: Moderate Assistance;Maximum Assistance  Bed to Chair: Dependent/Total  Squat Pivot Transfers: Moderate Assistance  Ambulation 1  Surface: level tile  Device: (walker lift)  Assistance: 2 Person assistance; Moderate assistance  Gait Deviations: Decreased step length;Decreased step height;Slow Meseret  Distance: 5 steps  Comments: fatigues quickly; heavily \"pushes\" leans with poor postural awareness or manual facilitation.   Stairs/Curb  Stairs?: No  Propulsion 1  Propulsion: Manual Level: Level Tile  Method: RLE;RUE  Level of Assistance: Moderate assistance  Description/ Details: poor safety awareness with left side neglect  Distance: 50 feet x 3 ;        Other exercises  Other exercises 1: Positional changes in wheelchair to maintain skin integrity, equilateral weight bearing/maintaining midline posture. Other exercises 2: Seated bilateral LE exercises, 1.5# R LE, active assisted/passive ROM L LE, orange (minimal) resistance band, 15-20x each  Other exercises 3: Lateral lean onto elbow and push back upright, to R stand by, to L with min. A, 2x5 each dir.; forward and retro lean 10x each; clockwise/counter clockwise trunk movement, 5x each dir. Other exercises 4: Sit <> stand, [place] x1; walker lift x1  Other exercises 5: Seated balance edge of mat, 10 min., including reaching outside base of support   Other exercises 6: Standing tolerance in [place], 2 min.; working on postural control with good return despite perturbation, single UE support                        G-Code     OutComes Score                                                     AM-PAC Score             Goals  Short term goals  Time Frame for Short term goals: 10 days  Short term goal 1: pt will perform bed mobility with min A  Short term goal 2: pt will dangle EOB/EOM for 20 to 25 minutes with SBA, performing challenged seated balance/corestrengthening  Short term goal 3: Pivot transfers with mod A+2  Short term goal 4: WC mobility x 100' with min A  Short term goal 5: progress to standing/pre-gait activities in // bars to facilitate L LE motor fucntion.   Long term goals  Time Frame for Long term goals : By LOS  Long term goal 1: Pt able to perform bed mobility independently  Long term goal 2: Pt able to perform transfers at [address] term goal 3: Pt able to progress to ambulation with appropriate device dist of 30 to 50 ft, mod A   Long term goal 4: Pt able to propel w/c level surfaces dist of 150 ft , mod-I. Long term goal 5: Improve L LE strength to atleast 2 to 2+/5 to improve fucntion. Long term goal 6: Improve PASS score from 8/30 to 23/36 to improve overall fucntion. Long term goal 7: Pt able to go up and down 5 steps with rail, mod A. Patient Goals   Patient goals : to regain use of L arm and L leg    Plan    Plan  Times per week: 1.5hr/day, 5 to 7 days/week.   Times per day: Daily  Current Treatment Recommendations: Strengthening, ROM, Balance Training, Functional Mobility Training, Transfer Training, Wheelchair Mobility Training, Gait Training, Neuromuscular Re-education, Pain Management, Home Exercise Program, Safety Education & Training, Patient/Caregiver Education & Training, Positioning, Endurance Training, Stair training  Safety Devices  Type of devices: Gait belt, All fall risk precautions in place, Patient at risk for falls, Left in bed, Call light within reach, Bed alarm in place  Restraints  Initially in place: No     Therapy Time     12/15/20 1229 12/15/20 1411   PT Individual Minutes   Time In [address] [id number]   [id number] 66 49   PT Concurrent Minutes   Time In  --  1306   Time Out  --  1313   Minutes  --  7     Ngoc Callahan, PTA

## 2020-12-15 NOTE — PROGRESS NOTES
[facility] Internal Medicine    CONSULTATION / HISTORY AND PHYSICAL EXAMINATION            Date:   12/15/2020  Patient name:  Nick Ocasio  Date of admission:  2020  8:30 PM  MRN:   [id number]  Account:  [de-identified]  YOB: 1970  PCP:    Reggie Pickard MD  Room:   2612612-  Code Status:    Full Code    Physician Requesting Consult: Yvonna Rinne, MD    Reason for Consult: Medical management    Chief Complaint:     No chief complaint on file. Deconditioning    History Obtained From:     patient, electronic medical record    History of Present Illness/ Interval History:   80-year-old female with history of delta storage pool disease, bipolar disorder, alcohol abuse who developed acute left-sided weaknessfound to have intraparenchymal hemorrhage, right parietal lobe hemorrhage and moderate subarachnoid hemorrhage. Pt admitted to rehab unit on 20. Improving with PT/OT      Past Medical History:     Past Medical History:   Diagnosis Date    Asthma     B[facility] 1 disorder ([facility]     Delta storage pool disease ([facility]     Hypertension     Psychiatric problem         Past Surgical History:     Past Surgical History:   Procedure Laterality Date     SECTION  9817,7780    Miscarriage     CHOLECYSTECTOMY      COLONOSCOPY N/A 2/3/2020    -random bx(normal)hemorrhoids    GASTRIC BYPASS SURGERY      HYSTERECTOMY      KNEE SURGERY      LAPAROSCOPY      TONSILLECTOMY AND ADENOIDECTOMY      UPPER GASTROINTESTINAL ENDOSCOPY N/A 2/3/2020    (normal,neg H-Pylori)normal post-bypass endoscopy        Medications Prior to Admission:     Prior to Admission medications    Medication Sig Start Date End Date Taking?  Authorizing Provider   STIMATE 1.5 MG/ML SOLN nasal solution INSTILL 2 SPRAYS INTO THE NOSE TWICE DAILY STARTING TWO DAYS BEFORE PROCEDURE 20   Celina Riley MD amLODIPine (NORVASC) 5 MG tablet Take 5 mg by mouth daily    Historical Provider, MD   QUEtiapine (SEROQUEL) 200 MG tablet Take 200 mg by mouth nightly    Historical Provider, MD   lisinopril (PRINIVIL;ZESTRIL) 20 MG tablet Take 20 mg by mouth daily    Historical Provider, MD   buPROPion (WELLBUTRIN XL) 300 MG XL tablet Take 300 mg by mouth daily 3/5/16   Historical Provider, MD   sertraline (ZOLOFT) 50 MG tablet Take 50 mg by mouth daily 3/5/16   Historical Provider, MD   PROAIR  (90 BASE) MCG/ACT inhaler Inhale 2 puffs into the lungs every 6 hours as needed  14   Historical Provider, MD   lamoTRIgine (LAMICTAL) 200 MG tablet Take 400 mg by mouth daily 2 tabs 14   Historical Provider, MD        Allergies:     Penicillin g, Pcn [penicillins], and Sulfa antibiotics    Social History:     Tobacco:    reports that she has never smoked. She has never used smokeless tobacco.  Alcohol:      reports current alcohol use. Drug Use:  reports no history of drug use. Family History:     Family History   Adopted: Yes   Family history unknown: Yes       Review of Systems:     Positive and Negative as described in HPI. Denies any shortness of breath or cough  Denies chest pain or palpitations  Denies abdominal pain, diarrhea vomiting  Denies any new numbness tremors or weakness. Physical Exam:     /60   Pulse 92   Temp 97.8 °F (36.6 °C) (Oral)   Resp 18   Ht 5' 5\" (1.651 m)   Wt 233 lb (105.7 kg)   SpO2 100%   BMI 38.77 kg/m²   Temp (24hrs), Av.9 °F (36.6 °C), Min:97.8 °F (36.6 °C), Max:97.9 °F (36.6 °C)      General appearance:  alert, cooperative and no distress  Eyes: Anicteric sclera. Pupils are equally round and reactive to light. Extraocular movements are intact.   Lungs:  clear to auscultation bilaterally, normal effort  Heart:  regular rate and rhythm, no murmur  Abdomen:  soft, nontender, nondistended, normal bowel sounds, no masses, hepatomegaly, splenomegaly Extremities:  no edema, redness, tenderness in the calves  Skin:  no gross lesions, rashes, induration  Neuro:  Alert, oriented X 3, left sided weakness    Investigations:      Laboratory Testing:  Lab Results   Component Value Date    WBC 10.4 12/10/2020    HGB 12.7 12/10/2020    HCT 37.4 12/10/2020    MCV 89.4 12/10/2020     (H) 12/10/2020     Lab Results   Component Value Date     12/14/2020    K 4.3 12/14/2020    CL 97 12/14/2020    CO2 24 12/14/2020    BUN 17 12/14/2020    CREATININE 0.61 12/14/2020    GLUCOSE 89 12/14/2020    CALCIUM 10.0 12/14/2020         Assessment :      Primary Problem  <principal problem not specified>    Active Hospital Problems    Diagnosis Date Noted    Essential hypertension [I10] 12/11/2020    Acute left-sided weakness [R53.1] 12/11/2020    H/O intracranial hemorrhage [Z86.79] 12/09/2020    Vasogenic edema ([address] [G93.6]     Intracranial hemorrhage (HCC) [I62.9] 11/30/2020    Bipolar 1 disorder (HCC) [F31.9]     Platelet dysfunction ([address]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 *        1. Intracranial hemorrhage with left-sided weakness  2. Pulmonary hypertension  3. Platelet dysfunction  4. Hypotensionmedication adjustedBP better controlled  5.  Hyponatremia, LIZA per labs 12/10we will repeat    12/15  Hyponatremia improving, LIZA resolved  Labs and vitals reviewed and stable  Continue current management    Consultations:   Yamilka Taylor MD  12/15/2020  2:45 PM    Copy sent to Dr. Davian Sy MD Please note that this chart was generated using voice recognition Dragon dictation software. Although every effort was made to ensure the accuracy of this automated transcription, some errors in transcription may have occurred.

## 2020-12-15 NOTE — PROGRESS NOTES
[facility]    ACUTE REHABILITATION OCCUPATIONAL THERAPY  DAILY NOTE    Date: 12/15/20  Patient Name: Zhanna Phipps      Room: 2612/2612-01    MRN: [id number]   : 1970  (48 y.o.)  Gender: female   Referring Practitioner: Purnima Wagoner MD  Diagnosis: Intracranial hemmorrhage  Additional Pertinent Hx: 51-year-old female with history of delta storage pool disease, bipolar disorder, alcohol abuse who developed acute left-sided weaknessfound to have intraparenchymal hemorrhage, right parietal lobe hemorrhage and moderate subarachnoid hemorrhage. Pt admitted to rehab unit on 20. Restrictions  Restrictions/Precautions: Fall Risk  Other position/activity restrictions: Occasional L hypertonicity. Required Braces or Orthoses?: No  Equipment Used:  Other, Wheelchair, [employer])    Subjective  Subjective: \"it was a really bad morning\" pt becoming tearful/upset, pt reports while using the lg stedy with nursing this morning for toileting, the transfer did not go smoothly and toileting tasks such as pulling her pants up was difficult, pt very upset req additional time to collect herself, this writer listened and offered support  Comments: pt cooperative, motivated, pleasant  Patient Currently in Pain: Denies  Restrictions/Precautions: Fall Risk  Overall Orientation Status: Within Functional Limits  Patient Observation  Observations: pt continues to be tearful/emotional throughout PM session, pt requests session held in her room due to emotional upsets, pt tearful through session but tolerates tasks despite being upset  Pain Assessment  Pain Assessment: 0-10  Pain Level: 1  Pain Type: Acute pain  Pain Location: Arm  Pain Orientation: Left  Pain Descriptors: Aching, Sore    Objective  Cognition  Overall Cognitive Status: WFL  Perception  Overall Perceptual Status: Impaired Unilateral Attention: Cues to attend left visual field;Cues to attend to left side of body;Cues to maintain midline in sitting;Cues to maintain midline in standing  Balance  Sitting Balance: Stand by assistance(stand by assist/sup)  Standing Balance: Dependent/Total(min assist in lgkurt lopez)  Bed mobility  Sit to Supine: Maximum assistance(laying on side)  Scooting: Contact guard assistance  Transfers  Sit to stand: Moderate assistance  Stand to sit: Moderate assistance  Transfer Comments: using lg lozadajerilyn  Standing Balance  Time: AM: 1-2 min x3  Activity: AM: transfers, toileting tasks  Comment: assist x1, using lg lozadajerilyn, assist to place LUE on front bar  Functional Mobility  Functional - Mobility Device: Wheelchair  Activity: To/from bathroom; Other  Assist Level:  Moderate assistance  Functional Mobility Comments: assist for turns on L side, VCs req for visual scanning with poor return noted on L side  Toilet Transfers  Toilet - Technique: (lg lopez)  Equipment Used: Standard toilet  Toilet Transfer: Dependent/Total  Toilet Transfers Comments: assist x1, using lg lopez     Type of ROM/Therapeutic Exercise  Type of ROM/Therapeutic Exercise: PROM;AAROM  Comment: ROM ex's to LUE in preparation for functional movements, 10 reps per ex and within tolerance, pt c/o pain in upper portion of LUE between elbow and shoulder, pain flucuates with movements, pt apraxic and movements varied-inconsistently, pt giving good efforts  Exercises  Scapular Protraction: AAROM  Scapular Retraction: PROM  Horizontal ABduction: AAROM  Horizontal ADduction: PROM  Elbow Flexion: AAROM  Elbow Extension: PROM  Supination: PROM  Pronation: PROM  Wrist Flexion: PROM  Wrist Extension: PROM  Finger Flexion: PROM  Finger Extension: PROM  Grasp/Release: whole hand  with L hand, apraxic and varied, with  movements pt facilitated L elbow extension unknowingly Additional Activities: DC planning this morning with pt and spouse on the phone to discuss home setup, continued therapies and assist pt may req at home, both spouse and pt asking questions with this writer able to answer if not questions directed to social work whom this writer addressed following session                 ADL  Feeding: Setup  Grooming: Modified independent (seated)  UE Bathing: None(declines)  LE Bathing: None(declines)  UE Dressing: None(declines)  LE Dressing: None(declines)  Toileting: Dependent/Total(min assist using lg stedy)  Additional Comments: pt sat sinkside to wash face, brush teeth, comb hair and complete toileitng tasks/toilet transfer using lg stedy this date          Assessment  Performance deficits / Impairments: Decreased functional mobility ; Decreased ADL status; Decreased ROM; Decreased strength;Decreased endurance;Decreased balance;Decreased high-level IADLs;Decreased fine motor control;Decreased coordination;Decreased posture;Decreased safe awareness;Decreased vision/visual deficit  Prognosis: Good  Discharge Recommendations: Home with assist PRN;Patient would benefit from continued therapy after discharge  Activity Tolerance: Patient limited by fatigue;Patient Tolerated treatment well  Safety Devices in place: Yes  Type of devices: Nurse notified; Left in bed;Patient at risk for falls;Call light within reach; Bed alarm in place  Equipment Recommendations  Equipment Needed: (TBD)       Patient Education: transfer safety, LUE awareness/protection, LUE ex's/ROM, pain mgmt for LUE, D/C planning   Patient Goals   Patient goals :  To discharge home with family support  Learner:patient  Method: demonstration and explanation       Outcome: verbalized concerns, demonstrated understanding, needs reinforcement and asked questions        Plan  Plan  Times per week: 5-7  Times per day: Twice a day Current Treatment Recommendations: Self-Care / ADL, Home Management Training, Strengthening, Balance Training, Functional Mobility Training, Endurance Training, Wheelchair Mobility Training, Neuromuscular Re-education, Pain Management, Safety Education & Training, Patient/Caregiver Education & Training, Equipment Evaluation, Education, & procurement, Cognitive/Perceptual Training  Patient Goals   Patient goals : To discharge home with family support  Short term goals  Time Frame for Short term goals: 7 to 10 days  Short term goal 1: Pt will perform upper body bathing/dressing with stand by assist.  Short term goal 2: Pt will perform lower body bathing and dressing with Moderate assist and Fair safety. Short term goal 3: Pt will perform toileting tasks with Moderate assist.  Short term goal 4: Pt will verbalize/demonstrate Good understanding of assistive equipment/durable medical equipment/modified techniques for increased independence with self-care and mobility. Short term goal 5: Pt will perform functional transfers with Moderate assist to toilet/wheelchair/shower with Fair safety. Short term goal 6: Pt will actively participate in 30+ minutes of therapeutic exercise/functional activities to promote increased independence with self-care and mobility. Long term goals  Time Frame for Long term goals : By discharge  Long term goal 1: Pt will perform BADLs with modified independence and Good safety with assist PRN for HORTENSIA hose. Long term goal 2: Pt will perform functional transfers and mobility with modified independence, least restrictive mobility device, and Good safety. Long term goal 3: Pt will attend to L side of body 100% of the time during self-care, transfers, and mobility with 1-2 verbal cues PRN. Long term goal 4: Pt will stand for 5+ minutes with 1-2 UE support, modified independence and no loss of balance while engaging in functional activity of choice. Long term goal 5: Pt will verbalize/demonstrate Good understanding of home exercise program for LUE.   Long term goals 6: 9 hole peg, box and block to be assessed for LUE as appropriate        12/15/20 0939 12/15/20 1420   OT Individual Minutes   Time In 0939 1420   Time Out 1045 1450   Minutes 66 30     Electronically signed by JEAN CARLOS Sr on 12/15/20 at 3:55 PM EST

## 2020-12-15 NOTE — PLAN OF CARE
Problem: Skin Integrity:  Goal: Will show no infection signs and symptoms  Outcome: Ongoing     Problem: Skin Integrity:  Goal: Absence of new skin breakdown  Outcome: Ongoing     Problem: Falls - Risk of:  Goal: Will remain free from falls  Outcome: Ongoing  No falls or injuries sustained at this time. No attempts to get out of bed without nursing assistance. Call light within reach and pt. uses appropriately for assistance. Siderails up x 2. Nonskid footwear remains on. Bed in low and locked position. Hourly nursing rounds made. Pt. Alert and oriented, aware of limitations, and exhibits good safety judgement. Pt. uses assistive devices appropriately. Pt. understands individual fall risk factors.      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 F/u , with c/o shingles right side lower  abd wall x 1 mon and now still has sharp pain in taht area and tramadol is not helping and still has lot of itching . Pain scale 8/10.   finished zovirax       Chief Complaint   Patient presents with   • Exposure     Patient experience shingles 06/06/19 not improving   • Derm Problem   • Rash   • Pain   • Face     Derm Problem   This is a new problem. The current episode started 1 to 4 weeks ago. The problem occurs constantly. The problem has been waxing and waning. Associated symptoms include a rash. Nothing aggravates the symptoms. She has tried acetaminophen and ice for the symptoms. The treatment provided mild relief.   Rash   This is a new problem. The current episode started 1 to 4 weeks ago. The problem has been gradually improving since onset. The affected locations include the abdomen.   Pain   Associated symptoms include a rash.       Patient's medications, allergies, past medical, surgical, social and family histories were reviewed and updated as appropriate.    Review of Systems   Skin: Positive for rash.   All other systems reviewed and are negative.      Objective      Visit Vitals  /66   Pulse 80   Temp 99.2 °F (37.3 °C)   Resp 18   Ht 5' 5\" (1.651 m)   Wt 79.2 kg (174 lb 9.6 oz)   SpO2 97%   BMI 29.05 kg/m²       Physical Exam   Constitutional: She is oriented to person, place, and time. She appears well-developed and well-nourished.   HENT:   Head: Normocephalic and atraumatic.   Eyes: Pupils are equal, round, and reactive to light. Conjunctivae and EOM are normal.   Neck: Normal range of motion. Neck supple.   Cardiovascular: Normal rate, regular rhythm and normal heart sounds.   Pulmonary/Chest: Effort normal and breath sounds normal.   Abdominal: Soft. Bowel sounds are normal.   Musculoskeletal: Normal range of motion.   Neurological: She is alert and oriented to person, place, and time.    Skin: Skin is warm and dry. Rash noted. There is erythema.   Dried vesicular rash right abd wall   Psychiatric: She has a normal mood and affect. Her behavior is normal. Judgment and thought content normal.   Nursing note and vitals reviewed.      Assessment         Call if not getting better or getting worse in next few days.  RTC 2-3 WEEKS / PRN   RTC as advised or as needed         Problem List Items Addressed This Visit        Other    Herpes zoster without complication - Primary    Relevant Medications    Glucosamine-Chondroitin (GLUCOSAMINE CHONDR COMPLEX PO)    lidocaine-prilocaine (EMLA) cream    gabapentin (NEURONTIN) 100 MG capsule      Other Visit Diagnoses     Postherpetic neuralgia        Relevant Medications    lidocaine-prilocaine (EMLA) cream    gabapentin (NEURONTIN) 100 MG capsule

## 2020-12-15 NOTE — PROGRESS NOTES
Quality of Gait: Cues for sequencing, weight-shifting with good return. Max A to advance/control L LE, though Pt is initiating hip & knee flexion to initiate L LE swing with limited ability to complete range of motion;  assist for L knee & hip extension in stance as well. Max A for strong upper body L lateral lean & UE support. Max A to steer/control walker. L ankle supinated x1, dependent to correct. Gait Deviations: Decreased step length, Decreased step height, Slow Meseret  Distance: 5 steps  Comments: fatigues quickly; heavily \"pushes\" leans with poor postural awareness or manual facilitation. Transfers  Sit to Stand: Moderate Assistance  Stand to sit: Moderate Assistance, Maximum Assistance  Bed to Chair: Dependent/Total  Stand Pivot Transfers: Dependent/Total(lg lopez, improved core control; 1 assist.)  Squat Pivot Transfers: Moderate Assistance  Comment: Pt demonstrates ability to extend L knee 2x today with downward pressure through hip & mirror feedback, fatigued quickly & no return in PM attempts without mirror feedback; hard L lateral lean, corrects with verbal, tactile & visual (in front of mirror) cues    Ambulation  Ambulation?: Yes  Ambulation 1  Surface: level tile  Device: (walker lift)  Other Apparatus: Left, Slider  Assistance: 2 Person assistance, Moderate assistance  Quality of Gait: Cues for sequencing, weight-shifting with good return. Max A to advance/control L LE, though Pt is initiating hip & knee flexion to initiate L LE swing with limited ability to complete range of motion;  assist for L knee & hip extension in stance as well. Max A for strong upper body L lateral lean & UE support. Max A to steer/control walker. L ankle supinated x1, dependent to correct. Gait Deviations: Decreased step length, Decreased step height, Slow Meseret  Distance: 5 steps  Comments: fatigues quickly; heavily \"pushes\" leans with poor postural awareness or manual facilitation.     Surface: level tile Ambulation 1  Surface: level tile  Device: (walker lift)  Other Apparatus: Left, Slider  Assistance: 2 Person assistance, Moderate assistance  Quality of Gait: Cues for sequencing, weight-shifting with good return. Max A to advance/control L LE, though Pt is initiating hip & knee flexion to initiate L LE swing with limited ability to complete range of motion;  assist for L knee & hip extension in stance as well. Max A for strong upper body L lateral lean & UE support. Max A to steer/control walker. L ankle supinated x1, dependent to correct. Gait Deviations: Decreased step length, Decreased step height, Slow Meseret  Distance: 5 steps  Comments: fatigues quickly; heavily \"pushes\" leans with poor postural awareness or manual facilitation. OT:  ADL  Feeding: Setup  Grooming: Modified independent (seated)  UE Bathing: None(declines)  LE Bathing: None(declines)  UE Dressing: None(declines)  LE Dressing: None(declines)  Toileting: Dependent/Total(min assist using lg stedy)  Additional Comments: pt sat sinkside to wash face, brush teeth, comb hair and complete toileitng tasks/toilet transfer using lg stedy this date         Balance  Sitting Balance: Stand by assistance(stand by assist/sup)  Standing Balance: Dependent/Total(min assist in lg stedy)   Standing Balance  Time: AM: 1-2 min x3  Activity: AM: transfers, toileting tasks  Comment: assist x1, using lg stedy, assist to place LUE on front bar  Functional Mobility  Functional - Mobility Device: Wheelchair  Activity: To/from bathroom, Other  Assist Level:  Moderate assistance  Functional Mobility Comments: assist for turns on L side, VCs req for visual scanning with poor return noted on L side     Bed mobility  Bridging: Stand by assistance  Rolling to Left: Minimal assistance  Rolling to Right: Maximum assistance  Supine to Sit: Maximum assistance  Sit to Supine: Minimal assistance  Scooting: Contact guard assistance Comment: Pt uses bed rail for rolling to eft side. pt moderately leans to left side, pt able to correct posture, improve seating balance once feet postioned on floor, CGA for static balance with R UE support. mod cues for attending to left side due to left neglect. Transfers  Stand Pivot Transfers: Dependent/Total  Sit to stand: Moderate assistance  Stand to sit: Moderate assistance  Transfer Comments: using lg stedy   Toilet Transfers  Toilet - Technique: (lg stedy)  Equipment Used: Standard toilet  Toilet Transfer: Dependent/Total  Toilet Transfers Comments: assist x1, using lg stedy     Shower Transfers  Shower Transfers Comments: Deferred due to safety concerns with Pt's transfers and sitting balance  Wheelchair Bed Transfers  Equipment Used:  Other, Wheelchair, [employer])  Level of Asssistance: Dependent/Total    SPEECH:      Current Medications:   Current Facility-Administered Medications: traZODone (DESYREL) tablet 25 mg, 25 mg, Oral, Nightly  gabapentin (NEURONTIN) capsule 300 mg, 300 mg, Oral, Nightly  tiZANidine (ZANAFLEX) tablet 4 mg, 4 mg, Oral, Q6H PRN  amLODIPine (NORVASC) tablet 5 mg, 5 mg, Oral, Daily  lisinopril (PRINIVIL;ZESTRIL) tablet 20 mg, 20 mg, Oral, Daily  oxyCODONE (ROXICODONE) immediate release tablet 5 mg, 5 mg, Oral, Q8H PRN  acetaminophen (TYLENOL) tablet 650 mg, 650 mg, Oral, Q4H PRN  buPROPion (WELLBUTRIN XL) extended release tablet 300 mg, 300 mg, Oral, Daily  [COMPLETED] cyanocobalamin injection 1,000 mcg, 1,000 mcg, Intramuscular, Daily **FOLLOWED BY** cyanocobalamin injection 1,000 mcg, 1,000 mcg, Intramuscular, Weekly **FOLLOWED BY** [START ON 2/5/2021] cyanocobalamin injection 1,000 mcg, 1,000 mcg, Intramuscular, T93 Days  folic acid (FOLVITE) tablet 1 mg, 1 mg, Oral, Daily  ipratropium-albuterol (DUONEB) nebulizer solution 1 ampule, 1 ampule, Inhalation, Q4H PRN  labetalol (NORMODYNE) tablet 200 mg, 200 mg, Oral, 3 times per day lamoTRIgine (LAMICTAL) tablet 400 mg, 400 mg, Oral, Daily  magnesium hydroxide (MILK OF MAGNESIA) 400 MG/5ML suspension 30 mL, 30 mL, Oral, Daily PRN  melatonin tablet 3 mg, 3 mg, Oral, Nightly  muscle rub cream, , Topical, TID PRN  potassium bicarb-citric acid (EFFER-K) effervescent tablet 40 mEq, 40 mEq, Oral, Daily  rOPINIRole (REQUIP) tablet 1 mg, 1 mg, Oral, Nightly  sennosides-docusate sodium (SENOKOT-S) 8.6-50 MG tablet 2 tablet, 2 tablet, Oral, Daily  sertraline (ZOLOFT) tablet 50 mg, 50 mg, Oral, Daily  vitamin B-1 (THIAMINE) tablet 100 mg, 100 mg, Oral, Daily  polyethylene glycol (GLYCOLAX) packet 17 g, 17 g, Oral, Daily  bisacodyl (DULCOLAX) suppository 10 mg, 10 mg, Rectal, Daily PRN  senna (SENOKOT) tablet 17.2 mg, 2 tablet, Oral, Daily PRN  enoxaparin (LOVENOX) injection 30 mg, 30 mg, Subcutaneous, BID      Objective:  /60   Pulse 92   Temp 97.8 °F (36.6 °C) (Oral)   Resp 18   Ht 5' 5\" (1.651 m)   Wt 233 lb (105.7 kg)   SpO2 100%   BMI 38.77 kg/m²       GEN: Well developed, well nourished, no acute distress  HEENT: NCAT. EOM grossly intact. Hearing grossly intact. Mucous membranes pink and moist.  RESP: Normal breath sounds with no wheezing, rales, or rhonchi. Respirations WNL and unlabored. CV: Regular rate and rhythm. No murmurs, rubs, or gallops. ABD: Soft, non-distended, BS+ and equal.  NEURO: Alert, oriented to person, place, and time. Speech fluent with no aphasia or dysarthria noted. Spasticity noted in the left upper limb. MSK: Decreased AROM in the left upper and lower limbs due to weakness. Otherwise functional ROM. Muscle bulk is normal bilaterally. Left hemiparesis. LIMBS: No edema in bilateral lower limbs. SKIN: Warm and dry with good turgor. PSYCH: Mood WNL. Affect WNL. Appropriately interactive. Diagnostics:     CBC: No results for input(s): WBC, RBC, HGB, HCT, MCV, RDW, PLT in the last 72 hours.   BMP:   Recent Labs     12/14/20  1536   *   K 4.3 CL 97*   CO2 24   BUN 17   CREATININE 0.61   GLUCOSE 89     BNP: No results for input(s): BNP in the last 72 hours. PT/INR: No results for input(s): PROTIME, INR in the last 72 hours. APTT: No results for input(s): APTT in the last 72 hours. CARDIAC ENZYMES: No results for input(s): CKMB, CKMBINDEX, TROPONINT in the last 72 hours. Invalid input(s): CKTOTAL;3  FASTING LIPID PANEL:  Lab Results   Component Value Date    CHOL 247 (H) 11/30/2020     11/30/2020    TRIG 77 11/30/2020     LIVER PROFILE: No results for input(s): AST, ALT, ALB, BILIDIR, BILITOT, ALKPHOS in the last 72 hours. Impression/Plan:   Impaired ADLs, gait, and mobility due to:    1. Hemorrhagic CVA:  PT/OT for gait, mobility, strengthening, endurance, ADLs, and self care. Medrol tapered until 12/13. 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 Monitoring platelets  3. Muscle spasm/myoclonus: Now off baclofen. Tizanidine as needed. 4. HTN: amlodipine (decreased 12/14 by IM), lisinopril (decreased 12/14 by IM), labetalol  5. ETOH withdrawal/history of abuse: was treated with IV ativan and librium. On K45 and folic acid now  6. Bipolar Disorder: on wellbutrin, zoloft, lamictal  7. Restless Leg Syndrome: on requip  8. Insomnia:  On melatonin, gabapentin nightly. Takes trazodone at home - will trial low-dose trazodone tonight. 9. Bowel Management: Miralax daily, senokot prn, dulcolax prn. 10. DVT Prophylaxis:  low molecular weight heparin, SCD's while in bed and HORTENSIA's   11.  Internal medicine for medical management         Electronically signed by Alyson Galeazzi, MD on 12/15/2020 at 5:21 PM

## 2020-12-16 PROCEDURE — 97530 THERAPEUTIC ACTIVITIES: CPT

## 2020-12-16 PROCEDURE — 97110 THERAPEUTIC EXERCISES: CPT

## 2020-12-16 PROCEDURE — 1180000000 HC REHAB R&B

## 2020-12-16 PROCEDURE — 6370000000 HC RX 637 (ALT 250 FOR IP): Performed by: INTERNAL MEDICINE

## 2020-12-16 PROCEDURE — 6360000002 HC RX W HCPCS: Performed by: PHYSICAL MEDICINE & REHABILITATION

## 2020-12-16 PROCEDURE — 99232 SBSQ HOSP IP/OBS MODERATE 35: CPT | Performed by: STUDENT IN AN ORGANIZED HEALTH CARE EDUCATION/TRAINING PROGRAM

## 2020-12-16 PROCEDURE — 97535 SELF CARE MNGMENT TRAINING: CPT

## 2020-12-16 PROCEDURE — 6370000000 HC RX 637 (ALT 250 FOR IP): Performed by: NURSE PRACTITIONER

## 2020-12-16 PROCEDURE — 99232 SBSQ HOSP IP/OBS MODERATE 35: CPT | Performed by: INTERNAL MEDICINE

## 2020-12-16 PROCEDURE — 6370000000 HC RX 637 (ALT 250 FOR IP): Performed by: STUDENT IN AN ORGANIZED HEALTH CARE EDUCATION/TRAINING PROGRAM

## 2020-12-16 PROCEDURE — 6370000000 HC RX 637 (ALT 250 FOR IP): Performed by: PHYSICAL MEDICINE & REHABILITATION

## 2020-12-16 RX ORDER — SERTRALINE HYDROCHLORIDE 100 MG/1
100 TABLET, FILM COATED ORAL DAILY
Status: DISCONTINUED | OUTPATIENT
Start: 2020-12-17 | End: 2020-12-21

## 2020-12-16 RX ADMIN — Medication 3 MG: at 21:22

## 2020-12-16 RX ADMIN — GABAPENTIN 300 MG: 300 CAPSULE ORAL at 21:22

## 2020-12-16 RX ADMIN — ENOXAPARIN SODIUM 30 MG: 30 INJECTION SUBCUTANEOUS at 09:16

## 2020-12-16 RX ADMIN — POLYETHYLENE GLYCOL 3350 17 G: 17 POWDER, FOR SOLUTION ORAL at 09:16

## 2020-12-16 RX ADMIN — LAMOTRIGINE 400 MG: 100 TABLET ORAL at 09:16

## 2020-12-16 RX ADMIN — SENNOSIDES AND DOCUSATE SODIUM 2 TABLET: 8.6; 5 TABLET ORAL at 09:15

## 2020-12-16 RX ADMIN — TIZANIDINE 4 MG: 4 TABLET ORAL at 00:33

## 2020-12-16 RX ADMIN — LISINOPRIL 20 MG: 20 TABLET ORAL at 09:17

## 2020-12-16 RX ADMIN — POTASSIUM BICARBONATE 40 MEQ: 782 TABLET, EFFERVESCENT ORAL at 09:15

## 2020-12-16 RX ADMIN — FOLIC ACID 1 MG: 1 TABLET ORAL at 09:16

## 2020-12-16 RX ADMIN — TIZANIDINE 4 MG: 4 TABLET ORAL at 21:22

## 2020-12-16 RX ADMIN — TRAZODONE HYDROCHLORIDE 25 MG: 50 TABLET ORAL at 21:22

## 2020-12-16 RX ADMIN — SERTRALINE HYDROCHLORIDE 50 MG: 50 TABLET ORAL at 14:08

## 2020-12-16 RX ADMIN — ACETAMINOPHEN 650 MG: 325 TABLET, FILM COATED ORAL at 10:37

## 2020-12-16 RX ADMIN — BUPROPION HYDROCHLORIDE 300 MG: 300 TABLET, FILM COATED, EXTENDED RELEASE ORAL at 09:15

## 2020-12-16 RX ADMIN — THIAMINE HCL TAB 100 MG 100 MG: 100 TAB at 09:19

## 2020-12-16 RX ADMIN — ENOXAPARIN SODIUM 30 MG: 30 INJECTION SUBCUTANEOUS at 21:22

## 2020-12-16 RX ADMIN — TIZANIDINE 4 MG: 4 TABLET ORAL at 10:37

## 2020-12-16 RX ADMIN — ROPINIROLE HYDROCHLORIDE 1 MG: 1 TABLET, FILM COATED ORAL at 21:22

## 2020-12-16 RX ADMIN — SERTRALINE HYDROCHLORIDE 50 MG: 50 TABLET ORAL at 09:15

## 2020-12-16 RX ADMIN — AMLODIPINE BESYLATE 5 MG: 10 TABLET ORAL at 09:16

## 2020-12-16 ASSESSMENT — PAIN SCALES - GENERAL
PAINLEVEL_OUTOF10: 6
PAINLEVEL_OUTOF10: 0

## 2020-12-16 NOTE — PROGRESS NOTES
Physical Therapy  Facility/Department: [facility] ACUTE REHAB  Daily Treatment Note  NAME: Nick Ocasio  : 1970  MRN: [id number]    Date of Service: 2020    Discharge Recommendations:  Patient would benefit from continued therapy after discharge, Home with assist PRN        Assessment      PT Education: General Safety; Disease Specific Education; Injury Prevention; Functional Mobility Training  Activity Tolerance  Activity Tolerance: Patient Tolerated treatment well;Patient limited by endurance     Patient Diagnosis(es): There were no encounter diagnoses. has a past medical history of Asthma, Bipolar 1 disorder ([facility] [address] Delta storage pool disease [facility]), Hypertension, and Psychiatric problem. has a past surgical history that includes  section (4586,0950); Gastric bypass surgery (); Tonsillectomy and adenoidectomy (); Cholecystectomy (); knee surgery (); Hysterectomy (); laparoscopy (); Colonoscopy (N/A, 2/3/2020); and Upper gastrointestinal endoscopy (N/A, 2/3/2020). Restrictions  Restrictions/Precautions  Restrictions/Precautions: Fall Risk  Required Braces or Orthoses?: No  Position Activity Restriction  Other position/activity restrictions: Occasional L hypertonicity. Subjective   Subjective  Subjective: patient reporting she feels like today will be a better day. She  discussed med changes for her mood  Pain Screening  Patient Currently in Pain: Denies  Vital Signs  Patient Currently in Pain: Denies       Orientation     Cognition      Objective      Transfers  Sit to Stand: Minimal Assistance; Moderate Assistance(with lg stedy)  Stand to sit: Moderate Assistance(with use of lg stedy)  Bed to Chair: Dependent/Total(with lg stedy)  Squat Pivot Transfers:  Moderate Assistance(to left weak side transfers)  Comment: poor left side awareness deficits  Ambulation  Ambulation?: No  Stairs/Curb  Stairs?: No  Wheelchair Activities  Propulsion: No        Other exercises Other exercises 1: focused on neuro re-ed and postural alignment with mirror feed back sitting and standing. Standing support assisted with lg lopez. education on Left side weight bearing and hip and knee control. sit to stands resisted with blue therapy at hips 12 reps tolerated. standing with Right LE on 4 inch rise step to weight shift to L LE WB activity with and without UE support. constant cueing for training with mirror, tactile, vc's provided. caution patient \"pusher\" syndrome and poor sensory awareness. Other exercises 2: Seated bilateral LE exercises, 3# R LE, blue resistance band 10 reps to tolerance each  Other exercises 6: NuStep 10 minutes BLE no LUE supported in sling. L4. needed strapping to support LLE to neutral                        G-Code     OutComes Score                                                     AM-PAC Score             Goals  Short term goals  Time Frame for Short term goals: 10 days  Short term goal 1: pt will perform bed mobility with min A  Short term goal 2: pt will dangle EOB/EOM for 20 to 25 minutes with SBA, performing challenged seated balance/corestrengthening  Short term goal 3: Pivot transfers with mod A+2  Short term goal 4: WC mobility x 100' with min A  Short term goal 5: progress to standing/pre-gait activities in // bars to facilitate L LE motor fucntion. Long term goals  Time Frame for Long term goals : By LOS  Long term goal 1: Pt able to perform bed mobility independently  Long term goal 2: Pt able to perform transfers at [address] term goal 3: Pt able to progress to ambulation with appropriate device dist of 30 to 50 ft, mod A   Long term goal 4: Pt able to propel w/c level surfaces dist of 150 ft , mod-I. Long term goal 5: Improve L LE strength to atleast 2 to 2+/5 to improve fucntion. Long term goal 6: Improve PASS score from 8/30 to 23/36 to improve overall fucntion. Long term goal 7: Pt able to go up and down 5 steps with rail, mod A. Patient Goals   Patient goals : to regain use of L arm and L leg    Plan    Plan  Times per week: 1.5hr/day, 5 to 7 days/week.   Times per day: Daily  Current Treatment Recommendations: Strengthening, ROM, Balance Training, Functional Mobility Training, Transfer Training, Wheelchair Mobility Training, Gait Training, Neuromuscular Re-education, Pain Management, Home Exercise Program, Safety Education & Training, Patient/Caregiver Education & Training, Positioning, Endurance Training, Stair training  Safety Devices  Type of devices: Gait belt, All fall risk precautions in place, Patient at risk for falls, Left in bed, Call light within reach, Bed alarm in place  Restraints  Initially in place: No     Therapy Time     12/16/20 1118   PT Individual Minutes   Time In 0746   Time Out 0850   Minutes [id number] BredaSaad Callahan, PTA

## 2020-12-16 NOTE — PLAN OF CARE
Problem: Skin Integrity:  Goal: Will show no infection signs and symptoms  Outcome: Ongoing     Problem: Skin Integrity:  Goal: Absence of new skin breakdown  Outcome: Ongoing     Problem: Falls - Risk of:  Goal: Will remain free from falls  Outcome: Ongoing   No falls or injuries sustained at this time. No attempts to get out of bed without nursing assistance. Call light within reach and pt. uses appropriately for assistance. Siderails up x 2. Nonskid footwear remains on. Bed in low and locked position. Hourly nursing rounds made. Pt. Alert and oriented, aware of limitations, and exhibits good safety judgement. Pt. uses assistive devices appropriately. Pt. understands individual fall risk factors.      Problem: Falls - Risk of:  Goal: Absence of physical injury  Outcome: Ongoing     Problem: Pain:  Goal: Pain level will decrease  Outcome: Ongoing     Problem: Pain:  Goal: Control of acute pain  Outcome: Ongoing     Problem: Neurological  Goal: Maximum potential motor/sensory/cognitive function  Outcome: Ongoing     Problem: Nutrition  Goal: Optimal nutrition therapy  Outcome: Ongoing     Problem: Neurological  Goal: Maximum potential motor/sensory/cognitive function  Outcome: Ongoing     Problem: Mobility - Impaired:  Goal: Mobility will improve  Outcome: Ongoing

## 2020-12-16 NOTE — ADT AUTH CERT
Subscriber Details  Hospital Account [de-identified]  [facility] Subscriber Name/Sex/Relation Subscriber  Subscriber Address/Phone Subscriber Emp/Emp Phone   1.  [employer]   [id number] Cboy Wheeler - Male   (Spouse) 1971 [place], [place]  [address]   [phone](O) [facility]    Utilization Reviews       Stroke: Hemorrhagic - Care Day 10 (2020) by Maxx Albert RN       Review Status Review Entered   Completed 2020 10:28      Criteria Review      Care Day: 10 Care Date: 2020 Level of Care:    Guideline Day 4    Level Of Care    (X) Stroke unit or floor to discharge    2020 10:28 AM EST by Sheryle Necessary      intermediate    Clinical Status    ( ) * Mental status at baseline, or stable and acceptable for next level of care    ( ) * Neurologic deficits absent or stable    (X) * Hemodynamic stability    2020 10:28 AM EST by Sheryle Necessary      hr 75  bp 113/45    (X) * Dangerous arrhythmia absent    2020 10:28 AM EST by Sheryle Necessary      nsr    ( ) * Adequate dietary intake    ( ) * Discharge plans and education understood    Activity    ( ) * Up to chair    (X) * Assisted ambulation as tolerated    2020 10:28 AM EST by Sheryle Necessary      up with assist    Routes    (X) * Oral hydration, medications, and diet    2020 10:28 AM EST by Sheryle Necessary      po    (X) Advance diet as tolerated    2020 10:28 AM EST by Sheryle Necessary      general diet    Interventions    (X) Possible physical therapy    2020 10:28 AM EST by Sheryle Necessary      pt    (X) Possible occupational and speech therapy    2020 10:28 AM EST by Sheryle Necessary      ot    (X) DVT prophylaxis    2020 10:28 AM EST by Sheryle Necessary      lovenox 30 mg 2xd    * Milestone   Additional Notes   20      Neuro critical care Patient reports severe muscle spasms nightly affecting mostly her left arm and leg.  States they have been starting around 5PM.  Will start Flexeril 10mg QD PRN.  Continue Baclofen 5mg TID and move up administration of Neurontin to dinner time.  Discussed left upper extremity spasticity with concern for contracture of the hand; recommend keeping towel in hand and keeping arm in a neutral position.  Patient is stable for discharge, precert to [facility] pending.        CONSTITUTIONAL:  female. Alert and oriented x 3. In no acute distress.  GCS 15. Nontoxic. No dysarthria. No aphasia. HEAD: normocephalic, atraumatic    EYES: PERRL, EOMI   ENT: moist mucous membranes, chipped right front tooth   NECK: supple, symmetric   LUNGS: Equal air entry bilaterally, clear   CARDIOVASCULAR: normal s1 / s2, RRR, distal pulses intact   ABDOMEN: Soft, no rigidity, normal bowel sounds   NEUROLOGIC: Mental Status:  A & O x3, Awake               Cranial Nerves:     II: Visual fields:  Normal   III: Pupils:  equal, round, reactive to light   III,IV,VI: Extra Ocular Movements: intact   V: Facial sensation:  abnormal - decreased sensation on the left   VII: Facial strength: abnormal - left facial droop       Motor Exam:     Drift:  present - left upper and left lower extremities   Tone:  abnormal - increased tone left upper extremity       5/5 strength to the right upper and right lower extremities   0/5 strength to left upper extremity, no movement to pain.    1/5 strength to left lower extremity, withdraws to pain, no spontaneous movement.       Sensory:     Touch:     Right Upper Extremity:  normal   Left Upper Extremity:  abnormal - severely decreased   Right Lower Extremity:  normal   Left Lower Extremity:  abnormal - severely decreased           DRAINS:   ? There are no drains for Neuro Critical Care to monitor at this time.        ASSESSMENT AND PLAN:       - No signs/symptoms of infection   - CBC as needed       HEME:    - History of delta storage pool disease   - Transfused total 2 packs of platelets during admission, received DDAVP on arrival   - Hem/Onc following; appreciate recs   - Stable H&H/platelet count throughout admit   - CBC as needed       ENDOCRINE:   - Continue to monitor blood glucose, goal <180   - Hemoglobin A1C 5.0   - TSH 1.34       OTHER:   - Melatonin 3mg QHS to promote normal sleep/wake cycle   - History of bipolar disorder; Lamictal 400mg BID, Zoloft 50mg QD   - PT/OT/ST; foot drop boot on L, towel in left hand/neutral position   - PM&R following; appropriate candidate for inpatient rehab   - Discharge planning; [facility] when precert obtained       PROPHYLAXIS:   Stress ulcer: N/A       DVT PROPHYLAXIS:   - SCD sleeves - Thigh High           PT   Assessment: Pt performed STS transfer x2 in lg steady Stephen x2. While sanding pt required MaxA to maintain upright position d/t L lateral lean. Pt stood ~5 minutes total. Pt would be unsafe to return to her prior living arrangements and Would benefit from aggresssive PT after d/c to address deficits. Pt with high PLOF and motivated to participate.     Prognosis: Good   PT Education: Goals; General Safety;PT Role;Plan of Care;Precautions; Adaptive Device Training;Disease Specific Education; Functional Mobility Training; Injury Prevention;Home Exercise Program      OT   Performance deficits / Impairments: Decreased functional mobility ; Decreased ADL status; Decreased ROM; Decreased strength;Decreased endurance;Decreased balance;Decreased high-level IADLs;Decreased fine motor control;Decreased coordination;Decreased posture   Prognosis: Good   Decision Making: Medium Complexity      Hem/onc       Patient seen and examined at bedside.  Headaches are better.     Pain is controlled, eating better   Left weakness is the same      Vitals: BP (!) 113/45   Pulse 75   Temp 98.2 °F (36.8 °C) (Oral)   Resp 17   Ht 5' 5\" (1.651 m)   Wt 233 lb 7.5 oz (105.9 kg)   SpO2 98%   BMI 38.85 kg/m²    General appearance -somnolent, in mild painful distress due to headache   Mental status -oriented x3   Eyes - pupils equal and reactive, extraocular eye movements intact   Mouth - mucous membranes moist, pharynx normal without lesions   Neck - supple, no significant adenopathy   Lymphatics - no palpable lymphadenopathy, no hepatosplenomegaly   Chest - clear to auscultation, no wheezes, rales or rhonchi, symmetric air entry   Heart - normal rate, regular rhythm, normal S1, S2, no murmurs   Abdomen - soft, nontender, nondistended, no masses or organomegaly   Neurological -somnolent but oriented, normal speech, left hemiplegia. Extremities - peripheral pulses normal, no pedal edema, no clubbing or cyanosis   Skin - normal coloration and turgor, no rashes, no suspicious skin lesions noted       1. Acute intracranial hemorrhage. 2. Delta pool storage disorder   3. Hypertensive emergency   4. Alcohol withdrawal               Plan       The patient condition seems to be stable   Continue with current treatment plan.  she will  go to rehab once precert is available      98.2 (36.8) 17 75 113/45       Hob 30, nv cks q4h, epc, sz precauitons, fall precautions, telemetry, vs    Norvasc 10 mg daily, lioresal 5 mg 3xd, wellbutrin 300 mg daily,   cyanocobalamin 0218BJD Im daily, folic acid 1 mg daily,  lamictal 400 mg daily, lisinopirl 40 mg daily,   effer k 40 meq daily, requip 1 mg nightly, zoloft 50 mg daily, thimine 100 mg daily, tylenol 650 mg q4hprn x2   normodyne 200 mg 3xd,  melatonin 3 mg nightly    neurontin 300 mg nightly, glycolax daily    oxycodone 5 mg q6hprn x2      senokot daily ,    medrol 4 mg nightly,and bk,  flexeril 10 mg prn x1      [facility]                      Stroke: Hemorrhagic - Care Day 9 (12/8/2020) by Shahriar Ayala RN      Review Status Review Entered   Completed 12/11/2020 10:14      Criteria Review      Care Day: 9 Care Date: 12/8/2020 Level of Care:    Guideline Day 4    Level Of Care    (X) Stroke unit or floor to discharge    12/11/2020 10:14 AM EST by Gertrude Santana      intermediate    Clinical Status    ( ) * Mental status at baseline, or stable and acceptable for next level of care    ( ) * Neurologic deficits absent or stable    (X) * Hemodynamic stability    12/11/2020 10:14 AM EST by Gertrude Santana      hr 77  bp 145/64    (X) * Dangerous arrhythmia absent    12/11/2020 10:14 AM EST by Gertrude Santana      nsr    ( ) * Adequate dietary intake    ( ) * Discharge plans and education understood    Activity    ( ) * Up to chair    (X) * Assisted ambulation as tolerated    12/11/2020 10:14 AM EST by Gertrude Santana      act as sekou with assist    Routes    (X) * Oral hydration, medications, and diet    12/11/2020 10:14 AM EST by Gertrude Santana      po    (X) Advance diet as tolerated    12/11/2020 10:14 AM EST by Gertrude Santana      general diet    Interventions    (X) Possible physical therapy    12/11/2020 10:14 AM EST by Gertrude Santana      pt    (X) Possible occupational and speech therapy    12/11/2020 10:14 AM EST by Gertrude Santana      ot    (X) DVT prophylaxis    12/11/2020 10:14 AM EST by Gertrude Santana      lovenox 30 mg 2xd    * Milestone   Additional Notes   12/8/20      Neuro critical care        Headache, Right sided weakness     Last 24h:     No acute events overnight.  Patient was having mild headache overnight received Tylenol for that. [facility] patient remained well controlled.  Clinical exam remained stable patient.  female. Alert and oriented x 3.  Appears to be resting comfortably. GCS 15. Nontoxic. No dysarthria. No aphasia.     HEAD: normocephalic, atraumatic    EYES: PERRL, EOMI   ENT: moist mucous membranes, chipped right front tooth   NECK: supple, symmetric LUNGS: Equal air entry bilaterally, clear   CARDIOVASCULAR: normal s1 / s2, RRR, distal pulses intact   ABDOMEN: Soft, no rigidity, normal bowel sounds   NEUROLOGIC: Mental Status:  A & O x3, Awake               Cranial Nerves:     II: Visual fields:  Normal   III: Pupils:  equal, round, reactive to light   III,IV,VI: Extra Ocular Movements: intact   V: Facial sensation:  abnormal - decreased sensation on the left   VII: Facial strength: abnormal - left facial droop       Motor Exam:     Drift:  present - left upper and left lower extremities   Tone:  abnormal - increased tone left upper extremity       5/5 strength to the right upper and right lower extremities   0/5 strength to left upper extremity, no movement to pain. 1/5 strength to left lower extremity, withdraws to pain, no spontaneous movement.       Sensory:     Touch:     Right Upper Extremity:  normal   Left Upper Extremity:  abnormal - severely decreased   Right Lower Extremity:  normal   Left Lower Extremity:  abnormal - severely decreased        54 yo female with a history of HTN, Delta storage pool disease, bipolar disorder, and alcohol abuse who was admitted to the Neuro ICU for management of a large right parietal ICH with subarachnoid and intraventricular blood.  Given DDAVP and 1 pack of platelets in setting of platelet disorder.  Initially presented to [facility] ED with left sided weakness and headache.  Hypertensive with -190's on arrival to ED.       NEUROLOGIC:   - Acute right parietal ICH with intraventricular extension and subarachnoid hemorrhage component   - Etiology likely secondary to coaguloapthy in setting of platelet disorder and hypertension   - No underlying mass or vascular malformation on MRI brain with/without contrast   - F/U Neurosurgery outpatient   - Goal SBP<160   - History of ETOH abuse;  Folic Acid& Thiamine replacement   - No further signs/symptoms of withdraw; stop Librium& Ativan  - Headache/Neck pain;  Baclofen 5mg TID, continue Medrol dose pack, start Neurontin 300mg QHS, decrease Roxicodone 5mg Q8h PRN   - Requip 1mg QHS for restless legs   - Neuro checks per protocol       CARDIOVASCULAR:   - Goal SBP<160   - Persistent hypertension   - Continue Norvasc 10mg QD, Lisinopril 40mg QD and Labetalol 200mg Q8h   - Echocardiogram EF 55%   - Lipid panel; LDL 90, Cholesterol 247   - Continue telemetry       PULMONARY:   - Maintaining O2 sats on room air   - History of asthma   - Duonebs PRN       RENAL/FLUID/ELECTROLYTE:   - Normal renal functioning   - BUN 12/ Creatinine 0.50   - Adequate urine output per nursing   - Taking adequate PO fluids, stop IVF   - Replace electrolytes PRN   - Stop daily labs       GI/NUTRITION:   NUTRITION:   - General Diet    - Appetite improving slowly, tolerating supplements   - Last BM 12/6   - Bowel regimen: Senokot-S &Glycolax daily, Milk of Mag PRN   - GI prophylaxis: N/A       ID:   Leopold Novel, Tmax 37.1C   - No leukocytosis, WBC 9.2   - UA 11/30 negative   - COVID-19 negative 11/30   - Continue to monitor for fevers   - Daily CBC       HEME:    - History of delta storage pool disease   - Transfused total 2 packs of platelets during admission, received DDAVP on arrival   - Hem/Onc following; appreciate recs   - H&H 12.2/39.9, stable   - Platelets 396   - Daily CBC       ENDOCRINE:   - Continue to monitor blood glucose, goal <180   - Hemoglobin A1C 5.0   - TSH 1.34       OTHER:   - Melatonin 3mg QHS to promote normal sleep/wake cycle   - History of bipolar disorder; Lamictal 400mg BID, Zoloft 50mg QD   - PT/OT/ST; foot drop boot on L, discuss left wrist splint   - PM&R following; appropriate candidate for inpatient rehab   - Discharge planning; [facility]           PROPHYLAXIS:   Stress ulcer: N/A       DVT PROPHYLAXIS:   - SCD sleeves - Thigh High    - Heparin 5000u Q8h       Dietitian      Nutrition Recommendations/Plan: Continue Ensure High Protein ONS TID. Encourage PO intake as tolerated. Obtain current weight.       Nutrition Assessment:  Pt drinking ONS well, but still not eating much of meals (observed intake of less than 25% of breakfast this morning). OT   Performance deficits / Impairments: Decreased functional mobility ; Decreased ADL status; Decreased ROM; Decreased strength;Decreased endurance;Decreased balance;Decreased high-level IADLs;Decreased fine motor control;Decreased coordination;Decreased posture   Prognosis: Good   REQUIRES OT FOLLOW UP: Yes   Activity Tolerance   Activity Tolerance: Patient Tolerated treatment well   Activity Tolerance: Limited by decreased LUE/LLE  deficits      PT   Body structures, Functions, Activity limitations: Decreased functional mobility ; Decreased strength;Decreased safe awareness;Decreased balance; Increased pain;Decreased sensation;Decreased fine motor control;Decreased coordination;Decreased posture   Assessment: Pt demo good improvement toward thearpy goals this date. ABle to  lg steady x 9 min total, with MIN-MOD x2. Pt would be unsafe to return to her prior living arrangements and would benefit from aggresssive PT after d/c to address deficits   Prognosis: Good      PMR   1. Large right parietal lobe intracranial hemorrhage with moderate subarachnoid hemorrhage and intraventricular hemorrhage in the right occipital lobe status post TPA mild spasticity - baclofen   2. Bipolar disorder-Wellbutrin, Zoloft,    3. Alcohol abuse-being monitored for withdrawal, still receiving IV ativan and Librum- plan to wean begin 12/5, will need to monitor as cont wean B84, folic acid   4.  Delta storage pool disease, tach platelets today Keppra stopped as patient on Lamictal., received desmopressin 11/30   5.   Hypertension-Norvasc, lisinopril, given labetol iv x 4 last night per Neuro note   6.  Restless leg-Requip

## 2020-12-16 NOTE — PROGRESS NOTES
[address]  Acute Rehabilitation Physical Therapy Progress Note    Date: 20  Patient Name: Shay Momin       Room: 2612/2612-01  MRN: [id number]   Account: [de-identified]   : 1970  (48 y.o.) Gender: female     Referring Practitioner: Urbano Bess MD  Diagnosis: Intracranial hemmorrhage  Past Medical History:  has a past medical history of Asthma, Bipolar 1 disorder ([facility] [address] Delta storage pool disease [facility]), Hypertension, and Psychiatric problem. Past Surgical History:   has a past surgical history that includes  section (4208,7366); Gastric bypass surgery (); Tonsillectomy and adenoidectomy (); Cholecystectomy (); knee surgery (); Hysterectomy (); laparoscopy (); Colonoscopy (N/A, 2/3/2020); and Upper gastrointestinal endoscopy (N/A, 2/3/2020). Additional Pertinent Hx: 51-year-old female with history of delta storage pool disease, bipolar disorder, alcohol abuse who developed acute left-sided weaknessfound to have intraparenchymal hemorrhage, right parietal lobe hemorrhage and moderate subarachnoid hemorrhage. Pt admitted to rehab unit on 20. Overall Orientation Status: Within Normal Limits  Restrictions/Precautions  Restrictions/Precautions: Fall Risk  Required Braces or Orthoses?: No  Position Activity Restriction  Other position/activity restrictions: Occasional L hypertonicity. Subjective: Pt reports no complaints  Comments: Pt is motivated and pleasant. Vital Signs  Patient Currently in Pain: Denies           Patient Observation  Observations: intermittent L side neglect       Bed Mobility  Comment: Did not perform at this time    Transfers:  Sit to Stand:  Moderate Assistance  Stand to sit: Moderate Assistance;Maximum Assistance  Bed to Chair: Dependent/Total                       Stairs/Curb  Stairs?: No                 Propulsion 1  Propulsion: Manual  Level: Level Tile  Method: RLE;ASHLEE Short term goal 1: pt will perform bed mobility with min A  Short term goal 2: pt will dangle EOB/EOM for 20 to 25 minutes with SBA, performing challenged seated balance/corestrengthening  Short term goal 3: Pivot transfers with mod A+2  Short term goal 4: WC mobility x 100' with min A  Short term goal 5: progress to standing/pre-gait activities in // bars to facilitate L LE motor fucntion. Long term goals  Time Frame for Long term goals : By LOS  Long term goal 1: Pt able to perform bed mobility independently  Long term goal 2: Pt able to perform transfers at [address] term goal 3: Pt able to progress to ambulation with appropriate device dist of 30 to 50 ft, mod A   Long term goal 4: Pt able to propel w/c level surfaces dist of 150 ft , mod-I. Long term goal 5: Improve L LE strength to atleast 2 to 2+/5 to improve fucntion. Long term goal 6: Improve PASS score from 8/30 to 23/36 to improve overall fucntion.   Long term goal 7: Pt able to go up and down 5 steps with rail, mod A.       12/16/20 1220   PT Individual Minutes   Time In 1220   Time Out 1252   Minutes 32       Electronically signed by Twyla Pimentel PTA on 12/16/20 at 3:14 PM EST

## 2020-12-16 NOTE — PLAN OF CARE
Nonverbal cues indicate pain relief. Pt. Rests quietly with eyes closed after pain medication administration. Respirations easy and unlabored.  Appears free from distress.

## 2020-12-16 NOTE — PROGRESS NOTES
[place] [address] Internal Medicine    CONSULTATION / HISTORY AND PHYSICAL EXAMINATION            Date:   2020  Patient name:  Bel Corea  Date of admission:  2020  8:30 PM  MRN:   [id number]  Account:  [de-identified]  YOB: 1970  PCP:    Nat Marroquin MD  Room:   [facility]-  Code Status:    Full Code    Physician Requesting Consult: Alexis Marrufo MD    Reason for Consult: Medical management    Chief Complaint:     No chief complaint on file. Deconditioning    History Obtained From:     patient, electronic medical record    History of Present Illness/ Interval History:   51-year-old female with history of delta storage pool disease, bipolar disorder, alcohol abuse who developed acute left-sided weaknessfound to have intraparenchymal hemorrhage, right parietal lobe hemorrhage and moderate subarachnoid hemorrhage. Pt admitted to rehab unit on 20. Improving with PT/OT      Past Medical History:     Past Medical History:   Diagnosis Date    Asthma     Bipolar 1 disorder ([facility] [address]     Delta storage pool disease ([facility] [address]     Hypertension     Psychiatric problem         Past Surgical History:     Past Surgical History:   Procedure Laterality Date     SECTION  2328,4376    Miscarriage     CHOLECYSTECTOMY      COLONOSCOPY N/A 2/3/2020    -random bx(normal)hemorrhoids    GASTRIC BYPASS SURGERY      HYSTERECTOMY      KNEE SURGERY  1984    LAPAROSCOPY      TONSILLECTOMY AND ADENOIDECTOMY      UPPER GASTROINTESTINAL ENDOSCOPY N/A 2/3/2020    (normal,neg H-Pylori)normal post-bypass endoscopy        Medications Prior to Admission:     Prior to Admission medications    Medication Sig Start Date End Date Taking?  Authorizing Provider   STIMATE 1.5 MG/ML SOLN nasal solution INSTILL 2 SPRAYS INTO THE NOSE TWICE DAILY STARTING TWO DAYS BEFORE PROCEDURE 20   Mg Porras MD Extremities:  no edema, redness, tenderness in the calves  Skin:  no gross lesions, rashes, induration  Neuro:  Alert, oriented X 3, left sided weakness  Power is left arm 0 out of 5 and 1 out of 5 in left lower extremity  Investigations:      Laboratory Testing:  Lab Results   Component Value Date    WBC 10.4 12/10/2020    HGB 12.7 12/10/2020    HCT 37.4 12/10/2020    MCV 89.4 12/10/2020     (H) 12/10/2020     Lab Results   Component Value Date     12/14/2020    K 4.3 12/14/2020    CL 97 12/14/2020    CO2 24 12/14/2020    BUN 17 12/14/2020    CREATININE 0.61 12/14/2020    GLUCOSE 89 12/14/2020    CALCIUM 10.0 12/14/2020         Assessment :      Primary Problem  <principal problem not specified>    Active Hospital Problems    Diagnosis Date Noted    Essential hypertension [I10] 12/11/2020    Acute left-sided weakness [R53.1] 12/11/2020    H/O intracranial hemorrhage [Z86.79] 12/09/2020    Vasogenic edema ([facility] [address] [G93.6]     Intracranial hemorrhage (HCC) [I62.9] 11/30/2020    Bipolar 1 disorder (HCC) [F31.9]     Platelet dysfunction ([address]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 *        1. Intracranial hemorrhage with left-sided weakness  2. Pulmonary hypertension  3. Platelet dysfunction  4. Hypotensionmedication adjustedBP better controlled  5.  Hyponatremia, LIZA per labs 12/10we will repeat    12/16  Hyponatremia improving, LIZA resolved  Labs and vitals reviewed and stable  Continue current management  Trazodone started 50 mg for insomnia    Consultations:   IP CONSULT TO INTERNAL MEDICINE  IP CONSULT TO DIETITIAN  IP CONSULT TO SOCIAL WORK      Fabio Arreaga MD  12/16/2020  4:00 PM    Copy sent to Dr. Patria Olguin MD Please note that this chart was generated using voice recognition Dragon dictation software. Although every effort was made to ensure the accuracy of this automated transcription, some errors in transcription may have occurred.

## 2020-12-16 NOTE — PROGRESS NOTES
Physical Medicine & Rehabilitation  Progress Note      Subjective:      Ms. Chin Daley is a 48year-old female with hemorrhagic CVA and L nondominant hemiparesis. She reports feeling a little bit better than yesterday. She states that her mood is improved. She states that she takes zoloft 150mg daily at home - discussed gradually increasing this medication to her home dose. She states that she slept better last night. She notes that cramping and spasms in the left upper limb continue to be manageable at this time. She states that headaches are much improved from last week. She denies any other acute concerns. ROS:  Denies fevers, chills, sweats. No chest pain, palpitations, lightheadedness. Denies coughing, wheezing or shortness of breath. Denies abdominal pain, nausea, diarrhea or constipation. No new areas of joint pain. Denies new areas of numbness or weakness. Denies new anxiety or depression issues. No new skin problems. Rehabilitation:   Progressing in therapies. PT:  Restrictions/Precautions: Fall Risk  Other position/activity restrictions: Occasional L hypertonicity. Transfers  Sit to Stand: Minimal Assistance, Moderate Assistance(with lg stedy)  Stand to sit: Moderate Assistance(with use of lg stedy)  Bed to Chair: Dependent/Total(with lg stedy)  Stand Pivot Transfers: Dependent/Total(lg stedy, improved core control; 1 assist.)  Squat Pivot Transfers:  Moderate Assistance(to left weak side transfers)  Comment: poor left side awareness deficits  Ambulation 1  Surface: level tile  Device: (walker lift)  Other Apparatus: Left, Slider  Assistance: 2 Person assistance, Moderate assistance Quality of Gait: Cues for sequencing, weight-shifting with good return. Max A to advance/control L LE, though Pt is initiating hip & knee flexion to initiate L LE swing with limited ability to complete range of motion;  assist for L knee & hip extension in stance as well. Max A for strong upper body L lateral lean & UE support. Max A to steer/control walker. L ankle supinated x1, dependent to correct. Gait Deviations: Decreased step length, Decreased step height, Slow Meseret  Distance: 5 steps  Comments: fatigues quickly; heavily \"pushes\" leans with poor postural awareness or manual facilitation. Transfers  Sit to Stand: Minimal Assistance, Moderate Assistance(with lg stedy)  Stand to sit: Moderate Assistance(with use of lg stedy)  Bed to Chair: Dependent/Total(with lg stedy)  Stand Pivot Transfers: Dependent/Total(lg stedy, improved core control; 1 assist.)  Squat Pivot Transfers: Moderate Assistance(to left weak side transfers)  Comment: poor left side awareness deficits  Ambulation  Ambulation?: No  Ambulation 1  Surface: level tile  Device: (walker lift)  Other Apparatus: Left, Slider  Assistance: 2 Person assistance, Moderate assistance  Quality of Gait: Cues for sequencing, weight-shifting with good return. Max A to advance/control L LE, though Pt is initiating hip & knee flexion to initiate L LE swing with limited ability to complete range of motion;  assist for L knee & hip extension in stance as well. Max A for strong upper body L lateral lean & UE support. Max A to steer/control walker. L ankle supinated x1, dependent to correct. Gait Deviations: Decreased step length, Decreased step height, Slow Meseret  Distance: 5 steps  Comments: fatigues quickly; heavily \"pushes\" leans with poor postural awareness or manual facilitation.     Surface: level tile  Ambulation 1  Surface: level tile  Device: (walker lift)  Other Apparatus: Left, Slider  Assistance: 2 Person assistance, Moderate assistance Quality of Gait: Cues for sequencing, weight-shifting with good return. Max A to advance/control L LE, though Pt is initiating hip & knee flexion to initiate L LE swing with limited ability to complete range of motion;  assist for L knee & hip extension in stance as well. Max A for strong upper body L lateral lean & UE support. Max A to steer/control walker. L ankle supinated x1, dependent to correct. Gait Deviations: Decreased step length, Decreased step height, Slow Meseret  Distance: 5 steps  Comments: fatigues quickly; heavily \"pushes\" leans with poor postural awareness or manual facilitation. OT:  ADL  Feeding: Setup  Grooming: Modified independent (brush teeth/wash face)  UE Bathing: None(declines)  LE Bathing: None(declines)  UE Dressing: Moderate assistance(assist with cuing ara tech/threading LUE/overhead/adjustmen)  LE Dressing: None(declines)  Toileting: Minimal assistance(min assist clothing on L side using lg VISEOdy)  Additional Comments: pt requesting to wash hair sinkside, pt leaned forward into sink to assist is writer in wetting, washing and drying her hair, pt then combed out with min assist due to tangles         Balance  Sitting Balance: Stand by assistance(stand by assist with dynamic, SUP with static)  Standing Balance: Minimal assistance(min/contact guard in lg stedy)   Standing Balance  Time: AM: 1-2min x2, 1 min; PM: 1 min x4  Activity: AM: toilet transfer, toileting tasks and functional transfers in lg stedy; PM: functional transfers, toileting tasks  Comment: assist x1, using lg stedy, assist to place LUE on front bar  Functional Mobility  Functional - Mobility Device: Wheelchair  Activity: To/from bathroom, Other  Assist Level:  Moderate assistance  Functional Mobility Comments: assist for turns on L side, VCs req for visual scanning with poor return noted on L side     Bed mobility  Bridging: Stand by assistance  Rolling to Left: Minimal assistance Rolling to Right: Moderate assistance  Supine to Sit: Moderate assistance(assist LLE only and positioning)  Sit to Supine: Moderate assistance  Scooting: Contact guard assistance  Comment: Pt uses bed rail for rolling to eft side. pt moderately leans to left side, pt able to correct posture, improve seating balance once feet postioned on floor, CGA for static balance with R UE support. mod cues for attending to left side due to left neglect. Transfers  Stand Pivot Transfers: Dependent/Total  Sit to stand: Minimal assistance  Stand to sit: Minimal assistance, Contact guard assistance  Transfer Comments: using lg stedy   Toilet Transfers  Toilet - Technique: (lg stedy)  Equipment Used: Standard toilet  Toilet Transfer: Dependent/Total  Toilet Transfers Comments: assist x1, using lg stedy(min assist on/off toilet using lg stedy)     Shower Transfers  Shower Transfers Comments: Deferred due to safety concerns with Pt's transfers and sitting balance  Wheelchair Bed Transfers  Equipment Used:  Other, Wheelchair, Bed(Lg Stedy)  Level of Asssistance: Dependent/Total    SPEECH:      Current Medications:   Current Facility-Administered Medications: [START ON 12/17/2020] sertraline (ZOLOFT) tablet 100 mg, 100 mg, Oral, Daily  traZODone (DESYREL) tablet 25 mg, 25 mg, Oral, Nightly  gabapentin (NEURONTIN) capsule 300 mg, 300 mg, Oral, Nightly  tiZANidine (ZANAFLEX) tablet 4 mg, 4 mg, Oral, Q6H PRN  amLODIPine (NORVASC) tablet 5 mg, 5 mg, Oral, Daily  lisinopril (PRINIVIL;ZESTRIL) tablet 20 mg, 20 mg, Oral, Daily  oxyCODONE (ROXICODONE) immediate release tablet 5 mg, 5 mg, Oral, Q8H PRN  acetaminophen (TYLENOL) tablet 650 mg, 650 mg, Oral, Q4H PRN  buPROPion (WELLBUTRIN XL) extended release tablet 300 mg, 300 mg, Oral, Daily [COMPLETED] cyanocobalamin injection 1,000 mcg, 1,000 mcg, Intramuscular, Daily **FOLLOWED BY** cyanocobalamin injection 1,000 mcg, 1,000 mcg, Intramuscular, Weekly **FOLLOWED BY** [START ON 2/5/2021] cyanocobalamin injection 1,000 mcg, 1,000 mcg, Intramuscular, J43 Days  folic acid (FOLVITE) tablet 1 mg, 1 mg, Oral, Daily  ipratropium-albuterol (DUONEB) nebulizer solution 1 ampule, 1 ampule, Inhalation, Q4H PRN  labetalol (NORMODYNE) tablet 200 mg, 200 mg, Oral, 3 times per day  lamoTRIgine (LAMICTAL) tablet 400 mg, 400 mg, Oral, Daily  magnesium hydroxide (MILK OF MAGNESIA) 400 MG/5ML suspension 30 mL, 30 mL, Oral, Daily PRN  melatonin tablet 3 mg, 3 mg, Oral, Nightly  muscle rub cream, , Topical, TID PRN  potassium bicarb-citric acid (EFFER-K) effervescent tablet 40 mEq, 40 mEq, Oral, Daily  rOPINIRole (REQUIP) tablet 1 mg, 1 mg, Oral, Nightly  sennosides-docusate sodium (SENOKOT-S) 8.6-50 MG tablet 2 tablet, 2 tablet, Oral, Daily  vitamin B-1 (THIAMINE) tablet 100 mg, 100 mg, Oral, Daily  polyethylene glycol (GLYCOLAX) packet 17 g, 17 g, Oral, Daily  bisacodyl (DULCOLAX) suppository 10 mg, 10 mg, Rectal, Daily PRN  senna (SENOKOT) tablet 17.2 mg, 2 tablet, Oral, Daily PRN  enoxaparin (LOVENOX) injection 30 mg, 30 mg, Subcutaneous, BID      Objective:  BP (!) 124/49   Pulse 99   Temp 98.2 °F (36.8 °C) (Oral)   Resp 18   Ht 5' 5\" (1.651 m)   Wt 233 lb (105.7 kg)   SpO2 100%   BMI 38.77 kg/m²       GEN: Well developed, well nourished, no acute distress  HEENT: NCAT. EOM grossly intact. Hearing grossly intact. Mucous membranes pink and moist.  RESP: Normal breath sounds with no wheezing, rales, or rhonchi. Respirations WNL and unlabored. CV: Regular rate and rhythm. No murmurs, rubs, or gallops. ABD: Soft, non-distended, BS+ and equal.  NEURO: Alert, oriented to person, place, and time. Speech fluent with no aphasia or dysarthria noted. MSK: Decreased AROM in the left upper and lower limbs due to weakness. Otherwise functional ROM. Muscle bulk is normal bilaterally. Left hemiparesis. LIMBS: No edema in bilateral lower limbs. SKIN: Warm and dry with good turgor. PSYCH: Mood WNL. Affect WNL. Appropriately interactive. Diagnostics:     CBC: No results for input(s): WBC, RBC, HGB, HCT, MCV, RDW, PLT in the last 72 hours. BMP:   Recent Labs     12/14/20  1536   *   K 4.3   CL 97*   CO2 24   BUN 17   CREATININE 0.61   GLUCOSE 89     BNP: No results for input(s): BNP in the last 72 hours. PT/INR: No results for input(s): PROTIME, INR in the last 72 hours. APTT: No results for input(s): APTT in the last 72 hours. CARDIAC ENZYMES: No results for input(s): CKMB, CKMBINDEX, TROPONINT in the last 72 hours. Invalid input(s): CKTOTAL;3  FASTING LIPID PANEL:  Lab Results   Component Value Date    CHOL 247 (H) 11/30/2020     11/30/2020    TRIG 77 11/30/2020     LIVER PROFILE: No results for input(s): AST, ALT, ALB, BILIDIR, BILITOT, ALKPHOS in the last 72 hours. Impression/Plan:   Impaired ADLs, gait, and mobility due to:    1. Hemorrhagic CVA:  PT/OT for gait, mobility, strengthening, endurance, ADLs, and self care. Medrol tapered until 12/13. 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 Monitoring platelets  3. Muscle spasm/myoclonus: Now off baclofen. Tizanidine as needed. 4. HTN: amlodipine (decreased 12/14 by IM), lisinopril (decreased 12/14 by IM), labetalol  5. ETOH withdrawal/history of abuse: was treated with IV ativan and librium. On X55 and folic acid now  6. Bipolar Disorder: on wellbutrin, zoloft (increased 12/16 - patient takes 150mg at home, will gradually increase to that dose), lamictal  7. Restless Leg Syndrome: on requip  8. Insomnia:  On melatonin, gabapentin nightly. Takes trazodone at home - continue low-dose trazodone (started 12/15). 9. Bowel Management: Miralax daily, senokot prn, dulcolax prn. 10. DVT Prophylaxis:  low molecular weight heparin, SCD's while in bed and HORTENSIA's   11.  Internal medicine for medical management         Electronically signed by Júnior Mcdaniels MD on 12/16/2020 at 11:05 PM

## 2020-12-16 NOTE — PROGRESS NOTES
[id number] [facility]    ACUTE REHABILITATION OCCUPATIONAL THERAPY  DAILY NOTE    Date: 20  Patient Name: Sophie Argueta      Room: 2612/2612-01    MRN: [id number]   : 1970  (48 y.o.)  Gender: female   Referring Practitioner: Sunni Power MD  Diagnosis: Intracranial hemmorrhage  Additional Pertinent Hx: 51-year-old female with history of delta storage pool disease, bipolar disorder, alcohol abuse who developed acute left-sided weaknessfound to have intraparenchymal hemorrhage, right parietal lobe hemorrhage and moderate subarachnoid hemorrhage. Pt admitted to rehab unit on 20. Restrictions  Restrictions/Precautions: Fall Risk  Other position/activity restrictions: Occasional L hypertonicity. Required Braces or Orthoses?: No  Equipment Used: Other, Wheelchair, Bed(Lg Stedy)    Subjective  Subjective: \"I feel so much better today.  I cried all evening but I think I needed it\"  Comments: pt cooperative, motivated, pleasant and in much better spirits this date  Patient Currently in Pain: Denies  Restrictions/Precautions: Fall Risk  Overall Orientation Status: Within Functional Limits  Patient Observation  Observations: intermittent L side neglect  Pain Assessment  Pain Assessment: 0-10  Pain Level: 1  Pain Type: Acute pain  Pain Location: Arm  Pain Orientation: Left  Pain Descriptors: Aching, Sore    Objective  Cognition  Overall Cognitive Status: WFL  Perception  Overall Perceptual Status: Impaired  Unilateral Attention: Cues to attend left visual field;Cues to attend to left side of body;Cues to maintain midline in sitting;Cues to maintain midline in standing(improving, flucuates)  Balance  Sitting Balance: Stand by assistance(stand by assist with dynamic, SUP with static)  Standing Balance: Minimal assistance(min/contact guard in lg stedy)  Bed mobility  Rolling to Left: Minimal assistance  Rolling to Right: Moderate assistance Toileting: Minimal assistance(min assist clothing on L side using lg stedy)  Additional Comments: pt requesting to wash hair sinkside, pt leaned forward into sink to assist is writer in wetting, washing and drying her hair, pt then combed out with min assist due to tangles          Assessment  Performance deficits / Impairments: Decreased functional mobility ; Decreased ADL status; Decreased ROM; Decreased strength;Decreased endurance;Decreased balance;Decreased high-level IADLs;Decreased fine motor control;Decreased coordination;Decreased posture;Decreased safe awareness;Decreased vision/visual deficit  Prognosis: Good  Discharge Recommendations: Home with assist PRN;Patient would benefit from continued therapy after discharge  Activity Tolerance: Patient Tolerated treatment well  Safety Devices in place: Yes  Type of devices: Nurse notified; Left in bed;Patient at risk for falls;Call light within reach  Equipment Recommendations  Equipment Needed: (TBD)       Patient Education: transfer safety, strengthening, controlled movements, ADL tasks and OT POC  Patient Goals   Patient goals : To discharge home with family support  Learner:patient  Method: demonstration and explanation       Outcome: verbalized concerns, demonstrated understanding, needs reinforcement and asked questions        Plan  Plan  Times per week: 5-7  Times per day: Twice a day  Current Treatment Recommendations: Self-Care / ADL, Home Management Training, Strengthening, Balance Training, Functional Mobility Training, Endurance Training, Wheelchair Mobility Training, Neuromuscular Re-education, Pain Management, Safety Education & Training, Patient/Caregiver Education & Training, Equipment Evaluation, Education, & procurement, Cognitive/Perceptual Training  Patient Goals   Patient goals :  To discharge home with family support  Short term goals  Time Frame for Short term goals: 7 to 10 days Short term goal 1: Pt will perform upper body bathing/dressing with stand by assist.  Short term goal 2: Pt will perform lower body bathing and dressing with Moderate assist and Fair safety. Short term goal 3: Pt will perform toileting tasks with Moderate assist.  Short term goal 4: Pt will verbalize/demonstrate Good understanding of assistive equipment/durable medical equipment/modified techniques for increased independence with self-care and mobility. Short term goal 5: Pt will perform functional transfers with Moderate assist to toilet/wheelchair/shower with Fair safety. Short term goal 6: Pt will actively participate in 30+ minutes of therapeutic exercise/functional activities to promote increased independence with self-care and mobility. Long term goals  Time Frame for Long term goals : By discharge  Long term goal 1: Pt will perform BADLs with modified independence and Good safety with assist PRN for HORTENSIA hose. Long term goal 2: Pt will perform functional transfers and mobility with modified independence, least restrictive mobility device, and Good safety. Long term goal 3: Pt will attend to L side of body 100% of the time during self-care, transfers, and mobility with 1-2 verbal cues PRN. Long term goal 4: Pt will stand for 5+ minutes with 1-2 UE support, modified independence and no loss of balance while engaging in functional activity of choice. Long term goal 5: Pt will verbalize/demonstrate Good understanding of home exercise program for LUE.   Long term goals 6: 9 hole peg, box and block to be assessed for LUE as appropriate        12/16/20 0937 12/16/20 1416   OT Individual Minutes   Time In 4293 1416   Time Out 1035 1510   Minutes 58 54     Electronically signed by JEAN CARLOS Boucher on 12/16/20 at 4:00 PM EST

## 2020-12-17 LAB
ANION GAP SERPL CALCULATED.3IONS-SCNC: 11 MMOL/L (ref 9–17)
BUN BLDV-MCNC: 15 MG/DL (ref 6–20)
BUN/CREAT BLD: NORMAL (ref 9–20)
CALCIUM SERPL-MCNC: 9.9 MG/DL (ref 8.6–10.4)
CHLORIDE BLD-SCNC: 99 MMOL/L (ref 98–107)
CO2: 25 MMOL/L (ref 20–31)
CREAT SERPL-MCNC: 0.6 MG/DL (ref 0.5–0.9)
GFR AFRICAN AMERICAN: >60 ML/MIN
GFR NON-AFRICAN AMERICAN: >60 ML/MIN
GFR SERPL CREATININE-BSD FRML MDRD: NORMAL ML/MIN/{1.73_M2}
GFR SERPL CREATININE-BSD FRML MDRD: NORMAL ML/MIN/{1.73_M2}
GLUCOSE BLD-MCNC: 95 MG/DL (ref 70–99)
HCT VFR BLD CALC: 35.7 % (ref 36–46)
HEMOGLOBIN: 11.9 G/DL (ref 12–16)
MCH RBC QN AUTO: 29.9 PG (ref 26–34)
MCHC RBC AUTO-ENTMCNC: 33.3 G/DL (ref 31–37)
MCV RBC AUTO: 89.7 FL (ref 80–100)
NRBC AUTOMATED: ABNORMAL PER 100 WBC
PDW BLD-RTO: 14 % (ref 11.5–14.9)
PLATELET # BLD: 544 K/UL (ref 150–450)
PMV BLD AUTO: 7.4 FL (ref 6–12)
POTASSIUM SERPL-SCNC: 4.4 MMOL/L (ref 3.7–5.3)
RBC # BLD: 3.98 M/UL (ref 4–5.2)
SODIUM BLD-SCNC: 135 MMOL/L (ref 135–144)
WBC # BLD: 6.4 K/UL (ref 3.5–11)

## 2020-12-17 PROCEDURE — 1180000000 HC REHAB R&B

## 2020-12-17 PROCEDURE — 99232 SBSQ HOSP IP/OBS MODERATE 35: CPT | Performed by: INTERNAL MEDICINE

## 2020-12-17 PROCEDURE — 97112 NEUROMUSCULAR REEDUCATION: CPT

## 2020-12-17 PROCEDURE — 85027 COMPLETE CBC AUTOMATED: CPT

## 2020-12-17 PROCEDURE — 6370000000 HC RX 637 (ALT 250 FOR IP): Performed by: PHYSICAL MEDICINE & REHABILITATION

## 2020-12-17 PROCEDURE — 6370000000 HC RX 637 (ALT 250 FOR IP): Performed by: STUDENT IN AN ORGANIZED HEALTH CARE EDUCATION/TRAINING PROGRAM

## 2020-12-17 PROCEDURE — 80048 BASIC METABOLIC PNL TOTAL CA: CPT

## 2020-12-17 PROCEDURE — 97542 WHEELCHAIR MNGMENT TRAINING: CPT

## 2020-12-17 PROCEDURE — 99232 SBSQ HOSP IP/OBS MODERATE 35: CPT | Performed by: STUDENT IN AN ORGANIZED HEALTH CARE EDUCATION/TRAINING PROGRAM

## 2020-12-17 PROCEDURE — 97530 THERAPEUTIC ACTIVITIES: CPT

## 2020-12-17 PROCEDURE — 6360000002 HC RX W HCPCS: Performed by: PHYSICAL MEDICINE & REHABILITATION

## 2020-12-17 PROCEDURE — 6370000000 HC RX 637 (ALT 250 FOR IP): Performed by: INTERNAL MEDICINE

## 2020-12-17 PROCEDURE — 97116 GAIT TRAINING THERAPY: CPT

## 2020-12-17 PROCEDURE — 36415 COLL VENOUS BLD VENIPUNCTURE: CPT

## 2020-12-17 PROCEDURE — 97110 THERAPEUTIC EXERCISES: CPT

## 2020-12-17 PROCEDURE — 6370000000 HC RX 637 (ALT 250 FOR IP): Performed by: NURSE PRACTITIONER

## 2020-12-17 PROCEDURE — 97535 SELF CARE MNGMENT TRAINING: CPT

## 2020-12-17 RX ORDER — GAUZE BANDAGE 2" X 2"
100 BANDAGE TOPICAL DAILY
Status: DISCONTINUED | OUTPATIENT
Start: 2020-12-17 | End: 2021-01-05 | Stop reason: HOSPADM

## 2020-12-17 RX ORDER — TIZANIDINE 4 MG/1
4 TABLET ORAL EVERY 8 HOURS SCHEDULED
Status: DISCONTINUED | OUTPATIENT
Start: 2020-12-17 | End: 2020-12-19

## 2020-12-17 RX ADMIN — FOLIC ACID 1 MG: 1 TABLET ORAL at 07:40

## 2020-12-17 RX ADMIN — ACETAMINOPHEN 650 MG: 325 TABLET, FILM COATED ORAL at 17:10

## 2020-12-17 RX ADMIN — SERTRALINE HYDROCHLORIDE 100 MG: 100 TABLET ORAL at 07:40

## 2020-12-17 RX ADMIN — ENOXAPARIN SODIUM 30 MG: 30 INJECTION SUBCUTANEOUS at 20:50

## 2020-12-17 RX ADMIN — AMLODIPINE BESYLATE 5 MG: 10 TABLET ORAL at 07:41

## 2020-12-17 RX ADMIN — POLYETHYLENE GLYCOL 3350 17 G: 17 POWDER, FOR SOLUTION ORAL at 07:41

## 2020-12-17 RX ADMIN — LABETALOL HYDROCHLORIDE 200 MG: 100 TABLET, FILM COATED ORAL at 20:56

## 2020-12-17 RX ADMIN — BUPROPION HYDROCHLORIDE 300 MG: 300 TABLET, FILM COATED, EXTENDED RELEASE ORAL at 07:44

## 2020-12-17 RX ADMIN — LISINOPRIL 20 MG: 20 TABLET ORAL at 07:40

## 2020-12-17 RX ADMIN — ROPINIROLE HYDROCHLORIDE 1 MG: 1 TABLET, FILM COATED ORAL at 20:51

## 2020-12-17 RX ADMIN — Medication 3 MG: at 20:50

## 2020-12-17 RX ADMIN — TRAZODONE HYDROCHLORIDE 25 MG: 50 TABLET ORAL at 20:50

## 2020-12-17 RX ADMIN — ENOXAPARIN SODIUM 30 MG: 30 INJECTION SUBCUTANEOUS at 07:41

## 2020-12-17 RX ADMIN — TIZANIDINE 4 MG: 4 TABLET ORAL at 12:36

## 2020-12-17 RX ADMIN — SENNOSIDES AND DOCUSATE SODIUM 2 TABLET: 8.6; 5 TABLET ORAL at 07:41

## 2020-12-17 RX ADMIN — TIZANIDINE 4 MG: 4 TABLET ORAL at 20:50

## 2020-12-17 RX ADMIN — GABAPENTIN 300 MG: 300 CAPSULE ORAL at 20:50

## 2020-12-17 RX ADMIN — POTASSIUM BICARBONATE 40 MEQ: 782 TABLET, EFFERVESCENT ORAL at 07:41

## 2020-12-17 RX ADMIN — ACETAMINOPHEN 650 MG: 325 TABLET, FILM COATED ORAL at 06:07

## 2020-12-17 RX ADMIN — TIZANIDINE 4 MG: 4 TABLET ORAL at 06:07

## 2020-12-17 RX ADMIN — THIAMINE HCL TAB 100 MG 100 MG: 100 TAB at 11:33

## 2020-12-17 RX ADMIN — LAMOTRIGINE 400 MG: 100 TABLET ORAL at 07:46

## 2020-12-17 ASSESSMENT — PAIN SCALES - GENERAL
PAINLEVEL_OUTOF10: 0
PAINLEVEL_OUTOF10: 4
PAINLEVEL_OUTOF10: 0

## 2020-12-17 NOTE — PROGRESS NOTES
[address]    ACUTE REHABILITATION OCCUPATIONAL THERAPY  DAILY NOTE    Date: 20  Patient Name: Remberto Paez      Room: 2612/2612-01    MRN: [id number]   : 1970  (48 y.o.)  Gender: female   Referring Practitioner: Marc Larson MD  Diagnosis: Intracranial hemmorrhage  Additional Pertinent Hx: 51-year-old female with history of delta storage pool disease, bipolar disorder, alcohol abuse who developed acute left-sided weaknessfound to have intraparenchymal hemorrhage, right parietal lobe hemorrhage and moderate subarachnoid hemorrhage. Pt admitted to rehab unit on 20. Restrictions  Restrictions/Precautions: Fall Risk  Other position/activity restrictions: Occasional L hypertonicity. Required Braces or Orthoses?: No  Equipment Used: Other, Wheelchair, [employer])    Subjective  Subjective: \"I want to try it\" pt states in regards to standing without using lg stedy  Comments: pt cooperative, motivated, and pleasant  Patient Currently in Pain: Denies  Restrictions/Precautions: Fall Risk  Overall Orientation Status: Within Functional Limits  Patient Observation  Observations: intermittent L side neglect  Pain Assessment  Pain Assessment: 0-10  Pain Level: 1  Pain Type: Acute pain  Pain Location: Arm  Pain Orientation: Left  Pain Descriptors: Aching, Sore    Objective  Cognition  Overall Cognitive Status: WFL  Perception  Overall Perceptual Status: Impaired  Unilateral Attention: Cues to attend left visual field;Cues to attend to left side of body;Cues to maintain midline in sitting;Cues to maintain midline in standing(improving, flucuates)  Balance  Sitting Balance: Stand by assistance(stand by assist/SUP)  Standing Balance: Minimal assistance(assist to support L side, cuing for L lateral lean)  Bed mobility  Rolling to Left: Minimal assistance  Sit to Supine:  Moderate assistance(BLE's, mod/min assist, flucuates with fatigue)  Scooting: Contact guard assistance Transfers  Stand Pivot Transfers: Moderate assistance(w/c<>toilet, w/c>bed, min/mod assist, setup req, VCs for tech/positioning)  Sit to stand: Minimal assistance  Stand to sit: Minimal assistance;Contact guard assistance  Standing Balance  Time: AM: 1-2 min x4; PM: 1-2 min x2, 2-3 min  Activity: AM: functional transfers using lg stedy, lower body dressing and elizabeth-care (did not use lg stedy with standing req min assist); PM: toilet transfer/toileting tasks, functional standing/transfers  Comment: assist x1, standing in lg stedy for transfers only, standing with support on L side for lower body dressing(did not use lg stedy in afternoon session for standing/transfers)  Functional Mobility  Functional - Mobility Device: Wheelchair  Activity: To/from bathroom  Assist Level:  Moderate assistance  Functional Mobility Comments: assist for turns on L side, VCs req for visual scanning with poor return noted on L side  Toilet Transfers  Toilet - Technique: (lg stedy)  Equipment Used: Standard toilet  Toilet Transfer: Dependent/Total  Toilet Transfers Comments: assist x1, using lg stedy(min assist on/off toilet using lg stedy; PM: pt completed stand pivot transfer w/c<>toilet with mod/min assist, VC for safety and LLE positioning)     Type of ROM/Therapeutic Exercise  Type of ROM/Therapeutic Exercise: Free weights(RUE, 2-3#, 20 reps x2)  Comment: pt engaged in RUE ex's to support mobility/transfers and overall endurance, rest breaks req due to fatigue, good tolerance noted  Exercises  Scapular Protraction: x  Scapular Retraction: x  Shoulder Flexion: x  Shoulder Extension: x  Horizontal ABduction: x  Horizontal ADduction: x  Elbow Flexion: x  Elbow Extension: x Other: hand skateboard with LUE utilized to increase strength/control for isolated/targeted movements, assist req for control and accuracy, pt unable to complete L elbow extension req assist for full ROM, increased tone noted when pt giving full effort, rest breaks req as needed due to fatigue        Electrical Stimulation  Electrical Stimulation Location: Left;Elbow;Wrist  Electrical Stimulation Action: electrical pads placed on LUE to facilitate elbow and wrist extension, to tolerating fair with lowest setting for elbow extension  Electrical Stimulation Parameters: 10 min, 35 Hz, reciprocal cycle  Electrical Stimulation Goals: Strength;Neuromuscular Facilitation              ADL  Equipment Provided: Reacher;Long-handled shoe horn  Feeding: Setup  Grooming: Modified independent (seated, oral care/washing face and combing hair)  UE Bathing: Setup;Minimal assistance(seated, assist thoroughness/RUE)  LE Bathing: Minimal assistance;Setup;Verbal cueing(min assist in standing, setup to wash elizabeth-care only, declines to wash BLE's)  UE Dressing: Maximum assistance(assist threading bra/shirt on LUE, adjustments and hook bra)  LE Dressing: Maximum assistance(pants, underwear, TEDs, shoes, see below for details)  Toileting: Minimal assistance(min assist in standing, assist clothing on L hip)  Additional Comments: pt sat on toilet to remove lower body dressing using reacher with assist on LLE, assist req to thread over L foot, pt able to thread pants and underwear over R foot using reacher, min assist in standing with cuing for L lateral lean, good correction noted, assist clothing on L hip, pt able to step into R shoe with setup and assist to push down with L using shoe horn          Assessment Performance deficits / Impairments: Decreased functional mobility ; Decreased ADL status; Decreased ROM; Decreased strength;Decreased endurance;Decreased balance;Decreased high-level IADLs;Decreased fine motor control;Decreased coordination;Decreased posture;Decreased safe awareness;Decreased vision/visual deficit  Prognosis: Good  Discharge Recommendations: Home with assist PRN;Patient would benefit from continued therapy after discharge  Activity Tolerance: Patient Tolerated treatment well  Safety Devices in place: Yes  Type of devices: Nurse notified; Left in bed;Call light within reach  Equipment Recommendations  Equipment Needed: (TBD)       Patient Education: LUE awareness/protection, ADL tasks/ara tech, transfer safety/positioning, strengthening  Patient Goals   Patient goals : To discharge home with family support  Learner:patient  Method: demonstration and explanation       Outcome: verbalized concerns, demonstrated understanding, needs reinforcement and asked questions        Plan  Plan  Times per week: 5-7  Times per day: Twice a day  Current Treatment Recommendations: Self-Care / ADL, Home Management Training, Strengthening, Balance Training, Functional Mobility Training, Endurance Training, Wheelchair Mobility Training, Neuromuscular Re-education, Pain Management, Safety Education & Training, Patient/Caregiver Education & Training, Equipment Evaluation, Education, & procurement, Cognitive/Perceptual Training  Patient Goals   Patient goals : To discharge home with family support  Short term goals  Time Frame for Short term goals: 7 to 10 days  Short term goal 1: Pt will perform upper body bathing/dressing with stand by assist.  Short term goal 2: Pt will perform lower body bathing and dressing with Moderate assist and Fair safety.   Short term goal 3: Pt will perform toileting tasks with Moderate assist. Short term goal 4: Pt will verbalize/demonstrate Good understanding of assistive equipment/durable medical equipment/modified techniques for increased independence with self-care and mobility. Short term goal 5: Pt will perform functional transfers with Moderate assist to toilet/wheelchair/shower with Fair safety. Short term goal 6: Pt will actively participate in 30+ minutes of therapeutic exercise/functional activities to promote increased independence with self-care and mobility. Long term goals  Time Frame for Long term goals : By discharge  Long term goal 1: Pt will perform BADLs with modified independence and Good safety with assist PRN for HORTENSIA hose. Long term goal 2: Pt will perform functional transfers and mobility with modified independence, least restrictive mobility device, and Good safety. Long term goal 3: Pt will attend to L side of body 100% of the time during self-care, transfers, and mobility with 1-2 verbal cues PRN. Long term goal 4: Pt will stand for 5+ minutes with 1-2 UE support, modified independence and no loss of balance while engaging in functional activity of choice. Long term goal 5: Pt will verbalize/demonstrate Good understanding of home exercise program for LUE.   Long term goals 6: 9 hole peg, box and block to be assessed for LUE as appropriate        12/17/20 0937 12/17/20 1410   OT Individual Minutes   Time In 0937 1410   Time Out 1025 1500   Minutes 48 50     Electronically signed by JEAN CARLOS Somers on 12/17/20 at 3:54 PM EST

## 2020-12-17 NOTE — PROGRESS NOTES
Physical Medicine & Rehabilitation  Progress Note      Subjective:      Ms. Rosy Manning is a 48year-old female with hemorrhagic CVA and L nondominant hemiparesis. She reports feeling pretty well today. She states that therapy was challenging but that it felt good. She notes mild pain in the left anteromedial, proximal upper limb with certain positioning of the limb. She notices it more in therapy usually. She states that it started after one of the first days of admission to rehab after therapy. She and therapists also noted that spasticity in the left upper limb is interfering with some of her daily activities/therapy. Discussed scheduling tizanidine. She notes sleeping okay but not great at this time. She denies any other acute concerns. ROS:  Denies fevers, chills, sweats. No chest pain, palpitations, lightheadedness. Denies coughing, wheezing or shortness of breath. Denies abdominal pain, nausea, diarrhea or constipation. No new areas of joint pain. Denies new areas of numbness or weakness. Denies new anxiety or depression issues. No new skin problems. Rehabilitation:   Progressing in therapies. PT:  Restrictions/Precautions: Fall Risk  Other position/activity restrictions: Occasional L hypertonicity. Transfers  Sit to Stand: Minimal Assistance(// bars)  Stand to sit: Moderate Assistance(// bars)  Bed to Chair: Moderate assistance(to pt's right no AD)  Stand Pivot Transfers: Dependent/Total(lg lopez, improved core control; 1 assist.)  Squat Pivot Transfers: Moderate Assistance(to pt's Right, no AD)  Comment: poor left side awareness deficits  Ambulation 1  Surface: level tile  Device: (walker lift)  Other Apparatus: Left, Slider(toe of shoe tap)  Assistance:  Moderate assistance, 2 Person assistance(3 person assist for posture control and LLE placment) Quality of Gait: Cues for sequencing, weight-shifting with good return. Max A to advance/control L LE, though Pt is initiating hip & knee flexion to initiate L LE swing with ataxi mvmt and with fatigues lacks ability to complete full ROM;  assist for L knee & hip extension in stance as well. MoD A for strong upper body L lateral lean & UE support. Mod A x 2 control walker. Gait Deviations: Decreased step length, Decreased step height, Slow Meseret(ataxic L foot placment)  Distance: 50ft  Comments:  heavily \"pushes\" leans with poor postural awareness or manual facilitation. Transfers  Sit to Stand: Minimal Assistance(// bars)  Stand to sit: Moderate Assistance(// bars)  Bed to Chair: Moderate assistance(to pt's right no AD)  Stand Pivot Transfers: Dependent/Total(lg lopez, improved core control; 1 assist.)  Squat Pivot Transfers: Moderate Assistance(to pt's Right, no AD)  Comment: poor left side awareness deficits  Ambulation  Ambulation?: Yes  Ambulation 1  Surface: level tile  Device: (walker lift)  Other Apparatus: Left, Slider(toe of shoe tap)  Assistance: Moderate assistance, 2 Person assistance(3 person assist for posture control and LLE placment)  Quality of Gait: Cues for sequencing, weight-shifting with good return. Max A to advance/control L LE, though Pt is initiating hip & knee flexion to initiate L LE swing with ataxi mvmt and with fatigues lacks ability to complete full ROM;  assist for L knee & hip extension in stance as well. MoD A for strong upper body L lateral lean & UE support. Mod A x 2 control walker. Gait Deviations: Decreased step length, Decreased step height, Slow Meseret(ataxic L foot placment)  Distance: 50ft  Comments:  heavily \"pushes\" leans with poor postural awareness or manual facilitation.     Surface: level tile  Ambulation 1  Surface: level tile  Device: (walker lift)  Other Apparatus: Left, Slider(toe of shoe tap) Assistance: Moderate assistance, 2 Person assistance(3 person assist for posture control and LLE placment)  Quality of Gait: Cues for sequencing, weight-shifting with good return. Max A to advance/control L LE, though Pt is initiating hip & knee flexion to initiate L LE swing with ataxi mvmt and with fatigues lacks ability to complete full ROM;  assist for L knee & hip extension in stance as well. MoD A for strong upper body L lateral lean & UE support. Mod A x 2 control walker. Gait Deviations: Decreased step length, Decreased step height, Slow Meseret(ataxic L foot placment)  Distance: 50ft  Comments:  heavily \"pushes\" leans with poor postural awareness or manual facilitation.     OT:  ADL  Equipment Provided: Reacher, Long-handled shoe horn  Feeding: Setup  Grooming: Modified independent (seated, oral care/washing face and combing hair)  UE Bathing: Setup, Minimal assistance(seated, assist thoroughness/RUE)  LE Bathing: Minimal assistance, Setup, Verbal cueing(min assist in standing, setup to wash elizabeth-care only, declin)  UE Dressing: Maximum assistance(assist threading bra/shirt on LUE, adjustments and hook bra)  LE Dressing: Maximum assistance(pants, underwear, TEDs, shoes, see below for details)  Toileting: Minimal assistance(min assist in standing, assist clothing on L hip)  Additional Comments: pt sat on toilet to remove lower body dressing using reacher with assist on LLE, assist req to thread over L foot, pt able to thread pants and underwear over R foot using reacher, min assist in standing with cuing for L lateral lean, good correction noted, assist clothing on L hip, pt able to step into R shoe with setup and assist to push down with L using shoe horn         Balance  Sitting Balance: Stand by assistance(stand by assist/SUP)  Standing Balance: Minimal assistance(assist to support L side, cuing for L lateral lean)   Standing Balance  Time: AM: 1-2 min x4; PM: 1-2 min x2, 2-3 min Activity: AM: functional transfers using lg stedy, lower body dressing and elizabeth-care (did not use lg stedy with standing req min assist); PM: toilet transfer/toileting tasks, functional standing/transfers  Comment: assist x1, standing in lg stedy for transfers only, standing with support on L side for lower body dressing(did not use lg stedy in afternoon session for standing/tra)  Functional Mobility  Functional - Mobility Device: Wheelchair  Activity: To/from bathroom  Assist Level: Moderate assistance  Functional Mobility Comments: assist for turns on L side, VCs req for visual scanning with poor return noted on L side     Bed mobility  Bridging: Minimal assistance(to stibilize LLE to maintain midline)  Rolling to Left: Minimal assistance  Rolling to Right: Moderate assistance  Supine to Sit: Moderate assistance(assist LLE only and positioning)  Sit to Supine: Moderate assistance(BLE's, mod/min assist, flucuates with fatigue)  Scooting: Stand by assistance  Comment: Pt uses bed rail for rolling to eft side. pt moderately leans to left side, pt able to correct posture, improve seating balance once feet postioned on floor, CGA for static balance with R UE support. mod cues for attending to left side due to left neglect. Transfers  Stand Pivot Transfers: Moderate assistance(w/c<>toilet, w/c>bed, min/mod assist, setup req, VCs for sahil)  Sit to stand: Minimal assistance  Stand to sit: Minimal assistance, Contact guard assistance  Transfer Comments: using lg stedy   Toilet Transfers  Toilet - Technique: (lg stedy)  Equipment Used: Standard toilet  Toilet Transfer: Dependent/Total  Toilet Transfers Comments: assist x1, using lg stedy(min assist on/off toilet using lg stedy; PM: pt completed )     Shower Transfers  Shower Transfers Comments: Deferred due to safety concerns with Pt's transfers and sitting balance  Wheelchair Bed Transfers  Equipment Used:  Other, Wheelchair, [employer]) Level of Asssistance: Dependent/Total    SPEECH:      Current Medications:   Current Facility-Administered Medications: thiamine mononitrate tablet 100 mg, 100 mg, Oral, Daily  tiZANidine (ZANAFLEX) tablet 4 mg, 4 mg, Oral, 3 times per day  sertraline (ZOLOFT) tablet 100 mg, 100 mg, Oral, Daily  traZODone (DESYREL) tablet 25 mg, 25 mg, Oral, Nightly  gabapentin (NEURONTIN) capsule 300 mg, 300 mg, Oral, Nightly  amLODIPine (NORVASC) tablet 5 mg, 5 mg, Oral, Daily  lisinopril (PRINIVIL;ZESTRIL) tablet 20 mg, 20 mg, Oral, Daily  oxyCODONE (ROXICODONE) immediate release tablet 5 mg, 5 mg, Oral, Q8H PRN  acetaminophen (TYLENOL) tablet 650 mg, 650 mg, Oral, Q4H PRN  buPROPion (WELLBUTRIN XL) extended release tablet 300 mg, 300 mg, Oral, Daily  [COMPLETED] cyanocobalamin injection 1,000 mcg, 1,000 mcg, Intramuscular, Daily **FOLLOWED BY** cyanocobalamin injection 1,000 mcg, 1,000 mcg, Intramuscular, Weekly **FOLLOWED BY** [START ON 2/5/2021] cyanocobalamin injection 1,000 mcg, 1,000 mcg, Intramuscular, Y15 Days  folic acid (FOLVITE) tablet 1 mg, 1 mg, Oral, Daily  ipratropium-albuterol (DUONEB) nebulizer solution 1 ampule, 1 ampule, Inhalation, Q4H PRN  labetalol (NORMODYNE) tablet 200 mg, 200 mg, Oral, 3 times per day  lamoTRIgine (LAMICTAL) tablet 400 mg, 400 mg, Oral, Daily  magnesium hydroxide (MILK OF MAGNESIA) 400 MG/5ML suspension 30 mL, 30 mL, Oral, Daily PRN  melatonin tablet 3 mg, 3 mg, Oral, Nightly  muscle rub cream, , Topical, TID PRN  rOPINIRole (REQUIP) tablet 1 mg, 1 mg, Oral, Nightly  sennosides-docusate sodium (SENOKOT-S) 8.6-50 MG tablet 2 tablet, 2 tablet, Oral, Daily  polyethylene glycol (GLYCOLAX) packet 17 g, 17 g, Oral, Daily  bisacodyl (DULCOLAX) suppository 10 mg, 10 mg, Rectal, Daily PRN  senna (SENOKOT) tablet 17.2 mg, 2 tablet, Oral, Daily PRN  enoxaparin (LOVENOX) injection 30 mg, 30 mg, Subcutaneous, BID      Objective: /72   Pulse 86   Temp 98.4 °F (36.9 °C) (Oral)   Resp 18   Ht 5' 5\" (1.651 m)   Wt 233 lb (105.7 kg)   SpO2 97%   BMI 38.77 kg/m²       GEN: Well developed, well nourished, no acute distress  HEENT: NCAT. EOM grossly intact. Hearing grossly intact. Mucous membranes pink and moist.  RESP: Normal breath sounds with no wheezing, rales, or rhonchi. Respirations WNL and unlabored. CV: Regular rate and rhythm. No murmurs, rubs, or gallops. ABD: Soft, non-distended, BS+ and equal.  NEURO: Alert, oriented to person, place, and time. Speech fluent with no aphasia or dysarthria noted. Spasticity noted in the left elbow flexors, wrist flexors, and finger flexors. MSK: No tenderness to palpation of the left shoulder or left biceps muscle. Normal passive ROM of the left shoulder without pain. Cannot reproduce left proximal upper limb pain on exam.  Decreased AROM in the left upper and lower limbs due to weakness. Otherwise functional ROM. Muscle bulk is normal bilaterally. Left hemiparesis. LIMBS: No edema in bilateral lower limbs. SKIN: Warm and dry with good turgor. PSYCH: Mood WNL. Affect WNL. Appropriately interactive. Diagnostics:     CBC:   Recent Labs     12/17/20  0619   WBC 6.4   RBC 3.98*   HGB 11.9*   HCT 35.7*   MCV 89.7   RDW 14.0   *     BMP:   Recent Labs     12/17/20  0619      K 4.4   CL 99   CO2 25   BUN 15   CREATININE 0.60   GLUCOSE 95     BNP: No results for input(s): BNP in the last 72 hours. PT/INR: No results for input(s): PROTIME, INR in the last 72 hours. APTT: No results for input(s): APTT in the last 72 hours. CARDIAC ENZYMES: No results for input(s): CKMB, CKMBINDEX, TROPONINT in the last 72 hours.     Invalid input(s): CKTOTAL;3  FASTING LIPID PANEL:  Lab Results   Component Value Date    CHOL 247 (H) 11/30/2020     11/30/2020    TRIG 77 11/30/2020 LIVER PROFILE: No results for input(s): AST, ALT, ALB, BILIDIR, BILITOT, ALKPHOS in the last 72 hours. Impression/Plan:   Impaired ADLs, gait, and mobility due to:    1. Hemorrhagic CVA:  PT/OT for gait, mobility, strengthening, endurance, ADLs, and self care. Medrol tapered until 12/13. 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 Monitoring platelets  3. Muscle spasm/myoclonus/spasticity: Now off baclofen. Switched from as-needed to scheduled tizanidine 12/17. 4. Left proximal upper limb pain:  In the area of left biceps muscle. Could not reproduce pain on exam today. Will monitor. 5. HTN: amlodipine (decreased 12/14 by IM), lisinopril (decreased 12/14 by IM), labetalol  6. ETOH withdrawal/history of abuse: was treated with IV ativan and librium. On U70 and folic acid now  7. Bipolar Disorder: on wellbutrin, zoloft (increased 12/16 - patient takes 150mg at home, will gradually increase to that dose), lamictal  8. Restless Leg Syndrome: on requip  9. Insomnia:  On melatonin, gabapentin nightly. Takes trazodone at home - continue low-dose trazodone (started 12/15). 10. Bowel Management: Miralax daily, senokot prn, dulcolax prn. 11. DVT Prophylaxis:  low molecular weight heparin, SCD's while in bed and HORTENSIA's   12.  Internal medicine for medical management         Electronically signed by Clemencia Gonzalez MD on 12/17/2020 at 11:21 PM

## 2020-12-17 NOTE — PLAN OF CARE
Problem: Skin Integrity:  Goal: Will show no infection signs and symptoms  Description: Will show no infection signs and symptoms  Outcome: Ongoing     Problem: Falls - Risk of:  Goal: Will remain free from falls  Description: Will remain free from falls  Outcome: Ongoing     Problem: Pain:  Goal: Control of acute pain  Description: Control of acute pain  Outcome: Ongoing

## 2020-12-17 NOTE — FLOWSHEET NOTE
Patient alone in her room, stated she's exhausted from her busy day. Patient stated therapy is what's going to help me get home and return to my life. Patient  Appears to have good family support through her  and children. Chaplains will remain available to offer spiritual and emotional support as needed. 12/16/20 9904   Encounter Summary   Services provided to: Patient   [facility]   Support System Spouse; Children   Place of [address] [facility] [place], [place]   Continue Visiting   (12/16/20)   Complexity of Encounter Low   Length of Encounter 15 minutes   Spiritual Assessment Completed Yes   Routine   Type Initial   Assessment Calm; Approachable   Intervention Active listening;Explored feelings, thoughts, concerns;Nurtured hope;Prayer;Sustaining presence/ Ministry of presence;Provided reading materials/devotional materials; Discussed illness/injury and it's impact; Discussed belief system/Druze practices/yves   Visited by Chacha Vasques

## 2020-12-17 NOTE — PROGRESS NOTES
Physical Therapy  [address]  Acute Rehabilitation Physical Therapy Progress Note    Date: 20  Patient Name: Seth Lesch       Room: 2612/2612-01  MRN: [id number]   Account: [de-identified]   : 1970  (48 y.o.) Gender: female     Referring Practitioner: Estrella Hurst MD  Diagnosis: Intracranial hemmorrhage  Past Medical History:  has a past medical history of Asthma, Bipolar 1 disorder ([facility] [address] Delta storage pool disease [facility]), Hypertension, and Psychiatric problem. Past Surgical History:   has a past surgical history that includes  section (,90); Gastric bypass surgery (); Tonsillectomy and adenoidectomy (); Cholecystectomy (); knee surgery (); Hysterectomy (); laparoscopy (); Colonoscopy (N/A, 2/3/2020); and Upper gastrointestinal endoscopy (N/A, 2/3/2020). Additional Pertinent Hx: 63-year-old female with history of delta storage pool disease, bipolar disorder, alcohol abuse who developed acute left-sided weaknessfound to have intraparenchymal hemorrhage, right parietal lobe hemorrhage and moderate subarachnoid hemorrhage. Pt admitted to rehab unit on 20. Restrictions/Precautions  Restrictions/Precautions: Fall Risk  Required Braces or Orthoses?: No  Position Activity Restriction  Other position/activity restrictions: Occasional L hypertonicity.              Vital Signs  Patient Currently in Pain: Denies                   Bed Mobility:   Bed Mobility  Bridging: Minimal assistance(to stibilize LLE to maintain midline)  Rolling: Contact guard assistance;Rolling Right(vc's for LUE neglect)  Supine to Sit: Stand by assistance  Sit to Supine: Minimal assistance  Scooting: Stand by assistance  Comment: mat and 2 pillows  Bed mobility  Bridging: Minimal assistance(to stibilize LLE to maintain midline)  Scooting: Stand by assistance    Transfers:  Sit to Stand: Minimal Assistance(// bars)  Stand to sit: Moderate Assistance(// bars) Bed to Chair: Moderate assistance(to pt's right no AD)     Squat Pivot Transfers: Moderate Assistance(to pt's Right, no AD)        Ambulation 1  Surface: level tile  Device: (walker lift)  Other Apparatus: Left;Slider(toe of shoe tap)  Assistance: Moderate assistance;2 Person assistance(3 person assist for posture control and LLE placment)  Quality of Gait: Cues for sequencing, weight-shifting with good return. Max A to advance/control L LE, though Pt is initiating hip & knee flexion to initiate L LE swing with ataxi mvmt and with fatigues lacks ability to complete full ROM;  assist for L knee & hip extension in stance as well. MoD A for strong upper body L lateral lean & UE support. Mod A x 2 control walker. Gait Deviations: Decreased step length;Decreased step height;Slow Meseret(ataxic L foot placment)  Distance: 50ft  Comments:  heavily \"pushes\" leans with poor postural awareness or manual facilitation. Ambulation 2  Surface - 2: level tile  Device 2: (walker lift)  Other Apparatus 2: Slider;Left(toe of L shoe taped)  Assistance 2: 2 Person assistance; Moderate assistance(3rd person assist with LLE placment and to maintain TKE)  Quality of Gait 2: same as above. Pt tends to let L hip drop back with fatigue which then causes increase risk for rolling L ankle due to poor foot placement. Mod/Max A for placment expecially with fatigue. Gait Deviations: Slow Meseret;Decreased step length;Decreased step height(ataxic LLE placment)  Distance: 70ft  Comments: cues for sequencing. Strong hip flexor mvmt, but lacking knee extension in final swing phase of gait. Continues to require assist to maintain TKE during stance phase as well.      Stairs/Curb  Stairs?: No              Wheelchair Activities  Wheelchair Parts Management: Yes  Left Brakes Level of Assistance: Dependent/Total  Right Brakes Level of Assistance: Stand by Assist  Propulsion: Yes  Propulsion 1  Propulsion: Manual  Level: Level Tile  Method: RLE;LLE Body structures, Functions, Activity limitations: Decreased functional mobility ; Decreased strength;Decreased safe awareness;Decreased balance; Increased pain;Decreased sensation;Decreased fine motor control;Decreased coordination;Decreased posture;Decreased endurance;Decreased vision/visual deficit; Decreased ROM  Prognosis: Good  REQUIRES PT FOLLOW UP: Yes  Discharge Recommendations: Patient would benefit from continued therapy after discharge;Home with assist PRN    Goals  Short term goals  Time Frame for Short term goals: 10 days  Short term goal 1: pt will perform bed mobility with min A  Short term goal 2: pt will dangle EOB/EOM for 20 to 25 minutes with SBA, performing challenged seated balance/corestrengthening  Short term goal 3: Pivot transfers with mod A+2  Short term goal 4: WC mobility x 100' with min A  Short term goal 5: progress to standing/pre-gait activities in // bars to facilitate L LE motor fucntion. Long term goals  Time Frame for Long term goals : By LOS  Long term goal 1: Pt able to perform bed mobility independently  Long term goal 2: Pt able to perform transfers at [address] term goal 3: Pt able to progress to ambulation with appropriate device dist of 30 to 50 ft, mod A   Long term goal 4: Pt able to propel w/c level surfaces dist of 150 ft , mod-I. Long term goal 5: Improve L LE strength to atleast 2 to 2+/5 to improve fucntion. Long term goal 6: Improve PASS score from 8/30 to 23/36 to improve overall fucntion.   Long term goal 7: Pt able to go up and down 5 steps with rail, mod A.       12/17/20 0750 12/17/20 1300   PT Individual Minutes   Time In 0750 1300   Time Out 0850 1330   Minutes 60 30       Electronically signed by Marina Barron PTA on 12/17/20 at 3:32 PM EST

## 2020-12-18 PROCEDURE — 97110 THERAPEUTIC EXERCISES: CPT

## 2020-12-18 PROCEDURE — 97112 NEUROMUSCULAR REEDUCATION: CPT

## 2020-12-18 PROCEDURE — 6370000000 HC RX 637 (ALT 250 FOR IP): Performed by: STUDENT IN AN ORGANIZED HEALTH CARE EDUCATION/TRAINING PROGRAM

## 2020-12-18 PROCEDURE — 6360000002 HC RX W HCPCS: Performed by: NURSE PRACTITIONER

## 2020-12-18 PROCEDURE — 97116 GAIT TRAINING THERAPY: CPT

## 2020-12-18 PROCEDURE — 97535 SELF CARE MNGMENT TRAINING: CPT

## 2020-12-18 PROCEDURE — 1180000000 HC REHAB R&B

## 2020-12-18 PROCEDURE — 99231 SBSQ HOSP IP/OBS SF/LOW 25: CPT | Performed by: STUDENT IN AN ORGANIZED HEALTH CARE EDUCATION/TRAINING PROGRAM

## 2020-12-18 PROCEDURE — 6370000000 HC RX 637 (ALT 250 FOR IP): Performed by: NURSE PRACTITIONER

## 2020-12-18 PROCEDURE — 97530 THERAPEUTIC ACTIVITIES: CPT

## 2020-12-18 PROCEDURE — 6360000002 HC RX W HCPCS: Performed by: PHYSICAL MEDICINE & REHABILITATION

## 2020-12-18 PROCEDURE — 99232 SBSQ HOSP IP/OBS MODERATE 35: CPT | Performed by: INTERNAL MEDICINE

## 2020-12-18 PROCEDURE — 6370000000 HC RX 637 (ALT 250 FOR IP): Performed by: PHYSICAL MEDICINE & REHABILITATION

## 2020-12-18 RX ORDER — AMLODIPINE BESYLATE 2.5 MG/1
2.5 TABLET ORAL DAILY
Status: DISCONTINUED | OUTPATIENT
Start: 2020-12-19 | End: 2020-12-21

## 2020-12-18 RX ADMIN — ENOXAPARIN SODIUM 30 MG: 30 INJECTION SUBCUTANEOUS at 07:21

## 2020-12-18 RX ADMIN — SERTRALINE HYDROCHLORIDE 100 MG: 100 TABLET ORAL at 07:21

## 2020-12-18 RX ADMIN — GABAPENTIN 300 MG: 300 CAPSULE ORAL at 20:38

## 2020-12-18 RX ADMIN — BUPROPION HYDROCHLORIDE 300 MG: 300 TABLET, FILM COATED, EXTENDED RELEASE ORAL at 07:22

## 2020-12-18 RX ADMIN — SENNOSIDES AND DOCUSATE SODIUM 2 TABLET: 8.6; 5 TABLET ORAL at 07:21

## 2020-12-18 RX ADMIN — ENOXAPARIN SODIUM 30 MG: 30 INJECTION SUBCUTANEOUS at 20:40

## 2020-12-18 RX ADMIN — LAMOTRIGINE 400 MG: 100 TABLET ORAL at 07:22

## 2020-12-18 RX ADMIN — ACETAMINOPHEN 650 MG: 325 TABLET, FILM COATED ORAL at 20:39

## 2020-12-18 RX ADMIN — TRAZODONE HYDROCHLORIDE 25 MG: 50 TABLET ORAL at 20:39

## 2020-12-18 RX ADMIN — FOLIC ACID 1 MG: 1 TABLET ORAL at 07:21

## 2020-12-18 RX ADMIN — ROPINIROLE HYDROCHLORIDE 1 MG: 1 TABLET, FILM COATED ORAL at 20:40

## 2020-12-18 RX ADMIN — ACETAMINOPHEN 650 MG: 325 TABLET, FILM COATED ORAL at 05:26

## 2020-12-18 RX ADMIN — TIZANIDINE 4 MG: 4 TABLET ORAL at 05:26

## 2020-12-18 RX ADMIN — CYANOCOBALAMIN 1000 MCG: 1000 INJECTION, SOLUTION INTRAMUSCULAR at 07:22

## 2020-12-18 RX ADMIN — POLYETHYLENE GLYCOL 3350 17 G: 17 POWDER, FOR SOLUTION ORAL at 07:21

## 2020-12-18 RX ADMIN — Medication 3 MG: at 20:38

## 2020-12-18 RX ADMIN — TIZANIDINE 4 MG: 4 TABLET ORAL at 14:21

## 2020-12-18 RX ADMIN — TIZANIDINE 4 MG: 4 TABLET ORAL at 20:39

## 2020-12-18 RX ADMIN — OXYCODONE HYDROCHLORIDE 5 MG: 5 TABLET ORAL at 14:21

## 2020-12-18 RX ADMIN — THIAMINE HCL TAB 100 MG 100 MG: 100 TAB at 07:20

## 2020-12-18 ASSESSMENT — PAIN SCALES - GENERAL
PAINLEVEL_OUTOF10: 7
PAINLEVEL_OUTOF10: 9

## 2020-12-18 NOTE — PROGRESS NOTES
[place] [address] [facility]    CONSULTATION / HISTORY AND PHYSICAL EXAMINATION            Date:   2020  Patient name:  Smitha Ellison  Date of admission:  2020  8:30 PM  MRN:   [id number]  Account:  [de-identified]  YOB: 1970  PCP:    Pauletta Severin, MD  Room:   2612612-  Code Status:    Full Code    Physician Requesting Consult: Gladis Persaud MD    Reason for Consult: Medical management    Chief Complaint:     No chief complaint on file. Deconditioning    History Obtained From:     patient, electronic medical record    History of Present Illness/ Interval History:   54-year-old female with history of delta storage pool disease, bipolar disorder, alcohol abuse who developed acute left-sided weaknessfound to have intraparenchymal hemorrhage, right parietal lobe hemorrhage and moderate subarachnoid hemorrhage. Pt admitted to rehab unit on 20. Improving with PT/OT      Past Medical History:     Past Medical History:   Diagnosis Date    Asthma     B[facility] 1 disorder ([facility] [address]     Delta storage pool disease ([facility] [address]     Hypertension     Psychiatric problem         Past Surgical History:     Past Surgical History:   Procedure Laterality Date     SECTION  7362,6011    Miscarriage     CHOLECYSTECTOMY      COLONOSCOPY N/A 2/3/2020    -random bx(normal)hemorrhoids    GASTRIC BYPASS SURGERY      HYSTERECTOMY      KNEE SURGERY      LAPAROSCOPY      TONSILLECTOMY AND ADENOIDECTOMY      UPPER GASTROINTESTINAL ENDOSCOPY N/A 2/3/2020    (normal,neg H-Pylori)normal post-bypass endoscopy        Medications Prior to Admission:     Prior to Admission medications    Medication Sig Start Date End Date Taking?  Authorizing Provider   STIMATE 1.5 MG/ML SOLN nasal solution INSTILL 2 SPRAYS INTO THE NOSE TWICE DAILY STARTING TWO DAYS BEFORE PROCEDURE 20   Zane Yepez MD amLODIPine (NORVASC) 5 MG tablet Take 5 mg by mouth daily    Historical Provider, MD   QUEtiapine (SEROQUEL) 200 MG tablet Take 200 mg by mouth nightly    Historical Provider, MD   lisinopril (PRINIVIL;ZESTRIL) 20 MG tablet Take 20 mg by mouth daily    Historical Provider, MD   buPROPion (WELLBUTRIN XL) 300 MG XL tablet Take 300 mg by mouth daily 3/5/16   Historical Provider, MD   sertraline (ZOLOFT) 50 MG tablet Take 50 mg by mouth daily 3/5/16   Historical Provider, MD   PROAIR  (90 BASE) MCG/ACT inhaler Inhale 2 puffs into the lungs every 6 hours as needed  14   Historical Provider, MD   lamoTRIgine (LAMICTAL) 200 MG tablet Take 400 mg by mouth daily 2 tabs 14   Historical Provider, MD        Allergies:     Penicillin g, Pcn [penicillins], and Sulfa antibiotics    Social History:     Tobacco:    reports that she has never smoked. She has never used smokeless tobacco.  Alcohol:      reports current alcohol use. Drug Use:  reports no history of drug use. Family History:     Family History   Adopted: Yes   Family history unknown: Yes       Review of Systems:     Positive and Negative as described in HPI. Denies any shortness of breath or cough  Denies chest pain or palpitations  Denies abdominal pain, diarrhea vomiting  Denies any new numbness tremors or weakness. Physical Exam:     BP (!) 98/47   Pulse 82   Temp 97.9 °F (36.6 °C) (Oral)   Resp 18   Ht 5' 5\" (1.651 m)   Wt 233 lb (105.7 kg)   SpO2 97%   BMI 38.77 kg/m²   Temp (24hrs), Av.2 °F (36.8 °C), Min:97.9 °F (36.6 °C), Max:98.4 °F (36.9 °C)      General appearance:  alert, cooperative and no distress  Eyes: Anicteric sclera. Pupils are equally round and reactive to light. Extraocular movements are intact.   Lungs:  clear to auscultation bilaterally, normal effort  Heart:  regular rate and rhythm, no murmur  Abdomen:  soft, nontender, nondistended, normal bowel sounds, no masses, hepatomegaly, splenomegaly Extremities:  no edema, redness, tenderness in the calves  Skin:  no gross lesions, rashes, induration  Neuro:  Alert, oriented X 3, left sided weakness  Power is left arm 0 out of 5 and 1 out of 5 in left lower extremity  Investigations:      Laboratory Testing:  Lab Results   Component Value Date    WBC 6.4 12/17/2020    HGB 11.9 (L) 12/17/2020    HCT 35.7 (L) 12/17/2020    MCV 89.7 12/17/2020     (H) 12/17/2020     Lab Results   Component Value Date     12/17/2020    K 4.4 12/17/2020    CL 99 12/17/2020    CO2 25 12/17/2020    BUN 15 12/17/2020    CREATININE 0.60 12/17/2020    GLUCOSE 95 12/17/2020    CALCIUM 9.9 12/17/2020         Assessment :      Primary Problem  <principal problem not specified>    Active Hospital Problems    Diagnosis Date Noted    Essential hypertension [I10] 12/11/2020    Acute left-sided weakness [R53.1] 12/11/2020    H/O intracranial hemorrhage [Z86.79] 12/09/2020    Vasogenic edema ([facility] [address] [G93.6]     Intracranial hemorrhage (HCC) [I62.9] 11/30/2020    Bipolar 1 disorder (HCC) [F31.9]     Platelet dysfunction ([address]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 *        1. Intracranial hemorrhage with left-sided weakness  2. Pulmonary hypertension  3. Platelet dysfunction  4. Hypotensionmedication adjustedBP better controlled  5.  Hyponatremia, LIZA per labs 12/10we will repeat    12/16  Hyponatremia improving, LIZA resolved  Labs and vitals reviewed and stable  Continue current management  Trazodone started 50 mg for insomnia    12/17   Patient is progressing well, with therapy  Weakness in leg is much better, noticed that weakness is arm is still there  Potassium tested is 4.4 We will discontinue scheduled p.o. potassium    12/18   Readings of low blood pressure, will discontinue labetalol  Reducing dose of Norvasc to 2.5  We will follow      Consultations:   [address] [place] [address] CONSULT TO DIETITIAN  IP CONSULT TO SOCIAL WORK      Heena Perez MD  12/18/2020  4:41 PM    Copy sent to Dr. Henna Hernandez MD    Please note that this chart was generated using voice recognition Dragon dictation software. Although every effort was made to ensure the accuracy of this automated transcription, some errors in transcription may have occurred.

## 2020-12-18 NOTE — PROGRESS NOTES
[address]   ACUTE REHABILITATION OCCUPATIONAL THERAPY  DAILY NOTE    Date: 20  Patient Name: Alaine Shone      Room: 2612/2612-01    MRN: [id number]   : 1970  (48 y.o.)  Gender: female   Referring Practitioner: Roni Trammell MD  Diagnosis: Intracranial hemmorrhage  Additional Pertinent Hx: 68-year-old female with history of delta storage pool disease, bipolar disorder, alcohol abuse who developed acute left-sided weaknessfound to have intraparenchymal hemorrhage, right parietal lobe hemorrhage and moderate subarachnoid hemorrhage. Pt admitted to rehab unit on 20. Restrictions  Restrictions/Precautions: Fall Risk  Other position/activity restrictions: Occasional L hypertonicity. Required Braces or Orthoses?: No  Equipment Used: Other, Wheelchair, [employer])    Subjective  Subjective: \"I'm leaning against it for extra support\" pt states in regards to leaning on wall in shower on R side while standing and washing elizabeth-area  Comments: pt cooperative, motivated, and pleasant  Patient Currently in Pain: Denies  Restrictions/Precautions: Fall Risk  Overall Orientation Status: Within Functional Limits  Patient Observation  Observations: intermittent L side neglect  Pain Assessment  Pain Assessment: 0-10  Pain Level: 1  Pain Type: Acute pain  Pain Location: Arm  Pain Orientation: Left  Pain Descriptors: Aching, Sore    Objective  Cognition  Overall Cognitive Status: WFL  Safety Judgement: Decreased awareness of need for safety(improving, pt completes transfers quickly, VCs for safety)  Perception  Overall Perceptual Status: Impaired  Unilateral Attention: Cues to attend left visual field;Cues to attend to left side of body;Cues to maintain midline in sitting;Cues to maintain midline in standing(improving, flucuates)  Balance  Sitting Balance: Supervision  Standing Balance:  Moderate assistance(min/mod assist x1, VCs for L lateral lean correction)  Bed mobility Rolling to Left: Contact guard assistance  Supine to Sit: Contact guard assistance  Scooting: Stand by assistance  Transfers  Stand Pivot Transfers: Moderate assistance(assist x1, VCs for proper tech/safety and pace)  Sit to stand: Minimal assistance  Stand to sit: Minimal assistance;Contact guard assistance  Transfer Comments: VCs and assist for LLE/LUE positioning prior to transfers, assist x1  Standing Balance  Time: AM: 1-2 min x6  Activity: AM: functional transfers, ADL tasks  Comment: posterior loss of balance x1 noted when standing for elizabeth-care  Functional Mobility  Functional - Mobility Device: Wheelchair  Activity: To/from bathroom  Assist Level: Moderate assistance  Functional Mobility Comments: assist for turns on L side, VCs req for visual scanning with poor return noted on L side  Toilet Transfers  Toilet - Technique: Squat pivot; To right; To left(w/c<>toilet)  Equipment Used: Standard toilet(GB)  Toilet Transfer: Moderate assistance  Toilet Transfers Comments: VCs req for proper tech/safety, assist x1, assist for LLE positioning  Shower Transfers  Shower - Transfer From: Wheelchair  Shower - Transfer Type: To and From  Shower - Transfer To: Transfer tub bench  Shower - Technique: Squat pivot; To right; To left  Shower Transfers:  Moderate assistance  Shower Transfers Comments: assist x1, VCs for proper tech/safety, assist LLE positioning     Exercises  Grasp/Release: PM: whole hand grasping ex's to grasp small bean bag with L hand, support req to LUE during task,  delayed action noted due to apraxia but pt able to grasp bean bag 3/5 trials, total assist req to release Additional Activities: PM: pt asking this writer suggestions on possible equipment she may need when going home, this writer explained that her anticipated D/C date is still a few weeks out and she may not need the same level of assist then as she needs now, pt verbalized understanding, this writer suggested transfer tub bench, installed GB's and hand held shower would be beneficial and additional equipment would need reevaluated at a time closer to D/C, pt demo'd good understanding and reception  Electrical Stimulation  Electrical Stimulation Location: Left;Elbow;Wrist  Electrical Stimulation Action: electrical pads placed on LUE to facilitate elbow and wrist extension, to tolerating fair with lowest setting for elbow extension  Electrical Stimulation Parameters: 10 min, 35 Hz, reciprocal cycle  Electrical Stimulation Goals: Strength;Neuromuscular Facilitation              ADL  Equipment Provided: Reacher;Long-handled shoe horn;Long-handled sponge  Feeding: Setup  Grooming: Modified independent (seated)  UE Bathing: Setup;Minimal assistance(seated, thoroughness for B armpits)  LE Bathing: Moderate assistance;Setup;Verbal cueing(mod assist in standing for elizabeth-care, long handled sponge utilized)  UE Dressing: Maximum assistance;Setup;Verbal cueing; Increased time to complete(VCs for ara tech/threading LUE/adjustments and bra)  LE Dressing: Maximum assistance(assist threading LLE/over L hip/TEDs/L shoe/tie, mod assist in standing)  Toileting:  Moderate assistance(min/mod assist in standing for hygiene, pt wearing footies)  Additional Comments: reacher utilized to thread pants over BLE's, assist threading over L foot and up on L hip, min/mod assist in standing for pulling up, pt able to step into R shoe with setup and min assist for L shoe, tie both, assist TEDs, total assist for bra/hook and VCs for ara tech when threading shirt, assist for LUE/cuing overhead and adjustments          Assessment Performance deficits / Impairments: Decreased functional mobility ; Decreased ADL status; Decreased ROM; Decreased strength;Decreased endurance;Decreased balance;Decreased high-level IADLs;Decreased fine motor control;Decreased coordination;Decreased posture;Decreased safe awareness;Decreased vision/visual deficit  Prognosis: Good  Discharge Recommendations: Home with assist PRN;Patient would benefit from continued therapy after discharge  Activity Tolerance: Patient Tolerated treatment well  Safety Devices in place: Yes  Type of devices: Nurse notified; Left in chair;Call light within reach  Equipment Recommendations  Equipment Needed: Yes  Hand Held Shower: x  Transfer Tub Bench: x  Grab bars: x  Reacher: x  Long-handled Shoehorn: x       Patient Education: LUE awareness/protection, transfer safety, LLE positioning, apraxia, D/C planning and possible equipment  Patient Goals   Patient goals : To discharge home with family support  Learner:patient  Method: demonstration and explanation       Outcome: verbalized concerns, demonstrated understanding, needs reinforcement and asked questions        Plan  Plan  Times per week: 5-7  Times per day: Twice a day  Current Treatment Recommendations: Self-Care / ADL, Home Management Training, Strengthening, Balance Training, Functional Mobility Training, Endurance Training, Wheelchair Mobility Training, Neuromuscular Re-education, Pain Management, Safety Education & Training, Patient/Caregiver Education & Training, Equipment Evaluation, Education, & procurement, Cognitive/Perceptual Training  Patient Goals   Patient goals : To discharge home with family support  Short term goals  Time Frame for Short term goals: 7 to 10 days  Short term goal 1: Pt will perform upper body bathing/dressing with stand by assist.  Short term goal 2: Pt will perform lower body bathing and dressing with Moderate assist and Fair safety. Short term goal 3: Pt will perform toileting tasks with Moderate assist.  Short term goal 4: Pt will verbalize/demonstrate Good understanding of assistive equipment/durable medical equipment/modified techniques for increased independence with self-care and mobility. Short term goal 5: Pt will perform functional transfers with Moderate assist to toilet/wheelchair/shower with Fair safety. Short term goal 6: Pt will actively participate in 30+ minutes of therapeutic exercise/functional activities to promote increased independence with self-care and mobility. Long term goals  Time Frame for Long term goals : By discharge  Long term goal 1: Pt will perform BADLs with modified independence and Good safety with assist PRN for HORTENSIA hose. Long term goal 2: Pt will perform functional transfers and mobility with modified independence, least restrictive mobility device, and Good safety. Long term goal 3: Pt will attend to L side of body 100% of the time during self-care, transfers, and mobility with 1-2 verbal cues PRN. Long term goal 4: Pt will stand for 5+ minutes with 1-2 UE support, modified independence and no loss of balance while engaging in functional activity of choice. Long term goal 5: Pt will verbalize/demonstrate Good understanding of home exercise program for LUE.   Long term goals 6: 9 hole peg, box and block to be assessed for LUE as appropriate        12/18/20 0735 12/18/20 1235   OT Individual Minutes   Time In 8319 1235   Time Out 0850 1305   Minutes 75 30     Electronically signed by JEAN CARLOS Santoro on 12/18/20 at 2:50 PM EST

## 2020-12-18 NOTE — PROGRESS NOTES
[address]   Physical Therapy Progress Note    Date: 20  Patient Name: Genna Murillo       Room: 2612/2612-01  MRN: [id number]   Account: [de-identified]   : 1970  (48 y.o.)   Gender: female     Discharge Recommendations   Patient would benefit from continued therapy after discharge, Home with assist PRN  Equipment Needed: No  Other: TBD    Referring Practitioner: Sherin Bonilla MD  Diagnosis: Intracranial hemmorrhage  Restrictions/Precautions: Fall Risk  Other position/activity restrictions: Occasional L hypertonicity. Past Medical History:  has a past medical history of Asthma, Bipolar 1 disorder ([facility] [place] [address] Delta storage pool disease [facility]), Hypertension, and Psychiatric problem. Past Surgical History:   has a past surgical history that includes  section (,6042); Gastric bypass surgery (); Tonsillectomy and adenoidectomy (); Cholecystectomy (); knee surgery (); Hysterectomy (); laparoscopy (); Colonoscopy (N/A, 2/3/2020); and Upper gastrointestinal endoscopy (N/A, 2/3/2020). Additional Pertinent Hx: 70-year-old female with history of delta storage pool disease, bipolar disorder, alcohol abuse who developed acute left-sided weaknessfound to have intraparenchymal hemorrhage, right parietal lobe hemorrhage and moderate subarachnoid hemorrhage. Pt admitted to rehab unit on 20. Overall Orientation Status: Within Normal Limits  Restrictions/Precautions  Restrictions/Precautions: Fall Risk  Required Braces or Orthoses?: No  Position Activity Restriction  Other position/activity restrictions: Occasional L hypertonicity.      Subjective: Pt reports having difficulty with transfers having to use good arm to pull self onto toilet with nursing staff; pt states she would like to focus on improving transfers Comments: Pt is very motivated. During AM tx, writer noticed slight tone in left UE and left LE. By afternoon tx, tone had significantly increased as well as spasms. Vital Signs  Patient Currently in Pain: Denies           Patient Observation  Observations: intermittent L side neglect       Bed Mobility:   Rolling: Contact guard assistance  Supine to Sit: Minimal assistance  Sit to Supine: Minimal assistance  Scooting: Stand by assistance  Comment: mat and 2 pillows      Transfers:  Sit to Stand: Moderate Assistance  Stand to sit: Moderate Assistance  Bed to Chair: Moderate assistance(x1, squat pivot)     Squat Pivot Transfers: Moderate Assistance(x1)        Ambulation 1  Surface: level tile  Other Apparatus: Left;Slider  Assistance: Moderate assistance(x3)  Quality of Gait: very ataxic and rolling left ankle, uncontrolled pace  Gait Deviations: Decreased step length;Decreased step height;Deviated path  Distance: 75ft  Comments: 2 assist with maintaining upright posture, 1 assist with advancing left LE and maintaining terminal knee and foot placement, max tactile and verbal cues        Stairs/Curb  Stairs?: No                 Propulsion 1  Propulsion: Manual  Level: Level Tile  Method: RLE;RUE  Level of Assistance:  Moderate assistance  Description/ Details: poor safety awareness with left side neglect  Distance: 100ft               Posture: Fair  Sitting - Static: Fair  Sitting - Dynamic: Fair;-  Standing - Static: Poor(standing in //bars - 2 assist)  Standing - Dynamic: (NT)     Other exercises 1: Bed Mobility on Mat  Other exercises 2: Seated UBE 5min forward/ 5 minute Backward  Other exercises 3: Seated (B) LE ex x 20, minimal LAQ noted with LLE today  Other exercises 4: Transfers Squat Pivot w/c >< mat x4 with corrected set up  Other exercises 5: Supine LLE ex x 10-15  Other exercises 6: Seated Edge of Mat scooting to weak left side x2, to improve hip movement using mirror for feedback Other exercises 7: Supine Left LE hamstring stretch x1min x2sets           Activity Tolerance: Patient Tolerated treatment well;Treatment limited secondary to medical complications (free text)  Activity Tolerance: High tone to left UE and Left LE during PM tx limiting  PT Equipment Recommendations  Equipment Needed: No       Assessment  Activity Tolerance: Patient Tolerated treatment well;Treatment limited secondary to medical complications (high tone in Left UE and Left LE during PM tx)   Body structures, Functions, Activity limitations: Decreased functional mobility ; Decreased strength;Decreased safe awareness;Decreased balance; Increased pain;Decreased sensation;Decreased fine motor control;Decreased coordination;Decreased posture;Decreased endurance;Decreased vision/visual deficit; Decreased ROM  Prognosis: Good  Discharge Recommendations: Patient would benefit from continued therapy after discharge;Home with assist PRN     Type of devices: Call light within reach;Gait belt; All fall risk precautions in place  Restraints  Initially in place: No     Plan  Times per week: 1.5hr/day, 5 to 7 days/week.   Times per day: Daily  Current Treatment Recommendations: Strengthening, ROM, Balance Training, Functional Mobility Training, Transfer Training, Wheelchair Mobility Training, Gait Training, Neuromuscular Re-education, Pain Management, Home Exercise Program, Safety Education & Training, Patient/Caregiver Education & Training, Positioning, Endurance Training, Stair training    Patient Education  New Education Provided:  Plan of Care  Learner:patient  Method: demonstration and explanation       Outcome: acknowledged understanding of Plan of Care and needs reinforcement     Goals  Short term goals  Time Frame for Short term goals: 10 days  Short term goal 1: pt will perform bed mobility with min A  Short term goal 2: pt will dangle EOB/EOM for 20 to 25 minutes with SBA, performing challenged seated balance/corestrengthening Short term goal 3: Pivot transfers with mod A+2  Short term goal 4: WC mobility x 100' with min A  Short term goal 5: progress to standing/pre-gait activities in // bars to facilitate L LE motor fucntion. Long term goals  Time Frame for Long term goals : By LOS  Long term goal 1: Pt able to perform bed mobility independently  Long term goal 2: Pt able to perform transfers at [address] term goal 3: Pt able to progress to ambulation with appropriate device dist of 30 to 50 ft, mod A   Long term goal 4: Pt able to propel w/c level surfaces dist of 150 ft , mod-I. Long term goal 5: Improve L LE strength to atleast 2 to 2+/5 to improve fucntion. Long term goal 6: Improve PASS score from 8/30 to 23/36 to improve overall fucntion. Long term goal 7: Pt able to go up and down 5 steps with rail, mod A.     PT Individual Minutes  Time In: 9232  Time Out: 1403 [facility]  Minutes: 41    Electronically signed by Chris Trujillo PTA on 12/18/20 at 3:51 PM EST

## 2020-12-18 NOTE — PROGRESS NOTES
Physical Medicine & Rehabilitation  Progress Note      Subjective:      Ms. Sofia Maurer is a 48year-old female with hemorrhagic CVA and L nondominant hemiparesis. She reports doing okay today. She notes that spasticity in the left upper and lower limbs continue to interfere with therapies. Will consider increase in tizanidine dose over the weekend. She states that the left proximal upper limb pain is mild and about the same as yesterday. She thinks that ropinirole is helping with restless leg symptoms. She denies any other acute concerns. ROS:  Denies fevers, chills, sweats. No chest pain, palpitations, lightheadedness. Denies coughing, wheezing or shortness of breath. Denies abdominal pain, nausea, diarrhea or constipation. No new areas of joint pain. Denies new areas of numbness or weakness. Denies new anxiety or depression issues. No new skin problems. Rehabilitation:   Progressing in therapies. PT:  Restrictions/Precautions: Fall Risk  Other position/activity restrictions: Occasional L hypertonicity. Transfers  Sit to Stand: Moderate Assistance  Stand to sit: Moderate Assistance  Bed to Chair: Moderate assistance(x1, squat pivot)  Stand Pivot Transfers: Dependent/Total(lg lopez, improved core control; 1 assist.)  Squat Pivot Transfers: Moderate Assistance(x1)  Comment: squat pivot  Ambulation 1  Surface: level tile  Device: (walker lift)  Other Apparatus: Left, Slider  Assistance: Moderate assistance(x3)  Quality of Gait: very ataxic and rolling left ankle, uncontrolled pace  Gait Deviations: Decreased step length, Decreased step height, Deviated path  Distance: 75ft  Comments: 2 assist with maintaining upright posture, 1 assist with advancing left LE and maintaining terminal knee and foot placement, max tactile and verbal cues    Transfers  Sit to Stand:  Moderate Assistance  Stand to sit: Moderate Assistance  Bed to Chair: Moderate assistance(x1, squat pivot) Stand Pivot Transfers: Dependent/Total(lg lopez, improved core control; 1 assist.)  Squat Pivot Transfers: Moderate Assistance(x1)  Comment: squat pivot  Ambulation  Ambulation?: Yes  Ambulation 1  Surface: level tile  Device: (walker lift)  Other Apparatus: Left, Slider  Assistance: Moderate assistance(x3)  Quality of Gait: very ataxic and rolling left ankle, uncontrolled pace  Gait Deviations: Decreased step length, Decreased step height, Deviated path  Distance: 75ft  Comments: 2 assist with maintaining upright posture, 1 assist with advancing left LE and maintaining terminal knee and foot placement, max tactile and verbal cues    Surface: level tile  Ambulation 1  Surface: level tile  Device: (walker lift)  Other Apparatus: Left, Slider  Assistance: Moderate assistance(x3)  Quality of Gait: very ataxic and rolling left ankle, uncontrolled pace  Gait Deviations: Decreased step length, Decreased step height, Deviated path  Distance: 75ft  Comments: 2 assist with maintaining upright posture, 1 assist with advancing left LE and maintaining terminal knee and foot placement, max tactile and verbal cues    OT:  ADL  Equipment Provided: Reacher, Long-handled shoe horn, Long-handled sponge  Feeding: Setup  Grooming: Modified independent (seated)  UE Bathing: Setup, Minimal assistance(seated, thoroughness for B armpits)  LE Bathing: Moderate assistance, Setup, Verbal cueing(mod assist in standing for elizabeth-care, long handled sponge ut)  UE Dressing: Maximum assistance, Setup, Verbal cueing, Increased time to complete(VCs for ara tech/threading LUE/adjustments and bra)  LE Dressing: Maximum assistance(assist threading LLE/over L hip/TEDs/L shoe/tie, mod assist )  Toileting:  Moderate assistance(min/mod assist in standing for hygiene, pt wearing footies) Additional Comments: reacher utilized to thread pants over BLE's, assist threading over L foot and up on L hip, min/mod assist in standing for pulling up, pt able to step into R shoe with setup and min assist for L shoe, tie both, assist TEDs, total assist for bra/hook and VCs for ara tech when threading shirt, assist for LUE/cuing overhead and adjustments         Balance  Sitting Balance: Supervision  Standing Balance: Moderate assistance(min/mod assist x1, VCs for L lateral lean correction)   Standing Balance  Time: AM: 1-2 min x6  Activity: AM: functional transfers, ADL tasks  Comment: posterior loss of balance x1 noted when standing for elizabeth-care  Functional Mobility  Functional - Mobility Device: Wheelchair  Activity: To/from bathroom  Assist Level: Moderate assistance  Functional Mobility Comments: assist for turns on L side, VCs req for visual scanning with poor return noted on L side     Bed mobility  Bridging: Minimal assistance(to stibilize LLE to maintain midline)  Rolling to Left: Contact guard assistance  Rolling to Right: Moderate assistance  Supine to Sit: Contact guard assistance  Sit to Supine: Moderate assistance(BLE's, mod/min assist, flucuates with fatigue)  Scooting: Stand by assistance  Comment: Pt uses bed rail for rolling to eft side. pt moderately leans to left side, pt able to correct posture, improve seating balance once feet postioned on floor, CGA for static balance with R UE support. mod cues for attending to left side due to left neglect. Transfers  Stand Pivot Transfers: Moderate assistance(assist x1, VCs for proper tech/safety and pace)  Sit to stand: Minimal assistance  Stand to sit: Minimal assistance, Contact guard assistance  Transfer Comments: VCs and assist for LLE/LUE positioning prior to transfers, assist x1   Toilet Transfers  Toilet - Technique: Squat pivot, To right, To left(w/c<>toilet)  Equipment Used: Standard toilet(GB)  Toilet Transfer:  Moderate assistance rOPINIRole (REQUIP) tablet 1 mg, 1 mg, Oral, Nightly  sennosides-docusate sodium (SENOKOT-S) 8.6-50 MG tablet 2 tablet, 2 tablet, Oral, Daily  polyethylene glycol (GLYCOLAX) packet 17 g, 17 g, Oral, Daily  bisacodyl (DULCOLAX) suppository 10 mg, 10 mg, Rectal, Daily PRN  senna (SENOKOT) tablet 17.2 mg, 2 tablet, Oral, Daily PRN  enoxaparin (LOVENOX) injection 30 mg, 30 mg, Subcutaneous, BID      Objective:  /82   Pulse 85   Temp 98.2 °F (36.8 °C) (Oral)   Resp 18   Ht 5' 5\" (1.651 m)   Wt 233 lb (105.7 kg)   SpO2 100%   BMI 38.77 kg/m²       GEN: Well developed, well nourished, no acute distress  HEENT: NCAT. EOM grossly intact. Hearing grossly intact. Mucous membranes pink and moist.  RESP: Normal breath sounds with no wheezing, rales, or rhonchi. Respirations WNL and unlabored. CV: Regular rate and rhythm. No murmurs, rubs, or gallops. ABD: Soft, non-distended, BS+ and equal.  NEURO: Alert, oriented to person, place, and time. Speech fluent with no aphasia or dysarthria noted. Spasticity noted in the left elbow flexors, wrist flexors, finger flexors, knee flexors, and ankle plantarflexors. MSK: No tenderness to palpation of the left shoulder or left biceps muscle. Normal passive ROM of the left shoulder without pain. Cannot reproduce left proximal upper limb pain on exam today. Decreased AROM in the left upper and lower limbs due to weakness. Otherwise functional ROM. Muscle bulk is normal bilaterally. Left hemiparesis. LIMBS: No edema in bilateral lower limbs. SKIN: Warm and dry with good turgor. PSYCH: Mood WNL. Affect WNL. Appropriately interactive. Diagnostics:     CBC:   Recent Labs     12/17/20  0619   WBC 6.4   RBC 3.98*   HGB 11.9*   HCT 35.7*   MCV 89.7   RDW 14.0   *     BMP:   Recent Labs     12/17/20  0619      K 4.4   CL 99   CO2 25   BUN 15   CREATININE 0.60   GLUCOSE 95     BNP: No results for input(s): BNP in the last 72 hours.

## 2020-12-18 NOTE — PROGRESS NOTES
[place] [id number] Internal Medicine    CONSULTATION / HISTORY AND PHYSICAL EXAMINATION            Date:   2020  Patient name:  Megan Ritchie  Date of admission:  2020  8:30 PM  MRN:   [id number]  Account:  [de-identified]  YOB: 1970  PCP:    Patria Olguin MD  Room:   2612612-  Code Status:    Full Code    Physician Requesting Consult: Tati Moseley MD    Reason for Consult: Medical management    Chief Complaint:     No chief complaint on file. Deconditioning    History Obtained From:     patient, electronic medical record    History of Present Illness/ Interval History:   59-year-old female with history of delta storage pool disease, bipolar disorder, alcohol abuse who developed acute left-sided weaknessfound to have intraparenchymal hemorrhage, right parietal lobe hemorrhage and moderate subarachnoid hemorrhage. Pt admitted to rehab unit on 20. Improving with PT/OT      Past Medical History:     Past Medical History:   Diagnosis Date    Asthma     B[facility] 1 [facility] [address]     Delta storage pool disease ([facility] [address]     Hypertension     Psychiatric problem         Past Surgical History:     Past Surgical History:   Procedure Laterality Date     SECTION  0096,9601    Miscarriage     CHOLECYSTECTOMY      COLONOSCOPY N/A 2/3/2020    -random bx(normal)hemorrhoids    GASTRIC BYPASS SURGERY      HYSTERECTOMY      KNEE SURGERY  1984    LAPAROSCOPY      TONSILLECTOMY AND ADENOIDECTOMY      UPPER GASTROINTESTINAL ENDOSCOPY N/A 2/3/2020    (normal,neg H-Pylori)normal post-bypass endoscopy        Medications Prior to Admission:     Prior to Admission medications    Medication Sig Start Date End Date Taking?  Authorizing Provider   STIMATE 1.5 MG/ML SOLN nasal solution INSTILL 2 SPRAYS INTO THE NOSE TWICE DAILY STARTING TWO DAYS BEFORE PROCEDURE 20   Christiano Garza MD amLODIPine (NORVASC) 5 MG tablet Take 5 mg by mouth daily    Historical Provider, MD   QUEtiapine (SEROQUEL) 200 MG tablet Take 200 mg by mouth nightly    Historical Provider, MD   lisinopril (PRINIVIL;ZESTRIL) 20 MG tablet Take 20 mg by mouth daily    Historical Provider, MD   buPROPion (WELLBUTRIN XL) 300 MG XL tablet Take 300 mg by mouth daily 3/5/16   Historical Provider, MD   sertraline (ZOLOFT) 50 MG tablet Take 50 mg by mouth daily 3/5/16   Historical Provider, MD   PROAIR  (90 BASE) MCG/ACT inhaler Inhale 2 puffs into the lungs every 6 hours as needed  14   Historical Provider, MD   lamoTRIgine (LAMICTAL) 200 MG tablet Take 400 mg by mouth daily 2 tabs 14   Historical Provider, MD        Allergies:     Penicillin g, Pcn [penicillins], and Sulfa antibiotics    Social History:     Tobacco:    reports that she has never smoked. She has never used smokeless tobacco.  Alcohol:      reports current alcohol use. Drug Use:  reports no history of drug use. Family History:     Family History   Adopted: Yes   Family history unknown: Yes       Review of Systems:     Positive and Negative as described in HPI. Denies any shortness of breath or cough  Denies chest pain or palpitations  Denies abdominal pain, diarrhea vomiting  Denies any new numbness tremors or weakness. Physical Exam:     /74   Pulse 100   Temp 98.4 °F (36.9 °C) (Oral)   Resp 18   Ht 5' 5\" (1.651 m)   Wt 233 lb (105.7 kg)   SpO2 97%   BMI 38.77 kg/m²   Temp (24hrs), Av.4 °F (36.9 °C), Min:98.3 °F (36.8 °C), Max:98.4 °F (36.9 °C)      General appearance:  alert, cooperative and no distress  Eyes: Anicteric sclera. Pupils are equally round and reactive to light. Extraocular movements are intact.   Lungs:  clear to auscultation bilaterally, normal effort  Heart:  regular rate and rhythm, no murmur  Abdomen:  soft, nontender, nondistended, normal bowel sounds, no masses, hepatomegaly, splenomegaly Extremities:  no edema, redness, tenderness in the calves  Skin:  no gross lesions, rashes, induration  Neuro:  Alert, oriented X 3, left sided weakness  Power is left arm 0 out of 5 and 1 out of 5 in left lower extremity  Investigations:      Laboratory Testing:  Lab Results   Component Value Date    WBC 6.4 12/17/2020    HGB 11.9 (L) 12/17/2020    HCT 35.7 (L) 12/17/2020    MCV 89.7 12/17/2020     (H) 12/17/2020     Lab Results   Component Value Date     12/17/2020    K 4.4 12/17/2020    CL 99 12/17/2020    CO2 25 12/17/2020    BUN 15 12/17/2020    CREATININE 0.60 12/17/2020    GLUCOSE 95 12/17/2020    CALCIUM 9.9 12/17/2020         Assessment :      Primary Problem  <principal problem not specified>    Active Hospital Problems    Diagnosis Date Noted    Essential hypertension [I10] 12/11/2020    Acute left-sided weakness [R53.1] 12/11/2020    H/O intracranial hemorrhage [Z86.79] 12/09/2020    Vasogenic edema ([facility] [address] [G93.6]     Intracranial hemorrhage (HCC) [I62.9] 11/30/2020    Bipolar 1 disorder (HCC) [F31.9]     Platelet dysfunction ([address]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 *        1. Intracranial hemorrhage with left-sided weakness  2. Pulmonary hypertension  3. Platelet dysfunction  4. Hypotensionmedication adjustedBP better controlled  5.  Hyponatremia, LIZA per labs 12/10we will repeat    12/16  Hyponatremia improving, LIZA resolved  Labs and vitals reviewed and stable  Continue current management  Trazodone started 50 mg for insomnia    12/17   Patient is progressing well, with therapy  Weakness in leg is much better, noticed that weakness is arm is still there  Potassium tested is 4.4

## 2020-12-18 NOTE — PROGRESS NOTES
Comprehensive Nutrition Assessment    Type and Reason for Visit:  Reassess    Nutrition Recommendations/Plan: Continue General diet and Ensure HIgh Protein supplements on all trays    Nutrition Assessment:  Pt is consuming all food provided and likes the supplements. She is very complimentary about the food here. Malnutrition Assessment:  Malnutrition Status:   At risk for malnutrition (Comment)    Context:  Acute Illness     Findings of the 6 clinical characteristics of malnutrition:  Energy Intake:  Mild decrease in energy intake (Comment)  Weight Loss:  Unable to assess(stated wts)     Body Fat Loss:  No significant body fat loss     Muscle Mass Loss:  No significant muscle mass loss    Fluid Accumulation:  No significant fluid accumulation     Strength:  Not Performed    Estimated Daily Nutrient Needs:  Energy (kcal):  1900 kcal= 18 kcal /kg; Weight Used for Energy Requirements:  Current     Protein (g):  1.5g/kg=85 g or more; Weight Used for Protein Requirements:  Ideal          Nutrition Related Findings:  Edema:none, BM 12/ 16, Labs/Meds: Reviewed      Wounds:  None       Current Nutrition Therapies:    DIET GENERAL;  Dietary Nutrition Supplements: Low Calorie High Protein Supplement    Anthropometric Measures:  · Height: 5' 5\" (165.1 cm)  · Current Body Weight: 233 lb (105.7 kg)   · Usual Body Weight: 243 lb (110.2 kg)(stated)     · Ideal Body Weight: 125 lbs; % Ideal Body Weight 186.4 %   · BMI: 38.8  · BMI Categories: Obese Class 2 (BMI 35.0 -39.9)       Nutrition Diagnosis:   · Inadequate energy intake related to (decreased appetite) as evidenced by poor intake prior to admission    Nutrition Interventions:   Food and/or Nutrient Delivery:  Continue Current Diet, Continue Oral Nutrition Supplement  Nutrition Education/Counseling:  Education not indicated   Coordination of Nutrition Care:  Continue to monitor while inpatient    Goals:  po intake greater than 75% Nutrition Monitoring and Evaluation:   Food/Nutrient Intake Outcomes:  Food and Nutrient Intake, Supplement Intake  Physical Signs/Symptoms Outcomes:  Biochemical Data, GI Status, Skin, Weight, Fluid Status or Edema     Discharge Planning:    Continue current diet     Electronically signed by Mylene Mendoza RD, DURAN on 12/18/20 at 12:41 PM EST    Contact: [phone]

## 2020-12-18 NOTE — PLAN OF CARE
Problem: Skin Integrity:  Goal: Absence of new skin breakdown  Description: Absence of new skin breakdown  Outcome: Met This Shift  Skin assessment completed this shift. Nutrition and Hydration status assessed with adequate intake. Epifanio Score as charted. Patient tolerates repositioning by staff at least every 2 hours. Patient able to reposition self for comfort and to prevent breakdown. Patient verbalizes understanding of pressure ulcer prevention measures. Skin integrity maintained. No new skin breakdown noted. Skin to high risk pressure areas including coccyx and heels are clear.     Kelly / Incontinence care provided as needed throughout the shift. Aloe Vesta Moisture Barrier ointment applied to buttocks as a preventative measure.        Problem: Falls - Risk of:  Goal: Will remain free from falls  Description: Will remain free from falls  Outcome: Met This Shift   No falls or injuries sustained at this time. No attempts to get out of bed without nursing assistance. Call light within reach and pt. uses appropriately for assistance. Siderails up x 2. Nonskid footwear remains on. Bed in low and locked position. Hourly nursing rounds made. Pt. Alert and oriented, aware of limitations, and exhibits good safety judgement. Pt. uses assistive devices appropriately. Pt. understands individual fall risk factors.     Pt.  reminded to use call light with each nurse/patient interaction.     Bed alarm remains engaged throughout the shift as a precaution.          Problem: Pain:  Goal: Pain level will decrease  Description: Pain level will decrease  Outcome: Ongoing   Pain assessment and reassessment completed so far this shift. Pt. able to rest after the use of pain medication. Patient medicated with 650mg of tylenol Q4hrs PRN fror c/o pain right lower back.               Pt. Repositions per self for comfort.

## 2020-12-19 PROCEDURE — 6370000000 HC RX 637 (ALT 250 FOR IP): Performed by: PHYSICAL MEDICINE & REHABILITATION

## 2020-12-19 PROCEDURE — 97542 WHEELCHAIR MNGMENT TRAINING: CPT

## 2020-12-19 PROCEDURE — 97530 THERAPEUTIC ACTIVITIES: CPT

## 2020-12-19 PROCEDURE — 99232 SBSQ HOSP IP/OBS MODERATE 35: CPT | Performed by: STUDENT IN AN ORGANIZED HEALTH CARE EDUCATION/TRAINING PROGRAM

## 2020-12-19 PROCEDURE — 97110 THERAPEUTIC EXERCISES: CPT

## 2020-12-19 PROCEDURE — 6360000002 HC RX W HCPCS: Performed by: PHYSICAL MEDICINE & REHABILITATION

## 2020-12-19 PROCEDURE — 6370000000 HC RX 637 (ALT 250 FOR IP): Performed by: STUDENT IN AN ORGANIZED HEALTH CARE EDUCATION/TRAINING PROGRAM

## 2020-12-19 PROCEDURE — 97112 NEUROMUSCULAR REEDUCATION: CPT

## 2020-12-19 PROCEDURE — 6370000000 HC RX 637 (ALT 250 FOR IP): Performed by: INTERNAL MEDICINE

## 2020-12-19 PROCEDURE — 99231 SBSQ HOSP IP/OBS SF/LOW 25: CPT | Performed by: INTERNAL MEDICINE

## 2020-12-19 PROCEDURE — 97116 GAIT TRAINING THERAPY: CPT

## 2020-12-19 PROCEDURE — 97535 SELF CARE MNGMENT TRAINING: CPT

## 2020-12-19 PROCEDURE — 1180000000 HC REHAB R&B

## 2020-12-19 PROCEDURE — 6370000000 HC RX 637 (ALT 250 FOR IP): Performed by: NURSE PRACTITIONER

## 2020-12-19 RX ORDER — TIZANIDINE 4 MG/1
4 TABLET ORAL NIGHTLY
Status: DISCONTINUED | OUTPATIENT
Start: 2020-12-19 | End: 2020-12-22

## 2020-12-19 RX ADMIN — BUPROPION HYDROCHLORIDE 300 MG: 300 TABLET, FILM COATED, EXTENDED RELEASE ORAL at 09:11

## 2020-12-19 RX ADMIN — LISINOPRIL 20 MG: 20 TABLET ORAL at 09:10

## 2020-12-19 RX ADMIN — Medication 3 MG: at 20:19

## 2020-12-19 RX ADMIN — ACETAMINOPHEN 650 MG: 325 TABLET, FILM COATED ORAL at 20:19

## 2020-12-19 RX ADMIN — TIZANIDINE 4 MG: 4 TABLET ORAL at 20:19

## 2020-12-19 RX ADMIN — ENOXAPARIN SODIUM 30 MG: 30 INJECTION SUBCUTANEOUS at 09:11

## 2020-12-19 RX ADMIN — SENNOSIDES AND DOCUSATE SODIUM 2 TABLET: 8.6; 5 TABLET ORAL at 09:10

## 2020-12-19 RX ADMIN — AMLODIPINE BESYLATE 2.5 MG: 2.5 TABLET ORAL at 09:10

## 2020-12-19 RX ADMIN — TIZANIDINE 4 MG: 4 TABLET ORAL at 05:59

## 2020-12-19 RX ADMIN — LAMOTRIGINE 400 MG: 100 TABLET ORAL at 09:11

## 2020-12-19 RX ADMIN — TIZANIDINE 4 MG: 4 TABLET ORAL at 14:16

## 2020-12-19 RX ADMIN — GABAPENTIN 300 MG: 300 CAPSULE ORAL at 20:18

## 2020-12-19 RX ADMIN — POLYETHYLENE GLYCOL 3350 17 G: 17 POWDER, FOR SOLUTION ORAL at 09:10

## 2020-12-19 RX ADMIN — SERTRALINE HYDROCHLORIDE 100 MG: 100 TABLET ORAL at 09:10

## 2020-12-19 RX ADMIN — ENOXAPARIN SODIUM 30 MG: 30 INJECTION SUBCUTANEOUS at 20:20

## 2020-12-19 RX ADMIN — THIAMINE HCL TAB 100 MG 100 MG: 100 TAB at 09:10

## 2020-12-19 RX ADMIN — ROPINIROLE HYDROCHLORIDE 1 MG: 1 TABLET, FILM COATED ORAL at 20:20

## 2020-12-19 RX ADMIN — FOLIC ACID 1 MG: 1 TABLET ORAL at 09:10

## 2020-12-19 RX ADMIN — TRAZODONE HYDROCHLORIDE 25 MG: 50 TABLET ORAL at 20:18

## 2020-12-19 RX ADMIN — ACETAMINOPHEN 650 MG: 325 TABLET, FILM COATED ORAL at 09:10

## 2020-12-19 ASSESSMENT — PAIN SCALES - GENERAL: PAINLEVEL_OUTOF10: 3

## 2020-12-19 NOTE — PROGRESS NOTES
[facility] Internal Medicine    CONSULTATION / HISTORY AND PHYSICAL EXAMINATION            Date:   2020  Patient name:  Donna Serrano  Date of admission:  2020  8:30 PM  MRN:   [id number]  Account:  [de-identified]  YOB: 1970  PCP:    Kaylyn Alatorre MD  Room:   2612/2612-01  Code Status:    Full Code    Physician Requesting Consult: Leonor Bermudez MD    Reason for Consult: Medical management    Chief Complaint:     No chief complaint on file. Deconditioning    History Obtained From:     patient, electronic medical record    History of Present Illness/ Interval History:   55-year-old female with history of delta storage pool disease, bipolar disorder, alcohol abuse who developed acute left-sided weaknessfound to have intraparenchymal hemorrhage, right parietal lobe hemorrhage and moderate subarachnoid hemorrhage. Pt admitted to rehab unit on 20. Improving with PT/OT      Past Medical History:     Past Medical History:   Diagnosis Date    Asthma     [facility] [address]     Delta storage pool disease ([facility] [address]     Hypertension     Psychiatric problem         Past Surgical History:     Past Surgical History:   Procedure Laterality Date     SECTION  4582,9948    Miscarriage     CHOLECYSTECTOMY      COLONOSCOPY N/A 2/3/2020    -random bx(normal)hemorrhoids    GASTRIC BYPASS SURGERY      HYSTERECTOMY      KNEE SURGERY  1984    LAPAROSCOPY      TONSILLECTOMY AND ADENOIDECTOMY      UPPER GASTROINTESTINAL ENDOSCOPY N/A 2/3/2020    (normal,neg H-Pylori)normal post-bypass endoscopy        Medications Prior to Admission:     Prior to Admission medications    Medication Sig Start Date End Date Taking?  Authorizing Provider   STIMATE 1.5 MG/ML SOLN nasal solution INSTILL 2 SPRAYS INTO THE NOSE TWICE DAILY STARTING TWO DAYS BEFORE PROCEDURE 20   Radha Danielle MD amLODIPine (NORVASC) 5 MG tablet Take 5 mg by mouth daily    Historical Provider, MD   QUEtiapine (SEROQUEL) 200 MG tablet Take 200 mg by mouth nightly    Historical Provider, MD   lisinopril (PRINIVIL;ZESTRIL) 20 MG tablet Take 20 mg by mouth daily    Historical Provider, MD   buPROPion (WELLBUTRIN XL) 300 MG XL tablet Take 300 mg by mouth daily 3/5/16   Historical Provider, MD   sertraline (ZOLOFT) 50 MG tablet Take 50 mg by mouth daily 3/5/16   Historical Provider, MD   PROAIR  (90 BASE) MCG/ACT inhaler Inhale 2 puffs into the lungs every 6 hours as needed  14   Historical Provider, MD   lamoTRIgine (LAMICTAL) 200 MG tablet Take 400 mg by mouth daily 2 tabs 14   Historical Provider, MD        Allergies:     Penicillin g, Pcn [penicillins], and Sulfa antibiotics    Social History:     Tobacco:    reports that she has never smoked. She has never used smokeless tobacco.  Alcohol:      reports current alcohol use. Drug Use:  reports no history of drug use. Family History:     Family History   Adopted: Yes   Family history unknown: Yes       Review of Systems:     Positive and Negative as described in HPI. Denies any shortness of breath or cough  Denies chest pain or palpitations  Denies abdominal pain, diarrhea vomiting  Denies any new numbness tremors or weakness. Physical Exam:     /76   Pulse 77   Temp 97 °F (36.1 °C) (Oral)   Resp 19   Ht 5' 5\" (1.651 m)   Wt 233 lb (105.7 kg)   SpO2 100%   BMI 38.77 kg/m²   Temp (24hrs), Av.6 °F (36.4 °C), Min:97 °F (36.1 °C), Max:98.2 °F (36.8 °C)      General appearance:  alert, cooperative and no distress  Eyes: Anicteric sclera. Pupils are equally round and reactive to light. Extraocular movements are intact.   Lungs:  clear to auscultation bilaterally, normal effort  Heart:  regular rate and rhythm, no murmur  Abdomen:  soft, nontender, nondistended, normal bowel sounds, no masses, hepatomegaly, splenomegaly We will discontinue scheduled p.o. potassium    12/18   Readings of low blood pressure, will discontinue labetalol  Reducing dose of Norvasc to 2.5  We will follow    12/19   Patient blood pressure is much better, after adjusting dose of Norvasc  Working with physical therapy        Consultations:   [address] [place] [address] CONSULT TO DIETITIAN  IP CONSULT TO SOCIAL WORK      Bell Larsen MD  12/19/2020  2:28 PM    Copy sent to Dr. Amanda Wilkes MD    Please note that this chart was generated using voice recognition Dragon dictation software. Although every effort was made to ensure the accuracy of this automated transcription, some errors in transcription may have occurred.

## 2020-12-19 NOTE — PROGRESS NOTES
Physical Medicine & Rehabilitation  Progress Note      Subjective:      Ms. Tamia Riley is a 48year-old female with hemorrhagic CVA and L nondominant hemiparesis. She reports doing fine today. She notes continued spasticity in the left upper and lower limbs. Observed her ambulating in therapy today - inversion of the left foot noted and some adduction of the left hip. Discussed adjusting tizanidine timing and dose. She denies any other acute concerns. ROS:  Denies fevers, chills, sweats. No chest pain, palpitations, lightheadedness. Denies coughing, wheezing or shortness of breath. Denies abdominal pain, nausea, diarrhea or constipation. No new areas of joint pain. Denies new areas of numbness or weakness. Denies new anxiety or depression issues. No new skin problems. Rehabilitation:   Progressing in therapies. PT:  Restrictions/Precautions: Fall Risk  Other position/activity restrictions: Occasional L hypertonicity. Transfers  Sit to Stand: Moderate Assistance  Stand to sit: Moderate Assistance  Bed to Chair: Moderate assistance(x1, squat pivot)  Stand Pivot Transfers: Dependent/Total(lg lopez, improved core control; 1 assist.)  Squat Pivot Transfers: Moderate Assistance(x1)  Comment: squat pivot  Ambulation 1  Surface: level tile  Device: (walker lift)  Other Apparatus: Left, Slider  Assistance: Moderate assistance(x3)  Quality of Gait: improved tone, pt able to advance left LE and pt able to complete terminal knee without assist, pt still needing assistance for not over powering swing phase of left LE and to prevent the left ankle from rolling  Gait Deviations: Decreased step length, Decreased step height, Deviated path  Distance: 100ft  Comments: 2 assist with maintaining upright posture, 1 assist with preventing over powering through swing phase and preventing left ankle rolling    Transfers  Sit to Stand:  Moderate Assistance  Stand to sit: Moderate Assistance UE Dressing: Minimal assistance(trialed hook in front and turn, very difficult with ara technique, pt then self initiated don overhead with good results, only min assist to pull down on L trunk,  min assist with don shirt due to rotation errors. indep doff shirt, min assist doff bra)  LE Dressing: None  Toileting: Dependent/Total(min- mod assist in am)  Additional Comments: grooming completed at sink, UE bathe and dress, adl transfers with toileting am and pm.         Balance  Sitting Balance: Supervision(on toilet, monitor in case loss of balance)  Standing Balance: Maximum assistance(at times min assist this am, static stand with RUE support- needs LLE stabilized.)   Standing Balance  Time: 2-3 min x 3, 4-5 min x 1 in am, 2-4 min in pm  Activity: functional transfers, ADL tasks  Comment: posterior loss of balance x1 noted when standing for elizabeth-care  Functional Mobility  Functional - Mobility Device: Wheelchair  Activity: To/from bathroom  Assist Level: Minimal assistance  Functional Mobility Comments: L turn running into doorframe, pt self initiates correction that she attempted to turn too soon. Bed mobility  Bridging: Dependent/Total(attempts to hold LLE in bridge position to scoot to [facility] unsuccessful due to strength of LLE extensor tone.)  Rolling to Left: Contact guard assistance  Rolling to Right: Moderate assistance  Supine to Sit: Contact guard assistance  Sit to Supine: Moderate assistance(min in am)  Scooting: Moderate assistance(severe extensor tone LLE when pt attempting)  Comment: Pt uses bed rail for rolling to eft side. pt moderately leans to left side, pt able to correct posture, improve seating balance once feet postioned on floor, CGA for static balance with R UE support. mod cues for attending to left side due to left neglect.   Transfers  Stand Pivot Transfers: Maximum assistance(mod in am, max in pm when fatigued)  Sit to stand: Maximum assistance(mod or less in am) Stand to sit: Minimal assistance, Contact guard assistance  Transfer Comments: nursing uses Kaylen lopez for all adl transfers (due to variable level of assist and amount of skilled technique required this is appropriate)  practiced w/c to toilet to w/c to bed in am and again in pm, all to strong side. pm OT after heavy PT session and pt max fatigued and required more assist with transfers + toileting. Toilet Transfers  Toilet - Technique: Squat pivot, To right, To left(w/c<>toilet)  Equipment Used: Standard toilet(GB)  Toilet Transfer: Moderate assistance  Toilet Transfers Comments: VCs req for proper tech/safety, assist x1, assist for LLE positioning     Shower Transfers  Shower - Transfer From: Wheelchair  Shower - Transfer Type: To and From  Shower - Transfer To: Transfer tub bench  Shower - Technique: Squat pivot, To right, To left  Shower Transfers: Moderate assistance  Shower Transfers Comments: assist x1, VCs for proper tech/safety, assist LLE positioning  Wheelchair Bed Transfers  Equipment Used:  Other, Wheelchair, [employer])  Level of Asssistance: Dependent/Total    SPEECH:      Current Medications:   Current Facility-Administered Medications: [START ON 12/20/2020] tiZANidine (ZANAFLEX) tablet 6 mg, 6 mg, Oral, BID- 8&2 **AND** tiZANidine (ZANAFLEX) tablet 4 mg, 4 mg, Oral, Nightly  amLODIPine (NORVASC) tablet 2.5 mg, 2.5 mg, Oral, Daily  thiamine mononitrate tablet 100 mg, 100 mg, Oral, Daily  sertraline (ZOLOFT) tablet 100 mg, 100 mg, Oral, Daily  traZODone (DESYREL) tablet 25 mg, 25 mg, Oral, Nightly  gabapentin (NEURONTIN) capsule 300 mg, 300 mg, Oral, Nightly  lisinopril (PRINIVIL;ZESTRIL) tablet 20 mg, 20 mg, Oral, Daily  oxyCODONE (ROXICODONE) immediate release tablet 5 mg, 5 mg, Oral, Q8H PRN  acetaminophen (TYLENOL) tablet 650 mg, 650 mg, Oral, Q4H PRN  buPROPion (WELLBUTRIN XL) extended release tablet 300 mg, 300 mg, Oral, Daily [COMPLETED] cyanocobalamin injection 1,000 mcg, 1,000 mcg, Intramuscular, Daily **FOLLOWED BY** cyanocobalamin injection 1,000 mcg, 1,000 mcg, Intramuscular, Weekly **FOLLOWED BY** [START ON 2/5/2021] cyanocobalamin injection 1,000 mcg, 1,000 mcg, Intramuscular, Y85 Days  folic acid (FOLVITE) tablet 1 mg, 1 mg, Oral, Daily  ipratropium-albuterol (DUONEB) nebulizer solution 1 ampule, 1 ampule, Inhalation, Q4H PRN  lamoTRIgine (LAMICTAL) tablet 400 mg, 400 mg, Oral, Daily  magnesium hydroxide (MILK OF MAGNESIA) 400 MG/5ML suspension 30 mL, 30 mL, Oral, Daily PRN  melatonin tablet 3 mg, 3 mg, Oral, Nightly  muscle rub cream, , Topical, TID PRN  rOPINIRole (REQUIP) tablet 1 mg, 1 mg, Oral, Nightly  sennosides-docusate sodium (SENOKOT-S) 8.6-50 MG tablet 2 tablet, 2 tablet, Oral, Daily  polyethylene glycol (GLYCOLAX) packet 17 g, 17 g, Oral, Daily  bisacodyl (DULCOLAX) suppository 10 mg, 10 mg, Rectal, Daily PRN  senna (SENOKOT) tablet 17.2 mg, 2 tablet, Oral, Daily PRN  enoxaparin (LOVENOX) injection 30 mg, 30 mg, Subcutaneous, BID      Objective:  /79   Pulse 95   Temp 98.2 °F (36.8 °C) (Oral)   Resp 12   Ht 5' 5\" (1.651 m)   Wt 233 lb (105.7 kg)   SpO2 98%   BMI 38.77 kg/m²       GEN: Well developed, well nourished, no acute distress  HEENT: NCAT. EOM grossly intact. Hearing grossly intact. Mucous membranes pink and moist.  RESP: Normal breath sounds with no wheezing, rales, or rhonchi. Respirations WNL and unlabored. CV: Regular rate and rhythm. No murmurs, rubs, or gallops. ABD: Soft, non-distended, BS+ and equal.  NEURO: Alert, oriented to person, place, and time. Speech fluent with no aphasia or dysarthria noted. Spasticity noted in the left elbow flexors, wrist flexors, finger flexors, knee flexors, and ankle plantarflexors. MSK: Decreased AROM in the left upper and lower limbs due to weakness. Otherwise functional ROM. Muscle bulk is normal bilaterally. Left hemiparesis. Ambulating in physical therapy with use of slider and moderate assistance x3 with wheelchair follow. Inversion of the left foot noted and some adduction of the left hip. LIMBS: No edema in bilateral lower limbs. SKIN: Warm and dry with good turgor. PSYCH: Mood WNL. Affect WNL. Appropriately interactive. Diagnostics:     CBC:   Recent Labs     12/17/20 0619   WBC 6.4   RBC 3.98*   HGB 11.9*   HCT 35.7*   MCV 89.7   RDW 14.0   *     BMP:   Recent Labs     12/17/20 0619      K 4.4   CL 99   CO2 25   BUN 15   CREATININE 0.60   GLUCOSE 95     BNP: No results for input(s): BNP in the last 72 hours. PT/INR: No results for input(s): PROTIME, INR in the last 72 hours. APTT: No results for input(s): APTT in the last 72 hours. CARDIAC ENZYMES: No results for input(s): CKMB, CKMBINDEX, TROPONINT in the last 72 hours. Invalid input(s): CKTOTAL;3  FASTING LIPID PANEL:  Lab Results   Component Value Date    CHOL 247 (H) 11/30/2020     11/30/2020    TRIG 77 11/30/2020     LIVER PROFILE: No results for input(s): AST, ALT, ALB, BILIDIR, BILITOT, ALKPHOS in the last 72 hours. Impression/Plan:   Impaired ADLs, gait, and mobility due to:    1. Hemorrhagic CVA:  PT/OT for gait, mobility, strengthening, endurance, ADLs, and self care. Medrol tapered until 12/13. 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 Monitoring platelets  3. Muscle spasm/myoclonus/spasticity: Now off baclofen. Switched from as-needed to scheduled tizanidine 12/17 - increased dose for 12/20. PRAFO for left lower limb at night. Will likely need bracing for the left lower limb to help with stability and tone. 4. Left proximal upper limb pain:  In the area of left biceps muscle. Will monitor. 5. HTN: amlodipine (decreased 12/18 by IM), lisinopril (decreased 12/14 by IM), labetalol - discontinued 12/18 by IM  6. ETOH withdrawal/history of abuse: was treated with IV ativan and librium. On D18 and folic acid now  7. Bipolar Disorder: on wellbutrin, zoloft (increased 12/16 - patient takes 150mg at home, will gradually increase to that dose), lamictal  8. Restless Leg Syndrome: on requip  9. Insomnia:  On melatonin, gabapentin nightly. Takes trazodone at home - continue low-dose trazodone (started 12/15). 10. Bowel Management: Miralax daily, senokot prn, dulcolax prn. 11. DVT Prophylaxis:  low molecular weight heparin, SCD's while in bed and HORTENSIA's   12.  Internal medicine for medical management         Electronically signed by Shivam Ramires MD on 12/19/2020 at 11:10 PM

## 2020-12-19 NOTE — PROGRESS NOTES
[facility]: JESSICA CRUZ   ACUTE REHABILITATION OCCUPATIONAL THERAPY  DAILY NOTE    Date: 20  Patient Name: Bora Fontaine      Room: 2612/2612-01    MRN: [id number]   : 1970  (48 y.o.)  Gender: female   Referring Practitioner: Best Churchill MD  Diagnosis: Intracranial hemmorrhage       Restrictions  Restrictions/Precautions: Fall Risk  Other position/activity restrictions: Occasional L hypertonicity. Required Braces or Orthoses?: No  Equipment Used: Other, Wheelchair, Bed(Tameka Camp)    Subjective  Subjective: \"Let's try this. \"  Pt self initiates new method to don bra. Comments: pt cooperative, motivated, and pleasant. per Dr Miracle Centeno, pt to receive boot to stabilize L ankle, foot in bed. Objective  Cognition  Overall Cognitive Status: (fair)  Perception  Overall Perceptual Status: (rotation errors with dressing tasks)  Balance  Sitting Balance: Supervision(on toilet, monitor in case loss of balance)  Standing Balance: Maximum assistance(at times min assist this am, static stand with RUE support- needs LLE stabilized.)  Bed mobility  Bridging: Dependent/Total(attempts to hold LLE in bridge position to scoot to [facility] unsuccessful due to strength of LLE extensor tone.)  Sit to Supine: Moderate assistance(min in am)  Scooting: Moderate assistance(severe extensor tone LLE when pt attempting)  Transfers  Stand Pivot Transfers: Maximum assistance(mod in am, max in pm when fatigued)  Sit to stand: Maximum assistance(mod or less in am)  Transfer Comments: nursing uses Herlinda camp for all adl transfers (due to variable level of assist and amount of skilled technique required this is appropriate)  practiced w/c to toilet to w/c to bed in am and again in pm, all to strong side. pm OT after heavy PT session and pt max fatigued and required more assist with transfers + toileting.   Standing Balance  Time: 2-3 min x 3, 4-5 min x 1 in am, 2-4 min in pm Activity: functional transfers, ADL tasks  Functional Mobility  Functional - Mobility Device: Wheelchair  Activity: To/from bathroom  Assist Level: Minimal assistance  Functional Mobility Comments: L turn running into doorframe, pt self initiates correction that she attempted to turn too soon. Upper Extremity Function  NDT Treatment: Upper extremity              Neuromuscular Education  NDT Treatment: Upper extremity  Vibration: Vibrations applied to LUE shoulder to wrist,  good results throughout except fair for shoulder horizontal abduction. ADL  Feeding: Setup  Grooming: Modified independent (seated)  UE Bathing: Setup(demo good use of ara technique to wash RUE)  LE Bathing: None  UE Dressing: Minimal assistance(trialed hook in front and turn, very difficult with ara technique, pt then self initiated don overhead with good results, only min assist to pull down on L trunk,  min assist with don shirt due to rotation errors. indep doff shirt, min assist doff bra)  LE Dressing: None  Toileting: Dependent/Total(min- mod assist in am)  Additional Comments: grooming completed at sink, UE bathe and dress, adl transfers with toileting am and pm.          Assessment     Activity Tolerance: Patient limited by fatigue  Activity Tolerance: OT assisted pt to bed at end am OT to rest for pm therapies. pm OT after heavy PT session and pt max fatigued and required more assist with transfers + toileting. 12/19/20 1502 12/19/20 1505   OT Individual Minutes   Time In [id number]   Time Out 1208 1450   Minutes 87 46          Patient Education:  Patient Goals   Patient goals : To discharge home with family support  Learner:patient and significant other  Method: demonstration, explanation and handout       Outcome: acknowledged understanding , demonstrated understanding and asked questions   OT Education  OT Education: ADL Adaptive Strategies; Equipment;Transfer Training;Family Education;IADL Safety Patient Education: ed spouse and pt:  written info provided for [employer] and info re adapted cutting board and rocker knife. ed re adl transfers in room, how to set up to go to strong side, hand placement, etc, methods of don bra. Plan  Plan  Times per week: 5-7  Times per day: Twice a day  Current Treatment Recommendations: Self-Care / ADL, Home Management Training, Strengthening, Balance Training, Functional Mobility Training, Endurance Training, Wheelchair Mobility Training, Neuromuscular Re-education, Pain Management, Safety Education & Training, Patient/Caregiver Education & Training, Equipment Evaluation, Education, & procurement, Cognitive/Perceptual Training  Patient Goals   Patient goals : To discharge home with family support  Short term goals  Time Frame for Short term goals: 7 to 10 days  Short term goal 1: Pt will perform upper body bathing/dressing with stand by assist.  Short term goal 2: Pt will perform lower body bathing and dressing with Moderate assist and Fair safety. Short term goal 3: Pt will perform toileting tasks with Moderate assist.  Short term goal 4: Pt will verbalize/demonstrate Good understanding of assistive equipment/durable medical equipment/modified techniques for increased independence with self-care and mobility. Short term goal 5: Pt will perform functional transfers with Moderate assist to toilet/wheelchair/shower with Fair safety. Short term goal 6: Pt will actively participate in 30+ minutes of therapeutic exercise/functional activities to promote increased independence with self-care and mobility. Long term goals  Time Frame for Long term goals : By discharge  Long term goal 1: Pt will perform BADLs with modified independence and Good safety with assist PRN for HORTENSIA hose. Long term goal 2: Pt will perform functional transfers and mobility with modified independence, least restrictive mobility device, and Good safety. Long term goal 3: Pt will attend to L side of body 100% of the time during self-care, transfers, and mobility with 1-2 verbal cues PRN. Long term goal 4: Pt will stand for 5+ minutes with 1-2 UE support, modified independence and no loss of balance while engaging in functional activity of choice. Long term goal 5: Pt will verbalize/demonstrate Good understanding of home exercise program for LUE.   Long term goals 6: 9 hole peg, box and block to be assessed for LUE as appropriate       Electronically signed by Maria De Jesus George OT on 12/19/20 at 3:30 PM EST

## 2020-12-19 NOTE — PROGRESS NOTES
[facility]    Physical Therapy Progress Note    Date: 20  Patient Name: Alexandru White       Room: 2612/2612-01  MRN: [id number]   Account: [de-identified]   : 1970  (48 y.o.)   Gender: female     Discharge Recommendations   Patient would benefit from continued therapy after discharge, Home with assist PRN  Equipment Needed: No  Other: TBD    Referring Practitioner: Angie Soria MD  Diagnosis: Intracranial hemmorrhage  Restrictions/Precautions: Fall Risk  Other position/activity restrictions: Occasional L hypertonicity. Past Medical History:  has a past medical history of Asthma, Bipolar 1 disorder ([facility] [address] Delta storage pool disease [facility]), Hypertension, and Psychiatric problem. Past Surgical History:   has a past surgical history that includes  section (59,5445); Gastric bypass surgery (); Tonsillectomy and adenoidectomy (); Cholecystectomy (); knee surgery (); Hysterectomy (); laparoscopy (); Colonoscopy (N/A, 2/3/2020); and Upper gastrointestinal endoscopy (N/A, 2/3/2020). Additional Pertinent Hx: 59-year-old female with history of delta storage pool disease, bipolar disorder, alcohol abuse who developed acute left-sided weaknessfound to have intraparenchymal hemorrhage, right parietal lobe hemorrhage and moderate subarachnoid hemorrhage. Pt admitted to rehab unit on 20. Overall Orientation Status: Within Normal Limits  Restrictions/Precautions  Restrictions/Precautions: Fall Risk  Required Braces or Orthoses?: No  Position Activity Restriction  Other position/activity restrictions: Occasional L hypertonicity. Subjective: Pt reports doing well this morning; pt states doesn't normally sleep well at night  Comments: Pt is very motivated. Pt reports feeling increased movement in left calf muscle.     Vital Signs  Patient Currently in Pain: Denies           Patient Observation  Observations: intermittent L side neglect Other exercises 2: Seated UBE 5min forward/ 5 minute Backward  Other exercises 3: Seated (B) LE ex x 20, minimal LAQ noted with LLE today  Other exercises 4: Transfers Squat Pivot w/c >< mat x4 with corrected set up           Activity Tolerance: Patient Tolerated treatment well  PT Equipment Recommendations  Equipment Needed: No       Assessment  Activity Tolerance: Patient Tolerated treatment well   Body structures, Functions, Activity limitations: Decreased functional mobility ; Decreased strength;Decreased safe awareness;Decreased balance; Increased pain;Decreased sensation;Decreased fine motor control;Decreased coordination;Decreased posture;Decreased endurance;Decreased vision/visual deficit; Decreased ROM  Prognosis: Good  Discharge Recommendations: Patient would benefit from continued therapy after discharge;Home with assist PRN     Type of devices: Call light within reach;Gait belt; All fall risk precautions in place  Restraints  Initially in place: No     Plan  Times per week: 1.5hr/day, 5 to 7 days/week.   Times per day: Daily  Current Treatment Recommendations: Strengthening, ROM, Balance Training, Functional Mobility Training, Transfer Training, Wheelchair Mobility Training, Gait Training, Neuromuscular Re-education, Pain Management, Home Exercise Program, Safety Education & Training, Patient/Caregiver Education & Training, Positioning, Endurance Training, Stair training    Patient Education  New Education Provided:  Plan of Care  Learner:patient  Method: demonstration and explanation       Outcome: needs reinforcement     Goals  Short term goals  Time Frame for Short term goals: 10 days  Short term goal 1: pt will perform bed mobility with min A  Short term goal 2: pt will dangle EOB/EOM for 20 to 25 minutes with SBA, performing challenged seated balance/corestrengthening  Short term goal 3: Pivot transfers with mod A+2  Short term goal 4: WC mobility x 100' with min A Short term goal 5: progress to standing/pre-gait activities in // bars to facilitate L LE motor fucntion. Long term goals  Time Frame for Long term goals : By LOS  Long term goal 1: Pt able to perform bed mobility independently  Long term goal 2: Pt able to perform transfers at [address] term goal 3: Pt able to progress to ambulation with appropriate device dist of 30 to 50 ft, mod A   Long term goal 4: Pt able to propel w/c level surfaces dist of 150 ft , mod-I. Long term goal 5: Improve L LE strength to atleast 2 to 2+/5 to improve fucntion. Long term goal 6: Improve PASS score from 8/30 to 23/36 to improve overall fucntion. Long term goal 7: Pt able to go up and down 5 steps with rail, mod A.     PT Individual Minutes  Time In: 4114  Time Out: 1400  Minutes: 52    Electronically signed by Kaleb Soto PTA on 12/19/20 at 3:55 PM EST

## 2020-12-19 NOTE — PLAN OF CARE
Problem: Skin Integrity:  Goal: Will show no infection signs and symptoms  12/19/2020 1052 by Yuli Barnhart RN  Outcome: Ongoing     Problem: Skin Integrity:  Goal: Absence of new skin breakdown  12/19/2020 1052 by Yuli Barnhart RN  Outcome: Ongoing     Problem: Falls - Risk of:  Goal: Will remain free from falls  12/19/2020 1052 by Yuli Barnhart RN  Outcome: Ongoing  No falls or injuries sustained at this time. No attempts to get out of bed without nursing assistance. Call light within reach and pt. uses appropriately for assistance. Siderails up x 2. Nonskid footwear remains on. Bed in low and locked position. Hourly nursing rounds made. Pt. Alert and oriented, aware of limitations, and exhibits good safety judgement. Pt. uses assistive devices appropriately. Pt. understands individual fall risk factors.       Problem: Falls - Risk of:  Goal: Absence of physical injury  12/19/2020 1052 by Yuli Barnhart RN  Outcome: Ongoing     Problem: Pain:  Goal: Pain level will decrease  12/19/2020 1052 by Yuli Barnhart RN  Outcome: Ongoing     Problem: Pain:  Goal: Control of acute pain  12/19/2020 1052 by Yuli Barnhart RN  Outcome: Ongoing     Problem: Neurological  Goal: Maximum potential motor/sensory/cognitive function  12/19/2020 1052 by Yuli Barnhart RN  Outcome: Ongoing     Problem: Nutrition  Goal: Optimal nutrition therapy  12/19/2020 1052 by Yuli Barnhart RN  Outcome: Ongoing     Problem: Neurological  Goal: Maximum potential motor/sensory/cognitive function  12/19/2020 1052 by Yuli Barnhart RN  Outcome: Ongoing     Problem: Mobility - Impaired:  Goal: Mobility will improve  12/19/2020 1052 by Yuli Barnhart RN  Outcome: Ongoing

## 2020-12-20 LAB
BILIRUBIN URINE: NEGATIVE
COLOR: YELLOW
COMMENT UA: NORMAL
GLUCOSE URINE: NEGATIVE
KETONES, URINE: NEGATIVE
LEUKOCYTE ESTERASE, URINE: NEGATIVE
NITRITE, URINE: NEGATIVE
PH UA: 6 (ref 5–8)
PROTEIN UA: NEGATIVE
SPECIFIC GRAVITY UA: 1.01 (ref 1–1.03)
TURBIDITY: CLEAR
URINE HGB: NEGATIVE
UROBILINOGEN, URINE: NORMAL

## 2020-12-20 PROCEDURE — 6370000000 HC RX 637 (ALT 250 FOR IP): Performed by: INTERNAL MEDICINE

## 2020-12-20 PROCEDURE — 99231 SBSQ HOSP IP/OBS SF/LOW 25: CPT | Performed by: INTERNAL MEDICINE

## 2020-12-20 PROCEDURE — 97535 SELF CARE MNGMENT TRAINING: CPT

## 2020-12-20 PROCEDURE — 6370000000 HC RX 637 (ALT 250 FOR IP): Performed by: STUDENT IN AN ORGANIZED HEALTH CARE EDUCATION/TRAINING PROGRAM

## 2020-12-20 PROCEDURE — 6370000000 HC RX 637 (ALT 250 FOR IP): Performed by: PHYSICAL MEDICINE & REHABILITATION

## 2020-12-20 PROCEDURE — 97110 THERAPEUTIC EXERCISES: CPT

## 2020-12-20 PROCEDURE — 97112 NEUROMUSCULAR REEDUCATION: CPT

## 2020-12-20 PROCEDURE — 97116 GAIT TRAINING THERAPY: CPT

## 2020-12-20 PROCEDURE — 6370000000 HC RX 637 (ALT 250 FOR IP): Performed by: NURSE PRACTITIONER

## 2020-12-20 PROCEDURE — 97530 THERAPEUTIC ACTIVITIES: CPT

## 2020-12-20 PROCEDURE — 1180000000 HC REHAB R&B

## 2020-12-20 PROCEDURE — 81003 URINALYSIS AUTO W/O SCOPE: CPT

## 2020-12-20 PROCEDURE — 97542 WHEELCHAIR MNGMENT TRAINING: CPT

## 2020-12-20 PROCEDURE — 6360000002 HC RX W HCPCS: Performed by: PHYSICAL MEDICINE & REHABILITATION

## 2020-12-20 RX ADMIN — AMLODIPINE BESYLATE 2.5 MG: 2.5 TABLET ORAL at 08:27

## 2020-12-20 RX ADMIN — LISINOPRIL 20 MG: 20 TABLET ORAL at 08:27

## 2020-12-20 RX ADMIN — ENOXAPARIN SODIUM 30 MG: 30 INJECTION SUBCUTANEOUS at 19:58

## 2020-12-20 RX ADMIN — Medication 3 MG: at 19:58

## 2020-12-20 RX ADMIN — ROPINIROLE HYDROCHLORIDE 1 MG: 1 TABLET, FILM COATED ORAL at 19:57

## 2020-12-20 RX ADMIN — OXYCODONE HYDROCHLORIDE 5 MG: 5 TABLET ORAL at 16:03

## 2020-12-20 RX ADMIN — ACETAMINOPHEN 650 MG: 325 TABLET, FILM COATED ORAL at 16:03

## 2020-12-20 RX ADMIN — LAMOTRIGINE 400 MG: 100 TABLET ORAL at 08:26

## 2020-12-20 RX ADMIN — GABAPENTIN 300 MG: 300 CAPSULE ORAL at 19:58

## 2020-12-20 RX ADMIN — FOLIC ACID 1 MG: 1 TABLET ORAL at 08:27

## 2020-12-20 RX ADMIN — ENOXAPARIN SODIUM 30 MG: 30 INJECTION SUBCUTANEOUS at 08:27

## 2020-12-20 RX ADMIN — BUPROPION HYDROCHLORIDE 300 MG: 300 TABLET, FILM COATED, EXTENDED RELEASE ORAL at 08:26

## 2020-12-20 RX ADMIN — TIZANIDINE 6 MG: 2 TABLET ORAL at 08:27

## 2020-12-20 RX ADMIN — TRAZODONE HYDROCHLORIDE 25 MG: 50 TABLET ORAL at 19:58

## 2020-12-20 RX ADMIN — SENNOSIDES AND DOCUSATE SODIUM 2 TABLET: 8.6; 5 TABLET ORAL at 08:26

## 2020-12-20 RX ADMIN — THIAMINE HCL TAB 100 MG 100 MG: 100 TAB at 08:27

## 2020-12-20 RX ADMIN — TIZANIDINE 4 MG: 4 TABLET ORAL at 19:58

## 2020-12-20 RX ADMIN — SERTRALINE HYDROCHLORIDE 100 MG: 100 TABLET ORAL at 08:27

## 2020-12-20 RX ADMIN — TIZANIDINE 6 MG: 2 TABLET ORAL at 14:53

## 2020-12-20 NOTE — PROGRESS NOTES
[address] [facility]    ACUTE REHABILITATION OCCUPATIONAL THERAPY  DAILY NOTE    Date: 20  Patient Name: Lucila Palmer      Room: 2612/2612-01    MRN: [id number]   : 1970  (48 y.o.)  Gender: female   Referring Practitioner: Basilia Little MD  Diagnosis: Intracranial hemmorrhage       Restrictions  Restrictions/Precautions: Fall Risk  Other position/activity restrictions: L hypertonicity. PRAFO boot LLE in bed  Required Braces or Orthoses?: No  Equipment Used: Other, Wheelchair, Bed(Lg Stedy)    Subjective  Subjective: \"They did increase my muscle relaxant. \"  Comments: pt cooperative, motivated, and pleasant. has boot to stabilize L ankle, foot in bed. Objective  Cognition  Safety Judgement: Decreased awareness of need for safety(pt sometimes attempts dynamic sitting tasks without CGA , ed re need for this.)  Perception  Overall Perceptual Status: (rotation errors with dressing tasks.)  Balance  Sitting Balance: Contact guard assistance(shower bench and toilet for dynamic sitting)  Standing Balance: Moderate assistance(at times min assist with static stand and RUE support)  Transfers  Sit to stand: Maximum assistance(at times mod, learning to push up on w/c armrest to stand rather than pulling self up with grab bar or lg stedy.)  Stand to sit: Minimal assistance  Transfer Comments: nursing uses [address] stedy for all adl transfers (due to variable level of assist and amount of skilled technique required this is appropriate) in OT:  practiced w/c to tub bench to w/c  in am and w/c to toilet to w/c  in pm.  Standing Balance  Time: 2-4 min x 3 in am and again in pm  Activity: functional transfers, ADL tasks  Comment: pt is able to use RUE to manipulate pants over hips for toileting, bathing elizabeth, bottom ,  with min - mod assist or less for stand balance- is variable. Toilet Transfers  Toilet - Technique:  To right;Stand pivot  Equipment Used: Standard toilet Toilet Transfer: Maximum assistance  Toilet Transfers Comments: mod at times  Shower Transfers  Shower - Transfer From: Wheelchair  Shower - Transfer Type: To and From  Shower - Transfer To: Transfer tub bench  Shower - Technique: Squat pivot; To right; To left  Shower Transfers: Dependent;2 Person assistance  Shower Transfers Comments: mod - max of 1 to R, mod x 2 to L     Type of ROM/Therapeutic Exercise  Type of ROM/Therapeutic Exercise: Self PROM;PROM  Comment: pt reports doing SROM L shoulder flexion in bed as ed yesterday, ed to continue this and trial AROM L elbow flex, ext daily as well. Exercises  Shoulder Flexion: L shoulder able to tolerate full flexion stretch PROM going slowly           Neuromuscular Education  Vibration: Vibrations applied to LUE shoulder to elbow,  good results throughout, improved response for shoulder horizontal abduction post more ed today. after neuromuscular re ed, pt with fair carry over for elbow flex, extension without vibration. ADL  Equipment Provided: Long-handled shoe horn;Long-handled sponge  Feeding: Setup(assist to open ensure drink only)  Grooming: Modified independent (seated)  UE Bathing: Setup(good ara technique to wash RUE)  LE Bathing: Minimal assistance(min stand balance to wash elizabeth and bottom, remainder from seated needs set up and uses long sponge knees to feet.)  UE Dressing: Minimal assistance(bra and shirt, don bra overhead hooked.)  LE Dressing: Moderate assistance(underpants, pants, teds, shoes, practice cross LLE with assist to reach with min-mod assist, assist with teds, max assist with L shoe, min assist to stand and complete)  Toileting: Minimal assistance  Additional Comments: pt requesting shower and wash hair, completed this and dressing in am, toileting in pm. completed all adl transfers without lg stedy today.           Assessment     Activity Tolerance: Patient limited by fatigue Activity Tolerance: excellant motivation and effort, gets tired with exertion            12/20/20 1347 12/20/20 1349   OT Individual Minutes   Time In 1030 1233   Time Out 1155 1305   Minutes 85 32          Patient Education:  Patient Goals   Patient goals : To discharge home with family support  Learner:patient and significant other  Method: demonstration and explanation       Outcome: acknowledged understanding  and demonstrated understanding   OT Education  OT Education: Family Education; ADL Adaptive Strategies;Transfer Training  Patient Education: using overhead method to don bra already hooked. pt reports doing SROM L shoulder flexion in bed as ed yesterday, ed to continue this and trial AROM L elbow flex, ext daily as well. Plan  Plan  Times per week: 5-7  Times per day: Twice a day  Current Treatment Recommendations: Self-Care / ADL, Home Management Training, Strengthening, Balance Training, Functional Mobility Training, Endurance Training, Wheelchair Mobility Training, Neuromuscular Re-education, Pain Management, Safety Education & Training, Patient/Caregiver Education & Training, Equipment Evaluation, Education, & procurement, Cognitive/Perceptual Training  Patient Goals   Patient goals : To discharge home with family support  Short term goals  Time Frame for Short term goals: 7 to 10 days  Short term goal 1: Pt will perform upper body bathing/dressing with stand by assist.  Short term goal 2: Pt will perform lower body bathing and dressing with Moderate assist and Fair safety. Short term goal 3: Pt will perform toileting tasks with Moderate assist.  Short term goal 4: Pt will verbalize/demonstrate Good understanding of assistive equipment/durable medical equipment/modified techniques for increased independence with self-care and mobility. Short term goal 5: Pt will perform functional transfers with Moderate assist to toilet/wheelchair/shower with Fair safety. Short term goal 6: Pt will actively participate in 30+ minutes of therapeutic exercise/functional activities to promote increased independence with self-care and mobility. Long term goals  Time Frame for Long term goals : By discharge  Long term goal 1: Pt will perform BADLs with modified independence and Good safety with assist PRN for HORTENSIA hose. Long term goal 2: Pt will perform functional transfers and mobility with modified independence, least restrictive mobility device, and Good safety. Long term goal 3: Pt will attend to L side of body 100% of the time during self-care, transfers, and mobility with 1-2 verbal cues PRN. Long term goal 4: Pt will stand for 5+ minutes with 1-2 UE support, modified independence and no loss of balance while engaging in functional activity of choice. Long term goal 5: Pt will verbalize/demonstrate Good understanding of home exercise program for LUE.   Long term goals 6: 9 hole peg, box and block to be assessed for LUE as appropriate       Electronically signed by Tatianna Yates OT on 12/20/20 at 2:13 PM EST

## 2020-12-20 NOTE — PROGRESS NOTES
[address] [facility]    Physical Therapy Progress Note    Date: 20  Patient Name: Lyla Phipps       Room: 2612/2612-01  MRN: [id number]   Account: [de-identified]   : 1970  (48 y.o.)   Gender: female     Discharge Recommendations   Patient would benefit from continued therapy after discharge, Home with assist PRN  Equipment Needed: No  Other: TBD    Referring Practitioner: Aidan Garcia MD  Diagnosis: Intracranial hemmorrhage  Restrictions/Precautions: Fall Risk  Other position/activity restrictions: L hypertonicity. PRAFO boot LLE in bed   Past Medical History:  has a past medical history of Asthma, Bipolar 1 disorder ([facility] [address] Delta storage pool disease [facility]), Hypertension, and Psychiatric problem. Past Surgical History:   has a past surgical history that includes  section (,7345); Gastric bypass surgery (); Tonsillectomy and adenoidectomy (); Cholecystectomy (); knee surgery (); Hysterectomy (); laparoscopy (); Colonoscopy (N/A, 2/3/2020); and Upper gastrointestinal endoscopy (N/A, 2/3/2020). Additional Pertinent Hx: 51-year-old female with history of delta storage pool disease, bipolar disorder, alcohol abuse who developed acute left-sided weaknessfound to have intraparenchymal hemorrhage, right parietal lobe hemorrhage and moderate subarachnoid hemorrhage. Pt admitted to rehab unit on 20. Overall Orientation Status: Within Normal Limits  Restrictions/Precautions  Restrictions/Precautions: Fall Risk  Required Braces or Orthoses?: No  Position Activity Restriction  Other position/activity restrictions: Occasional L hypertonicity. Subjective: Pt with concerns this morning regarding transfers with Jess Linda and staff through the night, pt reports that the left ankle kept rolling, pt is aware of the rolling and very fearful of breaking ankle  Comments: Pt is very motivated.      Vital Signs  Patient Currently in Pain: Denies Patient Observation  Observations: intermittent L side neglect       Bed Mobility:   Rolling: Contact guard assistance  Supine to Sit: Stand by assistance  Sit to Supine: Minimal assistance  Scooting: Stand by assistance      Transfers:  Sit to Stand: Moderate Assistance  Stand to sit: Moderate Assistance  Bed to Chair: Moderate assistance(x1, squat pivot)     Squat Pivot Transfers: Minimal Assistance (x1, mainly stabilizing left ankle to prevent rolling). Ambulation 1  Surface: level tile  Other Apparatus: Left;Slider  Assistance: Moderate assistance(x3)  Quality of Gait: much better this morning, pt able to advance left LE and complete terminal knee without assist, pt needing assistance for swing phase due to over powering swing phase of left LE and to prevent the left ankle from rolling  Gait Deviations: Decreased step length;Decreased step height;Deviated path  Distance: 100ft  Comments: Pt only rolling left ankle 1x during AM tx, much better foot placement and control      Ambulation 2  Surface - 2: level tile  Other Apparatus 2: Slider;Left; Wheelchair follow  Assistance 2: Moderate assistance(x3)  Quality of Gait 2: improved control during PM, 1x pt had rolling left foot able to be corrected, pt able to control swing phase a little bit  Gait Deviations: Slow [place]  Distance: 100ft  Comments: Improved during PM with better control, cues for sequencing     Stairs/Curb  Stairs?: No                 Propulsion 1  Propulsion: Manual  Level: Level Tile  Method: RLE;RUE  Level of Assistance: Contact guard assistance  Description/ Details: poor safety awareness with left side neglect, running into objects on left side  Distance: 150ft                     Posture: Fair  Sitting - Static: Good  Sitting - Dynamic: Fair  Standing - Static: Poor;+  Standing - Dynamic: Poor  Comments: Standing in [employer] [place] [employer], Seated Edge of Mat Other exercises 1: NuStep 10 min. Level 2, gait belt used due to weak aDductors, mirror placed in front, pt able to correct posture  Other exercises 2: Seated UBE Right UE only 5min forward/ 5 minute Backward  Other exercises 3: Seated (B) LE ex x 20, minimal LAQ noted with LLE today  Other exercises 4: Transfers Squat Pivot w/c >< mat x4 with corrected set up  Other exercises 5: Pt's  reports 3 options for pt's bed height at home 1st option 22\", 2nd option 20\", 3rd option 28\";  also reports bathroom counter top height is 32\", stairs are 8 steps then landing, then 6 steps           Activity Tolerance: Patient Tolerated treatment well  PT Equipment Recommendations  Equipment Needed: No       Assessment  Activity Tolerance: Patient Tolerated treatment well   Body structures, Functions, Activity limitations: Decreased functional mobility ; Decreased strength;Decreased safe awareness;Decreased balance; Increased pain;Decreased sensation;Decreased fine motor control;Decreased coordination;Decreased posture;Decreased endurance;Decreased vision/visual deficit; Decreased ROM  Prognosis: Good  Discharge Recommendations: Patient would benefit from continued therapy after discharge;Home with assist PRN     Type of devices: Call light within reach;Gait belt; All fall risk precautions in place  Restraints  Initially in place: No     Plan  Times per week: 1.5hr/day, 5 to 7 days/week.   Times per day: Daily  Current Treatment Recommendations: Strengthening, ROM, Balance Training, Functional Mobility Training, Transfer Training, Wheelchair Mobility Training, Gait Training, Neuromuscular Re-education, Pain Management, Home Exercise Program, Safety Education & Training, Patient/Caregiver Education & Training, Positioning, Endurance Training, Stair training    Patient Education  New Education Provided:  Plan of Care  Learner:patient  Method: demonstration and explanation       Outcome: needs reinforcement     Goals Short term goals  Time Frame for Short term goals: 10 days  Short term goal 1: pt will perform bed mobility with min A  Short term goal 2: pt will dangle EOB/EOM for 20 to 25 minutes with SBA, performing challenged seated balance/corestrengthening  Short term goal 3: Pivot transfers with mod A+2  Short term goal 4: WC mobility x 100' with min A  Short term goal 5: progress to standing/pre-gait activities in // bars to facilitate L LE motor fucntion. Long term goals  Time Frame for Long term goals : By LOS  Long term goal 1: Pt able to perform bed mobility independently  Long term goal 2: Pt able to perform transfers at [address] term goal 3: Pt able to progress to ambulation with appropriate device dist of 30 to 50 ft, mod A   Long term goal 4: Pt able to propel w/c level surfaces dist of 150 ft , mod-I. Long term goal 5: Improve L LE strength to atleast 2 to 2+/5 to improve fucntion. Long term goal 6: Improve PASS score from 8/30 to 23/36 to improve overall fucntion.   Long term goal 7: Pt able to go up and down 5 steps with rail, mod A.       12/20/20 0925 12/20/20 1305   PT Individual Minutes   Time In 0596 2487   Time Out 1033 1358   Minutes 68 53       Electronically signed by Chris Trujillo PTA on 12/20/20 at 3:23 PM EST

## 2020-12-20 NOTE — PLAN OF CARE
Problem: Skin Integrity:  Goal: Will show no infection signs and symptoms  Outcome: Ongoing     Problem: Skin Integrity:  Goal: Absence of new skin breakdown  Outcome: Ongoing     Problem: Falls - Risk of:  Goal: Will remain free from falls  Outcome: Ongoing  No falls or injuries sustained at this time. No attempts to get out of bed without nursing assistance. Call light within reach and pt. uses appropriately for assistance. Siderails up x 2. Nonskid footwear remains on. Bed in low and locked position. Hourly nursing rounds made. Pt. Alert and oriented, aware of limitations, and exhibits good safety judgement. Pt. uses assistive devices appropriately. Pt. understands individual fall risk factors.       Problem: Falls - Risk of:  Goal: Absence of physical injury  Outcome: Ongoing     Problem: Pain:  Goal: Control of acute pain  Outcome: Ongoing     Problem: Neurological  Goal: Maximum potential motor/sensory/cognitive function  Outcome: Ongoing     Problem: Nutrition  Goal: Optimal nutrition therapy  Outcome: Ongoing     Problem: Neurological  Goal: Maximum potential motor/sensory/cognitive function  Outcome: Ongoing

## 2020-12-20 NOTE — PROGRESS NOTES
[facility]    CONSULTATION / HISTORY AND PHYSICAL EXAMINATION            Date:   2020  Patient name:  Nick Ocasio  Date of admission:  2020  8:30 PM  MRN:   [id number]  Account:  [de-identified]  YOB: 1970  PCP:    Reggie Pickard MD  Room:   2612/2612-01  Code Status:    Full Code    Physician Requesting Consult: Yvonna Rinne, MD    Reason for Consult: Medical management    Chief Complaint:     No chief complaint on file. Deconditioning    History Obtained From:     patient, electronic medical record    History of Present Illness/ Interval History:   59-year-old female with history of delta storage pool disease, bipolar disorder, alcohol abuse who developed acute left-sided weaknessfound to have intraparenchymal hemorrhage, right parietal lobe hemorrhage and moderate subarachnoid hemorrhage. Pt admitted to rehab unit on 20. Improving with PT/OT      Past Medical History:     Past Medical History:   Diagnosis Date    Asthma     Bipolar 1 disorder ([facility] [address]     Delta storage pool disease ([facility] [address]     Hypertension     Psychiatric problem         Past Surgical History:     Past Surgical History:   Procedure Laterality Date     SECTION  4595,2687    Miscarriage     CHOLECYSTECTOMY      COLONOSCOPY N/A 2/3/2020    -random bx(normal)hemorrhoids    GASTRIC BYPASS SURGERY      HYSTERECTOMY      KNEE SURGERY  1984    LAPAROSCOPY      TONSILLECTOMY AND ADENOIDECTOMY      UPPER GASTROINTESTINAL ENDOSCOPY N/A 2/3/2020    (normal,neg H-Pylori)normal post-bypass endoscopy        Medications Prior to Admission:     Prior to Admission medications    Medication Sig Start Date End Date Taking?  Authorizing Provider   STIMATE 1.5 MG/ML SOLN nasal solution INSTILL 2 SPRAYS INTO THE NOSE TWICE DAILY STARTING TWO DAYS BEFORE PROCEDURE 20   Celina Riley MD amLODIPine (NORVASC) 5 MG tablet Take 5 mg by mouth daily    Historical Provider, MD   QUEtiapine (SEROQUEL) 200 MG tablet Take 200 mg by mouth nightly    Historical Provider, MD   lisinopril (PRINIVIL;ZESTRIL) 20 MG tablet Take 20 mg by mouth daily    Historical Provider, MD   buPROPion (WELLBUTRIN XL) 300 MG XL tablet Take 300 mg by mouth daily 3/5/16   Historical Provider, MD   sertraline (ZOLOFT) 50 MG tablet Take 50 mg by mouth daily 3/5/16   Historical Provider, MD   PROAIR  (90 BASE) MCG/ACT inhaler Inhale 2 puffs into the lungs every 6 hours as needed  14   Historical Provider, MD   lamoTRIgine (LAMICTAL) 200 MG tablet Take 400 mg by mouth daily 2 tabs 14   Historical Provider, MD        Allergies:     Penicillin g, Pcn [penicillins], and Sulfa antibiotics    Social History:     Tobacco:    reports that she has never smoked. She has never used smokeless tobacco.  Alcohol:      reports current alcohol use. Drug Use:  reports no history of drug use. Family History:     Family History   Adopted: Yes   Family history unknown: Yes       Review of Systems:     Positive and Negative as described in HPI. Denies any shortness of breath or cough  Denies chest pain or palpitations  Denies abdominal pain, diarrhea vomiting  Denies any new numbness tremors or weakness. Physical Exam:     BP (!) 119/55   Pulse 93   Temp 97.9 °F (36.6 °C) (Oral)   Resp 12   Ht 5' 5\" (1.651 m)   Wt 233 lb (105.7 kg)   SpO2 98%   BMI 38.77 kg/m²   Temp (24hrs), Av.1 °F (36.7 °C), Min:97.9 °F (36.6 °C), Max:98.2 °F (36.8 °C)      General appearance:  alert, cooperative and no distress  Eyes: Anicteric sclera. Pupils are equally round and reactive to light. Extraocular movements are intact.   Lungs:  clear to auscultation bilaterally, normal effort  Heart:  regular rate and rhythm, no murmur  Abdomen:  soft, nontender, nondistended, normal bowel sounds, no masses, hepatomegaly, splenomegaly Extremities:  no edema, redness, tenderness in the calves  Skin:  no gross lesions, rashes, induration  Neuro:  Alert, oriented X 3, left sided weakness  Power is left arm 0 out of 5 and 1 out of 5 in left lower extremity, increased tone in both left upper and lower extremity  Investigations:      Laboratory Testing:  Lab Results   Component Value Date    WBC 6.4 12/17/2020    HGB 11.9 (L) 12/17/2020    HCT 35.7 (L) 12/17/2020    MCV 89.7 12/17/2020     (H) 12/17/2020     Lab Results   Component Value Date     12/17/2020    K 4.4 12/17/2020    CL 99 12/17/2020    CO2 25 12/17/2020    BUN 15 12/17/2020    CREATININE 0.60 12/17/2020    GLUCOSE 95 12/17/2020    CALCIUM 9.9 12/17/2020         Assessment :      Primary Problem  <principal problem not specified>    Active Hospital Problems    Diagnosis Date Noted    Essential hypertension [I10] 12/11/2020    Acute left-sided weakness [R53.1] 12/11/2020    H/O intracranial hemorrhage [Z86.79] 12/09/2020    Vasogenic edema ([facility] [address] [G93.6]     Intracranial hemorrhage (HCC) [I62.9] 11/30/2020    Bipolar 1 disorder (HCC) [F31.9]     Platelet dysfunction ([facility] [address]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 *        1. Intracranial hemorrhage with left-sided weakness  2. Pulmonary hypertension  3. Platelet dysfunction  4. Hypotensionmedication adjustedBP better controlled  5.  Hyponatremia, LIZA per labs 12/10we will repeat    12/16  Hyponatremia improving, LIZA resolved  Labs and vitals reviewed and stable  Continue current management  Trazodone started 50 mg for insomnia    12/17   Patient is progressing well, with therapy  Weakness in leg is much better, noticed that weakness is arm is still there Potassium tested is 4.4  We will discontinue scheduled p.o. potassium    12/18   Readings of low blood pressure, will discontinue labetalol  Reducing dose of Norvasc to 2.5  We will follow    12/19   Patient blood pressure is much better, after adjusting dose of Norvasc  Working with physical therapy  12/20   Patient blood pressure is much improved after adjusting dose of Norvasc  No new complaints      Consultations:   IP CONSULT TO INTERNAL MEDICINE  IP CONSULT TO DIETITIAN  IP CONSULT TO SOCIAL WORK      Nan Nicholson MD  12/20/2020  4:03 PM    Copy sent to Dr. Alex Jose MD    Please note that this chart was generated using voice recognition Dragon dictation software. Although every effort was made to ensure the accuracy of this automated transcription, some errors in transcription may have occurred.

## 2020-12-20 NOTE — PLAN OF CARE
Problem: Skin Integrity:  Goal: Will show no infection signs and symptoms  Description: Will show no infection signs and symptoms  Outcome: Ongoing     Problem: Skin Integrity:  Goal: Absence of new skin breakdown  Description: Absence of new skin breakdown  Outcome: Ongoing     Problem: Falls - Risk of:  Goal: Will remain free from falls  Description: Will remain free from falls  Outcome: Ongoing     Problem: Falls - Risk of:  Goal: Absence of physical injury  Description: Absence of physical injury  Outcome: Ongoing     Problem: Pain:  Goal: Pain level will decrease  Description: Pain level will decrease  Outcome: Ongoing     Problem: Pain:  Goal: Control of acute pain  Description: Control of acute pain  Outcome: Ongoing

## 2020-12-21 ENCOUNTER — APPOINTMENT (OUTPATIENT)
Dept: CT IMAGING | Age: 50
DRG: 057 | End: 2020-12-21
Attending: PHYSICAL MEDICINE & REHABILITATION
Payer: COMMERCIAL

## 2020-12-21 LAB
ALBUMIN SERPL-MCNC: 3.8 G/DL (ref 3.5–5.2)
ALBUMIN/GLOBULIN RATIO: ABNORMAL (ref 1–2.5)
ALP BLD-CCNC: 75 U/L (ref 35–104)
ALT SERPL-CCNC: 16 U/L (ref 5–33)
ANION GAP SERPL CALCULATED.3IONS-SCNC: 12 MMOL/L (ref 9–17)
AST SERPL-CCNC: 19 U/L
BILIRUB SERPL-MCNC: 0.22 MG/DL (ref 0.3–1.2)
BILIRUBIN DIRECT: 0.08 MG/DL
BILIRUBIN, INDIRECT: 0.14 MG/DL (ref 0–1)
BUN BLDV-MCNC: 10 MG/DL (ref 6–20)
BUN/CREAT BLD: NORMAL (ref 9–20)
CALCIUM SERPL-MCNC: 9.7 MG/DL (ref 8.6–10.4)
CHLORIDE BLD-SCNC: 101 MMOL/L (ref 98–107)
CO2: 22 MMOL/L (ref 20–31)
CREAT SERPL-MCNC: 0.56 MG/DL (ref 0.5–0.9)
GFR AFRICAN AMERICAN: >60 ML/MIN
GFR NON-AFRICAN AMERICAN: >60 ML/MIN
GFR SERPL CREATININE-BSD FRML MDRD: NORMAL ML/MIN/{1.73_M2}
GFR SERPL CREATININE-BSD FRML MDRD: NORMAL ML/MIN/{1.73_M2}
GLOBULIN: ABNORMAL G/DL (ref 1.5–3.8)
GLUCOSE BLD-MCNC: 94 MG/DL (ref 70–99)
HCT VFR BLD CALC: 33.3 % (ref 36–46)
HEMOGLOBIN: 11.2 G/DL (ref 12–16)
MCH RBC QN AUTO: 30.3 PG (ref 26–34)
MCHC RBC AUTO-ENTMCNC: 33.6 G/DL (ref 31–37)
MCV RBC AUTO: 90.2 FL (ref 80–100)
NRBC AUTOMATED: ABNORMAL PER 100 WBC
PDW BLD-RTO: 13.9 % (ref 11.5–14.9)
PLATELET # BLD: 535 K/UL (ref 150–450)
PMV BLD AUTO: 7.6 FL (ref 6–12)
POTASSIUM SERPL-SCNC: 4.1 MMOL/L (ref 3.7–5.3)
RBC # BLD: 3.69 M/UL (ref 4–5.2)
SODIUM BLD-SCNC: 135 MMOL/L (ref 135–144)
TOTAL PROTEIN: 7.1 G/DL (ref 6.4–8.3)
WBC # BLD: 6.1 K/UL (ref 3.5–11)

## 2020-12-21 PROCEDURE — 80076 HEPATIC FUNCTION PANEL: CPT

## 2020-12-21 PROCEDURE — 85027 COMPLETE CBC AUTOMATED: CPT

## 2020-12-21 PROCEDURE — 99231 SBSQ HOSP IP/OBS SF/LOW 25: CPT | Performed by: STUDENT IN AN ORGANIZED HEALTH CARE EDUCATION/TRAINING PROGRAM

## 2020-12-21 PROCEDURE — 97112 NEUROMUSCULAR REEDUCATION: CPT

## 2020-12-21 PROCEDURE — 1180000000 HC REHAB R&B

## 2020-12-21 PROCEDURE — 97542 WHEELCHAIR MNGMENT TRAINING: CPT

## 2020-12-21 PROCEDURE — 97535 SELF CARE MNGMENT TRAINING: CPT

## 2020-12-21 PROCEDURE — 36415 COLL VENOUS BLD VENIPUNCTURE: CPT

## 2020-12-21 PROCEDURE — 99231 SBSQ HOSP IP/OBS SF/LOW 25: CPT | Performed by: INTERNAL MEDICINE

## 2020-12-21 PROCEDURE — 6360000002 HC RX W HCPCS: Performed by: PHYSICAL MEDICINE & REHABILITATION

## 2020-12-21 PROCEDURE — 70450 CT HEAD/BRAIN W/O DYE: CPT

## 2020-12-21 PROCEDURE — 80048 BASIC METABOLIC PNL TOTAL CA: CPT

## 2020-12-21 PROCEDURE — 97530 THERAPEUTIC ACTIVITIES: CPT

## 2020-12-21 PROCEDURE — 6370000000 HC RX 637 (ALT 250 FOR IP): Performed by: NURSE PRACTITIONER

## 2020-12-21 PROCEDURE — 97110 THERAPEUTIC EXERCISES: CPT

## 2020-12-21 PROCEDURE — 6370000000 HC RX 637 (ALT 250 FOR IP): Performed by: PHYSICAL MEDICINE & REHABILITATION

## 2020-12-21 PROCEDURE — 6370000000 HC RX 637 (ALT 250 FOR IP): Performed by: INTERNAL MEDICINE

## 2020-12-21 PROCEDURE — 6370000000 HC RX 637 (ALT 250 FOR IP): Performed by: STUDENT IN AN ORGANIZED HEALTH CARE EDUCATION/TRAINING PROGRAM

## 2020-12-21 RX ORDER — AMLODIPINE BESYLATE 5 MG/1
5 TABLET ORAL DAILY
Status: DISCONTINUED | OUTPATIENT
Start: 2020-12-22 | End: 2020-12-21

## 2020-12-21 RX ORDER — AMLODIPINE BESYLATE 5 MG/1
5 TABLET ORAL DAILY
Status: DISCONTINUED | OUTPATIENT
Start: 2020-12-21 | End: 2021-01-05 | Stop reason: HOSPADM

## 2020-12-21 RX ADMIN — AMLODIPINE BESYLATE 5 MG: 5 TABLET ORAL at 12:05

## 2020-12-21 RX ADMIN — SERTRALINE HYDROCHLORIDE 100 MG: 100 TABLET ORAL at 09:48

## 2020-12-21 RX ADMIN — ROPINIROLE HYDROCHLORIDE 1 MG: 1 TABLET, FILM COATED ORAL at 20:10

## 2020-12-21 RX ADMIN — FOLIC ACID 1 MG: 1 TABLET ORAL at 09:48

## 2020-12-21 RX ADMIN — LISINOPRIL 20 MG: 20 TABLET ORAL at 09:48

## 2020-12-21 RX ADMIN — ENOXAPARIN SODIUM 30 MG: 30 INJECTION SUBCUTANEOUS at 09:49

## 2020-12-21 RX ADMIN — OXYCODONE HYDROCHLORIDE 5 MG: 5 TABLET ORAL at 22:53

## 2020-12-21 RX ADMIN — Medication 3 MG: at 20:09

## 2020-12-21 RX ADMIN — ENOXAPARIN SODIUM 30 MG: 30 INJECTION SUBCUTANEOUS at 20:09

## 2020-12-21 RX ADMIN — ACETAMINOPHEN 650 MG: 325 TABLET, FILM COATED ORAL at 17:59

## 2020-12-21 RX ADMIN — SENNOSIDES AND DOCUSATE SODIUM 2 TABLET: 8.6; 5 TABLET ORAL at 09:46

## 2020-12-21 RX ADMIN — GABAPENTIN 300 MG: 300 CAPSULE ORAL at 20:09

## 2020-12-21 RX ADMIN — TIZANIDINE 6 MG: 2 TABLET ORAL at 08:54

## 2020-12-21 RX ADMIN — TIZANIDINE 6 MG: 2 TABLET ORAL at 14:20

## 2020-12-21 RX ADMIN — THIAMINE HCL TAB 100 MG 100 MG: 100 TAB at 09:47

## 2020-12-21 RX ADMIN — SERTRALINE HYDROCHLORIDE 50 MG: 50 TABLET ORAL at 12:03

## 2020-12-21 RX ADMIN — TIZANIDINE 4 MG: 4 TABLET ORAL at 20:09

## 2020-12-21 RX ADMIN — LAMOTRIGINE 400 MG: 100 TABLET ORAL at 09:43

## 2020-12-21 RX ADMIN — TRAZODONE HYDROCHLORIDE 25 MG: 50 TABLET ORAL at 20:09

## 2020-12-21 RX ADMIN — BUPROPION HYDROCHLORIDE 300 MG: 300 TABLET, FILM COATED, EXTENDED RELEASE ORAL at 09:42

## 2020-12-21 ASSESSMENT — PAIN DESCRIPTION - PAIN TYPE: TYPE: ACUTE PAIN

## 2020-12-21 ASSESSMENT — PAIN DESCRIPTION - ONSET: ONSET: ON-GOING

## 2020-12-21 ASSESSMENT — PAIN SCALES - GENERAL
PAINLEVEL_OUTOF10: 0
PAINLEVEL_OUTOF10: 9
PAINLEVEL_OUTOF10: 6

## 2020-12-21 ASSESSMENT — PAIN DESCRIPTION - DESCRIPTORS: DESCRIPTORS: ACHING;DISCOMFORT

## 2020-12-21 ASSESSMENT — PAIN DESCRIPTION - LOCATION: LOCATION: ARM

## 2020-12-21 ASSESSMENT — PAIN DESCRIPTION - ORIENTATION: ORIENTATION: LEFT

## 2020-12-21 NOTE — PROGRESS NOTES
Comprehensive Nutrition Assessment    Type and Reason for Visit:  Reassess    Nutrition Recommendations/Plan: Continue General diet with Ensure High Protein supplement on all trays. Nutrition Assessment:  Pt continues to meet nutrition needs with po intake (intake consistently greater than 75%).     Malnutrition Assessment:  Malnutrition Status:  No malnutrition    Context:  Acute Illness     Findings of the 6 clinical characteristics of malnutrition:  Energy Intake:  (resolving)  Weight Loss:  Unable to assess(stated wts)     Body Fat Loss:  No significant body fat loss     Muscle Mass Loss:  No significant muscle mass loss    Fluid Accumulation:  No significant fluid accumulation     Strength:  Not Performed    Estimated Daily Nutrient Needs:  Energy (kcal):  1900 kcal= 18 kcal /kg; Weight Used for Energy Requirements:  Current     Protein (g):  1.5g/kg=85 g or more; Weight Used for Protein Requirements:  Ideal          Nutrition Related Findings:  no edema, BM 12/20, Labs/Meds: Reviewed, Weakness to L side      Wounds:  None       Current Nutrition Therapies:    DIET GENERAL;  Dietary Nutrition Supplements: Low Calorie High Protein Supplement    Anthropometric Measures:  · Height: 5' 5\" (165.1 cm)  · Current Body Weight: 233 lb (105.7 kg)   · Usual Body Weight: 243 lb (110.2 kg)(stated)     · Ideal Body Weight: 125 lbs; BMI: 38.8  · BMI Categories: Obese Class 2 (BMI 35.0 -39.9)       Nutrition Diagnosis:   · Inadequate energy intake related to (decreased appetite) as evidenced by poor intake prior to admission    Nutrition Interventions:   Food and/or Nutrient Delivery:  Continue Current Diet, Continue Oral Nutrition Supplement  Nutrition Education/Counseling:  Education not indicated   Coordination of Nutrition Care:  Continue to monitor while inpatient    Goals:  po intake greater than 75%       Nutrition Monitoring and Evaluation: Food/Nutrient Intake Outcomes:  Food and Nutrient Intake, Supplement Intake  Physical Signs/Symptoms Outcomes:  Biochemical Data, GI Status, Skin, Weight, Fluid Status or Edema     Discharge Planning:    Continue current diet     Electronically signed by Indra Davison RD, LD on 12/21/20 at 1:19 PM EST    Contact: [phone]

## 2020-12-21 NOTE — PATIENT CARE CONFERENCE
[address]    ACUTE REHABILITATION  TEAM CONFERENCE NOTE  Date: 20  Patient Name: Lucila Palmer       Room: 2612/2612-01  MRN: [id number]       : 1970  (48 y.o.)     Gender: female       H/O intracranial hemorrhage [Z86.79]  Diagnosis: Intracranial hemmorrhage     NURSING  Bladder  Continent  Bowel   Continent  Intervention    Both Bowel & Bladder Program     Wounds/Incisions/Ulcers: No skin issues identified  Medication Education Program: Patient able to manage medications and being educated by nursing  Pain: Patient's pain is currently controlled with -  Rosa Maria 5mg Q8hrs prn    Fall Risk:  Falling star program initiated    PHYSICAL THERAPY  Bed mobility  Bridging: Minimal assistance  Rolling to Left: Contact guard assistance  Rolling to Right: Contact guard assistance  Supine to Sit: Minimal assistance  Sit to Supine: Minimal assistance  Scooting: Stand by assistance(close SBA)    Transfers:  Sit to Stand: Moderate Assistance  Stand to sit: Moderate Assistance  Bed to Chair: Moderate assistance(x1, squat pivot)  Squat Pivot Transfers: Minimal Assistance(x1, mainly stabilizing left ankle to prevent rolling)    Ambulation 1  Surface: level tile  Device: (walker lift)  Other Apparatus: Left;Slider  Assistance:  Moderate assistance;2 Person assistance    Gait Deviations: Decreased step length;Decreased step height;Deviated path  Distance: 100ft  Comments: much better foot placement and control and sensory stimulus applied for LLE placement    Equipment Needed: No  Other: TBD    Goals  Time Frame for Short term goals: 10 days  Short term goal 1: pt will perform bed mobility with min A  Short term goal 2: pt will dangle EOB/EOM for 20 to 25 minutes with SBA, performing challenged seated balance/corestrengthening  Short term goal 3: Pivot transfers with mod A+2  Short term goal 4: WC mobility x 100' with min A Short term goal 5: progress to standing/pre-gait activities in // bars to facilitate L LE motor fucntion. OCCUPATIONAL THERAPY  SELF CARE   Equipment Provided: Long-handled sponge, Long-handled shoe horn, Reacher  Eating            Setup(assist to open items)   Oral Hygiene            Modified independent (seated)   Shower/Bathe Self             UE Bathing: Setup;Contact guard assistance(intermittent assist to support LUE, ara tech to wash UB)  LE Bathing: Minimal assistance(min A in standing for elizabeth-care, declines to wash feet this )   Upper Body Dressing            Minimal assistance(assist threading LUE/adjustments)   Lower Body Dressing            Putting On/Taking Off Footwear             Moderate assistance(crossing LLE tech to thread over foot, A on L hip/L shoe/HORTENSIA)   Toilet Transfer             Toilet - Technique: To right; To left;Stand pivot  Equipment Used: Standard toilet  Toilet Transfer: Moderate assistance  Toilet Transfers Comments: assist x1, VCs for safety and proper tech   Toileting Hygiene            Minimal assistance(min assist for balance)    Bed mobility  Rolling to Right: Contact guard assistance  Supine to Sit: Minimal assistance  Scooting: Stand by assistance(close SBA)     Shower Transfers: Dependent;2 Person assistance  Balance  Sitting Balance: Stand by assistance  Standing Balance:  Moderate assistance(mod/min assist, varys with task)  Standing Balance  Time: AM: 1 min x3, 1-2 min x2  Activity: AM: functional transfers, ADL tasks  Comment: unsteady with dynamic tasks, assist to stabilize LLE    Equipment Recommendations  Equipment Needed: Yes  Hand Held Shower: x  Transfer Tub Bench: x  Grab bars: x  Reacher: x  Long-handled Shoehorn: x       Short term goals  Time Frame for Short term goals: 7 to 10 days  Short term goal 1: Pt will perform upper body bathing/dressing with stand by assist. Short term goal 2: Pt will perform lower body bathing and dressing with Moderate assist and Fair safety. Short term goal 3: Pt will perform toileting tasks with Moderate assist.  Short term goal 4: Pt will verbalize/demonstrate Good understanding of assistive equipment/durable medical equipment/modified techniques for increased independence with self-care and mobility. Short term goal 5: Pt will perform functional transfers with Moderate assist to toilet/wheelchair/shower with Fair safety. Short term goal 6: Pt will actively participate in 30+ minutes of therapeutic exercise/functional activities to promote increased independence with self-care and mobility. SPEECH THERAPY      NUTRITION  Weight: 233 lb (105.7 kg) / Body mass index is 38.77 kg/m². Diet : General with Ensure High Protein supplements on all trays. Pt is consistently consuming more than 75% of food provided suggesting nutrition needs are met with po intake. Please see nutrition note for details.     SOCIAL WORK ASSESSMENT  Assessment: With patient   Pre-Admission Status:  Lives With: Spouse  Type of Home: House  Home Layout: Two level, Bed/Bath upstairs, 1/2 bath on main level(finished basement for recreation)  Home Access: Stairs to enter without rails, Stairs to enter with rails  Entrance Stairs - Number of Steps: 3-5 steps to enter with 0 HR; 6 + 1 + 8 steps to 2nd floor with R HR for all 15 steps  Entrance Stairs - Rails: Right(R rail going up to 2nd floor.)  Bathroom Shower/Tub: Tub/Shower unit, Curtain  Bathroom Toilet: Standard  Bathroom Equipment: (no DME)  Bathroom Accessibility: Walker accessible, Accessible  Home Equipment: (no DME)  ADL Assistance: Independent  Homemaking Assistance: Independent  Homemaking Responsibilities: Yes  Ambulation Assistance: Independent  Transfer Assistance: Independent  Active : Yes  Mode of Transportation: SUV  Occupation: Part time employment Long term goal 4: Pt able to propel w/c level surfaces dist of 150 ft , mod-I. Long term goal 5: Improve L LE strength to atleast 2 to 2+/5 to improve fucntion. Long term goal 6: Improve PASS score from 8/30 to 23/36 to improve overall fucntion. Long term goal 7: Pt able to go up and down 5 steps with rail, mod A.  OT:Long term goals  Time Frame for Long term goals : By discharge  Long term goal 1: Pt will perform BADLs with modified independence and Good safety with assist PRN for HORTENSIA hose. Long term goal 2: Pt will perform functional transfers and mobility with modified independence, least restrictive mobility device, and Good safety. Long term goal 3: Pt will attend to L side of body 100% of the time during self-care, transfers, and mobility with 1-2 verbal cues PRN. Long term goal 4: Pt will stand for 5+ minutes with 1-2 UE support, modified independence and no loss of balance while engaging in functional activity of choice. Long term goal 5: Pt will verbalize/demonstrate Good understanding of home exercise program for LUE. Long term goals 6: 9 hole peg, box and block to be assessed for LUE as appropriate  ST:     Team Members Present at Conference:  :  [employer], [place]  Occupational Therapist: Claudia Cantu OT   [address] PT  Speech Therapist:  N/A  Nurse:  Read Bamberger RN   Dietary/Nutrition: Evelyn Dennison RD LD. Pastoral Care: Boris Og  CMG:   Madi Fontanez RN     I approve the established interdisciplinary plan of care as documented within the medical record of [address].     Molly Wang MD

## 2020-12-21 NOTE — PROGRESS NOTES
[facility] Medicine    CONSULTATION / HISTORY AND PHYSICAL EXAMINATION            Date:   2020  Patient name:  Juventino Linton  Date of admission:  2020  8:30 PM  MRN:   [id number]  Account:  [de-identified]  YOB: 1970  PCP:    Prosper Schmidt MD  Room:   [facility]  Code Status:    Full Code    Physician Requesting Consult: Ro Whitney MD    Reason for Consult: Medical management    Chief Complaint:   Acute left sided weakness     History Obtained From:     patient, electronic medical record    History of Present Illness/ Interval History:   59-year-old female with history of delta storage pool disease, bipolar disorder, alcohol abuse who developed acute left-sided weaknessfound to have intraparenchymal hemorrhage, right parietal lobe hemorrhage and moderate subarachnoid hemorrhage. Pt admitted to rehab unit on 20. Improving with PT/OT    Past Medical History:     Past Medical History:   Diagnosis Date    Asthma     [facility] 1 disorder ([facility] [address]     Delta storage pool disease ([facility] [address]     Hypertension     Psychiatric problem         Past Surgical History:     Past Surgical History:   Procedure Laterality Date     SECTION  8558,9171    Miscarriage     CHOLECYSTECTOMY      COLONOSCOPY N/A 2/3/2020    random bx(normal)hemorrhoids    GASTRIC BYPASS SURGERY      HYSTERECTOMY      KNEE SURGERY      LAPAROSCOPY      TONSILLECTOMY AND ADENOIDECTOMY      UPPER GASTROINTESTINAL ENDOSCOPY N/A 2/3/2020    (normal,neg H-Pylori)normal post-bypass endoscopy        Medications Prior to Admission:     Prior to Admission medications    Medication Sig Start Date End Date Taking?  Authorizing Provider   STIMATE 1.5 MG/ML SOLN nasal solution INSTILL 2 SPRAYS INTO THE NOSE TWICE DAILY STARTING TWO DAYS BEFORE PROCEDURE 20   Sola Chang MD amLODIPine (NORVASC) 5 MG tablet Take 5 mg by mouth daily    Historical Provider, MD   QUEtiapine (SEROQUEL) 200 MG tablet Take 200 mg by mouth nightly    Historical Provider, MD   lisinopril (PRINIVIL;ZESTRIL) 20 MG tablet Take 20 mg by mouth daily    Historical Provider, MD   buPROPion (WELLBUTRIN XL) 300 MG XL tablet Take 300 mg by mouth daily 3/5/16   Historical Provider, MD   sertraline (ZOLOFT) 50 MG tablet Take 50 mg by mouth daily 3/5/16   Historical Provider, MD   PROAIR  (90 BASE) MCG/ACT inhaler Inhale 2 puffs into the lungs every 6 hours as needed  14   Historical Provider, MD   lamoTRIgine (LAMICTAL) 200 MG tablet Take 400 mg by mouth daily 2 tabs 14   Historical Provider, MD        Allergies:     Penicillin g, Pcn [penicillins], and Sulfa antibiotics    Social History:     Tobacco:    reports that she has never smoked. She has never used smokeless tobacco.  Alcohol:      reports current alcohol use. Drug Use:  reports no history of drug use. Family History:     Family History   Adopted: Yes   Family history unknown: Yes       Review of Systems:     Positive and Negative as described in HPI. Denies any shortness of breath or cough  Denies chest pain or palpitations  Denies abdominal pain, diarrhea vomiting  Denies any new numbness tremors or weakness. Physical Exam:     BP (!) 140/70   Pulse 90   Temp 98.1 °F (36.7 °C) (Oral)   Resp 12   Ht 5' 5\" (1.651 m)   Wt 233 lb (105.7 kg)   SpO2 98%   BMI 38.77 kg/m²   Temp (24hrs), Av.1 °F (36.7 °C), Min:98.1 °F (36.7 °C), Max:98.1 °F (36.7 °C)      General appearance:  alert, cooperative and no distress  Eyes: Anicteric sclera. Pupils are equally round and reactive to light. Extraocular movements are intact.   Lungs:  clear to auscultation bilaterally, normal effort  Heart:  regular rate and rhythm, no murmur  Abdomen:  soft, nontender, nondistended, normal bowel sounds, no masses, hepatomegaly, splenomegaly Weakness in leg is much better, noticed that weakness is arm is still there  Potassium tested is 4.4  We will discontinue scheduled p.o. potassium    12/18   Readings of low blood pressure, will discontinue labetalol  Reducing dose of Norvasc to 2.5  We will follow    12/19   Patient blood pressure is much better, after adjusting dose of Norvasc  Working with physical therapy    12/20   Patient blood pressure is much improved after adjusting dose of Norvasc  No new complaints    12/21  No new complaints. Patient weakness on the left side persist. Patient is due for repeat CT head       Consultations:   IP CONSULT TO INTERNAL MEDICINE  IP CONSULT TO DIETITIAN  IP CONSULT TO SOCIAL WORK      Ernst Simental MD  12/21/2020  9:27 AM    Copy sent to Dr. Gail Lovelace MD    Please note that this chart was generated using voice recognition Dragon dictation software. Although every effort was made to ensure the accuracy of this automated transcription, some errors in transcription may have occurred.

## 2020-12-21 NOTE — PROGRESS NOTES
Physical Therapy  Facility/Department: [facility] ACUTE REHAB  Daily Treatment Note  NAME: Nick Ocasio  : 1970  MRN: [id number]    Date of Service: 2020    Discharge Recommendations:  Patient would benefit from continued therapy after discharge, Home with assist PRN        Assessment      PT Education: General Safety; Disease Specific Education; Injury Prevention; Functional Mobility Training  Activity Tolerance  Activity Tolerance: Patient limited by fatigue;Patient limited by endurance     Patient Diagnosis(es): There were no encounter diagnoses. has a past medical history of Asthma, Bipolar 1 disorder ([facility] [address] Delta storage pool disease [facility]), Hypertension, and Psychiatric problem. has a past surgical history that includes  section (5767,9808); Gastric bypass surgery (); Tonsillectomy and adenoidectomy (); Cholecystectomy (); knee surgery (); Hysterectomy (); laparoscopy (); Colonoscopy (N/A, 2/3/2020); and Upper gastrointestinal endoscopy (N/A, 2/3/2020). Restrictions  Restrictions/Precautions  Restrictions/Precautions: Fall Risk  Required Braces or Orthoses?: No  Position Activity Restriction  Other position/activity restrictions: L hypertonicity. PRAFO boot LLE in bed  Subjective   General  Chart Reviewed: Yes  Additional Pertinent Hx: 77-year-old female with history of delta storage pool disease, bipolar disorder, alcohol abuse who developed acute left-sided weaknessfound to have intraparenchymal hemorrhage, right parietal lobe hemorrhage and moderate subarachnoid hemorrhage. Pt admitted to rehab unit on 20.   Family / Caregiver Present: No  Subjective  Subjective: reports difficulty sleeping  Pain Screening  Patient Currently in Pain: Denies  Vital Signs  Patient Currently in Pain: Denies       Orientation     Cognition      Objective   Bed mobility  Bridging: Minimal assistance  Rolling to Left: Contact guard assistance Rolling to Right: Contact guard assistance  Supine to Sit: Minimal assistance  Sit to Supine: Minimal assistance  Scooting: Stand by assistance(close SBA)  Transfers  Sit to Stand: Moderate Assistance  Stand to sit: Moderate Assistance  Bed to Chair: Moderate assistance(x1, squat pivot)  Stand Pivot Transfers: Maximum Assistance(pt very anxious trying a stand pivot with hemiwalker)  Squat Pivot Transfers: Minimal Assistance(x1, mainly stabilizing left ankle to prevent rolling)  Comment: squat pivot  Ambulation 1  Surface: level tile  Device: (walker lift)  Other Apparatus: Left;Slider  Assistance: Moderate assistance;2 Person assistance  Gait Deviations: Decreased step length;Decreased step height;Deviated path  Comments: much better foot placement and control and sensory stimulus applied for LLE placement  Stairs/Curb  Stairs?: No  Wheelchair Activities  Left Brakes Level of Assistance: Minimal assistance  Right Brakes Level of Assistance: Stand by Assist  Propulsion 1  Propulsion: Manual  Level: Level Tile  Method: RLE;RUE;LLE  Level of Assistance: Minimal assistance  Description/ Details: poor safety awareness with left side neglect, running into objects on left side  Distance: 80 feet        Other exercises  Other exercises 1: focused on neuro re-ed and postural alignment with mirror feed back sitting and standing. Standing support assisted with lg lopez. education on Left side weight bearing and hip and knee control. sit to stands resisted with blue therapy at hips 12 reps tolerated. standing with Right LE on 4 inch rise step to weight shift to L LE WB activity with and without UE support. constant cueing for training with mirror, tactile, vc's provided. caution patient \"pusher\" syndrome and poor sensory awareness.   Other exercises 2: Seated bilateral LE exercises, 3# R LE, blue resistance band 10 reps to tolerance each  Other exercises 3: Seated (B) LE ex x 20, minimal LAQ noted with LLE today Other exercises 6: NuStep 10 minutes BLE no LUE supported in sling. L4. needed strapping to support LLE to neutral                        G-Code     OutComes Score                                                     AM-PAC Score             Goals  Short term goals  Time Frame for Short term goals: 10 days  Short term goal 1: pt will perform bed mobility with min A  Short term goal 2: pt will dangle EOB/EOM for 20 to 25 minutes with SBA, performing challenged seated balance/corestrengthening  Short term goal 3: Pivot transfers with mod A+2  Short term goal 4: WC mobility x 100' with min A  Short term goal 5: progress to standing/pre-gait activities in // bars to facilitate L LE motor fucntion. Long term goals  Time Frame for Long term goals : By LOS  Long term goal 1: Pt able to perform bed mobility independently  Long term goal 2: Pt able to perform transfers at [address] term goal 3: Pt able to progress to ambulation with appropriate device dist of 30 to 50 ft, mod A   Long term goal 4: Pt able to propel w/c level surfaces dist of 150 ft , mod-I. Long term goal 5: Improve L LE strength to atleast 2 to 2+/5 to improve fucntion. Long term goal 6: Improve PASS score from 8/30 to 23/36 to improve overall fucntion. Long term goal 7: Pt able to go up and down 5 steps with rail, mod A. Patient Goals   Patient goals : to regain use of L arm and L leg    Plan    Plan  Times per week: 1.5hr/day, 5 to 7 days/week.   Times per day: Daily  Current Treatment Recommendations: Strengthening, ROM, Balance Training, Functional Mobility Training, Transfer Training, Wheelchair Mobility Training, Gait Training, Neuromuscular Re-education, Pain Management, Home Exercise Program, Safety Education & Training, Patient/Caregiver Education & Training, Positioning, Endurance Training, Stair training  Safety Devices  Type of devices: Call light within reach, Gait belt, All fall risk precautions in place  Restraints Initially in place: No     Therapy Time     12/21/20 1003 12/21/20 1308   PT Individual Minutes   Time In 1004 1305   Time Out 1108 1340   Minutes 64 35     Ngoc Callahan, PTA

## 2020-12-21 NOTE — PROGRESS NOTES
Physical Medicine & Rehabilitation  Progress Note      Subjective:      Ms. Rosy Manning is a 48year-old female with hemorrhagic CVA and L nondominant hemiparesis. She reports doing fine today. She states that she seemed to do better in afternoon PT yesterday with the increased dose of tizanidine in the morning and afternoon. Will continue to monitor. Discussed increasing zoloft to home dose today. She denies any other acute concerns. ROS:  Denies fevers, chills, sweats. No chest pain, palpitations, lightheadedness. Denies coughing, wheezing or shortness of breath. Denies abdominal pain, nausea, diarrhea or constipation. No new areas of joint pain. Denies new areas of numbness or weakness. Denies new anxiety or depression issues. No new skin problems. Rehabilitation:   Progressing in therapies. PT:  Restrictions/Precautions: Fall Risk  Other position/activity restrictions: L hypertonicity. PRAFO boot LLE in bed   Transfers  Sit to Stand: Moderate Assistance  Stand to sit: Moderate Assistance  Bed to Chair: Moderate assistance(x1, squat pivot)  Stand Pivot Transfers: Maximum Assistance(pt very anxious trying a stand pivot with hemiwalker)  Squat Pivot Transfers: Minimal Assistance(x1, mainly stabilizing left ankle to prevent rolling)  Comment: squat pivot  Ambulation 1  Surface: level tile  Device: (walker lift)  Other Apparatus: Left, Slider  Assistance: Moderate assistance, 2 Person assistance  Quality of Gait: much better this morning, pt able to advance left LE and complete terminal knee without assist, pt needing assistance for swing phase due to over powering swing phase of left LE and to prevent the left ankle from rolling  Gait Deviations: Decreased step length, Decreased step height, Deviated path  Distance: 100ft  Comments: much better foot placement and control and sensory stimulus applied for LLE placement    Transfers  Sit to Stand:  Moderate Assistance Stand to sit: Moderate Assistance  Bed to Chair: Moderate assistance(x1, squat pivot)  Stand Pivot Transfers: Maximum Assistance(pt very anxious trying a stand pivot with hemiwalker)  Squat Pivot Transfers: Minimal Assistance(x1, mainly stabilizing left ankle to prevent rolling)  Comment: squat pivot  Ambulation  Ambulation?: Yes  Ambulation 1  Surface: level tile  Device: (walker lift)  Other Apparatus: Left, Slider  Assistance: Moderate assistance, 2 Person assistance  Quality of Gait: much better this morning, pt able to advance left LE and complete terminal knee without assist, pt needing assistance for swing phase due to over powering swing phase of left LE and to prevent the left ankle from rolling  Gait Deviations: Decreased step length, Decreased step height, Deviated path  Distance: 100ft  Comments: much better foot placement and control and sensory stimulus applied for LLE placement    Surface: level tile  Ambulation 1  Surface: level tile  Device: (walker lift)  Other Apparatus: Left, Slider  Assistance:  Moderate assistance, 2 Person assistance  Quality of Gait: much better this morning, pt able to advance left LE and complete terminal knee without assist, pt needing assistance for swing phase due to over powering swing phase of left LE and to prevent the left ankle from rolling  Gait Deviations: Decreased step length, Decreased step height, Deviated path  Distance: 100ft  Comments: much better foot placement and control and sensory stimulus applied for LLE placement    OT:  ADL  Equipment Provided: Long-handled sponge, Long-handled shoe horn, Reacher  Feeding: Setup(assist to open items)  Grooming: Modified independent (seated)  UE Bathing: Setup, Contact guard assistance(intermittent assist to support LUE, ara tech to wash UB)  LE Bathing: Minimal assistance(min A in standing for elizabeth-care, declines to wash feet this )  UE Dressing: Minimal assistance(assist threading LUE/adjustments) LE Dressing: Moderate assistance(crossing LLE tech to thread over foot, A on L hip/L shoe/HORTENSIA)  Toileting: Minimal assistance(min assist for balance)  Additional Comments: pt used reacher to remove underwear and footies, attempts to use reacher to thread underwear on LLE, pt asking to \"try something first\", pt able to cross LLE over R knee to thread  underwear and pants on L foot, able to reach and thread on R foot, min assist in standing to pull up over hips, assist to hike pants/underwear on L hip, A TEDs and L shoe, tie both, VCs for ara tech with good carryover and intermittent assist for LUE threading/adjustments         Balance  Sitting Balance: Stand by assistance  Standing Balance: Moderate assistance(mod/min assist, varys with task)   Standing Balance  Time: AM: 1 min x3, 1-2 min x2  Activity: AM: functional transfers, ADL tasks  Comment: unsteady with dynamic tasks, assist to stabilize LLE  Functional Mobility  Functional - Mobility Device: Wheelchair  Activity: To/from bathroom  Assist Level: Minimal assistance  Functional Mobility Comments: assist and cuing for visual scanning/turning to L side     Bed mobility  Bridging: Minimal assistance  Rolling to Left: Contact guard assistance  Rolling to Right: Contact guard assistance  Supine to Sit: Minimal assistance  Sit to Supine: Minimal assistance  Scooting: Stand by assistance(close SBA)  Comment: Pt uses bed rail for rolling to eft side. pt moderately leans to left side, pt able to correct posture, improve seating balance once feet postioned on floor, CGA for static balance with R UE support. mod cues for attending to left side due to left neglect. Transfers  Stand Pivot Transfers: Moderate assistance  Sit to stand: Moderate assistance  Stand to sit: Minimal assistance  Transfer Comments: VCs for hand placements and proper tech   Toilet Transfers  Toilet - Technique:  To right, To left, Stand pivot  Equipment Used: Standard toilet Toilet Transfer: Moderate assistance  Toilet Transfers Comments: assist x1, VCs for safety and proper tech     Shower Transfers  Shower - Transfer From: Wheelchair  Shower - Transfer Type: To and From  Shower - Transfer To: Transfer tub bench  Shower - Technique: Squat pivot, To right, To left  Shower Transfers: Dependent, 2 Person assistance  Shower Transfers Comments: mod - max of 1 to R, mod x 2 to L  Wheelchair Bed Transfers  Equipment Used:  Other, Wheelchair, [employer])  Level of Asssistance: Dependent/Total    SPEECH:      Current Medications:   Current Facility-Administered Medications: [START ON 12/22/2020] sertraline (ZOLOFT) tablet 150 mg, 150 mg, Oral, Daily  amLODIPine (NORVASC) tablet 5 mg, 5 mg, Oral, Daily  tiZANidine (ZANAFLEX) tablet 6 mg, 6 mg, Oral, BID- 8&2 **AND** tiZANidine (ZANAFLEX) tablet 4 mg, 4 mg, Oral, Nightly  thiamine mononitrate tablet 100 mg, 100 mg, Oral, Daily  traZODone (DESYREL) tablet 25 mg, 25 mg, Oral, Nightly  gabapentin (NEURONTIN) capsule 300 mg, 300 mg, Oral, Nightly  lisinopril (PRINIVIL;ZESTRIL) tablet 20 mg, 20 mg, Oral, Daily  oxyCODONE (ROXICODONE) immediate release tablet 5 mg, 5 mg, Oral, Q8H PRN  acetaminophen (TYLENOL) tablet 650 mg, 650 mg, Oral, Q4H PRN  buPROPion (WELLBUTRIN XL) extended release tablet 300 mg, 300 mg, Oral, Daily  [COMPLETED] cyanocobalamin injection 1,000 mcg, 1,000 mcg, Intramuscular, Daily **FOLLOWED BY** cyanocobalamin injection 1,000 mcg, 1,000 mcg, Intramuscular, Weekly **FOLLOWED BY** [START ON 2/5/2021] cyanocobalamin injection 1,000 mcg, 1,000 mcg, Intramuscular, N53 Days  folic acid (FOLVITE) tablet 1 mg, 1 mg, Oral, Daily  ipratropium-albuterol (DUONEB) nebulizer solution 1 ampule, 1 ampule, Inhalation, Q4H PRN  lamoTRIgine (LAMICTAL) tablet 400 mg, 400 mg, Oral, Daily  magnesium hydroxide (MILK OF MAGNESIA) 400 MG/5ML suspension 30 mL, 30 mL, Oral, Daily PRN  melatonin tablet 3 mg, 3 mg, Oral, Nightly muscle rub cream, , Topical, TID PRN  rOPINIRole (REQUIP) tablet 1 mg, 1 mg, Oral, Nightly  sennosides-docusate sodium (SENOKOT-S) 8.6-50 MG tablet 2 tablet, 2 tablet, Oral, Daily  polyethylene glycol (GLYCOLAX) packet 17 g, 17 g, Oral, Daily  bisacodyl (DULCOLAX) suppository 10 mg, 10 mg, Rectal, Daily PRN  senna (SENOKOT) tablet 17.2 mg, 2 tablet, Oral, Daily PRN  enoxaparin (LOVENOX) injection 30 mg, 30 mg, Subcutaneous, BID      Objective:  /63   Pulse 95   Temp 98.8 °F (37.1 °C) (Oral)   Resp 18   Ht 5' 5\" (1.651 m)   Wt 233 lb (105.7 kg)   SpO2 100%   BMI 38.77 kg/m²       GEN: Well developed, well nourished, no acute distress  HEENT: NCAT. EOM grossly intact. Hearing grossly intact. Mucous membranes pink and moist.  RESP: Normal breath sounds with no wheezing, rales, or rhonchi. Respirations WNL and unlabored. CV: Regular rate and rhythm. No murmurs, rubs, or gallops. ABD: Soft, non-distended, BS+ and equal.  NEURO: Alert, oriented to person, place, and time. Speech fluent with no aphasia or dysarthria noted. Moderate spasticity noted in the left elbow flexors, wrist flexors, finger flexors, knee flexors, and ankle plantarflexors. MSK: Decreased AROM in the left upper and lower limbs due to weakness. Otherwise functional ROM. Muscle bulk is normal bilaterally. Left hemiparesis. LIMBS: No edema in bilateral lower limbs. SKIN: Warm and dry with good turgor. PSYCH: Mood WNL. Affect WNL. Appropriately interactive. Diagnostics:     CBC:   Recent Labs     12/21/20  0639   WBC 6.1   RBC 3.69*   HGB 11.2*   HCT 33.3*   MCV 90.2   RDW 13.9   *     BMP:   Recent Labs     12/21/20  0639      K 4.1      CO2 22   BUN 10   CREATININE 0.56   GLUCOSE 94     BNP: No results for input(s): BNP in the last 72 hours. PT/INR: No results for input(s): PROTIME, INR in the last 72 hours. APTT: No results for input(s): APTT in the last 72 hours. CARDIAC ENZYMES: No results for input(s): CKMB, CKMBINDEX, TROPONINT in the last 72 hours. Invalid input(s): CKTOTAL;3  FASTING LIPID PANEL:  Lab Results   Component Value Date    CHOL 247 (H) 11/30/2020     11/30/2020    TRIG 77 11/30/2020     LIVER PROFILE:   Recent Labs     12/21/20  0639   AST 19   ALT 16   BILIDIR 0.08   BILITOT 0.22*   ALKPHOS 75            Impression/Plan:   Impaired ADLs, gait, and mobility due to:    1. Hemorrhagic CVA:  PT/OT for gait, mobility, strengthening, endurance, ADLs, and self care. Medrol tapered until 12/13. 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 Monitoring platelets  3. Muscle spasm/myoclonus/spasticity: Now off baclofen. Switched from as-needed to scheduled tizanidine 12/17 - increased dose 12/20. PRAFO for left lower limb at night. Will likely need bracing for the left lower limb to help with stability and tone. 4. Left proximal upper limb pain:  In the area of left biceps muscle. Will monitor. 5. HTN: amlodipine (decreased 12/18 by IM), lisinopril (decreased 12/14 by IM), labetalol - discontinued 12/18 by IM  6. ETOH withdrawal/history of abuse: was treated with IV ativan and librium. On I95 and folic acid now  7. Bipolar Disorder: on wellbutrin, zoloft (increased 12/21 to home dose of 150mg daily), lamictal  8. Restless Leg Syndrome: on requip  9. Insomnia:  On melatonin, gabapentin nightly. Takes trazodone at home - continue low-dose trazodone (started 12/15). 10. Bowel Management: Miralax daily, senokot prn, dulcolax prn. 11. DVT Prophylaxis:  low molecular weight heparin, SCD's while in bed and HORTENSIA's   12.  Internal medicine for medical management         Electronically signed by Brandon Romo MD on 12/21/2020 at 9:56 PM
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
## 2020-12-21 NOTE — PLAN OF CARE
Problem: Skin Integrity:  Goal: Will show no infection signs and symptoms  Description: Will show no infection signs and symptoms  12/21/2020 0234 by Chemo Cabrales RN  Outcome: Ongoing     Problem: Skin Integrity:  Goal: Absence of new skin breakdown  Description: Absence of new skin breakdown  12/21/2020 0234 by Chemo Cabrales RN  Outcome: Ongoing     Problem: Falls - Risk of:  Goal: Will remain free from falls  Description: Will remain free from falls  12/21/2020 0234 by Chemo Cabrales RN  Outcome: Ongoing     Problem: Falls - Risk of:  Goal: Absence of physical injury  Description: Absence of physical injury  12/21/2020 0234 by Chemo Cabrales RN  Outcome: Ongoing     Problem: Pain:  Goal: Pain level will decrease  Description: Pain level will decrease  12/21/2020 0234 by Chemo Cabrales RN  Outcome: Ongoing     Problem: Pain:  Goal: Control of chronic pain  Description: Control of chronic pain  Outcome: Ongoing     Problem: Nutrition  Goal: Optimal nutrition therapy  Description: Nutrition Problem #1: Inadequate energy intake  Intervention: Food and/or Nutrient Delivery: Continue Current Diet, Continue Oral Nutrition Supplement  Nutritional Goals: po intake greater than 75%     12/21/2020 0234 by Chemo Cabrales RN  Outcome: Ongoing

## 2020-12-21 NOTE — PROGRESS NOTES
[address]   ACUTE REHABILITATION OCCUPATIONAL THERAPY  DAILY NOTE    Date: 20  Patient Name: Chito Greenwood      Room: 2612/2612-01    MRN: [id number]   : 1970  (48 y.o.)  Gender: female   Referring Practitioner: Win De La Cruz MD  Diagnosis: Intracranial hemmorrhage  Additional Pertinent Hx: 51-year-old female with history of [employer] storage [employer] disease, bipolar disorder, alcohol abuse who developed acute left-sided weaknessfound to have intraparenchymal hemorrhage, right parietal lobe hemorrhage and moderate subarachnoid hemorrhage. Pt admitted to rehab unit on 20. Restrictions  Restrictions/Precautions: Fall Risk  Other position/activity restrictions: L hypertonicity. PRAFO boot LLE in bed  Required Braces or Orthoses?: No  Equipment Used: Other, Wheelchair, Bed(Tameka Conrado)    Subjective  Subjective: \"ouch!  I can feel my ankle starting to roll\" pt states in regards to L ankle internally rotating with standing, increased tone noted in LLE, this writer attempts to block L knee to decrease rolling of L ankle with standing tasks  Comments: pt cooperative, motivated, and pleasant  Patient Currently in Pain: Denies  Restrictions/Precautions: Fall Risk  Overall Orientation Status: Within Functional Limits  Patient Observation  Observations: intermittent L side neglect  Pain Assessment  Pain Assessment: 0-10  Pain Level: 1  Pain Type: Acute pain  Pain Location: Arm  Pain Orientation: Left  Pain Descriptors: Aching, Sore    Objective  Cognition  Overall Cognitive Status: Impaired  Safety Judgement: Decreased awareness of need for safety  Perception  Overall Perceptual Status: Impaired  Unilateral Attention: Cues to attend left visual field;Cues to attend to left side of body;Cues to maintain midline in sitting;Cues to maintain midline in standing(improving, flucuates)  Balance  Sitting Balance: Stand by assistance Standing Balance: Moderate assistance(mod/min assist, varys with task)  Bed mobility  Rolling to Right: Contact guard assistance  Supine to Sit: Minimal assistance  Scooting: Stand by assistance(close SBA)  Transfers  Stand Pivot Transfers: Moderate assistance  Sit to stand: Moderate assistance  Stand to sit: Minimal assistance  Transfer Comments: VCs for hand placements and proper tech  Standing Balance  Time: AM: 1 min x3, 1-2 min x2  Activity: AM: functional transfers, ADL tasks  Comment: unsteady with dynamic tasks, assist to stabilize LLE  Functional Mobility  Functional - Mobility Device: Wheelchair  Activity: To/from bathroom  Assist Level: Minimal assistance  Functional Mobility Comments: assist and cuing for visual scanning/turning to L side  Toilet Transfers  Toilet - Technique: To right; To left;Stand pivot  Equipment Used: Standard toilet  Toilet Transfer: Moderate assistance  Toilet Transfers Comments: assist x1, VCs for safety and proper tech     Type of ROM/Therapeutic Exercise  Comment: LUE, 10 reps, support to LUE  Exercises  Scapular Protraction: AAROM  Scapular Retraction: PROM  Shoulder Depression: PROM  Shoulder Elevation: PROM        Electrical Stimulation  Electrical Stimulation Location: Left; Shoulder  Electrical Stimulation Action: electrical pads placed to facilitate shoulder elevation/depression and scapular movements, pt tolerated lowest level for 10 min  Electrical Stimulation Parameters: 10 min, 35 Hz, reciprocal cycle  Electrical Stimulation Goals: Strength;Neuromuscular Facilitation              ADL  Equipment Provided: Long-handled sponge;Long-handled shoe horn;Reacher  Feeding: Setup(assist to open items)  Grooming: Modified independent (seated)  UE Bathing: Setup;Contact guard assistance(intermittent assist to support LUE, ara tech to wash UB)  LE Bathing: Minimal assistance(min A in standing for elizabeth-care, declines to wash feet this date) UE Dressing: Minimal assistance(assist threading LUE/adjustments)  LE Dressing: Moderate assistance(crossing LLE tech to thread over foot, A on L hip/L shoe/TEDs/tie both)  Toileting: Minimal assistance(min assist for balance)  Additional Comments: pt used reacher to remove underwear and footies, attempts to use reacher to thread underwear on LLE, pt asking to \"try something first\", pt able to cross LLE over R knee to thread  underwear and pants on L foot, able to reach and thread on R foot, min assist in standing to pull up over hips, assist to hike pants/underwear on L hip, A TEDs and L shoe, tie both, VCs for ara tech with good carryover and intermittent assist for LUE threading/adjustments          Assessment  Performance deficits / Impairments: Decreased functional mobility ; Decreased ADL status; Decreased ROM; Decreased strength;Decreased endurance;Decreased balance;Decreased high-level IADLs;Decreased fine motor control;Decreased coordination;Decreased posture;Decreased safe awareness;Decreased vision/visual deficit  Prognosis: Good  Discharge Recommendations: Home with assist PRN;Patient would benefit from continued therapy after discharge  Activity Tolerance: Patient Tolerated treatment well  Safety Devices in place: Yes  Type of devices: Nurse notified; Left in chair;Patient at risk for falls;Call light within reach  Equipment Recommendations  Equipment Needed: Yes  Hand Held Shower: x  Transfer Tub Bench: x  Grab bars: x  Reacher: x  Long-handled Shoehorn: x       Patient Education: ara tech/ADL tasks, transfer safety-balance, ROM to LUE   Patient Goals   Patient goals :  To discharge home with family support  Learner:patient  Method: demonstration and explanation       Outcome: verbalized concerns, demonstrated understanding, needs reinforcement and asked questions        Plan  Plan  Times per week: 5-7  Times per day: Twice a day Current Treatment Recommendations: Self-Care / ADL, Home Management Training, Strengthening, Balance Training, Functional Mobility Training, Endurance Training, Wheelchair Mobility Training, Neuromuscular Re-education, Pain Management, Safety Education & Training, Patient/Caregiver Education & Training, Equipment Evaluation, Education, & procurement, Cognitive/Perceptual Training  Patient Goals   Patient goals : To discharge home with family support  Short term goals  Time Frame for Short term goals: 7 to 10 days  Short term goal 1: Pt will perform upper body bathing/dressing with stand by assist.  Short term goal 2: Pt will perform lower body bathing and dressing with Moderate assist and Fair safety. Short term goal 3: Pt will perform toileting tasks with Moderate assist.  Short term goal 4: Pt will verbalize/demonstrate Good understanding of assistive equipment/durable medical equipment/modified techniques for increased independence with self-care and mobility. Short term goal 5: Pt will perform functional transfers with Moderate assist to toilet/wheelchair/shower with Fair safety. Short term goal 6: Pt will actively participate in 30+ minutes of therapeutic exercise/functional activities to promote increased independence with self-care and mobility. Long term goals  Time Frame for Long term goals : By discharge  Long term goal 1: Pt will perform BADLs with modified independence and Good safety with assist PRN for HORTENSIA hose. Long term goal 2: Pt will perform functional transfers and mobility with modified independence, least restrictive mobility device, and Good safety. Long term goal 3: Pt will attend to L side of body 100% of the time during self-care, transfers, and mobility with 1-2 verbal cues PRN. Long term goal 4: Pt will stand for 5+ minutes with 1-2 UE support, modified independence and no loss of balance while engaging in functional activity of choice. Long term goal 5: Pt will verbalize/demonstrate Good understanding of home exercise program for LUE.   Long term goals 6: 9 hole peg, box and block to be assessed for LUE as appropriate        12/21/20 0735 12/21/20 1240   OT Individual Minutes   Time In 1112 1240   Time Out 0845 1302   Minutes 70 22     Electronically signed by JEAN CARLOS Muro on 12/21/20 at 2:38 PM EST

## 2020-12-21 NOTE — PLAN OF CARE
Problem: Skin Integrity:  Goal: Will show no infection signs and symptoms  Description: Will show no infection signs and symptoms  12/21/2020 1356 by Rob Soto RN  Outcome: Ongoing  Note: Pt educated on risks for impaired skin integrity. Pt assisted in keeping skin clean and dry, assisted and prompted to reposition frequently. No s/s of infection or new breakdown noted this shift. Will continue to monitor and intervene as needed. Problem: Falls - Risk of:  Goal: Will remain free from falls  Description: Will remain free from falls  12/21/2020 1356 by Rob Soto RN  Outcome: Ongoing  Note: Pt educated on and verbalizes understanding of fall risks. Pt wearing nonskid stockings, uses assistive devices appropriately, call light within reach, encouraged to ask for assistance. Pt calls out appropriately this shift. Will continue to monitor and intervene as needed. Problem: Pain:  Goal: Control of acute pain  Description: Control of acute pain  12/21/2020 1356 by Rob Soto RN  Outcome: Ongoing  Note: Pt denies having any pain this shift. Pt reports pain is controlled by current pain regimen. Will continue to monitor and medicate per MD orders.

## 2020-12-22 PROCEDURE — 97116 GAIT TRAINING THERAPY: CPT

## 2020-12-22 PROCEDURE — 6370000000 HC RX 637 (ALT 250 FOR IP): Performed by: NURSE PRACTITIONER

## 2020-12-22 PROCEDURE — 97542 WHEELCHAIR MNGMENT TRAINING: CPT

## 2020-12-22 PROCEDURE — 99231 SBSQ HOSP IP/OBS SF/LOW 25: CPT | Performed by: INTERNAL MEDICINE

## 2020-12-22 PROCEDURE — 99232 SBSQ HOSP IP/OBS MODERATE 35: CPT | Performed by: STUDENT IN AN ORGANIZED HEALTH CARE EDUCATION/TRAINING PROGRAM

## 2020-12-22 PROCEDURE — 97535 SELF CARE MNGMENT TRAINING: CPT

## 2020-12-22 PROCEDURE — 97530 THERAPEUTIC ACTIVITIES: CPT

## 2020-12-22 PROCEDURE — 6360000002 HC RX W HCPCS: Performed by: PHYSICAL MEDICINE & REHABILITATION

## 2020-12-22 PROCEDURE — 97112 NEUROMUSCULAR REEDUCATION: CPT

## 2020-12-22 PROCEDURE — 1180000000 HC REHAB R&B

## 2020-12-22 PROCEDURE — 6370000000 HC RX 637 (ALT 250 FOR IP): Performed by: INTERNAL MEDICINE

## 2020-12-22 PROCEDURE — 6370000000 HC RX 637 (ALT 250 FOR IP): Performed by: STUDENT IN AN ORGANIZED HEALTH CARE EDUCATION/TRAINING PROGRAM

## 2020-12-22 PROCEDURE — 6370000000 HC RX 637 (ALT 250 FOR IP): Performed by: PHYSICAL MEDICINE & REHABILITATION

## 2020-12-22 PROCEDURE — 97110 THERAPEUTIC EXERCISES: CPT

## 2020-12-22 RX ORDER — TIZANIDINE 4 MG/1
4 TABLET ORAL
Status: DISCONTINUED | OUTPATIENT
Start: 2020-12-22 | End: 2021-01-04

## 2020-12-22 RX ADMIN — GABAPENTIN 300 MG: 300 CAPSULE ORAL at 20:21

## 2020-12-22 RX ADMIN — BUPROPION HYDROCHLORIDE 300 MG: 300 TABLET, FILM COATED, EXTENDED RELEASE ORAL at 09:14

## 2020-12-22 RX ADMIN — TIZANIDINE 6 MG: 2 TABLET ORAL at 14:39

## 2020-12-22 RX ADMIN — FOLIC ACID 1 MG: 1 TABLET ORAL at 09:13

## 2020-12-22 RX ADMIN — TIZANIDINE 4 MG: 4 TABLET ORAL at 20:21

## 2020-12-22 RX ADMIN — TIZANIDINE 6 MG: 2 TABLET ORAL at 09:13

## 2020-12-22 RX ADMIN — OXYCODONE HYDROCHLORIDE 5 MG: 5 TABLET ORAL at 15:46

## 2020-12-22 RX ADMIN — ENOXAPARIN SODIUM 30 MG: 30 INJECTION SUBCUTANEOUS at 20:21

## 2020-12-22 RX ADMIN — AMLODIPINE BESYLATE 5 MG: 5 TABLET ORAL at 09:13

## 2020-12-22 RX ADMIN — THIAMINE HCL TAB 100 MG 100 MG: 100 TAB at 09:12

## 2020-12-22 RX ADMIN — Medication 3 MG: at 20:21

## 2020-12-22 RX ADMIN — LAMOTRIGINE 400 MG: 100 TABLET ORAL at 09:15

## 2020-12-22 RX ADMIN — SENNOSIDES AND DOCUSATE SODIUM 2 TABLET: 8.6; 5 TABLET ORAL at 09:13

## 2020-12-22 RX ADMIN — ENOXAPARIN SODIUM 30 MG: 30 INJECTION SUBCUTANEOUS at 09:12

## 2020-12-22 RX ADMIN — ROPINIROLE HYDROCHLORIDE 1 MG: 1 TABLET, FILM COATED ORAL at 20:21

## 2020-12-22 RX ADMIN — SERTRALINE HYDROCHLORIDE 150 MG: 100 TABLET ORAL at 09:13

## 2020-12-22 RX ADMIN — MENTHOL, METHYL SALICYLATE: 10; 15 CREAM TOPICAL at 15:47

## 2020-12-22 RX ADMIN — TRAZODONE HYDROCHLORIDE 25 MG: 50 TABLET ORAL at 20:21

## 2020-12-22 RX ADMIN — LISINOPRIL 20 MG: 20 TABLET ORAL at 09:12

## 2020-12-22 ASSESSMENT — PAIN SCALES - GENERAL
PAINLEVEL_OUTOF10: 9
PAINLEVEL_OUTOF10: 8

## 2020-12-22 NOTE — CARE COORDINATION
Social work; per team meeting advised [facility] that pt may need some help due to ankle instability. He is on his way over. [place] from [employer].  Ro salazar

## 2020-12-22 NOTE — PROGRESS NOTES
Physical Therapy  Facility/Department: [facility] ACUTE REHAB  Daily Treatment Note  NAME: Zarina Thurston  : 1970  MRN: [id number]    Date of Service: 2020    Discharge Recommendations:  Patient would benefit from continued therapy after discharge, Home with assist PRN        Assessment      Activity Tolerance  Activity Tolerance: Patient limited by fatigue     Patient Diagnosis(es): There were no encounter diagnoses. has a past medical history of Asthma, Bipolar 1 disorder ([facility] [address] [employer] disease [facility]), Hypertension, and Psychiatric problem. has a past surgical history that includes  section ([id number]); Gastric bypass surgery (); Tonsillectomy and adenoidectomy (); Cholecystectomy (); knee surgery (); Hysterectomy (); laparoscopy (); Colonoscopy (N/A, 2/3/2020); and Upper gastrointestinal endoscopy (N/A, 2/3/2020). Restrictions  Restrictions/Precautions  Restrictions/Precautions: Fall Risk  Required Braces or Orthoses?: No  Position Activity Restriction  Other position/activity restrictions: L hypertonicity. PRAFO boot LLE in bed  Subjective              Orientation     Cognition      Objective   Bed mobility  Bridging: Minimal assistance  Rolling to Left: Contact guard assistance  Rolling to Right: Contact guard assistance;Stand by assistance  Supine to Sit: Contact guard assistance  Sit to Supine: Minimal assistance  Scooting: Minimal assistance  Transfers  Sit to Stand: Moderate Assistance  Stand to sit: Moderate Assistance  Stand Pivot Transfers: Maximum Assistance(pt very anxious trying a stand pivot with hemiwalker)  Squat Pivot Transfers: Minimal Assistance(x1, mainly stabilizing left ankle to prevent rolling)  Comment: squat pivot  Ambulation 1  Surface: level tile  Device: Hemiwalker  Other Apparatus: Left;Slider; Wheelchair follow  Assistance:  Moderate assistance(2nd person for safety and wc)

## 2020-12-22 NOTE — PROGRESS NOTES
Physical Medicine & Rehabilitation  Progress Note      Subjective:      Ms. Victor M Esposito is a 48year-old female with hemorrhagic CVA and L nondominant hemiparesis. She reports doing okay today. She notes increased spasms in the left upper and lower limbs yesterday afternoon and into the evening. She states that these spasms are similar to those that she was having when she first admitted and baclofen dose was increased. She also started having the same type of spasms this afternoon during evaluation. Discussed decreasing afternoon dose of tizanidine to 4mg. She was evaluated by the orthotist today for left lower limb bracing. She denies any other acute concerns. ROS:  Denies fevers, chills, sweats. No chest pain, palpitations, lightheadedness. Denies coughing, wheezing or shortness of breath. Denies abdominal pain, nausea, diarrhea or constipation. No new areas of joint pain. Denies new areas of numbness or weakness. Denies new anxiety or depression issues. No new skin problems. Rehabilitation:   Progressing in therapies. PT:  Restrictions/Precautions: Fall Risk  Other position/activity restrictions: L hypertonicity. PRAFO boot LLE in bed   Transfers  Sit to Stand: Moderate Assistance  Stand to sit: Moderate Assistance  Bed to Chair: Moderate assistance(x1, squat pivot)  Stand Pivot Transfers: Maximum Assistance(pt very anxious trying a stand pivot with hemiwalker)  Squat Pivot Transfers: Minimal Assistance(x1, mainly stabilizing left ankle to prevent rolling)  Comment: squat pivot  Ambulation 1  Surface: level tile  Device: Hemiwalker  Other Apparatus: Left, Slider, Wheelchair follow  Assistance:  Moderate assistance(2nd person for safety and wc)  Quality of Gait: much better this morning, pt able to advance left LE and complete terminal knee without assist, pt needing assistance for swing phase due to over powering swing phase of left LE and to prevent the left ankle from rolling Gait Deviations: Decreased step length, Decreased step height, Deviated path  Distance: 25 feet x2  Comments: much better foot placement and control and sensory stimulus applied for LLE placement    Transfers  Sit to Stand: Moderate Assistance  Stand to sit: Moderate Assistance  Bed to Chair: Moderate assistance(x1, squat pivot)  Stand Pivot Transfers: Maximum Assistance(pt very anxious trying a stand pivot with hemiwalker)  Squat Pivot Transfers: Minimal Assistance(x1, mainly stabilizing left ankle to prevent rolling)  Comment: squat pivot  Ambulation  Ambulation?: Yes  Ambulation 1  Surface: level tile  Device: Hemiwalker  Other Apparatus: Left, Slider, Wheelchair follow  Assistance: Moderate assistance(2nd person for safety and wc)  Quality of Gait: much better this morning, pt able to advance left LE and complete terminal knee without assist, pt needing assistance for swing phase due to over powering swing phase of left LE and to prevent the left ankle from rolling  Gait Deviations: Decreased step length, Decreased step height, Deviated path  Distance: 25 feet x2  Comments: much better foot placement and control and sensory stimulus applied for LLE placement    Surface: level tile  Ambulation 1  Surface: level tile  Device: Hemiwalker  Other Apparatus: Left, Slider, Wheelchair follow  Assistance:  Moderate assistance(2nd person for safety and wc)  Quality of Gait: much better this morning, pt able to advance left LE and complete terminal knee without assist, pt needing assistance for swing phase due to over powering swing phase of left LE and to prevent the left ankle from rolling  Gait Deviations: Decreased step length, Decreased step height, Deviated path  Distance: 25 feet x2  Comments: much better foot placement and control and sensory stimulus applied for LLE placement    OT:  ADL  Equipment Provided: Long-handled sponge, Long-handled shoe horn, Reacher  Feeding: Setup Grooming: Modified independent (seated)  UE Bathing: Setup, Contact guard assistance(dycem utized on sink to stabilize LUE, intermittent assist r)  LE Bathing: Moderate assistance, Verbal cueing(assist to block L knee in standing, min/mod assist for erick)  UE Dressing: Minimal assistance(cuing for ara tech, intermittent assist threading LUE/adjus)  LE Dressing: Moderate assistance(using reacher, crossing LLE over R knee to thread L foot, as)  Toileting: Moderate assistance(mod/min assist for balance with clothing mgmt)  Additional Comments: pt used reacher to remove underwear and footies, pt able to cross LLE over R knee to thread  underwear and pants on L foot, able to reach and thread on R foot, mod/min assist in standing to pull up over hips, assist to hike pants/underwear on L hip, assist to block L knee in standing to decrease L foot inversion, A TEDs and L shoe, tie both, VCs for ara tech with good carryover and intermittent assist for LUE threading/adjustments         Balance  Sitting Balance: Stand by assistance(stand by assist/sup on toilet)  Standing Balance:  Moderate assistance(mod/min assist, assist to place LUE on sink)   Standing Balance  Time: AM: 1 min x3, 1-2 min x3; PM: 4-5 min, 2-3 min x3  Activity: AM: functional transfers, ADL tasks, PM: dynamic standing/reaching and crossing midline to increase standing tolerance/balance and safety for improved task performance, tactile cues req to increase weight bearing/weight shifting in LLE, intermittent RUE support req for support/balance  Comment: unsteady with dynamic tasks, assist to stabilize LLE and place LUE on sink  Functional Mobility  Functional - Mobility Device: Wheelchair  Activity: To/from bathroom  Assist Level: Minimal assistance  Functional Mobility Comments: assist and cuing for visual scanning/turning to L side     Bed mobility  Bridging: Minimal assistance  Rolling to Left: Contact guard assistance Rolling to Right: Contact guard assistance, Stand by assistance  Supine to Sit: Contact guard assistance  Sit to Supine: Minimal assistance  Scooting: Minimal assistance  Comment: pt uses bed rail on R side  Transfers  Stand Pivot Transfers: Moderate assistance(assist x1, assist for LLE positioning/stabilization)  Sit to stand: Moderate assistance(mod/min assist, varies with level of support/fatigue)  Stand to sit: Minimal assistance  Transfer Comments: VCs for hand placements and proper tech   Toilet Transfers  Toilet - Technique: To right, To left, Stand pivot  Equipment Used: Standard toilet  Toilet Transfer: Moderate assistance  Toilet Transfers Comments: assist x1, VCs for safety and proper tech     Shower Transfers  Shower - Transfer From: Wheelchair  Shower - Transfer Type: To and From  Shower - Transfer To: Transfer tub bench  Shower - Technique: Squat pivot, To right, To left  Shower Transfers: Dependent, 2 Person assistance  Shower Transfers Comments: mod - max of 1 to R, mod x 2 to L  Wheelchair Bed Transfers  Equipment Used:  Other, Wheelchair, [employer])  Level of Asssistance: Dependent/Total    SPEECH:      Current Medications:   Current Facility-Administered Medications: [START ON 12/23/2020] tiZANidine (ZANAFLEX) tablet 6 mg, 6 mg, Oral, Daily **AND** tiZANidine (ZANAFLEX) tablet 4 mg, 4 mg, Oral, 2 times per day  sertraline (ZOLOFT) tablet 150 mg, 150 mg, Oral, Daily  amLODIPine (NORVASC) tablet 5 mg, 5 mg, Oral, Daily  thiamine mononitrate tablet 100 mg, 100 mg, Oral, Daily  traZODone (DESYREL) tablet 25 mg, 25 mg, Oral, Nightly  gabapentin (NEURONTIN) capsule 300 mg, 300 mg, Oral, Nightly  lisinopril (PRINIVIL;ZESTRIL) tablet 20 mg, 20 mg, Oral, Daily  oxyCODONE (ROXICODONE) immediate release tablet 5 mg, 5 mg, Oral, Q8H PRN  acetaminophen (TYLENOL) tablet 650 mg, 650 mg, Oral, Q4H PRN  buPROPion (WELLBUTRIN XL) extended release tablet 300 mg, 300 mg, Oral, Daily [COMPLETED] cyanocobalamin injection 1,000 mcg, 1,000 mcg, Intramuscular, Daily **FOLLOWED BY** cyanocobalamin injection 1,000 mcg, 1,000 mcg, Intramuscular, Weekly **FOLLOWED BY** [START ON 2/5/2021] cyanocobalamin injection 1,000 mcg, 1,000 mcg, Intramuscular, A36 Days  folic acid (FOLVITE) tablet 1 mg, 1 mg, Oral, Daily  ipratropium-albuterol (DUONEB) nebulizer solution 1 ampule, 1 ampule, Inhalation, Q4H PRN  lamoTRIgine (LAMICTAL) tablet 400 mg, 400 mg, Oral, Daily  magnesium hydroxide (MILK OF MAGNESIA) 400 MG/5ML suspension 30 mL, 30 mL, Oral, Daily PRN  melatonin tablet 3 mg, 3 mg, Oral, Nightly  muscle rub cream, , Topical, TID PRN  rOPINIRole (REQUIP) tablet 1 mg, 1 mg, Oral, Nightly  sennosides-docusate sodium (SENOKOT-S) 8.6-50 MG tablet 2 tablet, 2 tablet, Oral, Daily  polyethylene glycol (GLYCOLAX) packet 17 g, 17 g, Oral, Daily  bisacodyl (DULCOLAX) suppository 10 mg, 10 mg, Rectal, Daily PRN  senna (SENOKOT) tablet 17.2 mg, 2 tablet, Oral, Daily PRN  enoxaparin (LOVENOX) injection 30 mg, 30 mg, Subcutaneous, BID      Objective:  /67   Pulse 82   Temp 98.2 °F (36.8 °C) (Oral)   Resp 18   Ht 5' 5\" (1.651 m)   Wt 233 lb (105.7 kg)   SpO2 97%   BMI 38.77 kg/m²       GEN: Well developed, well nourished, no acute distress  HEENT: NCAT. EOM grossly intact. Hearing grossly intact. Mucous membranes pink and moist.  RESP: Normal breath sounds with no wheezing, rales, or rhonchi. Respirations WNL and unlabored. CV: Regular rate and rhythm. No murmurs, rubs, or gallops. ABD: Soft, non-distended, BS+ and equal.  NEURO: Alert, oriented to person, place, and time. Speech fluent with no aphasia or dysarthria noted. Moderate spasticity noted in the left elbow flexors, wrist flexors, finger flexors, knee flexors, and ankle plantarflexors. Muscle spasms in the left lower limb noted during evaluation. MSK: Decreased AROM in the left upper and lower limbs due to weakness. Otherwise functional ROM. Muscle bulk is normal bilaterally. Left hemiparesis. Tenderness to palpation over the region of the left proximal biceps/left pec major. Increased pain with passive external rotation of the left shoulder. LIMBS: No edema in bilateral lower limbs. SKIN: Warm and dry with good turgor. PSYCH: Mood WNL. Affect WNL. Appropriately interactive. Diagnostics:     CBC:   Recent Labs     12/21/20  0639   WBC 6.1   RBC 3.69*   HGB 11.2*   HCT 33.3*   MCV 90.2   RDW 13.9   *     BMP:   Recent Labs     12/21/20  0639      K 4.1      CO2 22   BUN 10   CREATININE 0.56   GLUCOSE 94     BNP: No results for input(s): BNP in the last 72 hours. PT/INR: No results for input(s): PROTIME, INR in the last 72 hours. APTT: No results for input(s): APTT in the last 72 hours. CARDIAC ENZYMES: No results for input(s): CKMB, CKMBINDEX, TROPONINT in the last 72 hours. Invalid input(s): CKTOTAL;3  FASTING LIPID PANEL:  Lab Results   Component Value Date    CHOL 247 (H) 11/30/2020     11/30/2020    TRIG 77 11/30/2020     LIVER PROFILE:   Recent Labs     12/21/20  0639   AST 19   ALT 16   BILIDIR 0.08   BILITOT 0.22*   ALKPHOS 75        Imaging: Follow up CT head, 12/21/2020: Impression   1. Interval decrease in size of the right parietal intraparenchymal hematoma. There is persistent associated edema with 3 mm right to left midline shift. 2. Areas of subarachnoid and intraventricular hemorrhage seen previously are   no longer identified. Impression/Plan:   Impaired ADLs, gait, and mobility due to:    1. Hemorrhagic CVA:  PT/OT for gait, mobility, strengthening, endurance, ADLs, and self care. Medrol tapered until 12/13. 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  Monitoring platelets 3. Muscle spasm/myoclonus/spasticity: Now off baclofen. Switched from as-needed to scheduled tizanidine 12/17 - increased dose 12/20 but now having increased muscle spasms in the afternoon and evening. Decreased afternoon dose of tizanidine for 12/23. PRAFO for left lower limb at night. Will need bracing for the left lower limb to help with stability and tone - orthotist evaluated 12/22. 4. Left proximal upper limb pain:  In the area of left proximal biceps/left pec major muscles. May be related to spasticity or increased use of left upper limb in therapies. Will monitor. 5. HTN: amlodipine (increased 12/21 by IM), lisinopril (decreased 12/14 by IM), labetalol - discontinued 12/18 by IM  6. ETOH withdrawal/history of abuse: was treated with IV ativan and librium. On R63 and folic acid now  7. Bipolar Disorder: on wellbutrin, zoloft (increased 12/21 to home dose of 150mg daily), lamictal  8. Restless Leg Syndrome: on requip  9. Insomnia:  On melatonin, gabapentin nightly. Takes trazodone at home - continue low-dose trazodone (started 12/15). 10. Bowel Management: Miralax daily, senokot prn, dulcolax prn. 11. DVT Prophylaxis:  low molecular weight heparin, SCD's while in bed and HORTENSIA's   12.  Internal medicine for medical management         Electronically signed by America Butt MD on 12/22/2020 at 7:51 PM

## 2020-12-22 NOTE — PROGRESS NOTES
[facility]: JESSICA CRUZ   ACUTE REHABILITATION OCCUPATIONAL THERAPY  DAILY NOTE    Date: 20  Patient Name: Alaine Shone      Room: 2612/2612-01    MRN: [id number]   : 1970  (48 y.o.)  Gender: female   Referring Practitioner: Roni Trammell MD  Diagnosis: Intracranial hemmorrhage  Additional Pertinent Hx: 51-year-old female with history of delta storage pool disease, bipolar disorder, alcohol abuse who developed acute left-sided weaknessfound to have intraparenchymal hemorrhage, right parietal lobe hemorrhage and moderate subarachnoid hemorrhage. Pt admitted to rehab unit on 20. Restrictions  Restrictions/Precautions: Fall Risk  Other position/activity restrictions: L hypertonicity. PRAFO boot LLE in bed  Required Braces or Orthoses?: No  Equipment Used: Other, Wheelchair, Bed(Tameka Camp)    Subjective  Subjective: Darci Benitez said '[place] bryant mom' and I lost it! \" pt becoming upset/emotional when recalling her son visiting last night, pt states \"I knew I wouldn't be home for manny but I didn't really realize it until he said that! \"   Comments: pt cooperative, motivated, and pleasant  Patient Currently in Pain: Denies(at rest)  Restrictions/Precautions: Fall Risk  Overall Orientation Status: Within Functional Limits  Patient Observation  Observations: intermittent L side neglect  Pain Assessment  Pain Assessment: 0-10  Pain Level: 1  Pain Type: Acute pain  Pain Location: Arm  Pain Orientation: Left  Pain Descriptors: Aching, Sore    Objective  Cognition  Overall Cognitive Status: Impaired  Safety Judgement: Decreased awareness of need for safety  Perception  Overall Perceptual Status: Impaired  Unilateral Attention: Cues to attend left visual field;Cues to attend to left side of body;Cues to maintain midline in sitting;Cues to maintain midline in standing(flucuates)  Balance  Sitting Balance: Stand by assistance(stand by assist/sup on toilet) Standing Balance: Moderate assistance(mod/min assist, assist to place LUE on sink)  Bed mobility  Rolling to Right: Contact guard assistance;Stand by assistance(for balance/steadiness)  Supine to Sit: Contact guard assistance  Scooting: Stand by assistance(close SBA)  Comment: pt uses bed rail on R side  Transfers  Stand Pivot Transfers: Moderate assistance(assist x1, assist for LLE positioning/stabilization)  Sit to stand: Moderate assistance(mod/min assist, varies with level of support/fatigue)  Stand to sit: Minimal assistance  Transfer Comments: VCs for hand placements and proper tech  Standing Balance  Time: AM: 1 min x3, 1-2 min x3; PM: 4-5 min, 2-3 min x3  Activity: AM: functional transfers, ADL tasks, PM: dynamic standing/reaching and crossing midline to increase standing tolerance/balance and safety for improved task performance, tactile cues req to increase weight bearing/weight shifting in LLE, intermittent RUE support req for support/balance  Comment: unsteady with dynamic tasks, assist to stabilize LLE and place LUE on sink  Functional Mobility  Functional - Mobility Device: Wheelchair  Activity: To/from bathroom  Assist Level: Minimal assistance  Functional Mobility Comments: assist and cuing for visual scanning/turning to L side  Toilet Transfers  Toilet - Technique: To right; To left;Stand pivot  Equipment Used: Standard toilet  Toilet Transfer: Moderate assistance  Toilet Transfers Comments: assist x1, VCs for safety and proper tech                             ADL  Equipment Provided: Long-handled sponge;Long-handled shoe horn;Reacher  Feeding: Setup  Grooming: Modified independent (seated)  UE Bathing: Setup;Contact guard assistance(dycem utilized on sink to stabilize LUE, intermittent assist req for support)  LE Bathing:  Moderate assistance;Verbal cueing(assist to block L knee in standing, min/mod assist for balance) Times per day: Twice a day  Current Treatment Recommendations: Self-Care / ADL, Home Management Training, Strengthening, Balance Training, Functional Mobility Training, Endurance Training, Wheelchair Mobility Training, Neuromuscular Re-education, Pain Management, Safety Education & Training, Patient/Caregiver Education & Training, Equipment Evaluation, Education, & procurement, Cognitive/Perceptual Training  Patient Goals   Patient goals : To discharge home with family support  Short term goals  Time Frame for Short term goals: 7 to 10 days  Short term goal 1: Pt will perform upper body bathing/dressing with stand by assist.  Short term goal 2: Pt will perform lower body bathing and dressing with Moderate assist and Fair safety. Short term goal 3: Pt will perform toileting tasks with Moderate assist.  Short term goal 4: Pt will verbalize/demonstrate Good understanding of assistive equipment/durable medical equipment/modified techniques for increased independence with self-care and mobility. Short term goal 5: Pt will perform functional transfers with Moderate assist to toilet/wheelchair/shower with Fair safety. Short term goal 6: Pt will actively participate in 30+ minutes of therapeutic exercise/functional activities to promote increased independence with self-care and mobility. Long term goals  Time Frame for Long term goals : By discharge  Long term goal 1: Pt will perform BADLs with modified independence and Good safety with assist PRN for HORTENSIA hose. Long term goal 2: Pt will perform functional transfers and mobility with modified independence, least restrictive mobility device, and Good safety. Long term goal 3: Pt will attend to L side of body 100% of the time during self-care, transfers, and mobility with 1-2 verbal cues PRN. Long term goal 4: Pt will stand for 5+ minutes with 1-2 UE support, modified independence and no loss of balance while engaging in functional activity of choice. Long term goal 5: Pt will verbalize/demonstrate Good understanding of home exercise program for LUE.   Long term goals 6: 9 hole peg, box and block to be assessed for LUE as appropriate        12/22/20 1336 12/22/20 1557   OT Individual Minutes   Time In  --  9205   Time Out  --  6212   Minutes  --  63   OT Co-Treatment Minutes   Time In 1336  --    Time Out 1408  --    Minutes 32  --      Electronically signed by JEAN CARLOS Yun on 12/22/20 at 4:00 PM EST

## 2020-12-22 NOTE — PROGRESS NOTES
[facility] Internal Medicine    CONSULTATION / HISTORY AND PHYSICAL EXAMINATION            Date:   2020  Patient name:  Erica Lombardi  Date of admission:  2020  8:30 PM  MRN:   [id number]  Account:  [de-identified]  YOB: 1970  PCP:    Twan Noel MD  Room:   [facility]  Code Status:    Full Code    Physician Requesting Consult: Marcella Correa MD    Reason for Consult: Medical management    Chief Complaint:   Acute left sided weakness     History Obtained From:     patient, electronic medical record    History of Present Illness/ Interval History:   51-year-old female with history of delta storage pool disease, bipolar disorder, alcohol abuse who developed acute left-sided weaknessfound to have intraparenchymal hemorrhage, right parietal lobe hemorrhage and moderate subarachnoid hemorrhage. Pt admitted to rehab unit on 20. Improving with PT/OT    Past Medical History:     Past Medical History:   Diagnosis Date    Asthma     [facility] [address]     Delta storage pool disease ([facility] [address]     Hypertension     Psychiatric problem         Past Surgical History:     Past Surgical History:   Procedure Laterality Date     SECTION  7731,3216    Miscarriage     CHOLECYSTECTOMY      COLONOSCOPY N/A 2/3/2020    -random bx(normal)hemorrhoids    GASTRIC BYPASS SURGERY      HYSTERECTOMY      KNEE SURGERY      LAPAROSCOPY      TONSILLECTOMY AND ADENOIDECTOMY      UPPER GASTROINTESTINAL ENDOSCOPY N/A 2/3/2020    (normal,neg H-Pylori)normal post-bypass endoscopy        Medications Prior to Admission:     Prior to Admission medications    Medication Sig Start Date End Date Taking?  Authorizing Provider   STIMATE 1.5 MG/ML SOLN nasal solution INSTILL 2 SPRAYS INTO THE NOSE TWICE DAILY STARTING TWO DAYS BEFORE PROCEDURE 20   Gigi Blank MD amLODIPine (NORVASC) 5 MG tablet Take 5 mg by mouth daily    Historical Provider, MD   QUEtiapine (SEROQUEL) 200 MG tablet Take 200 mg by mouth nightly    Historical Provider, MD   lisinopril (PRINIVIL;ZESTRIL) 20 MG tablet Take 20 mg by mouth daily    Historical Provider, MD   buPROPion (WELLBUTRIN XL) 300 MG XL tablet Take 300 mg by mouth daily 3/5/16   Historical Provider, MD   sertraline (ZOLOFT) 50 MG tablet Take 50 mg by mouth daily 3/5/16   Historical Provider, MD   PROAIR  (90 BASE) MCG/ACT inhaler Inhale 2 puffs into the lungs every 6 hours as needed  14   Historical Provider, MD   lamoTRIgine (LAMICTAL) 200 MG tablet Take 400 mg by mouth daily 2 tabs 14   Historical Provider, MD        Allergies:     Penicillin g, Pcn [penicillins], and Sulfa antibiotics    Social History:     Tobacco:    reports that she has never smoked. She has never used smokeless tobacco.  Alcohol:      reports current alcohol use. Drug Use:  reports no history of drug use. Family History:     Family History   Adopted: Yes   Family history unknown: Yes       Review of Systems:     Positive and Negative as described in HPI. Denies any shortness of breath or cough  Denies chest pain or palpitations  Denies abdominal pain, diarrhea vomiting  Denies any new numbness tremors or weakness. Physical Exam:     /80   Pulse 81   Temp 98.4 °F (36.9 °C) (Oral)   Resp 12   Ht 5' 5\" (1.651 m)   Wt 233 lb (105.7 kg)   SpO2 98%   BMI 38.77 kg/m²   Temp (24hrs), Av.6 °F (37 °C), Min:98.4 °F (36.9 °C), Max:98.8 °F (37.1 °C)      General appearance:  alert, cooperative and no distress  Eyes: Anicteric sclera. Pupils are equally round and reactive to light. Extraocular movements are intact.   Lungs:  clear to auscultation bilaterally, normal effort  Heart:  regular rate and rhythm, no murmur  Abdomen:  soft, nontender, nondistended, normal bowel sounds, no masses, hepatomegaly, splenomegaly Patient is progressing well, with therapy  Weakness in leg is much better, noticed that weakness is arm is still there  Potassium tested is 4.4  We will discontinue scheduled p.o. potassium    12/18   Readings of low blood pressure, will discontinue labetalol  Reducing dose of Norvasc to 2.5  We will follow    12/19   Patient blood pressure is much better, after adjusting dose of Norvasc  Working with physical therapy    12/20   Patient blood pressure is much improved after adjusting dose of Norvasc  No new complaints    12/21  No new complaints. Patient weakness on the left side persist. Patient is due for repeat CT head     12/22  No acute complaints   Patient has CT head yesterday showed resolution of SAH and intraventricular hemorrhage and decrease in size of right parietal hemorrhage. Blood is controlled after increasing amlodipine       Consultations:   IP CONSULT TO INTERNAL MEDICINE  IP CONSULT TO DIETITIAN  IP CONSULT TO SOCIAL WORK      Nida Moody MD  12/22/2020  8:29 AM    Copy sent to Dr. Yann Garcia MD    Please note that this chart was generated using voice recognition Dragon dictation software. Although every effort was made to ensure the accuracy of this automated transcription, some errors in transcription may have occurred. Attestation and add on       I have discussed the care of Bora Fontaine , including pertinent history and exam findings,      12/22/20    with the resident. I have seen and examined the patient and the key elements of all parts of the encounter have been performed by me . I agree with the assessment, plan and orders as documented by the resident. Active Problems:    Platelet dysfunction (HCC)    Bipolar 1 disorder (HCC)    Intracranial hemorrhage (HCC)    Vasogenic edema (HCC)    H/O intracranial hemorrhage    Essential hypertension    Acute left-sided weakness  Resolved Problems:    * No resolved hospital problems.  * ---CT scan of the head showed significant resolution of the intracranial hemorrhage and- ;        Medications: Allergies: Allergies   Allergen Reactions    Penicillin G Itching    Pcn [Penicillins]     Sulfa Antibiotics Other (See Comments)     Inflammation in the eye       Current Meds:   Scheduled Meds:    sertraline  150 mg Oral Daily    amLODIPine  5 mg Oral Daily    tiZANidine  6 mg Oral BID- 8&2    And    tiZANidine  4 mg Oral Nightly    thiamine mononitrate  100 mg Oral Daily    traZODone  25 mg Oral Nightly    gabapentin  300 mg Oral Nightly    lisinopril  20 mg Oral Daily    buPROPion  300 mg Oral Daily    cyanocobalamin  1,000 mcg Intramuscular Weekly    Followed by   Kiara Hazel ON 2/5/2021] cyanocobalamin  1,000 mcg Intramuscular Q77 Days    folic acid  1 mg Oral Daily    lamoTRIgine  400 mg Oral Daily    melatonin  3 mg Oral Nightly    rOPINIRole  1 mg Oral Nightly    sennosides-docusate sodium  2 tablet Oral Daily    polyethylene glycol  17 g Oral Daily    enoxaparin  30 mg Subcutaneous BID     Continuous Infusions:   PRN Meds: oxyCODONE, acetaminophen, ipratropium-albuterol, magnesium hydroxide, muscle rub, bisacodyl, senna        [facility] [address], [address] [place] [address].    Phone [phone]   Fax: [phone]  Answering Service: [phone]

## 2020-12-22 NOTE — PLAN OF CARE
Problem: Skin Integrity:  Goal: Will show no infection signs and symptoms  Description: Will show no infection signs and symptoms  Outcome: Ongoing  Goal: Absence of new skin breakdown  Description: Absence of new skin breakdown  Outcome: Ongoing     Problem: Falls - Risk of:  Goal: Will remain free from falls  Description: Will remain free from falls  Outcome: Ongoing  Goal: Absence of physical injury  Description: Absence of physical injury  Outcome: Ongoing     Problem: Pain:  Goal: Pain level will decrease  Description: Pain level will decrease  Outcome: Ongoing  Goal: Control of acute pain  Description: Control of acute pain  Outcome: Ongoing  Goal: Control of chronic pain  Description: Control of chronic pain  Outcome: Ongoing     Problem: Neurological  Goal: Maximum potential motor/sensory/cognitive function  Outcome: Ongoing     Problem: Nutrition  Goal: Optimal nutrition therapy  Description: Nutrition Problem #1: Inadequate energy intake  Intervention: Food and/or Nutrient Delivery: Continue Current Diet, Continue Oral Nutrition Supplement  Nutritional Goals: po intake greater than 75%     Outcome: Ongoing     Problem: Neurological  Goal: Maximum potential motor/sensory/cognitive function  Outcome: Ongoing     Problem: Mobility - Impaired:  Goal: Mobility will improve  Description: Mobility will improve  Outcome: Ongoing

## 2020-12-23 PROCEDURE — 6360000002 HC RX W HCPCS: Performed by: PHYSICAL MEDICINE & REHABILITATION

## 2020-12-23 PROCEDURE — 6370000000 HC RX 637 (ALT 250 FOR IP): Performed by: STUDENT IN AN ORGANIZED HEALTH CARE EDUCATION/TRAINING PROGRAM

## 2020-12-23 PROCEDURE — 1180000000 HC REHAB R&B

## 2020-12-23 PROCEDURE — 99231 SBSQ HOSP IP/OBS SF/LOW 25: CPT | Performed by: STUDENT IN AN ORGANIZED HEALTH CARE EDUCATION/TRAINING PROGRAM

## 2020-12-23 PROCEDURE — 6370000000 HC RX 637 (ALT 250 FOR IP): Performed by: INTERNAL MEDICINE

## 2020-12-23 PROCEDURE — 97530 THERAPEUTIC ACTIVITIES: CPT

## 2020-12-23 PROCEDURE — 97116 GAIT TRAINING THERAPY: CPT

## 2020-12-23 PROCEDURE — 97112 NEUROMUSCULAR REEDUCATION: CPT

## 2020-12-23 PROCEDURE — 97110 THERAPEUTIC EXERCISES: CPT

## 2020-12-23 PROCEDURE — 99231 SBSQ HOSP IP/OBS SF/LOW 25: CPT | Performed by: INTERNAL MEDICINE

## 2020-12-23 PROCEDURE — 97535 SELF CARE MNGMENT TRAINING: CPT

## 2020-12-23 PROCEDURE — 6370000000 HC RX 637 (ALT 250 FOR IP): Performed by: NURSE PRACTITIONER

## 2020-12-23 PROCEDURE — 6370000000 HC RX 637 (ALT 250 FOR IP): Performed by: PHYSICAL MEDICINE & REHABILITATION

## 2020-12-23 PROCEDURE — 97542 WHEELCHAIR MNGMENT TRAINING: CPT

## 2020-12-23 RX ADMIN — ROPINIROLE HYDROCHLORIDE 1 MG: 1 TABLET, FILM COATED ORAL at 20:19

## 2020-12-23 RX ADMIN — LAMOTRIGINE 400 MG: 100 TABLET ORAL at 07:21

## 2020-12-23 RX ADMIN — TIZANIDINE 4 MG: 4 TABLET ORAL at 20:19

## 2020-12-23 RX ADMIN — FOLIC ACID 1 MG: 1 TABLET ORAL at 07:18

## 2020-12-23 RX ADMIN — SERTRALINE HYDROCHLORIDE 150 MG: 100 TABLET ORAL at 07:18

## 2020-12-23 RX ADMIN — LISINOPRIL 20 MG: 20 TABLET ORAL at 07:18

## 2020-12-23 RX ADMIN — OXYCODONE HYDROCHLORIDE 5 MG: 5 TABLET ORAL at 17:32

## 2020-12-23 RX ADMIN — TRAZODONE HYDROCHLORIDE 25 MG: 50 TABLET ORAL at 20:20

## 2020-12-23 RX ADMIN — SENNOSIDES AND DOCUSATE SODIUM 2 TABLET: 8.6; 5 TABLET ORAL at 07:18

## 2020-12-23 RX ADMIN — POLYETHYLENE GLYCOL 3350 17 G: 17 POWDER, FOR SOLUTION ORAL at 07:18

## 2020-12-23 RX ADMIN — GABAPENTIN 300 MG: 300 CAPSULE ORAL at 20:19

## 2020-12-23 RX ADMIN — THIAMINE HCL TAB 100 MG 100 MG: 100 TAB at 07:19

## 2020-12-23 RX ADMIN — ENOXAPARIN SODIUM 30 MG: 30 INJECTION SUBCUTANEOUS at 07:18

## 2020-12-23 RX ADMIN — BUPROPION HYDROCHLORIDE 300 MG: 300 TABLET, FILM COATED, EXTENDED RELEASE ORAL at 07:21

## 2020-12-23 RX ADMIN — ENOXAPARIN SODIUM 30 MG: 30 INJECTION SUBCUTANEOUS at 20:21

## 2020-12-23 RX ADMIN — TIZANIDINE 4 MG: 4 TABLET ORAL at 13:40

## 2020-12-23 RX ADMIN — AMLODIPINE BESYLATE 5 MG: 5 TABLET ORAL at 07:19

## 2020-12-23 RX ADMIN — Medication 3 MG: at 20:19

## 2020-12-23 RX ADMIN — TIZANIDINE 6 MG: 4 TABLET ORAL at 07:18

## 2020-12-23 NOTE — PROGRESS NOTES
Quality of Gait: much better this morning, pt able to advance left LE and complete terminal knee without assist, pt needing assistance for swing phase due to over powering swing phase of left LE and to prevent the left ankle from rolling  Gait Deviations: Decreased step length;Decreased step height;Deviated path  Distance: 25 feet x2  Comments: much better foot placement and control and sensory stimulus applied for LLE placement  Ambulation 2  Surface - 2: level tile  Device 2: Large Ruel [place]  Other Apparatus 2: Slider;Left; Wheelchair follow  Assistance 2: Moderate assistance;2 Person assistance  Gait Deviations: Decreased step length;Decreased step height  Distance: 25 feet  Stairs  # Steps : 3  Stairs Height: 4\"  Rails: Right ascending  Assistance: 2 Person assistance;Maximum assistance  Comment: needs Bay-ModA to place LLE patient able to initiate advancement. Propulsion 1  Propulsion: Manual  Level: Level Tile  Method: RLE;RUE;LLE  Level of Assistance: Minimal assistance  Description/ Details: poor safety awareness with left side neglect, running into objects on left side  Distance: 80 feet        Other exercises  Other exercises 1: focused on neuro re-ed and postural alignment with mirror feed back sitting and standing. Standing support assisted with lg lopez. education on Left side weight bearing and hip and knee control. sit to stands resisted with blue therapy at hips 12 reps tolerated. standing with Right LE on 4 inch rise step to weight shift to L LE WB activity with and without UE support. constant cueing for training with mirror, tactile, vc's provided. caution patient \"pusher\" syndrome and poor sensory awareness.   Other exercises 2: Seated bilateral LE exercises, 3# R LE, blue resistance band 10 reps to tolerance each  Other exercises 3: Seated (B) LE ex x 20, minimal LAQ noted with LLE today Other exercises 6: NuStep 10 minutes BLE no LUE supported in sling. L4. needed strapping to support LLE to neutral                        G-Code     OutComes Score                                                     AM-PAC Score             Goals  Short term goals  Time Frame for Short term goals: 10 days  Short term goal 1: pt will perform bed mobility with min A  Short term goal 2: pt will dangle EOB/EOM for 20 to 25 minutes with SBA, performing challenged seated balance/corestrengthening  Short term goal 3: Pivot transfers with mod A+2  Short term goal 4: WC mobility x 100' with min A  Short term goal 5: progress to standing/pre-gait activities in // bars to facilitate L LE motor fucntion. Long term goals  Time Frame for Long term goals : By LOS  Long term goal 1: Pt able to perform bed mobility independently  Long term goal 2: Pt able to perform transfers at [address] term goal 3: Pt able to progress to ambulation with appropriate device dist of 30 to 50 ft, mod A   Long term goal 4: Pt able to propel w/c level surfaces dist of 150 ft , mod-I. Long term goal 5: Improve L LE strength to atleast 2 to 2+/5 to improve fucntion. Long term goal 6: Improve PASS score from 8/30 to 23/36 to improve overall fucntion. Long term goal 7: Pt able to go up and down 5 steps with rail, mod A. Patient Goals   Patient goals : to regain use of L arm and L leg    Plan    Plan  Times per week: 1.5hr/day, 5 to 7 days/week.   Times per day: Daily  Current Treatment Recommendations: Strengthening, ROM, Balance Training, Functional Mobility Training, Transfer Training, Wheelchair Mobility Training, Gait Training, Neuromuscular Re-education, Pain Management, Home Exercise Program, Safety Education & Training, Patient/Caregiver Education & Training, Positioning, Endurance Training, Stair training  Safety Devices  Type of devices: Call light within reach, Gait belt, All fall risk precautions in place  Restraints

## 2020-12-23 NOTE — PLAN OF CARE
Problem: Skin Integrity:  Goal: Will show no infection signs and symptoms  Description: Will show no infection signs and symptoms  Outcome: Ongoing     Problem: Skin Integrity:  Goal: Absence of new skin breakdown  Description: Absence of new skin breakdown  Outcome: Ongoing     Problem: Falls - Risk of:  Goal: Will remain free from falls  Description: Will remain free from falls  Outcome: Ongoing     Problem: Falls - Risk of:  Goal: Absence of physical injury  Description: Absence of physical injury  Outcome: Ongoing     Problem: Pain:  Goal: Pain level will decrease  Description: Pain level will decrease  Outcome: Ongoing     Problem: Pain:  Goal: Control of acute pain  Description: Control of acute pain  Outcome: Ongoing     Problem: Nutrition  Goal: Optimal nutrition therapy  Description: Nutrition Problem #1: Inadequate energy intake  Intervention: Food and/or Nutrient Delivery: Continue Current Diet, Continue Oral Nutrition Supplement  Nutritional Goals: po intake greater than 75%     Outcome: Ongoing     Problem: Mobility - Impaired:  Goal: Mobility will improve  Description: Mobility will improve  Outcome: Ongoing

## 2020-12-23 NOTE — PROGRESS NOTES
[facility]    CONSULTATION / HISTORY AND PHYSICAL EXAMINATION            Date:   2020  Patient name:  Alaine Shone  Date of admission:  2020  8:30 PM  MRN:   [id number]  Account:  [de-identified]  YOB: 1970  PCP:    Quique Schneider MD  Room:   [id number]  Code Status:    Full Code    Physician Requesting Consult: Roni Trammell MD    Reason for Consult: Medical management    Chief Complaint:   Acute left sided weakness     History Obtained From:     patient, electronic medical record    History of Present Illness/ Interval History:   77-year-old female with history of delta storage pool disease, bipolar disorder, alcohol abuse who developed acute left-sided weaknessfound to have intraparenchymal hemorrhage, right parietal lobe hemorrhage and moderate subarachnoid hemorrhage. Pt admitted to rehab unit on 20. Improving with PT/OT    Past Medical History:     Past Medical History:   Diagnosis Date    Asthma     Bipolar 1 disorder ([facility] [address]     Delta storage pool disease ([facility] [address]     Hypertension     Psychiatric problem         Past Surgical History:     Past Surgical History:   Procedure Laterality Date     SECTION  7137,2152    Miscarriage     CHOLECYSTECTOMY      COLONOSCOPY N/A 2/3/2020    -random bx(normal)hemorrhoids    GASTRIC BYPASS SURGERY      HYSTERECTOMY      KNEE SURGERY      LAPAROSCOPY      TONSILLECTOMY AND ADENOIDECTOMY      UPPER GASTROINTESTINAL ENDOSCOPY N/A 2/3/2020    (normal,neg H-Pylori)normal post-bypass endoscopy        Medications Prior to Admission:     Prior to Admission medications    Medication Sig Start Date End Date Taking?  Authorizing Provider   STIMATE 1.5 MG/ML SOLN nasal solution INSTILL 2 SPRAYS INTO THE NOSE TWICE DAILY STARTING TWO DAYS BEFORE PROCEDURE 20   Song Lugo MD amLODIPine (NORVASC) 5 MG tablet Take 5 mg by mouth daily    Historical Provider, MD   QUEtiapine (SEROQUEL) 200 MG tablet Take 200 mg by mouth nightly    Historical Provider, MD   lisinopril (PRINIVIL;ZESTRIL) 20 MG tablet Take 20 mg by mouth daily    Historical Provider, MD   buPROPion (WELLBUTRIN XL) 300 MG XL tablet Take 300 mg by mouth daily 3/5/16   Historical Provider, MD   sertraline (ZOLOFT) 50 MG tablet Take 50 mg by mouth daily 3/5/16   Historical Provider, MD   PROAIR  (90 BASE) MCG/ACT inhaler Inhale 2 puffs into the lungs every 6 hours as needed  14   Historical Provider, MD   lamoTRIgine (LAMICTAL) 200 MG tablet Take 400 mg by mouth daily 2 tabs 14   Historical Provider, MD        Allergies:     Penicillin g, Pcn [penicillins], and Sulfa antibiotics    Social History:     Tobacco:    reports that she has never smoked. She has never used smokeless tobacco.  Alcohol:      reports current alcohol use. Drug Use:  reports no history of drug use. Family History:     Family History   Adopted: Yes   Family history unknown: Yes       Review of Systems:     Positive and Negative as described in HPI. Denies any shortness of breath or cough  Denies chest pain or palpitations  Denies abdominal pain, diarrhea vomiting  Denies any new numbness tremors or weakness. Physical Exam:     BP (!) 98/51   Pulse 85   Temp 97.7 °F (36.5 °C) (Oral)   Resp 18   Ht 5' 5\" (1.651 m)   Wt 233 lb (105.7 kg)   SpO2 99%   BMI 38.77 kg/m²   Temp (24hrs), Av °F (36.7 °C), Min:97.7 °F (36.5 °C), Max:98.2 °F (36.8 °C)      General appearance:  alert, cooperative and no distress  Eyes: Anicteric sclera. Pupils are equally round and reactive to light. Extraocular movements are intact.   Lungs:  clear to auscultation bilaterally, normal effort  Heart:  regular rate and rhythm, no murmur  Abdomen:  soft, nontender, nondistended, normal bowel sounds, no masses, hepatomegaly, splenomegaly Extremities:  no edema, redness, tenderness in the calves  Skin:  no gross lesions, rashes, induration  Neuro:  Alert, oriented X 3, left sided weakness  Power is left arm 0 out of 5 and 1 out of 5 in left lower extremity, increased tone in both left upper and lower extremity  Investigations:      Laboratory Testing:  Lab Results   Component Value Date    WBC 6.1 12/21/2020    HGB 11.2 (L) 12/21/2020    HCT 33.3 (L) 12/21/2020    MCV 90.2 12/21/2020     (H) 12/21/2020     Lab Results   Component Value Date     12/21/2020    K 4.1 12/21/2020     12/21/2020    CO2 22 12/21/2020    BUN 10 12/21/2020    CREATININE 0.56 12/21/2020    GLUCOSE 94 12/21/2020    CALCIUM 9.7 12/21/2020         Assessment :      Primary Problem  <principal problem not specified>    Active Hospital Problems    Diagnosis Date Noted    Essential hypertension [I10] 12/11/2020    Acute left-sided weakness [R53.1] 12/11/2020    H/O intracranial hemorrhage [Z86.79] 12/09/2020    Vasogenic edema ([facility] [address] [G93.6]     Intracranial hemorrhage (HCC) [I62.9] 11/30/2020    Bipolar 1 disorder (HCC) [F31.9]     Platelet dysfunction ([facility] [address]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 *      1. Intracranial hemorrhage with left-sided weakness. Improving. Repeat CT showed significant improvement   2. Pulmonary hypertension  3. Platelet dysfunction  4. Hypertension. Continue lisinopril 20 mg daily and amlodipine 5 mg . 5. Delta pool storage disease.    6. DVT prophylaxis with Lovenox       12/16  Hyponatremia improving, LIZA resolved  Labs and vitals reviewed and stable  Continue current management  Trazodone started 50 mg for insomnia    12/17 Patient is progressing well, with therapy  Weakness in leg is much better, noticed that weakness is arm is still there  Potassium tested is 4.4  We will discontinue scheduled p.o. potassium    12/18   Readings of low blood pressure, will discontinue labetalol  Reducing dose of Norvasc to 2.5  We will follow    12/19   Patient blood pressure is much better, after adjusting dose of Norvasc  Working with physical therapy    12/20   Patient blood pressure is much improved after adjusting dose of Norvasc  No new complaints    12/21  No new complaints. Patient weakness on the left side persist. Patient is due for repeat CT head     12/22  No acute complaints   Patient has CT head yesterday showed resolution of SAH and intraventricular hemorrhage and decrease in size of right parietal hemorrhage. Blood is controlled after increasing amlodipine       Consultations:   [address] [place] [address] CONSULT TO DIETITIAN  IP CONSULT TO SOCIAL WORK  INPATIENT CONSULT TO ORTHOTIST/PROSTHETIST      Micheal Verde MD  12/23/2020  1:13 PM    Copy sent to Dr. Giovanna Wellington MD    Please note that this chart was generated using voice recognition Dragon dictation software. Although every effort was made to ensure the accuracy of this automated transcription, some errors in transcription may have occurred. Attestation and add on       I have discussed the care of Juan Boudreaux , including pertinent history and exam findings,      12/22/20    with the resident. I have seen and examined the patient and the key elements of all parts of the encounter have been performed by me . I agree with the assessment, plan and orders as documented by the resident.      Active Problems:    Platelet dysfunction (HCC)    Bipolar 1 disorder (HCC)    Intracranial hemorrhage (HCC)    Vasogenic edema (HCC)    H/O intracranial hemorrhage    Essential hypertension    Acute left-sided weakness  Resolved Problems: * No resolved hospital problems. *          12/23/2020    Patient tolerating rehabilitative therapies . Progressing fairly well . Continue present therapy . ·     ·  1.              ---CT scan of the head showed significant resolution of the intracranial hemorrhage and- ;        Medications: Allergies: Allergies   Allergen Reactions    Penicillin G Itching    Pcn [Penicillins]     Sulfa Antibiotics Other (See Comments)     Inflammation in the eye       Current Meds:   Scheduled Meds:    tiZANidine  6 mg Oral Daily    And    tiZANidine  4 mg Oral 2 times per day    sertraline  150 mg Oral Daily    amLODIPine  5 mg Oral Daily    thiamine mononitrate  100 mg Oral Daily    traZODone  25 mg Oral Nightly    gabapentin  300 mg Oral Nightly    lisinopril  20 mg Oral Daily    buPROPion  300 mg Oral Daily    cyanocobalamin  1,000 mcg Intramuscular Weekly    Followed by   Gaurav De La O ON 2/5/2021] cyanocobalamin  1,000 mcg Intramuscular J08 Days    folic acid  1 mg Oral Daily    lamoTRIgine  400 mg Oral Daily    melatonin  3 mg Oral Nightly    rOPINIRole  1 mg Oral Nightly    sennosides-docusate sodium  2 tablet Oral Daily    polyethylene glycol  17 g Oral Daily    enoxaparin  30 mg Subcutaneous BID     Continuous Infusions:   PRN Meds: oxyCODONE, acetaminophen, ipratropium-albuterol, magnesium hydroxide, muscle rub, bisacodyl, senna        [facility] [address], [address] [place] [address].    Phone [phone]   Fax: [phone]  Answering Service: [phone]

## 2020-12-23 NOTE — PROGRESS NOTES
Comments: much better foot placement and control and sensory stimulus applied for LLE placement    Transfers  Sit to Stand: Moderate Assistance  Stand to sit: Moderate Assistance  Bed to Chair: Moderate assistance(x1, squat pivot)  Stand Pivot Transfers: Maximum Assistance(pt very anxious trying a stand pivot with hemiwalker)  Squat Pivot Transfers: Minimal Assistance(x1, mainly stabilizing left ankle to prevent rolling)  Comment: squat pivot  Ambulation  Ambulation?: Yes  Ambulation 1  Surface: level tile  Device: Hemiwalker  Other Apparatus: Left, Slider, Wheelchair follow  Assistance: Moderate assistance(2nd person for safety and wc)  Quality of Gait: much better this morning, pt able to advance left LE and complete terminal knee without assist, pt needing assistance for swing phase due to over powering swing phase of left LE and to prevent the left ankle from rolling  Gait Deviations: Decreased step length, Decreased step height, Deviated path  Distance: 25 feet x2  Comments: much better foot placement and control and sensory stimulus applied for LLE placement    Surface: level tile  Ambulation 1  Surface: level tile  Device: Hemiwalker  Other Apparatus: Left, Slider, Wheelchair follow  Assistance:  Moderate assistance(2nd person for safety and wc)  Quality of Gait: much better this morning, pt able to advance left LE and complete terminal knee without assist, pt needing assistance for swing phase due to over powering swing phase of left LE and to prevent the left ankle from rolling  Gait Deviations: Decreased step length, Decreased step height, Deviated path  Distance: 25 feet x2  Comments: much better foot placement and control and sensory stimulus applied for LLE placement    OT:  ADL  Equipment Provided: Long-handled sponge, Long-handled shoe horn, Reacher  Feeding: Setup  Grooming: Modified independent (seated)  UE Bathing: Setup, Contact guard assistance(assist to support/stabilize LUE) LE Bathing: Moderate assistance, Verbal cueing(mod/min assist in standing, declines feet, cuing req for lesley)  UE Dressing: Maximum assistance, Setup, Verbal cueing, Increased time to complete(assist threading bra/shirt on LUE/overhead and adjustments)  LE Dressing: Moderate assistance(reacher utilized, assist threading on LLE/L hip/TEDs/L shoe/)  Toileting: Minimal assistance(intermittent assist clothing on L hip and balance)  Additional Comments: pt used reacher to remove underwear and footies, pt able to cross LLE over R knee to thread  underwear and pants on L foot, able to reach and thread on R foot, mod/min assist in standing to pull up over hips, assist to hike pants/underwear on L hip, assist to block L knee in standing to decrease L foot inversion, A TEDs and L shoe, tie both, VCs for ara tech with good carryover and assist for LUE threading/adjustments, VCs req to adjust standing posture/balance and weight shift on LLE to decrease tone and L foot inversion         Balance  Sitting Balance: Supervision  Standing Balance:  Moderate assistance(mod/min assist, VCs for LLE positioning/adjustments)   Standing Balance  Time: AM: 1-2 min x5  Activity: AM: functional transfers, ADL tasks  Comment: unsteady with dynamic tasks, assist to stabilize LLE and place LUE on sink, VCs for posture/positioning and weight shifting on LLE  Functional Mobility  Functional - Mobility Device: Wheelchair  Activity: To/from bathroom  Assist Level: Minimal assistance  Functional Mobility Comments: assist and cuing for visual scanning/turning to L side     Bed mobility  Bridging: Minimal assistance  Rolling to Left: Contact guard assistance  Rolling to Right: Contact guard assistance, Stand by assistance  Supine to Sit: Contact guard assistance  Sit to Supine: Minimal assistance  Scooting: Minimal assistance  Comment: pt uses bed rail on R side  Transfers  Stand Pivot Transfers: Minimal assistance(to strong side (R)) Sit to stand: Moderate assistance(mod/min assist, varies with fatigue and level of support)  Stand to sit: Minimal assistance  Transfer Comments: VCs for hand placements, controlled sitting and proper tech   Toilet Transfers  Toilet - Technique: To right, Stand pivot(off toilet, seated on toilet upon arrival)  Equipment Used: Standard toilet  Toilet Transfer: Moderate assistance, Minimal assistance  Toilet Transfers Comments: assist x1, VCs for safety and proper tech with fair carryover     Shower Transfers  Shower - Transfer From: Wheelchair  Shower - Transfer Type: To and From  Shower - Transfer To: Transfer tub bench  Shower - Technique: Squat pivot, To right, To left  Shower Transfers: Dependent, 2 Person assistance  Shower Transfers Comments: mod - max of 1 to R, mod x 2 to L  Wheelchair Bed Transfers  Equipment Used:  Other, Wheelchair, [employer])  Level of Asssistance: Dependent/Total    SPEECH:      Current Medications:   Current Facility-Administered Medications: tiZANidine (ZANAFLEX) tablet 6 mg, 6 mg, Oral, Daily **AND** tiZANidine (ZANAFLEX) tablet 4 mg, 4 mg, Oral, 2 times per day  sertraline (ZOLOFT) tablet 150 mg, 150 mg, Oral, Daily  amLODIPine (NORVASC) tablet 5 mg, 5 mg, Oral, Daily  thiamine mononitrate tablet 100 mg, 100 mg, Oral, Daily  traZODone (DESYREL) tablet 25 mg, 25 mg, Oral, Nightly  gabapentin (NEURONTIN) capsule 300 mg, 300 mg, Oral, Nightly  lisinopril (PRINIVIL;ZESTRIL) tablet 20 mg, 20 mg, Oral, Daily  oxyCODONE (ROXICODONE) immediate release tablet 5 mg, 5 mg, Oral, Q8H PRN  acetaminophen (TYLENOL) tablet 650 mg, 650 mg, Oral, Q4H PRN  buPROPion (WELLBUTRIN XL) extended release tablet 300 mg, 300 mg, Oral, Daily [COMPLETED] cyanocobalamin injection 1,000 mcg, 1,000 mcg, Intramuscular, Daily **FOLLOWED BY** cyanocobalamin injection 1,000 mcg, 1,000 mcg, Intramuscular, Weekly **FOLLOWED BY** [START ON 2/5/2021] cyanocobalamin injection 1,000 mcg, 1,000 mcg, Intramuscular, F72 Days  folic acid (FOLVITE) tablet 1 mg, 1 mg, Oral, Daily  ipratropium-albuterol (DUONEB) nebulizer solution 1 ampule, 1 ampule, Inhalation, Q4H PRN  lamoTRIgine (LAMICTAL) tablet 400 mg, 400 mg, Oral, Daily  magnesium hydroxide (MILK OF MAGNESIA) 400 MG/5ML suspension 30 mL, 30 mL, Oral, Daily PRN  melatonin tablet 3 mg, 3 mg, Oral, Nightly  muscle rub cream, , Topical, TID PRN  rOPINIRole (REQUIP) tablet 1 mg, 1 mg, Oral, Nightly  sennosides-docusate sodium (SENOKOT-S) 8.6-50 MG tablet 2 tablet, 2 tablet, Oral, Daily  polyethylene glycol (GLYCOLAX) packet 17 g, 17 g, Oral, Daily  bisacodyl (DULCOLAX) suppository 10 mg, 10 mg, Rectal, Daily PRN  senna (SENOKOT) tablet 17.2 mg, 2 tablet, Oral, Daily PRN  enoxaparin (LOVENOX) injection 30 mg, 30 mg, Subcutaneous, BID      Objective:  BP (!) 98/51   Pulse 85   Temp 97.7 °F (36.5 °C) (Oral)   Resp 18   Ht 5' 5\" (1.651 m)   Wt 233 lb (105.7 kg)   SpO2 99%   BMI 38.77 kg/m²       GEN: Well developed, well nourished, no acute distress  HEENT: NCAT. EOM grossly intact. Hearing grossly intact. Mucous membranes pink and moist.  RESP: Normal breath sounds with no wheezing, rales, or rhonchi. Respirations WNL and unlabored. CV: Regular rate and rhythm. No murmurs, rubs, or gallops. ABD: Soft, non-distended, BS+ and equal.  NEURO: Alert, oriented to person, place, and time. Speech fluent with no aphasia or dysarthria noted. Moderate spasticity noted in the left elbow flexors, wrist flexors, finger flexors, knee flexors, and ankle plantarflexors. MSK: Decreased AROM in the left upper and lower limbs due to weakness. Otherwise functional ROM. Muscle bulk is normal bilaterally. Left hemiparesis. Mild tenderness to palpation over the region of the left proximal biceps/left pec major. No increased pain with passive external rotation of the left shoulder today. LIMBS: No edema in bilateral lower limbs. SKIN: Warm and dry with good turgor. PSYCH: Mood WNL. Affect WNL. Appropriately interactive. Diagnostics:     CBC:   Recent Labs     12/21/20  0639   WBC 6.1   RBC 3.69*   HGB 11.2*   HCT 33.3*   MCV 90.2   RDW 13.9   *     BMP:   Recent Labs     12/21/20  0639      K 4.1      CO2 22   BUN 10   CREATININE 0.56   GLUCOSE 94     BNP: No results for input(s): BNP in the last 72 hours. PT/INR: No results for input(s): PROTIME, INR in the last 72 hours. APTT: No results for input(s): APTT in the last 72 hours. CARDIAC ENZYMES: No results for input(s): CKMB, CKMBINDEX, TROPONINT in the last 72 hours. Invalid input(s): CKTOTAL;3  FASTING LIPID PANEL:  Lab Results   Component Value Date    CHOL 247 (H) 11/30/2020     11/30/2020    TRIG 77 11/30/2020     LIVER PROFILE:   Recent Labs     12/21/20  0639   AST 19   ALT 16   BILIDIR 0.08   BILITOT 0.22*   ALKPHOS 75          Impression/Plan:   Impaired ADLs, gait, and mobility due to:    1. Hemorrhagic CVA:  PT/OT for gait, mobility, strengthening, endurance, ADLs, and self care. Medrol tapered until 12/13 for headache. 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  Monitoring platelets

## 2020-12-23 NOTE — PROGRESS NOTES
[address]   ACUTE REHABILITATION OCCUPATIONAL THERAPY  DAILY NOTE    Date: 20  Patient Name: Godwin Ramirez      Room: 2612/2612-01    MRN: [id number]   : 1970  (48 y.o.)  Gender: female   Referring Practitioner: Griselda Dolan MD  Diagnosis: Intracranial hemmorrhage  Additional Pertinent Hx: 58-year-old female with history of delta storage pool disease, bipolar disorder, alcohol abuse who developed acute left-sided weaknessfound to have intraparenchymal hemorrhage, right parietal lobe hemorrhage and moderate subarachnoid hemorrhage. Pt admitted to rehab unit on 20. Restrictions  Restrictions/Precautions: Fall Risk  Other position/activity restrictions: L hypertonicity. PRAFO boot LLE in bed  Required Braces or Orthoses?: No  Equipment Used: Other, Wheelchair, [employer])    Subjective  Subjective: \"it's just discouraging because I feel so wobbly\" pt states in regards to unsteadiness first thing in the morning and completing standing tasks  Comments: pt cooperative, motivated, and pleasant  Patient Currently in Pain: Denies  Restrictions/Precautions: Fall Risk  Overall Orientation Status: Within Functional Limits  Patient Observation  Observations: intermittent L side neglect  Pain Assessment  Pain Assessment: 0-10  Pain Level: 1  Pain Type: Acute pain  Pain Location: Arm  Pain Orientation: Left  Pain Descriptors: Aching, Sore    Objective  Cognition  Overall Cognitive Status: Impaired  Safety Judgement: Decreased awareness of need for safety  Perception  Overall Perceptual Status: Impaired  Unilateral Attention: Cues to attend left visual field;Cues to attend to left side of body;Cues to maintain midline in sitting;Cues to maintain midline in standing(flucuates)  Balance  Sitting Balance: Supervision  Standing Balance:  Moderate assistance(mod/min assist, VCs for LLE positioning/adjustments)  Transfers Additional Comments: pt used reacher to remove underwear and footies, pt able to cross LLE over R knee to thread  underwear and pants on L foot, able to reach and thread on R foot, mod/min assist in standing to pull up over hips, assist to hike pants/underwear on L hip, assist to block L knee in standing to decrease L foot inversion, A TEDs and L shoe, tie both, VCs for ara tech with good carryover and assist for LUE threading/adjustments, VCs req to adjust standing posture/balance and weight shift on LLE to decrease tone and L foot inversion          Assessment  Performance deficits / Impairments: Decreased functional mobility ; Decreased ADL status; Decreased ROM; Decreased strength;Decreased endurance;Decreased balance;Decreased high-level IADLs;Decreased fine motor control;Decreased coordination;Decreased posture;Decreased safe awareness;Decreased vision/visual deficit  Prognosis: Good  Discharge Recommendations: Home with assist PRN;Patient would benefit from continued therapy after discharge  Activity Tolerance: Patient limited by pain; Patient limited by fatigue;Patient Tolerated treatment well(pain in LUE with movements)  Safety Devices in place: Yes  Type of devices: Nurse notified; Left in chair;Patient at risk for falls;Call light within reach  Equipment Recommendations  Equipment Needed: Yes  Hand Held Shower: x  Transfer Tub Bench: x  Grab bars: x  Reacher: x  Long-handled Shoehorn: x       Patient Education: transfer safety/tech, positioning LLE in standing/transfers, LUE awareness, ADL tasks/ara tech, adaptive equipment  Patient Goals   Patient goals :  To discharge home with family support  Learner:patient  Method: demonstration and explanation       Outcome: needs reinforcement        Plan  Plan  Times per week: 5-7  Times per day: Twice a day Current Treatment Recommendations: Self-Care / ADL, Home Management Training, Strengthening, Balance Training, Functional Mobility Training, Endurance Training, Wheelchair Mobility Training, Neuromuscular Re-education, Pain Management, Safety Education & Training, Patient/Caregiver Education & Training, Equipment Evaluation, Education, & procurement, Cognitive/Perceptual Training  Patient Goals   Patient goals : To discharge home with family support  Short term goals  Time Frame for Short term goals: 7 to 10 days  Short term goal 1: Pt will perform upper body bathing/dressing with stand by assist.  Short term goal 2: Pt will perform lower body bathing and dressing with Moderate assist and Fair safety. Short term goal 3: Pt will perform toileting tasks with Moderate assist.  Short term goal 4: Pt will verbalize/demonstrate Good understanding of assistive equipment/durable medical equipment/modified techniques for increased independence with self-care and mobility. Short term goal 5: Pt will perform functional transfers with Moderate assist to toilet/wheelchair/shower with Fair safety. Short term goal 6: Pt will actively participate in 30+ minutes of therapeutic exercise/functional activities to promote increased independence with self-care and mobility. Long term goals  Time Frame for Long term goals : By discharge  Long term goal 1: Pt will perform BADLs with modified independence and Good safety with assist PRN for HORTENSIA hose. Long term goal 2: Pt will perform functional transfers and mobility with modified independence, least restrictive mobility device, and Good safety. Long term goal 3: Pt will attend to L side of body 100% of the time during self-care, transfers, and mobility with 1-2 verbal cues PRN. Long term goal 4: Pt will stand for 5+ minutes with 1-2 UE support, modified independence and no loss of balance while engaging in functional activity of choice. Long term goal 5: Pt will verbalize/demonstrate Good understanding of home exercise program for LUE.   Long term goals 6: 9 hole peg, box and block to be assessed for LUE as appropriate        12/23/20 0735 12/23/20 1335   OT Individual Minutes   Time In 0735 1335   Time Out 0840 1403   Minutes 65 28     Electronically signed by JEAN CARLOS Cavazos on 12/23/20 at 3:34 PM EST

## 2020-12-24 LAB
HCT VFR BLD CALC: 33.3 % (ref 36–46)
HEMOGLOBIN: 11.2 G/DL (ref 12–16)
MCH RBC QN AUTO: 30.2 PG (ref 26–34)
MCHC RBC AUTO-ENTMCNC: 33.7 G/DL (ref 31–37)
MCV RBC AUTO: 89.6 FL (ref 80–100)
NRBC AUTOMATED: ABNORMAL PER 100 WBC
PDW BLD-RTO: 13.9 % (ref 11.5–14.9)
PLATELET # BLD: 447 K/UL (ref 150–450)
PMV BLD AUTO: 7.3 FL (ref 6–12)
RBC # BLD: 3.72 M/UL (ref 4–5.2)
WBC # BLD: 5.9 K/UL (ref 3.5–11)

## 2020-12-24 PROCEDURE — 97110 THERAPEUTIC EXERCISES: CPT

## 2020-12-24 PROCEDURE — 6370000000 HC RX 637 (ALT 250 FOR IP): Performed by: STUDENT IN AN ORGANIZED HEALTH CARE EDUCATION/TRAINING PROGRAM

## 2020-12-24 PROCEDURE — 97112 NEUROMUSCULAR REEDUCATION: CPT

## 2020-12-24 PROCEDURE — 6370000000 HC RX 637 (ALT 250 FOR IP): Performed by: INTERNAL MEDICINE

## 2020-12-24 PROCEDURE — 36415 COLL VENOUS BLD VENIPUNCTURE: CPT

## 2020-12-24 PROCEDURE — 97542 WHEELCHAIR MNGMENT TRAINING: CPT

## 2020-12-24 PROCEDURE — 97116 GAIT TRAINING THERAPY: CPT

## 2020-12-24 PROCEDURE — 85027 COMPLETE CBC AUTOMATED: CPT

## 2020-12-24 PROCEDURE — 6360000002 HC RX W HCPCS: Performed by: PHYSICAL MEDICINE & REHABILITATION

## 2020-12-24 PROCEDURE — 6370000000 HC RX 637 (ALT 250 FOR IP): Performed by: NURSE PRACTITIONER

## 2020-12-24 PROCEDURE — 6370000000 HC RX 637 (ALT 250 FOR IP): Performed by: PHYSICAL MEDICINE & REHABILITATION

## 2020-12-24 PROCEDURE — 97530 THERAPEUTIC ACTIVITIES: CPT

## 2020-12-24 PROCEDURE — 99231 SBSQ HOSP IP/OBS SF/LOW 25: CPT | Performed by: INTERNAL MEDICINE

## 2020-12-24 PROCEDURE — 1180000000 HC REHAB R&B

## 2020-12-24 PROCEDURE — 97535 SELF CARE MNGMENT TRAINING: CPT

## 2020-12-24 PROCEDURE — 99232 SBSQ HOSP IP/OBS MODERATE 35: CPT | Performed by: PHYSICAL MEDICINE & REHABILITATION

## 2020-12-24 RX ADMIN — AMLODIPINE BESYLATE 5 MG: 5 TABLET ORAL at 07:19

## 2020-12-24 RX ADMIN — LISINOPRIL 20 MG: 20 TABLET ORAL at 07:18

## 2020-12-24 RX ADMIN — POLYETHYLENE GLYCOL 3350 17 G: 17 POWDER, FOR SOLUTION ORAL at 07:20

## 2020-12-24 RX ADMIN — Medication 3 MG: at 20:57

## 2020-12-24 RX ADMIN — FOLIC ACID 1 MG: 1 TABLET ORAL at 07:18

## 2020-12-24 RX ADMIN — TIZANIDINE 4 MG: 4 TABLET ORAL at 13:38

## 2020-12-24 RX ADMIN — SERTRALINE HYDROCHLORIDE 150 MG: 100 TABLET ORAL at 07:18

## 2020-12-24 RX ADMIN — SENNOSIDES AND DOCUSATE SODIUM 2 TABLET: 8.6; 5 TABLET ORAL at 07:18

## 2020-12-24 RX ADMIN — TIZANIDINE 4 MG: 4 TABLET ORAL at 20:57

## 2020-12-24 RX ADMIN — LAMOTRIGINE 400 MG: 100 TABLET ORAL at 07:19

## 2020-12-24 RX ADMIN — TRAZODONE HYDROCHLORIDE 25 MG: 50 TABLET ORAL at 20:57

## 2020-12-24 RX ADMIN — ROPINIROLE HYDROCHLORIDE 1 MG: 1 TABLET, FILM COATED ORAL at 21:02

## 2020-12-24 RX ADMIN — OXYCODONE HYDROCHLORIDE 5 MG: 5 TABLET ORAL at 14:23

## 2020-12-24 RX ADMIN — TIZANIDINE 6 MG: 4 TABLET ORAL at 07:18

## 2020-12-24 RX ADMIN — GABAPENTIN 300 MG: 300 CAPSULE ORAL at 20:57

## 2020-12-24 RX ADMIN — BUPROPION HYDROCHLORIDE 300 MG: 300 TABLET, FILM COATED, EXTENDED RELEASE ORAL at 07:20

## 2020-12-24 RX ADMIN — ENOXAPARIN SODIUM 30 MG: 30 INJECTION SUBCUTANEOUS at 20:57

## 2020-12-24 RX ADMIN — THIAMINE HCL TAB 100 MG 100 MG: 100 TAB at 07:19

## 2020-12-24 RX ADMIN — ENOXAPARIN SODIUM 30 MG: 30 INJECTION SUBCUTANEOUS at 07:19

## 2020-12-24 ASSESSMENT — PAIN SCALES - GENERAL: PAINLEVEL_OUTOF10: 8

## 2020-12-24 NOTE — PROGRESS NOTES
[address] [facility]    ACUTE REHABILITATION OCCUPATIONAL THERAPY  DAILY NOTE    Date: 20  Patient Name: Juventino Linton      Room: 2612/2612-01    MRN: [id number]   : 1970  (48 y.o.)  Gender: female   Referring Practitioner: Ro Whitney MD  Diagnosis: Intracranial hemmorrhage  Additional Pertinent Hx: 58-year-old female with history of delta storage pool disease, bipolar disorder, alcohol abuse who developed acute left-sided weaknessfound to have intraparenchymal hemorrhage, right parietal lobe hemorrhage and moderate subarachnoid hemorrhage. Pt admitted to rehab unit on 20. Restrictions  Restrictions/Precautions: Fall Risk  Other position/activity restrictions: L hypertonicity. PRAFO boot LLE in bed  Required Braces or Orthoses?: No  Equipment Used:  Other, Wheelchair, Bed([facility])    Subjective  Subjective: \"well I hope I get it before I go home\" pt states in regards to custom AFO that was fitted yesterday for her  Comments: pt cooperative, motivated, and pleasant  Patient Currently in Pain: Denies(at rest)  Restrictions/Precautions: Fall Risk  Overall Orientation Status: Within Functional Limits  Patient Observation  Observations: intermittent L side neglect  Pain Assessment  Pain Assessment: 0-10  Pain Level: 1  Pain Type: Acute pain  Pain Location: Arm  Pain Orientation: Left  Pain Descriptors: Aching, Sore    Objective  Cognition  Overall Cognitive Status: Impaired  Safety Judgement: Decreased awareness of need for safety  Perception  Overall Perceptual Status: Impaired  Unilateral Attention: Cues to attend left visual field;Cues to attend to left side of body;Cues to maintain midline in sitting;Cues to maintain midline in standing(flucuates)  Balance  Sitting Balance: Supervision  Standing Balance: Minimal assistance(min assist, tactile cuing for LLE positioning/weight shifting)  Bed mobility  Rolling to Right: Contact guard assistance;Stand by assistance Neuromuscular Education  Neuromuscular education: Yes  Taping: kinesio tape applied to LUE to decrease pain in L bicep/upper arm           ADL  Equipment Provided: Long-handled sponge;Long-handled shoe horn;Reacher  Feeding: Setup  Grooming: Modified independent (seated)  UE Bathing: Contact guard assistance(intermittent assist for LUE support, good carryover with ara tech)  LE Bathing: Stand by assistance(stand by assist for weight shifting on bench for elizabeth-care)  UE Dressing: Moderate assistance;Setup;Verbal cueing; Increased time to complete(assist threading LUE in shirt/adjustments for shirt and bra)  LE Dressing: Moderate assistance(intermittent assist threading LLE/on L hip/TEDs/L shoe/tie both)  Toileting: Minimal assistance(intermittent assist clothing on L hip/balance)  Additional Comments: using reacher, pt gives good efforts and attempts to try various ways for donning/threading pants and shirt, pt states \"I'm just trying to see if something different is easier\"          Assessment  Performance deficits / Impairments: Decreased functional mobility ; Decreased ADL status; Decreased ROM; Decreased strength;Decreased endurance;Decreased balance;Decreased high-level IADLs;Decreased fine motor control;Decreased coordination;Decreased posture;Decreased safe awareness;Decreased vision/visual deficit  Prognosis: Good  Discharge Recommendations: Home with assist PRN;Patient would benefit from continued therapy after discharge  Activity Tolerance: Patient limited by pain; Patient limited by fatigue;Patient Tolerated treatment well(pain in LUE with movements)  Safety Devices in place: Yes  Type of devices: Nurse notified; Left in chair;Patient at risk for falls;Call light within reach  Equipment Recommendations  Equipment Needed: Yes  Hand Held Shower: x  Transfer Tub Bench: x  Grab bars: x  Reacher: x  Long-handled Shoehorn: x Patient Education: adaptive equipment, transfer safety, LLE positioning, ADL tasks/ara tech, pain mgmt in LUE, D/C planning   Patient Goals   Patient goals : To discharge home with family support  Learner:patient  Method: demonstration and explanation       Outcome: verbalized concerns, demonstrated understanding, needs reinforcement and asked questions        Plan  Plan  Times per week: 5-7  Times per day: Twice a day  Current Treatment Recommendations: Self-Care / ADL, Home Management Training, Strengthening, Balance Training, Functional Mobility Training, Endurance Training, Wheelchair Mobility Training, Neuromuscular Re-education, Pain Management, Safety Education & Training, Patient/Caregiver Education & Training, Equipment Evaluation, Education, & procurement, Cognitive/Perceptual Training  Patient Goals   Patient goals : To discharge home with family support  Short term goals  Time Frame for Short term goals: 7 to 10 days  Short term goal 1: Pt will perform upper body bathing/dressing with stand by assist.  Short term goal 2: Pt will perform lower body bathing and dressing with Moderate assist and Fair safety. Short term goal 3: Pt will perform toileting tasks with Moderate assist.  Short term goal 4: Pt will verbalize/demonstrate Good understanding of assistive equipment/durable medical equipment/modified techniques for increased independence with self-care and mobility. Short term goal 5: Pt will perform functional transfers with Moderate assist to toilet/wheelchair/shower with Fair safety. Short term goal 6: Pt will actively participate in 30+ minutes of therapeutic exercise/functional activities to promote increased independence with self-care and mobility. Long term goals  Time Frame for Long term goals : By discharge  Long term goal 1: Pt will perform BADLs with modified independence and Good safety with assist PRN for HORTENSIA hose. Long term goal 2: Pt will perform functional transfers and mobility with modified independence, least restrictive mobility device, and Good safety. Long term goal 3: Pt will attend to L side of body 100% of the time during self-care, transfers, and mobility with 1-2 verbal cues PRN. Long term goal 4: Pt will stand for 5+ minutes with 1-2 UE support, modified independence and no loss of balance while engaging in functional activity of choice. Long term goal 5: Pt will verbalize/demonstrate Good understanding of home exercise program for LUE.   Long term goals 6: 9 hole peg, box and block to be assessed for LUE as appropriate        12/24/20 0737 12/24/20 1340   OT Individual Minutes   Time In [id number]   Time Out 0843 1440   Minutes 66 60     Electronically signed by JEAN CARLOS Walter on 12/24/20 at 3:42 PM EST

## 2020-12-24 NOTE — PROGRESS NOTES
Physical Medicine & Rehabilitation  Progress Note    12/24/2020 5:34 PM     CC: Ambulatory and ADL dysfunction due to hemorrhagic CVA left nondominant hemiparesis    Subjective:   Complaints. Feels well. Still occasionally gets spasms but appears better today. ROS:  Denies fevers, chills, sweats. No chest pain, palpitations, lightheadedness. Denies coughing, wheezing or shortness of breath. Denies abdominal pain, nausea, diarrhea or constipation. No new areas of joint pain. Denies new areas of numbness or weakness. Denies new anxiety or depression issues. No new skin problems. Rehabilitation:   PT:  Restrictions/Precautions: Fall Risk  Other position/activity restrictions: L hypertonicity. PRAFO boot LLE in bed   Transfers  Sit to Stand: Moderate Assistance  Stand to sit: Moderate Assistance  Bed to Chair: Moderate assistance(x1, squat pivot)  Stand Pivot Transfers: Maximum Assistance(pt very anxious trying a stand pivot with hemiwalker)  Squat Pivot Transfers: Minimal Assistance(x1, mainly stabilizing left ankle to prevent rolling)  Comment: caution LEFT ankle rolls. REINFORCE foot placement. Ambulation 1  Surface: level tile  Device: Hemiwalker  Other Apparatus: Left, Slider, Wheelchair follow  Assistance:  Moderate assistance(2nd person for safety and wc)  Quality of Gait: much better this morning, pt able to advance left LE and complete terminal knee without assist, pt needing assistance for swing phase due to over powering swing phase of left LE and to prevent the left ankle from rolling  Gait Deviations: Decreased step length, Decreased step height, Deviated path  Distance: 30 feet x2  Comments: much better foot placement and control and sensory stimulus applied for LLE placement          OT:  ADL  Equipment Provided: Long-handled sponge, Long-handled shoe horn, Reacher  Feeding: Setup  Grooming: Modified independent (seated) UE Bathing: Contact guard assistance(intermittent assist for LUE support, good carryover with hem)  LE Bathing: Stand by assistance(stand by assist for weight shifting on bench for elizabeth-care)  UE Dressing: Moderate assistance, Setup, Verbal cueing, Increased time to complete(assist threading LUE in shirt/adjustments for shirt and bra)  LE Dressing: Moderate assistance(intermittent assist threading LLE/on L hip/TEDs/L shoe/tie b)  Toileting: Minimal assistance(intermittent assist clothing on L hip/balance)  Additional Comments: using reacher, pt gives good efforts and attempts to try various ways for donning/threading pants and shirt, pt states \"I'm just trying to see if something different is easier\"         Balance  Sitting Balance: Supervision  Standing Balance: Minimal assistance(min assist, tactile cuing for LLE positioning/weight shiftin)   Standing Balance  Time: AM: 1 min x2, 1-2 min x3; PM: 1-2 min x3  Activity: AM: functional transfers, ADL tasks; PM: toileting tasks/toilet transfer  Comment: increased steadiness with dynamic tasks this date, assist to stabilize LLE, VCs for posture/positioning and weight shifting on LLE to decrease tone and L ankle inversion  Functional Mobility  Functional - Mobility Device: Wheelchair  Activity: To/from bathroom  Assist Level: Minimal assistance  Functional Mobility Comments: assist and cuing for visual scanning/turning to L side     Bed mobility  Bridging: Minimal assistance  Rolling to Left: Contact guard assistance  Rolling to Right: Contact guard assistance, Stand by assistance  Supine to Sit: Contact guard assistance  Sit to Supine: Minimal assistance  Scooting: Minimal assistance  Comment: pt uses bed rail on R side  Transfers  Stand Step Transfers: Moderate assistance(1-2 steps to L side for transfer)  Stand Pivot Transfers:  Moderate assistance(min/mod, varies with direction of transfer)  Sit to stand: Minimal assistance  Stand to sit: Minimal assistance Transfer Comments: VCs for hand placements, controlled sitting and proper tech, assist req to position/stabilize LLE for standing/transfers   Toilet Transfers  Toilet - Technique: To right, To left, Stand pivot  Equipment Used: Standard toilet  Toilet Transfer: Moderate assistance, Minimal assistance  Toilet Transfers Comments: assist x1, VCs for safety and proper tech with fair carryover, assist for LLE positioning/stabilization, pt tends to complete transfers quickly req VCs for control     Shower Transfers  Shower - Transfer From: Wheelchair  Shower - Transfer Type: To and From  Shower - Transfer To: Transfer tub bench  Shower - Technique: Stand pivot, To right, To left  Shower Transfers: Moderate assistance  Shower Transfers Comments: assist x1, VCs and assist for LLE positioning/stabilization, cuing for control, fair/good carryover noted  Wheelchair Bed Transfers  Equipment Used: Other, Wheelchair, Bed(Tameka Boojerilyn)  Level of Asssistance: Dependent/Total      ST:           Objective:  BP (!) 141/87   Pulse 83   Temp 97.8 °F (36.6 °C) (Oral)   Resp 15   Ht 5' 5\" (1.651 m)   Wt 233 lb (105.7 kg)   SpO2 100%   BMI 38.77 kg/m²  I Body mass index is 38.77 kg/m². I   Wt Readings from Last 1 Encounters:   12/15/20 233 lb (105.7 kg)      Temp (24hrs), Av.8 °F (36.6 °C), Min:97.8 °F (36.6 °C), Max:97.8 °F (36.6 °C)         GEN: well developed, well nourished, no acute distress  HEENT: Normocephalic atraumatic, EOMI, mucous membranes pink and moist  CV: RRR, no murmurs, rubs or gallops  PULM: CTAB, no rales or rhonchi. Respirations WNL and unlabored  ABD: soft, NT, ND, +BS and equal  NEURO: A&O x3. Sensation intact to light touch. MSK:  EXTREMITIES: No calf tenderness to palpation bilaterally. No edema BLEs  SKIN: warm dry and intact with good turgor  PSYCH: appropriately interactive. Affect WNL.   Good spirits        Medications   Scheduled Meds:   tiZANidine  6 mg Oral Daily    And  tiZANidine  4 mg Oral 2 times per day    sertraline  150 mg Oral Daily    amLODIPine  5 mg Oral Daily    thiamine mononitrate  100 mg Oral Daily    traZODone  25 mg Oral Nightly    gabapentin  300 mg Oral Nightly    lisinopril  20 mg Oral Daily    buPROPion  300 mg Oral Daily    cyanocobalamin  1,000 mcg Intramuscular Weekly    Followed by   Estrellita James ON 2/5/2021] cyanocobalamin  1,000 mcg Intramuscular U96 Days    folic acid  1 mg Oral Daily    lamoTRIgine  400 mg Oral Daily    melatonin  3 mg Oral Nightly    rOPINIRole  1 mg Oral Nightly    sennosides-docusate sodium  2 tablet Oral Daily    polyethylene glycol  17 g Oral Daily    enoxaparin  30 mg Subcutaneous BID     Continuous Infusions:  PRN Meds:.oxyCODONE, acetaminophen, ipratropium-albuterol, magnesium hydroxide, muscle rub, bisacodyl, senna     Diagnostics:     CBC:   Recent Labs     12/24/20  0547   WBC 5.9   RBC 3.72*   HGB 11.2*   HCT 33.3*   MCV 89.6   RDW 13.9        BMP: No results for input(s): NA, K, CL, CO2, PHOS, BUN, CREATININE in the last 72 hours. Invalid input(s): CA  BNP: No results for input(s): BNP in the last 72 hours. PT/INR: No results for input(s): PROTIME, INR in the last 72 hours. APTT: No results for input(s): APTT in the last 72 hours. CARDIAC ENZYMES: No results for input(s): CKMB, CKMBINDEX, TROPONINT in the last 72 hours. Invalid input(s): CKTOTAL;3  FASTING LIPID PANEL:  Lab Results   Component Value Date    CHOL 247 (H) 11/30/2020     11/30/2020    TRIG 77 11/30/2020     LIVER PROFILE: No results for input(s): AST, ALT, ALB, BILIDIR, BILITOT, ALKPHOS in the last 72 hours. I/O (24Hr): No intake or output data in the 24 hours ending 12/24/20 1734    Glu last 24 hour  No results for input(s): POCGLU in the last 72 hours. No results for input(s): CLARITYU, COLORU, PHUR, SPECGRAV, PROTEINU, RBCUA, BLOODU, BACTERIA, NITRU, WBCUA, LEUKOCYTESUR, YEAST, GLUCOSEU, BILIRUBINUR in the last 72 hours. Impression/Plan:    1. Hemorrhagic CVA:  PT/OT for gait, mobility, strengthening, endurance, ADLs, and self care. Medrol tapered until 12/13 for headache. 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 Monitoring platelets  3. Muscle spasm/myoclonus/spasticity: Now off baclofen. Switched from as-needed to scheduled tizanidine 12/17 - increased dose 12/20 (6mg/6mg/4mg) but now having increased muscle spasms in the afternoon and evening. Decreased afternoon dose of tizanidine 12/23 (now on 6mg/4mg/4mg).  PRAFO for left lower limb at night. Will need bracing for the left lower limb to help with stability and tone - orthotist evaluated 12/22. Patient she feels she is doing better with decrease dose as per Dr. Shira Hidalgo  4. Left proximal upper limb pain:  In the area of left proximal biceps/left pec major muscles. May be related to spasticity or increased use of left upper limb in therapies. Will monitor. 5. HTN: amlodipine (increased 12/21 by IM), lisinopril (decreased 12/14 by IM); labetalol discontinued 12/18 by IM  6. ETOH withdrawal/history of abuse: was treated with IV ativan and librium. On B45 and folic acid now  7. Bipolar Disorder: on wellbutrin, zoloft (increased 12/21 to home dose of 150mg daily), lamictal  8. Restless Leg Syndrome: on requip  9. Insomnia:  On melatonin, gabapentin nightly. Takes trazodone at home - continue low-dose trazodone (started 12/15). 10. Bowel Management: Miralax daily, senokot prn, dulcolax prn. 11. DVT Prophylaxis:  low molecular weight heparin, SCD's while in bed and HORTENSIA's   12. Internal medicine for medical management       Ananya Reid. Raymond Garber MD This note is created with the assistance of a speech recognition program.  While intending to generate a document that actually reflects the content of the visit, the document can still have some errors including those of syntax and sound a like substitutions which may escape proof reading.   In such instances, actual meaning can be extrapolated by contextual diversion

## 2020-12-24 NOTE — PROGRESS NOTES
[facility]    CONSULTATION / HISTORY AND PHYSICAL EXAMINATION            Date:   2020  Patient name:  Chito Greenwood  Date of admission:  2020  8:30 PM  MRN:   [id number]  Account:  [de-identified]  YOB: 1970  PCP:    Jesica Mitchell MD  Room:   [facility]  Code Status:    Full Code    Physician Requesting Consult: Win De La Cruz MD    Reason for Consult: Medical management    Chief Complaint:   Acute left sided weakness     History Obtained From:     patient, electronic medical record    History of Present Illness/ Interval History:   77-year-old female with history of delta storage pool disease, bipolar disorder, alcohol abuse who developed acute left-sided weaknessfound to have intraparenchymal hemorrhage, right parietal lobe hemorrhage and moderate subarachnoid hemorrhage. Pt admitted to rehab unit on 20. Improving with PT/OT    Past Medical History:     Past Medical History:   Diagnosis Date    Asthma     B[facility] 1 disorder ([facility]     Delta storage pool disease ([facility] [facility]     Hypertension     Psychiatric problem         Past Surgical History:     Past Surgical History:   Procedure Laterality Date     SECTION  3923,6268    Miscarriage     CHOLECYSTECTOMY      COLONOSCOPY N/A 2/3/2020    -random bx(normal)hemorrhoids    GASTRIC BYPASS SURGERY      HYSTERECTOMY      KNEE SURGERY      LAPAROSCOPY      TONSILLECTOMY AND ADENOIDECTOMY      UPPER GASTROINTESTINAL ENDOSCOPY N/A 2/3/2020    (normal,neg H-Pylori)normal post-bypass endoscopy        Medications Prior to Admission:     Prior to Admission medications    Medication Sig Start Date End Date Taking?  Authorizing Provider   STIMATE 1.5 MG/ML SOLN nasal solution INSTILL 2 SPRAYS INTO THE NOSE TWICE DAILY STARTING TWO DAYS BEFORE PROCEDURE 20   Irineo Cordoba MD amLODIPine (NORVASC) 5 MG tablet Take 5 mg by mouth daily    Historical Provider, MD   QUEtiapine (SEROQUEL) 200 MG tablet Take 200 mg by mouth nightly    Historical Provider, MD   lisinopril (PRINIVIL;ZESTRIL) 20 MG tablet Take 20 mg by mouth daily    Historical Provider, MD   buPROPion (WELLBUTRIN XL) 300 MG XL tablet Take 300 mg by mouth daily 3/5/16   Historical Provider, MD   sertraline (ZOLOFT) 50 MG tablet Take 50 mg by mouth daily 3/5/16   Historical Provider, MD   PROAIR  (90 BASE) MCG/ACT inhaler Inhale 2 puffs into the lungs every 6 hours as needed  14   Historical Provider, MD   lamoTRIgine (LAMICTAL) 200 MG tablet Take 400 mg by mouth daily 2 tabs 14   Historical Provider, MD        Allergies:     Penicillin g, Pcn [penicillins], and Sulfa antibiotics    Social History:     Tobacco:    reports that she has never smoked. She has never used smokeless tobacco.  Alcohol:      reports current alcohol use. Drug Use:  reports no history of drug use. Family History:     Family History   Adopted: Yes   Family history unknown: Yes       Review of Systems:     Positive and Negative as described in HPI. Denies any shortness of breath or cough  Denies chest pain or palpitations  Denies abdominal pain, diarrhea vomiting  Denies any new numbness tremors or weakness. Physical Exam:     BP (!) 141/87   Pulse 83   Temp 97.8 °F (36.6 °C) (Oral)   Resp 15   Ht 5' 5\" (1.651 m)   Wt 233 lb (105.7 kg)   SpO2 100%   BMI 38.77 kg/m²   Temp (24hrs), Av.8 °F (36.6 °C), Min:97.7 °F (36.5 °C), Max:97.8 °F (36.6 °C)      General appearance:  alert, cooperative and no distress  Eyes: Anicteric sclera. Pupils are equally round and reactive to light. Extraocular movements are intact.   Lungs:  clear to auscultation bilaterally, normal effort  Heart:  regular rate and rhythm, no murmur  Abdomen:  soft, nontender, nondistended, normal bowel sounds, no masses, hepatomegaly, splenomegaly Extremities:  no edema, redness, tenderness in the calves  Skin:  no gross lesions, rashes, induration  Neuro:  Alert, oriented X 3, left sided weakness  Power is left arm 0 out of 5 and 1 out of 5 in left lower extremity, increased tone in both left upper and lower extremity  Investigations:      Laboratory Testing:  Lab Results   Component Value Date    WBC 5.9 12/24/2020    HGB 11.2 (L) 12/24/2020    HCT 33.3 (L) 12/24/2020    MCV 89.6 12/24/2020     12/24/2020     Lab Results   Component Value Date     12/21/2020    K 4.1 12/21/2020     12/21/2020    CO2 22 12/21/2020    BUN 10 12/21/2020    CREATININE 0.56 12/21/2020    GLUCOSE 94 12/21/2020    CALCIUM 9.7 12/21/2020         Assessment :      Primary Problem  <principal problem not specified>    Active Hospital Problems    Diagnosis Date Noted    Essential hypertension [I10] 12/11/2020    Acute left-sided weakness [R53.1] 12/11/2020    H/O intracranial hemorrhage [Z86.79] 12/09/2020    Vasogenic edema ([facility] [address] [facility] [G93.6]     Intracranial hemorrhage (HCC) [I62.9] 11/30/2020    Bipolar 1 disorder (HCC) [F31.9]     Platelet dysfunction ([facility] [address]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 *      1. Intracranial hemorrhage with left-sided weakness. Improving. Repeat CT showed significant improvement   2. Pulmonary hypertension  3. Platelet dysfunction  4. Hypertension. Continue lisinopril 20 mg daily and amlodipine 5 mg . 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 Monitoring platelets   6. Restless Leg Syndrome: on requip  7.  DVT prophylaxis with Lovenox       12/19   Patient blood pressure is much better, after adjusting dose of Norvasc Working with physical therapy    12/20   Patient blood pressure is much improved after adjusting dose of Norvasc  No new complaints    12/21  No new complaints. Patient weakness on the left side persist. Patient is due for repeat CT head     12/22  No acute complaints   Patient has CT head showed resolution of SAH and intraventricular hemorrhage and decrease in size of right parietal hemorrhage. Blood is controlled after increasing amlodipine     12/24  No acute issues   Repeat CT scan of the head showed significant resolution of the intracranial hemorrhage   Vitals stable. Consultations:   IP CONSULT TO INTERNAL MEDICINE  IP CONSULT TO DIETITIAN  IP CONSULT TO SOCIAL WORK  INPATIENT CONSULT TO ORTHOTIST/PROSTHETIST      Tony Nix MD  12/24/2020  8:47 AM    Copy sent to Dr. Kaylyn Alatorre MD    Please note that this chart was generated using voice recognition Dragon dictation software. Although every effort was made to ensure the accuracy of this automated transcription, some errors in transcription may have occurred. Attestation and add on       I have discussed the care of Donna Serrano , including pertinent history and exam findings,      12/24/20    with the resident. I have seen and examined the patient and the key elements of all parts of the encounter have been performed by me . I agree with the assessment, plan and orders as documented by the resident. Active Problems:    Platelet dysfunction (HCC)    Bipolar 1 disorder (HCC)    Intracranial hemorrhage (HCC)    Vasogenic edema (HCC)    H/O intracranial hemorrhage    Essential hypertension    Acute left-sided weakness  Resolved Problems:    * No resolved hospital problems. *            ---- ;        Medications: Allergies:     Allergies   Allergen Reactions    Penicillin G Itching    Pcn [Penicillins]     Sulfa Antibiotics Other (See Comments)     Inflammation in the eye       Current Meds:   Scheduled Meds:  tiZANidine  6 mg Oral Daily    And    tiZANidine  4 mg Oral 2 times per day    sertraline  150 mg Oral Daily    amLODIPine  5 mg Oral Daily    thiamine mononitrate  100 mg Oral Daily    traZODone  25 mg Oral Nightly    gabapentin  300 mg Oral Nightly    lisinopril  20 mg Oral Daily    buPROPion  300 mg Oral Daily    cyanocobalamin  1,000 mcg Intramuscular Weekly    Followed by   Branden Beltran ON 2/5/2021] cyanocobalamin  1,000 mcg Intramuscular A19 Days    folic acid  1 mg Oral Daily    lamoTRIgine  400 mg Oral Daily    melatonin  3 mg Oral Nightly    rOPINIRole  1 mg Oral Nightly    sennosides-docusate sodium  2 tablet Oral Daily    polyethylene glycol  17 g Oral Daily    enoxaparin  30 mg Subcutaneous BID     Continuous Infusions:   PRN Meds: oxyCODONE, acetaminophen, ipratropium-albuterol, magnesium hydroxide, muscle rub, bisacodyl, senna        [facility]  [address] [place] [address].    Phone [phone]   Fax: [phone]  Answering Service: [phone]

## 2020-12-24 NOTE — PROGRESS NOTES
Physical Therapy  Facility/Department: [facility] ACUTE REHAB  Daily Treatment Note  NAME: Juventino Linton  : 1970  MRN: [id number]    Date of Service: 2020    Discharge Recommendations:  Patient would benefit from continued therapy after discharge, Home with assist PRN        Assessment      PT Education: Family Education;General Safety  Patient Education: Spouse Alfred regarding stairs, transfers and gait training. Activity Tolerance  Activity Tolerance: Patient limited by fatigue     Patient Diagnosis(es): There were no encounter diagnoses. has a past medical history of Asthma, Bipolar 1 disorder ([facility] [address] Delta storage pool disease [facility]), Hypertension, and Psychiatric problem. has a past surgical history that includes  section (4987,1373); Gastric bypass surgery (); Tonsillectomy and adenoidectomy (); Cholecystectomy (); knee surgery (); Hysterectomy (); laparoscopy (); Colonoscopy (N/A, 2/3/2020); and Upper gastrointestinal endoscopy (N/A, 2/3/2020). Restrictions  Restrictions/Precautions  Restrictions/Precautions: Fall Risk  Required Braces or Orthoses?: No  Position Activity Restriction  Other position/activity restrictions: L hypertonicity. PRAFO boot LLE in bed  Subjective              Orientation     Cognition      Objective   Bed mobility  Bridging: Minimal assistance  Rolling to Left: Contact guard assistance  Rolling to Right: Contact guard assistance;Stand by assistance  Supine to Sit: Contact guard assistance  Sit to Supine: Minimal assistance  Scooting: Minimal assistance  Transfers  Sit to Stand: Moderate Assistance  Stand to sit: Moderate Assistance  Stand Pivot Transfers: Maximum Assistance(pt very anxious trying a stand pivot with hemiwalker)  Squat Pivot Transfers: Minimal Assistance(x1, mainly stabilizing left ankle to prevent rolling)  Comment: caution LEFT ankle rolls. REINFORCE foot placement.   Ambulation 1  Surface: level tile  Device: WAY Systems Other Apparatus: Left;Slider; Wheelchair follow  Assistance: Moderate assistance(2nd person for safety and wc)  Quality of Gait: much better this morning, pt able to advance left LE and complete terminal knee without assist, pt needing assistance for swing phase due to over powering swing phase of left LE and to prevent the left ankle from rolling  Gait Deviations: Decreased step length;Decreased step height;Deviated path  Distance: 30 feet x2  Comments: much better foot placement and control and sensory stimulus applied for LLE placement  Ambulation 2  Surface - 2: level tile  Device 2: Large Ruel Indra  Other Apparatus 2: Slider;Left; Wheelchair follow  Assistance 2: Moderate assistance;2 Person assistance  Gait Deviations: Decreased step length;Decreased step height  Distance: 25 feet  Stairs/Curb  Stairs?: Yes  Stairs  # Steps : 3  Stairs Height: 4\"  Rails: Right ascending  Assistance: 2 Person assistance;Maximum assistance  Comment: PATIENT DEMONSTRATES MORE SAFETY WITH FOLLOWING THIS SEQUENCING with up forward ascending left LE leading and descend retro with leading down with right LE.  CAUTION Left ankle rolling  Wheelchair Activities  Left Brakes Level of Assistance: Minimal assistance  Propulsion 1  Propulsion: Manual  Level: Level Tile  Method: RLE;RUE;LLE  Level of Assistance: Minimal assistance  Description/ Details: poor safety awareness with left side neglect, running into objects on left side  Distance: 80 feet        Other exercises Other exercises 1: focused on neuro re-ed and postural alignment with mirror feed back sitting and standing. Standing support assisted with lg lopez. education on Left side weight bearing and hip and knee control. sit to stands resisted with blue therapy at hips 12 reps tolerated. standing with Right LE on 4 inch rise step to weight shift to L LE WB activity with and without UE support. constant cueing for training with mirror, tactile, vc's provided. caution patient \"pusher\" syndrome and poor sensory awareness. Other exercises 2: Seated bilateral LE exercises, 3# R LE, blue resistance band 10 reps to tolerance each  Other exercises 3: Seated (B) LE ex x 20, minimal LAQ noted with LLE today  Other exercises 4: Transfers Squat Pivot w/c >< mat x4 with corrected set up  Other exercises 6: NuStep 10 minutes BLE no LUE supported in sling. L4. needed strapping to support LLE to neutral  Other exercises 7: Supine Left LE hamstring stretch x1min x2sets                        G-Code     OutComes Score                                                     AM-PAC Score             Goals  Short term goals  Time Frame for Short term goals: 10 days  Short term goal 1: pt will perform bed mobility with min A  Short term goal 2: pt will dangle EOB/EOM for 20 to 25 minutes with SBA, performing challenged seated balance/corestrengthening  Short term goal 3: Pivot transfers with mod A+2  Short term goal 4: WC mobility x 100' with min A  Short term goal 5: progress to standing/pre-gait activities in // bars to facilitate L LE motor fucntion.   Long term goals  Time Frame for Long term goals : By LOS  Long term goal 1: Pt able to perform bed mobility independently  Long term goal 2: Pt able to perform transfers at [address] term goal 3: Pt able to progress to ambulation with appropriate device dist of 30 to 50 ft, mod A   Long term goal 4: Pt able to propel w/c level surfaces dist of 150 ft , mod-I. Long term goal 5: Improve L LE strength to atleast 2 to 2+/5 to improve fucntion. Long term goal 6: Improve PASS score from 8/30 to 23/36 to improve overall fucntion. Long term goal 7: Pt able to go up and down 5 steps with rail, mod A. Patient Goals   Patient goals : to regain use of L arm and L leg    Plan    Plan  Times per week: 1.5hr/day, 5 to 7 days/week.   Times per day: Daily  Current Treatment Recommendations: Strengthening, ROM, Balance Training, Functional Mobility Training, Transfer Training, Wheelchair Mobility Training, Gait Training, Neuromuscular Re-education, Pain Management, Home Exercise Program, Safety Education & Training, Patient/Caregiver Education & Training, Positioning, Endurance Training, Stair training  Safety Devices  Type of devices: Call light within reach, Gait belt, All fall risk precautions in place  Restraints  Initially in place: No     Therapy Time     12/24/20 1129 12/24/20 1357   PT Individual Minutes   Time In 0711 2770   Time Out 1113 1337   Minutes [address], P[place]

## 2020-12-25 PROCEDURE — 99231 SBSQ HOSP IP/OBS SF/LOW 25: CPT | Performed by: INTERNAL MEDICINE

## 2020-12-25 PROCEDURE — 6370000000 HC RX 637 (ALT 250 FOR IP): Performed by: INTERNAL MEDICINE

## 2020-12-25 PROCEDURE — 97110 THERAPEUTIC EXERCISES: CPT

## 2020-12-25 PROCEDURE — 99232 SBSQ HOSP IP/OBS MODERATE 35: CPT | Performed by: PHYSICAL MEDICINE & REHABILITATION

## 2020-12-25 PROCEDURE — 97535 SELF CARE MNGMENT TRAINING: CPT

## 2020-12-25 PROCEDURE — 6370000000 HC RX 637 (ALT 250 FOR IP): Performed by: STUDENT IN AN ORGANIZED HEALTH CARE EDUCATION/TRAINING PROGRAM

## 2020-12-25 PROCEDURE — 1180000000 HC REHAB R&B

## 2020-12-25 PROCEDURE — 6360000002 HC RX W HCPCS: Performed by: NURSE PRACTITIONER

## 2020-12-25 PROCEDURE — 6370000000 HC RX 637 (ALT 250 FOR IP): Performed by: PHYSICAL MEDICINE & REHABILITATION

## 2020-12-25 PROCEDURE — 6360000002 HC RX W HCPCS: Performed by: PHYSICAL MEDICINE & REHABILITATION

## 2020-12-25 PROCEDURE — 97116 GAIT TRAINING THERAPY: CPT

## 2020-12-25 PROCEDURE — 6370000000 HC RX 637 (ALT 250 FOR IP): Performed by: NURSE PRACTITIONER

## 2020-12-25 RX ORDER — LIDOCAINE 4 G/G
1 PATCH TOPICAL DAILY
Status: DISCONTINUED | OUTPATIENT
Start: 2020-12-25 | End: 2020-12-27

## 2020-12-25 RX ADMIN — AMLODIPINE BESYLATE 5 MG: 5 TABLET ORAL at 07:46

## 2020-12-25 RX ADMIN — TRAZODONE HYDROCHLORIDE 25 MG: 50 TABLET ORAL at 20:37

## 2020-12-25 RX ADMIN — CYANOCOBALAMIN 1000 MCG: 1000 INJECTION, SOLUTION INTRAMUSCULAR at 07:49

## 2020-12-25 RX ADMIN — SENNOSIDES AND DOCUSATE SODIUM 2 TABLET: 8.6; 5 TABLET ORAL at 07:47

## 2020-12-25 RX ADMIN — THIAMINE HCL TAB 100 MG 100 MG: 100 TAB at 07:46

## 2020-12-25 RX ADMIN — TIZANIDINE 4 MG: 4 TABLET ORAL at 15:37

## 2020-12-25 RX ADMIN — TIZANIDINE 4 MG: 4 TABLET ORAL at 20:36

## 2020-12-25 RX ADMIN — LAMOTRIGINE 400 MG: 100 TABLET ORAL at 07:49

## 2020-12-25 RX ADMIN — POLYETHYLENE GLYCOL 3350 17 G: 17 POWDER, FOR SOLUTION ORAL at 07:47

## 2020-12-25 RX ADMIN — LISINOPRIL 20 MG: 20 TABLET ORAL at 07:46

## 2020-12-25 RX ADMIN — ACETAMINOPHEN 650 MG: 325 TABLET, FILM COATED ORAL at 20:36

## 2020-12-25 RX ADMIN — TIZANIDINE 6 MG: 4 TABLET ORAL at 07:46

## 2020-12-25 RX ADMIN — Medication 3 MG: at 20:37

## 2020-12-25 RX ADMIN — GABAPENTIN 300 MG: 300 CAPSULE ORAL at 20:36

## 2020-12-25 RX ADMIN — ROPINIROLE HYDROCHLORIDE 1 MG: 1 TABLET, FILM COATED ORAL at 20:36

## 2020-12-25 RX ADMIN — BUPROPION HYDROCHLORIDE 300 MG: 300 TABLET, FILM COATED, EXTENDED RELEASE ORAL at 07:50

## 2020-12-25 RX ADMIN — FOLIC ACID 1 MG: 1 TABLET ORAL at 07:47

## 2020-12-25 RX ADMIN — SERTRALINE HYDROCHLORIDE 150 MG: 100 TABLET ORAL at 07:46

## 2020-12-25 RX ADMIN — ENOXAPARIN SODIUM 30 MG: 30 INJECTION SUBCUTANEOUS at 07:46

## 2020-12-25 RX ADMIN — ENOXAPARIN SODIUM 30 MG: 30 INJECTION SUBCUTANEOUS at 20:37

## 2020-12-25 ASSESSMENT — PAIN DESCRIPTION - LOCATION
LOCATION: SHOULDER
LOCATION: SHOULDER

## 2020-12-25 ASSESSMENT — PAIN DESCRIPTION - PROGRESSION: CLINICAL_PROGRESSION: GRADUALLY IMPROVING

## 2020-12-25 ASSESSMENT — PAIN DESCRIPTION - DESCRIPTORS: DESCRIPTORS: ACHING;DISCOMFORT

## 2020-12-25 ASSESSMENT — PAIN DESCRIPTION - FREQUENCY: FREQUENCY: INTERMITTENT

## 2020-12-25 ASSESSMENT — PAIN SCALES - GENERAL: PAINLEVEL_OUTOF10: 0

## 2020-12-25 ASSESSMENT — PAIN DESCRIPTION - ORIENTATION: ORIENTATION: LEFT

## 2020-12-25 NOTE — PLAN OF CARE
Problem: Skin Integrity:  Goal: Will show no infection signs and symptoms  Description: Will show no infection signs and symptoms  12/25/2020 1413 by Maribell Causey RN  Outcome: Ongoing    Problem: Falls - Risk of:  Goal: Will remain free from falls  Description: Will remain free from falls  12/25/2020 1413 by Maribell Causey RN  Outcome: Ongoing  1  Problem: Pain:  Goal: Pain level will decrease  Description: Pain level will decrease  Outcome: Ongoing

## 2020-12-25 NOTE — PROGRESS NOTES
Physical Medicine & Rehabilitation  Progress Note    12/25/2020 9:13 AM     CC: Ambulatory and ADL dysfunction due to hemorrhagic CVA left nondominant hemiparesis    Subjective:   Complaints. Feels well. Still occasionally gets spasms but appears better today. Continues with anterior left shoulder discomfort    ROS:  Denies fevers, chills, sweats. No chest pain, palpitations, lightheadedness. Denies coughing, wheezing or shortness of breath. Denies abdominal pain, nausea, diarrhea or constipation. No new areas of joint pain. Denies new areas of numbness or weakness. Denies new anxiety or depression issues. No new skin problems. Rehabilitation:   PT:  Restrictions/Precautions: Fall Risk  Other position/activity restrictions: L hypertonicity. PRAFO boot LLE in bed   Transfers  Sit to Stand: Moderate Assistance  Stand to sit: Moderate Assistance  Bed to Chair: Moderate assistance(x1, squat pivot)  Stand Pivot Transfers: Maximum Assistance(pt very anxious trying a stand pivot with hemiwalker)  Squat Pivot Transfers: Minimal Assistance(x1, mainly stabilizing left ankle to prevent rolling)  Comment: caution LEFT ankle rolls. REINFORCE foot placement. Ambulation 1  Surface: level tile  Device: Hemiwalker  Other Apparatus: Left, Slider, Wheelchair follow  Assistance:  Moderate assistance(2nd person for safety and wc)  Quality of Gait: much better this morning, pt able to advance left LE and complete terminal knee without assist, pt needing assistance for swing phase due to over powering swing phase of left LE and to prevent the left ankle from rolling  Gait Deviations: Decreased step length, Decreased step height, Deviated path  Distance: 30 feet x2  Comments: much better foot placement and control and sensory stimulus applied for LLE placement          OT:  ADL  Equipment Provided: Long-handled sponge, Long-handled shoe horn, Reacher  Feeding: Setup  Grooming: Modified independent (seated) Transfer Comments: VCs for hand placements, controlled sitting and proper tech, assist req to position/stabilize LLE for standing/transfers   Toilet Transfers  Toilet - Technique: To right, To left, Stand pivot  Equipment Used: Standard toilet  Toilet Transfer: Moderate assistance, Minimal assistance  Toilet Transfers Comments: assist x1, VCs for safety and proper tech with fair carryover, assist for LLE positioning/stabilization, pt tends to complete transfers quickly req VCs for control     Shower Transfers  Shower - Transfer From: Wheelchair  Shower - Transfer Type: To and From  Shower - Transfer To: Transfer tub bench  Shower - Technique: Stand pivot, To right, To left  Shower Transfers: Moderate assistance  Shower Transfers Comments: assist x1, VCs and assist for LLE positioning/stabilization, cuing for control, fair/good carryover noted  Wheelchair Bed Transfers  Equipment Used: Other, Wheelchair, Bed(Tameka Boojerilyn)  Level of Asssistance: Dependent/Total      ST:           Objective:  /72   Pulse 83   Temp 98.3 °F (36.8 °C) (Oral)   Resp 16   Ht 5' 5\" (1.651 m)   Wt 233 lb (105.7 kg)   SpO2 100%   BMI 38.77 kg/m²  I Body mass index is 38.77 kg/m². I   Wt Readings from Last 1 Encounters:   12/15/20 233 lb (105.7 kg)      Temp (24hrs), Av.3 °F (36.8 °C), Min:98.2 °F (36.8 °C), Max:98.3 °F (36.8 °C)         GEN: well developed, well nourished, no acute distress  HEENT: Normocephalic atraumatic, EOMI, mucous membranes pink and moist  CV: RRR, no murmurs, rubs or gallops  PULM: CTAB, no rales or rhonchi. Respirations WNL and unlabored  ABD: soft, NT, ND, +BS and equal  NEURO: A&O x3. Sensation intact to light touch. MSK: Decreased range of motion left upper lower extremities, tender anterior left shoulder, 4/5 right upper and lower extremity, moderate spasticity left upper and lower extremity  EXTREMITIES: No calf tenderness to palpation bilaterally.  No edema BLEs SKIN: warm dry and intact with good turgor  PSYCH: appropriately interactive. Affect WNL. Medications   Scheduled Meds:   tiZANidine  6 mg Oral Daily    And    tiZANidine  4 mg Oral 2 times per day    sertraline  150 mg Oral Daily    amLODIPine  5 mg Oral Daily    thiamine mononitrate  100 mg Oral Daily    traZODone  25 mg Oral Nightly    gabapentin  300 mg Oral Nightly    lisinopril  20 mg Oral Daily    buPROPion  300 mg Oral Daily    cyanocobalamin  1,000 mcg Intramuscular Weekly    Followed by   Columba Davenport ON 2/5/2021] cyanocobalamin  1,000 mcg Intramuscular U86 Days    folic acid  1 mg Oral Daily    lamoTRIgine  400 mg Oral Daily    melatonin  3 mg Oral Nightly    rOPINIRole  1 mg Oral Nightly    sennosides-docusate sodium  2 tablet Oral Daily    polyethylene glycol  17 g Oral Daily    enoxaparin  30 mg Subcutaneous BID     Continuous Infusions:  PRN Meds:.oxyCODONE, acetaminophen, ipratropium-albuterol, magnesium hydroxide, muscle rub, bisacodyl, senna     Diagnostics:     CBC:   Recent Labs     12/24/20  0547   WBC 5.9   RBC 3.72*   HGB 11.2*   HCT 33.3*   MCV 89.6   RDW 13.9        BMP: No results for input(s): NA, K, CL, CO2, PHOS, BUN, CREATININE in the last 72 hours. Invalid input(s): CA  BNP: No results for input(s): BNP in the last 72 hours. PT/INR: No results for input(s): PROTIME, INR in the last 72 hours. APTT: No results for input(s): APTT in the last 72 hours. CARDIAC ENZYMES: No results for input(s): CKMB, CKMBINDEX, TROPONINT in the last 72 hours. Invalid input(s): CKTOTAL;3  FASTING LIPID PANEL:  Lab Results   Component Value Date    CHOL 247 (H) 11/30/2020     11/30/2020    TRIG 77 11/30/2020     LIVER PROFILE: No results for input(s): AST, ALT, ALB, BILIDIR, BILITOT, ALKPHOS in the last 72 hours. I/O (24Hr): No intake or output data in the 24 hours ending 12/25/20 0913    Glu last 24 hour  No results for input(s): POCGLU in the last 72 hours. 13. DVT Prophylaxis:  low molecular weight heparin, SCD's while in bed and HORTENSIA's   14. Internal medicine for medical management       Orion Coley. Shari Daniel MD       This note is created with the assistance of a speech recognition program.  While intending to generate a document that actually reflects the content of the visit, the document can still have some errors including those of syntax and sound a like substitutions which may escape proof reading.   In such instances, actual meaning can be extrapolated by contextual diversion

## 2020-12-25 NOTE — PROGRESS NOTES
Physical Therapy  Facility/Department: [facility] ACUTE REHAB  Daily Treatment Note  NAME: Sylvie Galvin  : 1970  MRN: [id number]    Date of Service: 2020    Discharge Recommendations:  Patient would benefit from continued therapy after discharge, Home with assist PRN   PT Equipment Recommendations  Equipment Needed: No  Other: TBD    Assessment   Treatment Diagnosis: Impaired function. REQUIRES PT FOLLOW UP: Yes  Activity Tolerance  Activity Tolerance: Patient limited by fatigue;Patient limited by endurance  Other Comments  Comments: Pt  taking pt to room after tx      Patient Diagnosis(es): There were no encounter diagnoses. has a past medical history of Asthma, Bipolar 1 disorder ([facility] [address] Delta storage pool disease [facility]), Hypertension, and Psychiatric problem. has a past surgical history that includes  section (0,393); Gastric bypass surgery (); Tonsillectomy and adenoidectomy (); Cholecystectomy (); knee surgery (); Hysterectomy (); laparoscopy (); Colonoscopy (N/A, 2/3/2020); and Upper gastrointestinal endoscopy (N/A, 2/3/2020). Restrictions  Restrictions/Precautions  Restrictions/Precautions: Fall Risk  Required Braces or Orthoses?: No  Position Activity Restriction  Other position/activity restrictions: L hypertonicity. PRAFO boot LLE in bed  Subjective   General  Chart Reviewed: Yes  Additional Pertinent Hx: 71-year-old female with history of delta storage pool disease, bipolar disorder, alcohol abuse who developed acute left-sided weaknessfound to have intraparenchymal hemorrhage, right parietal lobe hemorrhage and moderate subarachnoid hemorrhage. Pt admitted to rehab unit on 20. Response To Previous Treatment: Patient with no complaints from previous session. Family / Caregiver Present: No  Subjective  Subjective: Pt positioned in WC, agreeable for tx. General Comment  Comments: Pt is very motivated.    Pain Screening Patient Currently in Pain: Denies       Orientation  Orientation  Overall Orientation Status: Within Normal Limits  Objective      Transfers  Sit to Stand: Minimal Assistance  Stand to sit: Contact guard assistance  Comment: caution LEFT ankle rolls. REINFORCE foot placement. Ambulation  Ambulation?: Yes  Ambulation 1  Surface: level tile  Device: Hemiwalker  Other Apparatus: Left;Slider; Wheelchair follow  Assistance: Minimal assistance;2 Person assistance  Quality of Gait: Intially pt is fearful d/t ankle instability. Gait Deviations: Decreased step length;Decreased step height;Deviated path  Distance: 108ft with two seated rest breaks. Comments: Pt cued to taked wider steps, TKE, and push heel into floor on L LE. Good carry over from pt. Stairs/Curb  Stairs?: No  Wheelchair Activities  Wheelchair Parts Management: Yes  Left Brakes Level of Assistance: Minimal assistance  Right Brakes Level of Assistance: Stand by Assist  Propulsion: No  Neuromuscular Education  Neuromuscular education: No  Balance  Posture: Fair  Sitting - Static: Good  Sitting - Dynamic: Fair  Standing - Static: Poor;+  Standing - Dynamic: Poor  Comments: Standing in [employer], Seated Edge of Mat  Exercises  Comments: . Other exercises  Other exercises?: Yes  Other exercises 1: Seated B LE ex's x20, Blue theraband, R #3 , L #3     Goals  Short term goals  Time Frame for Short term goals: 10 days  Short term goal 1: pt will perform bed mobility with min A  Short term goal 2: pt will dangle EOB/EOM for 20 to 25 minutes with SBA, performing challenged seated balance/corestrengthening  Short term goal 3: Pivot transfers with mod A+2  Short term goal 4: WC mobility x 100' with min A  Short term goal 5: progress to standing/pre-gait activities in // bars to facilitate L LE motor fucntion.   Long term goals  Time Frame for Long term goals : By LOS  Long term goal 1: Pt able to perform bed mobility independently Long term goal 2: Pt able to perform transfers at [address] term goal 3: Pt able to progress to ambulation with appropriate device dist of 30 to 50 ft, mod A   Long term goal 4: Pt able to propel w/c level surfaces dist of 150 ft , mod-I. Long term goal 5: Improve L LE strength to atleast 2 to 2+/5 to improve fucntion. Long term goal 6: Improve PASS score from 8/30 to 23/36 to improve overall fucntion. Long term goal 7: Pt able to go up and down 5 steps with rail, mod A. Patient Goals   Patient goals : to regain use of L arm and L leg    Plan    Plan  Times per week: 1.5hr/day, 5 to 7 days/week.   Safety Devices  Type of devices: Call light within reach, Gait belt, All fall risk precautions in place  Restraints  Initially in place: No     Therapy Time         12/25/20 1154   PT Individual Minutes   Time In 1015   Time Out 1100   Minutes [address] [place] [address] , [place]

## 2020-12-25 NOTE — PROGRESS NOTES
[address] [facility]    ACUTE REHABILITATION OCCUPATIONAL THERAPY  DAILY NOTE    Date: 20  Patient Name: Lucila Palmer      Room: 2612/2612-01    MRN: [id number]   : 1970  (48 y.o.)  Gender: female   Referring Practitioner: Basilia Little MD  Diagnosis: Intracranial hemmorrhage  Additional Pertinent Hx: 51-year-old female with history of delta storage pool disease, bipolar disorder, alcohol abuse who developed acute left-sided weaknessfound to have intraparenchymal hemorrhage, right parietal lobe hemorrhage and moderate subarachnoid hemorrhage. Pt admitted to rehab unit on 20. Restrictions  Restrictions/Precautions: Fall Risk  Other position/activity restrictions: L hypertonicity. PRAFO boot LLE in bed  Required Braces or Orthoses?: No  Equipment Used: Other, Wheelchair, Bed(Tameka Boojerilyn)    Subjective  Comments: Pt pleasant and cooperative. Patient Currently in Pain: Yes  Pain Location: Shoulder  Pain Orientation: Left  Restrictions/Precautions: Fall Risk  Overall Orientation Status: Within Functional Limits     Pain Assessment  Pain Type: Acute pain  Pain Location: Shoulder  Pain Orientation: Left  Pain Frequency: Intermittent    Objective     Balance  Sitting Balance: Modified independent   Standing Balance: Moderate assistance(sinkside for lower body dressing)  Bed mobility  Supine to Sit: Stand by assistance  Sit to Supine: Moderate assistance  Comment: pt utilizes bed rail prn for support on R side. Transfers  Stand Pivot Transfers: Moderate assistance(towards strong side)  Sit to stand: Minimal assistance  Stand to sit: Minimal assistance  Transfer Comments: pt demo good hand placement for sit>stands, decreased control for stand>sit to bed; improved to wheelchair.    Standing Balance  Time: <1 min x 3, 1-2 min   Activity: functional transfers, LB dressing Comment: increased steadiness with dynamic tasks this date, assist to stabilize LLE, VCs for posture/positioning and weight shifting on LLE to decrease tone and L ankle inversion        Weight Bearing  Weight Bearing Technique: Yes  LUE Weight Bearing: Extended arm standing(during sinkside care tasks )           ADL  Equipment Provided: Reacher  Feeding: Setup(per report)  Grooming: Setup(Assist with hair care (place in pony tail))  UE Bathing: Minimal assistance(did not bathe however clinical reasoning with donning of deodorant; does well with ara technique to tolerance)  LE Bathing: None  UE Dressing: Setup(eze [place] shirt; good ara technique carryover. Bra remained on)  LE Dressing: Moderate assistance(intermittent A  threading LLE/over L hip/TEDs/shoes/brace. )  Toileting: None        Assessment  Performance deficits / Impairments: Decreased functional mobility ; Decreased ADL status; Decreased ROM; Decreased strength;Decreased endurance;Decreased balance;Decreased high-level IADLs;Decreased fine motor control;Decreased coordination;Decreased posture;Decreased safe awareness;Decreased vision/visual deficit  Prognosis: Good  Discharge Recommendations: Home with assist PRN;Patient would benefit from continued therapy after discharge  Activity Tolerance: Patient Tolerated treatment well  Safety Devices in place: Yes  Type of devices: Gait belt;Left in bed;Call light within reach  Equipment Recommendations  Equipment Needed: Yes  Hand Held Shower: x  Transfer Tub Bench: x  Grab bars: x  Reacher: x  Long-handled Shoehorn: x        Plan  Plan  Times per week: 5-7  Times per day: Twice a day Current Treatment Recommendations: Self-Care / ADL, Home Management Training, Strengthening, Balance Training, Functional Mobility Training, Endurance Training, Wheelchair Mobility Training, Neuromuscular Re-education, Pain Management, Safety Education & Training, Patient/Caregiver Education & Training, Equipment Evaluation, Education, & procurement, Cognitive/Perceptual Training  Patient Goals   Patient goals : To discharge home with family support  Short term goals  Time Frame for Short term goals: 7 to 10 days  Short term goal 1: Pt will perform upper body bathing/dressing with stand by assist.  Short term goal 2: Pt will perform lower body bathing and dressing with Moderate assist and Fair safety. Short term goal 3: Pt will perform toileting tasks with Moderate assist.  Short term goal 4: Pt will verbalize/demonstrate Good understanding of assistive equipment/durable medical equipment/modified techniques for increased independence with self-care and mobility. Short term goal 5: Pt will perform functional transfers with Moderate assist to toilet/wheelchair/shower with Fair safety. Short term goal 6: Pt will actively participate in 30+ minutes of therapeutic exercise/functional activities to promote increased independence with self-care and mobility. Long term goals  Time Frame for Long term goals : By discharge  Long term goal 1: Pt will perform BADLs with modified independence and Good safety with assist PRN for HORTENSIA hose. Long term goal 2: Pt will perform functional transfers and mobility with modified independence, least restrictive mobility device, and Good safety. Long term goal 3: Pt will attend to L side of body 100% of the time during self-care, transfers, and mobility with 1-2 verbal cues PRN. Long term goal 4: Pt will stand for 5+ minutes with 1-2 UE support, modified independence and no loss of balance while engaging in functional activity of choice. Long term goal 5: Pt will verbalize/demonstrate Good understanding of home exercise program for LUE.   Long term goals 6: 9 hole peg, box and block to be assessed for LUE as appropriate        12/25/20 0929   OT Individual Minutes   Time In 0823   Time Out 0916   Minutes 53     Electronically signed by JEAN CARLOS Garrido on 12/25/20 at 10:53 AM EST

## 2020-12-25 NOTE — PROGRESS NOTES
[facility] Medicine    CONSULTATION / HISTORY AND PHYSICAL EXAMINATION            Date:   2020  Patient name:  Giorgio Acuna  Date of admission:  2020  8:30 PM  MRN:   [id number]  Account:  [de-identified]  YOB: 1970  PCP:    Felicia Bowen MD  Room:   [facility]  Code Status:    Full Code    Physician Requesting Consult: Meg Goyal MD    Reason for Consult: Medical management    Chief Complaint:   Acute left sided weakness     History Obtained From:     patient, electronic medical record    History of Present Illness/ Interval History:   54-year-old female with history of delta storage pool disease, bipolar disorder, alcohol abuse who developed acute left-sided weaknessfound to have intraparenchymal hemorrhage, right parietal lobe hemorrhage and moderate subarachnoid hemorrhage. Pt admitted to rehab unit on 20. Improving with PT/OT    Past Medical History:     Past Medical History:   Diagnosis Date    Asthma     Bipolar 1 disorder ([facility] [address]     Delta storage pool disease ([facility] [address]     Hypertension     Psychiatric problem         Past Surgical History:     Past Surgical History:   Procedure Laterality Date     SECTION  9823,5526    Miscarriage     CHOLECYSTECTOMY      COLONOSCOPY N/A 2/3/2020    random bx(normal)hemorrhoids    GASTRIC BYPASS SURGERY      HYSTERECTOMY      KNEE SURGERY      LAPAROSCOPY      TONSILLECTOMY AND ADENOIDECTOMY      UPPER GASTROINTESTINAL ENDOSCOPY N/A 2/3/2020    (normal,neg H-Pylori)normal post-bypass endoscopy        Medications Prior to Admission:     Prior to Admission medications    Medication Sig Start Date End Date Taking?  Authorizing Provider   STIMATE 1.5 MG/ML SOLN nasal solution INSTILL 2 SPRAYS INTO THE NOSE TWICE DAILY STARTING TWO DAYS BEFORE PROCEDURE 20   Tj Villalba MD amLODIPine (NORVASC) 5 MG tablet Take 5 mg by mouth daily    Historical Provider, MD   QUEtiapine (SEROQUEL) 200 MG tablet Take 200 mg by mouth nightly    Historical Provider, MD   lisinopril (PRINIVIL;ZESTRIL) 20 MG tablet Take 20 mg by mouth daily    Historical Provider, MD   buPROPion (WELLBUTRIN XL) 300 MG XL tablet Take 300 mg by mouth daily 3/5/16   Historical Provider, MD   sertraline (ZOLOFT) 50 MG tablet Take 50 mg by mouth daily 3/5/16   Historical Provider, MD   PROAIR  (90 BASE) MCG/ACT inhaler Inhale 2 puffs into the lungs every 6 hours as needed  14   Historical Provider, MD   lamoTRIgine (LAMICTAL) 200 MG tablet Take 400 mg by mouth daily 2 tabs 14   Historical Provider, MD        Allergies:     Penicillin g, Pcn [penicillins], and Sulfa antibiotics    Social History:     Tobacco:    reports that she has never smoked. She has never used smokeless tobacco.  Alcohol:      reports current alcohol use. Drug Use:  reports no history of drug use. Family History:     Family History   Adopted: Yes   Family history unknown: Yes       Review of Systems:     Positive and Negative as described in HPI. Denies any shortness of breath or cough  Denies chest pain or palpitations  Denies abdominal pain, diarrhea vomiting  Denies any new numbness tremors or weakness. Physical Exam:     /72   Pulse 83   Temp 98.3 °F (36.8 °C) (Oral)   Resp 16   Ht 5' 5\" (1.651 m)   Wt 233 lb (105.7 kg)   SpO2 100%   BMI 38.77 kg/m²   Temp (24hrs), Av.3 °F (36.8 °C), Min:98.2 °F (36.8 °C), Max:98.3 °F (36.8 °C)      General appearance:  alert, cooperative and no distress  Eyes: Anicteric sclera. Pupils are equally round and reactive to light. Extraocular movements are intact.   Lungs:  clear to auscultation bilaterally, normal effort  Heart:  regular rate and rhythm, no murmur  Abdomen:  soft, nontender, nondistended, normal bowel sounds, no masses, hepatomegaly, splenomegaly

## 2020-12-25 NOTE — PLAN OF CARE
Problem: Skin Integrity:  Goal: Will show no infection signs and symptoms  Description: Will show no infection signs and symptoms  Outcome: Ongoing  Note: Patient displays no new signs of infection during this shift. Problem: Skin Integrity:  Goal: Absence of new skin breakdown  Description: Absence of new skin breakdown  Outcome: Ongoing  Note: No new occurrence of skin breakdown noted during this shift. Problem: Falls - Risk of:  Goal: Will remain free from falls  Description: Will remain free from falls  Outcome: Ongoing  Note: Patient remains free of incidence/ injury. Bed remains in low position.

## 2020-12-26 PROCEDURE — 6370000000 HC RX 637 (ALT 250 FOR IP): Performed by: NURSE PRACTITIONER

## 2020-12-26 PROCEDURE — 97110 THERAPEUTIC EXERCISES: CPT

## 2020-12-26 PROCEDURE — 6370000000 HC RX 637 (ALT 250 FOR IP): Performed by: INTERNAL MEDICINE

## 2020-12-26 PROCEDURE — 97530 THERAPEUTIC ACTIVITIES: CPT

## 2020-12-26 PROCEDURE — 6370000000 HC RX 637 (ALT 250 FOR IP): Performed by: PHYSICAL MEDICINE & REHABILITATION

## 2020-12-26 PROCEDURE — 6370000000 HC RX 637 (ALT 250 FOR IP): Performed by: STUDENT IN AN ORGANIZED HEALTH CARE EDUCATION/TRAINING PROGRAM

## 2020-12-26 PROCEDURE — 97112 NEUROMUSCULAR REEDUCATION: CPT

## 2020-12-26 PROCEDURE — 97116 GAIT TRAINING THERAPY: CPT

## 2020-12-26 PROCEDURE — 6360000002 HC RX W HCPCS: Performed by: PHYSICAL MEDICINE & REHABILITATION

## 2020-12-26 PROCEDURE — 1180000000 HC REHAB R&B

## 2020-12-26 PROCEDURE — 99232 SBSQ HOSP IP/OBS MODERATE 35: CPT | Performed by: PHYSICAL MEDICINE & REHABILITATION

## 2020-12-26 PROCEDURE — 97535 SELF CARE MNGMENT TRAINING: CPT

## 2020-12-26 PROCEDURE — 99231 SBSQ HOSP IP/OBS SF/LOW 25: CPT | Performed by: INTERNAL MEDICINE

## 2020-12-26 RX ADMIN — LISINOPRIL 20 MG: 20 TABLET ORAL at 07:12

## 2020-12-26 RX ADMIN — TIZANIDINE 6 MG: 4 TABLET ORAL at 07:13

## 2020-12-26 RX ADMIN — SERTRALINE HYDROCHLORIDE 150 MG: 100 TABLET ORAL at 07:12

## 2020-12-26 RX ADMIN — SENNOSIDES 17.2 MG: 8.6 TABLET, FILM COATED ORAL at 07:18

## 2020-12-26 RX ADMIN — GABAPENTIN 300 MG: 300 CAPSULE ORAL at 20:10

## 2020-12-26 RX ADMIN — TIZANIDINE 4 MG: 4 TABLET ORAL at 15:14

## 2020-12-26 RX ADMIN — TIZANIDINE 4 MG: 4 TABLET ORAL at 20:10

## 2020-12-26 RX ADMIN — FOLIC ACID 1 MG: 1 TABLET ORAL at 07:12

## 2020-12-26 RX ADMIN — ENOXAPARIN SODIUM 30 MG: 30 INJECTION SUBCUTANEOUS at 07:13

## 2020-12-26 RX ADMIN — ENOXAPARIN SODIUM 30 MG: 30 INJECTION SUBCUTANEOUS at 20:11

## 2020-12-26 RX ADMIN — BUPROPION HYDROCHLORIDE 300 MG: 300 TABLET, FILM COATED, EXTENDED RELEASE ORAL at 07:20

## 2020-12-26 RX ADMIN — AMLODIPINE BESYLATE 5 MG: 5 TABLET ORAL at 07:12

## 2020-12-26 RX ADMIN — OXYCODONE HYDROCHLORIDE 5 MG: 5 TABLET ORAL at 17:36

## 2020-12-26 RX ADMIN — Medication 3 MG: at 20:10

## 2020-12-26 RX ADMIN — LAMOTRIGINE 400 MG: 100 TABLET ORAL at 07:20

## 2020-12-26 RX ADMIN — THIAMINE HCL TAB 100 MG 100 MG: 100 TAB at 07:12

## 2020-12-26 RX ADMIN — TRAZODONE HYDROCHLORIDE 25 MG: 50 TABLET ORAL at 20:10

## 2020-12-26 RX ADMIN — SENNOSIDES AND DOCUSATE SODIUM 2 TABLET: 8.6; 5 TABLET ORAL at 08:12

## 2020-12-26 RX ADMIN — ROPINIROLE HYDROCHLORIDE 1 MG: 1 TABLET, FILM COATED ORAL at 20:10

## 2020-12-26 ASSESSMENT — PAIN SCALES - GENERAL
PAINLEVEL_OUTOF10: 6
PAINLEVEL_OUTOF10: 4

## 2020-12-26 NOTE — PLAN OF CARE
Problem: Skin Integrity:  Goal: Will show no infection signs and symptoms  Description: Will show no infection signs and symptoms  Outcome: Ongoing     Problem: Skin Integrity:  Goal: Absence of new skin breakdown  Description: Absence of new skin breakdown  Outcome: Ongoing     Problem: Falls - Risk of:  Goal: Will remain free from falls  Description: Will remain free from falls  Outcome: Ongoing     Problem: Falls - Risk of:  Goal: Absence of physical injury  Description: Absence of physical injury  Outcome: Ongoing     Problem: Pain:  Goal: Pain level will decrease  Description: Pain level will decrease  Outcome: Ongoing     Problem: Pain:  Goal: Control of acute pain  Description: Control of acute pain  Outcome: Ongoing     Problem: Pain:  Goal: Control of chronic pain  Description: Control of chronic pain  Outcome: Ongoing     Problem: Neurological  Goal: Maximum potential motor/sensory/cognitive function  Outcome: Ongoing     Problem: Nutrition  Goal: Optimal nutrition therapy  Description: Nutrition Problem #1: Inadequate energy intake  Intervention: Food and/or Nutrient Delivery: Continue Current Diet, Continue Oral Nutrition Supplement  Nutritional Goals: po intake greater than 75%     Outcome: Ongoing     Problem: Neurological  Goal: Maximum potential motor/sensory/cognitive function  Outcome: Ongoing     Problem: Mobility - Impaired:  Goal: Mobility will improve  Description: Mobility will improve  Outcome: Ongoing

## 2020-12-26 NOTE — PROGRESS NOTES
Physical Therapy  Facility/Department: [facility]  Daily Treatment Note  NAME: Smitha Ellison  : 1970  MRN: [id number]    Date of Service: 2020    Discharge Recommendations:  Patient would benefit from continued therapy after discharge, Home with assist PRN   PT Equipment Recommendations  Equipment Needed: Yes(Continue to assess)    Assessment   Body structures, Functions, Activity limitations: Decreased functional mobility ; Decreased strength;Decreased safe awareness;Decreased balance; Increased pain;Decreased sensation;Decreased fine motor control;Decreased coordination;Decreased posture;Decreased endurance;Decreased vision/visual deficit; Decreased ROM  Treatment Diagnosis: Impaired function. Barriers to Learning: none known  REQUIRES PT FOLLOW UP: Yes  Activity Tolerance  Activity Tolerance: Patient limited by fatigue;Patient limited by endurance     Patient Diagnosis(es): There were no encounter diagnoses. has a past medical history of Asthma, Bipolar 1 disorder ([facility] [address] Delta storage pool disease [facility]), Hypertension, and Psychiatric problem. has a past surgical history that includes  section (6210,8074); Gastric bypass surgery (); Tonsillectomy and adenoidectomy (); Cholecystectomy (); knee surgery (); Hysterectomy (); laparoscopy (); Colonoscopy (N/A, 2/3/2020); and Upper gastrointestinal endoscopy (N/A, 2/3/2020). Restrictions  Restrictions/Precautions  Restrictions/Precautions: Fall Risk  Required Braces or Orthoses?: No  Position Activity Restriction  Other position/activity restrictions: L hypertonicity. PRAFO boot LLE in bed  Subjective   General  Chart Reviewed: Yes  Response To Previous Treatment: Patient with no complaints from previous session. Family / Caregiver Present: No  Subjective  Subjective: Patient very motivated for therapy.   General Comment Comments: Per Dr. Raymond Garber, patient allowed to use LUE sling for ambulation purposes only, 12/26/2020     Orientation  Orientation  Overall Orientation Status: Within Normal Limits  Objective   Bed mobility  Bridging: Minimal assistance  Rolling to Left: Contact guard assistance  Rolling to Right: Contact guard assistance  Supine to Sit: Minimal assistance  Sit to Supine: Minimal assistance  Scooting: Minimal assistance  Comment: Performed on therapy mat, wedge, no hand rails. Patient educated on log rolling technique, taking time to position correctly. Patient impulsive at time. Transfers  Sit to Stand: Minimal Assistance  Stand to sit: Contact guard assistance  Bed to Chair: Moderate assistance;Minimal assistance(Squat pivot, assist varying)  Squat Pivot Transfers: Minimal Assistance; Moderate Assistance  Comment: caution LEFT ankle rolls. REINFORCE foot placement. Ambulation  Ambulation?: Yes  Ambulation 1  Surface: level tile  Device: Hemiwalker  Other Apparatus: Left;Slider; Wheelchair follow;AFO(Left AFO (anterior plate), left slider)  Assistance: Moderate assistance  Gait Deviations: Decreased step length;Decreased step height;Deviated path  Distance: 75 ft  Comments: Cues for foot placement and quad control/TKE. Narrow KRISTIN. Ankle better controlled when patient completes TKE. Stairs/Curb  Stairs?: No(Not performed this date)  Neuromuscular Education  Neuromuscular education: Yes  Vibration: Vibration applied to LLE (quads, DF, PF, inversion/eversion), good response to SAQ, minimal results from PF/DF and inversion, poor results to eversion). Good response to ankle quick stretch reflexes.      Other exercises  Other exercises?: Yes  Other exercises 1: Seated (B) LE exercises x20 (RLE 3#, LLE AROM but cues for technique and control)  Other exercises 2: Supine (B) LE exercises x15 Other exercises 3: Squat pivot transfer to left and right (therapy mat <> wheelchair) x10 (AM/PM), focus on technique, safety and ankle control. Other exercises 4: Manual resistance applied to left ankle with some return with cues for \"push me away\"      Goals  Short term goals  Time Frame for Short term goals: 10 days  Short term goal 1: pt will perform bed mobility with min A  Short term goal 2: pt will dangle EOB/EOM for 20 to 25 minutes with SBA, performing challenged seated balance/corestrengthening  Short term goal 3: Pivot transfers with mod A+2  Short term goal 4: WC mobility x 100' with min A  Short term goal 5: progress to standing/pre-gait activities in // bars to facilitate L LE motor fucntion. Long term goals  Time Frame for Long term goals : By LOS  Long term goal 1: Pt able to perform bed mobility independently  Long term goal 2: Pt able to perform transfers at [address] term goal 3: Pt able to progress to ambulation with appropriate device dist of 30 to 50 ft, mod A   Long term goal 4: Pt able to propel w/c level surfaces dist of 150 ft , mod-I. Long term goal 5: Improve L LE strength to atleast 2 to 2+/5 to improve fucntion. Long term goal 6: Improve PASS score from 8/30 to 23/36 to improve overall fucntion. Long term goal 7: Pt able to go up and down 5 steps with rail, mod A. Patient Goals   Patient goals : to regain use of L arm and L leg    Plan    Plan  Times per week: 1.5hr/day, 5 to 7 days/week.   Times per day: Daily  Current Treatment Recommendations: Strengthening, ROM, Balance Training, Functional Mobility Training, Transfer Training, Wheelchair Mobility Training, Gait Training, Neuromuscular Re-education, Pain Management, Home Exercise Program, Safety Education & Training, Patient/Caregiver Education & Training, Positioning, Endurance Training, Stair training  Safety Devices  Type of devices: Gait belt  Restraints  Initially in place: No        12/26/20 1020 12/26/20 1536 PT Individual Minutes   Time In [id number]   Time Out [id number]   Minutes 56 34   PT Concurrent Minutes   Time In 1020  --    Time Out 1030  --    Minutes 10  --        Josh Vale, PTA

## 2020-12-26 NOTE — PROGRESS NOTES
Physical Medicine & Rehabilitation  Progress Note    12/26/2020 1:25 PM     CC: Ambulatory and ADL dysfunction due to hemorrhagic CVA left nondominant hemiparesis    Subjective:   Complaints. Feels well. Shoulder better with taping. Also try Lidoderm patch today. Does not want left hemilap board as she feels this causes more discomfort    ROS:  Denies fevers, chills, sweats. No chest pain, palpitations, lightheadedness. Denies coughing, wheezing or shortness of breath. Denies abdominal pain, nausea, diarrhea or constipation. No new areas of joint pain. Denies new areas of numbness or weakness. Denies new anxiety or depression issues. No new skin problems. Rehabilitation:   PT:  Restrictions/Precautions: Fall Risk  Other position/activity restrictions: L hypertonicity. PRAFO boot LLE in bed   Transfers  Sit to Stand: Minimal Assistance  Stand to sit: Contact guard assistance  Bed to Chair: Moderate assistance(x1, squat pivot)  Stand Pivot Transfers: Maximum Assistance(pt very anxious trying a stand pivot with hemiwalker)  Squat Pivot Transfers: Minimal Assistance(x1, mainly stabilizing left ankle to prevent rolling)  Comment: caution LEFT ankle rolls. REINFORCE foot placement. Ambulation 1  Surface: level tile  Device: Hemiwalker  Other Apparatus: Left, Slider, Wheelchair follow  Assistance: Minimal assistance, 2 Person assistance  Quality of Gait: Intially pt is fearful d/t ankle instability. Gait Deviations: Decreased step length, Decreased step height, Deviated path  Distance: 108ft with two seated rest breaks. Comments: Pt cued to taked wider steps, TKE, and push heel into floor on L LE. Good carry over from pt.            OT:  ADL  Equipment Provided: Reacher  Feeding: Setup(per report)  Grooming: Setup(Assist with hair care (place in pony tail))  UE Bathing: Minimal assistance(did not bathe however clinical reasoning with donning of serena)  LE Bathing: None UE Dressing: Setup(eze [place] shirt; good ara technique carryover. Bra remained o)  LE Dressing: Moderate assistance(intermittent A  threading LLE/over L hip/TEDs/shoes/brace. )  Toileting: None  Additional Comments: using reacher, pt gives good efforts and attempts to try various ways for donning/threading pants and shirt, pt states \"I'm just trying to see if something different is easier\"         Balance  Sitting Balance: Modified independent   Standing Balance: Moderate assistance(sinkside for lower body dressing)   Standing Balance  Time: <1 min x 3, 1-2 min   Activity: functional transfers, LB dressing   Comment: increased steadiness with dynamic tasks this date, assist to stabilize LLE, VCs for posture/positioning and weight shifting on LLE to decrease tone and L ankle inversion  Functional Mobility  Functional - Mobility Device: Wheelchair  Activity: To/from bathroom  Assist Level: Minimal assistance  Functional Mobility Comments: assist and cuing for visual scanning/turning to L side     Bed mobility  Bridging: Minimal assistance  Rolling to Left: Contact guard assistance  Rolling to Right: Contact guard assistance, Stand by assistance  Supine to Sit: Stand by assistance  Sit to Supine: Moderate assistance  Scooting: Minimal assistance  Comment: pt utilizes bed rail prn for support on R side. Transfers  Stand Step Transfers: Moderate assistance(1-2 steps to L side for transfer)  Stand Pivot Transfers: Moderate assistance(towards strong side)  Sit to stand: Minimal assistance  Stand to sit: Minimal assistance  Transfer Comments: pt demo good hand placement for sit>stands, decreased control for stand>sit to bed; improved to wheelchair. Toilet Transfers  Toilet - Technique: To right, To left, Stand pivot  Equipment Used: Standard toilet  Toilet Transfer:  Moderate assistance, Minimal assistance Toilet Transfers Comments: assist x1, VCs for safety and proper tech with fair carryover, assist for LLE positioning/stabilization, pt tends to complete transfers quickly req VCs for control     Shower Transfers  Shower - Transfer From: Wheelchair  Shower - Transfer Type: To and From  Shower - Transfer To: Transfer tub bench  Shower - Technique: Stand pivot, To right, To left  Shower Transfers: Moderate assistance  Shower Transfers Comments: assist x1, VCs and assist for LLE positioning/stabilization, cuing for control, fair/good carryover noted  Wheelchair Bed Transfers  Equipment Used: Other, Wheelchair, Bed(Eduquia)  Level of Asssistance: Dependent/Total      ST:           Objective:  /60   Pulse 90   Temp 98 °F (36.7 °C) (Oral)   Resp 20   Ht 5' 5\" (1.651 m)   Wt 233 lb (105.7 kg)   SpO2 100%   BMI 38.77 kg/m²  I Body mass index is 38.77 kg/m². I   Wt Readings from Last 1 Encounters:   12/15/20 233 lb (105.7 kg)      Temp (24hrs), Av.2 °F (36.8 °C), Min:98 °F (36.7 °C), Max:98.4 °F (36.9 °C)         GEN: well developed, well nourished, no acute distress  HEENT: Normocephalic atraumatic, EOMI, mucous membranes pink and moist  CV: RRR, no murmurs, rubs or gallops  PULM: CTAB, no rales or rhonchi. Respirations WNL and unlabored  ABD: soft, NT, ND, +BS and equal  NEURO: A&O x3. Sensation intact to light touch. MSK: Decreased range of motion left upper lower extremities, tender anterior left shoulder, 4/5 right upper and lower extremity, moderate spasticity left upper and lower extremity  EXTREMITIES: No calf tenderness to palpation bilaterally. No edema BLEs  SKIN: warm dry and intact with good turgor  PSYCH: appropriately interactive. Affect WNL.           Medications   Scheduled Meds:   lidocaine  1 patch Transdermal Daily    tiZANidine  6 mg Oral Daily    And    tiZANidine  4 mg Oral 2 times per day    sertraline  150 mg Oral Daily    amLODIPine  5 mg Oral Daily  thiamine mononitrate  100 mg Oral Daily    traZODone  25 mg Oral Nightly    gabapentin  300 mg Oral Nightly    lisinopril  20 mg Oral Daily    buPROPion  300 mg Oral Daily    cyanocobalamin  1,000 mcg Intramuscular Weekly    Followed by   Osman Harley ON 2/5/2021] cyanocobalamin  1,000 mcg Intramuscular Z83 Days    folic acid  1 mg Oral Daily    lamoTRIgine  400 mg Oral Daily    melatonin  3 mg Oral Nightly    rOPINIRole  1 mg Oral Nightly    sennosides-docusate sodium  2 tablet Oral Daily    polyethylene glycol  17 g Oral Daily    enoxaparin  30 mg Subcutaneous BID     Continuous Infusions:  PRN Meds:.oxyCODONE, acetaminophen, ipratropium-albuterol, magnesium hydroxide, muscle rub, bisacodyl, senna     Diagnostics:     CBC:   Recent Labs     12/24/20  0547   WBC 5.9   RBC 3.72*   HGB 11.2*   HCT 33.3*   MCV 89.6   RDW 13.9        BMP: No results for input(s): NA, K, CL, CO2, PHOS, BUN, CREATININE in the last 72 hours. Invalid input(s): CA  BNP: No results for input(s): BNP in the last 72 hours. PT/INR: No results for input(s): PROTIME, INR in the last 72 hours. APTT: No results for input(s): APTT in the last 72 hours. CARDIAC ENZYMES: No results for input(s): CKMB, CKMBINDEX, TROPONINT in the last 72 hours. Invalid input(s): CKTOTAL;3  FASTING LIPID PANEL:  Lab Results   Component Value Date    CHOL 247 (H) 11/30/2020     11/30/2020    TRIG 77 11/30/2020     LIVER PROFILE: No results for input(s): AST, ALT, ALB, BILIDIR, BILITOT, ALKPHOS in the last 72 hours. I/O (24Hr): No intake or output data in the 24 hours ending 12/26/20 1325    Glu last 24 hour  No results for input(s): POCGLU in the last 72 hours. No results for input(s): CLARITYU, COLORU, PHUR, SPECGRAV, PROTEINU, RBCUA, BLOODU, BACTERIA, NITRU, WBCUA, LEUKOCYTESUR, YEAST, GLUCOSEU, BILIRUBINUR in the last 72 hours.       Impression/Plan: This note is created with the assistance of a speech recognition program.  While intending to generate a document that actually reflects the content of the visit, the document can still have some errors including those of syntax and sound a like substitutions which may escape proof reading.   In such instances, actual meaning can be extrapolated by contextual diversion

## 2020-12-26 NOTE — PROGRESS NOTES
[facility]    CONSULTATION / HISTORY AND PHYSICAL EXAMINATION            Date:   2020  Patient name:  Rodrigo Bullock  Date of admission:  2020  8:30 PM  MRN:   [id number]  Account:  [de-identified]  YOB: 1970  PCP:    Amanda Wilkes MD  Room:   [facility]  Code Status:    Full Code    Physician Requesting Consult: Alex Montgomery MD    Reason for Consult: Medical management    Chief Complaint:   Acute left sided weakness     History Obtained From:     patient, electronic medical record    History of Present Illness/ Interval History:   51-year-old female with history of delta storage pool disease, bipolar disorder, alcohol abuse who developed acute left-sided weaknessfound to have intraparenchymal hemorrhage, right parietal lobe hemorrhage and moderate subarachnoid hemorrhage. Pt admitted to rehab unit on 20. Improving with PT/OT    Past Medical History:     Past Medical History:   Diagnosis Date    Asthma     B[facility] 1 disorder ([facility] [address]     Delta storage pool disease ([facility] [address]     Hypertension     Psychiatric problem         Past Surgical History:     Past Surgical History:   Procedure Laterality Date     SECTION  7637,3598    Miscarriage     CHOLECYSTECTOMY      COLONOSCOPY N/A 2/3/2020    -random bx(normal)hemorrhoids    GASTRIC BYPASS SURGERY      HYSTERECTOMY      KNEE SURGERY      LAPAROSCOPY      TONSILLECTOMY AND ADENOIDECTOMY      UPPER GASTROINTESTINAL ENDOSCOPY N/A 2/3/2020    (normal,neg H-Pylori)normal post-bypass endoscopy        Medications Prior to Admission:     Prior to Admission medications    Medication Sig Start Date End Date Taking?  Authorizing Provider   STIMATE 1.5 MG/ML SOLN nasal solution INSTILL 2 SPRAYS INTO THE NOSE TWICE DAILY STARTING TWO DAYS BEFORE PROCEDURE 20   Vandana Rossi MD amLODIPine (NORVASC) 5 MG tablet Take 5 mg by mouth daily    Historical Provider, MD   QUEtiapine (SEROQUEL) 200 MG tablet Take 200 mg by mouth nightly    Historical Provider, MD   lisinopril (PRINIVIL;ZESTRIL) 20 MG tablet Take 20 mg by mouth daily    Historical Provider, MD   buPROPion (WELLBUTRIN XL) 300 MG XL tablet Take 300 mg by mouth daily 3/5/16   Historical Provider, MD   sertraline (ZOLOFT) 50 MG tablet Take 50 mg by mouth daily 3/5/16   Historical Provider, MD   PROAIR  (90 BASE) MCG/ACT inhaler Inhale 2 puffs into the lungs every 6 hours as needed  14   Historical Provider, MD   lamoTRIgine (LAMICTAL) 200 MG tablet Take 400 mg by mouth daily 2 tabs 14   Historical Provider, MD        Allergies:     Penicillin g, Pcn [penicillins], and Sulfa antibiotics    Social History:     Tobacco:    reports that she has never smoked. She has never used smokeless tobacco.  Alcohol:      reports current alcohol use. Drug Use:  reports no history of drug use. Family History:     Family History   Adopted: Yes   Family history unknown: Yes       Review of Systems:     Positive and Negative as described in HPI. Denies any shortness of breath or cough  Denies chest pain or palpitations  Denies abdominal pain, diarrhea vomiting  Denies any new numbness tremors or weakness. Physical Exam:     BP (!) 115/58   Pulse 85   Temp 98.4 °F (36.9 °C) (Oral)   Resp 16   Ht 5' 5\" (1.651 m)   Wt 233 lb (105.7 kg)   SpO2 99%   BMI 38.77 kg/m²   Temp (24hrs), Av.4 °F (36.9 °C), Min:98.4 °F (36.9 °C), Max:98.4 °F (36.9 °C)      General appearance:  alert, cooperative and no distress  Eyes: Anicteric sclera. Pupils are equally round and reactive to light. Extraocular movements are intact.   Lungs:  clear to auscultation bilaterally, normal effort  Heart:  regular rate and rhythm, no murmur  Abdomen:  soft, nontender, nondistended, normal bowel sounds, no masses, hepatomegaly, splenomegaly Patient has CT head showed resolution of SAH and intraventricular hemorrhage and decrease in size of right parietal hemorrhage. Blood is controlled after increasing amlodipine     12/24  No acute issues   Repeat CT scan of the head showed significant resolution of the intracranial hemorrhage   Vitals stable. 12/25  No new symptoms   Progressing satisfactory with rehab therapies . 12/26  No acute issues   Feels well   Get occasional spasm but overall improving   Vitals are stable     Consultations:   IP CONSULT TO INTERNAL MEDICINE  IP CONSULT TO DIETITIAN  IP CONSULT TO SOCIAL WORK  INPATIENT CONSULT TO ORTHOTIST/PROSTHETIST      Jose Maria Springer MD  12/26/2020  7:26 AM    Copy sent to Dr. Patria Olguin MD    Please note that this chart was generated using voice recognition Dragon dictation software. Although every effort was made to ensure the accuracy of this automated transcription, some errors in transcription may have occurred. Attestation and add on       I have discussed the care of Megan Ritchie , including pertinent history and exam findings,      12/26/20    with the resident. I have seen and examined the patient and the key elements of all parts of the encounter have been performed by me . I agree with the assessment, plan and orders as documented by the resident. Active Problems:    Platelet dysfunction (HCC)    Bipolar 1 disorder (HCC)    Intracranial hemorrhage (HCC)    Vasogenic edema (HCC)    H/O intracranial hemorrhage    Essential hypertension    Acute left-sided weakness  Resolved Problems:    * No resolved hospital problems. *            ---- ;        Medications: Allergies:     Allergies   Allergen Reactions    Penicillin G Itching    Pcn [Penicillins]     Sulfa Antibiotics Other (See Comments)     Inflammation in the eye       Current Meds:   Scheduled Meds:    lidocaine  1 patch Transdermal Daily    tiZANidine  6 mg Oral Daily    And

## 2020-12-26 NOTE — PROGRESS NOTES
[address]   ACUTE REHABILITATION OCCUPATIONAL THERAPY  DAILY NOTE    Date: 20  Patient Name: Nick Ocasio      Room: 2612/2612-01    MRN: [id number]   : 1970  (48 y.o.)  Gender: female   Referring Practitioner: Yvonna Rinne, MD  Diagnosis: Intracranial hemmorrhage  Additional Pertinent Hx: 63-year-old female with history of delta storage pool disease, bipolar disorder, alcohol abuse who developed acute left-sided weaknessfound to have intraparenchymal hemorrhage, right parietal lobe hemorrhage and moderate subarachnoid hemorrhage. Pt admitted to rehab unit on 20. Restrictions  Restrictions/Precautions: Fall Risk  Other position/activity restrictions: L hypertonicity. PRAFO boot LLE in bed  Required Braces or Orthoses?: No  Equipment Used: Other, Wheelchair, Bed(Tameka Conrado)    Subjective  Subjective: \"Yes, I'm so happy, I don't think I've ever done that before! \" Pt states regarding increased mvmt in LUE elbow. Comments: Pt pleasant and cooperative. Patient Currently in Pain: Denies  Restrictions/Precautions: Fall Risk  Overall Orientation Status: Within Functional Limits     Pain Assessment  Pain Type: Acute pain  Pain Location: Shoulder  Pain Orientation: Left  Pain Frequency: Intermittent    Objective  Cognition  Overall Cognitive Status: WFL  Perception  Overall Perceptual Status: WFL  Balance  Sitting Balance: Modified independent   Standing Balance: Moderate assistance(min-mod pending activity sinkside for ADL's)  Bed mobility  Supine to Sit: Stand by assistance  Scooting: Minimal assistance  Transfers  Stand Pivot Transfers: Moderate assistance;Minimal assistance(towards strong side)  Sit to stand: Minimal assistance  Stand to sit: Minimal assistance  Transfer Comments: pt demo good hand placement.    Standing Balance  Time: AM: 1-2 min x 2   Activity: LB care tasks at sink Comment: increased steadiness with dynamic tasks this date, assist to stabilize LLE, VCs for posture/positioning and weight shifting on LLE to decrease tone and L ankle inversion        Type of ROM/Therapeutic Exercise  Type of ROM/Therapeutic Exercise: ZABRINAOM(LUE ROM )  Comment: gravity eliminated at elbow. Increased return in mvmt per pt report. x5 reps tolerated at a time due UE fatigue. Initial mvmt improved with each rep. Exercises  Scapular Protraction: x  Scapular Retraction: x  Shoulder ABduction: x(minimal return)  Shoulder ADduction: x(\"\")  Elbow Flexion: x  Elbow Extension: x  Grasp/Release: pt with noted increasing grasp involuntary with elbow flexion. Pt completed voluntary light grasp into fist however unable to release back into extension. Pt reports utilizes sponge in hand to address brain reconnection and light grasping ex's. Weight Bearing  Weight Bearing Technique: Yes  LUE Weight Bearing: Extended arm standing([facility] care tasks for weight shifting)  Response To Weight Bearing Technique: LUE supported on dycem for increased support. Electrical Stimulation  Electrical Stimulation Location: Forearm; Wrist  Electrical Stimulation Action: electrical pads placed to facilitate wrist and digit flexion and extension. minimal tolerance due to reported \"spasm feeling\" in bicep muscle after ~7 min of stimulation. Electrical Stimulation Parameters: 7 min, 35 Hx. reciprocal pattern. Electrical Stimulation Goals: Strength;Neuromuscular Facilitation  Neuromuscular Education  Neuromuscular education: Yes  Weight Bearing  Weight Bearing Technique: Yes  LUE Weight Bearing: Extended arm standing([employer] [facility] care tasks for weight shifting)  Response To Weight Bearing Technique: LUE supported on dycem for increased support.             ADL  Equipment Provided: Reacher  Feeding: Setup(per report)  Grooming: Setup  UE Bathing: Minimal assistance(intermittent support of LUE ) LE Bathing: Moderate assistance(Assist in standing for elizabeth care and buttocks bathing )  UE Dressing: Minimal assistance; Increased time to complete(assist with threading LUE. )  LE Dressing: Moderate assistance;Maximum assistance(assist in threading LLE, pulling over L hip in standing, donning B TEDs and shoes assist with L ankle brace. )  Toileting: None  Additional Comments: Pt gives good effort in all tasks. Assessment  Performance deficits / Impairments: Decreased functional mobility ; Decreased ADL status; Decreased ROM; Decreased strength;Decreased endurance;Decreased balance;Decreased high-level IADLs;Decreased fine motor control;Decreased coordination;Decreased posture;Decreased safe awareness;Decreased vision/visual deficit  Prognosis: Good  Discharge Recommendations: Home with assist PRN;Patient would benefit from continued therapy after discharge  Activity Tolerance: Patient Tolerated treatment well  Safety Devices in place: Yes  Type of devices: Left in chair(PT gym )  Equipment Recommendations  Equipment Needed: Yes  Hand Held Shower: x  Transfer Tub Bench: x  Grab bars: x  Reacher: x  Long-handled Shoehorn: x     Plan  Plan  Times per week: 5-7  Times per day: Twice a day  Current Treatment Recommendations: Self-Care / ADL, Home Management Training, Strengthening, Balance Training, Functional Mobility Training, Endurance Training, Wheelchair Mobility Training, Neuromuscular Re-education, Pain Management, Safety Education & Training, Patient/Caregiver Education & Training, Equipment Evaluation, Education, & procurement, Cognitive/Perceptual Training  Patient Goals   Patient goals : To discharge home with family support  Short term goals  Time Frame for Short term goals: 7 to 10 days  Short term goal 1: Pt will perform upper body bathing/dressing with stand by assist.  Short term goal 2: Pt will perform lower body bathing and dressing with Moderate assist and Fair safety. Short term goal 3: Pt will perform toileting tasks with Moderate assist.  Short term goal 4: Pt will verbalize/demonstrate Good understanding of assistive equipment/durable medical equipment/modified techniques for increased independence with self-care and mobility. Short term goal 5: Pt will perform functional transfers with Moderate assist to toilet/wheelchair/shower with Fair safety. Short term goal 6: Pt will actively participate in 30+ minutes of therapeutic exercise/functional activities to promote increased independence with self-care and mobility. Long term goals  Time Frame for Long term goals : By discharge  Long term goal 1: Pt will perform BADLs with modified independence and Good safety with assist PRN for HORTENSIA hose. Long term goal 2: Pt will perform functional transfers and mobility with modified independence, least restrictive mobility device, and Good safety. Long term goal 3: Pt will attend to L side of body 100% of the time during self-care, transfers, and mobility with 1-2 verbal cues PRN. Long term goal 4: Pt will stand for 5+ minutes with 1-2 UE support, modified independence and no loss of balance while engaging in functional activity of choice. Long term goal 5: Pt will verbalize/demonstrate Good understanding of home exercise program for LUE.   Long term goals 6: 9 hole peg, box and block to be assessed for LUE as appropriate        12/26/20 1000 12/26/20 1529   OT Individual Minutes   Time In 0730 1338   Time Out 0837 1413   Minutes 67 35     Electronically signed by JEAN CARLOS Saul on 12/26/20 at 3:59 PM EST

## 2020-12-27 PROCEDURE — 97110 THERAPEUTIC EXERCISES: CPT

## 2020-12-27 PROCEDURE — 6360000002 HC RX W HCPCS: Performed by: PHYSICAL MEDICINE & REHABILITATION

## 2020-12-27 PROCEDURE — 97112 NEUROMUSCULAR REEDUCATION: CPT

## 2020-12-27 PROCEDURE — 1180000000 HC REHAB R&B

## 2020-12-27 PROCEDURE — 6370000000 HC RX 637 (ALT 250 FOR IP): Performed by: INTERNAL MEDICINE

## 2020-12-27 PROCEDURE — 99232 SBSQ HOSP IP/OBS MODERATE 35: CPT | Performed by: PHYSICAL MEDICINE & REHABILITATION

## 2020-12-27 PROCEDURE — 6370000000 HC RX 637 (ALT 250 FOR IP): Performed by: STUDENT IN AN ORGANIZED HEALTH CARE EDUCATION/TRAINING PROGRAM

## 2020-12-27 PROCEDURE — 97530 THERAPEUTIC ACTIVITIES: CPT

## 2020-12-27 PROCEDURE — 97535 SELF CARE MNGMENT TRAINING: CPT

## 2020-12-27 PROCEDURE — 99231 SBSQ HOSP IP/OBS SF/LOW 25: CPT | Performed by: INTERNAL MEDICINE

## 2020-12-27 PROCEDURE — 97116 GAIT TRAINING THERAPY: CPT

## 2020-12-27 PROCEDURE — 6370000000 HC RX 637 (ALT 250 FOR IP): Performed by: PHYSICAL MEDICINE & REHABILITATION

## 2020-12-27 PROCEDURE — 6370000000 HC RX 637 (ALT 250 FOR IP): Performed by: NURSE PRACTITIONER

## 2020-12-27 RX ADMIN — FOLIC ACID 1 MG: 1 TABLET ORAL at 08:50

## 2020-12-27 RX ADMIN — ENOXAPARIN SODIUM 30 MG: 30 INJECTION SUBCUTANEOUS at 08:51

## 2020-12-27 RX ADMIN — SENNOSIDES AND DOCUSATE SODIUM 2 TABLET: 8.6; 5 TABLET ORAL at 08:51

## 2020-12-27 RX ADMIN — THIAMINE HCL TAB 100 MG 100 MG: 100 TAB at 08:51

## 2020-12-27 RX ADMIN — LAMOTRIGINE 400 MG: 100 TABLET ORAL at 08:53

## 2020-12-27 RX ADMIN — TIZANIDINE 4 MG: 4 TABLET ORAL at 21:04

## 2020-12-27 RX ADMIN — ROPINIROLE HYDROCHLORIDE 1 MG: 1 TABLET, FILM COATED ORAL at 21:05

## 2020-12-27 RX ADMIN — TRAZODONE HYDROCHLORIDE 25 MG: 50 TABLET ORAL at 21:04

## 2020-12-27 RX ADMIN — TIZANIDINE 6 MG: 4 TABLET ORAL at 08:50

## 2020-12-27 RX ADMIN — ACETAMINOPHEN 650 MG: 325 TABLET, FILM COATED ORAL at 05:21

## 2020-12-27 RX ADMIN — AMLODIPINE BESYLATE 5 MG: 5 TABLET ORAL at 08:51

## 2020-12-27 RX ADMIN — TIZANIDINE 4 MG: 4 TABLET ORAL at 14:07

## 2020-12-27 RX ADMIN — LISINOPRIL 20 MG: 20 TABLET ORAL at 08:51

## 2020-12-27 RX ADMIN — DICLOFENAC 2 G: 10 GEL TOPICAL at 21:04

## 2020-12-27 RX ADMIN — ENOXAPARIN SODIUM 30 MG: 30 INJECTION SUBCUTANEOUS at 21:05

## 2020-12-27 RX ADMIN — OXYCODONE HYDROCHLORIDE 5 MG: 5 TABLET ORAL at 16:47

## 2020-12-27 RX ADMIN — GABAPENTIN 300 MG: 300 CAPSULE ORAL at 21:04

## 2020-12-27 RX ADMIN — ACETAMINOPHEN 650 MG: 325 TABLET, FILM COATED ORAL at 14:07

## 2020-12-27 RX ADMIN — Medication 3 MG: at 21:04

## 2020-12-27 RX ADMIN — BUPROPION HYDROCHLORIDE 300 MG: 300 TABLET, FILM COATED, EXTENDED RELEASE ORAL at 08:53

## 2020-12-27 RX ADMIN — DICLOFENAC 2 G: 10 GEL TOPICAL at 14:07

## 2020-12-27 RX ADMIN — SERTRALINE HYDROCHLORIDE 150 MG: 100 TABLET ORAL at 08:50

## 2020-12-27 ASSESSMENT — PAIN DESCRIPTION - ORIENTATION
ORIENTATION: LEFT
ORIENTATION: LEFT;ANTERIOR

## 2020-12-27 ASSESSMENT — PAIN DESCRIPTION - PROGRESSION: CLINICAL_PROGRESSION: GRADUALLY IMPROVING

## 2020-12-27 ASSESSMENT — PAIN DESCRIPTION - LOCATION
LOCATION: SHOULDER
LOCATION: SHOULDER

## 2020-12-27 ASSESSMENT — PAIN DESCRIPTION - ONSET: ONSET: ON-GOING

## 2020-12-27 ASSESSMENT — PAIN SCALES - GENERAL
PAINLEVEL_OUTOF10: 8
PAINLEVEL_OUTOF10: 5

## 2020-12-27 ASSESSMENT — PAIN DESCRIPTION - DESCRIPTORS: DESCRIPTORS: ACHING;DISCOMFORT

## 2020-12-27 NOTE — PROGRESS NOTES
[address]   ACUTE REHABILITATION OCCUPATIONAL THERAPY  DAILY NOTE    Date: 20  Patient Name: Makenna Aragon      Room: 2612/2612-01    MRN: [id number]   : 1970  (48 y.o.)  Gender: female   Referring Practitioner: Belle Estrella MD  Diagnosis: Intracranial hemmorrhage  Additional Pertinent Hx: 59-year-old female with history of delta storage pool disease, bipolar disorder, alcohol abuse who developed acute left-sided weaknessfound to have intraparenchymal hemorrhage, right parietal lobe hemorrhage and moderate subarachnoid hemorrhage. Pt admitted to rehab unit on 20. Restrictions  Restrictions/Precautions: Fall Risk  Other position/activity restrictions: L hypertonicity. PRAFO boot LLE in bed  Required Braces or Orthoses?: No  Equipment Used: Other, Wheelchair, Bed(Lg Stedy)    Subjective  Subjective: \"I haven't felt comfortable doing it with nursing yet\" Pt states regarding use of lg stedy to transfer to toilet this date. In PM, pt states \"I just wanted to let you know that I've done two transfers with nursing without the lg stedy today\"  Comments: Pt pleasant and cooperative.  Pt in [address] [place] [address] completing toilet transfer with PCT upon writer entry in AM.   Patient Currently in Pain: No  Restrictions/Precautions: Fall Risk  Overall Orientation Status: Within Functional Limits     Pain Assessment  Pain Type: Acute pain  Pain Location: Shoulder  Pain Orientation: Left  Pain Frequency: Intermittent    Objective  Cognition  Overall Cognitive Status: WFL  Perception  Overall Perceptual Status: WFL  Balance  Sitting Balance: Modified independent   Standing Balance: Minimal assistance     Standing Balance  Time: Am: <1 min, 1-2 min x 2 PM: ~1 min x 2, 2-3 min, ~1 min  Activity: AM: funcitonal transfer, LBB/LBD tasks PM: toileting tasks, functional transfer training Comment: increased steadiness this date with dynamic functional task, L ankle more stable; writer assist to stabilize not required. Pt notes she was more aware to put increased WB to LLE in standing this date. Functional Mobility  Functional - Mobility Device: Hemiwalker  Activity: To/from bathroom(training bathroom. )  Assist Level: Moderate assistance  Functional Mobility Comments: assist to manage L ankle to prevent turning, verbal cues for enviromental awareness. Pt became very anxious during transfer training, various backward and side stepping involved for proper positioning to tub bench. Encouargement provided throughout, standing rest breaks. Pt unsteady  as anxiety increased however no loss of balance. Toilet Transfers  Toilet - Technique: To right; To left;Stand pivot  Equipment Used: Standard toilet  Toilet Transfer: Moderate assistance(towards weak side. )  Toilet Transfers Comments: use of grab bar for UE support. Tub Transfers  Tub - Transfer From: Walker(ara walker. )  Tub - Transfer Type: To(utilized stand pivot to wheelchair to return. )  Tub - Transfer To: Transfer tub bench  Tub - Technique: Ambulating  Tub Transfers: Moderate assistance;Verbal cues  Tub Transfers Comments: once positioned on edge of tub bench; cueing and increased time required. See functional mobility note. Assist required to lift LLE over edge of tub to enter. Recommend transfer bench on R end of tub when facing to ensure completing seated pivot over edge of tub towards strong side to be able to utilize R side to grasp rail on bench to assist. Continued education and transfer training required to increase safety and tolerance.                   Neuromuscular Education  Neuromuscular education: Yes  Taping: kinesio tape reapplied to LUE to decrease pain in L bicep/upper arm        ADL  Equipment Provided: Reacher  Feeding: Setup  Grooming: Setup  UE Bathing: Minimal assistance;Setup(intermittent support of LUE) LE Bathing: Minimal assistance(standing support to wash elizabeth area/buttocks. )  UE Dressing: Setup; Increased time to complete  LE Dressing: Moderate assistance; Increased time to complete;Verbal cueing(Assist to manage fully over L hip, eze TEDs/shoes. )  Toileting: Minimal assistance(partial A for clothing mgmt up/down on L side. )     Instrumental ADL's  Instrumental ADLs: Yes  Light Housekeeping  Light Housekeeping Level: Wheelchair  Light Housekeeping Level of Assistance: Stand by assistance;Supervision  Light Housekeeping: Pt retreived clothing from dryer at wheelchair level, placed on tabletop and folded using one handed techniques    Assessment  Performance deficits / Impairments: Decreased functional mobility ; Decreased ADL status; Decreased ROM; Decreased strength;Decreased endurance;Decreased balance;Decreased high-level IADLs;Decreased fine motor control;Decreased coordination;Decreased posture;Decreased safe awareness;Decreased vision/visual deficit  Prognosis: Good  Discharge Recommendations: Home with assist PRN;Patient would benefit from continued therapy after discharge  Activity Tolerance: Patient Tolerated treatment well  Safety Devices in place: Yes  Type of devices: Left in chair;Call light within reach;Gait belt(ice placed on L shoulder)  Equipment Recommendations  Equipment Needed: Yes  Hand Held Shower: x  Transfer Tub Bench: x  Grab bars: x  Reacher: x  Long-handled Shoehorn: x        Plan  Plan  Times per week: 5-7  Times per day: Twice a day  Current Treatment Recommendations: Self-Care / ADL, Home Management Training, Strengthening, Balance Training, Functional Mobility Training, Endurance Training, Wheelchair Mobility Training, Neuromuscular Re-education, Pain Management, Safety Education & Training, Patient/Caregiver Education & Training, Equipment Evaluation, Education, & procurement, Cognitive/Perceptual Training  Patient Goals   Patient goals :  To discharge home with family support Short term goals  Time Frame for Short term goals: 7 to 10 days  Short term goal 1: Pt will perform upper body bathing/dressing with stand by assist.  Short term goal 2: Pt will perform lower body bathing and dressing with Moderate assist and Fair safety. Short term goal 3: Pt will perform toileting tasks with Moderate assist.  Short term goal 4: Pt will verbalize/demonstrate Good understanding of assistive equipment/durable medical equipment/modified techniques for increased independence with self-care and mobility. Short term goal 5: Pt will perform functional transfers with Moderate assist to toilet/wheelchair/shower with Fair safety. Short term goal 6: Pt will actively participate in 30+ minutes of therapeutic exercise/functional activities to promote increased independence with self-care and mobility. Long term goals  Time Frame for Long term goals : By discharge  Long term goal 1: Pt will perform BADLs with modified independence and Good safety with assist PRN for HORTENSIA hose. Long term goal 2: Pt will perform functional transfers and mobility with modified independence, least restrictive mobility device, and Good safety. Long term goal 3: Pt will attend to L side of body 100% of the time during self-care, transfers, and mobility with 1-2 verbal cues PRN. Long term goal 4: Pt will stand for 5+ minutes with 1-2 UE support, modified independence and no loss of balance while engaging in functional activity of choice. Long term goal 5: Pt will verbalize/demonstrate Good understanding of home exercise program for LUE.   Long term goals 6: 9 hole peg, box and block to be assessed for LUE as appropriate        12/27/20 0827 12/27/20 1337   OT Individual Minutes   Time In 0735 1230   Time Out 0825 1330   Minutes 50 60     Electronically signed by JEAN CARLOS Bernabe on 12/27/20 at 3:00 PM EST

## 2020-12-27 NOTE — PROGRESS NOTES
Physical Therapy  Facility/Department: [facility] ACUTE REHAB  Daily Treatment Note  NAME: Velinda Babinski  : 1970  MRN: [id number]    Date of Service: 2020    Discharge Recommendations:  Patient would benefit from continued therapy after discharge, Home with assist PRN   PT Equipment Recommendations  Equipment Needed: Yes(Continue to assess)    Assessment   Body structures, Functions, Activity limitations: Decreased functional mobility ; Decreased strength;Decreased safe awareness;Decreased balance; Increased pain;Decreased sensation;Decreased fine motor control;Decreased coordination;Decreased posture;Decreased endurance;Decreased vision/visual deficit; Decreased ROM  Treatment Diagnosis: Impaired function. Barriers to Learning: none known  REQUIRES PT FOLLOW UP: Yes  Activity Tolerance  Activity Tolerance: Patient limited by fatigue;Patient limited by endurance     Patient Diagnosis(es): There were no encounter diagnoses. has a past medical history of Asthma, Bipolar 1 disorder ([facility] [address] Delta storage pool disease [facility]), Hypertension, and Psychiatric problem. has a past surgical history that includes  section (8070,4555); Gastric bypass surgery (); Tonsillectomy and adenoidectomy (); Cholecystectomy (); knee surgery (); Hysterectomy (); laparoscopy (); Colonoscopy (N/A, 2/3/2020); and Upper gastrointestinal endoscopy (N/A, 2/3/2020). Restrictions  Restrictions/Precautions  Restrictions/Precautions: Fall Risk  Required Braces or Orthoses?: No  Position Activity Restriction  Other position/activity restrictions: L hypertonicity. PRAFO boot LLE in bed  Subjective   General  Chart Reviewed: Yes  Family / Caregiver Present: No  Subjective  Subjective: Patient very motivated for therapy. General Comment  Comments: Per Dr. Alessia Rhodes, patient allowed to use LUE sling for ambulation purposes only, 2020. Sling not present in room yet, TUCKER Ross calls to follow up on sling. Pain Screening  Patient Currently in Pain: Yes  Pain Assessment  Pain Assessment: 0-10  Pain Level: 8  Pain Type: Acute pain  Pain Location: Shoulder  Pain Orientation: Left; Anterior  Pain Descriptors: [place]; Sore  Vital Signs  Patient Currently in Pain: Yes       Objective   Bed mobility  Bridging: Minimal assistance  Rolling to Left: Contact guard assistance  Rolling to Right: Contact guard assistance  Supine to Sit: Minimal assistance  Sit to Supine: Minimal assistance  Scooting: Minimal assistance  Comment: Performed on therapy mat, wedge, no hand rails. Patient educated on log rolling technique, taking time to position correctly. Patient impulsive at times. Transfers  Sit to Stand: Minimal Assistance  Stand to sit: Contact guard assistance  Bed to Chair: Moderate assistance;Minimal assistance(Squat pivot, assist varying)  Squat Pivot Transfers: Minimal Assistance; Moderate Assistance  Comment: caution LEFT ankle rolls. REINFORCE foot placement. Ambulation  Ambulation?: Yes  Ambulation 1  Surface: level tile  Device: Hemiwalker  Other Apparatus: Left; Wheelchair follow(Left ankle brace and high top shoes for increase ankle support)  Assistance: Minimal assistance  Gait Deviations: Decreased step length;Decreased step height;Deviated path  Distance: 75 ft  Comments: Cues for foot placement and quad control/TKE. Narrow KRISTIN. Ankle better controlled when patient completes TKE. Cues to increase left step length at times. Veers left, cues for straight path  Stairs/Curb  Stairs?: Yes  Stairs  # Steps : 6  Stairs Height: 6\"  Rails: Right ascending  Assistance: Moderate assistance;2 Person assistance  Comment: Ascends forward, descends retro. Attempted multiple sequencing, performs best ascending/descending with RLE first. x1 assist at trunk/hips on gait belt, x1 assist for LLE positioning and placement for joint protection at ankle and knee. Patient can be impulsive at times, requires verbal cues for safety. Patient goals : to regain use of L arm and L leg    Plan    Plan  Times per week: 1.5hr/day, 5 to 7 days/week.   Times per day: Daily  Current Treatment Recommendations: Strengthening, ROM, Balance Training, Functional Mobility Training, Transfer Training, Wheelchair Mobility Training, Gait Training, Neuromuscular Re-education, Pain Management, Home Exercise Program, Safety Education & Training, Patient/Caregiver Education & Training, Positioning, Endurance Training, Stair training  Safety Devices  Type of devices: Gait belt  Restraints  Initially in place: No        12/27/20 1015 12/27/20 1535   PT Individual Minutes   Time In [id number]   Time Out [id number]   Minutes [address] [place] [address] [address]

## 2020-12-27 NOTE — PROGRESS NOTES
UE Dressing: Minimal assistance, Increased time to complete(assist with threading LUE. )  LE Dressing: Moderate assistance, Maximum assistance(assist in threading LLE, pulling over L hip in standing, don)  Toileting: None  Additional Comments: Pt gives good effort in all tasks. Balance  Sitting Balance: Modified independent   Standing Balance: Minimal assistance   Standing Balance  Time: Am: <1 min, 1-2 min x 2   Activity: AM: funcitonal transfer, LBB/LBD tasks  Comment: increased steadiness this date with dynamic functional task, L ankle more stable; writer assist to stabilize not required. Pt notes she was more aware to put increased WB to LLE in standing this date. Functional Mobility  Functional - Mobility Device: Wheelchair  Activity: To/from bathroom  Assist Level: Minimal assistance  Functional Mobility Comments: assist and cuing for visual scanning/turning to L side     Bed mobility  Bridging: Minimal assistance  Rolling to Left: Contact guard assistance  Rolling to Right: Contact guard assistance  Supine to Sit: Minimal assistance  Sit to Supine: Minimal assistance  Scooting: Minimal assistance  Comment: Performed on therapy mat, wedge, no hand rails. Patient educated on log rolling technique, taking time to position correctly. Patient impulsive at time. Transfers  Stand Step Transfers: Moderate assistance(1-2 steps to L side for transfer)  Stand Pivot Transfers: Moderate assistance, Minimal assistance(towards strong side)  Sit to stand: Minimal assistance  Stand to sit: Minimal assistance  Transfer Comments: pt demo good hand placement. Toilet Transfers  Toilet - Technique: To right, To left, Stand pivot  Equipment Used: Standard toilet  Toilet Transfer:  Moderate assistance, Minimal assistance  Toilet Transfers Comments: assist x1, VCs for safety and proper tech with fair carryover, assist for LLE positioning/stabilization, pt tends to complete transfers quickly req VCs for control Shower Transfers  Shower - Transfer From: Wheelchair  Shower - Transfer Type: To and From  Shower - Transfer To: Transfer tub bench  Shower - Technique: Stand pivot, To right, To left  Shower Transfers: Moderate assistance  Shower Transfers Comments: assist x1, VCs and assist for LLE positioning/stabilization, cuing for control, fair/good carryover noted  Wheelchair Bed Transfers  Equipment Used: Other, Wheelchair, Bed(Tameka Camp)  Level of Asssistance: Dependent/Total      ST:           Objective:  /81   Pulse 80   Temp 98 °F (36.7 °C) (Oral)   Resp 18   Ht 5' 5\" (1.651 m)   Wt 233 lb (105.7 kg)   SpO2 99%   BMI 38.77 kg/m²  I Body mass index is 38.77 kg/m². I   Wt Readings from Last 1 Encounters:   12/15/20 233 lb (105.7 kg)      Temp (24hrs), Av °F (36.7 °C), Min:98 °F (36.7 °C), Max:98 °F (36.7 °C)         GEN: well developed, well nourished, no acute distress  HEENT: Normocephalic atraumatic, EOMI, mucous membranes pink and moist  CV: RRR, no murmurs, rubs or gallops  PULM: CTAB, no rales or rhonchi. Respirations WNL and unlabored  ABD: soft, NT, ND, +BS and equal  NEURO: A&O x3. Sensation intact to light touch. MSK: Decreased range of motion left upper lower extremities, tender anterior left shoulder, 4/5 right upper and lower extremity, moderate spasticity left upper and lower extremity, tenderness left anterior shoulder  EXTREMITIES: No calf tenderness to palpation bilaterally. No edema BLEs  SKIN: warm dry and intact with good turgor  PSYCH: appropriately interactive. Affect WNL.           Medications   Scheduled Meds:   lidocaine  1 patch Transdermal Daily    tiZANidine  6 mg Oral Daily    And    tiZANidine  4 mg Oral 2 times per day    sertraline  150 mg Oral Daily    amLODIPine  5 mg Oral Daily    thiamine mononitrate  100 mg Oral Daily    traZODone  25 mg Oral Nightly    gabapentin  300 mg Oral Nightly    lisinopril  20 mg Oral Daily    buPROPion  300 mg Oral Daily  cyanocobalamin  1,000 mcg Intramuscular Weekly    Followed by   Branden Beltran ON 2/5/2021] cyanocobalamin  1,000 mcg Intramuscular L25 Days    folic acid  1 mg Oral Daily    lamoTRIgine  400 mg Oral Daily    melatonin  3 mg Oral Nightly    rOPINIRole  1 mg Oral Nightly    sennosides-docusate sodium  2 tablet Oral Daily    polyethylene glycol  17 g Oral Daily    enoxaparin  30 mg Subcutaneous BID     Continuous Infusions:  PRN Meds:.oxyCODONE, acetaminophen, ipratropium-albuterol, magnesium hydroxide, muscle rub, bisacodyl, senna     Diagnostics:     CBC:   No results for input(s): WBC, RBC, HGB, HCT, MCV, RDW, PLT in the last 72 hours. BMP: No results for input(s): NA, K, CL, CO2, PHOS, BUN, CREATININE in the last 72 hours. Invalid input(s): CA  BNP: No results for input(s): BNP in the last 72 hours. PT/INR: No results for input(s): PROTIME, INR in the last 72 hours. APTT: No results for input(s): APTT in the last 72 hours. CARDIAC ENZYMES: No results for input(s): CKMB, CKMBINDEX, TROPONINT in the last 72 hours. Invalid input(s): CKTOTAL;3  FASTING LIPID PANEL:  Lab Results   Component Value Date    CHOL 247 (H) 11/30/2020     11/30/2020    TRIG 77 11/30/2020     LIVER PROFILE: No results for input(s): AST, ALT, ALB, BILIDIR, BILITOT, ALKPHOS in the last 72 hours. I/O (24Hr): No intake or output data in the 24 hours ending 12/27/20 1209    Glu last 24 hour  No results for input(s): POCGLU in the last 72 hours. No results for input(s): CLARITYU, COLORU, PHUR, SPECGRAV, PROTEINU, RBCUA, BLOODU, BACTERIA, NITRU, WBCUA, LEUKOCYTESUR, YEAST, GLUCOSEU, BILIRUBINUR in the last 72 hours.       Impression/Plan: This note is created with the assistance of a speech recognition program.  While intending to generate a document that actually reflects the content of the visit, the document can still have some errors including those of syntax and sound a like substitutions which may escape proof reading.   In such instances, actual meaning can be extrapolated by contextual diversion

## 2020-12-27 NOTE — PROGRESS NOTES
[place] [address] Internal Medicine    CONSULTATION / HISTORY AND PHYSICAL EXAMINATION            Date:   2020  Patient name:  Nieves Deshpande  Date of admission:  2020  8:30 PM  MRN:   [id number]  Account:  [de-identified]  YOB: 1970  PCP:    Murtaza Berrios MD  Room:   2612612-  Code Status:    Full Code    Physician Requesting Consult: Minal Gipson MD    Reason for Consult: Medical management    Chief Complaint:   Acute left sided weakness     History Obtained From:     patient, electronic medical record    History of Present Illness/ Interval History:   60-year-old female with history of delta storage pool disease, bipolar disorder, alcohol abuse who developed acute left-sided weaknessfound to have intraparenchymal hemorrhage, right parietal lobe hemorrhage and moderate subarachnoid hemorrhage. Pt admitted to rehab unit on 20. Improving with PT/OT    Past Medical History:     Past Medical History:   Diagnosis Date    Asthma     B[facility] 1 disorder ([facility] [address]     Delta storage pool disease ([facility] [address]     Hypertension     Psychiatric problem         Past Surgical History:     Past Surgical History:   Procedure Laterality Date     SECTION  1043,4528    Miscarriage     CHOLECYSTECTOMY      COLONOSCOPY N/A 2/3/2020    -random bx(normal)hemorrhoids    GASTRIC BYPASS SURGERY      HYSTERECTOMY      KNEE SURGERY      LAPAROSCOPY      TONSILLECTOMY AND ADENOIDECTOMY      UPPER GASTROINTESTINAL ENDOSCOPY N/A 2/3/2020    (normal,neg H-Pylori)normal post-bypass endoscopy        Medications Prior to Admission:     Prior to Admission medications    Medication Sig Start Date End Date Taking?  Authorizing Provider   STIMATE 1.5 MG/ML SOLN nasal solution INSTILL 2 SPRAYS INTO THE NOSE TWICE DAILY STARTING TWO DAYS BEFORE PROCEDURE 20   Mckenzie Hopkins MD amLODIPine (NORVASC) 5 MG tablet Take 5 mg by mouth daily    Historical Provider, MD   QUEtiapine (SEROQUEL) 200 MG tablet Take 200 mg by mouth nightly    Historical Provider, MD   lisinopril (PRINIVIL;ZESTRIL) 20 MG tablet Take 20 mg by mouth daily    Historical Provider, MD   buPROPion (WELLBUTRIN XL) 300 MG XL tablet Take 300 mg by mouth daily 3/5/16   Historical Provider, MD   sertraline (ZOLOFT) 50 MG tablet Take 50 mg by mouth daily 3/5/16   Historical Provider, MD   PROAIR  (90 BASE) MCG/ACT inhaler Inhale 2 puffs into the lungs every 6 hours as needed  14   Historical Provider, MD   lamoTRIgine (LAMICTAL) 200 MG tablet Take 400 mg by mouth daily 2 tabs 14   Historical Provider, MD        Allergies:     Penicillin g, Pcn [penicillins], and Sulfa antibiotics    Social History:     Tobacco:    reports that she has never smoked. She has never used smokeless tobacco.  Alcohol:      reports current alcohol use. Drug Use:  reports no history of drug use. Family History:     Family History   Adopted: Yes   Family history unknown: Yes       Review of Systems:     Positive and Negative as described in HPI. Denies any shortness of breath or cough  Denies chest pain or palpitations  Denies abdominal pain, diarrhea vomiting  Denies any new numbness tremors or weakness. Physical Exam:     /81   Pulse 80   Temp 98 °F (36.7 °C) (Oral)   Resp 18   Ht 5' 5\" (1.651 m)   Wt 233 lb (105.7 kg)   SpO2 99%   BMI 38.77 kg/m²   Temp (24hrs), Av °F (36.7 °C), Min:98 °F (36.7 °C), Max:98 °F (36.7 °C)      General appearance:  alert, cooperative and no distress  Eyes: Anicteric sclera. Pupils are equally round and reactive to light. Extraocular movements are intact.   Lungs:  clear to auscultation bilaterally, normal effort  Heart:  regular rate and rhythm, no murmur  Abdomen:  soft, nontender, nondistended, normal bowel sounds, no masses, hepatomegaly, splenomegaly Patient has CT head showed resolution of SAH and intraventricular hemorrhage and decrease in size of right parietal hemorrhage. Blood is controlled after increasing amlodipine     12/24  No acute issues   Repeat CT scan of the head showed significant resolution of the intracranial hemorrhage   Vitals stable. 12/25  No new symptoms   Progressing satisfactory with rehab therapies . 12/26  No acute issues   Feels well   Get occasional spasm but overall improving   Vitals are stable     12/27/20  Patient tolerating rehabilitative therapies . Progressing fairly well . Continue present therapy . Consultations:   IP CONSULT TO INTERNAL MEDICINE  IP CONSULT TO DIETITIAN  IP CONSULT TO SOCIAL WORK  INPATIENT CONSULT TO ORTHOTIST/PROSTHETIST      Og Plasencia MD  12/27/2020  3:09 PM    Copy sent to Dr. Harsha Haq MD    Please note that this chart was generated using voice recognition Dragon dictation software. Although every effort was made to ensure the accuracy of this automated transcription, some errors in transcription may have occurred.

## 2020-12-27 NOTE — PLAN OF CARE
Problem: Skin Integrity:  Goal: Will show no infection signs and symptoms  Description: Will show no infection signs and symptoms  Outcome: Ongoing  Note: Pt educated on risks for impaired skin integrity. Pt assisted in keeping skin clean and dry, assisted and prompted to reposition frequently. No s/s of infection or new breakdown noted this shift. Will continue to monitor and intervene as needed. Problem: Falls - Risk of:  Goal: Will remain free from falls  Description: Will remain free from falls  Outcome: Ongoing  Note: Pt educated on and verbalizes understanding of fall risks. Pt wearing nonskid stockings, uses assistive devices appropriately, call light within reach, encouraged to ask for assistance. Pt calls out appropriately this shift. Will continue to monitor and intervene as needed. Problem: Pain:  Goal: Control of acute pain  Description: Control of acute pain  Outcome: Ongoing  Note: Pt accepting of prn tylenol. Lidocaine patch not providing relief. MD ordered voltaren gel bid daily, pt accepting. Non-pharmacological interventions discussed. Pt observed icing and positioning shoulder this shift. Will continue to monitor and assist as needed.

## 2020-12-28 ENCOUNTER — APPOINTMENT (OUTPATIENT)
Dept: GENERAL RADIOLOGY | Age: 50
DRG: 057 | End: 2020-12-28
Attending: PHYSICAL MEDICINE & REHABILITATION
Payer: COMMERCIAL

## 2020-12-28 LAB
ANION GAP SERPL CALCULATED.3IONS-SCNC: 12 MMOL/L (ref 9–17)
BUN BLDV-MCNC: 11 MG/DL (ref 6–20)
BUN/CREAT BLD: NORMAL (ref 9–20)
CALCIUM SERPL-MCNC: 9.9 MG/DL (ref 8.6–10.4)
CHLORIDE BLD-SCNC: 101 MMOL/L (ref 98–107)
CO2: 24 MMOL/L (ref 20–31)
CREAT SERPL-MCNC: 0.64 MG/DL (ref 0.5–0.9)
GFR AFRICAN AMERICAN: >60 ML/MIN
GFR NON-AFRICAN AMERICAN: >60 ML/MIN
GFR SERPL CREATININE-BSD FRML MDRD: NORMAL ML/MIN/{1.73_M2}
GFR SERPL CREATININE-BSD FRML MDRD: NORMAL ML/MIN/{1.73_M2}
GLUCOSE BLD-MCNC: 86 MG/DL (ref 70–99)
HCT VFR BLD CALC: 36.2 % (ref 36–46)
HEMOGLOBIN: 11.8 G/DL (ref 12–16)
MCH RBC QN AUTO: 29.3 PG (ref 26–34)
MCHC RBC AUTO-ENTMCNC: 32.6 G/DL (ref 31–37)
MCV RBC AUTO: 89.8 FL (ref 80–100)
NRBC AUTOMATED: ABNORMAL PER 100 WBC
PDW BLD-RTO: 13.7 % (ref 11.5–14.9)
PLATELET # BLD: 419 K/UL (ref 150–450)
PMV BLD AUTO: 6.9 FL (ref 6–12)
POTASSIUM SERPL-SCNC: 4 MMOL/L (ref 3.7–5.3)
RBC # BLD: 4.03 M/UL (ref 4–5.2)
SODIUM BLD-SCNC: 137 MMOL/L (ref 135–144)
WBC # BLD: 6.8 K/UL (ref 3.5–11)

## 2020-12-28 PROCEDURE — 97110 THERAPEUTIC EXERCISES: CPT

## 2020-12-28 PROCEDURE — 99231 SBSQ HOSP IP/OBS SF/LOW 25: CPT | Performed by: INTERNAL MEDICINE

## 2020-12-28 PROCEDURE — 97116 GAIT TRAINING THERAPY: CPT

## 2020-12-28 PROCEDURE — 97535 SELF CARE MNGMENT TRAINING: CPT

## 2020-12-28 PROCEDURE — 6360000002 HC RX W HCPCS: Performed by: PHYSICAL MEDICINE & REHABILITATION

## 2020-12-28 PROCEDURE — 6370000000 HC RX 637 (ALT 250 FOR IP): Performed by: PHYSICAL MEDICINE & REHABILITATION

## 2020-12-28 PROCEDURE — 6370000000 HC RX 637 (ALT 250 FOR IP): Performed by: INTERNAL MEDICINE

## 2020-12-28 PROCEDURE — 1180000000 HC REHAB R&B

## 2020-12-28 PROCEDURE — 97530 THERAPEUTIC ACTIVITIES: CPT

## 2020-12-28 PROCEDURE — 97760 ORTHOTIC MGMT&TRAING 1ST ENC: CPT

## 2020-12-28 PROCEDURE — 73030 X-RAY EXAM OF SHOULDER: CPT

## 2020-12-28 PROCEDURE — 6370000000 HC RX 637 (ALT 250 FOR IP): Performed by: NURSE PRACTITIONER

## 2020-12-28 PROCEDURE — 6370000000 HC RX 637 (ALT 250 FOR IP): Performed by: STUDENT IN AN ORGANIZED HEALTH CARE EDUCATION/TRAINING PROGRAM

## 2020-12-28 PROCEDURE — 99232 SBSQ HOSP IP/OBS MODERATE 35: CPT | Performed by: PHYSICAL MEDICINE & REHABILITATION

## 2020-12-28 PROCEDURE — 36415 COLL VENOUS BLD VENIPUNCTURE: CPT

## 2020-12-28 PROCEDURE — 85027 COMPLETE CBC AUTOMATED: CPT

## 2020-12-28 PROCEDURE — 80048 BASIC METABOLIC PNL TOTAL CA: CPT

## 2020-12-28 RX ADMIN — FOLIC ACID 1 MG: 1 TABLET ORAL at 07:14

## 2020-12-28 RX ADMIN — ROPINIROLE HYDROCHLORIDE 1 MG: 1 TABLET, FILM COATED ORAL at 20:08

## 2020-12-28 RX ADMIN — GABAPENTIN 300 MG: 300 CAPSULE ORAL at 20:08

## 2020-12-28 RX ADMIN — ENOXAPARIN SODIUM 30 MG: 30 INJECTION SUBCUTANEOUS at 07:13

## 2020-12-28 RX ADMIN — ACETAMINOPHEN 650 MG: 325 TABLET, FILM COATED ORAL at 05:48

## 2020-12-28 RX ADMIN — LAMOTRIGINE 400 MG: 100 TABLET ORAL at 07:15

## 2020-12-28 RX ADMIN — TIZANIDINE 6 MG: 4 TABLET ORAL at 07:14

## 2020-12-28 RX ADMIN — POLYETHYLENE GLYCOL 3350 17 G: 17 POWDER, FOR SOLUTION ORAL at 07:15

## 2020-12-28 RX ADMIN — DICLOFENAC 2 G: 10 GEL TOPICAL at 08:43

## 2020-12-28 RX ADMIN — OXYCODONE HYDROCHLORIDE 5 MG: 5 TABLET ORAL at 14:01

## 2020-12-28 RX ADMIN — TIZANIDINE 4 MG: 4 TABLET ORAL at 12:56

## 2020-12-28 RX ADMIN — TIZANIDINE 4 MG: 4 TABLET ORAL at 20:08

## 2020-12-28 RX ADMIN — AMLODIPINE BESYLATE 5 MG: 5 TABLET ORAL at 07:15

## 2020-12-28 RX ADMIN — Medication 3 MG: at 20:08

## 2020-12-28 RX ADMIN — THIAMINE HCL TAB 100 MG 100 MG: 100 TAB at 07:15

## 2020-12-28 RX ADMIN — SERTRALINE HYDROCHLORIDE 150 MG: 100 TABLET ORAL at 07:14

## 2020-12-28 RX ADMIN — BUPROPION HYDROCHLORIDE 300 MG: 300 TABLET, FILM COATED, EXTENDED RELEASE ORAL at 07:16

## 2020-12-28 RX ADMIN — ENOXAPARIN SODIUM 30 MG: 30 INJECTION SUBCUTANEOUS at 20:09

## 2020-12-28 RX ADMIN — SENNOSIDES AND DOCUSATE SODIUM 2 TABLET: 8.6; 5 TABLET ORAL at 07:14

## 2020-12-28 RX ADMIN — TRAZODONE HYDROCHLORIDE 25 MG: 50 TABLET ORAL at 20:09

## 2020-12-28 RX ADMIN — LISINOPRIL 20 MG: 20 TABLET ORAL at 07:15

## 2020-12-28 RX ADMIN — DICLOFENAC 2 G: 10 GEL TOPICAL at 20:09

## 2020-12-28 ASSESSMENT — PAIN SCALES - GENERAL
PAINLEVEL_OUTOF10: 6
PAINLEVEL_OUTOF10: 0

## 2020-12-28 ASSESSMENT — PAIN DESCRIPTION - PAIN TYPE
TYPE: ACUTE PAIN
TYPE: ACUTE PAIN

## 2020-12-28 ASSESSMENT — PAIN DESCRIPTION - LOCATION
LOCATION: SHOULDER
LOCATION: SHOULDER;ARM

## 2020-12-28 ASSESSMENT — PAIN DESCRIPTION - FREQUENCY: FREQUENCY: INTERMITTENT

## 2020-12-28 ASSESSMENT — PAIN DESCRIPTION - PROGRESSION: CLINICAL_PROGRESSION: GRADUALLY IMPROVING

## 2020-12-28 ASSESSMENT — PAIN DESCRIPTION - ORIENTATION: ORIENTATION: LEFT

## 2020-12-28 NOTE — PROGRESS NOTES
Physical Medicine & Rehabilitation  Progress Note    12/28/2020 11:32 AM     CC: Ambulatory and ADL dysfunction due to hemorrhagic CVA left nondominant hemiparesis    Subjective:   No Complaints. Feels well. Does not want left hemilap board as she feels this causes more discomfort. Using sling    ROS:  Denies fevers, chills, sweats. No chest pain, palpitations, lightheadedness. Denies coughing, wheezing or shortness of breath. Denies abdominal pain, nausea, diarrhea or constipation. No new areas of joint pain. Denies new areas of numbness or weakness. Denies new anxiety or depression issues. No new skin problems. Rehabilitation:   PT:  Restrictions/Precautions: Fall Risk  Other position/activity restrictions: L hypertonicity. PRAFO boot LLE in bed   Transfers  Sit to Stand: Minimal Assistance  Stand to sit: Contact guard assistance  Bed to Chair: Moderate assistance, Minimal assistance(Squat pivot, assist varying)  Stand Pivot Transfers: Maximum Assistance(pt very anxious trying a stand pivot with hemiwalker)  Squat Pivot Transfers: Minimal Assistance, Moderate Assistance  Comment: caution LEFT ankle rolls. REINFORCE foot placement. Ambulation 1  Surface: level tile  Device: Hemiwalker  Other Apparatus: Left, Wheelchair follow(Left ankle brace and high top shoes for increase ankle support)  Assistance: Minimal assistance  Quality of Gait: Intially pt is fearful d/t ankle instability. Gait Deviations: Decreased step length, Decreased step height, Deviated path  Distance: 75 ft  Comments: Cues for foot placement and quad control/TKE. Narrow KRISTIN. Ankle better controlled when patient completes TKE. Cues to increase left step length at times.  Veers left, cues for straight path          OT:  ADL  Equipment Provided: Reacher  Feeding: Setup  Grooming: Modified independent (seated sinkside)  UE Bathing: Minimal assistance, Setup(intermittent assist to LUE) LE Bathing: Minimal assistance(standing support for washing elizabeth-area, long handled sponge)  UE Dressing: Contact guard assistance, Setup, Increased time to complete(intermittent assist for donning shirt/bra, seated)  LE Dressing: Moderate assistance, Increased time to complete, Verbal cueing(assist over L hip, TEDs, shoes, support in standing)  Toileting: Minimal assistance(support in standing/clothing on L hip)  Additional Comments: gives good effort in all tasks, reacher used as needed   Instrumental ADL's  Instrumental ADLs: Yes     Balance  Sitting Balance: Modified independent   Standing Balance: Minimal assistance(assist for LLE positioning)   Standing Balance  Time: AM: less than 1 min stands x2, 1 min x3  Activity: AM: functional transfers, ADL tasks  Comment: increased steadiness this date with dynamic functional tasks, VCs for LLE positioning/stabilizing  Functional Mobility  Functional - Mobility Device: Hemiwalker  Activity: To/from bathroom(training bathroom. )  Assist Level: Moderate assistance  Functional Mobility Comments: assist to manage L ankle to prevent turning, verbal cues for enviromental awareness. Pt became very anxious during transfer training, various backward and side stepping involved for proper positioning to tub bench. Encouargement provided throughout, standing rest breaks. Pt unsteady  as anxiety increased however no loss of balance. Bed mobility  Bridging: Minimal assistance  Rolling to Left: Contact guard assistance  Rolling to Right: Contact guard assistance  Supine to Sit: Minimal assistance  Sit to Supine: Minimal assistance  Scooting: Minimal assistance  Comment: Performed on therapy mat, wedge, no hand rails. Patient educated on log rolling technique, taking time to position correctly. Patient impulsive at times. Transfers  Stand Step Transfers:  Moderate assistance(1-2 steps to L side for transfer) Stand Pivot Transfers: Moderate assistance, Minimal assistance(min assist to strong side, mod assist to weak (L))  Sit to stand: Minimal assistance  Stand to sit: Minimal assistance  Transfer Comments: pt demo good hand placement, VCs for LLE positioning   Toilet Transfers  Toilet - Technique: To right, To left, Stand pivot  Equipment Used: Standard toilet  Toilet Transfer: Moderate assistance(towards weak side. )  Toilet Transfers Comments: use of grab bar for UE support. Tub Transfers  Tub - Transfer From: Walker(ara walker. )  Tub - Transfer Type: To(utilized stand pivot to wheelchair to return. )  Tub - Transfer To: Transfer tub bench  Tub - Technique: Ambulating  Tub Transfers: Moderate assistance, Verbal cues  Tub Transfers Comments: once positioned on edge of tub bench; cueing and increased time required. See functional mobility note. Assist required to lift LLE over edge of tub to enter. Recommend transfer bench on R end of tub when facing to ensure completing seated pivot over edge of tub towards strong side to be able to utilize R side to grasp rail on bench to assist. Continued education and transfer training required to increase safety and tolerance. Shower Transfers  Shower - Transfer From: Wheelchair  Shower - Transfer Type: To and From  Shower - Transfer To: Transfer tub bench  Shower - Technique: Stand pivot, To right, To left  Shower Transfers: Moderate assistance(min/mod, mod assist to L side)  Shower Transfers Comments: assist x1, VCs and assist for LLE positioning/stabilization, cuing for control, fair/good carryover noted  Wheelchair Bed Transfers  Equipment Used: Other, Wheelchair, Bed(Tameka Camp)  Level of Asssistance: Dependent/Total      ST:           Objective:  /75   Pulse 77   Temp 97.8 °F (36.6 °C) (Oral)   Resp 18   Ht 5' 5\" (1.651 m)   Wt 233 lb (105.7 kg)   SpO2 100%   BMI 38.77 kg/m²  I Body mass index is 38.77 kg/m².  I   Wt Readings from Last 1 Encounters: 12/15/20 233 lb (105.7 kg)      Temp (24hrs), Av.1 °F (36.7 °C), Min:97.8 °F (36.6 °C), Max:98.3 °F (36.8 °C)         GEN: well developed, well nourished, no acute distress  HEENT: Normocephalic atraumatic, EOMI, mucous membranes pink and moist  CV: RRR, no murmurs, rubs or gallops  PULM: CTAB, no rales or rhonchi. Respirations WNL and unlabored  ABD: soft, NT, ND, +BS and equal  NEURO: A&O x3. Sensation intact to light touch. MSK: Decreased range of motion left upper lower extremities,, 4/5 right upper and lower extremity, moderate spasticity left upper and lower extremity, tenderness left anterior shoulder  EXTREMITIES: No calf tenderness to palpation bilaterally. No edema BLEs, left hand curling at  rest  SKIN: warm dry and intact with good turgor  PSYCH: appropriately interactive. Affect WNL.           Medications   Scheduled Meds:   diclofenac sodium  2 g Topical BID    tiZANidine  6 mg Oral Daily    And    tiZANidine  4 mg Oral 2 times per day    sertraline  150 mg Oral Daily    amLODIPine  5 mg Oral Daily    thiamine mononitrate  100 mg Oral Daily    traZODone  25 mg Oral Nightly    gabapentin  300 mg Oral Nightly    lisinopril  20 mg Oral Daily    buPROPion  300 mg Oral Daily    cyanocobalamin  1,000 mcg Intramuscular Weekly    Followed by   Gaurav De La [facility] 2021] cyanocobalamin  1,000 mcg Intramuscular Z35 Days    folic acid  1 mg Oral Daily    lamoTRIgine  400 mg Oral Daily    melatonin  3 mg Oral Nightly    rOPINIRole  1 mg Oral Nightly    sennosides-docusate sodium  2 tablet Oral Daily    polyethylene glycol  17 g Oral Daily    enoxaparin  30 mg Subcutaneous BID     Continuous Infusions:  PRN Meds:.oxyCODONE, acetaminophen, ipratropium-albuterol, magnesium hydroxide, muscle rub, bisacodyl, senna     Diagnostics:     CBC:   Recent Labs     20  0620   WBC 6.8   RBC 4.03   HGB 11.8*   HCT 36.2   MCV 89.8   RDW 13.7        BMP:   Recent Labs     20  0620    K 4.0      CO2 24   BUN 11   CREATININE 0.64     BNP: No results for input(s): BNP in the last 72 hours. PT/INR: No results for input(s): PROTIME, INR in the last 72 hours. APTT: No results for input(s): APTT in the last 72 hours. CARDIAC ENZYMES: No results for input(s): CKMB, CKMBINDEX, TROPONINT in the last 72 hours. Invalid input(s): CKTOTAL;3  FASTING LIPID PANEL:  Lab Results   Component Value Date    CHOL 247 (H) 11/30/2020     11/30/2020    TRIG 77 11/30/2020     LIVER PROFILE: No results for input(s): AST, ALT, ALB, BILIDIR, BILITOT, ALKPHOS in the last 72 hours. I/O (24Hr): No intake or output data in the 24 hours ending 12/28/20 1132    Glu last 24 hour  No results for input(s): POCGLU in the last 72 hours. No results for input(s): CLARITYU, COLORU, PHUR, SPECGRAV, PROTEINU, RBCUA, BLOODU, BACTERIA, NITRU, WBCUA, LEUKOCYTESUR, YEAST, GLUCOSEU, BILIRUBINUR in the last 72 hours. Impression/Plan:    1. Ambulatory and ADL dysfunction secondary to hemorrhagic CVA: Acute rehab effortsPT/OT, PRAFO for le ft lower limb at night. 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 Monitoring platelets  3. History of headacheMedrol taper 12/13  4. Muscle spasm/myoclonus/spasticity: Now off baclofennot tolerate increase. On schedule tizanidine 12/17 - increased dose 12/20 (6mg/6mg/4mg) but noted increased muscle spasms in the afternoon and evening.     Dose adjusted 12/23 (now on 6mg/4mg/4mg). .  Patient she feels she is doing better with decrease dose

## 2020-12-28 NOTE — PROGRESS NOTES
Physical Therapy  Facility/Department: \[facility]\"" ACUTE REHAB  Daily Treatment Note  NAME: Bel Corea  : 1970  MRN: [id number]    Date of Service: 2020    Discharge Recommendations:  Patient would benefit from continued therapy after discharge, Home with assist PRN        Assessment      PT Education: Disease Specific Education; Family Education;Precautions;Gait Training  Activity Tolerance  Activity Tolerance: Patient limited by fatigue;Patient limited by endurance     Patient Diagnosis(es): There were no encounter diagnoses. has a past medical history of Asthma, Bipolar 1 disorder ([facility] [address] Delta storage pool disease [facility]), Hypertension, and Psychiatric problem. has a past surgical history that includes  section (2998,7641); Gastric bypass surgery (); Tonsillectomy and adenoidectomy (); Cholecystectomy (); knee surgery (); Hysterectomy (); laparoscopy (); Colonoscopy (N/A, 2/3/2020); and Upper gastrointestinal endoscopy (N/A, 2/3/2020). Restrictions  Restrictions/Precautions  Restrictions/Precautions: Fall Risk  Required Braces or Orthoses?: No  Position Activity Restriction  Other position/activity restrictions: L hypertonicity. PRAFO boot LLE in bed  Subjective   General  Chart Reviewed: Yes  Family / Caregiver Present: Yes(spouse Alfred)  Subjective  Subjective: patient and spouse with multiple questions regarding continued POC and dc planning. Orientation     Cognition      Objective      Transfers  Sit to Stand: Minimal Assistance;Contact guard assistance  Stand to sit: Contact guard assistance  Bed to Chair: Minimal assistance  Car Transfer: Minimal Assistance  Comment: Spouse Alfred assisting with his LUE with gait belt during all transfer training preformed. CGA feedback provide by therapist only for training safety purposes.   Ambulation  Ambulation?: Yes  Ambulation 1  Surface: level tile  Device: Ingageapp Other Apparatus: Left; Wheelchair follow;Dorsiflex Assist(Left ankle brace and high top shoes for increase ankle support)  Assistance: Minimal assistance  Quality of Gait: spasmic/flexor tone on LLE  Gait Deviations: Decreased step length;Decreased step height;Deviated path; Increased KRISTIN  Distance: 30 feet x 2 with family training shorter distances for safety. limited by noting decreased safety with foot placement. Comments: Cues to heel strike completion on swing phase with shoulder width apart KRISTIN. no noted ankle roll. patient fatigues with continued distance and note patient unable to maintain foot placement wide and increasing tone eversion with gait. Patient tolerated gait without rolling ankle at this time with ankle support. Ambulation 2  Surface - 2: level tile  Device 2: Zimmerman rail  Other Apparatus 2: Left;Dorsiflex Assist(ankle brace and high top shoes support)  Gait Deviations: Increased KRISTIN; Decreased step length;Decreased step height  Distance: 20 feet x 2 for initial spouse gait training and education. patient also demonstrated retro gait training for neuro re-ed and home exercise program education. Comments: tolerated well with no ankle rolling  Stairs/Curb  Stairs?: Yes  Stairs  # Steps : 6  Stairs Height: 6\"  Rails: Right ascending  Assistance: Moderate assistance  Comment: Ascends forward, descends retro. ascending with LLE for WB placement to prevent mare/crossing of LLE in swing phase. patient able place LLE unassisted. Retro step down with RLE to maintain WB LLE to prevent scissoring and unassisted LLE foot placement during sequencing. family training spouse present for education and need for assist needs further training to for safety.                                  G-Code     OutComes Score                                                     AM-PAC Score             Goals  Short term goals  Time Frame for Short term goals: 10 days  Short term goal 1: pt will perform bed mobility with min A Short term goal 2: pt will dangle EOB/EOM for 20 to 25 minutes with SBA, performing challenged seated balance/corestrengthening  Short term goal 3: Pivot transfers with mod A+2  Short term goal 4: WC mobility x 100' with min A  Short term goal 5: progress to standing/pre-gait activities in // bars to facilitate L LE motor fucntion. Long term goals  Time Frame for Long term goals : By LOS  Long term goal 1: Pt able to perform bed mobility independently  Long term goal 2: Pt able to perform transfers at [address] term goal 3: Pt able to progress to ambulation with appropriate device dist of 30 to 50 ft, mod A   Long term goal 4: Pt able to propel w/c level surfaces dist of 150 ft , mod-I. Long term goal 5: Improve L LE strength to atleast 2 to 2+/5 to improve fucntion. Long term goal 6: Improve PASS score from 8/30 to 23/36 to improve overall fucntion. Long term goal 7: Pt able to go up and down 5 steps with rail, mod A. Patient Goals   Patient goals : to regain use of L arm and L leg    Plan    Plan  Times per week: 1.5hr/day, 5 to 7 days/week.   Times per day: Daily  Current Treatment Recommendations: Strengthening, ROM, Balance Training, Functional Mobility Training, Transfer Training, Wheelchair Mobility Training, Gait Training, Neuromuscular Re-education, Pain Management, Home Exercise Program, Safety Education & Training, Patient/Caregiver Education & Training, Positioning, Endurance Training, Stair training  Safety Devices  Type of devices: Gait belt  Restraints  Initially in place: No     Therapy Time   Individual Concurrent Group Co-treatment   Time In 0950         Time Out 1100         Minutes Sun 113, PTA

## 2020-12-28 NOTE — PROGRESS NOTES
[address] [facility]    ACUTE REHABILITATION OCCUPATIONAL THERAPY  DAILY NOTE    Date: 20  Patient Name: Smitha Ellison      Room: 2612/2612-01    MRN: [id number]   : 1970  (48 y.o.)  Gender: female   Referring Practitioner: Gladis Persaud MD  Diagnosis: Intracranial hemmorrhage  Additional Pertinent Hx: 71-year-old female with history of delta storage pool disease, bipolar disorder, alcohol abuse who developed acute left-sided weaknessfound to have intraparenchymal hemorrhage, right parietal lobe hemorrhage and moderate subarachnoid hemorrhage. Pt admitted to rehab unit on 20. Restrictions  Restrictions/Precautions: Fall Risk  Other position/activity restrictions: L hypertonicity. PRAFO boot LLE in bed  Required Braces or Orthoses?: No  Equipment Used: Other, Wheelchair, Bed(Tamekakurt Boojerilyn)    Subjective  Subjective: \"see honey, I do so good! \"  Comments: pt states in regards to ADL tasks and transfers, pt spouse present and participating in ADL tasks  Patient Currently in Pain: Denies  Restrictions/Precautions: Fall Risk  Overall Orientation Status: Within Functional Limits  Patient Observation  Observations: intermittent L side neglect  Pain Assessment  Pain Assessment: 0-10  Pain Level: 1  Pain Type: Acute pain  Pain Location: Arm  Pain Orientation: Left  Pain Descriptors: Aching, Sore    Objective  Cognition  Overall Cognitive Status: WFL  Perception  Overall Perceptual Status: WFL  Unilateral Attention: Cues to attend left visual field;Cues to attend to left side of body;Cues to maintain midline in sitting;Cues to maintain midline in standing(flucuates)  Balance  Sitting Balance: Modified independent   Standing Balance: Minimal assistance(assist for LLE positioning)  Transfers  Stand Pivot Transfers:  Moderate assistance;Minimal assistance(min assist to strong side, mod assist to weak (L))  Sit to stand: Minimal assistance  Stand to sit: Minimal assistance Short term goal 3: Pt will perform toileting tasks with Moderate assist.  Short term goal 4: Pt will verbalize/demonstrate Good understanding of assistive equipment/durable medical equipment/modified techniques for increased independence with self-care and mobility. Short term goal 5: Pt will perform functional transfers with Moderate assist to toilet/wheelchair/shower with Fair safety. Short term goal 6: Pt will actively participate in 30+ minutes of therapeutic exercise/functional activities to promote increased independence with self-care and mobility. Long term goals  Time Frame for Long term goals : By discharge  Long term goal 1: Pt will perform BADLs with modified independence and Good safety with assist PRN for HORTENSIA hose. Long term goal 2: Pt will perform functional transfers and mobility with modified independence, least restrictive mobility device, and Good safety. Long term goal 3: Pt will attend to L side of body 100% of the time during self-care, transfers, and mobility with 1-2 verbal cues PRN. Long term goal 4: Pt will stand for 5+ minutes with 1-2 UE support, modified independence and no loss of balance while engaging in functional activity of choice. Long term goal 5: Pt will verbalize/demonstrate Good understanding of home exercise program for LUE.   Long term goals 6: 9 hole peg, box and block to be assessed for LUE as appropriate        12/28/20 0735 12/28/20 1340   OT Individual Minutes   Time In 0284 1340   Time Out 0849 1400   Minutes 74 20     Electronically signed by JEAN CARLOS Madrid on 12/28/20 at 3:16 PM EST

## 2020-12-28 NOTE — CARE COORDINATION
JUSTO received a request to see this patient and her . JUSTO did go to this room and spoke with them. They were wondering what equipment the patient will need when she is DC'd. JUSTO informed them that when it comes a little closer, Justo will speak with therapy who will inform of an equipment needed. JUSTO also received a call from Machelle Ma with [facility] who fitted her for her brace/AFO and he requested a prescription. JUSTO did get the Doctor to put in the order and JUSTO did fax this to Machelle Ma with [facility]. They also asked about [facility] in [facility] as to whether or not they provide home therapy. Justo called the company and found that they do not do home therapy. JUSTO will provide a list of Home care companies so that they can choose a [address] for their OT and PT. ADD:  JUSTO did take the list into this patient. JUSTO informed her that therapy did not yet have her needed list of equipment for this SW. The patient did report that her  has a wheelchair taken care of, therefore if that is a need it will not need to be ordered. JUSTO will follow up with the patient in the AM to see if she has made a decision about a home [address].

## 2020-12-28 NOTE — PATIENT CARE CONFERENCE
[address] [facility]    ACUTE REHABILITATION  TEAM CONFERENCE NOTE  Date: 20  Patient Name: Godwin Ramirez       Room: 2612/2612-01  MRN: [id number]       : 1970  (48 y.o.)     Gender: female       H/O intracranial hemorrhage [Z86.79]  Diagnosis: Intracranial hemmorrhage     NURSING  Bladder  Continent  Bowel   Continent  Intervention    Both Bowel & Bladder Program     Wounds/Incisions/Ulcers: No skin issues identified  Medication Education Program: Patient able to manage medications and being educated by nursing  Pain: Patient's pain is currently controlled with -   Roxicodone prn  Fall Risk:  Falling star program initiated    PHYSICAL THERAPY  Bed mobility  Bridging: Minimal assistance  Rolling to Left: Contact guard assistance  Rolling to Right: Contact guard assistance  Supine to Sit: Minimal assistance  Sit to Supine: Minimal assistance  Scooting: Minimal assistance  Car transfers-min A  Comment: Spouse Alfred assisting with his LUE with gait belt during all transfer training preformed. CGA feedback provide by therapist only for training safety purposes. Transfers:  Sit to Stand: Minimal Assistance;Contact guard assistance  Stand to sit: Contact guard assistance  Bed to Chair: Minimal assistance  Squat Pivot Transfers: Minimal Assistance; Moderate Assistance    Ambulation 1  Surface: level tile  Device: [employer]  Other Apparatus: Left; Wheelchair follow;Dorsiflex Assist(Left ankle brace and high top shoes for increase ankle support)  Assistance: Minimal assistance  Quality of Gait: spasmic/flexor tone on LLE  Gait Deviations: Decreased step length;Decreased step height;Deviated path; Increased KRISTIN  Distance: 30 feet x 2 with family training shorter distances for safety. limited by noting decreased safety with foot placement. Comments: Cues to heel strike completion on swing phase with shoulder width apart KRISTIN. no noted ankle roll. patient fatigues with continued distance and note patient unable to maintain foot placement wide and increasing tone eversion with gait. Patient tolerated gait without rolling ankle at this time with ankle support. Ambulation 2  Surface - 2: level tile  Device 2: Zimmerman rail  Other Apparatus 2: Left;Dorsiflex Assist(ankle brace and high top shoes support)  Gait Deviations: Increased KRISTIN; Decreased step length;Decreased step height  Distance: 20 feet x 2 for initial spouse gait training and education. patient also demonstrated retro gait training for neuro re-ed and home exercise program education. Comments: tolerated well with no ankle rolling    # Steps : 6  Stairs Height: 6\"  Rails: Right ascending  Assistance: Moderate assistance  Comment: Ascends forward, descends retro. ascending with LLE for WB placement to prevent mare/crossing of LLE in swing phase. patient able place LLE unassisted. Retro step down with RLE to maintain WB LLE to prevent scissoring and unassisted LLE foot placement during sequencing. family training spouse present for education and need for assist needs further training to for safety. Equipment Needed: Yes  W/C and Hemiwalker    Goals  Time Frame for Short term goals: 10 days  Short term goal 1: pt will perform bed mobility with min A  Short term goal 2: pt will dangle EOB/EOM for 20 to 25 minutes with SBA, performing challenged seated balance/corestrengthening  Short term goal 3: Pivot transfers with mod A+2  Short term goal 4: WC mobility x 100' with min A  Short term goal 5: progress to standing/pre-gait activities in // bars to facilitate L LE motor fucntion.       OCCUPATIONAL THERAPY  SELF CARE   Equipment Provided: Reacher  Eating            Setup   Oral Hygiene            Modified independent (seated sinkside)   Shower/Bathe Self Short term goal 6: Pt will actively participate in 30+ minutes of therapeutic exercise/functional activities to promote increased independence with self-care and mobility. SPEECH THERAPY    Short Term Goal:     NUTRITION  Weight: 233 lb (105.7 kg) / Body mass index is 38.77 kg/m². Diet Rx: General. Ensure High Protein with meals. PO intake appears adequate. Please see nutrition note for details. SOCIAL WORK ASSESSMENT  Assessment:  Pre-Admission Status:  Lives With: Spouse  Type of Home: House  Home Layout: Two level, Bed/Bath upstairs, 1/2 bath on main level(finished basement for recreation)  Home Access: Stairs to enter without rails, Stairs to enter with rails  Entrance Stairs - Number of Steps: 3-5 steps to enter with 0 HR; 6 + 1 + 8 steps to 2nd floor with R HR for all 15 steps  Entrance Stairs - Rails: Right(R rail going up to 2nd floor.)  Bathroom Shower/Tub: Tub/Shower unit, Curtain  Bathroom Toilet: Standard  Bathroom Equipment: (no DME)  Bathroom Accessibility: Walker accessible, Accessible  Home Equipment: (no DME)  ADL Assistance: Independent  Homemaking Assistance: Independent  Homemaking Responsibilities: Yes  Ambulation Assistance: Independent  Transfer Assistance: Independent  Active : Yes  Mode of Transportation: SUV  Occupation: Part time employment  Type of occupation:  (former )  Leisure & Hobbies: Hanging out with family, bingeing Net[employer] series, playing Poker with family  Additional Comments: Pt's spouse is a teacher and works full time. Pt has 2 adult sons who live nearby and stop by the home.      Family Education: Family Education initiated and Ongoing    Percentage Risk for Readmission: Low 0 - 18%   Readmission Risk              Risk of Unplanned Readmission:        17       %    Critical Items: None     Problem / Barrier Intervention / Plan [employer] Long term goal 2: Pt will perform functional transfers and mobility with modified independence, least restrictive mobility device, and Good safety. Long term goal 3: Pt will attend to L side of body 100% of the time during self-care, transfers, and mobility with 1-2 verbal cues PRN. Long term goal 4: Pt will stand for 5+ minutes with 1-2 UE support, modified independence and no loss of balance while engaging in functional activity of choice. Long term goal 5: Pt will verbalize/demonstrate Good understanding of home exercise program for LUE. Long term goals 6: 9 hole peg, box and block to be assessed for LUE as appropriate  ST:     Team Members Present at Conference:  :  [place]   Occupational Therapist:  Adriana Forrester OT   [address] PT  Speech Therapist:  N/A  Nurse: Rick Adams RN   Dietary/Nutrition: Jesika Bobo RD LD  Pastoral Care: Nate Berumen  CMG:  Tommy Adhikari RN    I approve the established interdisciplinary plan of care as documented within the medical record of [address]. Grant Jacome.  Cabrera Hopkins MD

## 2020-12-28 NOTE — PROGRESS NOTES
[address] [facility]    ACUTE REHABILITATION OCCUPATIONAL THERAPY  DAILY NOTE    Date: 20  Patient Name: Seth Lesch      Room: 2612/2612-01    MRN: [id number]   : 1970  (48 y.o.)  Gender: female   Referring Practitioner: Estrella Hurst MD  Diagnosis: Intracranial hemmorrhage  Additional Pertinent Hx: 68-year-old female with history of delta storage pool disease, bipolar disorder, alcohol abuse who developed acute left-sided weaknessfound to have intraparenchymal hemorrhage, right parietal lobe hemorrhage and moderate subarachnoid hemorrhage. Pt admitted to rehab unit on 20. Restrictions  Restrictions/Precautions: Fall Risk  Other position/activity restrictions: L hypertonicity. PRAFO boot LLE in bed  Required Braces or Orthoses?: No  Equipment Used: Other, Wheelchair, Bed    Subjective  Comments: at rest, pt with LUE  fingers in full flexion at DIP and PIP,  with PROM full extension can be obtained. pt was provided with resting pan splint to trial. per Dr. Mei Duque orders. Patient Education:  Patient Goals   Patient goals : To discharge home with family support  Learner:patient  Method: demonstration and explanation       Outcome: acknowledged understanding    OT Education  Patient Education: ed re cleaning and care of resting pan splint, monitoring for red skin areas and purpose and wearing schedule, pt verbalizes good understanding.     Plan  Plan  Times per week: 5-7  Times per day: Twice a day  Current Treatment Recommendations: Self-Care / ADL, Home Management Training, Strengthening, Balance Training, Functional Mobility Training, Endurance Training, Wheelchair Mobility Training, Neuromuscular Re-education, Pain Management, Safety Education & Training, Patient/Caregiver Education & Training, Equipment Evaluation, Education, & procurement, Cognitive/Perceptual Training  Patient Goals Patient goals : To discharge home with family support  Short term goals  Time Frame for Short term goals: 7 to 10 days  Short term goal 1: Pt will perform upper body bathing/dressing with stand by assist.  Short term goal 2: Pt will perform lower body bathing and dressing with Moderate assist and Fair safety. Short term goal 3: Pt will perform toileting tasks with Moderate assist.  Short term goal 4: Pt will verbalize/demonstrate Good understanding of assistive equipment/durable medical equipment/modified techniques for increased independence with self-care and mobility. Short term goal 5: Pt will perform functional transfers with Moderate assist to toilet/wheelchair/shower with Fair safety. Short term goal 6: Pt will actively participate in 30+ minutes of therapeutic exercise/functional activities to promote increased independence with self-care and mobility. Long term goals  Time Frame for Long term goals : By discharge  Long term goal 1: Pt will perform BADLs with modified independence and Good safety with assist PRN for HORTENSIA hose. Long term goal 2: Pt will perform functional transfers and mobility with modified independence, least restrictive mobility device, and Good safety. Long term goal 3: Pt will attend to L side of body 100% of the time during self-care, transfers, and mobility with 1-2 verbal cues PRN. Long term goal 4: Pt will stand for 5+ minutes with 1-2 UE support, modified independence and no loss of balance while engaging in functional activity of choice. Long term goal 5: Pt will verbalize/demonstrate Good understanding of home exercise program for LUE.   Long term goals 6: 9 hole peg, box and block to be assessed for LUE as appropriate       Electronically signed by Luis Howard OT on 12/28/20 at 5:58 PM EST

## 2020-12-28 NOTE — PROGRESS NOTES
Current Treatment Recommendations: Strengthening, ROM, Balance Training, Functional Mobility Training, Transfer Training, Wheelchair Mobility Training, Gait Training, Neuromuscular Re-education, Pain Management, Home Exercise Program, Safety Education & Training, Patient/Caregiver Education & Training, Positioning, Endurance Training, Stair training  Safety Devices  Type of devices: Gait belt  Restraints  Initially in place: No     Therapy Time   Individual Concurrent Group Co-treatment   Time In 1440         Time Out 1505         Minutes [address] [place] [address]

## 2020-12-28 NOTE — PROGRESS NOTES
[place] [address] Internal Medicine    CONSULTATION / HISTORY AND PHYSICAL EXAMINATION            Date:   2020  Patient name:  Radha Lux  Date of admission:  2020  8:30 PM  MRN:   [id number]  Account:  [de-identified]  YOB: 1970  PCP:    Tati Ervin MD  Room:   26122612-  Code Status:    Full Code    Physician Requesting Consult: Tejas Nagy MD    Reason for Consult: Medical management    Chief Complaint:   Acute left sided weakness     History Obtained From:     patient, electronic medical record    History of Present Illness/ Interval History:   49-year-old female with history of delta storage pool disease, bipolar disorder, alcohol abuse who developed acute left-sided weaknessfound to have intraparenchymal hemorrhage, right parietal lobe hemorrhage and moderate subarachnoid hemorrhage. Pt admitted to rehab unit on 20. Improving with PT/OT    Past Medical History:     Past Medical History:   Diagnosis Date    Asthma     [facility]     Delta storage pool disease ([facility]     Hypertension     Psychiatric problem         Past Surgical History:     Past Surgical History:   Procedure Laterality Date     SECTION  [id number],4663    Miscarriage     CHOLECYSTECTOMY      COLONOSCOPY N/A 2/3/2020    -random bx(normal)hemorrhoids    GASTRIC BYPASS SURGERY      HYSTERECTOMY      KNEE SURGERY      LAPAROSCOPY      TONSILLECTOMY AND ADENOIDECTOMY      UPPER GASTROINTESTINAL ENDOSCOPY N/A 2/3/2020    (normal,neg H-Pylori)normal post-bypass endoscopy        Medications Prior to Admission:     Prior to Admission medications    Medication Sig Start Date End Date Taking?  Authorizing Provider   STIMATE 1.5 MG/ML SOLN nasal solution INSTILL 2 SPRAYS INTO THE NOSE TWICE DAILY STARTING TWO DAYS BEFORE PROCEDURE 20   Dina Menezes MD amLODIPine (NORVASC) 5 MG tablet Take 5 mg by mouth daily    Historical Provider, MD   QUEtiapine (SEROQUEL) 200 MG tablet Take 200 mg by mouth nightly    Historical Provider, MD   lisinopril (PRINIVIL;ZESTRIL) 20 MG tablet Take 20 mg by mouth daily    Historical Provider, MD   buPROPion (WELLBUTRIN XL) 300 MG XL tablet Take 300 mg by mouth daily 3/5/16   Historical Provider, MD   sertraline (ZOLOFT) 50 MG tablet Take 50 mg by mouth daily 3/5/16   Historical Provider, MD   PROAIR  (90 BASE) MCG/ACT inhaler Inhale 2 puffs into the lungs every 6 hours as needed  14   Historical Provider, MD   lamoTRIgine (LAMICTAL) 200 MG tablet Take 400 mg by mouth daily 2 tabs 14   Historical Provider, MD        Allergies:     Penicillin g, Pcn [penicillins], and Sulfa antibiotics    Social History:     Tobacco:    reports that she has never smoked. She has never used smokeless tobacco.  Alcohol:      reports current alcohol use. Drug Use:  reports no history of drug use. Family History:     Family History   Adopted: Yes   Family history unknown: Yes       Review of Systems:     Positive and Negative as described in HPI. Denies any shortness of breath or cough  Denies chest pain or palpitations  Denies abdominal pain, diarrhea vomiting  Denies any new numbness tremors or weakness. Physical Exam:     /75   Pulse 77   Temp 97.8 °F (36.6 °C) (Oral)   Resp 18   Ht 5' 5\" (1.651 m)   Wt 233 lb (105.7 kg)   SpO2 100%   BMI 38.77 kg/m²   Temp (24hrs), Av.1 °F (36.7 °C), Min:97.8 °F (36.6 °C), Max:98.3 °F (36.8 °C)      General appearance:  alert, cooperative and no distress  Eyes: Anicteric sclera. Pupils are equally round and reactive to light. Extraocular movements are intact.   Lungs:  clear to auscultation bilaterally, normal effort  Heart:  regular rate and rhythm, no murmur  Abdomen:  soft, nontender, nondistended, normal bowel sounds, no masses, hepatomegaly, splenomegaly Extremities:  no edema, redness, tenderness in the calves  Skin:  no gross lesions, rashes, induration  Neuro:  Alert, oriented X 3, left sided weakness  Power is left arm 0 out of 5 and 1 out of 5 in left lower extremity, increased tone in both left upper and lower extremity  Investigations:      Laboratory Testing:  Lab Results   Component Value Date    WBC 6.8 12/28/2020    HGB 11.8 (L) 12/28/2020    HCT 36.2 12/28/2020    MCV 89.8 12/28/2020     12/28/2020     Lab Results   Component Value Date     12/28/2020    K 4.0 12/28/2020     12/28/2020    CO2 24 12/28/2020    BUN 11 12/28/2020    CREATININE 0.64 12/28/2020    GLUCOSE 86 12/28/2020    CALCIUM 9.9 12/28/2020         Assessment :      Primary Problem  <principal problem not specified>    Active Hospital Problems    Diagnosis Date Noted    Essential hypertension [I10] 12/11/2020    Acute left-sided weakness [R53.1] 12/11/2020    H/O intracranial hemorrhage [Z86.79] 12/09/2020    Vasogenic edema ([address] [G93.6]     Intracranial hemorrhage ([address] [I62.9] 11/30/2020    Bipolar 1 disorder (HCC) [F31.9]     Platelet dysfunction ([address]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 *      1. Intracranial hemorrhage with left-sided weakness. Improving. Repeat CT showed significant improvement   2. Pulmonary hypertension  3. Platelet dysfunction  4. Hypertension. Continue lisinopril 20 mg daily and amlodipine 5 mg . 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 Monitoring platelets   6. Restless Leg Syndrome: on requip  7.  DVT prophylaxis with Lovenox       12/24  No acute issues Repeat CT scan of the head showed significant resolution of the intracranial hemorrhage   Vitals stable. 12/25  No new symptoms   Progressing satisfactory with rehab therapies . 12/26  No acute issues   Feels well   Get occasional spasm but overall improving   Vitals are stable     12/27  Patient tolerating rehabilitative therapies . Progressing fairly well . Continue present therapy . 12/28  No acute issues overnight  Vitally and hemodynamically stable   Progressing well with therapies     Consultations:   [address] [place] [address] CONSULT TO DIETITIAN  IP CONSULT TO SOCIAL WORK  INPATIENT CONSULT TO ORTHOTIST/PROSTHETIST    Jose Maria Springer MD  12/28/2020  9:20 AM    Copy sent to Dr. Patria Olguin MD    Please note that this chart was generated using voice recognition Dragon dictation software. Although every effort was made to ensure the accuracy of this automated transcription, some errors in transcription may have occurred. Attestation and add on       I have discussed the care of Megan Ritchie , including pertinent history and exam findings,      12/28/20    with the resident. I have seen and examined the patient and the key elements of all parts of the encounter have been performed by me . I agree with the assessment, plan and orders as documented by the resident. Active Problems:    Platelet dysfunction (HCC)    Bipolar 1 disorder (HCC)    Intracranial hemorrhage (HCC)    Vasogenic edema (HCC)    H/O intracranial hemorrhage    Essential hypertension    Acute left-sided weakness  Resolved Problems:    * No resolved hospital problems. *            ---- ;        Medications: Allergies:     Allergies   Allergen Reactions    Penicillin G Itching    Pcn [Penicillins]     Sulfa Antibiotics Other (See Comments)     Inflammation in the eye       Current Meds:   Scheduled Meds:    diclofenac sodium  2 g Topical BID    tiZANidine  6 mg Oral Daily    And  tiZANidine  4 mg Oral 2 times per day    sertraline  150 mg Oral Daily    amLODIPine  5 mg Oral Daily    thiamine mononitrate  100 mg Oral Daily    traZODone  25 mg Oral Nightly    gabapentin  300 mg Oral Nightly    lisinopril  20 mg Oral Daily    buPROPion  300 mg Oral Daily    cyanocobalamin  1,000 mcg Intramuscular Weekly    Followed by   Lakshmi Somers ON 2/5/2021] cyanocobalamin  1,000 mcg Intramuscular T67 Days    folic acid  1 mg Oral Daily    lamoTRIgine  400 mg Oral Daily    melatonin  3 mg Oral Nightly    rOPINIRole  1 mg Oral Nightly    sennosides-docusate sodium  2 tablet Oral Daily    polyethylene glycol  17 g Oral Daily    enoxaparin  30 mg Subcutaneous BID     Continuous Infusions:   PRN Meds: oxyCODONE, acetaminophen, ipratropium-albuterol, magnesium hydroxide, muscle rub, bisacodyl, senna        [facility]  [address], [address] [place] [address].    Phone [phone]   Fax: [phone]  Answering Service: [phone]

## 2020-12-29 PROCEDURE — 97110 THERAPEUTIC EXERCISES: CPT

## 2020-12-29 PROCEDURE — 6370000000 HC RX 637 (ALT 250 FOR IP): Performed by: PHYSICAL MEDICINE & REHABILITATION

## 2020-12-29 PROCEDURE — 97116 GAIT TRAINING THERAPY: CPT

## 2020-12-29 PROCEDURE — 6370000000 HC RX 637 (ALT 250 FOR IP): Performed by: NURSE PRACTITIONER

## 2020-12-29 PROCEDURE — 97112 NEUROMUSCULAR REEDUCATION: CPT

## 2020-12-29 PROCEDURE — 6370000000 HC RX 637 (ALT 250 FOR IP): Performed by: STUDENT IN AN ORGANIZED HEALTH CARE EDUCATION/TRAINING PROGRAM

## 2020-12-29 PROCEDURE — 97530 THERAPEUTIC ACTIVITIES: CPT

## 2020-12-29 PROCEDURE — 99232 SBSQ HOSP IP/OBS MODERATE 35: CPT | Performed by: PHYSICAL MEDICINE & REHABILITATION

## 2020-12-29 PROCEDURE — 99231 SBSQ HOSP IP/OBS SF/LOW 25: CPT | Performed by: INTERNAL MEDICINE

## 2020-12-29 PROCEDURE — 6370000000 HC RX 637 (ALT 250 FOR IP): Performed by: INTERNAL MEDICINE

## 2020-12-29 PROCEDURE — 6360000002 HC RX W HCPCS: Performed by: PHYSICAL MEDICINE & REHABILITATION

## 2020-12-29 PROCEDURE — 1180000000 HC REHAB R&B

## 2020-12-29 PROCEDURE — 97535 SELF CARE MNGMENT TRAINING: CPT

## 2020-12-29 RX ORDER — DIAZEPAM 2 MG/1
2 TABLET ORAL 2 TIMES DAILY PRN
Status: DISCONTINUED | OUTPATIENT
Start: 2020-12-29 | End: 2020-12-30

## 2020-12-29 RX ADMIN — TRAZODONE HYDROCHLORIDE 25 MG: 50 TABLET ORAL at 20:22

## 2020-12-29 RX ADMIN — SENNOSIDES AND DOCUSATE SODIUM 2 TABLET: 8.6; 5 TABLET ORAL at 07:41

## 2020-12-29 RX ADMIN — Medication 3 MG: at 20:18

## 2020-12-29 RX ADMIN — BUPROPION HYDROCHLORIDE 300 MG: 300 TABLET, FILM COATED, EXTENDED RELEASE ORAL at 07:42

## 2020-12-29 RX ADMIN — THIAMINE HCL TAB 100 MG 100 MG: 100 TAB at 07:41

## 2020-12-29 RX ADMIN — ROPINIROLE HYDROCHLORIDE 1 MG: 1 TABLET, FILM COATED ORAL at 20:18

## 2020-12-29 RX ADMIN — DICLOFENAC 2 G: 10 GEL TOPICAL at 07:42

## 2020-12-29 RX ADMIN — TIZANIDINE 4 MG: 4 TABLET ORAL at 13:10

## 2020-12-29 RX ADMIN — ENOXAPARIN SODIUM 30 MG: 30 INJECTION SUBCUTANEOUS at 20:20

## 2020-12-29 RX ADMIN — SERTRALINE HYDROCHLORIDE 150 MG: 100 TABLET ORAL at 07:41

## 2020-12-29 RX ADMIN — ENOXAPARIN SODIUM 30 MG: 30 INJECTION SUBCUTANEOUS at 07:40

## 2020-12-29 RX ADMIN — POLYETHYLENE GLYCOL 3350 17 G: 17 POWDER, FOR SOLUTION ORAL at 07:40

## 2020-12-29 RX ADMIN — FOLIC ACID 1 MG: 1 TABLET ORAL at 07:40

## 2020-12-29 RX ADMIN — LISINOPRIL 20 MG: 20 TABLET ORAL at 07:41

## 2020-12-29 RX ADMIN — DIAZEPAM 2 MG: 2 TABLET ORAL at 16:28

## 2020-12-29 RX ADMIN — GABAPENTIN 300 MG: 300 CAPSULE ORAL at 20:18

## 2020-12-29 RX ADMIN — TIZANIDINE 4 MG: 4 TABLET ORAL at 20:19

## 2020-12-29 RX ADMIN — AMLODIPINE BESYLATE 5 MG: 5 TABLET ORAL at 07:40

## 2020-12-29 RX ADMIN — TIZANIDINE 6 MG: 4 TABLET ORAL at 07:40

## 2020-12-29 RX ADMIN — LAMOTRIGINE 400 MG: 100 TABLET ORAL at 07:42

## 2020-12-29 RX ADMIN — DICLOFENAC 2 G: 10 GEL TOPICAL at 20:18

## 2020-12-29 NOTE — CARE COORDINATION
SW received order for this patient to get a resting hand splint. RAY called [facility] and requested that they come and fit this patient. RAY then faxed the order as requested to [facility].

## 2020-12-29 NOTE — PROGRESS NOTES
[address]   ACUTE REHABILITATION OCCUPATIONAL THERAPY  DAILY NOTE    Date: 20  Patient Name: Magdalene Osuna      Room: 2612/2612-01    MRN: [id number]   : 1970  (48 y.o.)  Gender: female   Referring Practitioner: Lise Luna MD  Diagnosis: Intracranial hemmorrhage  Additional Pertinent Hx: 77-year-old female with history of delta storage pool disease, bipolar disorder, alcohol abuse who developed acute left-sided weaknessfound to have intraparenchymal hemorrhage, right parietal lobe hemorrhage and moderate subarachnoid hemorrhage. Pt admitted to rehab unit on 20. Restrictions  Restrictions/Precautions: Fall Risk  Other position/activity restrictions: L hypertonicity. PRAFO boot LLE in bed  Required Braces or Orthoses?: No  Equipment Used: Other, Wheelchair, Bed(Tameka Boojerilyn)    Subjective  Subjective: \"this foot is so annoying! \" pt states in regards to L foot/ankle inverting when transferring  Comments: pt cooperative and motivated for therapies  Patient Currently in Pain: Denies  Restrictions/Precautions: Fall Risk  Overall Orientation Status: Within Functional Limits  Patient Observation  Observations: intermittent L side neglect  Pain Assessment  Pain Assessment: 0-10  Pain Level: 1  Pain Type: Acute pain  Pain Location: Arm  Pain Orientation: Left  Pain Descriptors: Aching, Sore    Objective  Cognition  Overall Cognitive Status: WFL  Perception  Overall Perceptual Status: WFL  Unilateral Attention: Cues to attend left visual field;Cues to attend to left side of body;Cues to maintain midline in sitting;Cues to maintain midline in standing(flucuates)  Balance  Sitting Balance: Modified independent   Standing Balance: Minimal assistance(assist for LLE positioning/stabilizing)  Bed mobility  Rolling to Right: Minimal assistance(hand held assist)  Supine to Sit: Minimal assistance(hand held assist for trunk control) Weight Bearing Technique: Yes  LUE Weight Bearing: Forearm standing; Extended arm standing  Response To Weight Bearing Technique: standing WB tasks, tabletop level, trail one pt participated in forearm WB with LUE, crossing midline to retrieve items in standing, min assist for standing support with unsteadiness in reaching, trial two pt participated in extended arm WB standing with LUE, assist req for full extension of LUE, pt having increased tone with movements, elevated surface under L hand to decrease pain/stretch in wrist, pt tolerating well, VCs for WB in LLE to decrease L ankle/foot inversion, assist to provided to L knee laterally for support           ADL  Equipment Provided: Reacher  Feeding: Setup  Grooming: Modified independent (seated)  UE Bathing: Minimal assistance;Setup(intermittent assist to LUE)  LE Bathing: Minimal assistance(standing support for elizabeth-care, declines feet this date)  UE Dressing: Contact guard assistance;Setup; Increased time to complete(over head shirt, assist for adjustments)  LE Dressing: Moderate assistance; Increased time to complete;Verbal cueing(assist over L hip, TEDs, shoes, L brace, min A in standing)  Toileting: Minimal assistance(support in standing/clothing on L hip)          Assessment  Performance deficits / Impairments: Decreased functional mobility ; Decreased ADL status; Decreased ROM; Decreased strength;Decreased endurance;Decreased balance;Decreased high-level IADLs;Decreased fine motor control;Decreased coordination;Decreased posture;Decreased safe awareness;Decreased vision/visual deficit  Prognosis: Good  Discharge Recommendations: Home with assist PRN;Patient would benefit from continued therapy after discharge  Activity Tolerance: Patient Tolerated treatment well  Safety Devices in place: Yes  Type of devices: Left in chair;Call light within reach  Equipment Recommendations  Equipment Needed: Yes  Hand Held Shower: x  Transfer Tub Bench: x  Grab bars: x Reacher: x  Long-handled Shoehorn: x       Patient Education: transfer safety, balance during functional tasks, ADL tasks/ara tech, D/C planning   Patient Goals   Patient goals : To discharge home with family support  Learner:patient  Method: demonstration and explanation       Outcome: needs reinforcement        Plan  Plan  Times per week: 5-7  Times per day: Twice a day  Current Treatment Recommendations: Self-Care / ADL, Home Management Training, Strengthening, Balance Training, Functional Mobility Training, Endurance Training, Wheelchair Mobility Training, Neuromuscular Re-education, Pain Management, Safety Education & Training, Patient/Caregiver Education & Training, Equipment Evaluation, Education, & procurement, Cognitive/Perceptual Training  Patient Goals   Patient goals : To discharge home with family support  Short term goals  Time Frame for Short term goals: 7 to 10 days  Short term goal 1: Pt will perform upper body bathing/dressing with stand by assist.  Short term goal 2: Pt will perform lower body bathing and dressing with Moderate assist and Fair safety. Short term goal 3: Pt will perform toileting tasks with Moderate assist.  Short term goal 4: Pt will verbalize/demonstrate Good understanding of assistive equipment/durable medical equipment/modified techniques for increased independence with self-care and mobility. Short term goal 5: Pt will perform functional transfers with Moderate assist to toilet/wheelchair/shower with Fair safety. Short term goal 6: Pt will actively participate in 30+ minutes of therapeutic exercise/functional activities to promote increased independence with self-care and mobility. Long term goals  Time Frame for Long term goals : By discharge  Long term goal 1: Pt will perform BADLs with modified independence and Good safety with assist PRN for HORTENSIA hose. Long term goal 2: Pt will perform functional transfers and mobility with modified independence, least restrictive mobility device, and Good safety. Long term goal 3: Pt will attend to L side of body 100% of the time during self-care, transfers, and mobility with 1-2 verbal cues PRN. Long term goal 4: Pt will stand for 5+ minutes with 1-2 UE support, modified independence and no loss of balance while engaging in functional activity of choice. Long term goal 5: Pt will verbalize/demonstrate Good understanding of home exercise program for LUE.   Long term goals 6: 9 hole peg, box and block to be assessed for LUE as appropriate        12/29/20 0740 12/29/20 1350   OT Individual Minutes   Time In [address] [place] [address]   Time Out 0842 [id number]   Minutes 62 32     Electronically signed by JEAN CARLOS Boucher on 12/29/20 at 3:38 PM EST

## 2020-12-29 NOTE — PROGRESS NOTES
Physical Therapy  Facility/Department: [facility] ACUTE REHAB  Daily Treatment Note  NAME: Venkat Rivas  : 1970  MRN: [id number]    Date of Service: 2020    Discharge Recommendations:  Patient would benefit from continued therapy after discharge, Home with assist PRN   PT Equipment Recommendations  Mobility Devices: Arpita Mohs; Wheelchair  Walker: Frank-walker  Wheelchair: Light Wells Prema    Assessment      Activity Tolerance  Activity Tolerance: Patient limited by pain     Patient Diagnosis(es): There were no encounter diagnoses. has a past medical history of Asthma, Bipolar 1 disorder ([facility] [address] Delta storage pool disease [facility]), Hypertension, and Psychiatric problem. has a past surgical history that includes  section (5713,9711); Gastric bypass surgery (); Tonsillectomy and adenoidectomy (); Cholecystectomy (); knee surgery (); Hysterectomy (); laparoscopy (); Colonoscopy (N/A, 2/3/2020); and Upper gastrointestinal endoscopy (N/A, 2/3/2020). Restrictions  Restrictions/Precautions  Restrictions/Precautions: Fall Risk  Required Braces or Orthoses?: No  Position Activity Restriction  Other position/activity restrictions: L hypertonicity. PRAFO boot LLE in bed  Subjective              Orientation     Cognition      Objective      Transfers  Sit to Stand: Contact guard assistance  Stand to sit: Contact guard assistance  Bed to Chair: Contact guard assistance  Stand Pivot Transfers: Minimal Assistance  Squat Pivot Transfers: Contact guard assistance  Ambulation 1  Surface: level tile  Device: [employer]  Other Apparatus: Left; Wheelchair follow;Dorsiflex Assist  Assistance: Minimal assistance; Moderate assistance  Quality of Gait: spasmic/flexor tone on LLE  Gait Deviations: Decreased step length;Decreased step height;Deviated path; Increased KRISTIN  Distance: 25 feet x 2 Comments: Cues to heel strike completion on swing phase with shoulder width apart KRISTIN. no noted ankle roll with constant feedback with verbal tactile cueing. patient fatigues with continued distance and note patient unable to maintain foot placement wide and increasing tone eversion with gait. Patient tolerated gait without rolling ankle at this time with ankle support and high top shoes. patient awaiting AFO with ankle support from DME  Ambulation 2  Surface - 2: level tile  Device 2: Zimmerman rail  Other Apparatus 2: Left;Dorsiflex Assist(ankle brace)  Assistance 2: Minimal assistance;Contact guard assistance  Quality of Gait 2: improved step through  Gait Deviations: Increased KRISTIN; Decreased step length;Decreased step height  Distance: 20 feet x 3 forward and retro gait  Comments: constant cueing to increase KRISTIN  Stairs  # Steps : 9(preformed twice with seated rest break)  Stairs Height: 8\"  Rails: Right ascending  Assistance: Minimal/moderated Assistance with stability and LLE placement needs  Wheelchair Activities  Left Brakes Level of Assistance: Minimal assistance  Right Brakes Level of Assistance: Stand by Assist  Propulsion 1  Propulsion: Manual  Level: Level Tile  Method: RLE;RUE;LLE  Level of Assistance: Minimal assistance  Description/ Details: poor safety awareness with left side neglect, running into objects on left side  Distance: 80 feet        Other exercises  Other exercises 1: Neuro rehab with WB unilateral, bilateral, cocontract  Other exercises 3: Seated LLE against manual resistance, elicits increased muscular response x15 each (quads, hamstrings, abductors, adductors)  Other exercises 6: NuStep 10 minutes BLE no LUE supported in sling.  L4. needed strapping to support LLE to neutral  Other exercises 7: Supine Left LE hamstring stretch x1min x2sets                        G-Code     OutComes Score                                                     AM-PAC Score             Goals  Short term goals Time Frame for Short term goals: 10 days  Short term goal 1: pt will perform bed mobility with min A  Short term goal 2: pt will dangle EOB/EOM for 20 to 25 minutes with SBA, performing challenged seated balance/corestrengthening  Short term goal 3: Pivot transfers with mod A+2  Short term goal 4: WC mobility x 100' with min A  Short term goal 5: progress to standing/pre-gait activities in // bars to facilitate L LE motor fucntion. Long term goals  Time Frame for Long term goals : By LOS  Long term goal 1: Pt able to perform bed mobility independently  Long term goal 2: Pt able to perform transfers at [facility] [place] with verbal cues for LE positioning and safety  Long term goal 3: Pt able to progress to ambulation with appropriate device/L LE bracing,  dist of 50 to 100 ft,  CGA   Long term goal 4: Pt able to propel w/c level surfaces dist of 150 ft , mod-I. Long term goal 5: Improve L LE strength to atleast 2 to 2+/5 to improve fucntion. Long term goal 6: Improve PASS score from 8/30 to 23/36 to improve overall fucntion. Long term goal 7: Pt able to go up and down flight of steps with rail/Assistive device,  mod A./min A  Long term goal 8: Family training for safe fucntional mobility, including stari training. Patient Goals   Patient goals : to regain use of L arm and L leg    Plan    Plan  Times per week: 1.5hr/day, 5 to 7 days/week.   Times per day: Daily  Current Treatment Recommendations: Strengthening, ROM, Balance Training, Functional Mobility Training, Transfer Training, Wheelchair Mobility Training, Gait Training, Neuromuscular Re-education, Pain Management, Home Exercise Program, Safety Education & Training, Patient/Caregiver Education & Training, Positioning, Endurance Training, Stair training  Safety Devices  Type of devices: Gait belt  Restraints  Initially in place: No     Therapy Time     12/29/20 1100 12/29/20 1507   PT Individual Minutes   Time In [id number]   Time Out [id number]   [id number] Maximo Beams, PTA

## 2020-12-29 NOTE — PLAN OF CARE
Problem: Skin Integrity:  Goal: Will show no infection signs and symptoms  Description: Will show no infection signs and symptoms  Outcome: Ongoing     Problem: Skin Integrity:  Goal: Absence of new skin breakdown  Description: Absence of new skin breakdown  Outcome: Ongoing     Problem: Falls - Risk of:  Goal: Will remain free from falls  Description: Will remain free from falls  Outcome: Ongoing     Problem: Falls - Risk of:  Goal: Absence of physical injury  Description: Absence of physical injury  Outcome: Ongoing     Problem: Pain:  Goal: Pain level will decrease  Description: Pain level will decrease  Outcome: Ongoing     Problem: Pain:  Goal: Control of chronic pain  Description: Control of chronic pain  Outcome: Ongoing

## 2020-12-29 NOTE — CARE COORDINATION
RAY noted that patient made a choice about Homecare she chose [facility]. SW faxed her referral to that home care and will follow up via phone.

## 2020-12-29 NOTE — FLOWSHEET NOTE
Attempt at [facility] visit- pt busy with medical staff       12/28/20 2029   Encounter Summary   Services provided to: Patient not available   Referral/Consult From: Donya

## 2020-12-29 NOTE — PROGRESS NOTES
Physical Medicine & Rehabilitation  Progress Note    12/29/2020 12:27 PM     CC: Ambulatory and ADL dysfunction due to hemorrhagic CVA left nondominant hemiparesis    Subjective:   No Complaints. Feels well. Does not want left hemilap board as she feels this causes more discomfort. Using sling use with left anterior shoulder lateral chest wall discomfort    ROS:  Denies fevers, chills, sweats. No chest pain, palpitations, lightheadedness. Denies coughing, wheezing or shortness of breath. Denies abdominal pain, nausea, diarrhea or constipation. No new areas of joint pain. Denies new areas of numbness or weakness. Denies new anxiety or depression issues. No new skin problems. Rehabilitation:   PT:  Restrictions/Precautions: Fall Risk  Other position/activity restrictions: L hypertonicity. PRAFO boot LLE in bed   Transfers  Sit to Stand: Minimal Assistance, Contact guard assistance  Stand to sit: Contact guard assistance  Bed to Chair: Minimal assistance  Stand Pivot Transfers: Maximum Assistance(pt very anxious trying a stand pivot with hemiwalker)  Squat Pivot Transfers: Minimal Assistance, Moderate Assistance  Car Transfer: Minimal Assistance  Comment: Spouse Alfred assisting with his LUE with gait belt during all transfer training preformed. CGA feedback provide by therapist only for training safety purposes. Ambulation 1  Surface: level tile  Device: Hemiwalker  Other Apparatus: Left, Wheelchair follow, Dorsiflex Assist(Left ankle brace and high top shoes for increase ankle support)  Assistance: Minimal assistance  Quality of Gait: spasmic/flexor tone on LLE  Gait Deviations: Decreased step length, Decreased step height, Deviated path, Increased KRISTIN  Distance: 30 feet x 2 with family training shorter distances for safety. limited by noting decreased safety with foot placement. Comments: Cues to heel strike completion on swing phase with shoulder width apart KRISTIN. no noted ankle roll. patient fatigues with continued distance and note patient unable to maintain foot placement wide and increasing tone eversion with gait. Patient tolerated gait without rolling ankle at this time with ankle support. OT:  ADL  Equipment Provided: Reacher  Feeding: Setup  Grooming: Modified independent (seated)  UE Bathing: Minimal assistance, Setup(intermittent assist to LUE)  LE Bathing: Minimal assistance(standing support for elizabeth-care, declines feet this date)  UE Dressing: Contact guard assistance, Setup, Increased time to complete(over head shirt, assist for adjustments)  LE Dressing:  Moderate assistance, Increased time to complete, Verbal cueing(assist over L hip, TEDs, shoes, L brace, min A in standing)  Toileting: Minimal assistance(support in standing/clothing on L hip)  Additional Comments: gives good effort in all tasks, reacher used as needed   Instrumental ADL's  Instrumental ADLs: Yes     Balance  Sitting Balance: Modified independent   Standing Balance: Minimal assistance(assist for LLE positioning/stabilizing)   Standing Balance  Time: AM: <1min x3, 1-2 min x3  Activity: AM: functional transfers, ADL tasks, toileting  Comment: increased steadiness this date with dynamic functional tasks, VCs for LLE positioning/stabilizing, assist to place LUE on sink during functional tasks, increased tone noted in LUE making WB difficult and increasing pain  Functional Mobility  Functional - Mobility Device: Wheelchair  Activity: To/from bathroom  Assist Level: Contact guard assistance  Functional Mobility Comments: cuing for visual scanning on L side, assist turns to L     Bed mobility  Bridging: Minimal assistance  Rolling to Left: Contact guard assistance  Rolling to Right: Minimal assistance(hand held assist)  Supine to Sit: Minimal assistance(hand held assist for trunk control) Sit to Supine: Minimal assistance  Scooting: Contact guard assistance(hips edge of bed)  Comment: Performed on therapy mat, wedge, no hand rails. Patient educated on log rolling technique, taking time to position correctly. Patient impulsive at times. Transfers  Stand Step Transfers: Moderate assistance(1-2 steps to L side for transfer)  Stand Pivot Transfers: Moderate assistance, Minimal assistance(min A to strong side, mod A to weak side, A for LLE position)  Sit to stand: Minimal assistance  Stand to sit: Minimal assistance  Transfer Comments: pt demo good hand placement, VCs and assist for LLE positioning   Toilet Transfers  Toilet - Technique: To right, To left, Stand pivot  Equipment Used: Standard toilet  Toilet Transfer: Moderate assistance(towards weak side)  Toilet Transfers Comments: use of grab bar for UE support, cuing for safe pivoting with assist to stabilize LLE  Tub Transfers  Tub - Transfer From: Walker(ara walker. )  Tub - Transfer Type: To(utilized stand pivot to wheelchair to return. )  Tub - Transfer To: Transfer tub bench  Tub - Technique: Ambulating  Tub Transfers: Moderate assistance, Verbal cues  Tub Transfers Comments: once positioned on edge of tub bench; cueing and increased time required. See functional mobility note. Assist required to lift LLE over edge of tub to enter. Recommend transfer bench on R end of tub when facing to ensure completing seated pivot over edge of tub towards strong side to be able to utilize R side to grasp rail on bench to assist. Continued education and transfer training required to increase safety and tolerance. Shower Transfers  Shower - Transfer From: Wheelchair  Shower - Transfer Type: To and From  Shower - Transfer To: Transfer tub bench  Shower - Technique: Stand pivot, To right, To left  Shower Transfers:  Moderate assistance(min/mod, mod assist to L side) Shower Transfers Comments: assist x1, VCs and assist for LLE positioning/stabilization, cuing for control, fair/good carryover noted  Wheelchair Bed Transfers  Equipment Used: Other, Wheelchair, Bed(Tameka Camp)  Level of Asssistance: Dependent/Total      ST:           Objective:  /84   Pulse 79   Temp 98.1 °F (36.7 °C) (Oral)   Resp 12   Ht 5' 5\" (1.651 m)   Wt 231 lb 9.6 oz (105.1 kg)   SpO2 98%   BMI 38.54 kg/m²  I Body mass index is 38.54 kg/m². I   Wt Readings from Last 1 Encounters:   20 231 lb 9.6 oz (105.1 kg)      Temp (24hrs), Av.8 °F (36.6 °C), Min:97.5 °F (36.4 °C), Max:98.1 °F (36.7 °C)         GEN: well developed, well nourished, no acute distress  HEENT: Normocephalic atraumatic, EOMI, mucous membranes pink and moist  CV: RRR, no murmurs, rubs or gallops  PULM: CTAB, no rales or rhonchi. Respirations WNL and unlabored  ABD: soft, NT, ND, +BS and equal  NEURO: A&O x3. Sensation intact to light touch. MSK: Decreased range of motion left upper lower extremities,, 4/5 right upper and lower extremity, moderate spasticity left upper and lower extremity, tenderness left anterior shoulderlateral pectoral muscle  EXTREMITIES: No calf tenderness to palpation bilaterally. No edema BLEs, left hand curling at  rest  SKIN: warm dry and intact with good turgor  PSYCH: appropriately interactive. Affect WNL.           Medications   Scheduled Meds:   diclofenac sodium  2 g Topical BID    tiZANidine  6 mg Oral Daily    And    tiZANidine  4 mg Oral 2 times per day    sertraline  150 mg Oral Daily    amLODIPine  5 mg Oral Daily    thiamine mononitrate  100 mg Oral Daily    traZODone  25 mg Oral Nightly    gabapentin  300 mg Oral Nightly    lisinopril  20 mg Oral Daily    buPROPion  300 mg Oral Daily    cyanocobalamin  1,000 mcg Intramuscular Weekly    Followed by   Carlos Beaulieu ON 2021] cyanocobalamin  1,000 mcg Intramuscular O92 Days    folic acid  1 mg Oral Daily  lamoTRIgine  400 mg Oral Daily    melatonin  3 mg Oral Nightly    rOPINIRole  1 mg Oral Nightly    sennosides-docusate sodium  2 tablet Oral Daily    polyethylene glycol  17 g Oral Daily    enoxaparin  30 mg Subcutaneous BID     Continuous Infusions:  PRN Meds:.diazePAM, oxyCODONE, acetaminophen, ipratropium-albuterol, magnesium hydroxide, muscle rub, bisacodyl, senna     Diagnostics:     CBC:   Recent Labs     12/28/20  0620   WBC 6.8   RBC 4.03   HGB 11.8*   HCT 36.2   MCV 89.8   RDW 13.7        BMP:   Recent Labs     12/28/20  0620      K 4.0      CO2 24   BUN 11   CREATININE 0.64     BNP: No results for input(s): BNP in the last 72 hours. PT/INR: No results for input(s): PROTIME, INR in the last 72 hours. APTT: No results for input(s): APTT in the last 72 hours. CARDIAC ENZYMES: No results for input(s): CKMB, CKMBINDEX, TROPONINT in the last 72 hours. Invalid input(s): CKTOTAL;3  FASTING LIPID PANEL:  Lab Results   Component Value Date    CHOL 247 (H) 11/30/2020     11/30/2020    TRIG 77 11/30/2020     LIVER PROFILE: No results for input(s): AST, ALT, ALB, BILIDIR, BILITOT, ALKPHOS in the last 72 hours. I/O (24Hr): No intake or output data in the 24 hours ending 12/29/20 1227    Glu last 24 hour  No results for input(s): POCGLU in the last 72 hours. No results for input(s): CLARITYU, COLORU, PHUR, SPECGRAV, PROTEINU, RBCUA, BLOODU, BACTERIA, NITRU, WBCUA, LEUKOCYTESUR, YEAST, GLUCOSEU, BILIRUBINUR in the last 72 hours. Impression/Plan:    1. Ambulatory and ADL dysfunction secondary to hemorrhagic CVA: Acute rehab effortsPT/OT, PRAFO for le ft lower limb at night.   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 Monitoring platelets  3. History of headacheMedrol taper 12/13  4. Muscle spasm/myoclonus/spasticity: Now off baclofennot tolerate increase. On schedule tizanidine 12/17 - increased dose 12/20 (6mg/6mg/4mg) but noted increased muscle spasms in the afternoon and evening. Dose adjusted 12/23 (now on 6mg/4mg/4mg). .  Patient she feels she is doing better with decrease dosehesitant to increase again. Will try low-dose Valium as needed. Discussed with patient  5. Left proximal upper limb pain:  In the area of left proximal biceps/left pec major muscles.-Suspect spasticity, possible tendinitis, possible subluxation though only 1 fingerbreadth. Trial of Voltaren gel . Continue with relaxers, sling when in therapiesdiscussed at length with both patient and  12/20/2020. Reviewed medications, spasticity, sling, etc. x-ray negativesee above,  may benefit from Botox as outpatient  6. PainRoxicodone  7. HTN: amlodipine (increased 12/21 by IM), lisinopril (decreased 12/14 by IM); labetalol discontinued 12/18 by IM  8. ETOH withdrawal/history of abuse: was treated with IV ativan and librium. On P88 and folic acid now  9. Bipolar Disorder: on wellbutrin, zoloft (increased 12/21 to home dose of 150mg daily), lamictal  10. Restless Leg Syndrome: on requip  11. Insomnia:  On melatonin, gabapentin nightly. Takes trazodone at home - continue low-dose trazodone (started 12/15). 12. Bowel Management: Miralax daily, senokot prn, dulcolax prn. Last bowel movement 12/23 small  13. DVT Prophylaxis:  low molecular weight heparin, SCD's while in bed and HORTENSIA's   14. Internal medicine for medical management       Rowdy Cueto. Kamala Desir MD This note is created with the assistance of a speech recognition program.  While intending to generate a document that actually reflects the content of the visit, the document can still have some errors including those of syntax and sound a like substitutions which may escape proof reading.   In such instances, actual meaning can be extrapolated by contextual diversion

## 2020-12-29 NOTE — PROGRESS NOTES
[facility] Internal Medicine    CONSULTATION / HISTORY AND PHYSICAL EXAMINATION            Date:   2020  Patient name:  Erica Lombardi  Date of admission:  2020  8:30 PM  MRN:   [id number]  Account:  [de-identified]  YOB: 1970  PCP:    Twan Noel MD  Room:   [facility]  Code Status:    Full Code    Physician Requesting Consult: Marcella Correa MD    Reason for Consult: Medical management    Chief Complaint:   Acute left sided weakness     History Obtained From:     patient, electronic medical record    History of Present Illness/ Interval History:   66-year-old female with history of delta storage pool disease, bipolar disorder, alcohol abuse who developed acute left-sided weaknessfound to have intraparenchymal hemorrhage, right parietal lobe hemorrhage and moderate subarachnoid hemorrhage. Pt admitted to rehab unit on 20. Improving with PT/OT    Past Medical History:     Past Medical History:   Diagnosis Date    Asthma     Bipolar 1 disorder ([facility] [address]     Delta storage pool disease ([facility] [address]     Hypertension     Psychiatric problem         Past Surgical History:     Past Surgical History:   Procedure Laterality Date     SECTION  [id number]    Miscarriage     CHOLECYSTECTOMY      COLONOSCOPY N/A 2/3/2020    -random bx(normal)hemorrhoids    GASTRIC BYPASS SURGERY      HYSTERECTOMY      KNEE SURGERY      LAPAROSCOPY      TONSILLECTOMY AND ADENOIDECTOMY      UPPER GASTROINTESTINAL ENDOSCOPY N/A 2/3/2020    (normal,neg H-Pylori)normal post-bypass endoscopy        Medications Prior to Admission:     Prior to Admission medications    Medication Sig Start Date End Date Taking?  Authorizing Provider   STIMATE 1.5 MG/ML SOLN nasal solution INSTILL 2 SPRAYS INTO THE NOSE TWICE DAILY STARTING TWO DAYS BEFORE PROCEDURE 20   Yonatan Ortiz MD amLODIPine (NORVASC) 5 MG tablet Take 5 mg by mouth daily    Historical Provider, MD   QUEtiapine (SEROQUEL) 200 MG tablet Take 200 mg by mouth nightly    Historical Provider, MD   lisinopril (PRINIVIL;ZESTRIL) 20 MG tablet Take 20 mg by mouth daily    Historical Provider, MD   buPROPion (WELLBUTRIN XL) 300 MG XL tablet Take 300 mg by mouth daily 3/5/16   Historical Provider, MD   sertraline (ZOLOFT) 50 MG tablet Take 50 mg by mouth daily 3/5/16   Historical Provider, MD   PROAIR  (90 BASE) MCG/ACT inhaler Inhale 2 puffs into the lungs every 6 hours as needed  14   Historical Provider, MD   lamoTRIgine (LAMICTAL) 200 MG tablet Take 400 mg by mouth daily 2 tabs 14   Historical Provider, MD        Allergies:     Penicillin g, Pcn [penicillins], and Sulfa antibiotics    Social History:     Tobacco:    reports that she has never smoked. She has never used smokeless tobacco.  Alcohol:      reports current alcohol use. Drug Use:  reports no history of drug use. Family History:     Family History   Adopted: Yes   Family history unknown: Yes       Review of Systems:     Positive and Negative as described in HPI. Denies any shortness of breath or cough  Denies chest pain or palpitations  Denies abdominal pain, diarrhea vomiting  Denies any new numbness tremors or weakness. Physical Exam:     /84   Pulse 79   Temp 98.1 °F (36.7 °C) (Oral)   Resp 12   Ht 5' 5\" (1.651 m)   Wt 231 lb 9.6 oz (105.1 kg)   SpO2 98%   BMI 38.54 kg/m²   Temp (24hrs), Av.8 °F (36.6 °C), Min:97.5 °F (36.4 °C), Max:98.1 °F (36.7 °C)      General appearance:  alert, cooperative and no distress  Eyes: Anicteric sclera. Pupils are equally round and reactive to light. Extraocular movements are intact.   Lungs:  clear to auscultation bilaterally, normal effort  Heart:  regular rate and rhythm, no murmur  Abdomen:  soft, nontender, nondistended, normal bowel sounds, no masses, hepatomegaly, splenomegaly Extremities:  no edema, redness, tenderness in the calves  Skin:  no gross lesions, rashes, induration  Neuro:  Alert, oriented X 3, left sided weakness  Power is left arm 0 out of 5 and 1 out of 5 in left lower extremity, increased tone in both left upper and lower extremity  Investigations:      Laboratory Testing:  Lab Results   Component Value Date    WBC 6.8 12/28/2020    HGB 11.8 (L) 12/28/2020    HCT 36.2 12/28/2020    MCV 89.8 12/28/2020     12/28/2020     Lab Results   Component Value Date     12/28/2020    K 4.0 12/28/2020     12/28/2020    CO2 24 12/28/2020    BUN 11 12/28/2020    CREATININE 0.64 12/28/2020    GLUCOSE 86 12/28/2020    CALCIUM 9.9 12/28/2020         Assessment :      Primary Problem  <principal problem not specified>    Active Hospital Problems    Diagnosis Date Noted    Essential hypertension [I10] 12/11/2020    Acute left-sided weakness [R53.1] 12/11/2020    H/O intracranial hemorrhage [Z86.79] 12/09/2020    Vasogenic edema ([address] [G93.6]     Intracranial hemorrhage ([address] [I62.9] 11/30/2020    Bipolar 1 disorder (HCC) [F31.9]     Platelet dysfunction ([address]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 *      1. Intracranial hemorrhage with left-sided weakness. Improving. Repeat CT showed significant improvement   2. Pulmonary hypertension  3. Platelet dysfunction  4. Hypertension. Continue lisinopril 20 mg daily and amlodipine 5 mg . 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 Monitoring platelets   6. Restless Leg Syndrome: on requip  7.  DVT prophylaxis with Lovenox     12/22  No acute complaints Patient has CT head showed resolution of SAH and intraventricular hemorrhage and decrease in size of right parietal hemorrhage. Blood is controlled after increasing amlodipine     12/24  No acute issues   Repeat CT scan of the head showed significant resolution of the intracranial hemorrhage   Vitals stable. 12/25  No new symptoms   Progressing satisfactory with rehab therapies . 12/26  No acute issues   Feels well   Get occasional spasm but overall improving   Vitals are stable     12/29/2020    Patient tolerating rehabilitative therapies . Progressing fairly well . Continue present therapy . Consultations:   IP CONSULT TO INTERNAL MEDICINE  IP CONSULT TO DIETITIAN  IP CONSULT TO SOCIAL WORK  INPATIENT CONSULT TO ORTHOTIST/PROSTHETIST      Gage Miranda MD  12/29/2020  3:41 PM    Copy sent to Dr. Amanda Wilkes MD    Please note that this chart was generated using voice recognition Dragon dictation software. Although every effort was made to ensure the accuracy of this automated transcription, some errors in transcription may have occurred.

## 2020-12-30 PROCEDURE — 97112 NEUROMUSCULAR REEDUCATION: CPT

## 2020-12-30 PROCEDURE — 6360000002 HC RX W HCPCS: Performed by: PHYSICAL MEDICINE & REHABILITATION

## 2020-12-30 PROCEDURE — 6370000000 HC RX 637 (ALT 250 FOR IP): Performed by: NURSE PRACTITIONER

## 2020-12-30 PROCEDURE — 6370000000 HC RX 637 (ALT 250 FOR IP): Performed by: PHYSICAL MEDICINE & REHABILITATION

## 2020-12-30 PROCEDURE — 97535 SELF CARE MNGMENT TRAINING: CPT

## 2020-12-30 PROCEDURE — 97542 WHEELCHAIR MNGMENT TRAINING: CPT

## 2020-12-30 PROCEDURE — 99231 SBSQ HOSP IP/OBS SF/LOW 25: CPT | Performed by: INTERNAL MEDICINE

## 2020-12-30 PROCEDURE — 99232 SBSQ HOSP IP/OBS MODERATE 35: CPT | Performed by: PHYSICAL MEDICINE & REHABILITATION

## 2020-12-30 PROCEDURE — 97116 GAIT TRAINING THERAPY: CPT

## 2020-12-30 PROCEDURE — 1180000000 HC REHAB R&B

## 2020-12-30 PROCEDURE — 97530 THERAPEUTIC ACTIVITIES: CPT

## 2020-12-30 PROCEDURE — 6370000000 HC RX 637 (ALT 250 FOR IP): Performed by: INTERNAL MEDICINE

## 2020-12-30 PROCEDURE — 6370000000 HC RX 637 (ALT 250 FOR IP): Performed by: STUDENT IN AN ORGANIZED HEALTH CARE EDUCATION/TRAINING PROGRAM

## 2020-12-30 RX ORDER — DIAZEPAM 5 MG/1
5 TABLET ORAL 2 TIMES DAILY PRN
Status: DISCONTINUED | OUTPATIENT
Start: 2020-12-30 | End: 2021-01-05 | Stop reason: HOSPADM

## 2020-12-30 RX ADMIN — AMLODIPINE BESYLATE 5 MG: 5 TABLET ORAL at 07:55

## 2020-12-30 RX ADMIN — TIZANIDINE 6 MG: 4 TABLET ORAL at 07:54

## 2020-12-30 RX ADMIN — TIZANIDINE 4 MG: 4 TABLET ORAL at 14:55

## 2020-12-30 RX ADMIN — Medication 3 MG: at 22:17

## 2020-12-30 RX ADMIN — SENNOSIDES AND DOCUSATE SODIUM 2 TABLET: 8.6; 5 TABLET ORAL at 07:54

## 2020-12-30 RX ADMIN — TIZANIDINE 4 MG: 4 TABLET ORAL at 21:25

## 2020-12-30 RX ADMIN — GABAPENTIN 300 MG: 300 CAPSULE ORAL at 21:25

## 2020-12-30 RX ADMIN — ENOXAPARIN SODIUM 30 MG: 30 INJECTION SUBCUTANEOUS at 21:25

## 2020-12-30 RX ADMIN — DICLOFENAC 2 G: 10 GEL TOPICAL at 21:25

## 2020-12-30 RX ADMIN — TRAZODONE HYDROCHLORIDE 25 MG: 50 TABLET ORAL at 21:25

## 2020-12-30 RX ADMIN — LAMOTRIGINE 400 MG: 100 TABLET ORAL at 07:56

## 2020-12-30 RX ADMIN — SERTRALINE HYDROCHLORIDE 150 MG: 100 TABLET ORAL at 07:54

## 2020-12-30 RX ADMIN — BUPROPION HYDROCHLORIDE 300 MG: 300 TABLET, FILM COATED, EXTENDED RELEASE ORAL at 07:56

## 2020-12-30 RX ADMIN — THIAMINE HCL TAB 100 MG 100 MG: 100 TAB at 07:55

## 2020-12-30 RX ADMIN — ENOXAPARIN SODIUM 30 MG: 30 INJECTION SUBCUTANEOUS at 07:55

## 2020-12-30 RX ADMIN — DICLOFENAC 2 G: 10 GEL TOPICAL at 07:56

## 2020-12-30 RX ADMIN — ROPINIROLE HYDROCHLORIDE 1 MG: 1 TABLET, FILM COATED ORAL at 21:25

## 2020-12-30 RX ADMIN — LISINOPRIL 20 MG: 20 TABLET ORAL at 07:54

## 2020-12-30 RX ADMIN — DIAZEPAM 5 MG: 5 TABLET ORAL at 18:06

## 2020-12-30 RX ADMIN — DIAZEPAM 5 MG: 5 TABLET ORAL at 12:37

## 2020-12-30 RX ADMIN — OXYCODONE HYDROCHLORIDE 5 MG: 5 TABLET ORAL at 02:17

## 2020-12-30 RX ADMIN — FOLIC ACID 1 MG: 1 TABLET ORAL at 07:54

## 2020-12-30 ASSESSMENT — PAIN DESCRIPTION - PROGRESSION: CLINICAL_PROGRESSION: NOT CHANGED

## 2020-12-30 NOTE — PLAN OF CARE
Problem: Skin Integrity:  Goal: Will show no infection signs and symptoms  Description: Will show no infection signs and symptoms  12/30/2020 1354 by Van Cutler RN  Outcome: Ongoing  Note: Pt educated on risks for impaired skin integrity. Pt assisted in keeping skin clean and dry, assisted and prompted to reposition frequently. No s/s of infection or new breakdown noted this shift. Will continue to monitor and intervene as needed. Problem: Falls - Risk of:  Goal: Will remain free from falls  Description: Will remain free from falls  12/30/2020 1354 by Van Cutler RN  Outcome: Ongoing  Note: Pt educated on and verbalizes understanding of fall risks. Pt wearing nonskid stockings, uses assistive devices appropriately, call light within reach, encouraged to ask for assistance. Pt calls out appropriately this shift. Will continue to monitor and intervene as needed. Problem: Pain:  Goal: Control of acute pain  Description: Control of acute pain  12/30/2020 1354 by Van Cutler RN  Outcome: Ongoing  Note: Pt accepting of prn pain medications. MD increased medications to help get cramping and spastic pain in left shoulder under control. Pt accepting of medications. Non-pharmacological interventions discussed, pt accepting. Will continue to monitor and assist as needed and medicate per MD orders.

## 2020-12-30 NOTE — PROGRESS NOTES
STIMATE 1.5 MG/ML SOLN nasal solution INSTILL 2 SPRAYS INTO THE NOSE TWICE DAILY STARTING TWO DAYS BEFORE PROCEDURE 20   Jeffery Campos MD   amLODIPine (NORVASC) 5 MG tablet Take 5 mg by mouth daily    Historical Provider, MD   QUEtiapine (SEROQUEL) 200 MG tablet Take 200 mg by mouth nightly    Historical Provider, MD   lisinopril (PRINIVIL;ZESTRIL) 20 MG tablet Take 20 mg by mouth daily    Historical Provider, MD   buPROPion (WELLBUTRIN XL) 300 MG XL tablet Take 300 mg by mouth daily 3/5/16   Historical Provider, MD   sertraline (ZOLOFT) 50 MG tablet Take 50 mg by mouth daily 3/5/16   Historical Provider, MD   PROAIR  (90 BASE) MCG/ACT inhaler Inhale 2 puffs into the lungs every 6 hours as needed  14   Historical Provider, MD   lamoTRIgine (LAMICTAL) 200 MG tablet Take 400 mg by mouth daily 2 tabs 14   Historical Provider, MD        Allergies:     Penicillin g, Pcn [penicillins], and Sulfa antibiotics    Social History:     Tobacco:    reports that she has never smoked. She has never used smokeless tobacco.  Alcohol:      reports current alcohol use. Drug Use:  reports no history of drug use. Family History:     Family History   Adopted: Yes   Family history unknown: Yes       Review of Systems:     Positive and Negative as described in HPI. Denies any shortness of breath or cough  Denies chest pain or palpitations  Denies abdominal pain, diarrhea vomiting  Denies any new numbness tremors or weakness. Physical Exam:     /64   Pulse 78   Temp 98.1 °F (36.7 °C) (Oral)   Resp 20   Ht 5' 5\" (1.651 m)   Wt 231 lb 9.6 oz (105.1 kg)   SpO2 97%   BMI 38.54 kg/m²   Temp (24hrs), Av.5 °F (36.9 °C), Min:98.1 °F (36.7 °C), Max:98.8 °F (37.1 °C)      General appearance:  alert, cooperative and no distress  Eyes: Anicteric sclera. Pupils are equally round and reactive to light. Extraocular movements are intact.   Lungs:  clear to auscultation bilaterally, normal effort Heart:  regular rate and rhythm, no murmur  Abdomen:  soft, nontender, nondistended, normal bowel sounds, no masses, hepatomegaly, splenomegaly  Extremities:  no edema, redness, tenderness in the calves  Skin:  no gross lesions, rashes, induration  Neuro:  Alert, oriented X 3, left sided weakness  Power is left arm 0 out of 5 and 1 out of 5 in left lower extremity, increased tone in both left upper and lower extremity  Investigations:      Laboratory Testing:  Lab Results   Component Value Date    WBC 6.8 12/28/2020    HGB 11.8 (L) 12/28/2020    HCT 36.2 12/28/2020    MCV 89.8 12/28/2020     12/28/2020     Lab Results   Component Value Date     12/28/2020    K 4.0 12/28/2020     12/28/2020    CO2 24 12/28/2020    BUN 11 12/28/2020    CREATININE 0.64 12/28/2020    GLUCOSE 86 12/28/2020    CALCIUM 9.9 12/28/2020         Assessment :      Primary Problem  <principal problem not specified>    Active Hospital Problems    Diagnosis Date Noted    Essential hypertension [I10] 12/11/2020    Acute left-sided weakness [R53.1] 12/11/2020    H/O intracranial hemorrhage [Z86.79] 12/09/2020    Vasogenic edema ([address] [G93.6]     Intracranial hemorrhage ([address] [I62.9] 11/30/2020    Bipolar 1 disorder ([address] [F31.9]     Platelet dysfunction ([facility]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 *      1. Intracranial hemorrhage with left-sided weakness. Improving. Repeat CT showed significant improvement   2. Pulmonary hypertension  3. Platelet dysfunction  4. Hypertension. Continue lisinopril 20 mg daily and amlodipine 5 mg . 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 Monitoring platelets   6.  Restless Leg Syndrome: on requip 7. DVT prophylaxis with Lovenox     12/22  No acute complaints   Patient has CT head showed resolution of SAH and intraventricular hemorrhage and decrease in size of right parietal hemorrhage. Blood is controlled after increasing amlodipine     12/24  No acute issues   Repeat CT scan of the head showed significant resolution of the intracranial hemorrhage   Vitals stable. 12/25  No new symptoms   Progressing satisfactory with rehab therapies . 12/26  No acute issues   Feels well   Get occasional spasm but overall improving   Vitals are stable     12/30/2020    Patient tolerating rehabilitative therapies . Progressing fairly well . Continue present therapy . Vitals:    12/29/20 0617 12/29/20 1030 12/29/20 1846 12/30/20 0646   BP: 132/84  (!) 117/56 115/64   Pulse: 79  96 78   Resp: 12 18 20   Temp: 98.1 °F (36.7 °C)  98.8 °F (37.1 °C) 98.1 °F (36.7 °C)   TempSrc: Oral  Oral Oral   SpO2: 98%  100% 97%   Weight:  231 lb 9.6 oz (105.1 kg)     Height:         Significant last 24 hr data reviewed ;   Vitals:    12/29/20 0617 12/29/20 1030 12/29/20 1846 12/30/20 0646   BP: 132/84  (!) 117/56 115/64   Pulse: 79  96 78   Resp: 12 18 20   Temp: 98.1 °F (36.7 °C)  98.8 °F (37.1 °C) 98.1 °F (36.7 °C)   TempSrc: Oral  Oral Oral   SpO2: 98%  100% 97%   Weight:  231 lb 9.6 oz (105.1 kg)     Height:          No results found for this or any previous visit (from the past 24 hour(s)). No results for input(s): POCGLU in the last 72 hours. No results found.             Consultations:   IP CONSULT TO INTERNAL MEDICINE  IP CONSULT TO DIETITIAN  IP CONSULT TO SOCIAL WORK  INPATIENT CONSULT TO ORTHOTIST/PROSTHETIST      gO Plasencia MD  12/30/2020  2:01 PM    Copy sent to Dr. Harsha Haq MD Please note that this chart was generated using voice recognition Dragon dictation software. Although every effort was made to ensure the accuracy of this automated transcription, some errors in transcription may have occurred.

## 2020-12-30 NOTE — PROGRESS NOTES
Physical Medicine & Rehabilitation  Progress Note    12/30/2020 10:44 AM     CC: Ambulatory and ADL dysfunction due to hemorrhagic CVA left nondominant hemiparesis    Subjective:   No Complaints. Feels well. No Significant provement with 2 mg Valium. Notes most discomfort after OT    ROS:  Denies fevers, chills, sweats. No chest pain, palpitations, lightheadedness. Denies coughing, wheezing or shortness of breath. Denies abdominal pain, nausea, diarrhea or constipation. No new areas of joint pain. Denies new areas of numbness or weakness. Denies new anxiety or depression issues. No new skin problems. Rehabilitation:   PT:  Restrictions/Precautions: Fall Risk  Other position/activity restrictions: L hypertonicity. PRAFO boot LLE in bed   Transfers  Sit to Stand: Contact guard assistance  Stand to sit: Contact guard assistance  Bed to Chair: Contact guard assistance  Stand Pivot Transfers: Minimal Assistance  Squat Pivot Transfers: Contact guard assistance  Car Transfer: Minimal Assistance  Comment: Spouse Alfred assisting with his LUE with gait belt during all transfer training preformed. CGA feedback provide by therapist only for training safety purposes.   Ambulation 1  Surface: level tile  Device: Hemiwalker(patient prefered method of walking d/t increased stability provide)  Other Apparatus: Left, Dorsiflex Assist(ankle brace)  Assistance: Minimal assistance, Moderate assistance  Quality of Gait: spasmic/flexor tone on LLE; poor heel strike toe off, lacking normal flexion of knee, cueing to soft lock knee in extension and widen KRISTIN  Gait Deviations: Decreased step length, Decreased step height, Decreased arm swing, Decreased head and trunk rotation, Deviated path, Slow [place]  Distance: 110 feet; Comments: Hemiwalker is patients prefered method of walking d/t increased stability provided. Education on neuro re-ed and functional mobility with least restrictive device in home risk/benefits with good understanding verbalized. patient continues to increase anxiety when normal balance is challenged with ADL's. Lateral/medial supportive brace to reduce risk of eversion of ankle during stance Phase          OT:  ADL  Equipment Provided: Reacher  Feeding: Setup  Grooming: Modified independent (seated, oral care/washing face)  UE Bathing: Setup, Contact guard assistance(intermittent assist/support to LUE)  LE Bathing: Minimal assistance(standing support for elizabeth-area, long handled sponge)  UE Dressing: Minimal assistance, Setup, Verbal cueing, Increased time to complete(assist for adjustments/completion of threading LUE)  LE Dressing:  Moderate assistance, Increased time to complete, Verbal cueing(intermittent assist for threading LLE due to increased tone,)  Toileting: None(did not req use/need at this time)  Additional Comments: gives good effort in all tasks, reacher used as needed, increased tone noted in LLE this date req additional assist for dressing tasks   Instrumental ADL's  Instrumental ADLs: Yes     Balance  Sitting Balance: Modified independent   Standing Balance: Minimal assistance(assist positioning/stabilization for LLE)   Standing Balance  Time: AM: <1min x3, 1-2 min x3  Activity: AM: functional transfers and mobility, ADL tasks  Comment: increased steadiness this date with dynamic functional tasks, VCs for LLE positioning/stabilizing  Functional Mobility  Functional - Mobility Device: Hemiwalker  Activity: Other(4-5 steps in room)  Assist Level: Minimal assistance  Functional Mobility Comments: VCs to widen base of support with mobility to decrease tone in LLE while ambulating, assist to support LUE and block L knee laterally to decrease L foot/ankle inversion     Bed mobility Shower Transfers: Minimal assistance(improvement noted this date)  Shower Transfers Comments: assist x1, VCs and assist for LLE positioning/stabilization, cuing for control, fair/good carryover noted  Wheelchair Bed Transfers  Equipment Used: Other, Wheelchair, Bed(T[place]ka Camp)  Level of Asssistance: Dependent/Total      ST:           Objective:  /64   Pulse 78   Temp 98.1 °F (36.7 °C) (Oral)   Resp 20   Ht 5' 5\" (1.651 m)   Wt 231 lb 9.6 oz (105.1 kg)   SpO2 97%   BMI 38.54 kg/m²  I Body mass index is 38.54 kg/m². I   Wt Readings from Last 1 Encounters:   20 231 lb 9.6 oz (105.1 kg)      Temp (24hrs), Av.5 °F (36.9 °C), Min:98.1 °F (36.7 °C), Max:98.8 °F (37.1 °C)         GEN: well developed, well nourished, no acute distress  HEENT: Normocephalic atraumatic, EOMI, mucous membranes pink and moist  CV: RRR, no murmurs, rubs or gallops  PULM: CTAB, no rales or rhonchi. Respirations WNL and unlabored  ABD: soft, NT, ND, +BS and equal  NEURO: A&O x3. Sensation intact to light touch. MSK: Decreased range of motion left upper lower extremities,, 4/5 right upper and lower extremity, moderate spasticity left upper and lower extremity, tenderness left anterior shoulderlateral pectoral muscle  EXTREMITIES: No calf tenderness to palpation bilaterally. No edema BLEs, left hand curling at  rest  SKIN: warm dry and intact with good turgor  PSYCH: appropriately interactive. Affect WNL.           Medications   Scheduled Meds:   diclofenac sodium  2 g Topical BID    tiZANidine  6 mg Oral Daily    And    tiZANidine  4 mg Oral 2 times per day    sertraline  150 mg Oral Daily    amLODIPine  5 mg Oral Daily    thiamine mononitrate  100 mg Oral Daily    traZODone  25 mg Oral Nightly    gabapentin  300 mg Oral Nightly    lisinopril  20 mg Oral Daily    buPROPion  300 mg Oral Daily    cyanocobalamin  1,000 mcg Intramuscular Weekly    Followed by  [START ON 2/5/2021] cyanocobalamin  1,000 mcg Intramuscular Z27 Days    folic acid  1 mg Oral Daily    lamoTRIgine  400 mg Oral Daily    melatonin  3 mg Oral Nightly    rOPINIRole  1 mg Oral Nightly    sennosides-docusate sodium  2 tablet Oral Daily    polyethylene glycol  17 g Oral Daily    enoxaparin  30 mg Subcutaneous BID     Continuous Infusions:  PRN Meds:.diazePAM, oxyCODONE, acetaminophen, ipratropium-albuterol, magnesium hydroxide, muscle rub, bisacodyl, senna     Diagnostics:     CBC:   Recent Labs     12/28/20  0620   WBC 6.8   RBC 4.03   HGB 11.8*   HCT 36.2   MCV 89.8   RDW 13.7        BMP:   Recent Labs     12/28/20  0620      K 4.0      CO2 24   BUN 11   CREATININE 0.64     BNP: No results for input(s): BNP in the last 72 hours. PT/INR: No results for input(s): PROTIME, INR in the last 72 hours. APTT: No results for input(s): APTT in the last 72 hours. CARDIAC ENZYMES: No results for input(s): CKMB, CKMBINDEX, TROPONINT in the last 72 hours. Invalid input(s): CKTOTAL;3  FASTING LIPID PANEL:  Lab Results   Component Value Date    CHOL 247 (H) 11/30/2020     11/30/2020    TRIG 77 11/30/2020     LIVER PROFILE: No results for input(s): AST, ALT, ALB, BILIDIR, BILITOT, ALKPHOS in the last 72 hours. I/O (24Hr): No intake or output data in the 24 hours ending 12/30/20 1044    Glu last 24 hour  No results for input(s): POCGLU in the last 72 hours. No results for input(s): CLARITYU, COLORU, PHUR, SPECGRAV, PROTEINU, RBCUA, BLOODU, BACTERIA, NITRU, WBCUA, LEUKOCYTESUR, YEAST, GLUCOSEU, BILIRUBINUR in the last 72 hours.       Impression/Plan: 1. Ambulatory and ADL dysfunction secondary to hemorrhagic CVA: Acute rehab effortsPT/OT, PRAFO for le ft lower limb at night. 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 Monitoring platelets  3. History of headacheMedrol taper 12/13  4. Muscle spasm/myoclonus/spasticity: Now off baclofennot tolerate increase. On schedule tizanidine 12/17 - increased dose 12/20 (6mg/6mg/4mg) but noted increased muscle spasms in the afternoon and evening. Dose adjusted 12/23 (now on 6mg/4mg/4mg). .  Patient she feels she is doing better with decrease dosehesitant to increase again. Low-dose Valium with no side effects tolerated well but no improvement will increase to 5 mg discussed with patient  5. Left proximal upper limb pain:  In the area of left proximal biceps/left pec major muscles.-Suspect spasticity, possible tendinitis, possible subluxation though only 1 fingerbreadth. Trial of Voltaren gel . Continue with relaxers, sling when in therapiesdiscussed at length with both patient and  12/20/2020. Reviewed medications, spasticity, sling, etc. x-ray negativesee above,  may benefit from Botox as outpatient  6. PainRoxicodone  7. HTN: amlodipine (increased 12/21 by IM), lisinopril (decreased 12/14 by IM); labetalol discontinued 12/18 by IM  8. ETOH withdrawal/history of abuse: was treated with IV ativan and librium. On I97 and folic acid now  9. Bipolar Disorder: on wellbutrin, zoloft (increased 12/21 to home dose of 150mg daily), lamictal  10.  Restless Leg Syndrome: on requip 11. Insomnia:  On melatonin, gabapentin nightly. Takes trazodone at home - continue low-dose trazodone (started 12/15). 12. Bowel Management: Miralax daily, senokot prn, dulcolax prn. Last bowel movement 12/23 small  13. DVT Prophylaxis:  low molecular weight heparin, SCD's while in bed and HORTENSIA's   14. Internal medicine for medical management       Kelly Calvert. Willian Phipps MD       This note is created with the assistance of a speech recognition program.  While intending to generate a document that actually reflects the content of the visit, the document can still have some errors including those of syntax and sound a like substitutions which may escape proof reading.   In such instances, actual meaning can be extrapolated by contextual diversion

## 2020-12-30 NOTE — PROGRESS NOTES
[address]   ACUTE REHABILITATION OCCUPATIONAL THERAPY  DAILY NOTE    Date: 20  Patient Name: Everlena Shone      Room: 2612/2612-01    MRN: [id number]   : 1970  (48 y.o.)  Gender: female   Referring Practitioner: Nghia Schulz MD  Diagnosis: Intracranial hemmorrhage  Additional Pertinent Hx: 25-year-old female with history of delta storage pool disease, bipolar disorder, alcohol abuse who developed acute left-sided weaknessfound to have intraparenchymal hemorrhage, right parietal lobe hemorrhage and moderate subarachnoid hemorrhage. Pt admitted to rehab unit on 20. Restrictions  Restrictions/Precautions: Fall Risk  Other position/activity restrictions: L hypertonicity. PRAFO boot LLE in bed  Required Braces or Orthoses?: No  Equipment Used:  Other, Wheelchair, [employer])    Subjective  Subjective: \"I wore it all night last night\" pt states in regards to resting hand splint for L hand  Comments: pt cooperative and motivated for therapies  Patient Currently in Pain: Denies  Restrictions/Precautions: Fall Risk  Overall Orientation Status: Within Functional Limits  Patient Observation  Observations: intermittent L side neglect  Pain Assessment  Pain Assessment: 0-10  Pain Level: 1  Pain Type: Acute pain  Pain Location: Arm  Pain Orientation: Left  Pain Descriptors: Aching, Sore    Objective  Cognition  Overall Cognitive Status: WFL  Perception  Overall Perceptual Status: WFL  Unilateral Attention: Cues to attend left visual field;Cues to attend to left side of body;Cues to maintain midline in sitting;Cues to maintain midline in standing(flucuates)  Balance  Sitting Balance: Modified independent   Standing Balance: Minimal assistance(assist positioning/stabilization for LLE)  Bed mobility  Rolling to Right: Contact guard assistance  Supine to Sit: Contact guard assistance  Scooting: Contact guard assistance;Stand by assistance  Transfers Stand Step Transfers: Minimal assistance(using ara walker)  Sit to stand: Minimal assistance  Stand to sit: Minimal assistance  Transfer Comments: pt demo good hand placement, VCs and assist for LLE positioning  Standing Balance  Time: AM: <1min x3, 1-2 min x3; PM: 2 min x2, 2-3 min  Activity: AM: functional transfers and mobility, ADL tasks; PM: functional mobility, toilet transfer using ara walker to increase safety and indep, min assist req with support to LUE, no loss of balance noted, cuing to widen KRISTIN with fair carryover, WB standing task, see below for details  Comment: increased steadiness this date with dynamic functional tasks, VCs for LLE positioning/stabilizing  Functional Mobility  Functional - Mobility Device: Hemiwalker  Activity: Other(4-5 steps in room)  Assist Level: Minimal assistance  Functional Mobility Comments: VCs to widen base of support with mobility to decrease tone in LLE while ambulating, assist to support LUE and block L knee laterally to decrease L foot/ankle inversion  Toilet Transfers  Toilet - Technique: Ambulating(using ara walker)  Equipment Used: Standard toilet  Toilet Transfer: Minimal assistance  Toilet Transfers Comments: assist to support LUE, VCs for positioning with fair/good carryover  Shower Transfers  Shower - Transfer From: Wheelchair  Shower - Transfer Type: To and From  Shower - Transfer To: Shower seat with back  Shower - Technique: Stand pivot; To right; To left  Shower Transfers: Minimal assistance(improvement noted this date)  Shower Transfers Comments: assist x1, VCs and assist for LLE positioning/stabilization, cuing for control, fair/good carryover noted        Weight Bearing  Weight Bearing Technique: Yes  LUE Weight Bearing: Extended arm standing Response To Weight Bearing Technique: standing WB task with LUE, crossing midline to engage LUE during functional task, assist req to reach full extension, elevated surface req for L hand to decrease pain/stretch to wrist, good tolerance noted this date, contact guard req in standing        Weight Bearing  Weight Bearing Technique: Yes  LUE Weight Bearing: Extended arm standing  Response To Weight Bearing Technique: standing WB task with LUE, crossing midline to engage LUE during functional task, assist req to reach full extension, elevated surface req for L hand to decrease pain/stretch to wrist, good tolerance noted this date, contact guard req in standing           ADL  Equipment Provided: Reacher  Feeding: Setup  Grooming: Modified independent (seated, oral care/washing face)  UE Bathing: Setup;Contact guard assistance(intermittent assist/support to LUE)  LE Bathing: Minimal assistance(standing support for elizbaeth-area, long handled sponge)  UE Dressing: Minimal assistance;Setup;Verbal cueing; Increased time to complete(assist for adjustments/completion of threading LUE)  LE Dressing: Moderate assistance; Increased time to complete;Verbal cueing(intermittent assist for threading LLE due to increased tone, shoe/TEDs/pants)  Toileting: Minimal assistance(min A in standing, assist clothing on L hip)  Additional Comments: gives good effort in all tasks, reacher used as needed, increased tone noted in LLE this date req additional assist for dressing tasks          Assessment  Performance deficits / Impairments: Decreased functional mobility ; Decreased ADL status; Decreased ROM; Decreased strength;Decreased endurance;Decreased balance;Decreased high-level IADLs;Decreased fine motor control;Decreased coordination;Decreased posture;Decreased safe awareness;Decreased vision/visual deficit  Prognosis: Good  Discharge Recommendations: Home with assist PRN;Patient would benefit from continued therapy after discharge Activity Tolerance: Patient Tolerated treatment well  Safety Devices in place: Yes  Type of devices: Left in chair;Call light within reach  Equipment Recommendations  Equipment Needed: Yes  Hand Held Shower: x  Transfer Tub Bench: x  Grab bars: x  Reacher: x  Long-handled Shoehorn: x       Patient Education: transfer safety, LLE positioning, ADL tasks/ara tech, adaptive equipment   Patient Goals   Patient goals : To discharge home with family support  Learner:patient  Method: demonstration and explanation       Outcome: verbalized concerns, demonstrated understanding, needs reinforcement and asked questions        Plan  Plan  Times per week: 5-7  Times per day: Twice a day  Current Treatment Recommendations: Self-Care / ADL, Home Management Training, Strengthening, Balance Training, Functional Mobility Training, Endurance Training, Wheelchair Mobility Training, Neuromuscular Re-education, Pain Management, Safety Education & Training, Patient/Caregiver Education & Training, Equipment Evaluation, Education, & procurement, Cognitive/Perceptual Training  Patient Goals   Patient goals : To discharge home with family support  Short term goals  Time Frame for Short term goals: 7 to 10 days  Short term goal 1: Pt will perform upper body bathing/dressing with stand by assist.  Short term goal 2: Pt will perform lower body bathing and dressing with Moderate assist and Fair safety. Short term goal 3: Pt will perform toileting tasks with Moderate assist.  Short term goal 4: Pt will verbalize/demonstrate Good understanding of assistive equipment/durable medical equipment/modified techniques for increased independence with self-care and mobility. Short term goal 5: Pt will perform functional transfers with Moderate assist to toilet/wheelchair/shower with Fair safety.

## 2020-12-30 NOTE — PROGRESS NOTES
Physical Therapy  Facility/Department: [facility] ACUTE REHAB  Daily Treatment Note  NAME: Juan Boudreaux  : 1970  MRN: [id number]    Date of Service: 2020    Discharge Recommendations:  Patient would benefit from continued therapy after discharge, Home with assist PRN        Assessment      Activity Tolerance  Activity Tolerance: Patient Tolerated treatment well     Patient Diagnosis(es): There were no encounter diagnoses. has a past medical history of Asthma, Bipolar 1 disorder ([email or web address] Delta storage pool disease [facility]), Hypertension, and Psychiatric problem. has a past surgical history that includes  section (4998,4204); Gastric bypass surgery (); Tonsillectomy and adenoidectomy (); Cholecystectomy (); knee surgery (); Hysterectomy (); laparoscopy (); Colonoscopy (N/A, 2/3/2020); and Upper gastrointestinal endoscopy (N/A, 2/3/2020). Restrictions  Restrictions/Precautions  Restrictions/Precautions: Fall Risk  Required Braces or Orthoses?: No  Position Activity Restriction  Other position/activity restrictions: L hypertonicity.  PRAFO boot LLE in bed  Subjective   General  Family / Caregiver Present: No  Subjective  Subjective: patient requesting to focus training on side stepping to preform mobility improvement to toilet/tub  Pain Screening  Patient Currently in Pain: Yes  Pain Assessment  Pain Frequency: Intermittent  Clinical Progression: Not changed  Vital Signs  Patient Currently in Pain: Yes       Orientation     Cognition      Objective   Bed mobility  Bridging: Contact guard assistance  Rolling to Left: Contact guard assistance  Rolling to Right: Contact guard assistance  Supine to Sit: Contact guard assistance  Sit to Supine: Contact guard assistance  Scooting: Contact guard assistance  Transfers  Sit to Stand: Contact guard assistance  Stand to sit: Contact guard assistance  Bed to Chair: Contact guard assistance  Stand Pivot Transfers: Minimal Assistance Squat Pivot Transfers: Contact guard assistance  Ambulation 1  Surface: level tile  Device: Hemiwalker(patient prefered method of walking d/t increased stability provide)  Other Apparatus: Left;Dorsiflex Assist(ankle brace)  Quality of Gait: spasmic/flexor tone on LLE; poor heel strike toe off, lacking normal flexion of knee, cueing to soft lock knee in extension and widen KRISTIN  Gait Deviations: Decreased step length;Decreased step height;Decreased arm swing;Decreased head and trunk rotation;Deviated path; Slow [place]  Distance: 110 feet;  Comments: Hemiwalker is patients prefered method of walking d/t increased stability provided. Education on neuro re-ed and functional mobility with least restrictive device in home risk/benefits with good understanding verbalized. patient continues to increase anxiety when normal balance is challenged with ADL's. Lateral/medial supportive brace to reduce risk of eversion of ankle during stance Phase  Ambulation 2  Device 2: Zimmerman rail  Other Apparatus 2: Left;Dorsiflex Assist  Assistance 2: Minimal assistance;Contact guard assistance  Quality of Gait 2: improved step through  Gait Deviations: Increased KRISTIN; Decreased step length;Decreased step height  Distance: 20 feet x 3 forward and retro gait  Comments: Foward/retro/lateral stepping with tactile feedback and vc's for technique  Stairs  # Steps : 9  Stairs Height: 8\"  Rails: Right ascending  Assistance: Minimal assistance  Comment: Ascends forward, descends retro. ascending with LLE for WB placement to prevent mare/crossing of LLE in swing phase. patient able place LLE unassisted. Retro step down with RLE to maintain WB LLE to prevent scissoring and unassisted LLE foot placement during sequencing. family training spouse present for education and need for assist needs further training to for safety.   Propulsion 1  Propulsion: Manual  Level: Level Tile  Method: RLE;RUE;LLE Level of Assistance: Contact guard assistance;Stand by assistance  Description/ Details: poor safety awareness with left side neglect, running into objects on left side  Distance: 80 feet        Other exercises  Other exercises 1: Neuro rehab with WB unilateral, bilateral, cocontract  Other exercises 2: Sit to stands and lateral scooting on mat 20x                        G-Code     OutComes Score                                                     AM-PAC Score             Goals  Short term goals  Time Frame for Short term goals: 10 days  Short term goal 1: pt will perform bed mobility with min A  Short term goal 2: pt will dangle EOB/EOM for 20 to 25 minutes with SBA, performing challenged seated balance/corestrengthening  Short term goal 3: Pivot transfers with mod A+2  Short term goal 4: WC mobility x 100' with min A  Short term goal 5: progress to standing/pre-gait activities in // bars to facilitate L LE motor fucntion. Long term goals  Time Frame for Long term goals : By LOS  Long term goal 1: Pt able to perform bed mobility independently  Long term goal 2: Pt able to perform transfers at [place] [id number] with verbal cues for LE positioning and safety  Long term goal 3: Pt able to progress to ambulation with appropriate device/L LE bracing,  dist of 50 to 100 ft,  CGA   Long term goal 4: Pt able to propel w/c level surfaces dist of 150 ft , mod-I. Long term goal 5: Improve L LE strength to atleast 2 to 2+/5 to improve fucntion. Long term goal 6: Improve PASS score from 8/30 to 23/36 to improve overall fucntion. Long term goal 7: Pt able to go up and down flight of steps with rail/Assistive device,  mod A./min A  Long term goal 8: Family training for safe fucntional mobility, including stari training. Patient Goals   Patient goals : to regain use of L arm and L leg    Plan    Plan  Times per week: 1.5hr/day, 5 to 7 days/week.   Times per day: Daily Current Treatment Recommendations: Strengthening, ROM, Balance Training, Functional Mobility Training, Transfer Training, Wheelchair Mobility Training, Gait Training, Neuromuscular Re-education, Pain Management, Home Exercise Program, Safety Education & Training, Patient/Caregiver Education & Training, Positioning, Endurance Training, Stair training  Safety Devices  Type of devices: Gait belt  Restraints  Initially in place: No     Therapy Time     12/30/20 1019 12/30/20 1411   PT Individual Minutes   Time In [id number]   Time Out [id number]   AGVFSEZ 00 25     Ngoc Callahan, PTA

## 2020-12-31 PROCEDURE — 99231 SBSQ HOSP IP/OBS SF/LOW 25: CPT | Performed by: INTERNAL MEDICINE

## 2020-12-31 PROCEDURE — 1180000000 HC REHAB R&B

## 2020-12-31 PROCEDURE — 6370000000 HC RX 637 (ALT 250 FOR IP): Performed by: STUDENT IN AN ORGANIZED HEALTH CARE EDUCATION/TRAINING PROGRAM

## 2020-12-31 PROCEDURE — 6370000000 HC RX 637 (ALT 250 FOR IP): Performed by: PHYSICAL MEDICINE & REHABILITATION

## 2020-12-31 PROCEDURE — 97112 NEUROMUSCULAR REEDUCATION: CPT

## 2020-12-31 PROCEDURE — 99232 SBSQ HOSP IP/OBS MODERATE 35: CPT | Performed by: PHYSICAL MEDICINE & REHABILITATION

## 2020-12-31 PROCEDURE — 6370000000 HC RX 637 (ALT 250 FOR IP): Performed by: INTERNAL MEDICINE

## 2020-12-31 PROCEDURE — 97116 GAIT TRAINING THERAPY: CPT

## 2020-12-31 PROCEDURE — 97110 THERAPEUTIC EXERCISES: CPT

## 2020-12-31 PROCEDURE — 97530 THERAPEUTIC ACTIVITIES: CPT

## 2020-12-31 PROCEDURE — 6360000002 HC RX W HCPCS: Performed by: PHYSICAL MEDICINE & REHABILITATION

## 2020-12-31 PROCEDURE — 6370000000 HC RX 637 (ALT 250 FOR IP): Performed by: NURSE PRACTITIONER

## 2020-12-31 PROCEDURE — 97542 WHEELCHAIR MNGMENT TRAINING: CPT

## 2020-12-31 PROCEDURE — 97535 SELF CARE MNGMENT TRAINING: CPT

## 2020-12-31 RX ADMIN — LAMOTRIGINE 400 MG: 100 TABLET ORAL at 10:16

## 2020-12-31 RX ADMIN — BUPROPION HYDROCHLORIDE 300 MG: 300 TABLET, FILM COATED, EXTENDED RELEASE ORAL at 10:16

## 2020-12-31 RX ADMIN — DICLOFENAC 2 G: 10 GEL TOPICAL at 21:29

## 2020-12-31 RX ADMIN — ENOXAPARIN SODIUM 30 MG: 30 INJECTION SUBCUTANEOUS at 21:28

## 2020-12-31 RX ADMIN — TIZANIDINE 4 MG: 4 TABLET ORAL at 21:28

## 2020-12-31 RX ADMIN — TRAZODONE HYDROCHLORIDE 25 MG: 50 TABLET ORAL at 21:28

## 2020-12-31 RX ADMIN — ROPINIROLE HYDROCHLORIDE 1 MG: 1 TABLET, FILM COATED ORAL at 21:28

## 2020-12-31 RX ADMIN — THIAMINE HCL TAB 100 MG 100 MG: 100 TAB at 10:14

## 2020-12-31 RX ADMIN — FOLIC ACID 1 MG: 1 TABLET ORAL at 10:14

## 2020-12-31 RX ADMIN — LISINOPRIL 20 MG: 20 TABLET ORAL at 10:14

## 2020-12-31 RX ADMIN — TIZANIDINE 6 MG: 4 TABLET ORAL at 10:13

## 2020-12-31 RX ADMIN — Medication 3 MG: at 21:28

## 2020-12-31 RX ADMIN — DIAZEPAM 5 MG: 5 TABLET ORAL at 18:14

## 2020-12-31 RX ADMIN — OXYCODONE HYDROCHLORIDE 5 MG: 5 TABLET ORAL at 01:26

## 2020-12-31 RX ADMIN — GABAPENTIN 300 MG: 300 CAPSULE ORAL at 21:28

## 2020-12-31 RX ADMIN — SERTRALINE HYDROCHLORIDE 150 MG: 100 TABLET ORAL at 10:14

## 2020-12-31 RX ADMIN — DICLOFENAC 2 G: 10 GEL TOPICAL at 10:14

## 2020-12-31 RX ADMIN — ENOXAPARIN SODIUM 30 MG: 30 INJECTION SUBCUTANEOUS at 10:14

## 2020-12-31 RX ADMIN — AMLODIPINE BESYLATE 5 MG: 5 TABLET ORAL at 10:14

## 2020-12-31 RX ADMIN — TIZANIDINE 4 MG: 4 TABLET ORAL at 14:46

## 2020-12-31 RX ADMIN — SENNOSIDES AND DOCUSATE SODIUM 2 TABLET: 8.6; 5 TABLET ORAL at 10:13

## 2020-12-31 ASSESSMENT — PAIN DESCRIPTION - ORIENTATION: ORIENTATION: LEFT

## 2020-12-31 ASSESSMENT — PAIN SCALES - GENERAL
PAINLEVEL_OUTOF10: 5
PAINLEVEL_OUTOF10: 7

## 2020-12-31 NOTE — PLAN OF CARE
Problem: Skin Integrity:  Goal: Will show no infection signs and symptoms  Description: Will show no infection signs and symptoms  12/31/2020 0243 by Rosalind Gonsales RN  Outcome: Ongoing     Problem: Skin Integrity:  Goal: Absence of new skin breakdown  Description: Absence of new skin breakdown  Outcome: Ongoing     Problem: Falls - Risk of:  Goal: Will remain free from falls  Description: Will remain free from falls  12/31/2020 0243 by Rosalind Gonsales RN  Outcome: Ongoing     Problem: Falls - Risk of:  Goal: Absence of physical injury  Description: Absence of physical injury  Outcome: Ongoing     Problem: Pain:  Goal: Pain level will decrease  Description: Pain level will decrease  Outcome: Ongoing     Problem: Pain:  Goal: Control of acute pain  Description: Control of acute pain  12/31/2020 0243 by Rosalind Gonsales RN  Outcome: Ongoing     Problem: Mobility - Impaired:  Goal: Mobility will improve  Description: Mobility will improve  Outcome: Ongoing

## 2020-12-31 NOTE — CARE COORDINATION
Social work: pt spouse will plan to be home from work the first 5 days. Then sister may come and stay for a time. Pt is still facing possible brain surgery in the future. Discussed [facility] called them [phone] and faxed updates and demographics to fax [phone]. Pt states she thinks susan ordered her a ara walker and a wheelchair. She would like to cancel the wheelchair as her spouse found one they can borrow. Cell for spouse on face sheet. He is willing to go over discharge instructions and has the day of discharge off. Call if he should come in that day for training. Pt was showing him how to apply her arm brace.   Radha lees

## 2020-12-31 NOTE — PROGRESS NOTES
Patient asked to reeval left lower extremity due to some discoloration. He notes some redness after therapies and on sitting. Improved with laying down and leg elevation. .  Leg examinedno change, mild dependent rubor while sitting, pulses intact. Advised patient to elevate legs. We will continue neuro vascular checks.   Discussed with patient

## 2020-12-31 NOTE — PROGRESS NOTES
[facility] Internal Medicine    CONSULTATION / HISTORY AND PHYSICAL EXAMINATION            Date:   2020  Patient name:  Alexandru White  Date of admission:  2020  8:30 PM  MRN:   [id number]  Account:  [de-identified]  YOB: 1970  PCP:    Shannon Rees MD  Room:   [facility]2612-  Code Status:    Full Code    Physician Requesting Consult: Angie Soria MD    Reason for Consult: Medical management    Chief Complaint:   Acute left sided weakness     History Obtained From:     patient, electronic medical record    History of Present Illness/ Interval History:   40-year-old female with history of delta storage pool disease, bipolar disorder, alcohol abuse who developed acute left-sided weaknessfound to have intraparenchymal hemorrhage, right parietal lobe hemorrhage and moderate subarachnoid hemorrhage. Pt admitted to rehab unit on 20. Improving with PT/OT     2020    · Patient stable  · No new symptoms 1. Past Medical History:     Past Medical History:   Diagnosis Date    Asthma     Bipolar 1 disorder ([facility] [address]     Delta storage pool disease ([facility] [address]     Hypertension     Psychiatric problem         Past Surgical History:     Past Surgical History:   Procedure Laterality Date     SECTION  5474,4511    Miscarriage     CHOLECYSTECTOMY      COLONOSCOPY N/A 2/3/2020    -random bx(normal)hemorrhoids    GASTRIC BYPASS SURGERY      HYSTERECTOMY      KNEE SURGERY      LAPAROSCOPY      TONSILLECTOMY AND ADENOIDECTOMY      UPPER GASTROINTESTINAL ENDOSCOPY N/A 2/3/2020    (normal,neg H-Pylori)normal post-bypass endoscopy        Medications Prior to Admission:     Prior to Admission medications    Medication Sig Start Date End Date Taking?  Authorizing Provider STIMATE 1.5 MG/ML SOLN nasal solution INSTILL 2 SPRAYS INTO THE NOSE TWICE DAILY STARTING TWO DAYS BEFORE PROCEDURE 20   Katharina Campos MD   amLODIPine (NORVASC) 5 MG tablet Take 5 mg by mouth daily    Historical Provider, MD   QUEtiapine (SEROQUEL) 200 MG tablet Take 200 mg by mouth nightly    Historical Provider, MD   lisinopril (PRINIVIL;ZESTRIL) 20 MG tablet Take 20 mg by mouth daily    Historical Provider, MD   buPROPion (WELLBUTRIN XL) 300 MG XL tablet Take 300 mg by mouth daily 3/5/16   Historical Provider, MD   sertraline (ZOLOFT) 50 MG tablet Take 50 mg by mouth daily 3/5/16   Historical Provider, MD   PROAIR  (90 BASE) MCG/ACT inhaler Inhale 2 puffs into the lungs every 6 hours as needed  14   Historical Provider, MD   lamoTRIgine (LAMICTAL) 200 MG tablet Take 400 mg by mouth daily 2 tabs 14   Historical Provider, MD        Allergies:     Penicillin g, Pcn [penicillins], and Sulfa antibiotics    Social History:     Tobacco:    reports that she has never smoked. She has never used smokeless tobacco.  Alcohol:      reports current alcohol use. Drug Use:  reports no history of drug use. Family History:     Family History   Adopted: Yes   Family history unknown: Yes       Review of Systems:     Positive and Negative as described in HPI. Denies any shortness of breath or cough  Denies chest pain or palpitations  Denies abdominal pain, diarrhea vomiting  Denies any new numbness tremors or weakness. Physical Exam:     /67   Pulse 97   Temp 97.7 °F (36.5 °C) (Oral)   Resp 16   Ht 5' 5\" (1.651 m)   Wt 231 lb 9.6 oz (105.1 kg)   SpO2 99%   BMI 38.54 kg/m²   Temp (24hrs), Av °F (36.7 °C), Min:97.7 °F (36.5 °C), Max:98.2 °F (36.8 °C)      General appearance:  alert, cooperative and no distress  Eyes: Anicteric sclera. Pupils are equally round and reactive to light. Extraocular movements are intact.   Lungs:  clear to auscultation bilaterally, normal effort Heart:  regular rate and rhythm, no murmur  Abdomen:  soft, nontender, nondistended, normal bowel sounds, no masses, hepatomegaly, splenomegaly  Extremities:  no edema, redness, tenderness in the calves  Skin:  no gross lesions, rashes, induration  Neuro:  Alert, oriented X 3, left sided weakness  Power is left arm 0 out of 5 and 1 out of 5 in left lower extremity, increased tone in both left upper and lower extremity  Investigations:      Laboratory Testing:  Lab Results   Component Value Date    WBC 6.8 12/28/2020    HGB 11.8 (L) 12/28/2020    HCT 36.2 12/28/2020    MCV 89.8 12/28/2020     12/28/2020     Lab Results   Component Value Date     12/28/2020    K 4.0 12/28/2020     12/28/2020    CO2 24 12/28/2020    BUN 11 12/28/2020    CREATININE 0.64 12/28/2020    GLUCOSE 86 12/28/2020    CALCIUM 9.9 12/28/2020         Assessment :      Primary Problem  <principal problem not specified>    Active Hospital Problems    Diagnosis Date Noted    Essential hypertension [I10] 12/11/2020    Acute left-sided weakness [R53.1] 12/11/2020    H/O intracranial hemorrhage [Z86.79] 12/09/2020    Vasogenic edema ([address] [G93.6]     Intracranial hemorrhage ([address] [I62.9] 11/30/2020    Bipolar 1 disorder ([address] [F31.9]     Platelet dysfunction ([facility] [address]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 *      1. Intracranial hemorrhage with left-sided weakness. Improving. Repeat CT showed significant improvement   2. Pulmonary hypertension  3. Platelet dysfunction  4. Hypertension. Continue lisinopril 20 mg daily and amlodipine 5 mg . 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 Monitoring platelets   6.  Restless Leg Syndrome: on requip 7. DVT prophylaxis with Lovenox     12/22  No acute complaints   Patient has CT head showed resolution of SAH and intraventricular hemorrhage and decrease in size of right parietal hemorrhage. Blood is controlled after increasing amlodipine     12/24  No acute issues   Repeat CT scan of the head showed significant resolution of the intracranial hemorrhage   Vitals stable. 12/25  No new symptoms   Progressing satisfactory with rehab therapies . 12/26  No acute issues   Feels well   Get occasional spasm but overall improving   Vitals are stable     12/31/2020    Patient tolerating rehabilitative therapies . Progressing fairly well . Continue present therapy . Vitals:    12/29/20 1846 12/30/20 0646 12/30/20 1946 12/31/20 0734   BP: (!) 117/56 115/64 137/70 132/67   Pulse: 96 78 81 97   Resp: 18 20 20 16   Temp: 98.8 °F (37.1 °C) 98.1 °F (36.7 °C) 98.2 °F (36.8 °C) 97.7 °F (36.5 °C)   TempSrc: Oral Oral Oral Oral   SpO2: 100% 97% 99% 99%   Weight:       Height:         Significant last 24 hr data reviewed ;   Vitals:    12/29/20 1846 12/30/20 0646 12/30/20 1946 12/31/20 0734   BP: (!) 117/56 115/64 137/70 132/67   Pulse: 96 78 81 97   Resp: 18 20 20 16   Temp: 98.8 °F (37.1 °C) 98.1 °F (36.7 °C) 98.2 °F (36.8 °C) 97.7 °F (36.5 °C)   TempSrc: Oral Oral Oral Oral   SpO2: 100% 97% 99% 99%   Weight:       Height:          No results found for this or any previous visit (from the past 24 hour(s)). No results for input(s): POCGLU in the last 72 hours. No results found.             Consultations:   IP CONSULT TO INTERNAL MEDICINE  IP CONSULT TO DIETITIAN  IP CONSULT TO SOCIAL WORK  INPATIENT CONSULT TO ORTHOTIST/PROSTHETIST      Corinne Mantilla MD  12/31/2020  3:32 PM    Copy sent to Dr. Rocky Valadez MD

## 2020-12-31 NOTE — PROGRESS NOTES
[address]   ACUTE REHABILITATION OCCUPATIONAL THERAPY  DAILY NOTE    Date: 20  Patient Name: Chito Greenwood      Room: 2612/2612-01    MRN: [id number]   : 1970  (48 y.o.)  Gender: female   Referring Practitioner: Win De La Cruz MD  Diagnosis: Intracranial hemmorrhage  Additional Pertinent Hx: 51-year-old female with history of delta storage pool disease, bipolar disorder, alcohol abuse who developed acute left-sided weaknessfound to have intraparenchymal hemorrhage, right parietal lobe hemorrhage and moderate subarachnoid hemorrhage. Pt admitted to rehab unit on 20. Restrictions  Restrictions/Precautions: Fall Risk  Other position/activity restrictions: L hypertonicity. PRAFO boot LLE in bed  Required Braces or Orthoses?: No  Equipment Used:  Other, Wheelchair, [employer])    Subjective  Subjective: \"I think I do so much better with walking than transferring\" pt states in regards to using ara walker to assist with ambulation for using toilet versus transfers from w/c to toilet  Comments: pt cooperative and motivated for therapies  Patient Currently in Pain: Denies  Restrictions/Precautions: Fall Risk  Overall Orientation Status: Within Functional Limits  Patient Observation  Observations: intermittent L side neglect, improving but req VCs at times  Pain Assessment  Pain Assessment: 0-10  Pain Level: 1  Pain Type: Acute pain  Pain Location: Arm  Pain Orientation: Left  Pain Descriptors: Aching, Sore    Objective  Cognition  Overall Cognitive Status: WFL  Safety Judgement: Decreased awareness of need for safety  Perception  Overall Perceptual Status: WFL  Unilateral Attention: Cues to attend left visual field;Cues to attend to left side of body;Cues to maintain midline in sitting;Cues to maintain midline in standing(flucuates, improving)  Balance  Sitting Balance: Modified independent Standing Balance: Minimal assistance(min assist for dynamic tasks, contact guard for static, support to LUE)  Bed mobility  Rolling to Right: Contact guard assistance  Supine to Sit: Contact guard assistance;Stand by assistance  Scooting: Contact guard assistance;Stand by assistance  Transfers  Stand Step Transfers: Minimal assistance(using ara walker)  Sit to stand: Minimal assistance  Stand to sit: Minimal assistance;Contact guard assistance  Transfer Comments: pt demo good hand placement, VCs and assist for LLE positioning  Standing Balance  Time: AM: 1-2 min x5; PM: 2-3 min x2, 1 min  Activity: AM: functional mobility/transfers, ADL tasks; PM: functional mobility, mock tub transfer, standing in shower to simulate washing, see below for details  Comment: increased steadiness this date with dynamic functional tasks, VCs for LLE positioning/stabilizing, support to LUE if unable to place on sink during tasks  Functional Mobility  Functional - Mobility Device: Hemiwalker  Activity: To/from bathroom; Other  Assist Level: Minimal assistance  Functional Mobility Comments: VCs to widen base of support with mobility to decrease tone in LLE while ambulating, assist to support LUE and block L knee laterally to decrease L foot/ankle inversion  Toilet Transfers  Toilet - Technique: Ambulating(using ara walker)  Equipment Used: Standard toilet  Toilet Transfer: Minimal assistance  Toilet Transfers Comments: assist to support LUE, VCs for positioning with good carryover  Tub Transfers  Tub - Transfer From: Walker(ara walker)  Tub - Transfer Type: To and From  Tub - Transfer To:  Transfer tub bench  Tub - Technique: Ambulating  Tub Transfers: Minimal assistance;Verbal cues Tub Transfers Comments: pt and spouse completed mock tub transfer with transfer tub bench, pt ambulating with frank walker, min assist req, LUE supported by sling, VCs req for positioning and proper tech, assist req to lift LLE over edge of tub, pt stood with spouse assist to simulate washing elizabeth-area, min assist req with support to LUE, no loss of balance noted                             ADL  Equipment Provided: Reacher  Feeding: Setup  Grooming: Modified independent (seated)  UE Bathing: Setup;Contact guard assistance(assist to support LUE)  LE Bathing: Minimal assistance(standing support for elizabeth-area, declines feet this date)  UE Dressing: Setup;Verbal cueing; Increased time to complete;Contact guard assistance(intermittent assist for adjustments, declines to change bra)  LE Dressing: Moderate assistance; Increased time to complete;Verbal cueing(intermittent threading LLE/over L hip, shoes, TEDs, brace)  Toileting: Minimal assistance(standing support and support to LUE)  Additional Comments: gives good effort in all tasks, reacher used as needed          Assessment  Performance deficits / Impairments: Decreased functional mobility ; Decreased ADL status; Decreased ROM; Decreased strength;Decreased endurance;Decreased balance;Decreased high-level IADLs;Decreased fine motor control;Decreased coordination;Decreased posture;Decreased safe awareness;Decreased vision/visual deficit  Prognosis: Good  Discharge Recommendations: Home with assist PRN;Patient would benefit from continued therapy after discharge  Activity Tolerance: Patient Tolerated treatment well  Safety Devices in place: Yes  Type of devices: Left in chair;Call light within reach  Equipment Recommendations  Equipment Needed: Yes  Walker: Frank-walker  Hand Held Shower: x  Transfer Tub Bench: x  Grab bars: x  Reacher: x  Long-handled Shoehorn: x Patient Education: transfer safety, ADL tasks/ara tech, LLE positioning/weight shifting, durable medical equipment, adaptive equipment   Patient Goals   Patient goals : To discharge home with family support  Learner:patient and significant other  Method: demonstration and explanation       Outcome: verbalized concerns, demonstrated understanding, needs reinforcement and asked questions        Plan  Plan  Times per week: 5-7  Times per day: Twice a day  Current Treatment Recommendations: Self-Care / ADL, Home Management Training, Strengthening, Balance Training, Functional Mobility Training, Endurance Training, Wheelchair Mobility Training, Neuromuscular Re-education, Pain Management, Safety Education & Training, Patient/Caregiver Education & Training, Equipment Evaluation, Education, & procurement, Cognitive/Perceptual Training  Patient Goals   Patient goals : To discharge home with family support  Short term goals  Time Frame for Short term goals: 7 to 10 days  Short term goal 1: Pt will perform upper body bathing/dressing with stand by assist.  Short term goal 2: Pt will perform lower body bathing and dressing with Moderate assist and Fair safety. Short term goal 3: Pt will perform toileting tasks with Moderate assist.  Short term goal 4: Pt will verbalize/demonstrate Good understanding of assistive equipment/durable medical equipment/modified techniques for increased independence with self-care and mobility. Short term goal 5: Pt will perform functional transfers with Moderate assist to toilet/wheelchair/shower with Fair safety. Short term goal 6: Pt will actively participate in 30+ minutes of therapeutic exercise/functional activities to promote increased independence with self-care and mobility. Long term goals  Time Frame for Long term goals : By discharge  Long term goal 1: Pt will perform BADLs with modified independence and Good safety with assist PRN for HORTENSIA hose. Long term goal 2: Pt will perform functional transfers and mobility with modified independence, least restrictive mobility device, and Good safety. Long term goal 3: Pt will attend to L side of body 100% of the time during self-care, transfers, and mobility with 1-2 verbal cues PRN. Long term goal 4: Pt will stand for 5+ minutes with 1-2 UE support, modified independence and no loss of balance while engaging in functional activity of choice. Long term goal 5: Pt will verbalize/demonstrate Good understanding of home exercise program for LUE.   Long term goals 6: 9 hole peg, box and block to be assessed for LUE as appropriate        12/31/20 0737 12/31/20 1340   OT Individual Minutes   Time In 4135 1340   Time Out 0840 1415   Minutes 63 35     Electronically signed by JEAN CARLOS Walter on 12/31/20 at 3:21 PM EST
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
## 2020-12-31 NOTE — PLAN OF CARE
Problem: Skin Integrity:  Goal: Will show no infection signs and symptoms  Description: Will show no infection signs and symptoms  12/31/2020 1548 by Henok Padilla RN  Outcome: Ongoing  Note: Pt educated on risks for impaired skin integrity. Pt assisted in keeping skin clean and dry, assisted and prompted to reposition frequently. No s/s of infection or new breakdown noted this shift. Will continue to monitor and intervene as needed. Problem: Falls - Risk of:  Goal: Will remain free from falls  Description: Will remain free from falls  12/31/2020 1548 by Henok Padilla RN  Outcome: Ongoing  Note: Pt educated on and verbalizes understanding of fall risks. Pt wearing nonskid stockings, uses assistive devices appropriately, call light within reach, encouraged to ask for assistance. Pt calls out appropriately this shift. Will continue to monitor and intervene as needed. Problem: Pain:  Goal: Control of acute pain  Description: Control of acute pain  12/31/2020 1548 by Henok Padilla RN  Outcome: Ongoing  Note: Pt denies having any pain this shift. Pt has declined the need for any prn medications this shift. Non-pharmacological interventions discussed, pt accepting. Will continue to monitor and assist as needed.

## 2020-12-31 NOTE — PROGRESS NOTES
Physical Medicine & Rehabilitation  Progress Note    12/31/2020 3:18 PM     CC: Ambulatory and ADL dysfunction due to hemorrhagic CVA left nondominant hemiparesis    Subjective:   No Complaints. Feels well. Patient and therapist note improvement with increase in Valium in therapies. Patient feels well. ROS:  Denies fevers, chills, sweats. No chest pain, palpitations, lightheadedness. Denies coughing, wheezing or shortness of breath. Denies abdominal pain, nausea, diarrhea or constipation. No new areas of joint pain. Denies new areas of numbness or weakness. Denies new anxiety or depression issues. No new skin problems. Rehabilitation:   PT:  Restrictions/Precautions: Fall Risk  Other position/activity restrictions: L hypertonicity. PRAFO boot LLE in bed   Transfers  Sit to Stand: Contact guard assistance  Stand to sit: Contact guard assistance  Bed to Chair: Contact guard assistance  Stand Pivot Transfers: Minimal Assistance  Squat Pivot Transfers: Contact guard assistance  Car Transfer: Minimal Assistance  Comment: Spouse Alfred assisting with his LUE with gait belt during all transfer training preformed. CGA feedback provide by therapist only for training safety purposes.   Ambulation 1  Surface: level tile  Device: Hemiwalker(patient prefered method of walking d/t increased stability provide)  Other Apparatus: Left, Dorsiflex Assist(ankle brace)  Assistance: Minimal assistance, Moderate assistance  Quality of Gait: spasmic/flexor tone on LLE; poor heel strike toe off, lacking normal flexion of knee, cueing to soft lock knee in extension and widen KRISTIN  Gait Deviations: Decreased step length, Decreased step height, Decreased arm swing, Decreased head and trunk rotation, Deviated path, Slow Meseret  Distance: 110 feet; Comments: Hemiwalker is patients prefered method of walking d/t increased stability provided. Education on neuro re-ed and functional mobility with least restrictive device in home risk/benefits with good understanding verbalized. patient continues to increase anxiety when normal balance is challenged with ADL's. Lateral/medial supportive brace to reduce risk of eversion of ankle during stance Phase          OT:  ADL  Equipment Provided: Reacher  Feeding: Setup  Grooming: Modified independent (seated)  UE Bathing: Setup, Contact guard assistance(assist to support LUE)  LE Bathing: Minimal assistance(standing support for elizabeth-area, declines feet this date)  UE Dressing: Setup, Verbal cueing, Increased time to complete, Contact guard assistance(intermittent assist for adjustments, declines to change bra)  LE Dressing:  Moderate assistance, Increased time to complete, Verbal cueing(intermittent threading LLE/over L hip, shoes, TEDs, brace)  Toileting: Minimal assistance(standing support and support to LUE)  Additional Comments: gives good effort in all tasks, reacher used as needed   Instrumental ADL's  Instrumental ADLs: Yes     Balance  Sitting Balance: Modified independent   Standing Balance: Minimal assistance(min assist for dynamic tasks, contact guard for static, supp)   Standing Balance  Time: AM: 1-2 min x5  Activity: AM: functional mobility/transfers, ADL tasks  Comment: increased steadiness this date with dynamic functional tasks, VCs for LLE positioning/stabilizing, support to LUE if unable to place on sink during tasks  Functional Mobility  Functional - Mobility Device: Hemiwalker  Activity: To/from bathroom, Other  Assist Level: Minimal assistance  Functional Mobility Comments: VCs to widen base of support with mobility to decrease tone in LLE while ambulating, assist to support LUE and block L knee laterally to decrease L foot/ankle inversion     Bed mobility  Bridging: Contact guard assistance Rolling to Left: Stand by assistance, Contact guard assistance  Rolling to Right: Stand by assistance, Contact guard assistance  Supine to Sit: Stand by assistance, Contact guard assistance  Sit to Supine: Stand by assistance, Contact guard assistance  Scooting: Stand by assistance, Contact guard assistance  Comment: occassional assisting LLE/UE as needed for safety  Transfers  Stand Step Transfers: Minimal assistance(using ara walker)  Stand Pivot Transfers: Moderate assistance, Minimal assistance(min A to strong side, mod A to weak side, A for LLE position)  Sit to stand: Minimal assistance  Stand to sit: Minimal assistance, Contact guard assistance  Transfer Comments: pt demo good hand placement, VCs and assist for LLE positioning   Toilet Transfers  Toilet - Technique: Ambulating(using ara walker)  Equipment Used: Standard toilet  Toilet Transfer: Minimal assistance  Toilet Transfers Comments: assist to support LUE, VCs for positioning with good carryover  Tub Transfers  Tub - Transfer From: Walker(ara walker. )  Tub - Transfer Type: To(utilized stand pivot to wheelchair to return. )  Tub - Transfer To: Transfer tub bench  Tub - Technique: Ambulating  Tub Transfers: Moderate assistance, Verbal cues  Tub Transfers Comments: once positioned on edge of tub bench; cueing and increased time required. See functional mobility note. Assist required to lift LLE over edge of tub to enter. Recommend transfer bench on R end of tub when facing to ensure completing seated pivot over edge of tub towards strong side to be able to utilize R side to grasp rail on bench to assist. Continued education and transfer training required to increase safety and tolerance. Shower Transfers  Shower - Transfer From: Wheelchair  Shower - Transfer Type: To and From  Shower - Transfer To:  Shower seat with back  Shower - Technique: Stand pivot, To right, To left  Shower Transfers: Minimal assistance(improvement noted this date) Shower Transfers Comments: assist x1, VCs and assist for LLE positioning/stabilization, cuing for control, fair/good carryover noted  Wheelchair Bed Transfers  Equipment Used: Other, Wheelchair, Bed(Tameka Camp)  Level of Asssistance: Dependent/Total      ST:           Objective:  /67   Pulse 97   Temp 97.7 °F (36.5 °C) (Oral)   Resp 16   Ht 5' 5\" (1.651 m)   Wt 231 lb 9.6 oz (105.1 kg)   SpO2 99%   BMI 38.54 kg/m²  I Body mass index is 38.54 kg/m². I   Wt Readings from Last 1 Encounters:   20 231 lb 9.6 oz (105.1 kg)      Temp (24hrs), Av °F (36.7 °C), Min:97.7 °F (36.5 °C), Max:98.2 °F (36.8 °C)         GEN: well developed, well nourished, no acute distress  HEENT: Normocephalic atraumatic, EOMI, mucous membranes pink and moist  CV: RRR, no murmurs, rubs or gallops  PULM: CTAB, no rales or rhonchi. Respirations WNL and unlabored  ABD: soft, NT, ND, +BS and equal  NEURO: A&O x3. Sensation intact to light touch. MSK: Decreased range of motion left upper lower extremities,, 4/5 right upper and lower extremity, moderate spasticity left upper and lower extremity, tenderness left anterior shoulderlateral pectoral muscle some improvement  EXTREMITIES: No calf tenderness to palpation bilaterally. No edema BLEs, left hand curling at  restuse of resting hand splint  SKIN: warm dry and intact with good turgor  PSYCH: appropriately interactive. Affect WNL.           Medications   Scheduled Meds:   diclofenac sodium  2 g Topical BID    tiZANidine  6 mg Oral Daily    And    tiZANidine  4 mg Oral 2 times per day    sertraline  150 mg Oral Daily    amLODIPine  5 mg Oral Daily    thiamine mononitrate  100 mg Oral Daily    traZODone  25 mg Oral Nightly    gabapentin  300 mg Oral Nightly    lisinopril  20 mg Oral Daily    buPROPion  300 mg Oral Daily    cyanocobalamin  1,000 mcg Intramuscular Weekly    Followed by   Giorgio Lugo ON 2021] cyanocobalamin  1,000 mcg Intramuscular Q30 Days  folic acid  1 mg Oral Daily    lamoTRIgine  400 mg Oral Daily    melatonin  3 mg Oral Nightly    rOPINIRole  1 mg Oral Nightly    sennosides-docusate sodium  2 tablet Oral Daily    polyethylene glycol  17 g Oral Daily    enoxaparin  30 mg Subcutaneous BID     Continuous Infusions:  PRN Meds:.diazePAM, oxyCODONE, acetaminophen, ipratropium-albuterol, magnesium hydroxide, muscle rub, bisacodyl, senna     Diagnostics:     CBC:   No results for input(s): WBC, RBC, HGB, HCT, MCV, RDW, PLT in the last 72 hours. BMP:   No results for input(s): NA, K, CL, CO2, PHOS, BUN, CREATININE in the last 72 hours. Invalid input(s): CA  BNP: No results for input(s): BNP in the last 72 hours. PT/INR: No results for input(s): PROTIME, INR in the last 72 hours. APTT: No results for input(s): APTT in the last 72 hours. CARDIAC ENZYMES: No results for input(s): CKMB, CKMBINDEX, TROPONINT in the last 72 hours. Invalid input(s): CKTOTAL;3  FASTING LIPID PANEL:  Lab Results   Component Value Date    CHOL 247 (H) 11/30/2020     11/30/2020    TRIG 77 11/30/2020     LIVER PROFILE: No results for input(s): AST, ALT, ALB, BILIDIR, BILITOT, ALKPHOS in the last 72 hours. I/O (24Hr): No intake or output data in the 24 hours ending 12/31/20 1518    Glu last 24 hour  No results for input(s): POCGLU in the last 72 hours. No results for input(s): CLARITYU, COLORU, PHUR, SPECGRAV, PROTEINU, RBCUA, BLOODU, BACTERIA, NITRU, WBCUA, LEUKOCYTESUR, YEAST, GLUCOSEU, BILIRUBINUR in the last 72 hours.       Impression/Plan: 1. Ambulatory and ADL dysfunction secondary to hemorrhagic CVA: Acute rehab effortsPT/OT, PRAFO for le ft lower limb at night. 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 Monitoring platelets  3. History of headacheMedrol taper 12/13  4. Muscle spasm/myoclonus/spasticity: Now off baclofennot tolerate increase. On schedule tizanidine 12/17 - increased dose 12/20 (6mg/6mg/4mg) but noted increased muscle spasms in the afternoon and evening. Dose adjusted 12/23 (now on 6mg/4mg/4mg). .  Patient she feels she is doing better with decrease dosehesitant to increase again. Improving with Valium 5 mg prior to therapies  5. Left proximal upper limb pain:  In the area of left proximal biceps/left pec major muscles.-Suspect spasticity, possible tendinitis, possible subluxation though only 1 fingerbreadth. Trial of Voltaren gel . Continue with relaxers, sling when in therapiesdiscussed at length with both patient and  12/20/2020. Reviewed medications, spasticity, sling, etc. x-ray negativesee above,  may benefit from Botox as outpatient  6. PainRoxicodone  7. HTN: amlodipine (increased 12/21 by IM), lisinopril (decreased 12/14 by IM); labetalol discontinued 12/18 by IM  8. ETOH withdrawal/history of abuse: was treated with IV ativan and librium. On H00 and folic acid now  9. Bipolar Disorder: on wellbutrin, zoloft (increased 12/21 to home dose of 150mg daily), lamictal  10. Restless Leg Syndrome: on requip  11. Insomnia:  On melatonin, gabapentin nightly. Takes trazodone at home - continue low-dose trazodone (started 12/15). 12. Bowel Management: Miralax daily, senokot prn, dulcolax prn. Last bowel movement 12/23 small  13. DVT Prophylaxis:  low molecular weight heparin, SCD's while in bed and HORTENSIA's   14. Internal medicine for medical management       Bonny Youssef. Blayne Davidson MD       This note is created with the assistance of a speech recognition program.  While intending to generate a document that actually reflects the content of the visit, the document can still have some errors including those of syntax and sound a like substitutions which may escape proof reading.   In such instances, actual meaning can be extrapolated by contextual diversion

## 2020-12-31 NOTE — PROGRESS NOTES
Physical Therapy  Facility/Department: [facility]  Daily Treatment Note  NAME: Remberto Paez  : 1970  MRN: [id number]    Date of Service: 2020    Discharge Recommendations:  Patient would benefit from continued therapy after discharge, Home with assist PRN        Assessment      PT Education: Disease Specific Education; Family Education;Precautions;Gait Training  Patient Education: Spouse Alfred  stairs,and gait training. Activity Tolerance  Activity Tolerance: Patient Tolerated treatment well     Patient Diagnosis(es): There were no encounter diagnoses. has a past medical history of Asthma, Bipolar 1 disorder ([facility] [address] Delta storage pool disease [facility]), Hypertension, and Psychiatric problem. has a past surgical history that includes  section ([phone]); Gastric bypass surgery (); Tonsillectomy and adenoidectomy (); Cholecystectomy (); knee surgery (); Hysterectomy (); laparoscopy (); Colonoscopy (N/A, 2/3/2020); and Upper gastrointestinal endoscopy (N/A, 2/3/2020). Restrictions  Restrictions/Precautions  Restrictions/Precautions: Fall Risk  Required Braces or Orthoses?: No  Position Activity Restriction  Other position/activity restrictions: L hypertonicity. PRAFO boot LLE in bed  Subjective   General  Chart Reviewed: Yes  Response To Previous Treatment: Patient with no complaints from previous session. Subjective  Subjective: patient reports spouse coming for therapy this pm to assist with stair training.           Orientation     Cognition      Objective   Bed mobility  Bridging: Contact guard assistance  Rolling to Left: Stand by assistance;Contact guard assistance  Rolling to Right: Stand by assistance;Contact guard assistance  Supine to Sit: Stand by assistance;Contact guard assistance  Sit to Supine: Stand by assistance;Contact guard assistance  Scooting: Stand by assistance;Contact guard assistance Comment: occassional assisting LLE/UE as needed for safety  Transfers  Sit to Stand: Contact guard assistance  Stand to sit: Contact guard assistance  Bed to Chair: Contact guard assistance  Stand Pivot Transfers: Minimal Assistance  Squat Pivot Transfers: Contact guard assistance  Ambulation 1  Surface: level tile  Device: Hemiwalker(patient prefered method of walking d/t increased stability provide)  Other Apparatus: Left;Dorsiflex Assist(ankle brace)  Quality of Gait: spasmic/flexor tone on LLE; poor heel strike toe off, lacking normal flexion of knee, cueing to soft lock knee in extension and widen KRISTIN  Gait Deviations: Decreased step length;Decreased step height;Decreased arm swing;Decreased head and trunk rotation;Deviated path; Slow Meseret  Distance: 110 feet;  Comments: Hemiwalker is patients prefered method of walking d/t increased stability provided. Education on neuro re-ed and functional mobility with least restrictive device in home risk/benefits with good understanding verbalized. patient continues to increase anxiety when normal balance is challenged with ADL's. Lateral/medial supportive brace to reduce risk of eversion of ankle during stance Phase  Ambulation 2  Device 2: Zimmerman rail  Other Apparatus 2: Left;Dorsiflex Assist  Assistance 2: Minimal assistance;Contact guard assistance  Quality of Gait 2: improved step through  Gait Deviations: Increased KRISTIN; Decreased step length;Decreased step height  Distance: 20 feet x 3 forward and retro gait  Comments:  Foward/retro/lateral stepping with tactile feedback and vc's for technique  Stairs  # Steps : 9  Stairs Height: 8\"  Rails: Right ascending  Assistance: Minimal assistance Comment: Ascends forward, descends retro. ascending with LLE for WB placement to prevent mare/crossing of LLE in swing phase. patient able place LLE unassisted. Retro step down with RLE to maintain WB LLE to prevent scissoring and unassisted LLE foot placement during sequencing. family training spouse present for education and need for assist needs further training to for safety. Wheelchair Activities  Left Brakes Level of Assistance: Minimal assistance  Right Brakes Level of Assistance: Stand by Assist  Propulsion 1  Propulsion: Manual  Level: Level Tile  Method: RLE;RUE;LLE  Level of Assistance: Contact guard assistance;Stand by assistance  Description/ Details: poor safety awareness with left side neglect, running into objects on left side  Distance: 80 feet        Other exercises  Other exercises 1: Neuro rehab with WB unilateral, bilateral, cocontract  Other exercises 2: Sit to stands and lateral scooting on mat 20x                        G-Code     OutComes Score                                                     AM-PAC Score             Goals  Short term goals  Time Frame for Short term goals: 10 days  Short term goal 1: pt will perform bed mobility with min A  Short term goal 2: pt will dangle EOB/EOM for 20 to 25 minutes with SBA, performing challenged seated balance/corestrengthening  Short term goal 3: Pivot transfers with mod A+2  Short term goal 4: WC mobility x 100' with min A  Short term goal 5: progress to standing/pre-gait activities in // bars to facilitate L LE motor fucntion.   Long term goals  Time Frame for Long term goals : By LOS  Long term goal 1: Pt able to perform bed mobility independently  Long term goal 2: [facility] with verbal cues for LE positioning and safety  Long term goal 3: Pt able to progress to ambulation with appropriate device/L LE bracing,  dist of 50 to 100 ft,  CGA   Long term goal 4: Pt able to propel w/c level surfaces dist of 150 ft , mod-I. Long term goal 5: Improve L LE strength to atleast 2 to 2+/5 to improve fucntion. Long term goal 6: Improve PASS score from 8/30 to 23/36 to improve overall fucntion. Long term goal 7: Pt able to go up and down flight of steps with rail/Assistive device,  mod A./min A  Long term goal 8: Family training for safe fucntional mobility, including stari training. Patient Goals   Patient goals : to regain use of L arm and L leg    Plan    Plan  Times per week: 1.5hr/day, 5 to 7 days/week.   Times per day: Daily  Current Treatment Recommendations: Strengthening, ROM, Balance Training, Functional Mobility Training, Transfer Training, Wheelchair Mobility Training, Gait Training, Neuromuscular Re-education, Pain Management, Home Exercise Program, Safety Education & Training, Patient/Caregiver Education & Training, Positioning, Endurance Training, Stair training  Safety Devices  Type of devices: Gait belt  Restraints  Initially in place: No     Therapy Time     12/31/20 0844 12/31/20 1456   PT Individual Minutes   Time In [phone]   Time Out [phone]   Minutes [address] [place], PTA

## 2021-01-01 PROCEDURE — 6370000000 HC RX 637 (ALT 250 FOR IP): Performed by: PHYSICAL MEDICINE & REHABILITATION

## 2021-01-01 PROCEDURE — 6360000002 HC RX W HCPCS: Performed by: PHYSICAL MEDICINE & REHABILITATION

## 2021-01-01 PROCEDURE — 99231 SBSQ HOSP IP/OBS SF/LOW 25: CPT | Performed by: STUDENT IN AN ORGANIZED HEALTH CARE EDUCATION/TRAINING PROGRAM

## 2021-01-01 PROCEDURE — 97116 GAIT TRAINING THERAPY: CPT

## 2021-01-01 PROCEDURE — 97112 NEUROMUSCULAR REEDUCATION: CPT

## 2021-01-01 PROCEDURE — 6370000000 HC RX 637 (ALT 250 FOR IP): Performed by: STUDENT IN AN ORGANIZED HEALTH CARE EDUCATION/TRAINING PROGRAM

## 2021-01-01 PROCEDURE — 97110 THERAPEUTIC EXERCISES: CPT

## 2021-01-01 PROCEDURE — 97535 SELF CARE MNGMENT TRAINING: CPT

## 2021-01-01 PROCEDURE — 6370000000 HC RX 637 (ALT 250 FOR IP): Performed by: NURSE PRACTITIONER

## 2021-01-01 PROCEDURE — 97530 THERAPEUTIC ACTIVITIES: CPT

## 2021-01-01 PROCEDURE — 97542 WHEELCHAIR MNGMENT TRAINING: CPT

## 2021-01-01 PROCEDURE — 6360000002 HC RX W HCPCS: Performed by: NURSE PRACTITIONER

## 2021-01-01 PROCEDURE — 99231 SBSQ HOSP IP/OBS SF/LOW 25: CPT | Performed by: INTERNAL MEDICINE

## 2021-01-01 PROCEDURE — 1180000000 HC REHAB R&B

## 2021-01-01 PROCEDURE — 6370000000 HC RX 637 (ALT 250 FOR IP): Performed by: INTERNAL MEDICINE

## 2021-01-01 RX ADMIN — CYANOCOBALAMIN 1000 MCG: 1000 INJECTION, SOLUTION INTRAMUSCULAR at 07:28

## 2021-01-01 RX ADMIN — GABAPENTIN 300 MG: 300 CAPSULE ORAL at 23:54

## 2021-01-01 RX ADMIN — OXYCODONE HYDROCHLORIDE 5 MG: 5 TABLET ORAL at 01:44

## 2021-01-01 RX ADMIN — ENOXAPARIN SODIUM 30 MG: 30 INJECTION SUBCUTANEOUS at 07:19

## 2021-01-01 RX ADMIN — TIZANIDINE 4 MG: 4 TABLET ORAL at 13:42

## 2021-01-01 RX ADMIN — THIAMINE HCL TAB 100 MG 100 MG: 100 TAB at 07:18

## 2021-01-01 RX ADMIN — SERTRALINE HYDROCHLORIDE 150 MG: 100 TABLET ORAL at 07:18

## 2021-01-01 RX ADMIN — DIAZEPAM 5 MG: 5 TABLET ORAL at 16:42

## 2021-01-01 RX ADMIN — TIZANIDINE 4 MG: 4 TABLET ORAL at 23:55

## 2021-01-01 RX ADMIN — TIZANIDINE 6 MG: 4 TABLET ORAL at 07:19

## 2021-01-01 RX ADMIN — LAMOTRIGINE 400 MG: 100 TABLET ORAL at 07:18

## 2021-01-01 RX ADMIN — LISINOPRIL 20 MG: 20 TABLET ORAL at 07:18

## 2021-01-01 RX ADMIN — TRAZODONE HYDROCHLORIDE 25 MG: 50 TABLET ORAL at 23:54

## 2021-01-01 RX ADMIN — AMLODIPINE BESYLATE 5 MG: 5 TABLET ORAL at 07:19

## 2021-01-01 RX ADMIN — BUPROPION HYDROCHLORIDE 300 MG: 300 TABLET, FILM COATED, EXTENDED RELEASE ORAL at 07:18

## 2021-01-01 RX ADMIN — ROPINIROLE HYDROCHLORIDE 1 MG: 1 TABLET, FILM COATED ORAL at 23:55

## 2021-01-01 RX ADMIN — FOLIC ACID 1 MG: 1 TABLET ORAL at 07:18

## 2021-01-01 RX ADMIN — ENOXAPARIN SODIUM 30 MG: 30 INJECTION SUBCUTANEOUS at 23:54

## 2021-01-01 RX ADMIN — SENNOSIDES AND DOCUSATE SODIUM 2 TABLET: 8.6; 5 TABLET ORAL at 07:18

## 2021-01-01 RX ADMIN — POLYETHYLENE GLYCOL 3350 17 G: 17 POWDER, FOR SOLUTION ORAL at 07:19

## 2021-01-01 RX ADMIN — DIAZEPAM 5 MG: 5 TABLET ORAL at 11:55

## 2021-01-01 RX ADMIN — DICLOFENAC 2 G: 10 GEL TOPICAL at 07:19

## 2021-01-01 RX ADMIN — Medication 3 MG: at 23:54

## 2021-01-01 ASSESSMENT — PAIN SCALES - GENERAL: PAINLEVEL_OUTOF10: 7

## 2021-01-01 NOTE — PROGRESS NOTES
[place] [address] Internal [facility]    CONSULTATION / HISTORY AND PHYSICAL EXAMINATION            Date:   2021  Patient name:  Bel Corea  Date of admission:  2020  8:30 PM  MRN:   [id number]  Account:  [de-identified]  YOB: 1970  PCP:    Nat Marroquin MD  Room:   [address] [place] [address]  Code Status:    Full Code    Physician Requesting Consult: Alexis Marrufo MD    Reason for Consult: Medical management    Chief Complaint:   Acute left sided weakness     History Obtained From:     patient, electronic medical record    History of Present Illness/ Interval History:   70-year-old female with history of delta storage pool disease, bipolar disorder, alcohol abuse who developed acute left-sided weaknessfound to have intraparenchymal hemorrhage, right parietal lobe hemorrhage and moderate subarachnoid hemorrhage. Pt admitted to rehab unit on 20. Improving with PT/OT     2021    · Patient stable  · No new symptoms 1. Past Medical History:     Past Medical History:   Diagnosis Date    Asthma     B[facility] 1 disorder [facility]     Delta storage pool disease ([facility] [address] [facility]     Hypertension     Psychiatric problem         Past Surgical History:     Past Surgical History:   Procedure Laterality Date     SECTION  7068,4093    Miscarriage     CHOLECYSTECTOMY      COLONOSCOPY N/A 2/3/2020    -random bx(normal)hemorrhoids    GASTRIC BYPASS SURGERY      HYSTERECTOMY      KNEE SURGERY      LAPAROSCOPY      TONSILLECTOMY AND ADENOIDECTOMY      UPPER GASTROINTESTINAL ENDOSCOPY N/A 2/3/2020    (normal,neg H-Pylori)normal post-bypass endoscopy        Medications Prior to Admission:     Prior to Admission medications    Medication Sig Start Date End Date Taking?  Authorizing Provider STIMATE 1.5 MG/ML SOLN nasal solution INSTILL 2 SPRAYS INTO THE NOSE TWICE DAILY STARTING TWO DAYS BEFORE PROCEDURE 20   Shameka Campos MD   amLODIPine (NORVASC) 5 MG tablet Take 5 mg by mouth daily    Historical Provider, MD   QUEtiapine (SEROQUEL) 200 MG tablet Take 200 mg by mouth nightly    Historical Provider, MD   lisinopril (PRINIVIL;ZESTRIL) 20 MG tablet Take 20 mg by mouth daily    Historical Provider, MD   buPROPion (WELLBUTRIN XL) 300 MG XL tablet Take 300 mg by mouth daily 3/5/16   Historical Provider, MD   sertraline (ZOLOFT) 50 MG tablet Take 50 mg by mouth daily 3/5/16   Historical Provider, MD   PROAIR  (90 BASE) MCG/ACT inhaler Inhale 2 puffs into the lungs every 6 hours as needed  14   Historical Provider, MD   lamoTRIgine (LAMICTAL) 200 MG tablet Take 400 mg by mouth daily 2 tabs 14   Historical Provider, MD        Allergies:     Penicillin g, Pcn [penicillins], and Sulfa antibiotics    Social History:     Tobacco:    reports that she has never smoked. She has never used smokeless tobacco.  Alcohol:      reports current alcohol use. Drug Use:  reports no history of drug use. Family History:     Family History   Adopted: Yes   Family history unknown: Yes       Review of Systems:     Positive and Negative as described in HPI. Denies any shortness of breath or cough  Denies chest pain or palpitations  Denies abdominal pain, diarrhea vomiting  Denies any new numbness tremors or weakness. Physical Exam:     /69   Pulse 77   Temp 97.8 °F (36.6 °C) (Oral)   Resp 18   Ht 5' 5\" (1.651 m)   Wt 231 lb 9.6 oz (105.1 kg)   SpO2 96%   BMI 38.54 kg/m²   Temp (24hrs), Av °F (36.7 °C), Min:97.8 °F (36.6 °C), Max:98.1 °F (36.7 °C)      General appearance:  alert, cooperative and no distress  Eyes: Anicteric sclera. Pupils are equally round and reactive to light. Extraocular movements are intact.   Lungs:  clear to auscultation bilaterally, normal effort Heart:  regular rate and rhythm, no murmur  Abdomen:  soft, nontender, nondistended, normal bowel sounds, no masses, hepatomegaly, splenomegaly  Extremities:  no edema, redness, tenderness in the calves  Skin:  no gross lesions, rashes, induration  Neuro:  Alert, oriented X 3, left sided weakness  Power is left arm 0 out of 5 and 1 out of 5 in left lower extremity, increased tone in both left upper and lower extremity  Investigations:      Laboratory Testing:  Lab Results   Component Value Date    WBC 6.8 12/28/2020    HGB 11.8 (L) 12/28/2020    HCT 36.2 12/28/2020    MCV 89.8 12/28/2020     12/28/2020     Lab Results   Component Value Date     12/28/2020    K 4.0 12/28/2020     12/28/2020    CO2 24 12/28/2020    BUN 11 12/28/2020    CREATININE 0.64 12/28/2020    GLUCOSE 86 12/28/2020    CALCIUM 9.9 12/28/2020         Assessment :      Primary Problem  <principal problem not specified>    Active Hospital Problems    Diagnosis Date Noted    Essential hypertension [I10] 12/11/2020    Acute left-sided weakness [R53.1] 12/11/2020    H/O intracranial hemorrhage [Z86.79] 12/09/2020    Vasogenic edema ([address] [G93.6]     Intracranial hemorrhage ([address] [I62.9] 11/30/2020    Bipolar 1 disorder ([address] [F31.9]     Platelet dysfunction ([facility] [facility]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 *      1. Intracranial hemorrhage with left-sided weakness. Improving. Repeat CT showed significant improvement   2. Pulmonary hypertension  3. Platelet dysfunction  4. Hypertension. Continue lisinopril 20 mg daily and amlodipine 5 mg . 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 Monitoring platelets   6.  Restless Leg Syndrome: on requip

## 2021-01-01 NOTE — PROGRESS NOTES
Physical Medicine & Rehabilitation  Progress Note      Subjective:      Ms. Rosy Manning is a 80-year-old female with hemorrhagic CVA and left nondominant hemiparesis. She reports doing fine today. She thinks that the valium has been helping with spasticity and muscle spasms in the left upper and lower limbs thus far. She continues to report some pain in the proximal left upper limb/left chest area but states that the improvement in spasticity has seemed to ease the pain some. She notes some pain overnight with wearing the left upper limb resting hand splint but states that she has only had it a few days. She denies any other acute concerns. ROS:  Denies fevers, chills, sweats. No chest pain, palpitations, lightheadedness. Denies coughing, wheezing or shortness of breath. Denies abdominal pain, nausea, diarrhea or constipation. No new areas of joint pain. Denies new areas of numbness or weakness. Denies new anxiety or depression issues. No new skin problems. Rehabilitation:   Progressing in therapies. PT:  Restrictions/Precautions: Fall Risk  Other position/activity restrictions: L hypertonicity. PRAFO boot LLE in bed   Transfers  Sit to Stand: Contact guard assistance  Stand to sit: Contact guard assistance  Bed to Chair: Contact guard assistance  Stand Pivot Transfers: Contact guard assistance  Squat Pivot Transfers: Contact guard assistance  Car Transfer: Minimal Assistance  Comment: Spouse Alfred assisting with his LUE with gait belt during all transfer training preformed. CGA feedback provide by therapist only for training safety purposes.   Ambulation 1  Surface: level tile  Device: Hemiwalker  Other Apparatus: (no additional supportive devices used today)  Assistance: Minimal assistance  Quality of Gait: spasmic/flexor tone on LLE; poor heel strike toe off, lacking normal flexion of knee, cueing to soft lock knee in extension and widen KRISTIN, weight shift and hip neutral over stance leg Assistance: Minimal assistance  Quality of Gait: spasmic/flexor tone on LLE; poor heel strike toe off, lacking normal flexion of knee, cueing to soft lock knee in extension and widen KRISTIN, weight shift and hip neutral over stance leg  Gait Deviations: Decreased step length, Decreased step height, Decreased arm swing, Decreased head and trunk rotation, Deviated path, Slow Meseret  Distance: 90 feet x 1  Comments: Hemiwalker is patients prefered method of walking d/t increased stability provided. Education on neuro re-ed and functional mobility with least restrictive device in home risk/benefits with good understanding verbalized. patient continues to increase anxiety when normal balance is challenged with ADL's. OT:  ADL  Equipment Provided: Reacher  Feeding: None  Grooming: Modified independent , Minimal assistance(seated at sink. assist with pony tail)  UE Bathing: Setup, Supervision  LE Bathing: Setup, Supervision(weight shifting for elizabeth area/buttocks )  UE Dressing: Verbal cueing, Increased time to complete, Contact guard assistance, Setup(intermittent assist for bra and shirt adjustments)  LE Dressing: Moderate assistance, Increased time to complete, Verbal cueing  Toileting: None  Additional Comments: GRANADOS facilitated pt in showering tasks this AM seated on tub bench. Pt completes functional mobility up to shower and is uncomfortable with stepping into shower due to L ankle weakness. LB dressing: assist for clothing mgmt while standing. VC to use keep hold on grab bar for optimal safety.     Instrumental ADL's  Instrumental ADLs: Yes     Balance  Sitting Balance: Modified independent   Standing Balance: Minimal assistance(min assist for dynamic tasks, CGA for static standing)   Standing Balance  Time: AM 2 min X1, 1 min X2; PM: 3-4 min X2  Activity: AM: functional mobility/transfers, ADL tasks; PM: LUE weight bearing on tabletop Comment: assist with LUE support when standing with ara walker, close attention to weight shifting for LLE blocking as needed. No LOB noted  Functional Mobility  Functional - Mobility Device: Hemiwalker  Activity: To/from bathroom  Assist Level: Minimal assistance  Functional Mobility Comments: VCs to widen base of support with mobility to decrease tone in LLE while ambulating, assist to support LUE and block L knee laterally to decrease L foot/ankle inversion. Bed mobility  Bridging: Contact guard assistance  Rolling to Left: Stand by assistance  Rolling to Right: Stand by assistance  Supine to Sit: Stand by assistance  Sit to Supine: Stand by assistance  Scooting: Stand by assistance  Comment: occassional assisting LLE/UE as needed for safety  Transfers  Stand Step Transfers: Minimal assistance(ara walker)  Stand Pivot Transfers: Minimal assistance  Sit to stand: Minimal assistance  Stand to sit: Contact guard assistance  Transfer Comments: good handplacement and sequencing   Toilet Transfers  Toilet - Technique: Ambulating(using ara walker)  Equipment Used: Standard toilet  Toilet Transfer: Minimal assistance  Toilet Transfers Comments: assist to support LUE, VCs for positioning with good carryover  Tub Transfers  Tub - Transfer From: Walker(ara walker)  Tub - Transfer Type: To and From  Tub - Transfer To: Transfer tub bench  Tub - Technique: Ambulating  Tub Transfers: Minimal assistance, Verbal cues  Tub Transfers Comments: pt and spouse completed mock tub transfer with transfer tub bench, pt ambulating with ara walker, min assist req, LUE supported by sling, VCs req for positioning and proper tech, assist req to lift LLE over edge of tub, pt stood with spouse assist to simulate washing elizabeth-area, min assist req with support to LUE, no loss of balance noted  Shower Transfers  Shower - Transfer From: Wheelchair  Shower - Transfer Type: To and From  Shower - Transfer To:  Shower seat with back Shower - Technique: Stand pivot, To right, To left  Shower Transfers: Minimal assistance  Shower Transfers Comments: assist x1, VCs and assist for LLE positioning/stabilization, cuing for control, fair/good carryover noted  Wheelchair Bed Transfers  Equipment Used:  Other, Wheelchair, [employer])  Level of Asssistance: Dependent/Total    SPEECH:      Current Medications:   Current Facility-Administered Medications: diazePAM (VALIUM) tablet 5 mg, 5 mg, Oral, BID PRN  diclofenac sodium (VOLTAREN) 1 % gel 2 g, 2 g, Topical, BID  tiZANidine (ZANAFLEX) tablet 6 mg, 6 mg, Oral, Daily **AND** tiZANidine (ZANAFLEX) tablet 4 mg, 4 mg, Oral, 2 times per day  sertraline (ZOLOFT) tablet 150 mg, 150 mg, Oral, Daily  amLODIPine (NORVASC) tablet 5 mg, 5 mg, Oral, Daily  thiamine mononitrate tablet 100 mg, 100 mg, Oral, Daily  traZODone (DESYREL) tablet 25 mg, 25 mg, Oral, Nightly  gabapentin (NEURONTIN) capsule 300 mg, 300 mg, Oral, Nightly  lisinopril (PRINIVIL;ZESTRIL) tablet 20 mg, 20 mg, Oral, Daily  oxyCODONE (ROXICODONE) immediate release tablet 5 mg, 5 mg, Oral, Q8H PRN  acetaminophen (TYLENOL) tablet 650 mg, 650 mg, Oral, Q4H PRN  buPROPion (WELLBUTRIN XL) extended release tablet 300 mg, 300 mg, Oral, Daily  [COMPLETED] cyanocobalamin injection 1,000 mcg, 1,000 mcg, Intramuscular, Daily **FOLLOWED BY** cyanocobalamin injection 1,000 mcg, 1,000 mcg, Intramuscular, Weekly **FOLLOWED BY** [START ON 2/5/2021] cyanocobalamin injection 1,000 mcg, 1,000 mcg, Intramuscular, B31 Days  folic acid (FOLVITE) tablet 1 mg, 1 mg, Oral, Daily  ipratropium-albuterol (DUONEB) nebulizer solution 1 ampule, 1 ampule, Inhalation, Q4H PRN  lamoTRIgine (LAMICTAL) tablet 400 mg, 400 mg, Oral, Daily  magnesium hydroxide (MILK OF MAGNESIA) 400 MG/5ML suspension 30 mL, 30 mL, Oral, Daily PRN  melatonin tablet 3 mg, 3 mg, Oral, Nightly  muscle rub cream, , Topical, TID PRN  rOPINIRole (REQUIP) tablet 1 mg, 1 mg, Oral, Nightly FASTING LIPID PANEL:  Lab Results   Component Value Date    CHOL 247 (H) 11/30/2020     11/30/2020    TRIG 77 11/30/2020     LIVER PROFILE: No results for input(s): AST, ALT, ALB, BILIDIR, BILITOT, ALKPHOS in the last 72 hours. Impression/Plan:   Impaired ADLs, gait, and mobility due to:    1. Hemorrhagic CVA: PT/OT for gait, mobility, strengthening, endurance, ADLs, and self care. PRAFO for left lower limb and left resting hand splint at night.  Will need bracing for the left lower limb to help with stability and tone - orthotist evaluated 12/22, left AFO to be delivered 1/4/21 per patient. Taping to left shoulder and sling when in therapy and ambulating. 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 Monitoring platelets  3. History of headache:  Medrol taper completed 12/13  4. Muscle spasm/myoclonus/spasticity: Now off baclofen - could not tolerate increase in dose.    On scheduled tizanidine 12/17 - increased dose 12/20 (6mg/6mg/4mg) but noted increased muscle spasms in the afternoon and evening.    Dose adjusted 12/23 (now on 6mg/4mg/4mg). Patient feels she is doing better with decreased dose, hesitant to increase again. Valium 5 mg BID PRN (added 12/30). 5. Left proximal upper limb pain:  In the area of left proximal biceps/left pec major muscles - suspect spasticity, possible tendinitis, possible subluxation though only 1 fingerbreadth. Voltaren gel BID. Continue with muscle relaxers as above, sling when in therapiesdiscussed at length with both patient and  12/20/2020. May benefit from Botox as outpatient  6. HTN: amlodipine (increased 12/21 by IM), lisinopril (decreased 12/14 by IM); labetalol discontinued 12/18 by IM  7. ETOH withdrawal/history of abuse: was treated with IV ativan and librium. On D06 and folic acid now  8. Bipolar Disorder: on wellbutrin, zoloft (increased 12/21 to home dose of 150mg daily), lamictal  9.  Restless Leg Syndrome: on requip 10. Insomnia:  On melatonin, gabapentin nightly.  Takes trazodone at home - continue low-dose trazodone (started 12/15). 11. Bowel Management: Miralax daily, senokot-s daily, senokot prn, dulcolax prn. 12. DVT Prophylaxis:  low molecular weight heparin, SCD's while in bed and HORTENSIA's   13.  Internal medicine for medical management      Electronically signed by Katelyn Henson MD on 1/1/2021 at 8:20 PM

## 2021-01-01 NOTE — PROGRESS NOTES
Other Apparatus: (no additional supportive devices used today)  Assistance: Minimal assistance  Quality of Gait: spasmic/flexor tone on LLE; poor heel strike toe off, lacking normal flexion of knee, cueing to soft lock knee in extension and widen KRISTIN, weight shift and hip neutral over stance leg  Gait Deviations: Decreased step length;Decreased step height;Decreased arm swing;Decreased head and trunk rotation;Deviated path; Slow Meseret  Distance: 90 feet x 1  Comments: Hemiwalker is patients prefered method of walking d/t increased stability provided. Education on neuro re-ed and functional mobility with least restrictive device in home risk/benefits with good understanding verbalized. patient continues to increase anxiety when normal balance is challenged with ADL's. Ambulation 2  Device 2: Zimmerman rail;Parallel Bars  Other Apparatus 2: (no supportive devices used today)  Assistance 2: Contact guard assistance  Quality of Gait 2: improved step through, weight shifting techniques noted  Gait Deviations: Increased KRISTIN; Decreased step length;Decreased step height;Decreased head and trunk rotation(but improving overall)  Distance: 20 feet x 3 forward and retro gait  Comments: Foward/retro/lateral stepping with tactile feedback and vc's for technique  Stairs  # Steps : 9  Stairs Height: 8\"  Rails: Right ascending  Comment: Ascends forward, descends retro. ascending with LLE for WB placement to prevent mare/crossing of LLE in swing phase. patient able place LLE unassisted. Retro step down with RLE to maintain WB LLE to prevent scissoring and unassisted LLE foot placement during sequencing.         Other exercises  Other exercises 1: Neuro rehab with WB unilateral, bilateral, cocontract  Other exercises 2: Sit to stands and lateral scooting on mat 20x                        G-Code     OutComes Score                                                     AM-PAC Score             Goals  Short term goals Time Frame for Short term goals: 10 days  Short term goal 1: pt will perform bed mobility with min A  Short term goal 2: pt will dangle EOB/EOM for 20 to 25 minutes with SBA, performing challenged seated balance/corestrengthening  Short term goal 3: Pivot transfers with mod A+2  Short term goal 4: WC mobility x 100' with min A  Short term goal 5: progress to standing/pre-gait activities in // bars to facilitate L LE motor fucntion. Long term goals  Time Frame for Long term goals : By LOS  Long term goal 1: Pt able to perform bed mobility independently  Long term goal 2: Pt able to perform transfers at [facility] with verbal cues for LE positioning and safety  Long term goal 3: Pt able to progress to ambulation with appropriate device/L LE bracing,  dist of 50 to 100 ft,  CGA   Long term goal 4: Pt able to propel w/c level surfaces dist of 150 ft , mod-I. Long term goal 5: Improve L LE strength to atleast 2 to 2+/5 to improve fucntion. Long term goal 6: Improve PASS score from 8/30 to 23/36 to improve overall fucntion. Long term goal 7: Pt able to go up and down flight of steps with rail/Assistive device,  mod A./min A  Long term goal 8: Family training for safe fucntional mobility, including stari training. Patient Goals   Patient goals : to regain use of L arm and L leg    Plan    Plan  Times per week: 1.5hr/day, 5 to 7 days/week.   Times per day: Daily  Current Treatment Recommendations: Strengthening, ROM, Balance Training, Functional Mobility Training, Transfer Training, Wheelchair Mobility Training, Gait Training, Neuromuscular Re-education, Pain Management, Home Exercise Program, Safety Education & Training, Patient/Caregiver Education & Training, Positioning, Endurance Training, Stair training  Safety Devices  Type of devices: Gait belt  Restraints  Initially in place: No     Therapy Time     01/01/21 1007 01/01/21 1541   PT Individual Minutes   Time In 1709 0950   Time Out [id number]   [facility] [phone] Deepali Marc, PTA

## 2021-01-02 PROCEDURE — 97535 SELF CARE MNGMENT TRAINING: CPT

## 2021-01-02 PROCEDURE — 6370000000 HC RX 637 (ALT 250 FOR IP): Performed by: INTERNAL MEDICINE

## 2021-01-02 PROCEDURE — 6370000000 HC RX 637 (ALT 250 FOR IP): Performed by: NURSE PRACTITIONER

## 2021-01-02 PROCEDURE — 6370000000 HC RX 637 (ALT 250 FOR IP): Performed by: PHYSICAL MEDICINE & REHABILITATION

## 2021-01-02 PROCEDURE — 6360000002 HC RX W HCPCS: Performed by: PHYSICAL MEDICINE & REHABILITATION

## 2021-01-02 PROCEDURE — 99231 SBSQ HOSP IP/OBS SF/LOW 25: CPT | Performed by: STUDENT IN AN ORGANIZED HEALTH CARE EDUCATION/TRAINING PROGRAM

## 2021-01-02 PROCEDURE — 97110 THERAPEUTIC EXERCISES: CPT

## 2021-01-02 PROCEDURE — 97112 NEUROMUSCULAR REEDUCATION: CPT

## 2021-01-02 PROCEDURE — 6370000000 HC RX 637 (ALT 250 FOR IP): Performed by: STUDENT IN AN ORGANIZED HEALTH CARE EDUCATION/TRAINING PROGRAM

## 2021-01-02 PROCEDURE — 97542 WHEELCHAIR MNGMENT TRAINING: CPT

## 2021-01-02 PROCEDURE — 97116 GAIT TRAINING THERAPY: CPT

## 2021-01-02 PROCEDURE — 97530 THERAPEUTIC ACTIVITIES: CPT

## 2021-01-02 PROCEDURE — 99231 SBSQ HOSP IP/OBS SF/LOW 25: CPT | Performed by: INTERNAL MEDICINE

## 2021-01-02 PROCEDURE — 1180000000 HC REHAB R&B

## 2021-01-02 RX ADMIN — DIAZEPAM 5 MG: 5 TABLET ORAL at 11:57

## 2021-01-02 RX ADMIN — TIZANIDINE 6 MG: 4 TABLET ORAL at 08:13

## 2021-01-02 RX ADMIN — LAMOTRIGINE 400 MG: 100 TABLET ORAL at 08:13

## 2021-01-02 RX ADMIN — LISINOPRIL 20 MG: 20 TABLET ORAL at 08:14

## 2021-01-02 RX ADMIN — BUPROPION HYDROCHLORIDE 300 MG: 300 TABLET, FILM COATED, EXTENDED RELEASE ORAL at 08:13

## 2021-01-02 RX ADMIN — ENOXAPARIN SODIUM 30 MG: 30 INJECTION SUBCUTANEOUS at 21:49

## 2021-01-02 RX ADMIN — TRAZODONE HYDROCHLORIDE 25 MG: 50 TABLET ORAL at 21:49

## 2021-01-02 RX ADMIN — ENOXAPARIN SODIUM 30 MG: 30 INJECTION SUBCUTANEOUS at 08:12

## 2021-01-02 RX ADMIN — DIAZEPAM 5 MG: 5 TABLET ORAL at 16:52

## 2021-01-02 RX ADMIN — FOLIC ACID 1 MG: 1 TABLET ORAL at 08:15

## 2021-01-02 RX ADMIN — Medication 3 MG: at 21:47

## 2021-01-02 RX ADMIN — OXYCODONE HYDROCHLORIDE 5 MG: 5 TABLET ORAL at 19:39

## 2021-01-02 RX ADMIN — SENNOSIDES AND DOCUSATE SODIUM 2 TABLET: 8.6; 5 TABLET ORAL at 08:13

## 2021-01-02 RX ADMIN — TIZANIDINE 4 MG: 4 TABLET ORAL at 14:51

## 2021-01-02 RX ADMIN — DICLOFENAC 2 G: 10 GEL TOPICAL at 08:22

## 2021-01-02 RX ADMIN — AMLODIPINE BESYLATE 5 MG: 5 TABLET ORAL at 08:14

## 2021-01-02 RX ADMIN — ROPINIROLE HYDROCHLORIDE 1 MG: 1 TABLET, FILM COATED ORAL at 21:49

## 2021-01-02 RX ADMIN — SERTRALINE HYDROCHLORIDE 150 MG: 100 TABLET ORAL at 08:14

## 2021-01-02 RX ADMIN — POLYETHYLENE GLYCOL 3350 17 G: 17 POWDER, FOR SOLUTION ORAL at 08:15

## 2021-01-02 RX ADMIN — THIAMINE HCL TAB 100 MG 100 MG: 100 TAB at 08:15

## 2021-01-02 RX ADMIN — TIZANIDINE 4 MG: 4 TABLET ORAL at 21:48

## 2021-01-02 RX ADMIN — GABAPENTIN 300 MG: 300 CAPSULE ORAL at 21:48

## 2021-01-02 ASSESSMENT — PAIN SCALES - GENERAL: PAINLEVEL_OUTOF10: 0

## 2021-01-02 NOTE — PROGRESS NOTES
[address]   ACUTE REHABILITATION OCCUPATIONAL THERAPY  DAILY NOTE    Date: 21  Patient Name: Quin Lewis      Room: 2612/2612-01    MRN: [id number]   : 1970  (48 y.o.)  Gender: female   Referring Practitioner: Abe Oates MD  Diagnosis: Intracranial hemmorrhage  Additional Pertinent Hx: 80-year-old female with history of delta storage pool disease, bipolar disorder, alcohol abuse who developed acute left-sided weaknessfound to have intraparenchymal hemorrhage, right parietal lobe hemorrhage and moderate subarachnoid hemorrhage. Pt admitted to rehab unit on 20. Restrictions  Restrictions/Precautions: Fall Risk  Other position/activity restrictions: L hypertonicity. PRAFO boot LLE in bed  Required Braces or Orthoses?: No  Equipment Used:  Other, Wheelchair, [employer])    Subjective  Subjective: \"I feel like I'm getting really good at this\" pt states in regards to donning overhead shirt  Comments: pt cooperative and motivated for therapies  Patient Currently in Pain: Denies  Restrictions/Precautions: Fall Risk  Overall Orientation Status: Within Functional Limits  Patient Observation  Observations: intermittent L side neglect, improving but req VCs at times  Pain Assessment  Pain Assessment: 0-10  Pain Level: 1  Pain Type: Acute pain  Pain Location: Arm  Pain Orientation: Left  Pain Descriptors: Aching, Sore    Objective  Cognition  Overall Cognitive Status: WFL  Perception  Overall Perceptual Status: WFL  Unilateral Attention: Cues to attend left visual field;Cues to attend to left side of body;Cues to maintain midline in standing  Balance  Sitting Balance: Modified independent   Standing Balance: Contact guard assistance  Bed mobility  Rolling to Left: Stand by assistance  Rolling to Right: Stand by assistance  Supine to Sit: Stand by assistance  Scooting: Stand by assistance  Transfers  Stand Step Transfers: Contact guard assistance(using ara walker) Sit to stand: Minimal assistance;Contact guard assistance  Stand to sit: Contact guard assistance  Transfer Comments: good hand placements noted  Standing Balance  Time: AM: 1-2 min 5; PM: 2-3 min x2  Activity: AM: functional mobility/transfers, ADL tasks; PM: standing WB task, see below for details  Comment: no loss of balance noted, pt demo's good awareness to weight shift on LLE with standing  Functional Mobility  Functional - Mobility Device: Hemiwalker  Activity: To/from bathroom  Assist Level: Contact guard assistance  Functional Mobility Comments: no loss of balance noted, slow to ambulate due to weight shifting on LLE, support to LUE as needed  Toilet Transfers  Toilet - Technique: Ambulating(using ara walker)  Equipment Used: Standard toilet  Toilet Transfer: Minimal assistance;Contact guard assistance  Toilet Transfers Comments: assist to support LUE, VCs for positioning with good carryover     Type of ROM/Therapeutic Exercise  Type of ROM/Therapeutic Exercise: PROM;AAROM(LUE, 5-10 reps per ex)  Comment: pt engaged in LUE ex's to increase control and prepare for functional movements, assist req to support LUE, rest breaks req due to increasing tone, pt req assist for stretching LUE and reaching full ROM, increased tolerance noted with minimal pain reported  Exercises  Scapular Protraction: AAROM  Scapular Retraction: AAROM  Shoulder Flexion: AAROM  Horizontal ABduction: AAROM  Horizontal ADduction: PROM  Elbow Flexion: AAROM  Elbow Extension: AAROM  Supination: AAROM  Pronation: PROM  Wrist Flexion: PROM  Wrist Extension: PROM  Finger Flexion: AAROM  Finger Extension: PROM  Weight Bearing  Weight Bearing Technique: Yes  LUE Weight Bearing: Extended arm standing Response To Weight Bearing Technique: standing WB task with LUE, tabletop task with reaching on L to engage LUE WB to maximize indep and safety with ADL/IADL tasks, assist req to reach full extension with L hand placed on elevated surface to decrease pain in L forearm, dycem utilized, increased tolerance noted this date        Weight Bearing  Weight Bearing Technique: Yes  LUE Weight Bearing: Extended arm standing  Response To Weight Bearing Technique: standing WB task with LUE, tabletop task with reaching on L to engage LUE WB to maximize indep and safety with ADL/IADL tasks, assist req to reach full extension with L hand placed on elevated surface to decrease pain in L forearm, dycem utilized, increased tolerance noted this date           ADL  Equipment Provided: Reacher  Feeding: Setup  Grooming: Modified independent ;Minimal assistance(assist to put hair in ponytail, seated)  UE Bathing: Setup;Supervision(seated)  LE Bathing: Contact guard assistance;Setup(contact guard for standing support, assist LUE on sink)  UE Dressing: Stand by assistance;Setup(pt able to don shirt with setup, did not change bra)  LE Dressing: Minimal assistance;Setup; Increased time to complete(intermittent assist threading LLE/over L hip/TEDs, pt able to doff shoes but kicking off heels, able to don shoes by crossing foot over opposite knee, using reacher for threading pants/underwear and doffing lower body dressing over feet)  Toileting: Contact guard assistance(contact guard for standing support/support LUE)  Additional Comments: pt washed up sinkside this date          Assessment  Performance deficits / Impairments: Decreased functional mobility ; Decreased ADL status; Decreased ROM; Decreased strength;Decreased endurance;Decreased balance;Decreased high-level IADLs;Decreased fine motor control;Decreased coordination;Decreased posture;Decreased safe awareness;Decreased vision/visual deficit  Prognosis: Good Discharge Recommendations: Home with assist PRN;Patient would benefit from continued therapy after discharge  Activity Tolerance: Patient Tolerated treatment well  Safety Devices in place: Yes  Type of devices: Left in chair;Call light within reach  Equipment Recommendations  Equipment Needed: Yes  Walker: Frank-walker  Hand Held Shower: x  Transfer Tub Bench: x  Grab bars: x  Reacher: x  Long-handled Shoehorn: x       Patient Education: ADL tasks/frank tech, adaptive equipment, transfer safety, D/C planning, weight shifting on LLE, WB to LUE, ROM/strenghtening   Patient Goals   Patient goals : To discharge home with family support  Learner:patient  Method: demonstration and explanation       Outcome: verbalized concerns, demonstrated understanding, needs reinforcement and asked questions        Plan  Plan  Times per week: 5-7  Times per day: Twice a day  Current Treatment Recommendations: Self-Care / ADL, Home Management Training, Strengthening, Balance Training, Functional Mobility Training, Endurance Training, Wheelchair Mobility Training, Neuromuscular Re-education, Pain Management, Safety Education & Training, Patient/Caregiver Education & Training, Equipment Evaluation, Education, & procurement, Cognitive/Perceptual Training  Patient Goals   Patient goals : To discharge home with family support  Short term goals  Time Frame for Short term goals: 7 to 10 days  Short term goal 1: Pt will perform upper body bathing/dressing with stand by assist.  Short term goal 2: Pt will perform lower body bathing and dressing with Moderate assist and Fair safety. Short term goal 3: Pt will perform toileting tasks with Moderate assist.  Short term goal 4: Pt will verbalize/demonstrate Good understanding of assistive equipment/durable medical equipment/modified techniques for increased independence with self-care and mobility.

## 2021-01-02 NOTE — PROGRESS NOTES
Patient observed sitting on the floor in front of the sink in the bathroom. She stated she sat too far forward in her wheelchair, grabbing for her reacher, and slid down onto the floor as a result. She denies hitting her head, losing consciousness, or any other pain/injuries at this time. Vitals signs were obtained prior to assisting back up to her chair. After assessment findings were negative for injury, patient was assisted back up off floor with 3 staff members to her wheelchair. Call light in reach.

## 2021-01-02 NOTE — PROGRESS NOTES
Physical Therapy  Facility/Department: [facility] ACUTE REHAB  Daily Treatment Note  NAME: Nick Ocasio  : 1970  MRN: [id number]    Date of Service: 2021    Discharge Recommendations:  Patient would benefit from continued therapy after discharge, Home with assist PRN   PT Equipment Recommendations  Equipment Needed: Yes  Mobility Devices: Leticia Harjeet; Wheelchair  Walker: Frank-walker  Wheelchair: [employer] structures, Functions, Activity limitations: Decreased functional mobility ; Decreased strength;Decreased safe awareness;Decreased balance; Increased pain;Decreased sensation;Decreased fine motor control;Decreased coordination;Decreased posture;Decreased endurance;Decreased vision/visual deficit; Decreased ROM  Treatment Diagnosis: Impaired function. History: medical history of delta storage pool disease, bipolar disorder   REQUIRES PT FOLLOW UP: Yes  Activity Tolerance  Activity Tolerance: Patient Tolerated treatment well     Patient Diagnosis(es): There were no encounter diagnoses. has a past medical history of Asthma, Bipolar 1 disorder ([facility] [address] Delta storage pool disease [facility]), Hypertension, and Psychiatric problem. has a past surgical history that includes  section ([id number]); Gastric bypass surgery (); Tonsillectomy and adenoidectomy (); Cholecystectomy (); knee surgery (); Hysterectomy (); laparoscopy (); Colonoscopy (N/A, 2/3/2020); and Upper gastrointestinal endoscopy (N/A, 2/3/2020). Restrictions  Restrictions/Precautions  Restrictions/Precautions: Fall Risk  Required Braces or Orthoses?: No  Position Activity Restriction  Other position/activity restrictions: L hypertonicity. PRAFO boot LLE in bed  Subjective   General  Chart Reviewed:  Yes Additional Pertinent Hx: 71-year-old female with history of delta storage pool disease, bipolar disorder, alcohol abuse who developed acute left-sided weaknessfound to have intraparenchymal hemorrhage, right parietal lobe hemorrhage and moderate subarachnoid hemorrhage. Pt admitted to rehab unit on 12/9/20. Response To Previous Treatment: Patient with no complaints from previous session. Family / Caregiver Present: No  Subjective  Subjective: Patient reports sleeping well throughout the night; eager to participate in therapy   Pain Screening  Patient Currently in Pain: Denies  Vital Signs  Patient Currently in Pain: Denies  Patient Observation  Observations: intermittent L side neglect, improving but req VCs at times       Orientation  Orientation  Overall Orientation Status: Within Functional Limits  Objective   Bed mobility  Supine to Sit: Stand by assistance  Sit to Supine: Stand by assistance  Scooting: Stand by assistance  Transfers  Sit to Stand: Contact guard assistance  Stand to sit: Contact guard assistance  Bed to Chair: Contact guard assistance  Ambulation  Ambulation?: Yes  Ambulation 1  Surface: level tile  Device: Hemiwalker  Assistance: Minimal assistance  Quality of Gait: spasmic/flexor tone on LLE; poor heel strike toe off, lacking normal flexion of knee, cueing to soft lock knee in extension and widen KRISTIN, weight shift and hip neutral over stance leg  Gait Deviations: Decreased step length;Decreased step height;Decreased arm swing;Decreased head and trunk rotation;Deviated path; Slow [place]  Distance: 120ft  Comments: Hemiwalker is patients prefered method of walking d/t increased stability provided. Stairs/Curb  Stairs?: Yes  Stairs  # Steps :  9(performed 9 took seated rest break and performed 9 more)  Stairs Height: 8\"  Rails: Right ascending  Assistance: Minimal assistance Comment: Ascends forward, descends retro. ascending with LLE for WB placement to prevent mare/crossing of LLE in swing phase. patient able place LLE unassisted. Retro step down with RLE to maintain WB LLE to prevent scissoring and unassisted LLE foot placement during sequencing. Propulsion 1  Propulsion: Manual  Level: Level Tile(Ramp)  Method: RUE;RLE  Level of Assistance: Contact guard assistance;Stand by assistance  Description/ Details: required CGA when going up ramp   Distance: 283ft        Other exercises  Other exercises?: Yes  Other exercises 1: seated B LE exercises x20 reps 2# on R LE; orange tband   Other exercises 2: seated balloon tap with 1# bar ~4 minutes   Other exercises 3: supine exercises x20 reps   Other exercises 4: vibration applied to anterior tibialis to engage DF     Goals  Short term goals  Time Frame for Short term goals: 10 days  Short term goal 1: pt will perform bed mobility with min A  Short term goal 2: pt will dangle EOB/EOM for 20 to 25 minutes with SBA, performing challenged seated balance/corestrengthening  Short term goal 3: Pivot transfers with mod A+2  Short term goal 4: WC mobility x 100' with min A  Short term goal 5: progress to standing/pre-gait activities in // bars to facilitate L LE motor fucntion. Long term goals  Time Frame for Long term goals : By LOS  Long term goal 1: Pt able to perform bed mobility independently  Long term goal 2: Pt able to perform transfers at [facility] with verbal cues for LE positioning and safety  Long term goal 3: Pt able to progress to ambulation with appropriate device/L LE bracing,  dist of 50 to 100 ft,  CGA   Long term goal 4: Pt able to propel w/c level surfaces dist of 150 ft , mod-I. Long term goal 5: Improve L LE strength to atleast 2 to 2+/5 to improve fucntion. Long term goal 6: Improve PASS score from 8/30 to 23/36 to improve overall fucntion. Long term goal 7: Pt able to go up and down flight of steps with rail/Assistive device,  mod A./min A  Long term goal 8: Family training for safe fucntional mobility, including stari training. Patient Goals   Patient goals : to regain use of L arm and L leg    Plan    Plan  Times per week: 1.5hr/day, 5 to 7 days/week.   Times per day: Daily  Current Treatment Recommendations: Strengthening, ROM, Balance Training, Functional Mobility Training, Transfer Training, Wheelchair Mobility Training, Gait Training, Neuromuscular Re-education, Pain Management, Home Exercise Program, Safety Education & Training, Patient/Caregiver Education & Training, Positioning, Endurance Training, Stair training  Safety Devices  Type of devices: Gait belt  Restraints  Initially in place: No        01/02/21 1133 01/02/21 1315   PT Individual Minutes   Time In [id number]   Time Out [id number]   OIA 44 [phone]     Electronically signed by Humaira Porras PTA on 1/2/21 at 3:27 PM EST

## 2021-01-02 NOTE — PROGRESS NOTES
This RN phoned patient's , Amihsa Tellez, to update him on patient's condition and regarding her fall last night. All questions/concerns were addressed and answered.

## 2021-01-02 NOTE — PROGRESS NOTES
Physical Medicine & Rehabilitation  Progress Note      Subjective:      Ms. Chin Daley is a 68-year-old female with hemorrhagic CVA and left nondominant hemiparesis. She reports doing fine today. She did have a fall out of her wheelchair last night. She states that she was propelling her wheelchair to the bathroom to get her reacher and slid too far forward onto the seat. She notes falling onto her left side but denies hitting her head or any other injuries. She reports feeling a little bit stiff and sore today but otherwise okay. She continues to feel like the valium helps with spasticity and left proximal upper limb pain. She denies any other acute concerns. ROS:  Denies fevers, chills, sweats. No chest pain, palpitations, lightheadedness. Denies coughing, wheezing or shortness of breath. Denies abdominal pain, nausea, diarrhea or constipation. No new areas of joint pain. Denies new areas of numbness or weakness. Denies new anxiety or depression issues. No new skin problems. Rehabilitation:   Progressing in therapies. PT:  Restrictions/Precautions: Fall Risk  Other position/activity restrictions: L hypertonicity. PRAFO boot LLE in bed   Transfers  Sit to Stand: Contact guard assistance  Stand to sit: Contact guard assistance  Bed to Chair: Contact guard assistance  Stand Pivot Transfers: Contact guard assistance  Squat Pivot Transfers: Contact guard assistance  Car Transfer: Minimal Assistance  Comment: Spouse Alfred assisting with his LUE with gait belt during all transfer training preformed. CGA feedback provide by therapist only for training safety purposes.   Ambulation 1  Surface: level tile  Device: Hemiwalker  Other Apparatus: (no additional supportive devices used today)  Assistance: Minimal assistance Quality of Gait: spasmic/flexor tone on LLE; poor heel strike toe off, lacking normal flexion of knee, cueing to soft lock knee in extension and widen KRISTIN, weight shift and hip neutral over stance leg  Gait Deviations: Decreased step length, Decreased step height, Decreased arm swing, Decreased head and trunk rotation, Deviated path, Slow M[place]  Distance: 120ft  Comments: Hemiwalker is patients prefered method of walking d/t increased stability provided. Transfers  Sit to Stand: Contact guard assistance  Stand to sit: Contact guard assistance  Bed to Chair: Contact guard assistance  Stand Pivot Transfers: Contact guard assistance  Squat Pivot Transfers: Contact guard assistance  Car Transfer: Minimal Assistance  Comment: Spouse Alfred assisting with his LUE with gait belt during all transfer training preformed. CGA feedback provide by therapist only for training safety purposes. Ambulation  Ambulation?: Yes  Ambulation 1  Surface: level tile  Device: Hemiwalker  Other Apparatus: (no additional supportive devices used today)  Assistance: Minimal assistance  Quality of Gait: spasmic/flexor tone on LLE; poor heel strike toe off, lacking normal flexion of knee, cueing to soft lock knee in extension and widen KRISTIN, weight shift and hip neutral over stance leg  Gait Deviations: Decreased step length, Decreased step height, Decreased arm swing, Decreased head and trunk rotation, Deviated path, Slow M[place]  Distance: 120ft  Comments: Hemiwalker is patients prefered method of walking d/t increased stability provided.      Surface: level tile  Ambulation 1  Surface: level tile  Device: Hemiwalker  Other Apparatus: (no additional supportive devices used today)  Assistance: Minimal assistance  Quality of Gait: spasmic/flexor tone on LLE; poor heel strike toe off, lacking normal flexion of knee, cueing to soft lock knee in extension and widen KRISTIN, weight shift and hip neutral over stance leg Gait Deviations: Decreased step length, Decreased step height, Decreased arm swing, Decreased head and trunk rotation, Deviated path, Slow Meseret  Distance: 120ft  Comments: Hemiwalker is patients prefered method of walking d/t increased stability provided.      OT:  ADL  Equipment Provided: Reacher  Feeding: Setup  Grooming: Modified independent , Minimal assistance(assist to put hair in ponytail, seated)  UE Bathing: Setup, Supervision(seated)  LE Bathing: Contact guard assistance, Setup(contact guard for standing support, assist LUE on sink)  UE Dressing: Stand by assistance, Setup(pt able to don shirt with setup, did not change bra)  LE Dressing: Minimal assistance, Setup, Increased time to complete(intermittent assist threading LLE/over L hip/TEDs, pt able t)  Toileting: Contact guard assistance(contact guard for standing support/support LUE)  Additional Comments: pt washed up sinkside this date   Instrumental ADL's  Instrumental ADLs: Yes     Balance  Sitting Balance: Modified independent   Standing Balance: Contact guard assistance   Standing Balance  Time: AM: 1-2 min 5; PM: 2-3 min x2  Activity: AM: functional mobility/transfers, ADL tasks; PM: standing WB task, see below for details  Comment: no loss of balance noted, pt demo's good awareness to weight shift on LLE with standing  Functional Mobility  Functional - Mobility Device: Hemiwalker  Activity: To/from bathroom  Assist Level: Contact guard assistance  Functional Mobility Comments: no loss of balance noted, slow to ambulate due to weight shifting on LLE, support to LUE as needed     Bed mobility  Bridging: Contact guard assistance  Rolling to Left: Stand by assistance  Rolling to Right: Stand by assistance  Supine to Sit: Stand by assistance  Sit to Supine: Stand by assistance  Scooting: Stand by assistance  Comment: occassional assisting LLE/UE as needed for safety  Transfers  Stand Step Transfers: Contact guard assistance(using ara walker) Stand Pivot Transfers: Minimal assistance  Sit to stand: Minimal assistance, Contact guard assistance  Stand to sit: Contact guard assistance  Transfer Comments: good hand placements noted   Toilet Transfers  Toilet - Technique: Ambulating(using ara walker)  Equipment Used: Standard toilet  Toilet Transfer: Minimal assistance, Contact guard assistance  Toilet Transfers Comments: assist to support LUE, VCs for positioning with good carryover  Tub Transfers  Tub - Transfer From: Walker(ara walker)  Tub - Transfer Type: To and From  Tub - Transfer To: Transfer tub bench  Tub - Technique: Ambulating  Tub Transfers: Minimal assistance, Verbal cues  Tub Transfers Comments: pt and spouse completed mock tub transfer with transfer tub bench, pt ambulating with ara walker, min assist req, LUE supported by sling, VCs req for positioning and proper tech, assist req to lift LLE over edge of tub, pt stood with spouse assist to simulate [place], min assist req with support to LUE, no loss of balance noted  Shower Transfers  Shower - Transfer From: Wheelchair  Shower - Transfer Type: To and From  Shower - Transfer To: Shower seat with back  Shower - Technique: Stand pivot, To right, To left  Shower Transfers: Minimal assistance  Shower Transfers Comments: assist x1, VCs and assist for LLE positioning/stabilization, cuing for control, fair/good carryover noted  Wheelchair Bed Transfers  Equipment Used:  Other, Wheelchair, Bed(Tameka Camp)  Level of Asssistance: Dependent/Total    SPEECH:      Current Medications:   Current Facility-Administered Medications: diazePAM (VALIUM) tablet 5 mg, 5 mg, Oral, BID PRN  diclofenac sodium (VOLTAREN) 1 % gel 2 g, 2 g, Topical, BID  tiZANidine (ZANAFLEX) tablet 6 mg, 6 mg, Oral, Daily **AND** tiZANidine (ZANAFLEX) tablet 4 mg, 4 mg, Oral, 2 times per day  sertraline (ZOLOFT) tablet 150 mg, 150 mg, Oral, Daily  amLODIPine (NORVASC) tablet 5 mg, 5 mg, Oral, Daily thiamine mononitrate tablet 100 mg, 100 mg, Oral, Daily  traZODone (DESYREL) tablet 25 mg, 25 mg, Oral, Nightly  gabapentin (NEURONTIN) capsule 300 mg, 300 mg, Oral, Nightly  lisinopril (PRINIVIL;ZESTRIL) tablet 20 mg, 20 mg, Oral, Daily  oxyCODONE (ROXICODONE) immediate release tablet 5 mg, 5 mg, Oral, Q8H PRN  acetaminophen (TYLENOL) tablet 650 mg, 650 mg, Oral, Q4H PRN  buPROPion (WELLBUTRIN XL) extended release tablet 300 mg, 300 mg, Oral, Daily  [COMPLETED] cyanocobalamin injection 1,000 mcg, 1,000 mcg, Intramuscular, Daily **FOLLOWED BY** cyanocobalamin injection 1,000 mcg, 1,000 mcg, Intramuscular, Weekly **FOLLOWED BY** [START ON 2/5/2021] cyanocobalamin injection 1,000 mcg, 1,000 mcg, Intramuscular, M21 Days  folic acid (FOLVITE) tablet 1 mg, 1 mg, Oral, Daily  ipratropium-albuterol (DUONEB) nebulizer solution 1 ampule, 1 ampule, Inhalation, Q4H PRN  lamoTRIgine (LAMICTAL) tablet 400 mg, 400 mg, Oral, Daily  magnesium hydroxide (MILK OF MAGNESIA) 400 MG/5ML suspension 30 mL, 30 mL, Oral, Daily PRN  melatonin tablet 3 mg, 3 mg, Oral, Nightly  muscle rub cream, , Topical, TID PRN  rOPINIRole (REQUIP) tablet 1 mg, 1 mg, Oral, Nightly  sennosides-docusate sodium (SENOKOT-S) 8.6-50 MG tablet 2 tablet, 2 tablet, Oral, Daily  polyethylene glycol (GLYCOLAX) packet 17 g, 17 g, Oral, Daily  bisacodyl (DULCOLAX) suppository 10 mg, 10 mg, Rectal, Daily PRN  senna (SENOKOT) tablet 17.2 mg, 2 tablet, Oral, Daily PRN  enoxaparin (LOVENOX) injection 30 mg, 30 mg, Subcutaneous, BID      Objective:  BP (!) 104/44   Pulse 88   Temp 98.7 °F (37.1 °C) (Oral)   Resp 18   Ht 5' 5\" (1.651 m)   Wt 231 lb 9.6 oz (105.1 kg)   SpO2 100%   BMI 38.54 kg/m²       GEN: Well developed, well nourished, no acute distress  HEENT: NCAT. EOMI. Hearing grossly intact. RESP: Normal breath sounds with no wheezing, rales, or rhonchi. Respirations WNL and unlabored. CV: Regular rate and rhythm. No murmurs, rubs, or gallops. ABD: Soft, non-distended, BS+ and equal.  NEURO: Alert, oriented to person, place, and time. Speech fluent. Moderate spasticity noted in the left upper limb. MSK: Decreased AROM in the left upper and lower limbs due to weakness. Otherwise functional ROM. Muscle bulk is normal bilaterally. Left hemiparesis. LIMBS: No edema in bilateral lower limbs. SKIN: Warm and dry with good turgor. PSYCH: Mood WNL. Affect WNL. Appropriately interactive. Diagnostics:     CBC: No results for input(s): WBC, RBC, HGB, HCT, MCV, RDW, PLT in the last 72 hours. BMP: No results for input(s): NA, K, CL, CO2, PHOS, BUN, CREATININE, GLUCOSE in the last 72 hours. Invalid input(s): CA  BNP: No results for input(s): BNP in the last 72 hours. PT/INR: No results for input(s): PROTIME, INR in the last 72 hours. APTT: No results for input(s): APTT in the last 72 hours. CARDIAC ENZYMES: No results for input(s): CKMB, CKMBINDEX, TROPONINT in the last 72 hours. Invalid input(s): CKTOTAL;3  FASTING LIPID PANEL:  Lab Results   Component Value Date    CHOL 247 (H) 11/30/2020     11/30/2020    TRIG 77 11/30/2020     LIVER PROFILE: No results for input(s): AST, ALT, ALB, BILIDIR, BILITOT, ALKPHOS in the last 72 hours. Impression/Plan:   Impaired ADLs, gait, and mobility due to:    1. Hemorrhagic CVA: PT/OT for gait, mobility, strengthening, endurance, ADLs, and self care. PRAFO for left lower limb and left resting hand splint at night.  Will need bracing for the left lower limb to help with stability and tone - orthotist evaluated 12/22, left AFO to be delivered 1/4/21 per patient. Taping to left shoulder and sling when in therapy and ambulating. 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 Monitoring platelets. 3. Fall out of wheelchair 1/1/21:  Slid too far forward on the seat of the wheelchair with propulsion using the right lower limb. No injuries sustained.

## 2021-01-02 NOTE — PROGRESS NOTES
This nurse contacted house supervisor, Davis Vaughn, to notify of patient's lowering self to floor from wheelchair. Also notified Dr. Milo Hammer of situation; no new orders at this time.

## 2021-01-02 NOTE — PROGRESS NOTES
[facility] Internal Medicine    CONSULTATION / HISTORY AND PHYSICAL EXAMINATION            Date:   2021  Patient name:  Alaine Shone  Date of admission:  2020  8:30 PM  MRN:   [id number]  Account:  [de-identified]  YOB: 1970  PCP:    Maurice Johnson MD  [place]:   [facility] [place] [facility] [place]  Code Status:    Full Code    Physician Requesting Consult: Roni Trammell MD    Reason for Consult: Medical management    Chief Complaint:   Acute left sided weakness     History Obtained From:     patient, electronic medical record    History of Present Illness/ Interval History:   51-year-old female with history of delta storage pool disease, bipolar disorder, alcohol abuse who developed acute left-sided weaknessfound to have intraparenchymal hemorrhage, right parietal lobe hemorrhage and moderate subarachnoid hemorrhage. Pt admitted to rehab unit on 20. Improving with PT/OT     2021    · Patient stable  · No new symptoms 1. Past Medical History:     Past Medical History:   Diagnosis Date    Asthma     Bipolar 1 disorder ([facility] [address]     Delta storage pool disease ([facility] [address]     Hypertension     Psychiatric problem         Past Surgical History:     Past Surgical History:   Procedure Laterality Date     SECTION  1296,[id number]    Miscarriage     CHOLECYSTECTOMY      COLONOSCOPY N/A 2/3/2020    -random bx(normal)hemorrhoids    GASTRIC BYPASS SURGERY      HYSTERECTOMY      KNEE SURGERY      LAPAROSCOPY      TONSILLECTOMY AND ADENOIDECTOMY      UPPER GASTROINTESTINAL ENDOSCOPY N/A 2/3/2020    bx(normal,neg H-Pylori)normal post-bypass endoscopy        Medications Prior to Admission:     Prior to Admission medications    Medication Sig Start Date End Date Taking?  Authorizing Provider STIMATE 1.5 MG/ML SOLN nasal solution INSTILL 2 SPRAYS INTO THE NOSE TWICE DAILY STARTING TWO DAYS BEFORE PROCEDURE 20   Thor Lefort Al-Khalili, MD   amLODIPine (NORVASC) 5 MG tablet Take 5 mg by mouth daily    Historical Provider, MD   QUEtiapine (SEROQUEL) 200 MG tablet Take 200 mg by mouth nightly    Historical Provider, MD   lisinopril (PRINIVIL;ZESTRIL) 20 MG tablet Take 20 mg by mouth daily    Historical Provider, MD   buPROPion (WELLBUTRIN XL) 300 MG XL tablet Take 300 mg by mouth daily 3/5/16   Historical Provider, MD   sertraline (ZOLOFT) 50 MG tablet Take 50 mg by mouth daily 3/5/16   Historical Provider, MD   PROAIR  (90 BASE) MCG/ACT inhaler Inhale 2 puffs into the lungs every 6 hours as needed  14   Historical Provider, MD   lamoTRIgine (LAMICTAL) 200 MG tablet Take 400 mg by mouth daily 2 tabs 14   Historical Provider, MD        Allergies:     Penicillin g, Pcn [penicillins], and Sulfa antibiotics    Social History:     Tobacco:    reports that she has never smoked. She has never used smokeless tobacco.  Alcohol:      reports current alcohol use. Drug Use:  reports no history of drug use. Family History:     Family History   Adopted: Yes   Family history unknown: Yes       Review of Systems:     Positive and Negative as described in HPI. Denies any shortness of breath or cough  Denies chest pain or palpitations  Denies abdominal pain, diarrhea vomiting  Denies any new numbness tremors or weakness. Physical Exam:     /72   Pulse 83   Temp 98.1 °F (36.7 °C) (Oral)   Resp 12   Ht 5' 5\" (1.651 m)   Wt 231 lb 9.6 oz (105.1 kg)   SpO2 99%   BMI 38.54 kg/m²   Temp (24hrs), Av.9 °F (36.6 °C), Min:97.7 °F (36.5 °C), Max:98.1 °F (36.7 °C)      General appearance:  alert, cooperative and no distress  Eyes: Anicteric sclera. Pupils are equally round and reactive to light. Extraocular movements are intact.   Lungs:  clear to auscultation bilaterally, normal effort Heart:  regular rate and rhythm, no murmur  Abdomen:  soft, nontender, nondistended, normal bowel sounds, no masses, hepatomegaly, splenomegaly  Extremities:  no edema, redness, tenderness in the calves  Skin:  no gross lesions, rashes, induration  Neuro:  Alert, oriented X 3, left sided weakness  Power is left arm 0 out of 5 and 1 out of 5 in left lower extremity, increased tone in both left upper and lower extremity  Investigations:      Laboratory Testing:  Lab Results   Component Value Date    WBC 6.8 12/28/2020    HGB 11.8 (L) 12/28/2020    HCT 36.2 12/28/2020    MCV 89.8 12/28/2020     12/28/2020     Lab Results   Component Value Date     12/28/2020    K 4.0 12/28/2020     12/28/2020    CO2 24 12/28/2020    BUN 11 12/28/2020    CREATININE 0.64 12/28/2020    GLUCOSE 86 12/28/2020    CALCIUM 9.9 12/28/2020         Assessment :      Primary Problem  <principal problem not specified>    Active Hospital Problems    Diagnosis Date Noted    Essential hypertension [I10] 12/11/2020    Acute left-sided weakness [R53.1] 12/11/2020    H/O intracranial hemorrhage [Z86.79] 12/09/2020    Vasogenic edema ([address] [G93.6]     Intracranial hemorrhage ([address] [I62.9] 11/30/2020    Bipolar 1 disorder ([address] [F31.9]     Platelet dysfunction ([facility] [address]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 *      1. Intracranial hemorrhage with left-sided weakness. Improving. Repeat CT showed significant improvement   2. Pulmonary hypertension  3. Platelet dysfunction  4. Hypertension. Continue lisinopril 20 mg daily and amlodipine 5 mg . 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 Monitoring platelets   6.  Restless Leg Syndrome: on requip 7. DVT prophylaxis with Lovenox         Consultations:   IP CONSULT TO INTERNAL MEDICINE  IP CONSULT TO DIETITIAN  IP CONSULT TO SOCIAL WORK  INPATIENT CONSULT TO ORTHOTIST/PROSTHETIST      Fabio Arreaga MD  1/2/2021  1:35 PM    Copy sent to Dr. Patria Olguin MD    Please note that this chart was generated using voice recognition Dragon dictation software. Although every effort was made to ensure the accuracy of this automated transcription, some errors in transcription may have occurred.

## 2021-01-02 NOTE — PLAN OF CARE
Problem: Skin Integrity:  Goal: Will show no infection signs and symptoms  Description: Will show no infection signs and symptoms  Outcome: Met This Shift  Goal: Absence of new skin breakdown  Description: Absence of new skin breakdown  Outcome: Met This Shift     Problem: Falls - Risk of:  Goal: Will remain free from falls  Description: Will remain free from falls  Outcome: Met This Shift  Goal: Absence of physical injury  Description: Absence of physical injury  Outcome: Met This Shift     Problem: Pain:  Goal: Pain level will decrease  Description: Pain level will decrease  Outcome: Met This Shift  Goal: Control of acute pain  Description: Control of acute pain  Outcome: Met This Shift  Goal: Control of chronic pain  Description: Control of chronic pain  Outcome: Met This Shift     Problem: Nutrition  Goal: Optimal nutrition therapy  Description: Nutrition Problem #1: Inadequate energy intake  Intervention: Food and/or Nutrient Delivery: Continue Current Diet, Continue Oral Nutrition Supplement  Nutritional Goals: po intake greater than 75%     Outcome: Met This Shift     Problem: Mobility - Impaired:  Goal: Mobility will improve  Description: Mobility will improve  Outcome: Met This Shift     Problem: Neurological  Goal: Maximum potential motor/sensory/cognitive function  Outcome: Ongoing     Problem: Neurological  Goal: Maximum potential motor/sensory/cognitive function  Outcome: Ongoing

## 2021-01-03 PROCEDURE — 6370000000 HC RX 637 (ALT 250 FOR IP): Performed by: INTERNAL MEDICINE

## 2021-01-03 PROCEDURE — 6360000002 HC RX W HCPCS: Performed by: PHYSICAL MEDICINE & REHABILITATION

## 2021-01-03 PROCEDURE — 97530 THERAPEUTIC ACTIVITIES: CPT

## 2021-01-03 PROCEDURE — 6370000000 HC RX 637 (ALT 250 FOR IP): Performed by: NURSE PRACTITIONER

## 2021-01-03 PROCEDURE — 6370000000 HC RX 637 (ALT 250 FOR IP): Performed by: STUDENT IN AN ORGANIZED HEALTH CARE EDUCATION/TRAINING PROGRAM

## 2021-01-03 PROCEDURE — 99231 SBSQ HOSP IP/OBS SF/LOW 25: CPT | Performed by: INTERNAL MEDICINE

## 2021-01-03 PROCEDURE — 6370000000 HC RX 637 (ALT 250 FOR IP): Performed by: PHYSICAL MEDICINE & REHABILITATION

## 2021-01-03 PROCEDURE — 1180000000 HC REHAB R&B

## 2021-01-03 PROCEDURE — 97116 GAIT TRAINING THERAPY: CPT

## 2021-01-03 PROCEDURE — 97110 THERAPEUTIC EXERCISES: CPT

## 2021-01-03 RX ADMIN — SERTRALINE HYDROCHLORIDE 150 MG: 100 TABLET ORAL at 08:01

## 2021-01-03 RX ADMIN — ENOXAPARIN SODIUM 30 MG: 30 INJECTION SUBCUTANEOUS at 20:47

## 2021-01-03 RX ADMIN — GABAPENTIN 300 MG: 300 CAPSULE ORAL at 20:47

## 2021-01-03 RX ADMIN — TRAZODONE HYDROCHLORIDE 25 MG: 50 TABLET ORAL at 20:47

## 2021-01-03 RX ADMIN — ROPINIROLE HYDROCHLORIDE 1 MG: 1 TABLET, FILM COATED ORAL at 20:48

## 2021-01-03 RX ADMIN — TIZANIDINE 6 MG: 4 TABLET ORAL at 08:01

## 2021-01-03 RX ADMIN — AMLODIPINE BESYLATE 5 MG: 5 TABLET ORAL at 08:02

## 2021-01-03 RX ADMIN — FOLIC ACID 1 MG: 1 TABLET ORAL at 08:02

## 2021-01-03 RX ADMIN — THIAMINE HCL TAB 100 MG 100 MG: 100 TAB at 08:00

## 2021-01-03 RX ADMIN — LISINOPRIL 20 MG: 20 TABLET ORAL at 08:02

## 2021-01-03 RX ADMIN — SENNOSIDES AND DOCUSATE SODIUM 2 TABLET: 8.6; 5 TABLET ORAL at 08:00

## 2021-01-03 RX ADMIN — BUPROPION HYDROCHLORIDE 300 MG: 300 TABLET, FILM COATED, EXTENDED RELEASE ORAL at 08:03

## 2021-01-03 RX ADMIN — ENOXAPARIN SODIUM 30 MG: 30 INJECTION SUBCUTANEOUS at 08:00

## 2021-01-03 RX ADMIN — Medication 3 MG: at 20:47

## 2021-01-03 RX ADMIN — DIAZEPAM 5 MG: 5 TABLET ORAL at 12:41

## 2021-01-03 RX ADMIN — TIZANIDINE 4 MG: 4 TABLET ORAL at 14:45

## 2021-01-03 RX ADMIN — LAMOTRIGINE 400 MG: 100 TABLET ORAL at 08:03

## 2021-01-03 RX ADMIN — TIZANIDINE 4 MG: 4 TABLET ORAL at 20:47

## 2021-01-03 RX ADMIN — DIAZEPAM 5 MG: 5 TABLET ORAL at 18:21

## 2021-01-03 RX ADMIN — ACETAMINOPHEN 650 MG: 325 TABLET, FILM COATED ORAL at 18:21

## 2021-01-03 NOTE — PLAN OF CARE
Problem: Skin Integrity:  Goal: Will show no infection signs and symptoms  Description: Will show no infection signs and symptoms  1/3/2021 1451 by Kelly Preciado RN  Outcome: Ongoing  1/3/2021 0516 by Edmund Flores RN  Outcome: Ongoing  Note: Patient has no signs or symptoms of infection. Goal: Absence of new skin breakdown  Description: Absence of new skin breakdown  1/3/2021 1451 by Kelly Preciado RN  Outcome: Ongoing  1/3/2021 0516 by Edmund Flores RN  Outcome: Ongoing     Problem: Falls - Risk of:  Goal: Will remain free from falls  Description: Will remain free from falls  1/3/2021 1451 by Kelly Preciado RN  Outcome: Ongoing  1/3/2021 0516 by Edmund Flores RN  Outcome: Ongoing  Note: No falls during this shift. Call light is within reach. Side rails up x2 with bed in lowest position. Patient safety maintained. Goal: Absence of physical injury  Description: Absence of physical injury  1/3/2021 1451 by Kelly Preciado RN  Outcome: Ongoing  1/3/2021 0516 by Edmund Flores RN  Outcome: Ongoing  Note: No injury this shift. Patient safety maintained.       Problem: Pain:  Goal: Pain level will decrease  Description: Pain level will decrease  1/3/2021 1451 by Kelly Preciado RN  Outcome: Ongoing  1/3/2021 0516 by Edmund Flores RN  Outcome: Ongoing  Goal: Control of acute pain  Description: Control of acute pain  1/3/2021 1451 by Kelly Preciado RN  Outcome: Ongoing  1/3/2021 0516 by Edmund Flores RN  Outcome: Ongoing  Goal: Control of chronic pain  Description: Control of chronic pain  1/3/2021 1451 by Kelly Preciado RN  Outcome: Ongoing  1/3/2021 0516 by Edmund Flores RN  Outcome: Ongoing     Problem: Neurological  Goal: Maximum potential motor/sensory/cognitive function  1/3/2021 1451 by Kelly Preciado RN  Outcome: Ongoing  1/3/2021 0516 by Edmund Flores RN  Outcome: Ongoing     Problem: Nutrition  Goal: Optimal nutrition therapy Description: Nutrition Problem #1: Inadequate energy intake  Intervention: Food and/or Nutrient Delivery: Continue Current Diet, Continue Oral Nutrition Supplement  Nutritional Goals: po intake greater than 75%     1/3/2021 1451 by Halima Conklin RN  Outcome: Ongoing  1/3/2021 0516 by Richi Sousa RN  Outcome: Ongoing     Problem: Neurological  Goal: Maximum potential motor/sensory/cognitive function  1/3/2021 1451 by Halima Conklin RN  Outcome: Ongoing  1/3/2021 0516 by Richi Sousa RN  Outcome: Ongoing     Problem: Mobility - Impaired:  Goal: Mobility will improve  Description: Mobility will improve  1/3/2021 1451 by Halima Conklin RN  Outcome: Ongoing  1/3/2021 0516 by Richi Sousa RN  Outcome: Ongoing

## 2021-01-03 NOTE — PROGRESS NOTES
Physical Therapy  Facility/Department: [facility] ACUTE REHAB  Daily Treatment Note  NAME: Donna Serrano  : 1970  MRN: [id number]    Date of Service: 1/3/2021    Discharge Recommendations:  Patient would benefit from continued therapy after discharge, Home with assist PRN   PT Equipment Recommendations  Equipment Needed: Yes  Mobility Devices: Noralyn Maria Elena; Wheelchair  Walker: Frank-walker  Wheelchair: Light [employer] structures, Functions, Activity limitations: Decreased functional mobility ; Decreased strength;Decreased safe awareness;Decreased balance; Increased pain;Decreased sensation;Decreased fine motor control;Decreased coordination;Decreased posture;Decreased endurance;Decreased vision/visual deficit; Decreased ROM  Treatment Diagnosis: Impaired function. History: medical history of delta storage pool disease, bipolar disorder   REQUIRES PT FOLLOW UP: Yes  Activity Tolerance  Activity Tolerance: Patient Tolerated treatment well     Patient Diagnosis(es): There were no encounter diagnoses. has a past medical history of Asthma, Bipolar 1 disorder ([facility] [address] Delta storage pool disease [facility]), Hypertension, and Psychiatric problem. has a past surgical history that includes  section (7559,3481); Gastric bypass surgery (); Tonsillectomy and adenoidectomy (); Cholecystectomy (); knee surgery (); Hysterectomy (); laparoscopy (); Colonoscopy (N/A, 2/3/2020); and Upper gastrointestinal endoscopy (N/A, 2/3/2020). Restrictions  Restrictions/Precautions  Restrictions/Precautions: Fall Risk  Required Braces or Orthoses?: No  Position Activity Restriction  Other position/activity restrictions: L hypertonicity. PRAFO boot LLE in bed  Subjective   General  Chart Reviewed:  Yes Additional Pertinent Hx: 80-year-old female with history of delta storage pool disease, bipolar disorder, alcohol abuse who developed acute left-sided weaknessfound to have intraparenchymal hemorrhage, right parietal lobe hemorrhage and moderate subarachnoid hemorrhage. Pt admitted to rehab unit on 12/9/20. Response To Previous Treatment: Patient with no complaints from previous session. Family / Caregiver Present: No  Subjective  Subjective: Patient reports sleeping well throughout the night; eager to participate in therapy   Pain Screening  Patient Currently in Pain: Denies  Vital Signs  Patient Currently in Pain: Denies       Orientation  Orientation  Overall Orientation Status: Within Functional Limits  Objective      Transfers  Sit to Stand: Contact guard assistance  Stand to sit: Contact guard assistance  Ambulation  Ambulation?: Yes  Ambulation 1  Surface: level tile  Device: Hemiwalker  Assistance: Contact guard assistance  Quality of Gait: spasmic/flexor tone on LLE; poor heel strike toe off, lacking normal flexion of knee, cueing to soft lock knee in extension and widen KRISTIN, weight shift and hip neutral over stance leg  Gait Deviations: Decreased step length;Decreased step height;Decreased arm swing;Decreased head and trunk rotation;Deviated path; Slow [place]  Distance: 140ft  Comments: Hemiwalker is patients prefered method of walking d/t increased stability provided. Stairs/Curb  Stairs?: Yes  Stairs  # Steps : 9(x2)  Stairs Height: 8\"  Rails: Right ascending  Assistance: Minimal assistance  Comment: Ascends forward, descends retro. ascending with LLE for WB placement to prevent mare/crossing of LLE in swing phase. patient able place LLE unassisted. Retro step down with RLE to maintain WB LLE to prevent scissoring and unassisted LLE foot placement during sequencing.         Other exercises  Other exercises?: Yes  Other exercises 1: forward, retro, and lateral walking 40ftx 2 on méndez rail Other exercises 2: modified SLS with 6in step x10 reps   Other exercises 3: wheelchair mobility with cones 3 ft apart x20ft   Other exercises 4: seated B LE 1# x20 reps; orange tband     Goals  Short term goals  Time Frame for Short term goals: 10 days  Short term goal 1: pt will perform bed mobility with min A  Short term goal 2: pt will dangle EOB/EOM for 20 to 25 minutes with SBA, performing challenged seated balance/corestrengthening  Short term goal 3: Pivot transfers with mod A+2  Short term goal 4: WC mobility x 100' with min A  Short term goal 5: progress to standing/pre-gait activities in // bars to facilitate L LE motor fucntion. Long term goals  Time Frame for Long term goals : By LOS  Long term goal 1: Pt able to perform bed mobility independently  Long term goal 2: Pt able to perform transfers at [facility] with verbal cues for LE positioning and safety  Long term goal 3: Pt able to progress to ambulation with appropriate device/L LE bracing,  dist of 50 to 100 ft,  CGA   Long term goal 4: Pt able to propel w/c level surfaces dist of 150 ft , mod-I. Long term goal 5: Improve L LE strength to atleast 2 to 2+/5 to improve fucntion. Long term goal 6: Improve PASS score from 8/30 to 23/36 to improve overall fucntion. Long term goal 7: Pt able to go up and down flight of steps with rail/Assistive device,  mod A./min A  Long term goal 8: Family training for safe fucntional mobility, including stari training. Patient Goals   Patient goals : to regain use of L arm and L leg    Plan    Plan  Times per week: 1.5hr/day, 5 to 7 days/week.   Times per day: Daily  Current Treatment Recommendations: Strengthening, ROM, Balance Training, Functional Mobility Training, Transfer Training, Wheelchair Mobility Training, Gait Training, Neuromuscular Re-education, Pain Management, Home Exercise Program, Safety Education & Training, Patient/Caregiver Education & Training, Positioning, Endurance Training, Stair training Safety Devices  Type of devices: Gait belt  Restraints  Initially in place: No        01/03/21 0849 01/03/21 1304   PT Individual Minutes   Time In 5213 3257   Time Out 0933 1327   Minutes 69 36     Electronically signed by Humaira Porras PTA on 1/3/21 at 1:34 PM EST

## 2021-01-03 NOTE — PLAN OF CARE
Problem: Skin Integrity:  Goal: Will show no infection signs and symptoms  Description: Will show no infection signs and symptoms  Outcome: Ongoing  Note: Patient has no signs or symptoms of infection. Problem: Falls - Risk of:  Goal: Will remain free from falls  Description: Will remain free from falls  Outcome: Ongoing  Note: No falls during this shift. Call light is within reach. Side rails up x2 with bed in lowest position. Patient safety maintained. Problem: Falls - Risk of:  Goal: Absence of physical injury  Description: Absence of physical injury  Outcome: Ongoing  Note: No injury this shift. Patient safety maintained.

## 2021-01-03 NOTE — PROGRESS NOTES
[facility] Internal Medicine    CONSULTATION / HISTORY AND PHYSICAL EXAMINATION            Date:   1/3/2021  Patient name:  Debbie Cat  Date of admission:  2020  8:30 PM  MRN:   [id number]  Account:  [de-identified]  YOB: 1970  PCP:    Saba Escobar MD  Room:   [facility]  Code Status:    Full Code    Physician Requesting Consult: Renee Bowman MD    Reason for Consult: Medical management    Chief Complaint:   Acute left sided weakness     History Obtained From:     patient, electronic medical record    History of Present Illness/ Interval History:   77-year-old female with history of delta storage pool disease, bipolar disorder, alcohol abuse who developed acute left-sided weaknessfound to have intraparenchymal hemorrhage, right parietal lobe hemorrhage and moderate subarachnoid hemorrhage. Pt admitted to rehab unit on 20. Improving with PT/OT     1/3/2021    · Patient stable  · No new symptoms 1. Past Medical History:     Past Medical History:   Diagnosis Date    Asthma     B[facility] 1 disorder ([facility] [address]     Delta storage pool disease ([facility] [address]     Hypertension     Psychiatric problem         Past Surgical History:     Past Surgical History:   Procedure Laterality Date     SECTION  0584,2946    Miscarriage     CHOLECYSTECTOMY      COLONOSCOPY N/A 2/3/2020    -random bx(normal)hemorrhoids    GASTRIC BYPASS SURGERY      HYSTERECTOMY      KNEE SURGERY      LAPAROSCOPY      TONSILLECTOMY AND ADENOIDECTOMY      UPPER GASTROINTESTINAL ENDOSCOPY N/A 2/3/2020    bx(normal,neg H-Pylori)normal post-bypass endoscopy        Medications Prior to Admission:     Prior to Admission medications    Medication Sig Start Date End Date Taking?  Authorizing Provider STIMATE 1.5 MG/ML SOLN nasal solution INSTILL 2 SPRAYS INTO THE NOSE TWICE DAILY STARTING TWO DAYS BEFORE PROCEDURE 20   Jen Campos MD   amLODIPine (NORVASC) 5 MG tablet Take 5 mg by mouth daily    Historical Provider, MD   QUEtiapine (SEROQUEL) 200 MG tablet Take 200 mg by mouth nightly    Historical Provider, MD   lisinopril (PRINIVIL;ZESTRIL) 20 MG tablet Take 20 mg by mouth daily    Historical Provider, MD   buPROPion (WELLBUTRIN XL) 300 MG XL tablet Take 300 mg by mouth daily 3/5/16   Historical Provider, MD   sertraline (ZOLOFT) 50 MG tablet Take 50 mg by mouth daily 3/5/16   Historical Provider, MD   PROAIR  (90 BASE) MCG/ACT inhaler Inhale 2 puffs into the lungs every 6 hours as needed  14   Historical Provider, MD   lamoTRIgine (LAMICTAL) 200 MG tablet Take 400 mg by mouth daily 2 tabs 14   Historical Provider, MD        Allergies:     Penicillin g, Pcn [penicillins], and Sulfa antibiotics    Social History:     Tobacco:    reports that she has never smoked. She has never used smokeless tobacco.  Alcohol:      reports current alcohol use. Drug Use:  reports no history of drug use. Family History:     Family History   Adopted: Yes   Family history unknown: Yes       Review of Systems:     Positive and Negative as described in HPI. Denies any shortness of breath or cough  Denies chest pain or palpitations  Denies abdominal pain, diarrhea vomiting  Denies any new numbness tremors or weakness. Physical Exam:     /65   Pulse 67   Temp 98.1 °F (36.7 °C) (Oral)   Resp 18   Ht 5' 5\" (1.651 m)   Wt 231 lb 9.6 oz (105.1 kg)   SpO2 98%   BMI 38.54 kg/m²   Temp (24hrs), Av.4 °F (36.9 °C), Min:98.1 °F (36.7 °C), Max:98.7 °F (37.1 °C)      General appearance:  alert, cooperative and no distress  Eyes: Anicteric sclera. Pupils are equally round and reactive to light. Extraocular movements are intact.   Lungs:  clear to auscultation bilaterally, normal effort Heart:  regular rate and rhythm, no murmur  Abdomen:  soft, nontender, nondistended, normal bowel sounds, no masses, hepatomegaly, splenomegaly  Extremities:  no edema, redness, tenderness in the calves  Skin:  no gross lesions, rashes, induration  Neuro:  Alert, oriented X 3, left sided weakness  Power is left arm 0 out of 5 and 1 out of 5 in left lower extremity, increased tone in both left upper and lower extremity  Investigations:      Laboratory Testing:  Lab Results   Component Value Date    WBC 6.8 12/28/2020    HGB 11.8 (L) 12/28/2020    HCT 36.2 12/28/2020    MCV 89.8 12/28/2020     12/28/2020     Lab Results   Component Value Date     12/28/2020    K 4.0 12/28/2020     12/28/2020    CO2 24 12/28/2020    BUN 11 12/28/2020    CREATININE 0.64 12/28/2020    GLUCOSE 86 12/28/2020    CALCIUM 9.9 12/28/2020         Assessment :      Primary Problem  <principal problem not specified>    Active Hospital Problems    Diagnosis Date Noted    Essential hypertension [I10] 12/11/2020    Acute left-sided weakness [R53.1] 12/11/2020    H/O intracranial hemorrhage [Z86.79] 12/09/2020    Vasogenic edema ([address] [G93.6]     Intracranial hemorrhage ([address] [I62.9] 11/30/2020    Bipolar 1 disorder ([address] [F31.9]     Platelet dysfunction ([facility] [address]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 *      1. Intracranial hemorrhage with left-sided weakness. Improving. Repeat CT showed significant improvement   2. Pulmonary hypertension  3. Platelet dysfunction  4. Hypertension. Continue lisinopril 20 mg daily and amlodipine 5 mg . 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 Monitoring platelets   6.  Restless Leg Syndrome: on requip 7. DVT prophylaxis with Lovenox         Consultations:   IP CONSULT TO INTERNAL MEDICINE  IP CONSULT TO DIETITIAN  IP CONSULT TO SOCIAL WORK  INPATIENT CONSULT TO ORTHOTIST/PROSTHETIST      Rani Linares MD  1/3/2021  1:17 PM    Copy sent to Dr. Alex Jose MD    Please note that this chart was generated using voice recognition Dragon dictation software. Although every effort was made to ensure the accuracy of this automated transcription, some errors in transcription may have occurred.

## 2021-01-04 VITALS
HEIGHT: 65 IN | OXYGEN SATURATION: 100 % | TEMPERATURE: 97.7 F | BODY MASS INDEX: 38.59 KG/M2 | WEIGHT: 231.6 LBS | DIASTOLIC BLOOD PRESSURE: 69 MMHG | HEART RATE: 101 BPM | SYSTOLIC BLOOD PRESSURE: 127 MMHG | RESPIRATION RATE: 16 BRPM

## 2021-01-04 LAB
ANION GAP SERPL CALCULATED.3IONS-SCNC: 11 MMOL/L (ref 9–17)
BUN BLDV-MCNC: 13 MG/DL (ref 6–20)
BUN/CREAT BLD: NORMAL (ref 9–20)
CALCIUM SERPL-MCNC: 10 MG/DL (ref 8.6–10.4)
CHLORIDE BLD-SCNC: 102 MMOL/L (ref 98–107)
CO2: 23 MMOL/L (ref 20–31)
CREAT SERPL-MCNC: 0.62 MG/DL (ref 0.5–0.9)
GFR AFRICAN AMERICAN: >60 ML/MIN
GFR NON-AFRICAN AMERICAN: >60 ML/MIN
GFR SERPL CREATININE-BSD FRML MDRD: NORMAL ML/MIN/{1.73_M2}
GFR SERPL CREATININE-BSD FRML MDRD: NORMAL ML/MIN/{1.73_M2}
GLUCOSE BLD-MCNC: 83 MG/DL (ref 70–99)
HCT VFR BLD CALC: 35.8 % (ref 36–46)
HEMOGLOBIN: 12.1 G/DL (ref 12–16)
MCH RBC QN AUTO: 30.3 PG (ref 26–34)
MCHC RBC AUTO-ENTMCNC: 33.8 G/DL (ref 31–37)
MCV RBC AUTO: 89.8 FL (ref 80–100)
NRBC AUTOMATED: ABNORMAL PER 100 WBC
PDW BLD-RTO: 13.8 % (ref 11.5–14.9)
PLATELET # BLD: 378 K/UL (ref 150–450)
PMV BLD AUTO: 7 FL (ref 6–12)
POTASSIUM SERPL-SCNC: 4.4 MMOL/L (ref 3.7–5.3)
RBC # BLD: 3.99 M/UL (ref 4–5.2)
SODIUM BLD-SCNC: 136 MMOL/L (ref 135–144)
WBC # BLD: 5.5 K/UL (ref 3.5–11)

## 2021-01-04 PROCEDURE — 85027 COMPLETE CBC AUTOMATED: CPT

## 2021-01-04 PROCEDURE — 6370000000 HC RX 637 (ALT 250 FOR IP): Performed by: PHYSICAL MEDICINE & REHABILITATION

## 2021-01-04 PROCEDURE — 6370000000 HC RX 637 (ALT 250 FOR IP): Performed by: NURSE PRACTITIONER

## 2021-01-04 PROCEDURE — 80048 BASIC METABOLIC PNL TOTAL CA: CPT

## 2021-01-04 PROCEDURE — 6370000000 HC RX 637 (ALT 250 FOR IP): Performed by: STUDENT IN AN ORGANIZED HEALTH CARE EDUCATION/TRAINING PROGRAM

## 2021-01-04 PROCEDURE — 6370000000 HC RX 637 (ALT 250 FOR IP): Performed by: INTERNAL MEDICINE

## 2021-01-04 PROCEDURE — 6360000002 HC RX W HCPCS: Performed by: PHYSICAL MEDICINE & REHABILITATION

## 2021-01-04 PROCEDURE — 99231 SBSQ HOSP IP/OBS SF/LOW 25: CPT | Performed by: INTERNAL MEDICINE

## 2021-01-04 PROCEDURE — 97530 THERAPEUTIC ACTIVITIES: CPT

## 2021-01-04 PROCEDURE — 99232 SBSQ HOSP IP/OBS MODERATE 35: CPT | Performed by: PHYSICAL MEDICINE & REHABILITATION

## 2021-01-04 PROCEDURE — 97542 WHEELCHAIR MNGMENT TRAINING: CPT

## 2021-01-04 PROCEDURE — 97110 THERAPEUTIC EXERCISES: CPT

## 2021-01-04 PROCEDURE — 36415 COLL VENOUS BLD VENIPUNCTURE: CPT

## 2021-01-04 PROCEDURE — 97112 NEUROMUSCULAR REEDUCATION: CPT

## 2021-01-04 PROCEDURE — 97116 GAIT TRAINING THERAPY: CPT

## 2021-01-04 PROCEDURE — 97535 SELF CARE MNGMENT TRAINING: CPT

## 2021-01-04 PROCEDURE — 1180000000 HC REHAB R&B

## 2021-01-04 RX ORDER — AMLODIPINE BESYLATE 5 MG/1
5 TABLET ORAL DAILY
Qty: 30 TABLET | Refills: 3 | Status: SHIPPED | OUTPATIENT
Start: 2021-01-05

## 2021-01-04 RX ORDER — DIAZEPAM 5 MG/1
5 TABLET ORAL 2 TIMES DAILY PRN
Qty: 60 TABLET | Refills: 0 | Status: SHIPPED | OUTPATIENT
Start: 2021-01-04 | End: 2021-01-14

## 2021-01-04 RX ORDER — SENNA AND DOCUSATE SODIUM 50; 8.6 MG/1; MG/1
2 TABLET, FILM COATED ORAL DAILY
Qty: 30 TABLET | Refills: 0 | Status: SHIPPED | OUTPATIENT
Start: 2021-01-05 | End: 2021-09-29

## 2021-01-04 RX ORDER — GABAPENTIN 300 MG/1
300 CAPSULE ORAL NIGHTLY
Qty: 30 CAPSULE | Refills: 0 | Status: SHIPPED | OUTPATIENT
Start: 2021-01-04 | End: 2021-01-28 | Stop reason: SDUPTHER

## 2021-01-04 RX ORDER — LANOLIN ALCOHOL/MO/W.PET/CERES
3 CREAM (GRAM) TOPICAL NIGHTLY
Qty: 30 TABLET | Refills: 0 | Status: SHIPPED | OUTPATIENT
Start: 2021-01-04 | End: 2022-01-12

## 2021-01-04 RX ORDER — BUPROPION HYDROCHLORIDE 300 MG/1
300 TABLET ORAL DAILY
Qty: 30 TABLET | Refills: 3 | Status: SHIPPED | OUTPATIENT
Start: 2021-01-05

## 2021-01-04 RX ORDER — LISINOPRIL 20 MG/1
20 TABLET ORAL DAILY
Qty: 30 TABLET | Refills: 3 | Status: SHIPPED | OUTPATIENT
Start: 2021-01-05 | End: 2021-10-12

## 2021-01-04 RX ORDER — GAUZE BANDAGE 2" X 2"
100 BANDAGE TOPICAL DAILY
Qty: 30 TABLET | Refills: 0 | Status: SHIPPED | OUTPATIENT
Start: 2021-01-05 | End: 2021-02-09 | Stop reason: SDUPTHER

## 2021-01-04 RX ORDER — TIZANIDINE 4 MG/1
4 TABLET ORAL 3 TIMES DAILY
Status: DISCONTINUED | OUTPATIENT
Start: 2021-01-04 | End: 2021-01-05 | Stop reason: HOSPADM

## 2021-01-04 RX ORDER — ROPINIROLE 1 MG/1
1 TABLET, FILM COATED ORAL NIGHTLY
Qty: 30 TABLET | Refills: 0 | Status: SHIPPED | OUTPATIENT
Start: 2021-01-04 | End: 2021-01-28 | Stop reason: SDUPTHER

## 2021-01-04 RX ORDER — POLYETHYLENE GLYCOL 3350 17 G/17G
17 POWDER, FOR SOLUTION ORAL DAILY
Qty: 527 G | Refills: 1 | Status: SHIPPED | OUTPATIENT
Start: 2021-01-05 | End: 2021-02-04

## 2021-01-04 RX ORDER — TIZANIDINE 4 MG/1
4 TABLET ORAL 3 TIMES DAILY
Qty: 90 TABLET | Refills: 0 | Status: SHIPPED | OUTPATIENT
Start: 2021-01-04 | End: 2021-01-28 | Stop reason: SDUPTHER

## 2021-01-04 RX ORDER — FOLIC ACID 1 MG/1
1 TABLET ORAL DAILY
Qty: 30 TABLET | Refills: 3 | Status: SHIPPED | OUTPATIENT
Start: 2021-01-05 | End: 2021-01-28 | Stop reason: SDUPTHER

## 2021-01-04 RX ORDER — CYANOCOBALAMIN 1000 UG/ML
1000 INJECTION INTRAMUSCULAR; SUBCUTANEOUS WEEKLY
Qty: 8 ML | Refills: 0 | Status: SHIPPED | OUTPATIENT
Start: 2021-01-08 | End: 2021-03-30

## 2021-01-04 RX ORDER — OXYCODONE HYDROCHLORIDE 5 MG/1
5 TABLET ORAL 2 TIMES DAILY PRN
Qty: 14 TABLET | Refills: 0 | Status: SHIPPED | OUTPATIENT
Start: 2021-01-04 | End: 2021-01-11

## 2021-01-04 RX ADMIN — BUPROPION HYDROCHLORIDE 300 MG: 300 TABLET, FILM COATED, EXTENDED RELEASE ORAL at 09:03

## 2021-01-04 RX ADMIN — LAMOTRIGINE 400 MG: 100 TABLET ORAL at 09:02

## 2021-01-04 RX ADMIN — AMLODIPINE BESYLATE 5 MG: 5 TABLET ORAL at 09:03

## 2021-01-04 RX ADMIN — Medication 3 MG: at 20:14

## 2021-01-04 RX ADMIN — TIZANIDINE 4 MG: 4 TABLET ORAL at 13:55

## 2021-01-04 RX ADMIN — OXYCODONE HYDROCHLORIDE 5 MG: 5 TABLET ORAL at 15:29

## 2021-01-04 RX ADMIN — OXYCODONE HYDROCHLORIDE 5 MG: 5 TABLET ORAL at 00:20

## 2021-01-04 RX ADMIN — TIZANIDINE 4 MG: 4 TABLET ORAL at 20:14

## 2021-01-04 RX ADMIN — POLYETHYLENE GLYCOL 3350 17 G: 17 POWDER, FOR SOLUTION ORAL at 09:02

## 2021-01-04 RX ADMIN — ROPINIROLE HYDROCHLORIDE 1 MG: 1 TABLET, FILM COATED ORAL at 20:14

## 2021-01-04 RX ADMIN — ENOXAPARIN SODIUM 30 MG: 30 INJECTION SUBCUTANEOUS at 09:02

## 2021-01-04 RX ADMIN — DIAZEPAM 5 MG: 5 TABLET ORAL at 18:14

## 2021-01-04 RX ADMIN — LISINOPRIL 20 MG: 20 TABLET ORAL at 09:03

## 2021-01-04 RX ADMIN — THIAMINE HCL TAB 100 MG 100 MG: 100 TAB at 09:03

## 2021-01-04 RX ADMIN — ENOXAPARIN SODIUM 30 MG: 30 INJECTION SUBCUTANEOUS at 20:15

## 2021-01-04 RX ADMIN — SENNOSIDES AND DOCUSATE SODIUM 2 TABLET: 8.6; 5 TABLET ORAL at 09:03

## 2021-01-04 RX ADMIN — TRAZODONE HYDROCHLORIDE 25 MG: 50 TABLET ORAL at 20:14

## 2021-01-04 RX ADMIN — TIZANIDINE 6 MG: 4 TABLET ORAL at 09:03

## 2021-01-04 RX ADMIN — FOLIC ACID 1 MG: 1 TABLET ORAL at 09:03

## 2021-01-04 RX ADMIN — SERTRALINE HYDROCHLORIDE 150 MG: 100 TABLET ORAL at 09:03

## 2021-01-04 RX ADMIN — ACETAMINOPHEN 650 MG: 325 TABLET, FILM COATED ORAL at 20:13

## 2021-01-04 RX ADMIN — DIAZEPAM 5 MG: 5 TABLET ORAL at 12:17

## 2021-01-04 RX ADMIN — GABAPENTIN 300 MG: 300 CAPSULE ORAL at 20:13

## 2021-01-04 ASSESSMENT — PAIN SCALES - GENERAL
PAINLEVEL_OUTOF10: 5
PAINLEVEL_OUTOF10: 8
PAINLEVEL_OUTOF10: 0

## 2021-01-04 NOTE — PROGRESS NOTES
[facility] Internal Medicine    CONSULTATION / HISTORY AND PHYSICAL EXAMINATION            Date:   2021  Patient name:  Shay Momin  Date of admission:  2020  8:30 PM  MRN:   [id number]  Account:  [de-identified]  YOB: 1970  PCP:    Chloe Jameson MD  Room:   2612/2612-01  Code Status:    Full Code    Physician Requesting Consult: Urbano Bess MD    Reason for Consult: Medical management    Chief Complaint:   Acute left sided weakness     History Obtained From:     patient, electronic medical record    History of Present Illness/ Interval History:   51-year-old female with history of delta storage pool disease, bipolar disorder, alcohol abuse who developed acute left-sided weaknessfound to have intraparenchymal hemorrhage, right parietal lobe hemorrhage and moderate subarachnoid hemorrhage. Pt admitted to rehab unit on 20. Improving with PT/OT     2021    · Patient stable  · No new symptoms 1. Past Medical History:     Past Medical History:   Diagnosis Date    Asthma     Bipolar 1 disorder ([facility] [address]     Delta storage pool disease ([facility] [address]     Hypertension     Psychiatric problem         Past Surgical History:     Past Surgical History:   Procedure Laterality Date     SECTION  6061,3983    Miscarriage     CHOLECYSTECTOMY      COLONOSCOPY N/A 2/3/2020    -random bx(normal)hemorrhoids    GASTRIC BYPASS SURGERY      HYSTERECTOMY      KNEE SURGERY  1984    LAPAROSCOPY      TONSILLECTOMY AND ADENOIDECTOMY      UPPER GASTROINTESTINAL ENDOSCOPY N/A 2/3/2020    (normal,neg H-Pylori)normal post-bypass endoscopy        Medications Prior to Admission:     Prior to Admission medications    Medication Sig Start Date End Date Taking?  Authorizing Provider oxyCODONE (ROXICODONE) 5 MG immediate release tablet Take 1 tablet by mouth 2 times daily as needed for Pain for up to 7 days. 1/4/21 1/11/21 Yes Renato Mujica MD   diazePAM (VALIUM) 5 MG tablet Take 1 tablet by mouth 2 times daily as needed (spasms) for up to 10 days. 1/4/21 1/14/21 Yes Renato Mujica MD   gabapentin (NEURONTIN) 300 MG capsule Take 1 capsule by mouth nightly for 30 days.  1/4/21 2/3/21 Yes Renato Mujica MD   sertraline (ZOLOFT) 50 MG tablet Take 3 tablets by mouth daily 1/5/21  Yes Renato Mujica MD   buPROPion (WELLBUTRIN XL) 300 MG extended release tablet Take 1 tablet by mouth daily 1/5/21  Yes Renato Mujica MD   lisinopril (PRINIVIL;ZESTRIL) 20 MG tablet Take 1 tablet by mouth daily 1/5/21  Yes Renato Mujica MD   rOPINIRole (REQUIP) 1 MG tablet Take 1 tablet by mouth nightly 1/4/21  Yes Renato Mujica MD   amLODIPine (NORVASC) 5 MG tablet Take 1 tablet by mouth daily 1/5/21  Yes Renato Mujica MD   cyanocobalamin 1000 MCG/ML injection Inject 1 mL into the muscle once a week for 4 doses Once a week for 4 more weeks then followed by monthly injections,l follow-up PCP for continuation 1/8/21 1/30/21 Yes Renato Mujica MD   folic acid (FOLVITE) 1 MG tablet Take 1 tablet by mouth daily 1/5/21  Yes Renato Mujica MD   polyethylene glycol (GLYCOLAX) 17 g packet Take 17 g by mouth daily 1/5/21 2/4/21 Yes Renato Mujica MD   sennosides-docusate sodium (SENOKOT-S) 8.6-50 MG tablet Take 2 tablets by mouth daily 1/5/21  Yes Renato Mujica MD   melatonin 3 MG TABS tablet Take 1 tablet by mouth nightly 1/4/21  Yes Renato Mujica MD   thiamine mononitrate 100 MG tablet Take 1 tablet by mouth daily 1/5/21  Yes [place], MD   tiZANidine (ZANAFLEX) 4 MG tablet Take 1 tablet by mouth 3 times daily 1/4/21  Yes Renato Mujica MD   PROAIR  (90 BASE) MCG/ACT inhaler Inhale 2 puffs into the lungs every 6 hours as needed  9/30/14   Historical Provider, MD lamoTRIgine (LAMICTAL) 200 MG tablet Take 400 mg by mouth daily 2 tabs 14   Historical Provider, MD        Allergies:     Penicillin g, Pcn [penicillins], and Sulfa antibiotics    Social History:     Tobacco:    reports that she has never smoked. She has never used smokeless tobacco.  Alcohol:      reports current alcohol use. Drug Use:  reports no history of drug use. Family History:     Family History   Adopted: Yes   Family history unknown: Yes       Review of Systems:     Positive and Negative as described in HPI. Denies any shortness of breath or cough  Denies chest pain or palpitations  Denies abdominal pain, diarrhea vomiting  Denies any new numbness tremors or weakness. Physical Exam:     /66   Pulse 97   Temp 98.2 °F (36.8 °C) (Oral)   Resp 16   Ht 5' 5\" (1.651 m)   Wt 231 lb 9.6 oz (105.1 kg)   SpO2 99%   BMI 38.54 kg/m²   Temp (24hrs), Av.2 °F (36.8 °C), Min:98.2 °F (36.8 °C), Max:98.2 °F (36.8 °C)      General appearance:  alert, cooperative and no distress  Eyes: Anicteric sclera. Pupils are equally round and reactive to light. Extraocular movements are intact.   Lungs:  clear to auscultation bilaterally, normal effort  Heart:  regular rate and rhythm, no murmur  Abdomen:  soft, nontender, nondistended, normal bowel sounds, no masses, hepatomegaly, splenomegaly  Extremities:  no edema, redness, tenderness in the calves  Skin:  no gross lesions, rashes, induration  Neuro:  Alert, oriented X 3, left sided weakness  Power is left arm 0 out of 5 and 1 out of 5 in left lower extremity, increased tone in both left upper and lower extremity  Investigations:      Laboratory Testing:  Lab Results   Component Value Date    WBC 5.5 2021    HGB 12.1 2021    HCT 35.8 (L) 2021    MCV 89.8 2021     2021     Lab Results   Component Value Date     2021    K 4.4 2021     2021    CO2 23 2021    BUN 13 2021 CREATININE 0.62 01/04/2021    GLUCOSE 83 01/04/2021    CALCIUM 10.0 01/04/2021         Assessment :      Primary Problem  <principal problem not specified>    Active Hospital Problems    Diagnosis Date Noted    Essential hypertension [I10] 12/11/2020    Acute left-sided weakness [R53.1] 12/11/2020    H/O intracranial hemorrhage [Z86.79] 12/09/2020    Vasogenic edema (HCC) [G93.6]     Intracranial hemorrhage (HCC) [I62.9] 11/30/2020    Bipolar 1 disorder (HCC) [F31.9]     Platelet dysfunction ([facility] [address]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 *      1. Intracranial hemorrhage with left-sided weakness. Improving. Repeat CT showed significant improvement   2. Pulmonary hypertension, controlled  3. Platelet dysfunction  4. Hypertension. Continue lisinopril 20 mg daily and amlodipine 5 mg . 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 Monitoring platelets   6. Restless Leg Syndrome: on requip  7. DVT prophylaxis with Lovenox         Consultations:   IP CONSULT TO INTERNAL MEDICINE  IP CONSULT TO DIETITIAN  IP CONSULT TO SOCIAL WORK  INPATIENT CONSULT TO ORTHOTIST/PROSTHETIST      Keshia Recinos MD  1/4/2021  6:57 PM    Copy sent to Dr. Gadiel Sales MD    Please note that this chart was generated using voice recognition Dragon dictation software. Although every effort was made to ensure the accuracy of this automated transcription, some errors in transcription may have occurred.

## 2021-01-04 NOTE — PROGRESS NOTES
Physical Medicine & Rehabilitation  Progress Note    1/4/2021 12:18 PM     CC: Ambulatory and ADL dysfunction due to hemorrhagic CVA left nondominant hemiparesis    Subjective:   No Complaints. Feels well. Patient feels Valium is helping. Ready for discharge tomorrow. Noted fall over weekendpatient denies any residual.    ROS:  Denies fevers, chills, sweats. No chest pain, palpitations, lightheadedness. Denies coughing, wheezing or shortness of breath. Denies abdominal pain, nausea, diarrhea or constipation. No new areas of joint pain. Denies new areas of numbness or weakness. Denies new anxiety or depression issues. No new skin problems. Rehabilitation:   PT:  Restrictions/Precautions: Fall Risk  Other position/activity restrictions: L hypertonicity. PRAFO boot LLE in bed   Transfers  Sit to Stand: Contact guard assistance  Stand to sit: Contact guard assistance  Bed to Chair: Contact guard assistance  Stand Pivot Transfers: Contact guard assistance  Squat Pivot Transfers: Contact guard assistance  Car Transfer: Minimal Assistance  Comment: Spouse Alfred assisting with his LUE with gait belt during all transfer training preformed. CGA feedback provide by therapist only for training safety purposes. Ambulation 1  Surface: level tile  Device: Hemiwalker  Other Apparatus: (no additional supportive devices used today)  Assistance: Contact guard assistance  Quality of Gait: spasmic/flexor tone on LLE; poor heel strike toe off, lacking normal flexion of knee, cueing to soft lock knee in extension and widen KRISTIN, weight shift and hip neutral over stance leg  Gait Deviations: Decreased step length, Decreased step height, Decreased arm swing, Decreased head and trunk rotation, Deviated path, Slow Meseret  Distance: 140ft  Comments: Hemiwalker is patients prefered method of walking d/t increased stability provided.            OT:  ADL  Equipment Provided: Reacher  Feeding: Setup  Grooming: Modified independent EXTREMITIES: No calf tenderness to palpation bilaterally. No edema BLEs, left hand curling at  restuse of resting hand splint  SKIN: warm dry and intact with good turgor  PSYCH: appropriately interactive. Affect WNL. Medications   Scheduled Meds:   tiZANidine  6 mg Oral Daily    And    tiZANidine  4 mg Oral 2 times per day    sertraline  150 mg Oral Daily    amLODIPine  5 mg Oral Daily    thiamine mononitrate  100 mg Oral Daily    traZODone  25 mg Oral Nightly    gabapentin  300 mg Oral Nightly    lisinopril  20 mg Oral Daily    buPROPion  300 mg Oral Daily    cyanocobalamin  1,000 mcg Intramuscular Weekly    Followed by   Kiara Hazel ON 2/5/2021] cyanocobalamin  1,000 mcg Intramuscular C51 Days    folic acid  1 mg Oral Daily    lamoTRIgine  400 mg Oral Daily    melatonin  3 mg Oral Nightly    rOPINIRole  1 mg Oral Nightly    sennosides-docusate sodium  2 tablet Oral Daily    polyethylene glycol  17 g Oral Daily    enoxaparin  30 mg Subcutaneous BID     Continuous Infusions:  PRN Meds:.diazePAM, oxyCODONE, acetaminophen, ipratropium-albuterol, magnesium hydroxide, muscle rub, bisacodyl, senna     Diagnostics:     CBC:   Recent Labs     01/04/21  0614   WBC 5.5   RBC 3.99*   HGB 12.1   HCT 35.8*   MCV 89.8   RDW 13.8        BMP:   Recent Labs     01/04/21  0614      K 4.4      CO2 23   BUN 13   CREATININE 0.62     BNP: No results for input(s): BNP in the last 72 hours. PT/INR: No results for input(s): PROTIME, INR in the last 72 hours. APTT: No results for input(s): APTT in the last 72 hours. CARDIAC ENZYMES: No results for input(s): CKMB, CKMBINDEX, TROPONINT in the last 72 hours. Invalid input(s): CKTOTAL;3  FASTING LIPID PANEL:  Lab Results   Component Value Date    CHOL 247 (H) 11/30/2020     11/30/2020    TRIG 77 11/30/2020     LIVER PROFILE: No results for input(s): AST, ALT, ALB, BILIDIR, BILITOT, ALKPHOS in the last 72 hours.      I/O (24Hr): Intake/Output Summary (Last 24 hours) at 1/4/2021 1218  Last data filed at 1/4/2021 4462  Gross per 24 hour   Intake 240 ml   Output    Net 240 ml       Glu last 24 hour  No results for input(s): POCGLU in the last 72 hours. No results for input(s): CLARITYU, COLORU, PHUR, SPECGRAV, PROTEINU, RBCUA, BLOODU, BACTERIA, NITRU, WBCUA, LEUKOCYTESUR, YEAST, GLUCOSEU, BILIRUBINUR in the last 72 hours. Impression/Plan:    1. Ambulatory and ADL dysfunction secondary to hemorrhagic CVA: Acute rehab effortsPT/OT, PRAFO for le ft lower limb at night. 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 Monitoring platelets  3. History of headacheMedrol taper 12/13  4. Muscle spasm/myoclonus/spasticity: Now off baclofennot tolerate increase. On schedule tizanidine 12/17 - increased dose 12/20 (6mg/6mg/4mg) but noted increased muscle spasms in the afternoon and evening. Dose adjusted 12/23 (now on 6mg/4mg/4mg). .  Patient she feels she is doing better with decrease dosehesitant to increase again. Improving with Valium 5 mg prior to therapiesimproved  5. Left proximal upper limb pain:  In the area of left proximal biceps/left pec major muscles.-Suspect spasticity, possible tendinitis, possible subluxation though only 1 fingerbreadth. Trial of Voltaren gel . Continue with relaxers, sling when in therapiesdiscussed at length with both patient and  12/20/2020. Reviewed medications, spasticity, sling, etc. x-ray negativesee above,  may benefit from Botox as outpatient  6.  PainRoxicodone 7. HTN: amlodipine (increased 12/21 by IM), lisinopril (decreased 12/14 by IM); labetalol discontinued 12/18 by IM  8. ETOH withdrawal/history of abuse: was treated with IV ativan and librium. On F36 and folic acid now  9. Bipolar Disorder: on wellbutrin, zoloft (increased 12/21 to home dose of 150mg daily), lamictal  10. Restless Leg Syndrome: on requip  11. Insomnia:  On melatonin, gabapentin nightly. Takes trazodone at home - continue low-dose trazodone (started 12/15). 12. Bowel Management: Miralax daily, senokot prn, dulcolax prn. Last bowel movement 12/23 small  13. DVT Prophylaxis:  low molecular weight heparin, SCD's while in bed and HORTENSIA's   14. Internal medicine for medical management  15. Discharge in a.m. if okay with internal medicine follow-up with 1. PCP 2. rehab 3. neurology, continue PT/OT no driving, LFT 1 week       Mino Jacobo MD       This note is created with the assistance of a speech recognition program.  While intending to generate a document that actually reflects the content of the visit, the document can still have some errors including those of syntax and sound a like substitutions which may escape proof reading.   In such instances, actual meaning can be extrapolated by contextual diversion

## 2021-01-04 NOTE — DISCHARGE INSTR - DIET

## 2021-01-04 NOTE — PROGRESS NOTES
[facility]    ACUTE REHABILITATION OCCUPATIONAL THERAPY  DAILY NOTE    Date: 21  Patient Name: Genna Murillo      Room: 2612/2612-01    MRN: [id number]   : 1970  (48 y.o.)  Gender: female   Referring Practitioner: Sherin Bonilla MD  Diagnosis: Intracranial hemmorrhage  Additional Pertinent Hx: 60-year-old female with history of delta storage pool disease, bipolar disorder, alcohol abuse who developed acute left-sided weaknessfound to have intraparenchymal hemorrhage, right parietal lobe hemorrhage and moderate subarachnoid hemorrhage. Pt admitted to rehab unit on 20. Restrictions  Restrictions/Precautions: Fall Risk  Other position/activity restrictions: L hypertonicity. PRAFO boot LLE in bed  Required Braces or Orthoses?: No  Equipment Used: Other, Wheelchair, Bed(Tameka Camp)    Subjective  Subjective: \"Did I get a good score? \"  Comments: pt cooperative and motivated for therapies  Restrictions/Precautions: Fall Risk  Overall Orientation Status: Within Functional Limits          Objective  Cognition  Overall Cognitive Status: WFL  Perception  Overall Perceptual Status: WFL  Unilateral Attention: Cues to attend to left side of body;Cues to attend left visual field  Balance  Sitting Balance: Modified independent   Standing Balance: Contact guard assistance  Transfers  Stand Step Transfers: Contact guard assistance(usign ara walker)  Stand Pivot Transfers: Contact guard assistance;Minimal assistance  Sit to stand: Contact guard assistance  Stand to sit: Contact guard assistance  Transfer Comments: good safety noted. Pt education provided on optimal s/u and sequencing for stand pivot transfers.    Standing Balance  Time: AM: 1-2 min  Activity: AM: functional transfers  Comment: no loss of balance noted, pt demo's good awareness to weight shift on LLE with standing  Functional Mobility  Functional - Mobility Device: Hemiwalker  Activity: Other(few steps in bathroom) Assist Level: Contact guard assistance  Functional Mobility Comments: no loss of balance noted, slow to ambulate due to weight shifting on LLE, support to LUE as needed  Toilet Transfers  Toilet - Technique: Stand pivot  Equipment Used: Standard toilet  Toilet Transfer: Contact guard assistance;Minimal assistance  Toilet Transfers Comments: assist to support LUE, VCs for positioning with good carryover  Shower Transfers  Shower - Transfer From: Wheelchair  Shower - Transfer Type: To and From  Shower - Transfer To: Shower seat with back  Shower - Technique: Stand pivot; To right; To left  Shower Transfers: Contact Guard;Minimal assistance  Shower Transfers Comments: assist x1, VCs and assist for LLE positioning/stabilization, cuing for control, fair/good carryover noted           Additional Activities: PM: end of session, GRANADOS removed pt's L AFO and assessed for s/s of redness or irritation with none noted. Light Housekeeping  Light Housekeeping Level: Wheelchair  Light Housekeeping Level of Assistance: Stand by [employer] Housekeeping: Pt retreived clothing from dryer at wheelchair level, placed on tabletop and folded using one handed techniques. occasional VC for bilateral intergration as able. ADL  Equipment Provided: Reacher  Feeding: Setup  Grooming: Modified independent   UE Bathing: Setup;Supervision  LE Bathing: Setup;Supervision(seated on tub bench, weight shifting to wash buttocks)  UE Dressing: Setup;Stand by assistance;Minimal assistance(assist for untwisitng bra strap)  LE Dressing: Minimal assistance;Setup; Increased time to complete  Toileting: Contact guard assistance(CGA for standing support/LUE support, VC for attending to L )  Additional Comments: Pt completes showering tasks this AM seated on tub bench.   LB dressing: reacher for threading BLES, intermittent assist pulling L side over hips, stabilizing LLE when crossing over knee 2* to slippery fabric Instrumental ADL's  Instrumental ADLs: Yes  Light Housekeeping  Light Housekeeping Level: Wheelchair  Light Housekeeping Level of Assistance: Stand by assistance;Supervision  Dimple Renteria Housekeeping: Pt retreived clothing from dryer at wheelchair level, placed on tabletop and folded using one handed techniques. occasional VC for bilateral intergration as able. Assessment  Performance deficits / Impairments: Decreased functional mobility ; Decreased ADL status; Decreased ROM; Decreased strength;Decreased endurance;Decreased balance;Decreased high-level IADLs;Decreased fine motor control;Decreased coordination;Decreased posture;Decreased safe awareness;Decreased vision/visual deficit  Prognosis: Good  Discharge Recommendations: Home with assist PRN;Patient would benefit from continued therapy after discharge  Activity Tolerance: Patient Tolerated treatment well  Activity Tolerance: excellant motivation and effort, gets tired with exertion  Safety Devices in place: Yes  Type of devices: Left in chair;Call light within reach  Restraints  Initially in place: No  Equipment Recommendations  Equipment Needed: Yes  Walker: Ara-walker  Hand Held Shower: x  Transfer Tub Bench: x  Grab bars: x  Reacher: x  Long-handled Shoehorn: x       Patient Education: shower transfer safety, toilet transfer safety, ara dressing/IADL technqiue  Patient Goals   Patient goals :  To discharge home with family support  Learner:patient  Method: explanation, demonstration      Outcome: acknowledged understanding of  and demonstrated understanding        Plan  Plan  Times per week: 5-7  Times per day: Twice a day Current Treatment Recommendations: Self-Care / ADL, Home Management Training, Strengthening, Balance Training, Functional Mobility Training, Endurance Training, Wheelchair Mobility Training, Neuromuscular Re-education, Pain Management, Safety Education & Training, Patient/Caregiver Education & Training, Equipment Evaluation, Education, & procurement, Cognitive/Perceptual Training  Patient Goals   Patient goals : To discharge home with family support  Short term goals  Time Frame for Short term goals: 7 to 10 days  Short term goal 1: Pt will perform upper body bathing/dressing with stand by assist.  Short term goal 2: Pt will perform lower body bathing and dressing with Moderate assist and Fair safety. Short term goal 3: Pt will perform toileting tasks with Moderate assist.  Short term goal 4: Pt will verbalize/demonstrate Good understanding of assistive equipment/durable medical equipment/modified techniques for increased independence with self-care and mobility. Short term goal 5: Pt will perform functional transfers with Moderate assist to toilet/wheelchair/shower with Fair safety. Short term goal 6: Pt will actively participate in 30+ minutes of therapeutic exercise/functional activities to promote increased independence with self-care and mobility. Long term goals  Time Frame for Long term goals : By discharge  Long term goal 1: Pt will perform BADLs with modified independence and Good safety with assist PRN for HORTENSIA hose. Long term goal 2: Pt will perform functional transfers and mobility with modified independence, least restrictive mobility device, and Good safety. Long term goal 3: Pt will attend to L side of body 100% of the time during self-care, transfers, and mobility with 1-2 verbal cues PRN. Long term goal 4: Pt will stand for 5+ minutes with 1-2 UE support, modified independence and no loss of balance while engaging in functional activity of choice.

## 2021-01-04 NOTE — PROGRESS NOTES
7.  Supine to Non-paretic Side Lateral  Examiner:  Begin with the subject in supine on a treatment mat. Instruct the subject to roll to the non-paretic side (lateral movement). Assist as necessary. Evaluate the subject's Performance on the amount of help required. Do not consider the quality of performance. 3  Can perform without help  8. Supine to Sitting up on the Edge of the Mat   Examiner:  Begin with the subject in supine on a treatment mat. Instruct the subject to come to sitting on the edge of the mat. Assist as necessary. Evaluate the subject's performance on the amount of help required. Do not considered the quality performance. 3  Can perform without help   9. Sitting on the Edge of the Mat to Supine  Examiner: Begin with the subject sitting on the edge of a treatment mat. Instruct the subject to return to supine. Assist as necessary. Evaluate the subjects performance on the amount of help required. Co not consider the quality of performance. 3  Can perform without help  10. Sitting to Standing Up  Examiner:  Begin with the subject sitting on the edge of a treatment mat. Instruct the subject to stand up without support. Assist if necessary. Evaluated the subject's performance on the amount ot help required. Do not consider the quality of the performance. 3  Can perform without help  11. Standing Up to Sitting Down  Examiner:  Begin with the subject standing by the edge of a treatment mat. Instruct the subject to sit on the edge of mat without support. Assist if necessary. Evaluated the subject's performance on the amount of help required. Do not consider the quality of the performance.    3  Can perform without help 12 .  Standing, Picking up a Pencil from the Floor  Examiner:  Begin with the subject standing. Instruct the subject to  a pencil from the floor without support. Assist if necessary. Evaluate the subject's performance on the amount of help required. Do Not consider the quality of the performance.    1  Can perform with much help       Changing Posture SUBTOTAL   19/21    PASS TOTAL   34/36   Electronically signed by Aubrie Hensley PTA on 1/4/2021 at 2:53 PM

## 2021-01-04 NOTE — PLAN OF CARE
Problem: Skin Integrity:  Goal: Will show no infection signs and symptoms  Description: Will show no infection signs and symptoms  1/4/2021 0441 by Raheem Danielle RN  Outcome: Ongoing  Note: Patient has no signs or symptoms of infection. Will continue to monitor. 1/3/2021 1451 by Elizabeth Adkins RN  Outcome: Ongoing     Problem: Falls - Risk of:  Goal: Will remain free from falls  Description: Will remain free from falls  1/4/2021 0441 by Raheem Danielle RN  Outcome: Ongoing  Note: No falls during this shift. Call light is within reach. Side rails up x2 with bed in lowest position. Patient safety maintained. 1/3/2021 1451 by Elizabeth Adkins RN  Outcome: Ongoing     Problem: Falls - Risk of:  Goal: Absence of physical injury  Description: Absence of physical injury  1/4/2021 0441 by Raheem Danielle RN  Outcome: Ongoing  Note: No injury this shift. Patient safety maintained. 1/3/2021 1451 by Elizabeth Adkins RN  Outcome: Ongoing     Problem: Pain:  Description: Pain management should include both nonpharmacologic and pharmacologic interventions. Goal: Pain level will decrease  Description: Pain level will decrease  1/4/2021 0441 by Raheem Danielle RN  Outcome: Ongoing  Note: Patients pain level decreased with PRN medications.    1/3/2021 1451 by Elizabeth Adkins RN  Outcome: Ongoing
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
## 2021-01-04 NOTE — CARE COORDINATION
RAY reached out to [facility] to see if they are able to accept this patient. Bridge reported to this RAY that no home care company will be able to accept this patient as her PCP Dr. Terell Anderson will not follow for home care unless he is the physician to order the home care. RAY called his office and spoke with a Ryan Dowell in the office. She reported that if she has an appointment set up then he will likely follow this patient with Home Care. RAY spoke with the patient and she reported that it was ok to set up an appointment with this physician. RAY called [facility] back and did set up an appointment for her on Tuesday January 12, 2021 @ 10:30 AM.  Ryan Dowell informed this RAY that it should be ok now since the appointment is set up. She took the fax number in order to fax the orders to this Junie Angel has not yet received these orders but [facility] care will accept her if this is taken care of with the Doctor. RAY put the appointment in her follow up list.  RAY awaiting order from Dr. Benedicto Emanuel office. ADD:  Ray ordered this patient's Hemiwalker through [facility] and this will arrive here tomorrow morning.

## 2021-01-04 NOTE — PATIENT CARE CONFERENCE
[facility]   ACUTE REHABILITATION  TEAM CONFERENCE NOTE  Date: 21  Patient Name: Juventino Linton       Room: 2612/2612-01  MRN: [id number]       : 1970  (48 y.o.)     Gender: female       H/O intracranial hemorrhage [Z86.79]  Diagnosis: Intracranial hemmorrhage     NURSING  Bladder  Continent  Bowel   Continent  Intervention    None     Wounds/Incisions/Ulcers: No skin issues identified  Medication Education Program: Patient able to manage medications and being educated by nursing  Pain: no pain concerns to address    Fall Risk:  Falling star program initiated    PHYSICAL THERAPY  Bed mobility  Bridging: Modified independent   Rolling to Left: Modified independent  Rolling to Right: Modified independent  Supine to Sit: Modified independent  Sit to Supine: Modified independent  Scooting: Modified independent  Comment: safety concerns and extra time  Bed Mobility  Bridging: Modified independent   Rolling: Modified independent  Supine to Sit: Modified independent  Sit to Supine: Modified independent  Scooting: Modified independent  Comment: safety concerns and extra time; practiced on 22\" surface heights per home bed height    Transfers:  Sit to Stand: Stand by assistance;Contact guard assistance  Stand to sit: Stand by assistance;Contact guard assistance  Bed to Chair: Contact guard assistance;Stand by assistance  Squat Pivot Transfers: Contact guard assistance    Ambulation 1  Surface: level tile  Device: Hemiwalker  Other Apparatus: AFO(with dorsiflexion assist and ankle support strappig)  Assistance: Contact guard assistance;Stand by assistance(intermittent touching with distraction with walking)  Gait Deviations: Decreased arm swing;Decreased head and trunk rotation;Deviated path;Decreased step length; Increased KRISTIN; Slow Meseret  Distance: 125 feet  Comments: Hemiwalker is patients prefered method of walking d/t increased stability provided.    Ambulation 2  Surface - 2: carpet Device 2: Hemiwalker  Other Apparatus 2: AFO  Assistance 2: Contact guard assistance  Gait Deviations: Slow Meseret; Increased KRISTIN; Decreased step length;Decreased arm swing;Decreased head and trunk rotation;Deviated path  Distance: 20 feet    Wheelchair Activities  Left Brakes Level of Assistance: Modified independent  Right Brakes Level of Assistance: Stand by Assist  Propulsion 1  Propulsion: Manual  Method: RUE;RLE  Level of Assistance: Supervision  Distance: 50 feet        # Steps : 18  Stairs Height: 8\"  Rails: Right ascending  Other Apparatus: AFO  Assistance: Contact guard assistance  Comment: Ascends forward, descends retro. ascending with LLE for WB placement to prevent mare/crossing of LLE in swing phase. patient able place LLE unassisted. Retro step down with RLE to maintain WB LLE to prevent scissoring and unassisted LLE foot placement during sequencing. Assistance Needed: Supervision or touching assistance    Equipment Needed: Yes  Mobility Devices: Nando Jerome, Wheelchair  Walker: Frank-walker  Wheelchair: 5gig [place]  Other: TBD    Active seated L hip flexion x5; vibration elicited erratic L quad & hamstring contraction in seated. Goals  Time Frame for Short term goals: 10 days  Short term goal 1: pt will perform bed mobility with min A  Short term goal 2: pt will dangle EOB/EOM for 20 to 25 minutes with SBA, performing challenged seated balance/corestrengthening  Short term goal 3: Pivot transfers with mod A+2  Short term goal 4: WC mobility x 100' with min A  Short term goal 5: progress to standing/pre-gait activities in // bars to facilitate L LE motor fucntion.       OCCUPATIONAL THERAPY  SELF CARE   Equipment Provided: Reacher  Eating   5  Setup or clean-up assistance     Setup   Oral Hygiene   6  Assistance Needed: Independent     Modified independent    Shower/Bathe Self   4  Assistance Needed: Supervision or touching assistance      UE Bathing: Setup;Supervision LE Bathing: Setup;Supervision(seated on tub bench, weight shifting to wash buttocks)   Upper Body Dressing   3  Assistance Needed: Partial/moderate assistance  for untwisitng bra strap only  Setup;Stand by assistance;Minimal assistance(assist for untwisitng bra strap)   Lower Body Dressing   3  Assistance Needed: Partial/moderate assistance     Putting On/Taking Off Footwear   3  Assistance Needed: Partial/moderate assistance  TEDs only   Minimal assistance;Setup; Increased time to complete   Toilet Transfer             Toilet - Technique: Stand pivot  Equipment Used: Standard toilet  Toilet Transfer: Contact guard assistance;Minimal assistance  Toilet Transfers Comments: assist to support LUE, VCs for positioning with good carryover   Toileting Hygiene   4 Assistance Needed: Supervision or touching assistance      Contact guard assistance(CGA for standing support/LUE support, VC for attending to L )     Shower Transfers: Contact Guard;Minimal assistance  Balance  Sitting Balance: Modified independent   Standing Balance: Contact guard assistance  Standing Balance  Time: AM: 1-2 min  Activity: AM: functional transfers  Comment: no loss of balance noted, pt demo's good awareness to weight shift on LLE with standing    Equipment Recommendations  Equipment Needed: Yes  Walker: Frank-walker  Hand Held Shower: x  Transfer Tub Bench: x  Grab bars: x  Reacher: x  Long-handled Shoehorn: x       Short term goals  Time Frame for Short term goals: 7 to 10 days  Short term goal 1: Pt will perform upper body bathing/dressing with stand by assist.  Short term goal 2: Pt will perform lower body bathing and dressing with Moderate assist and Fair safety. Short term goal 3: Pt will perform toileting tasks with Moderate assist.  Short term goal 4: Pt will verbalize/demonstrate Good understanding of assistive equipment/durable medical equipment/modified techniques for increased independence with self-care and mobility. Short term goal 5: Pt will perform functional transfers with Moderate assist to toilet/wheelchair/shower with Fair safety. Short term goal 6: Pt will actively participate in 30+ minutes of therapeutic exercise/functional activities to promote increased independence with self-care and mobility. SPEECH THERAPY  *  Short Term Goal:     NUTRITION  Weight: 231 lb 9.6 oz (105.1 kg) / Body mass index is 38.54 kg/m². Diet Rx: General. Low Calore, High Protein supplements with meals. PO intake is good. Please see nutrition note for details. SOCIAL WORK ASSESSMENT  Assessment:    Pre-Admission Status:  Lives With: Spouse  Type of Home: House  Home Layout: Two level, Bed/Bath upstairs, 1/2 bath on main level(finished basement for recreation)  Home Access: Stairs to enter without rails, Stairs to enter with rails  Entrance Stairs - Number of Steps: 3-5 steps to enter with 0 HR; 6 + 1 + 8 steps to 2nd floor with R HR for all 15 steps  Entrance Stairs - Rails: Right(R rail going up to 2nd floor.)  Bathroom Shower/Tub: Tub/Shower unit, Curtain  Bathroom Toilet: Standard  Bathroom Equipment: (no DME)  Bathroom Accessibility: Walker accessible, Accessible  Home Equipment: (no DME)  ADL Assistance: Independent  Homemaking Assistance: Independent  Homemaking Responsibilities: Yes  Ambulation Assistance: Independent  Transfer Assistance: Independent  Active : Yes  Mode of Transportation: SUV  Occupation: Part time employment  Type of occupation:  (former )  Leisure & Hobbies: Hanging out with family, bingeing Netflix series, playing Poker with family  Additional Comments: Pt's spouse is a teacher and works full time. Pt has 2 adult sons who live nearby and stop by the home.      Family Education: Family Education Completed    Percentage Risk for Readmission: Low 0 - 18%    Risk of Unplanned Readmission:      17 %    Critical Items: None     Problem / Barrier Intervention / Plan [employer] Impaired function related to deficits from stroke Strengthening, facilitating motor return Left Hemibody, functional mobility training with appropriate device     Pt slight impulsive Educate pt in safety     Left neglect Pt education, work on scanning left side environment.     Impaired independence and safety with self-care tasks ADL training, AE training, education re: modified techniques, safety, and energy conservation as needed                                   Total Self Care Score    Total Mobility Score  Admission Score:  15      Admission Score:  18  Goal:  39/42         Goal:  55/90   `  Discharge Plan   Estimated Discharge Date: 1/5/21  Overnight or Day Pass: No  Factors facilitating achievement of predicted outcomes: Family support, Motivated, Cooperative, Pleasant and Good insight into deficits  Barriers to the achievement of predicted outcomes: Decreased endurance, Upper extremity weakness, Lower extremity weakness, Medical complications, Skin Care and Medication managment    Functional Goals at discharge:  Predicted Outcome: Home with familyPATIENT'S LEVEL OF ASSISTANCE: Contact Guard / Rani Reyes Dr, min assist ADL  Discharge therapy goals:  PT: Long term goals  Time Frame for Long term goals : By LOS  Long term goal 1: Pt able to perform bed mobility independently  Long term goal 2: Pt able to perform transfers at SBA with verbal cues for LE positioning and safety  Long term goal 3: Pt able to progress to ambulation with appropriate device/L LE bracing,  dist of 50 to 100 ft,  CGA   Long term goal 4: Pt able to propel w/c level surfaces dist of 150 ft , mod-I. Long term goal 5: Improve L LE strength to atleast 2 to 2+/5 to improve fucntion. Long term goal 6: Improve PASS score from 8/30 to 23/36 to improve overall fucntion.   Long term goal 7: Pt able to go up and down flight of steps with rail/Assistive device,  mod A./min A Long term goal 8: Family training for safe fucntional mobility, including stari training. OT:Long term goals  Time Frame for Long term goals : By discharge  Long term goal 1: Pt will perform BADLs with modified independence and Good safety with assist PRN for HORTENSIA hose. Long term goal 2: Pt will perform functional transfers and mobility with modified independence, least restrictive mobility device, and Good safety. Long term goal 3: Pt will attend to L side of body 100% of the time during self-care, transfers, and mobility with 1-2 verbal cues PRN. Long term goal 4: Pt will stand for 5+ minutes with 1-2 UE support, modified independence and no loss of balance while engaging in functional activity of choice. Long term goal 5: Pt will verbalize/demonstrate Good understanding of home exercise program for LUE. Long term goals 6: 9 hole peg, box and block to be assessed for LUE as appropriate  ST:     Team Members Present at Conference:  :  [place]   Occupational Therapist: Vamshi Meléndez OT   [address] PT  Speech Therapist:  N/A  Nurse: Raymond Lindsey RN   Dietary/Nutrition: Laura Ibarra RD LD  Pastoral Care: Baby Cifuentes  CMG:  Michele Mcmullen RN    I approve the established interdisciplinary plan of care as documented within the medical record of [address] Ingris Diaz.  Adelaide Walter MD

## 2021-01-05 PROCEDURE — 97110 THERAPEUTIC EXERCISES: CPT

## 2021-01-05 PROCEDURE — 97116 GAIT TRAINING THERAPY: CPT

## 2021-01-05 PROCEDURE — 6370000000 HC RX 637 (ALT 250 FOR IP): Performed by: INTERNAL MEDICINE

## 2021-01-05 PROCEDURE — 6370000000 HC RX 637 (ALT 250 FOR IP): Performed by: NURSE PRACTITIONER

## 2021-01-05 PROCEDURE — 6370000000 HC RX 637 (ALT 250 FOR IP): Performed by: STUDENT IN AN ORGANIZED HEALTH CARE EDUCATION/TRAINING PROGRAM

## 2021-01-05 PROCEDURE — 6370000000 HC RX 637 (ALT 250 FOR IP): Performed by: PHYSICAL MEDICINE & REHABILITATION

## 2021-01-05 PROCEDURE — 6360000002 HC RX W HCPCS: Performed by: PHYSICAL MEDICINE & REHABILITATION

## 2021-01-05 PROCEDURE — 99239 HOSP IP/OBS DSCHRG MGMT >30: CPT | Performed by: PHYSICAL MEDICINE & REHABILITATION

## 2021-01-05 PROCEDURE — 97530 THERAPEUTIC ACTIVITIES: CPT

## 2021-01-05 PROCEDURE — 97535 SELF CARE MNGMENT TRAINING: CPT

## 2021-01-05 RX ADMIN — LISINOPRIL 20 MG: 20 TABLET ORAL at 09:32

## 2021-01-05 RX ADMIN — FOLIC ACID 1 MG: 1 TABLET ORAL at 09:32

## 2021-01-05 RX ADMIN — SERTRALINE HYDROCHLORIDE 150 MG: 100 TABLET ORAL at 09:32

## 2021-01-05 RX ADMIN — THIAMINE HCL TAB 100 MG 100 MG: 100 TAB at 09:32

## 2021-01-05 RX ADMIN — DIAZEPAM 5 MG: 5 TABLET ORAL at 12:42

## 2021-01-05 RX ADMIN — TIZANIDINE 4 MG: 4 TABLET ORAL at 09:32

## 2021-01-05 RX ADMIN — AMLODIPINE BESYLATE 5 MG: 5 TABLET ORAL at 09:32

## 2021-01-05 RX ADMIN — ENOXAPARIN SODIUM 30 MG: 30 INJECTION SUBCUTANEOUS at 09:32

## 2021-01-05 RX ADMIN — TIZANIDINE 4 MG: 4 TABLET ORAL at 12:42

## 2021-01-05 RX ADMIN — SENNOSIDES AND DOCUSATE SODIUM 2 TABLET: 8.6; 5 TABLET ORAL at 09:32

## 2021-01-05 RX ADMIN — LAMOTRIGINE 400 MG: 100 TABLET ORAL at 09:33

## 2021-01-05 RX ADMIN — BUPROPION HYDROCHLORIDE 300 MG: 300 TABLET, FILM COATED, EXTENDED RELEASE ORAL at 09:33

## 2021-01-05 NOTE — PLAN OF CARE
Problem: Skin Integrity:  Goal: Will show no infection signs and symptoms  Description: Will show no infection signs and symptoms  1/5/2021 0430 by Amita Ochoa RN  Outcome: Met This Shift  Goal: Absence of new skin breakdown  Description: Absence of new skin breakdown  1/5/2021 0430 by Amita Ochoa RN  Outcome: Met This Shift     Problem: Falls - Risk of:  Goal: Will remain free from falls  Description: Will remain free from falls  1/5/2021 0430 by Amita Ochoa RN  Outcome: Met This Shift  Goal: Absence of physical injury  Description: Absence of physical injury  1/5/2021 0430 by Amita Ochoa RN  Outcome: Met This Shift     Problem: Pain:  Goal: Pain level will decrease  Description: Pain level will decrease  1/5/2021 0430 by Amita Ochoa RN  Outcome: Met This Shift  Goal: Control of acute pain  Description: Control of acute pain  1/5/2021 0430 by Amita Ochoa RN  Outcome: Met This Shift  Goal: Control of chronic pain  Description: Control of chronic pain  1/5/2021 0430 by Amita Ochoa RN  Outcome: Met This Shift     Problem: Neurological  Goal: Maximum potential motor/sensory/cognitive function  1/5/2021 0430 by Amita Ochoa RN  Outcome: Met This Shift     Problem: Nutrition  Goal: Optimal nutrition therapy  Description: Nutrition Problem #1: Inadequate energy intake  Intervention: Food and/or Nutrient Delivery: Continue Current Diet, Continue Oral Nutrition Supplement  Nutritional Goals: po intake greater than 75%     1/5/2021 0430 by Amita Ochoa RN  Outcome: Met This Shift     Problem: Neurological  Goal: Maximum potential motor/sensory/cognitive function  1/5/2021 0430 by Amita Ochoa RN  Outcome: Met This Shift     Problem: Mobility - Impaired:  Goal: Mobility will improve  Description: Mobility will improve  1/5/2021 0430 by Amita Ochoa RN  Outcome: Met This Shift

## 2021-01-05 NOTE — PLAN OF CARE
Problem: Skin Integrity:  Goal: Will show no infection signs and symptoms  Outcome: Ongoing     Problem: Skin Integrity:  Goal: Absence of new skin breakdown  Outcome: Ongoing     Problem: Falls - Risk of:  Goal: Will remain free from falls  Outcome: Ongoing     Problem: Falls - Risk of:  Goal: Absence of physical injury  Outcome: Ongoing     Problem: Pain:  Goal: Pain level will decrease  Outcome: Ongoing     Problem: Pain:  Goal: Control of acute pain  Outcome: Ongoing     Problem: Neurological  Goal: Maximum potential motor/sensory/cognitive function  Outcome: Ongoing     Problem: Nutrition  Goal: Optimal nutrition therapy  Outcome: Ongoing

## 2021-01-05 NOTE — PLAN OF CARE
Problem: Skin Integrity:  Goal: Will show no infection signs and symptoms  Description: Will show no infection signs and symptoms  1/5/2021 0430 by Teresa Grayson RN  Outcome: Met This Shift  Goal: Absence of new skin breakdown  Description: Absence of new skin breakdown  1/5/2021 0430 by Teresa Grayson RN  Outcome: Met This Shift     Problem: Falls - Risk of:  Goal: Will remain free from falls  Description: Will remain free from falls  1/5/2021 0430 by Teresa Grayson RN  Outcome: Met This Shift  Goal: Absence of physical injury  Description: Absence of physical injury  1/5/2021 0430 by Teresa Grayson RN  Outcome: Met This Shift     Problem: Pain:  Goal: Pain level will decrease  Description: Pain level will decrease  1/5/2021 0430 by Teresa Grayson RN  Outcome: Met This Shift  Goal: Control of acute pain  Description: Control of acute pain  1/5/2021 0430 by Teresa Grayson RN  Outcome: Met This Shift  Goal: Control of chronic pain  Description: Control of chronic pain  1/5/2021 0430 by Teresa Grayson RN  Outcome: Met This Shift     Problem: Neurological  Goal: Maximum potential motor/sensory/cognitive function  1/5/2021 0430 by Teresa Grayson RN  Outcome: Met This Shift     Problem: Nutrition  Goal: Optimal nutrition therapy  Description: Nutrition Problem #1: Inadequate energy intake  Intervention: Food and/or Nutrient Delivery: Continue Current Diet, Continue Oral Nutrition Supplement  Nutritional Goals: po intake greater than 75%     1/5/2021 0430 by Teresa Grayson RN  Outcome: Met This Shift     Problem: Neurological  Goal: Maximum potential motor/sensory/cognitive function  1/5/2021 0430 by Teresa Grayson RN  Outcome: Met This Shift     Problem: Mobility - Impaired:  Goal: Mobility will improve  Description: Mobility will improve  1/5/2021 0430 by Teresa Grayson RN  Outcome: Met This Shift

## 2021-01-05 NOTE — PROGRESS NOTES
[address]   ACUTE REHABILITATION OCCUPATIONAL THERAPY  DAILY NOTE    Date: 21  Patient Name: Juventino Linton      Room: 2612/2612-01    MRN: [id number]   : 1970  (48 y.o.)  Gender: female   Referring Practitioner: Ro Whitney MD  Diagnosis: Intracranial hemmorrhage  Additional Pertinent Hx: 59-year-old female with history of delta storage pool disease, bipolar disorder, alcohol abuse who developed acute left-sided weaknessfound to have intraparenchymal hemorrhage, right parietal lobe hemorrhage and moderate subarachnoid hemorrhage. Pt admitted to rehab unit on 20. Restrictions  Restrictions/Precautions: Fall Risk  Other position/activity restrictions: L hypertonicity. PRAFO boot LLE in bed  Required Braces or Orthoses?: No  Equipment Used: Other, Wheelchair, Bed(Tameka Conrado)    Subjective  Subjective: \"I'm excited but scared, but excited, but more scared! \" pt states in regards to D/C this date  Comments: pt cooperative and motivated for therapies  Patient Currently in Pain: Denies  Restrictions/Precautions: Fall Risk  Overall Orientation Status: Within Functional Limits  Patient Observation  Observations: intermittent L side neglect, improving but req VCs at times  Pain Assessment  Pain Assessment: 0-10  Pain Level: 1  Pain Type: Acute pain  Pain Location: Arm  Pain Orientation: Left  Pain Descriptors: Aching, Sore    Objective  Cognition  Overall Cognitive Status: WFL  Perception  Overall Perceptual Status: WFL  Unilateral Attention: Cues to attend to left side of body;Cues to attend left visual field  Balance  Sitting Balance: Modified independent   Standing Balance: Stand by assistance  Bed mobility  Rolling to Left: Modified independent  Rolling to Right: Modified independent  Supine to Sit: Modified independent  Scooting: Modified independent  Transfers  Stand Step Transfers: Contact guard assistance;Stand by assistance(using ara walker) Stand Pivot Transfers: Contact guard assistance;Stand by assistance(ara walker)  Sit to stand: Contact guard assistance;Stand by assistance  Stand to sit: Stand by assistance  Transfer Comments: good safety and awareness noted  Standing Balance  Time: AM: 1-2  min x5  Activity: AM: functional mobility/transfers in room/bathroom, ADL tasks  Comment: no loss of balance noted, pt demo's good awareness to weight shift on LLE with standing  Functional Mobility  Functional - Mobility Device: Hemiwalker  Activity: To/from bathroom; Other  Assist Level: Contact guard assistance(contact/close stand by assist)  Functional Mobility Comments: no loss of balance noted, slow to ambulate due to weight shifting on LLE, support to LUE as needed  Toilet Transfers  Toilet - Technique: Ambulating(using ara walker)  Equipment Used: Standard toilet  Toilet Transfer: Contact guard assistance  Toilet Transfers Comments: assist x1, support to LUE as needed  Shower Transfers  Shower - Transfer From: Walker(using ara walker)  Shower - Transfer Type: To and From  Shower - Transfer To:  Shower seat with back  Shower - Technique: Ambulating  Shower Transfers: Contact Guard  Shower Transfers Comments: assist x1, cuing req for proper tech over treshold using ara walker     Type of ROM/Therapeutic Exercise  Type of ROM/Therapeutic Exercise: Self PROM(LUE, 5 reps to demo good understanding/reception)  Comment: pt educated on self PROM to LUE to decrease stiffness in joints and maintain strength, handout provided with spouse present for increased carryover  Exercises  Shoulder Flexion: x  Shoulder Extension: x  Horizontal ABduction: x  Horizontal ADduction: x  Elbow Flexion: x  Elbow Extension: x  Supination: x  Pronation: x  Wrist Flexion: x  Wrist Extension: x  Finger Flexion: x  Finger Extension: x                       ADL  Equipment Provided: Reacher  Feeding: Setup  Grooming: Modified independent (seated) UE Bathing: Setup;Supervision(seated)  LE Bathing: Setup;Supervision(seated, weight shifting on bench for washing buttocks, long handled sponge)  UE Dressing: Setup;Stand by assistance;Minimal assistance(assist for adjustments)  LE Dressing: Setup; Increased time to complete; Moderate assistance(reacher used, intermittent assist for threading LLE/over L hip, shoes/TEDs/AFO)  Toileting: Contact guard assistance(intermittent assist for clothing on L hip)          Assessment  Performance deficits / Impairments: Decreased functional mobility ; Decreased ADL status; Decreased ROM; Decreased strength;Decreased endurance;Decreased balance;Decreased high-level IADLs;Decreased fine motor control;Decreased coordination;Decreased posture;Decreased safe awareness;Decreased vision/visual deficit  Prognosis: Good  Discharge Recommendations: Home with assist PRN;Patient would benefit from continued therapy after discharge  Activity Tolerance: Patient Tolerated treatment well  Safety Devices in place: Yes  Type of devices: Left in chair;Call light within reach  Equipment Recommendations  Equipment Needed: Yes  Walker: Frank-walker  Hand Held Shower: x  Transfer Tub Bench: x  Grab bars: x  Reacher: x  Long-handled Shoehorn: x       Patient Education: ADL tasks/frank tech, transfer safety, LLE positioning, LUE awareness/protection, self PROM to LUE  Patient Goals   Patient goals :  To discharge home with family support  Learner:patient and significant other  Method: demonstration, explanation and handout       Outcome: verbalized concerns, demonstrated understanding, needs reinforcement and asked questions        Plan  Plan  Times per week: 5-7  Times per day: Twice a day Current Treatment Recommendations: Self-Care / ADL, Home Management Training, Strengthening, Balance Training, Functional Mobility Training, Endurance Training, Wheelchair Mobility Training, Neuromuscular Re-education, Pain Management, Safety Education & Training, Patient/Caregiver Education & Training, Equipment Evaluation, Education, & procurement, Cognitive/Perceptual Training  Patient Goals   Patient goals : To discharge home with family support  Short term goals  Time Frame for Short term goals: 7 to 10 days  Short term goal 1: Pt will perform upper body bathing/dressing with stand by assist.  Short term goal 2: Pt will perform lower body bathing and dressing with Moderate assist and Fair safety. Short term goal 3: Pt will perform toileting tasks with Moderate assist.  Short term goal 4: Pt will verbalize/demonstrate Good understanding of assistive equipment/durable medical equipment/modified techniques for increased independence with self-care and mobility. Short term goal 5: Pt will perform functional transfers with Moderate assist to toilet/wheelchair/shower with Fair safety. Short term goal 6: Pt will actively participate in 30+ minutes of therapeutic exercise/functional activities to promote increased independence with self-care and mobility. Long term goals  Time Frame for Long term goals : By discharge  Long term goal 1: Pt will perform BADLs with modified independence and Good safety with assist PRN for HORTENSIA hose. Long term goal 2: Pt will perform functional transfers and mobility with modified independence, least restrictive mobility device, and Good safety. Long term goal 3: Pt will attend to L side of body 100% of the time during self-care, transfers, and mobility with 1-2 verbal cues PRN. Long term goal 4: Pt will stand for 5+ minutes with 1-2 UE support, modified independence and no loss of balance while engaging in functional activity of choice. Long term goal 5: Pt will verbalize/demonstrate Good understanding of home exercise program for LUE.   Long term goals 6: 9 hole peg, box and block to be assessed for LUE as appropriate        01/05/21 0740 01/05/21 1050   OT Individual Minutes   Time In 0740 1050   Time Out 0831 1108   Minutes 51 18     Electronically signed by JEAN CARLOS Maki on 1/5/21 at 3:15 PM EST

## 2021-01-05 NOTE — PROGRESS NOTES
CLINICAL PHARMACY NOTE: MEDS TO [address] Select Patient?: No  Total # of Prescriptions Filled: 16   The following medications were delivered to the patient:  · Vitamin B shot  · Gabapentin  · Buprion  · Amlodipine  · Folic acid  · Melatonin  · Syringe  · oxycodone   · Lisinopril  · Sertraline  · Ropinirole  · Senna plus  · Vitamin B  · Clear lax  · Tizanidine  · Diazepam  ·   Total # of Interventions Completed: 0  Time Spent (min): 30    Additional Documentation:

## 2021-01-05 NOTE — PLAN OF CARE
Problem: Skin Integrity:  Goal: Will show no infection signs and symptoms  Description: Will show no infection signs and symptoms  1/5/2021 0430 by Otf Marlow RN  Outcome: Met This Shift  1/4/2021 1928 by Nathaly Langley RN  Outcome: Ongoing  Goal: Absence of new skin breakdown  Description: Absence of new skin breakdown  1/5/2021 0430 by Otf Marlow RN  Outcome: Met This Shift  1/4/2021 1928 by Nathaly Langley RN  Outcome: Ongoing

## 2021-01-05 NOTE — PLAN OF CARE
Problem: Skin Integrity:  Goal: Will show no infection signs and symptoms  Description: Will show no infection signs and symptoms  1/5/2021 0430 by Mandy Zendejas RN  Outcome: Met This Shift  Goal: Absence of new skin breakdown  Description: Absence of new skin breakdown  1/5/2021 0430 by Mandy Zendejas RN  Outcome: Met This Shift     Problem: Falls - Risk of:  Goal: Will remain free from falls  Description: Will remain free from falls  1/5/2021 0430 by Mandy Zendejas RN  Outcome: Met This Shift  Goal: Absence of physical injury  Description: Absence of physical injury  1/5/2021 0430 by Mandy Zendejas RN  Outcome: Met This Shift     Problem: Pain:  Goal: Pain level will decrease  Description: Pain level will decrease  1/5/2021 0430 by Mandy Zendejas RN  Outcome: Met This Shift  Goal: Control of acute pain  Description: Control of acute pain  1/5/2021 0430 by Mandy Zendejas RN  Outcome: Met This Shift  Goal: Control of chronic pain  Description: Control of chronic pain  1/5/2021 0430 by Mandy Zendejas RN  Outcome: Met This Shift     Problem: Neurological  Goal: Maximum potential motor/sensory/cognitive function  1/5/2021 0430 by Mandy Zendejas RN  Outcome: Met This Shift     Problem: Nutrition  Goal: Optimal nutrition therapy  Description: Nutrition Problem #1: Inadequate energy intake  Intervention: Food and/or Nutrient Delivery: Continue Current Diet, Continue Oral Nutrition Supplement  Nutritional Goals: po intake greater than 75%     1/5/2021 0430 by Mandy Zendejas RN  Outcome: Met This Shift     Problem: Neurological  Goal: Maximum potential motor/sensory/cognitive function  1/5/2021 0430 by Mnady Zendejas RN  Outcome: Met This Shift     Problem: Mobility - Impaired:  Goal: Mobility will improve  Description: Mobility will improve  1/5/2021 0430 by Mandy Zendejas RN  Outcome: Met This Shift

## 2021-01-05 NOTE — PROGRESS NOTES
Device: Hemiwalker  Other Apparatus: AFO(with dorsiflexion assist and ankle support strappig)  Assistance: Stand by assistance(intermittent touching with distraction with walking)  Gait Deviations: Decreased arm swing;Decreased head and trunk rotation;Deviated path;Decreased step length; Increased KRISTIN; Slow Meseret  Distance: 2MWT 65 feet; 6MWT 166 feet  Comments: Hemiwalker is patients prefered method of walking d/t increased stability provided. Ambulation 2  Surface - 2: carpet  Device 2: Hemiwalker  Other Apparatus 2: AFO  Assistance 2: Contact guard assistance  Gait Deviations: Slow Meseret; Increased KRISTIN; Decreased step length;Decreased arm swing;Decreased head and trunk rotation;Deviated path  Distance: 20 feet  Stairs  # Steps : 18  Stairs Height: 8\"  Rails: Right ascending  Other Apparatus: AFO  Assistance: Contact guard assistance  Comment: Ascends forward, descends retro. ascending with LLE for WB placement to prevent mare/crossing of LLE in swing phase. patient able place LLE unassisted. Retro step down with RLE to maintain WB LLE to prevent scissoring and unassisted LLE foot placement during sequencing.   Wheelchair Activities  Left Brakes Level of Assistance: Modified independent  Right Brakes Level of Assistance: Stand by Assist  Propulsion 1  Propulsion: Manual  Method: RUE;RLE  Level of Assistance: Supervision  Distance: 50 feet                                 G-Code     OutComes Score                                                     AM-PAC Score             Goals  Short term goals  Time Frame for Short term goals: 10 days  Short term goal 1: pt will perform bed mobility with min A  Short term goal 2: pt will dangle EOB/EOM for 20 to 25 minutes with SBA, performing challenged seated balance/corestrengthening  Short term goal 3: Pivot transfers with mod A+2  Short term goal 4: WC mobility x 100' with min A

## 2021-01-05 NOTE — CARE COORDINATION
Patient [address] with [facility]. Also her Frank-walker has been delivered. She has all of her follow up appointments. RAY faxed Dr. Virgen Malagon office and they had the Doctor sign a statement that he will follow this patient for all of her Home Care, PT and OT Needs. Spoke with [facility] and that statement is sufficient for [facility] to accept this patient. RAY faxed that form and DC orders to [facility].

## 2021-01-05 NOTE — PROGRESS NOTES
Physical Medicine & Rehabilitation  Progress Note    1/5/2021 11:35 AM     CC: Ambulatory and ADL dysfunction due to hemorrhagic CVA left nondominant hemiparesis    Subjective:   No Complaints. Feels well. Ready for discharge Noted fall over weekendpatient denies any residual.    ROS:  Denies fevers, chills, sweats. No chest pain, palpitations, lightheadedness. Denies coughing, wheezing or shortness of breath. Denies abdominal pain, nausea, diarrhea or constipation. No new areas of joint pain. Denies new areas of numbness or weakness. Denies new anxiety or depression issues. No new skin problems. Rehabilitation:   PT:  Restrictions/Precautions: Fall Risk  Other position/activity restrictions: L hypertonicity. PRAFO boot LLE in bed   Transfers  Sit to Stand: Stand by assistance, Contact guard assistance  Stand to sit: Stand by assistance, Contact guard assistance  Bed to Chair: Contact guard assistance, Stand by assistance  Stand Pivot Transfers: Contact guard assistance  Squat Pivot Transfers: Contact guard assistance  Car Transfer: Minimal Assistance  Comment: safety concerns and extra time  Ambulation 1  Surface: level tile  Device: FolderBoy  Other Apparatus: AFO(with dorsiflexion assist and ankle support strappig)  Assistance: Contact guard assistance, Stand by assistance(intermittent touching with distraction with walking)  Quality of Gait: spasmic/flexor tone on LLE; poor heel strike toe off, lacking normal flexion of knee, cueing to soft lock knee in extension and widen KRISTIN, weight shift and hip neutral over stance leg  Gait Deviations: Decreased arm swing, Decreased head and trunk rotation, Deviated path, Decreased step length, Increased KRISTIN, Slow Meseret  Distance: 125 feet  Comments: Hemiwalker is patients prefered method of walking d/t increased stability provided.            OT:  ADL  Equipment Provided: Reacher  Feeding: Setup  Grooming: Modified independent (seated) UE Bathing: Setup, Supervision(seated)  LE Bathing: Setup, Supervision(seated, weight shifting on bench for washing buttocks, long )  UE Dressing: Setup, Stand by assistance, Minimal assistance(assist for adjustments)  LE Dressing: Setup, Increased time to complete, Moderate assistance(reacher used, intermittent assist for threading LLE/over L h)  Toileting: Contact guard assistance(intermittent assist for clothing on L hip)  Additional Comments: Pt completes showering tasks this AM seated on tub bench.   LB dressing: reacher for threading BLES, intermittent assist pulling L side over hips, stabilizing LLE when crossing over knee 2* to slippery fabric   Instrumental ADL's  Instrumental ADLs: Yes     Balance  Sitting Balance: Modified independent   Standing Balance: Stand by assistance   Standing Balance  Time: AM: 1-2  min x5  Activity: AM: functional mobility/transfers in room/bathroom, ADL tasks  Comment: no loss of balance noted, pt demo's good awareness to weight shift on LLE with standing  Functional Mobility  Functional - Mobility Device: Hemiwalker  Activity: To/from bathroom, Other  Assist Level: Contact guard assistance(contact/close stand by assist)  Functional Mobility Comments: no loss of balance noted, slow to ambulate due to weight shifting on LLE, support to LUE as needed     Bed mobility  Bridging: Modified independent   Rolling to Left: Modified independent  Rolling to Right: Modified independent  Supine to Sit: Modified independent  Sit to Supine: Modified independent  Scooting: Modified independent  Comment: safety concerns and extra time  Transfers  Stand Step Transfers: Contact guard assistance, Stand by assistance(using ara walker)  Stand Pivot Transfers: Contact guard assistance, Stand by assistance(ara walker)  Sit to stand: Contact guard assistance, Stand by assistance  Stand to sit: Stand by assistance  Transfer Comments: good safety and awareness noted   Toilet Transfers NEURO: A&O x3. Sensation intact to light touch. No change  MSK: Decreased range of motion left upper lower extremities,, 4/5 right upper and lower extremity, moderate spasticity left upper and lower extremity, tenderness left anterior shoulderlateral pectoral muscle with improvementno edema or MCP tenderness  EXTREMITIES: No calf tenderness to palpation bilaterally. No edema BLEs, left hand curling at  restuse of resting hand splint  SKIN: warm dry and intact with good turgor  PSYCH: appropriately interactive. Affect WNL. Medications   Scheduled Meds:   tiZANidine  4 mg Oral TID    sertraline  150 mg Oral Daily    amLODIPine  5 mg Oral Daily    thiamine mononitrate  100 mg Oral Daily    traZODone  25 mg Oral Nightly    gabapentin  300 mg Oral Nightly    lisinopril  20 mg Oral Daily    buPROPion  300 mg Oral Daily    cyanocobalamin  1,000 mcg Intramuscular Weekly    Followed by   Joe Zavala ON 2/5/2021] cyanocobalamin  1,000 mcg Intramuscular L42 Days    folic acid  1 mg Oral Daily    lamoTRIgine  400 mg Oral Daily    melatonin  3 mg Oral Nightly    rOPINIRole  1 mg Oral Nightly    sennosides-docusate sodium  2 tablet Oral Daily    polyethylene glycol  17 g Oral Daily    enoxaparin  30 mg Subcutaneous BID     Continuous Infusions:  PRN Meds:.diazePAM, oxyCODONE, acetaminophen, ipratropium-albuterol, magnesium hydroxide, muscle rub, bisacodyl, senna     Diagnostics:     CBC:   Recent Labs     01/04/21  0614   WBC 5.5   RBC 3.99*   HGB 12.1   HCT 35.8*   MCV 89.8   RDW 13.8        BMP:   Recent Labs     01/04/21  0614      K 4.4      CO2 23   BUN 13   CREATININE 0.62     BNP: No results for input(s): BNP in the last 72 hours. PT/INR: No results for input(s): PROTIME, INR in the last 72 hours. APTT: No results for input(s): APTT in the last 72 hours. CARDIAC ENZYMES: No results for input(s): CKMB, CKMBINDEX, TROPONINT in the last 72 hours.     Invalid input(s): CKTOTAL;3 FASTING LIPID PANEL:  Lab Results   Component Value Date    CHOL 247 (H) 11/30/2020     11/30/2020    TRIG 77 11/30/2020     LIVER PROFILE: No results for input(s): AST, ALT, ALB, BILIDIR, BILITOT, ALKPHOS in the last 72 hours. I/O (24Hr): Intake/Output Summary (Last 24 hours) at 1/5/2021 1135  Last data filed at 1/4/2021 2100  Gross per 24 hour   Intake 400 ml   Output 1 ml   Net 399 ml       Glu last 24 hour  No results for input(s): POCGLU in the last 72 hours. No results for input(s): CLARITYU, COLORU, PHUR, SPECGRAV, PROTEINU, RBCUA, BLOODU, BACTERIA, NITRU, WBCUA, LEUKOCYTESUR, YEAST, GLUCOSEU, BILIRUBINUR in the last 72 hours. Impression/Plan:    1. Ambulatory and ADL dysfunction secondary to hemorrhagic CVA: Acute rehab effortsPT/OT, PRAFO for left lower limb at night. Will need bracing for the left lower limb to help with stability and tone - orthotist evaluated 12/22, current discharge plan 1/5/2021, taping to left shoulder,  and sling when in therapy and ambulating, left AFO, and left resting hand splint obtained,   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 Monitoring platelets  3. History of headacheMedrol taper 12/13  4. Muscle spasm/myoclonus/spasticity: Now off baclofennot tolerate increase. On schedule tizanidine 12/17 - increased dose 12/20 (6mg/6mg/4mg) but noted increased muscle spasms in the afternoon and evening. Dose adjusted 12/23 (now on 6mg/4mg/4mg). .  Patient she feels she is doing better with decrease dosehesitant to increase again.   Improving with Valium 5 mg prior to therapiesimproved 5. Left proximal upper limb pain:  In the area of left proximal biceps/left pec major muscles.-Suspect spasticity, possible tendinitis, possible subluxation though only 1 fingerbreadth. Trial of Voltaren gel . Continue with relaxers, sling when in therapiesdiscussed at length with both patient and  12/20/2020. Reviewed medications, spasticity, sling, etc. x-ray negativesee above,  may benefit from Botox as outpatient  6. PainRoxicodone  7. HTN: amlodipine (increased 12/21 by IM), lisinopril (decreased 12/14 by IM); labetalol discontinued 12/18 by IM  8. ETOH withdrawal/history of abuse: was treated with IV ativan and librium. On J10 and folic acid now  9. Bipolar Disorder: on wellbutrin, zoloft (increased 12/21 to home dose of 150mg daily), lamictal  10. Restless Leg Syndrome: on requip  11. Insomnia:  On melatonin, gabapentin nightly. Takes trazodone at home - continue low-dose trazodone (started 12/15). 12. Bowel Management: Miralax daily, senokot prn, dulcolax prn. Last bowel movement 12/23 small  13. DVT Prophylaxis:  low molecular weight heparin, SCD's while in bed and HORTENSIA's   14. Internal medicine for medical management  15. Discharge okay with internal medicine follow-up with 1. PCP 2. rehab - Rolan Sherwood 3. neurosurg- [facility] 4. Heme Onc,, continue PT/OT no driving, LFT/CBC 1 week       Mino Sherwood MD       This note is created with the assistance of a speech recognition program.  While intending to generate a document that actually reflects the content of the visit, the document can still have some errors including those of syntax and sound a like substitutions which may escape proof reading.   In such instances, actual meaning can be extrapolated by contextual diversion
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
## 2021-01-13 ENCOUNTER — VIRTUAL VISIT (OUTPATIENT)
Dept: NEUROSURGERY | Age: 51
End: 2021-01-13
Payer: COMMERCIAL

## 2021-01-13 DIAGNOSIS — I61.5 IVH (INTRAVENTRICULAR HEMORRHAGE) (HCC): ICD-10-CM

## 2021-01-13 DIAGNOSIS — I61.9 INTRAPARENCHYMAL HEMORRHAGE OF BRAIN (HCC): Primary | ICD-10-CM

## 2021-01-13 PROCEDURE — 3017F COLORECTAL CA SCREEN DOC REV: CPT | Performed by: NEUROLOGICAL SURGERY

## 2021-01-13 PROCEDURE — 99213 OFFICE O/P EST LOW 20 MIN: CPT | Performed by: NEUROLOGICAL SURGERY

## 2021-01-13 PROCEDURE — G8427 DOCREV CUR MEDS BY ELIG CLIN: HCPCS | Performed by: NEUROLOGICAL SURGERY

## 2021-01-13 PROCEDURE — 1111F DSCHRG MED/CURRENT MED MERGE: CPT | Performed by: NEUROLOGICAL SURGERY

## 2021-01-13 NOTE — PROGRESS NOTES
2021    TELEHEALTH EVALUATION -- Audio/Visual (During [facility])    HPI:    Lucila Palmer (:  1970) has requested an audio/video evaluation for the following concern(s):    Patient is approximately 6 weeks out from a large right proximal hemorrhage. She states that she has been ambulating with a supported walker and slowly developing more function of her left upper extremity but this is definitely been slower to respond. No other repeat seizures weakness numbness tingling that has progressed. Has been able to function better overall    Review of Systems    Prior to Visit Medications    Medication Sig Taking? Authorizing Provider   diazePAM (VALIUM) 5 MG tablet Take 1 tablet by mouth 2 times daily as needed (spasms) for up to 10 days. Yes Itzel Barros MD   gabapentin (NEURONTIN) 300 MG capsule Take 1 capsule by mouth nightly for 30 days.  Yes Itzel Barros MD   sertraline (ZOLOFT) 50 MG tablet Take 3 tablets by mouth daily Yes Itzel Barros MD   buPROPion (WELLBUTRIN XL) 300 MG extended release tablet Take 1 tablet by mouth daily Yes Itzel Barros MD   lisinopril (PRINIVIL;ZESTRIL) 20 MG tablet Take 1 tablet by mouth daily Yes Itzel Barros MD   rOPINIRole (REQUIP) 1 MG tablet Take 1 tablet by mouth nightly Yes Itzel Barros MD   amLODIPine (NORVASC) 5 MG tablet Take 1 tablet by mouth daily Yes Itzel Barros MD   cyanocobalamin 1000 MCG/ML injection Inject 1 mL into the muscle once a week for 4 doses Once a week for 4 more weeks then followed by monthly injections,l follow-up PCP for continuation Yes Itzel Barros MD   folic acid (FOLVITE) 1 MG tablet Take 1 tablet by mouth daily Yes Itzel Barros MD   polyethylene glycol (GLYCOLAX) 17 g packet Take 17 g by mouth daily Yes Itzel Barros MD   sennosides-docusate sodium (SENOKOT-S) 8.6-50 MG tablet Take 2 tablets by mouth daily Yes Itzel Barros MD Vital Signs: (As obtained by patient/caregiver or practitioner observation)    Blood pressure-  Heart rate-    Respiratory rate-    Temperature-  Pulse oximetry-     Constitutional: [x] Appears well-developed and well-nourished [] No apparent distress      [] Abnormal-   Mental status  [x] Alert and awake  [x] Oriented to person/place/time []Able to follow commands      Eyes:  EOM    [x]  Normal  [] Abnormal-  Sclera  [x]  Normal  [] Abnormal -         Discharge []  None visible  [] Abnormal -    HENT:   [x] Normocephalic, atraumatic. [] Abnormal   [x] Mouth/Throat: Mucous membranes are moist.     External Ears [x] Normal  [] Abnormal-     Neck: [x] No visualized mass     Pulmonary/Chest: [x] Respiratory effort normal.  [x] No visualized signs of difficulty breathing or respiratory distress        [] Abnormal-      Musculoskeletal:   [x] Normal gait with no signs of ataxia         [x] Normal range of motion of neck        [] Abnormal-       Neurological:        [x] No Facial Asymmetry (Cranial nerve 7 motor function) (limited exam to video visit)          [x] No gaze palsy   Significant left hemiparesis evident. Still able to move left arm and leg     [] Abnormal-         Skin:        [x] No significant exanthematous lesions or discoloration noted on facial skin         [] Abnormal-            Psychiatric:       [x] Normal Affect [x] No Hallucinations        [] Abnormal-     Other pertinent observable physical exam findings-     ASSESSMENT/PLAN:  Spontaneous intraparenchymal hemorrhage    Unfortunately CTA neck and MRI brain with and without contrast were unrevealing. Overall patient appears to be clinically improving. Will obtain MRV and ensure follow-up with endovascular neurology.   I believe patient would benefit from angiogram considering that I would think there would be a source of the hemorrhage beyond the fact that she is simply coagulopathic Zarina Thurston is a 48 y.o. female being evaluated by a Virtual Visit (video visit) encounter to address concerns as mentioned above. A caregiver was present when appropriate. Due to this being a TeleHealth encounter (During [facility] public health emergency), evaluation of the following organ systems was limited: Vitals/Constitutional/EENT/Resp/CV/GI//MS/Neuro/Skin/Heme-Lymph-Imm. Pursuant to the emergency declaration under the [address] [place] [address] and the [employer] Act, this Virtual Visit was conducted with patient's (and/or legal guardian's) consent, to reduce the patient's risk of exposure to COVID-19 and provide necessary medical care. The patient (and/or legal guardian) has also been advised to contact this office for worsening conditions or problems, and seek emergency medical treatment and/or call 911 if deemed necessary. Patient identification was verified at the start of the visit: Yes    Total time spent on this encounter: 15    Services were provided through a video synchronous discussion virtually to substitute for in-person clinic visit. Patient and provider were located at their individual homes. --Ayo Riley DO on 1/13/2021 at 8:48 AM    An electronic signature was used to authenticate this note.

## 2021-01-22 ENCOUNTER — TELEPHONE (OUTPATIENT)
Dept: NEUROSURGERY | Age: 51
End: 2021-01-22

## 2021-01-22 NOTE — TELEPHONE ENCOUNTER
Pt called and LM stating she had a stroke with three brain bleeds on 11/30. Pt states she is to have a dental procedure (crown prep-which will include ) done on the top of her jaw and wanted to make sure that its ok for her to have procedure done. Please review and advise. *Nothing scheduled yet. Waiting for response from our office before scheduling. *

## 2021-01-27 ENCOUNTER — HOSPITAL ENCOUNTER (OUTPATIENT)
Dept: MRI IMAGING | Age: 51
Discharge: HOME | End: 2021-01-29
Payer: COMMERCIAL

## 2021-01-27 DIAGNOSIS — I61.9 INTRAPARENCHYMAL HEMORRHAGE OF BRAIN (HCC): ICD-10-CM

## 2021-01-27 PROCEDURE — 70544 MR ANGIOGRAPHY HEAD W/O DYE: CPT

## 2021-01-28 ENCOUNTER — OFFICE VISIT (OUTPATIENT)
Dept: NEUROLOGY | Age: 51
End: 2021-01-28
Payer: COMMERCIAL

## 2021-01-28 VITALS
DIASTOLIC BLOOD PRESSURE: 84 MMHG | HEART RATE: 86 BPM | BODY MASS INDEX: 38.44 KG/M2 | WEIGHT: 231 LBS | SYSTOLIC BLOOD PRESSURE: 134 MMHG

## 2021-01-28 DIAGNOSIS — I61.1 NONTRAUMATIC CORTICAL HEMORRHAGE OF RIGHT CEREBRAL HEMISPHERE (HCC): Primary | ICD-10-CM

## 2021-01-28 DIAGNOSIS — G81.94 LEFT HEMIPARESIS (HCC): ICD-10-CM

## 2021-01-28 PROCEDURE — G8417 CALC BMI ABV UP PARAM F/U: HCPCS | Performed by: FAMILY MEDICINE

## 2021-01-28 PROCEDURE — G8484 FLU IMMUNIZE NO ADMIN: HCPCS | Performed by: FAMILY MEDICINE

## 2021-01-28 PROCEDURE — G8427 DOCREV CUR MEDS BY ELIG CLIN: HCPCS | Performed by: FAMILY MEDICINE

## 2021-01-28 PROCEDURE — 3017F COLORECTAL CA SCREEN DOC REV: CPT | Performed by: FAMILY MEDICINE

## 2021-01-28 PROCEDURE — 99215 OFFICE O/P EST HI 40 MIN: CPT | Performed by: FAMILY MEDICINE

## 2021-01-28 PROCEDURE — 1036F TOBACCO NON-USER: CPT | Performed by: FAMILY MEDICINE

## 2021-01-28 PROCEDURE — 1111F DSCHRG MED/CURRENT MED MERGE: CPT | Performed by: FAMILY MEDICINE

## 2021-01-28 RX ORDER — FOLIC ACID 1 MG/1
1 TABLET ORAL DAILY
Qty: 30 TABLET | Refills: 3 | Status: SHIPPED | OUTPATIENT
Start: 2021-01-28 | Stop reason: SDUPTHER

## 2021-01-28 RX ORDER — DIAZEPAM 5 MG/1
5 TABLET ORAL EVERY 6 HOURS PRN
COMMUNITY

## 2021-01-28 RX ORDER — GABAPENTIN 300 MG/1
300 CAPSULE ORAL NIGHTLY
Qty: 30 CAPSULE | Refills: 3 | Status: SHIPPED | OUTPATIENT
Start: 2021-01-28 | End: 2021-09-29

## 2021-01-28 RX ORDER — ROPINIROLE 1 MG/1
1 TABLET, FILM COATED ORAL NIGHTLY
Qty: 30 TABLET | Refills: 3 | Status: SHIPPED | OUTPATIENT
Start: 2021-01-28 | End: 2021-03-04 | Stop reason: SDUPTHER

## 2021-01-28 RX ORDER — TIZANIDINE 4 MG/1
4 TABLET ORAL 3 TIMES DAILY
Qty: 90 TABLET | Refills: 0 | Status: SHIPPED | OUTPATIENT
Start: 2021-01-28 | End: 2021-03-04 | Stop reason: SDUPTHER

## 2021-01-28 NOTE — LETTER
[address] [facility], [address] [place] [address]  Phone: [phone]  Fax: [phone]    Monisha Cohn MD        January 28, 2021     Patient: Debbie Frausto   YOB: 1970   Date of Visit: 1/28/2021       To Whom It May Concern: It is my medical opinion that Alex Guerrero had cerebral hemorrhage and is at risk for COVID-19 and she should be ok to have COVID-19 vaccination. If you have any questions or concerns, please don't hesitate to call.     Sincerely,        Monisha Cohn MD

## 2021-01-28 NOTE — PROGRESS NOTES
[address] [place] [address],  [address], [facility], [facility], [address] [place] [address]  P: [phone]  F: [address] NOTE     PATIENT NAME: Aishwarya Issa  PATIENT MRN: [id number]  PRIMARY CARE PHYSICIAN: Tamie Mcclendon MD    HPI:      Aishwarya Issa is a 48 y.o. female who presents to clinic today as a follow up for her R frontoparietal IPH. 48 y.o. female who presents with left arm and leg weakness last known well around midnight night prior to her presentation. Patient woke up around 4 AM and noticed her left arm was feeling funny later on she noticed left leg weakness. Pa justina started to have headache as well. Denied any nausea, vomiting, trauma, vision problem, bulbar or any other sensorimotor issues. Patient was taken to [facility] where initial blood pressure was 178. Patient had CT brain done that was consistent with right parieto-occipital ICH with subarachnoid hemorrhage extending into right temporal lobe. Mild midline shift to left. No IVH. Patient was given Cardene infusion, Keppra 1 g, Lopressor 5 mg. Patient was transferred to [address] [place] [address] for further evaluation and management.     PMHx: Delta pool deficiency, bleeding tendency, hypertension     Patient says for last few days she has been out of her lisinopril. But has been taking her Norvasc. Patient is not on any AC/AP at home.     Patient was started on Cardene again in the ER [facility]. Repeat CT head and CTA head and neck were not done. No LVO or aneurysm or any intracranial vascular abnormalities were apparent. .  Repeat CT was consistent with right temporal horn IVH. GCS 15.     And was given mannitol 25 g x 1 in the ER. She started to become agitated in the ER and was given Ativan 1 mg x2 in  The ER. Later on patient  was started on Precedex drip.     Neuro-surgery was consulted. Patient was transferred to neuro ICU for further evaluation and management. Repeat CT head were stable  MRI brain with and without did not reveal any underlying mass or vascular malformation  Patient received platelet transfusions during hospitalization. Patient's blood pressure was stabilized  Patient had persistent headache which seemed to improve with Medrol Dosepak. Neurontin 300 mg nightly was added for symptom control  She also had spasticity in left upper and lower extremity which seemed to improve with scheduled baclofen. Flexeril 10 mg daily as needed with added for muscle cramping  Patient was discharged to inpatient rehab. Today: Patient was seen and examined. She still has some weakness in left upper extremity left lower extremity. Patient uses a wheelchair/quad cane for ambulation.   Patient was evaluated by neurosurgery and recommended to have cerebral angiogram.  Patient had MRV brain done that was unremarkable  Patient is still complaining of mild 1 out of 10 right-sided headache denies any nausea, vomiting photo or phonophobia associated with a headache  says she takes Tylenol that helps her with the headache  Patient says since her stroke she had 5 or 6 ago both episodes      PREVIOUS WORKUP:     Lab Results   Component Value Date    WBC 5.5 2021    HGB 12.1 2021    HCT 35.8 (L) 2021    MCV 89.8 2021     2021       Past Medical History:   Diagnosis Date    Asthma     B[facility] 1 disorder ([facility]     Delta storage pool disease ([facility]     Hypertension     Psychiatric problem         Past Surgical History:   Procedure Laterality Date     SECTION  5106,3995    Miscarriage     CHOLECYSTECTOMY      COLONOSCOPY N/A 2/3/2020    -random bx(normal)hemorrhoids    GASTRIC BYPASS SURGERY      HYSTERECTOMY      KNEE SURGERY      LAPAROSCOPY      TONSILLECTOMY AND ADENOIDECTOMY      UPPER GASTROINTESTINAL ENDOSCOPY N/A 2/3/2020 -bx(normal,neg H-Pylori)normal post-bypass endoscopy        Social History     Socioeconomic History    Marital status:      Spouse name: Not on file    Number of children: Not on file    Years of education: Not on file    Highest education level: Not on file   Occupational History    Not on file   Social Needs    Financial resource strain: Not on file    Food insecurity     Worry: Not on file     Inability: Not on file    Transportation needs     Medical: Not on file     Non-medical: Not on file   Tobacco Use    Smoking status: Never Smoker    Smokeless tobacco: Never Used   Substance and Sexual Activity    Alcohol use: Yes     Comment: no alcohol since Sept    Drug use: No    Sexual activity: Yes     Partners: Male   Lifestyle    Physical activity     Days per week: Not on file     Minutes per session: Not on file    Stress: Not on file   Relationships    Social connections     Talks on phone: Not on file     Gets together: Not on file     Attends Sikh service: Not on file     Active member of club or organization: Not on file     Attends meetings of clubs or organizations: Not on file     Relationship status: Not on file    Intimate partner violence     Fear of current or ex partner: Not on file     Emotionally abused: Not on file     Physically abused: Not on file     Forced sexual activity: Not on file   Other Topics Concern    Not on file   Social History Narrative    Not on file        Current Outpatient Medications   Medication Sig Dispense Refill    diazePAM (VALIUM) 5 MG tablet Take 5 mg by mouth every 6 hours as needed for Anxiety (Muscle spasms).       sertraline (ZOLOFT) 50 MG tablet Take 3 tablets by mouth daily 30 tablet 3    buPROPion (WELLBUTRIN XL) 300 MG extended release tablet Take 1 tablet by mouth daily 30 tablet 3    lisinopril (PRINIVIL;ZESTRIL) 20 MG tablet Take 1 tablet by mouth daily 30 tablet 3  rOPINIRole (REQUIP) 1 MG tablet Take 1 tablet by mouth nightly 30 tablet 0    amLODIPine (NORVASC) 5 MG tablet Take 1 tablet by mouth daily 30 tablet 3    cyanocobalamin 1000 MCG/ML injection Inject 1 mL into the muscle once a week for 4 doses Once a week for 4 more weeks then followed by monthly injections,l follow-up PCP for continuation 8 mL 0    folic acid (FOLVITE) 1 MG tablet Take 1 tablet by mouth daily 30 tablet 3    sennosides-docusate sodium (SENOKOT-S) 8.6-50 MG tablet Take 2 tablets by mouth daily 30 tablet 0    melatonin 3 MG TABS tablet Take 1 tablet by mouth nightly 30 tablet 0    thiamine mononitrate 100 MG tablet Take 1 tablet by mouth daily 30 tablet 0    tiZANidine (ZANAFLEX) 4 MG tablet Take 1 tablet by mouth 3 times daily 90 tablet 0    PROAIR  (90 BASE) MCG/ACT inhaler Inhale 2 puffs into the lungs every 6 hours as needed       lamoTRIgine (LAMICTAL) 200 MG tablet Take 400 mg by mouth daily 2 tabs      gabapentin (NEURONTIN) 300 MG capsule Take 1 capsule by mouth nightly for 30 days. (Patient not taking: Reported on 1/28/2021) 30 capsule 0    polyethylene glycol (GLYCOLAX) 17 g packet Take 17 g by mouth daily (Patient not taking: Reported on 1/28/2021) 527 g 1     No current facility-administered medications for this visit. Allergies   Allergen Reactions    Penicillin G Itching    Pcn [Penicillins]     Sulfa Antibiotics Other (See Comments)     Inflammation in the eye        REVIEW OF SYSTEMS:     Review of Systems   Constitutional: Negative for activity change. Neurological: Positive for weakness and headaches. Negative for seizures, facial asymmetry, speech difficulty and numbness. Psychiatric/Behavioral: Negative for behavioral problems. All other systems reviewed and are negative.        VITALS  /84 (Site: Right Upper Arm, Position: Sitting, Cuff Size: Medium Adult)   Pulse 86   Wt 231 lb (104.8 kg) Comment: LKW  BMI 38.44 kg/m² PHYSICAL EXAMINATION:     Physical Exam  Vitals signs and nursing note reviewed. Constitutional:       General: She is not in acute distress. Appearance: Normal appearance. She is not ill-appearing. Neurological:      Mental Status: She is alert and oriented to person, place, and time. Cranial Nerves: No cranial nerve deficit. Sensory: Sensory deficit present. Motor: Weakness present. Coordination: Coordination is intact. Gait: Gait abnormal.      Deep Tendon Reflexes: Reflexes are normal and symmetric. NEUROLOGICAL EXAMINATION:     Neurological Exam  Mental Status  Alert. Motor   Strength is 5/5 in all four extremities except as noted. LUE 3/4  LLE 3/5. Sensory  Decreased sensation LUE/LLE. Reflexes  Deep tendon reflexes are 2+ and symmetric in all four extremities with downgoing toes bilaterally. Coordination  Finger-to-nose, rapid alternating movements and heel-to-shin normal bilaterally without dysmetria. Gait  Not tested,on wheelchair,walks with the quad cane at home. ASSESSMENT / PLAN:     //Spontaneous right frontoparietal IPH, subarachnoid hemorrhage, IVH secondary to hypertension, Delta pool storage deficiency    Other comorbidities include delta pool deficiency, bleeding tendency, hypertension,bipolar    Patient was evaluated by neurosurgery  MRV brain negative for any dural venous sinus thrombosis, stable right frontoparietal IPH  Follow-up neuro endovascular for cerebral angiogram to rule out any intracranial vascular abnormalities. Continue Tylenol as needed for headache  Has been off of the Keppra   On lamictal 400 daily for mood disorder. Follow-up in 1-2 months    Ms. Donaldson received counseling on the following healthy behaviors: medical compliance, smoking cessation, blood pressure control, regular follow up with primary doctor.         Electronically signed by Fortunato Peters MD on 1/28/2021 at 2:40 PM

## 2021-02-09 ENCOUNTER — OFFICE VISIT (OUTPATIENT)
Dept: NEUROLOGY | Age: 51
End: 2021-02-09
Payer: COMMERCIAL

## 2021-02-09 ENCOUNTER — HOSPITAL ENCOUNTER (OUTPATIENT)
Dept: MRI IMAGING | Age: 51
Discharge: HOME | End: 2021-02-11
Payer: COMMERCIAL

## 2021-02-09 VITALS
HEART RATE: 87 BPM | HEIGHT: 65 IN | SYSTOLIC BLOOD PRESSURE: 133 MMHG | DIASTOLIC BLOOD PRESSURE: 75 MMHG | OXYGEN SATURATION: 91 % | WEIGHT: 231 LBS | BODY MASS INDEX: 38.49 KG/M2

## 2021-02-09 DIAGNOSIS — I61.1 NONTRAUMATIC CORTICAL HEMORRHAGE OF RIGHT CEREBRAL HEMISPHERE (HCC): Primary | ICD-10-CM

## 2021-02-09 DIAGNOSIS — I61.1 NONTRAUMATIC CORTICAL HEMORRHAGE OF RIGHT CEREBRAL HEMISPHERE (HCC): ICD-10-CM

## 2021-02-09 PROCEDURE — 6360000004 HC RX CONTRAST MEDICATION: Performed by: FAMILY MEDICINE

## 2021-02-09 PROCEDURE — G8417 CALC BMI ABV UP PARAM F/U: HCPCS | Performed by: PSYCHIATRY & NEUROLOGY

## 2021-02-09 PROCEDURE — 3017F COLORECTAL CA SCREEN DOC REV: CPT | Performed by: PSYCHIATRY & NEUROLOGY

## 2021-02-09 PROCEDURE — G8427 DOCREV CUR MEDS BY ELIG CLIN: HCPCS | Performed by: PSYCHIATRY & NEUROLOGY

## 2021-02-09 PROCEDURE — 99215 OFFICE O/P EST HI 40 MIN: CPT | Performed by: PSYCHIATRY & NEUROLOGY

## 2021-02-09 PROCEDURE — 70553 MRI BRAIN STEM W/O & W/DYE: CPT

## 2021-02-09 PROCEDURE — A9579 GAD-BASE MR CONTRAST NOS,1ML: HCPCS | Performed by: FAMILY MEDICINE

## 2021-02-09 PROCEDURE — 1036F TOBACCO NON-USER: CPT | Performed by: PSYCHIATRY & NEUROLOGY

## 2021-02-09 PROCEDURE — G8484 FLU IMMUNIZE NO ADMIN: HCPCS | Performed by: PSYCHIATRY & NEUROLOGY

## 2021-02-09 RX ORDER — GAUZE BANDAGE 2" X 2"
100 BANDAGE TOPICAL DAILY
Qty: 30 TABLET | Refills: 0 | Status: SHIPPED | OUTPATIENT
Start: 2021-02-09 | End: 2021-03-15 | Stop reason: SDUPTHER

## 2021-02-09 RX ADMIN — GADOTERIDOL 20 ML: 279.3 INJECTION, SOLUTION INTRAVENOUS at 13:04

## 2021-02-09 NOTE — PROGRESS NOTES
Neurocritical Care, Stroke & Neurointerventional Note    [address]   [address] [facility],  [address], [facility]   P: [phone]  Endovascular Neurosurgery Consult    Pt Name: Venkat Rivas  MRN: [id number]  YOB: 1970  Date of evaluation: 2/9/2021  Primary Care Physician: Kermit Carpio MD    Reason for evaluation: spontaneous right     SUBJECTIVE:   History of Chief Complaint:    Venkat Rivas is a 46 y.o. 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 Medications  Prior to Admission medications    Medication Sig Start Date End Date Taking? Authorizing Provider   thiamine mononitrate 100 MG tablet Take 1 tablet by mouth daily 2/9/21  Yes Óscar Salazar MD   rOPINIRole (REQUIP) 1 MG tablet Take 1 tablet by mouth nightly 1/28/21  Yes Heena Payne MD   tiZANidine (ZANAFLEX) 4 MG tablet Take 1 tablet by mouth 3 times daily 1/28/21  Yes Heena Payne MD   gabapentin (NEURONTIN) 300 MG capsule Take 1 capsule by mouth nightly for 30 days.  1/28/21 2/27/21 Yes Heena Payne MD   folic acid (FOLVITE) 1 MG tablet Take 1 tablet by mouth daily 1/28/21  Yes Heena Payne MD   sertraline (ZOLOFT) 50 MG tablet Take 3 tablets by mouth daily 1/5/21  Yes Joseph Villarreal MD   buPROPion (WELLBUTRIN XL) 300 MG extended release tablet Take 1 tablet by mouth daily 1/5/21  Yes Joseph Villarreal MD   lisinopril (PRINIVIL;ZESTRIL) 20 MG tablet Take 1 tablet by mouth daily 1/5/21  Yes Joseph Villarreal MD   amLODIPine (NORVASC) 5 MG tablet Take 1 tablet by mouth daily 1/5/21  Yes Bree Cespedes MD   cyanocobalamin 1000 MCG/ML injection Inject 1 mL into the muscle once a week for 4 doses Once a week for 4 more weeks then followed by monthly injections,l follow-up PCP for continuation 21 Yes Bree Cespedes MD   melatonin 3 MG TABS tablet Take 1 tablet by mouth nightly 21  Yes Bree Cespedes MD   lamoTRIgine (LAMICTAL) 200 MG tablet Take 400 mg by mouth daily 2 tabs 14  Yes Historical Provider, MD   diazePAM (VALIUM) 5 MG tablet Take 5 mg by mouth every 6 hours as needed for Anxiety (Muscle spasms). Historical Provider, MD   sennosides-docusate sodium (SENOKOT-S) 8.6-50 MG tablet Take 2 tablets by mouth daily  Patient not taking: Reported on 2021   Bree Cespedes MD   PROAIR  (90 BASE) MCG/ACT inhaler Inhale 2 puffs into the lungs every 6 hours as needed  14   Historical Provider, MD    Scheduled Meds:  Continuous Infusions:  PRN Meds:.  Past Medical History   has a past medical history of Asthma, Bipolar 1 disorder ([facility] [address] Delta storage pool disease [facility]), Hypertension, and Psychiatric problem. Past Surgical History   has a past surgical history that includes  section (05,3800); Gastric bypass surgery (); Tonsillectomy and adenoidectomy (); Cholecystectomy (); knee surgery (); Hysterectomy (); laparoscopy (); Colonoscopy (N/A, 2/3/2020); and Upper gastrointestinal endoscopy (N/A, 2/3/2020). Social History   reports that she has never smoked. She has never used smokeless tobacco.   reports current alcohol use. reports no history of drug use. Family History  She was adopted. Family history is unknown by patient.     Review of Systems:  CONSTITUTIONAL:  negative for fevers, chills, fatigue and malaise    EYES:  negative for double vision, blurred vision and photophobia     HEENT:  negative for tinnitus, epistaxis and sore throat    RESPIRATORY:  negative for cough, shortness of breath, wheezing    CARDIOVASCULAR: negative for chest pain, palpitations, syncope, edema    GASTROINTESTINAL:  negative for nausea, vomiting    GENITOURINARY:  negative for incontinence    MUSCULOSKELETAL:  negative for neck or back pain    NEUROLOGICAL:  Negative for weakness and tingling  negative for headaches and dizziness    PSYCHIATRIC:  negative for anxiety      Review of systems otherwise negative. OBJECTIVE:   Vitals: /75   Pulse 87   Ht 5' 5\" (1.651 m)   Wt 231 lb (104.8 kg)   SpO2 91%   BMI 38.44 kg/m²   General appearance: Lying in bed, NAD  HEENT: Head: Normocephalic, no lesions, without obvious abnormality. Neck: no adenopathy, no carotid bruit, no JVD, supple, symmetrical, trachea midline and thyroid not enlarged, symmetric, no tenderness/mass/nodules  Lungs: clear to auscultation bilaterally  Heart: regular rate and rhythm, S1, S2 normal, no murmur, click, rub or gallop  Abdomen: soft, non-tender; nondistended, bowel sounds normal  Extremities: extremities normal, atraumatic, no cyanosis or edema    Neurologic: Mental status: Alert, oriented, thought content appropriate, Alert and oriented x 3,   Language/speech: intact language, attention, knowledge  CN: II-XII intact  MOTOR: spastic LEFT sided weakness, mild to moderate 3-4/5 with circumduction      LABS:   No results for input(s): WBC, HGB, HCT, PLT, NA, K, CL, CO2, BUN, CREATININE, MG, PHOS, CALCIUM, PTT, INR, AST, ALT, BILITOT, BILIDIR, NITRU, COLORU, BACTERIA in the last 72 hours. Invalid input(s): PT, WBCU, RBCU, LEUKOCYTESUA  No results for input(s): ALKPHOS, ALT, AST, BILITOT, BILIDIR, LABALBU, AMYLASE, LIPASE in the last 72 hours.     RADIOLOGY:   Images were personally reviewed including:      IMPRESSIONS:   Everlena Shone is a 46 y.o. female who presents with hx of delta pool storage disease, hypertension who presented in Nov of 2020 with right parieto-occipital spont ICH with IVH and SAH      May be related to HTN or her platelet disease may have led to more than usual in size    MRI brain and CTA were not revealing    Repeat MRI 2/9/2021    She is here for further evaluation and management with an angiogram   1. does not have any pertinent problems on file. PLANS:   BP control, < 120/80  Catheter angiogram  Will discuss with Hem/Onc  The risk and benefit of the cerebral angiography was explained at length to the patient and her family, including but not limited to vessel injury, X-ray dye allergic reaction, kidney dysfunction, stroke and groin hematoma. Consultation Visit Time:  50 minutes  Patient given educational materials - see patient instructions. Discussed use, benefit, and side effects of prescribed medications. Personally reviewed imaging with patients and all questions answered. Pt voiced understanding. Patient agreed with treatment plan. Follow up as directed below. Greater than 50% of the time was for counseling and providing answer to the patient question. The findings and the plan discussed with the patient and all her questions were answered. Thank you very much for your referral, please do not hesitate to contact me with any questions.     Priya Grove MD Pager: [phone]  Stroke, [facility]  [address], [address] [facility]  Pager [phone]  Electronically signed 2/9/2021 at 3:35 PM

## 2021-02-22 ENCOUNTER — TELEPHONE (OUTPATIENT)
Dept: ONCOLOGY | Age: 51
End: 2021-02-22

## 2021-02-22 NOTE — TELEPHONE ENCOUNTER
PT CALLED OFFICE TO STATES THAT DR NUGENT ORDERED AN ANGIOGRAM FOR HER, SHE IS SEEN HERE SHE STATES , BUT IN Epic, SHOWS PT SEEN IN [place] IN THE PAST. SHE IS ASKING IF THERE IS ANY PREP FOR AN ANGIOGRAM IN REGARD TO PREVENTING BLEEDING. WRITER REFERRED PT TO DR CICI JIMENEZ'S OFFICE  (P) [phone] FOR ANY QUESTIONS.   NOTIFIED PER MESSAGE THAT IF ANY EDUCATION NEEDED, SHOULD BE PROVIDED AT TIME OF ANGIOGRAM.

## 2021-02-24 ENCOUNTER — TELEPHONE (OUTPATIENT)
Dept: ONCOLOGY | Age: 51
End: 2021-02-24

## 2021-02-24 DIAGNOSIS — D69.1 PLATELET DYSFUNCTION (HCC): Primary | ICD-10-CM

## 2021-02-24 RX ORDER — DESMOPRESSIN ACETATE 150/SPRAY
AEROSOL, SPRAY WITH PUMP (EA) NASAL
Qty: 1 BOTTLE | Refills: 0 | Status: SHIPPED | OUTPATIENT
Start: 2021-02-24 | End: 2022-01-12

## 2021-02-24 NOTE — TELEPHONE ENCOUNTER
Patient had called previously informing us of planned arteriogram scheduled for 3/18/2021. She requested orders for treating her platelet disorder. Discussed with Dr. Tori Correa. He recommends 1 unit of platelets to be transfused prior to procedure. Also received order for desmopressin. Patient informed and told her to check with Dr. Tony Romeo office a few days before procedure to make sure actions have been taken to transfuse platelets. She voiced understanding.

## 2021-02-25 ENCOUNTER — TELEPHONE (OUTPATIENT)
Dept: NEUROLOGY | Age: 51
End: 2021-02-25

## 2021-02-25 DIAGNOSIS — D69.1 PLATELET DYSFUNCTION (HCC): Primary | ICD-10-CM

## 2021-02-25 NOTE — TELEPHONE ENCOUNTER
Called Dr. Arina Black office and spoke to [facility]. Informed her that patient has a platelet disorder and needs a platelet transfusion prior to arteriogram on 3/18/2021 per recommendation of Dr. Virgen Farr. This will need to be arranged through their office. She voiced understanding.

## 2021-03-03 NOTE — TELEPHONE ENCOUNTER
Orders placed, but writer is not sure if it done correctly. Please have hematology oncology office verify orders are done as recommended.

## 2021-03-04 ENCOUNTER — TELEMEDICINE (OUTPATIENT)
Dept: NEUROLOGY | Age: 51
End: 2021-03-04
Payer: COMMERCIAL

## 2021-03-04 DIAGNOSIS — D69.1 PLATELET DYSFUNCTION (HCC): ICD-10-CM

## 2021-03-04 DIAGNOSIS — G81.94 LEFT HEMIPARESIS (HCC): ICD-10-CM

## 2021-03-04 DIAGNOSIS — I61.0 NONTRAUMATIC SUBCORTICAL HEMORRHAGE OF CEREBRAL HEMISPHERE, UNSPECIFIED LATERALITY (HCC): Primary | ICD-10-CM

## 2021-03-04 DIAGNOSIS — I61.1 NONTRAUMATIC CORTICAL HEMORRHAGE OF RIGHT CEREBRAL HEMISPHERE (HCC): ICD-10-CM

## 2021-03-04 PROCEDURE — 3017F COLORECTAL CA SCREEN DOC REV: CPT | Performed by: FAMILY MEDICINE

## 2021-03-04 PROCEDURE — G8427 DOCREV CUR MEDS BY ELIG CLIN: HCPCS | Performed by: FAMILY MEDICINE

## 2021-03-04 PROCEDURE — 99214 OFFICE O/P EST MOD 30 MIN: CPT | Performed by: FAMILY MEDICINE

## 2021-03-04 RX ORDER — TIZANIDINE 4 MG/1
4 TABLET ORAL 3 TIMES DAILY
Qty: 90 TABLET | Refills: 0 | Status: SHIPPED | OUTPATIENT
Start: 2021-03-04 | End: 2021-09-29

## 2021-03-04 RX ORDER — ROPINIROLE 1 MG/1
1 TABLET, FILM COATED ORAL NIGHTLY
Qty: 30 TABLET | Refills: 3 | Status: SHIPPED | OUTPATIENT
Start: 2021-03-04

## 2021-03-04 RX ORDER — BLOOD PRESSURE TEST KIT
1 KIT MISCELLANEOUS DAILY
Qty: 1 KIT | Refills: 0 | Status: SHIPPED | OUTPATIENT
Start: 2021-03-04

## 2021-03-04 NOTE — PROGRESS NOTES
[address] [place] [address], [facility] [address], [facility], [address] [facility], [address] [place] [address]  P: [phone]  F: [phone]    NEUROLOGY CLINIC NOTE     PATIENT NAME: Alexandru White  PATIENT MRN: [id number]  PRIMARY CARE PHYSICIAN: Shannon Rees MD    HPI:      Alexandru White is a 46 y.o. female who presents to clinic today as a follow up for her R frontoparietal IPH. 48 y.o. female who presents with left arm and leg weakness last known well around midnight night prior to her presentation. Patient woke up around 4 AM and noticed her left arm was feeling funny later on she noticed left leg weakness. Pa justina started to have headache as well. Denied any nausea, vomiting, trauma, vision problem, bulbar or any other sensorimotor issues. Patient was taken to [facility] where initial blood pressure was 178. Patient had CT brain done that was consistent with right parieto-occipital ICH with subarachnoid hemorrhage extending into right temporal lobe. Mild midline shift to left. No IVH. Patient was given Cardene infusion, Keppra 1 g, Lopressor 5 mg. Patient was transferred to [address] [place] [address] for further evaluation and management.     PMHx: Delta pool deficiency, bleeding tendency, hypertension     Patient says for last few days she has been out of her lisinopril. But has been taking her Norvasc. Patient is not on any AC/AP at home.     Patient was started on Cardene again in the ER [facility]. Repeat CT head and CTA head and neck were not done. No LVO or aneurysm or any intracranial vascular abnormalities were apparent. .  Repeat CT was consistent with right temporal horn IVH. GCS 15.     And was given mannitol 25 g x 1 in the ER. She started to become agitated in the ER and was given Ativan 1 mg x2 in  The ER. Later on patient  was started on Precedex drip.     Neuro-surgery was consulted. Patient was transferred to neuro ICU for further evaluation and management.   Repeat CT head were stable  MRI brain with and without did not reveal any underlying mass or vascular malformation  Patient received platelet transfusions during hospitalization. Patient's blood pressure was stabilized  Patient had persistent headache which seemed to improve with Medrol Dosepak. Neurontin 300 mg nightly was added for symptom control  She also had spasticity in left upper and lower extremity which seemed to improve with scheduled baclofen. Flexeril 10 mg daily as needed with added for muscle cramping  Patient was discharged to inpatient rehab. Today: Patient was seen and examined. She still has some weakness in left upper extremity left lower extremity. Patient uses a wheelchair/quad cane for ambulation. Patient was evaluated by neurosurgery and recommended to have cerebral angiogram.  Patient had MRV brain done that was unremarkable  Patient is still complaining of mild 1 out of 10 right-sided headache denies any nausea, vomiting photo or phonophobia associated with a headache  says she takes Tylenol that helps her with the headache  Patient says since her stroke she had 5 or 6 ago both episodes    3/4/21: patient was seen and examined. Patient fell 2 days ago, patient was sitting on the floor,when she stood up she fell over. No lightheadedness. Patient fell due to weakness in left leg secondary to stroke. Patient is doing physical therapy 3 times a week. Patient uses a quad cane. Patient is able to walk across the room without any assistance and is able to go up and down the stairs with some one besides her. She does not check her blood pressure at home. Patient did follow up with neuro endovascular in February. Patient is going to have angiogram in March 18th. Patient is going to follow up with Hem/Onc. Patient is occassionally getting headache. Headaches can last upto 3 hours if she does not take tylenol.  She does not have numbness in Left arm/leg but some loss of sensation in Left arm and leg that is improved than before.       PREVIOUS WORKUP:     Lab Results   Component Value Date    WBC 5.5 2021    HGB 12.1 2021    HCT 35.8 (L) 2021    MCV 89.8 2021     2021       Past Medical History:   Diagnosis Date    Asthma     Bipolar 1 disorder ([facility] [address]     Delta storage pool disease ([facility] [address]     Hypertension     Psychiatric problem         Past Surgical History:   Procedure Laterality Date     SECTION  1282,7751    Miscarriage     CHOLECYSTECTOMY      COLONOSCOPY N/A 2/3/2020     bx(normal)hemorrhoids    GASTRIC BYPASS SURGERY      HYSTERECTOMY      KNEE SURGERY      LAPAROSCOPY      TONSILLECTOMY AND ADENOIDECTOMY      UPPER GASTROINTESTINAL ENDOSCOPY N/A 2/3/2020    (normal,neg H-Pylori)normal post-bypass endoscopy        Social History     Socioeconomic History    Marital status:      Spouse name: Not on file    Number of children: Not on file    Years of education: Not on file    Highest education level: Not on file   Occupational History    Not on file   Social Needs    Financial resource strain: Not on file    Food insecurity     Worry: Not on file     Inability: Not on file    Transportation needs     Medical: Not on file     Non-medical: Not on file   Tobacco Use    Smoking status: Never Smoker    Smokeless tobacco: Never Used   Substance and Sexual Activity    Alcohol use: Yes     Comment: no alcohol since Sept    Drug use: No    Sexual activity: Yes     Partners: Male   Lifestyle    Physical activity     Days per week: Not on file     Minutes per session: Not on file    Stress: Not on file   Relationships    Social connections     Talks on phone: Not on file     Gets together: Not on file     Attends Mosque service: Not on file     Active member of club or organization: Not on file     Attends meetings of clubs or organizations: Not on file     Relationship status: Not on file    Intimate partner violence     Fear of current or ex partner: Not on file     Emotionally abused: Not on file     Physically abused: Not on file     Forced sexual activity: Not on file   Other Topics Concern    Not on file   Social History Narrative    Not on file        Current Outpatient Medications   Medication Sig Dispense Refill    thiamine mononitrate 100 MG tablet Take 1 tablet by mouth daily 30 tablet 0    diazePAM (VALIUM) 5 MG tablet Take 5 mg by mouth every 6 hours as needed for Anxiety (Muscle spasms).  rOPINIRole (REQUIP) 1 MG tablet Take 1 tablet by mouth nightly 30 tablet 3    tiZANidine (ZANAFLEX) 4 MG tablet Take 1 tablet by mouth 3 times daily 90 tablet 0    gabapentin (NEURONTIN) 300 MG capsule Take 1 capsule by mouth nightly for 30 days.  30 capsule 3    folic acid (FOLVITE) 1 MG tablet Take 1 tablet by mouth daily 30 tablet 3    sertraline (ZOLOFT) 50 MG tablet Take 3 tablets by mouth daily 30 tablet 3    buPROPion (WELLBUTRIN XL) 300 MG extended release tablet Take 1 tablet by mouth daily 30 tablet 3    lisinopril (PRINIVIL;ZESTRIL) 20 MG tablet Take 1 tablet by mouth daily 30 tablet 3    amLODIPine (NORVASC) 5 MG tablet Take 1 tablet by mouth daily 30 tablet 3    cyanocobalamin 1000 MCG/ML injection Inject 1 mL into the muscle once a week for 4 doses Once a week for 4 more weeks then followed by monthly injections,l follow-up PCP for continuation 8 mL 0    lamoTRIgine (LAMICTAL) 200 MG tablet Take 400 mg by mouth daily 2 tabs      desmopressin (STIMATE) 1.5 MG/ML SOLN nasal solution 1 spray each nostril the night before procedure (Patient not taking: Reported on 3/4/2021) 1 Bottle 0    sennosides-docusate sodium (SENOKOT-S) 8.6-50 MG tablet Take 2 tablets by mouth daily (Patient not taking: Reported on 2/9/2021) 30 tablet 0    melatonin 3 MG TABS tablet Take 1 tablet by mouth nightly (Patient not taking: Reported on 3/4/2021) 30 tablet 0    PROAIR  (90 BASE) MCG/ACT inhaler Inhale 2 puffs into the lungs every 6 hours as needed        No current facility-administered medications for this visit. Allergies   Allergen Reactions    Penicillin G Itching    Pcn [Penicillins]     Sulfa Antibiotics Other (See Comments)     Inflammation in the eye        REVIEW OF SYSTEMS:     Review of Systems   Constitutional: Negative for activity change. Neurological: Positive for weakness and headaches. Negative for seizures, facial asymmetry, speech difficulty and numbness. Psychiatric/Behavioral: Negative for behavioral problems. All other systems reviewed and are negative. VITALS  There were no vitals taken for this visit. PHYSICAL EXAMINATION:     Due to covid pandemic the exam was limited and conducted virtually. Patient was awake,alert oriented ,following commands. NEUROLOGICAL EXAMINATION:   Due to covid pandemic the exam was limited and conducted virtually. Patient was awake,alert oriented ,following commands. ASSESSMENT / PLAN:     //Spontaneous right frontoparietal IPH, subarachnoid hemorrhage, IVH secondary to hypertension, Delta pool storage deficiency    Other comorbidities include delta pool deficiency, bleeding tendency, hypertension,bipolar    Patient was evaluated by neurosurgery  MRV brain negative for any dural venous sinus thrombosis, stable right frontoparietal IPH  Follow-up neuro endovascular for cerebral angiogram to rule out any intracranial vascular abnormalities. Continue Tylenol as needed for headache  Has been off of the Christen Peeling. Goal SBP<120  Patient to follow up with PCP for BP Kit and to monitor SBP at home. Patient was counselled regarding the importance of monitoring and having good blood pressure control at home. Follow up Hem/Onc prior to Cerebral Angiogram.  On lamictal 400 daily for mood disorder. Follow-up in 1-2 months.     Ms. Chin Daley received counseling on the following healthy behaviors: medical compliance, smoking cessation, blood pressure control, regular follow up with primary doctor.         Electronically signed by Ca Terrazas MD on 3/4/2021 at 1:18 PM

## 2021-03-15 RX ORDER — GAUZE BANDAGE 2" X 2"
100 BANDAGE TOPICAL DAILY
Qty: 30 TABLET | Refills: 5 | Status: SHIPPED | OUTPATIENT
Start: 2021-03-15 | End: 2022-01-12

## 2021-03-15 NOTE — TELEPHONE ENCOUNTER
Unclear why pt was started on thaimine on 2/2021 by dr. Vinita Amaya. No documentation in notes. Plan to address this next visit.

## 2021-03-18 ENCOUNTER — HOSPITAL ENCOUNTER (OUTPATIENT)
Dept: INTERVENTIONAL RADIOLOGY/VASCULAR | Age: 51
Discharge: HOME | End: 2021-03-20
Payer: COMMERCIAL

## 2021-03-18 ENCOUNTER — HOSPITAL ENCOUNTER (OUTPATIENT)
Dept: ONCOLOGY | Age: 51
Discharge: HOME | End: 2021-03-18
Attending: PSYCHIATRY & NEUROLOGY | Admitting: PSYCHIATRY & NEUROLOGY
Payer: COMMERCIAL

## 2021-03-18 VITALS
SYSTOLIC BLOOD PRESSURE: 156 MMHG | RESPIRATION RATE: 19 BRPM | DIASTOLIC BLOOD PRESSURE: 60 MMHG | OXYGEN SATURATION: 97 % | HEART RATE: 93 BPM

## 2021-03-18 VITALS
SYSTOLIC BLOOD PRESSURE: 119 MMHG | HEART RATE: 90 BPM | RESPIRATION RATE: 16 BRPM | OXYGEN SATURATION: 98 % | TEMPERATURE: 98.4 F | DIASTOLIC BLOOD PRESSURE: 73 MMHG

## 2021-03-18 DIAGNOSIS — D69.1 PLATELET DYSFUNCTION (HCC): ICD-10-CM

## 2021-03-18 PROBLEM — D69.9 BLEEDING DISORDER (HCC): Status: ACTIVE | Noted: 2021-03-18

## 2021-03-18 LAB
ABO/RH: NORMAL
ABSOLUTE EOS #: 0.13 K/UL (ref 0–0.44)
ABSOLUTE IMMATURE GRANULOCYTE: 0.03 K/UL (ref 0–0.3)
ABSOLUTE LYMPH #: 2.74 K/UL (ref 1.1–3.7)
ABSOLUTE MONO #: 0.52 K/UL (ref 0.1–1.2)
ANTIBODY SCREEN: NEGATIVE
ARM BAND NUMBER: NORMAL
BASOPHILS # BLD: 1 % (ref 0–2)
BASOPHILS ABSOLUTE: 0.06 K/UL (ref 0–0.2)
BLD PROD TYP BPU: NORMAL
BLOOD BANK SPECIMEN: NORMAL
DIFFERENTIAL TYPE: NORMAL
DISPENSE STATUS BLOOD BANK: NORMAL
EOSINOPHILS RELATIVE PERCENT: 2 % (ref 1–4)
EXPIRATION DATE: NORMAL
HCT VFR BLD CALC: 40.6 % (ref 36.3–47.1)
HEMOGLOBIN: 12.6 G/DL (ref 11.9–15.1)
IMMATURE GRANULOCYTES: 0 %
LYMPHOCYTES # BLD: 37 % (ref 24–43)
MCH RBC QN AUTO: 27.6 PG (ref 25.2–33.5)
MCHC RBC AUTO-ENTMCNC: 31 G/DL (ref 28.4–34.8)
MCV RBC AUTO: 89 FL (ref 82.6–102.9)
MONOCYTES # BLD: 7 % (ref 3–12)
NRBC AUTOMATED: 0 PER 100 WBC
PDW BLD-RTO: 12.7 % (ref 11.8–14.4)
PLATELET # BLD: 447 K/UL (ref 138–453)
PLATELET ESTIMATE: NORMAL
PMV BLD AUTO: 9.3 FL (ref 8.1–13.5)
RBC # BLD: 4.56 M/UL (ref 3.95–5.11)
RBC # BLD: NORMAL 10*6/UL
SEG NEUTROPHILS: 53 % (ref 36–65)
SEGMENTED NEUTROPHILS ABSOLUTE COUNT: 3.84 K/UL (ref 1.5–8.1)
TRANSFUSION STATUS: NORMAL
UNIT DIVISION: 0
UNIT NUMBER: NORMAL
WBC # BLD: 7.3 K/UL (ref 3.5–11.3)
WBC # BLD: NORMAL 10*3/UL

## 2021-03-18 PROCEDURE — C1769 GUIDE WIRE: HCPCS

## 2021-03-18 PROCEDURE — 36415 COLL VENOUS BLD VENIPUNCTURE: CPT

## 2021-03-18 PROCEDURE — C1887 CATHETER, GUIDING: HCPCS

## 2021-03-18 PROCEDURE — 99213 OFFICE O/P EST LOW 20 MIN: CPT | Performed by: PSYCHIATRY & NEUROLOGY

## 2021-03-18 PROCEDURE — 36224 PLACE CATH CAROTD ART: CPT | Performed by: PSYCHIATRY & NEUROLOGY

## 2021-03-18 PROCEDURE — C1894 INTRO/SHEATH, NON-LASER: HCPCS

## 2021-03-18 PROCEDURE — 2709999900 HC NON-CHARGEABLE SUPPLY

## 2021-03-18 PROCEDURE — 36227 PLACE CATH XTRNL CAROTID: CPT | Performed by: PSYCHIATRY & NEUROLOGY

## 2021-03-18 PROCEDURE — 7100000000 HC PACU RECOVERY - FIRST 15 MIN

## 2021-03-18 PROCEDURE — 6360000002 HC RX W HCPCS: Performed by: STUDENT IN AN ORGANIZED HEALTH CARE EDUCATION/TRAINING PROGRAM

## 2021-03-18 PROCEDURE — 2500000003 HC RX 250 WO HCPCS: Performed by: STUDENT IN AN ORGANIZED HEALTH CARE EDUCATION/TRAINING PROGRAM

## 2021-03-18 PROCEDURE — 36430 TRANSFUSION BLD/BLD COMPNT: CPT

## 2021-03-18 PROCEDURE — 6370000000 HC RX 637 (ALT 250 FOR IP)

## 2021-03-18 PROCEDURE — 86900 BLOOD TYPING SEROLOGIC ABO: CPT

## 2021-03-18 PROCEDURE — 75710 ARTERY X-RAYS ARM/LEG: CPT | Performed by: PSYCHIATRY & NEUROLOGY

## 2021-03-18 PROCEDURE — P9037 PLATE PHERES LEUKOREDU IRRAD: HCPCS

## 2021-03-18 PROCEDURE — 99152 MOD SED SAME PHYS/QHP 5/>YRS: CPT | Performed by: PSYCHIATRY & NEUROLOGY

## 2021-03-18 PROCEDURE — 86901 BLOOD TYPING SEROLOGIC RH(D): CPT

## 2021-03-18 PROCEDURE — 6370000000 HC RX 637 (ALT 250 FOR IP): Performed by: STUDENT IN AN ORGANIZED HEALTH CARE EDUCATION/TRAINING PROGRAM

## 2021-03-18 PROCEDURE — 7100000001 HC PACU RECOVERY - ADDTL 15 MIN

## 2021-03-18 PROCEDURE — 2500000003 HC RX 250 WO HCPCS

## 2021-03-18 PROCEDURE — 85025 COMPLETE CBC W/AUTO DIFF WBC: CPT

## 2021-03-18 PROCEDURE — 6360000004 HC RX CONTRAST MEDICATION: Performed by: PSYCHIATRY & NEUROLOGY

## 2021-03-18 PROCEDURE — 36226 PLACE CATH VERTEBRAL ART: CPT | Performed by: PSYCHIATRY & NEUROLOGY

## 2021-03-18 PROCEDURE — 86850 RBC ANTIBODY SCREEN: CPT

## 2021-03-18 RX ORDER — ACETAMINOPHEN 325 MG/1
650 TABLET ORAL EVERY 4 HOURS PRN
Status: DISCONTINUED | OUTPATIENT
Start: 2021-03-18 | End: 2021-03-21 | Stop reason: HOSPADM

## 2021-03-18 RX ORDER — VERAPAMIL HYDROCHLORIDE 2.5 MG/ML
5 INJECTION, SOLUTION INTRAVENOUS ONCE
Status: COMPLETED | OUTPATIENT
Start: 2021-03-18 | End: 2021-03-18

## 2021-03-18 RX ORDER — IODIXANOL 270 MG/ML
200 INJECTION, SOLUTION INTRAVASCULAR
Status: COMPLETED | OUTPATIENT
Start: 2021-03-18 | End: 2021-03-18

## 2021-03-18 RX ORDER — FENTANYL CITRATE 50 UG/ML
INJECTION, SOLUTION INTRAMUSCULAR; INTRAVENOUS
Status: COMPLETED | OUTPATIENT
Start: 2021-03-18 | End: 2021-03-18

## 2021-03-18 RX ORDER — SODIUM CHLORIDE 9 MG/ML
INJECTION, SOLUTION INTRAVENOUS PRN
Status: DISCONTINUED | OUTPATIENT
Start: 2021-03-18 | End: 2021-03-18 | Stop reason: HOSPADM

## 2021-03-18 RX ORDER — MIDAZOLAM HYDROCHLORIDE 2 MG/2ML
INJECTION, SOLUTION INTRAMUSCULAR; INTRAVENOUS
Status: COMPLETED | OUTPATIENT
Start: 2021-03-18 | End: 2021-03-18

## 2021-03-18 RX ORDER — PROMETHAZINE HYDROCHLORIDE 12.5 MG/1
12.5 TABLET ORAL EVERY 6 HOURS PRN
Status: DISCONTINUED | OUTPATIENT
Start: 2021-03-18 | End: 2021-03-21 | Stop reason: HOSPADM

## 2021-03-18 RX ORDER — ONDANSETRON 2 MG/ML
4 INJECTION INTRAMUSCULAR; INTRAVENOUS EVERY 6 HOURS PRN
Status: DISCONTINUED | OUTPATIENT
Start: 2021-03-18 | End: 2021-03-21 | Stop reason: HOSPADM

## 2021-03-18 RX ORDER — LABETALOL HYDROCHLORIDE 5 MG/ML
10 INJECTION, SOLUTION INTRAVENOUS ONCE
Status: COMPLETED | OUTPATIENT
Start: 2021-03-18 | End: 2021-03-18

## 2021-03-18 RX ORDER — CLINDAMYCIN PHOSPHATE 600 MG/50ML
600 INJECTION INTRAVENOUS ONCE
Status: COMPLETED | OUTPATIENT
Start: 2021-03-18 | End: 2021-03-18

## 2021-03-18 RX ADMIN — IODIXANOL 59 ML: 270 INJECTION, SOLUTION INTRAVASCULAR at 11:02

## 2021-03-18 RX ADMIN — ACETAMINOPHEN 650 MG: 325 TABLET ORAL at 13:21

## 2021-03-18 RX ADMIN — CLINDAMYCIN PHOSPHATE 600 MG: 600 INJECTION, SOLUTION INTRAVENOUS at 09:49

## 2021-03-18 RX ADMIN — Medication 10 MG: at 11:27

## 2021-03-18 RX ADMIN — FENTANYL CITRATE 50 MCG: 50 INJECTION, SOLUTION INTRAMUSCULAR; INTRAVENOUS at 09:47

## 2021-03-18 RX ADMIN — FENTANYL CITRATE 25 MCG: 50 INJECTION, SOLUTION INTRAMUSCULAR; INTRAVENOUS at 10:36

## 2021-03-18 RX ADMIN — VERAPAMIL HYDROCHLORIDE 5 MG: 2.5 INJECTION, SOLUTION INTRAVENOUS at 10:01

## 2021-03-18 RX ADMIN — MIDAZOLAM HYDROCHLORIDE 1 MG: 1 INJECTION, SOLUTION INTRAMUSCULAR; INTRAVENOUS at 09:47

## 2021-03-18 ASSESSMENT — PAIN SCALES - GENERAL: PAINLEVEL_OUTOF10: 5

## 2021-03-18 NOTE — OP NOTE
[facility]    NEUROENDOVASCULAR SERVICE: POST-OP NOTE: 3/18/2021    Pt Name: Valentine Dailey  MRN: [id number]  YOB: 1970  Date of Procedure: 3/18/2021  Primary Care Physician: Paul Chowdhury MD    Pre-Procedural Diagnosis: History of right occipital IPH. Post-Procedural Diagnosis: No underlying vascular pathology or AVM noted. Procedure Performed:Diagnostic Cerebral Angiogram    Surgeon:   Maximo Oliva MD    Fellow:  Mat Tilley MD     1st Assistant:  Jacquelyn Reyes    PRE-PROCEDURAL EXAM:  Prestroke baseline mRS MODIFIED CRISTY SCORE: 1 - No significant disability: despite symptoms, able to carry out all usual duties and activities. Neurological exam performed and unchanged from initial H&P or consult    Anesthesia: IV Moderate Sedation  Complications: none    Intra-Operative EXAM:  Neurological exam performed and unchanged from initial H&P or consult    EBL: < Minimal      Cc            Specimens: Were not Obtained  Contrast:     Visipaque 270 low osmolar 59 Cc             Fluoro: 10.9 min    Findings:  Please see dictated Radiology note for further details  1. No underlying aneurysm, vascular pathology or AVM noted. POST-PROCEDURAL EXAM :   Stable neurological Exam  Neurological exam performed and unchanged from initial H&P or consult    Closure:  right TR Band 6   F    POST-PROCEDURAL MONITORING : see orders  Disposition: Recovery room    Recommendations:  --Back to recovery room. --Deflated a lot from right wrist TR band according to protocol. --Neuro checks per protocol. --Peripheral pulse checks per protocol.   --ICU management per NSICU team.   --SBP goal 110-140  --Upon discharge follow up with Dr. Marcia Pennington in 2 weeks and Dr. Sushil Mcgraw in 3 months      MD Maximo Paige MD   Pager: [phone]  Stroke, [facility]  [address] [facility]  Electronically signed 3/18/2021 at 10:41 AM

## 2021-03-18 NOTE — PROGRESS NOTES
Pt up at bedside with no c/o. States headache has subsided. TR band removed, pressure dressing applied and arm board.  assisting pt with dressing.

## 2021-03-18 NOTE — H&P
Endovascular Neurosurgery history and physical exam    Pt Name: Seth Lesch  MRN: [id number]  YOB: 1970  Date of evaluation: 3/18/2021  Primary Care Physician: Naga Patel MD  Reason for evaluation: Cerebral angiogram to evaluate the right occipital IPH/IVH    SUBJECTIVE:   History of Chief Complaint:    Seth Lesch is a 46 y.o. female with past medical history including asthma, bipolar disorder, delta storage pool disease, hypertension. Patient presented back in November 2020 with headache and found to have right occipital IPH with IVH. She underwent work-up including CTA head and neck, MRI which was unremarkable for underlying vascular pathology. She was seen and evaluated in the neuro endovascular clinic with plan for diagnostic cerebral angiogram for further evaluation. She presented today accompanied by family. She is doing good. She is receiving platelets this morning prior to the procedure. Allergies  is allergic to penicillin g; pcn [penicillins]; and sulfa antibiotics. Medications  Prior to Admission medications    Medication Sig Start Date End Date Taking? Authorizing Provider   thiamine mononitrate 100 MG tablet Take 1 tablet by mouth daily 3/15/21   Elsa Arzola MD   rOPINIRole (REQUIP) 1 MG tablet Take 1 tablet by mouth nightly 3/4/21   Tabitha Purvis MD   tiZANidine (ZANAFLEX) 4 MG tablet Take 1 tablet by mouth 3 times daily 3/4/21   Tabitha Purvis MD   Blood Pressure KIT 1 each by Does not apply route daily 3/4/21   Tabitha Purvis MD   desmopressin (STIMATE) 1.5 MG/ML SOLN nasal solution 1 spray each nostril the night before procedure  Patient not taking: Reported on 3/4/2021 2/24/21   Hiral Vale MD   diazePAM (VALIUM) 5 MG tablet Take 5 mg by mouth every 6 hours as needed for Anxiety (Muscle spasms). Historical Provider, MD   gabapentin (NEURONTIN) 300 MG capsule Take 1 capsule by mouth nightly for 30 days.  1/28/21 3/4/21  Tabitha Purvis MD   folic acid (FOLVITE) 1 MG tablet Take 1 tablet by mouth daily 21   Bonnie Hussein MD   sertraline (ZOLOFT) 50 MG tablet Take 3 tablets by mouth daily 21   Cyndee Álvarez MD   buPROPion (WELLBUTRIN XL) 300 MG extended release tablet Take 1 tablet by mouth daily 21   Cyndee Álvarez MD   lisinopril (PRINIVIL;ZESTRIL) 20 MG tablet Take 1 tablet by mouth daily 21   Cyndee Álvarez MD   amLODIPine (NORVASC) 5 MG tablet Take 1 tablet by mouth daily 21   Cyndee Álvarez MD   cyanocobalamin 1000 MCG/ML injection Inject 1 mL into the muscle once a week for 4 doses Once a week for 4 more weeks then followed by monthly injections,l follow-up PCP for continuation 1/8/21 3/4/21  Cyndee Álvarez MD   sennosides-docusate sodium (SENOKOT-S) 8.6-50 MG tablet Take 2 tablets by mouth daily  Patient not taking: Reported on 2021   Cyndee Álvarez MD   melatonin 3 MG TABS tablet Take 1 tablet by mouth nightly  Patient not taking: Reported on 3/4/2021 1/4/21   Cyndee Álvarez MD   PROAIR  (90 BASE) MCG/ACT inhaler Inhale 2 puffs into the lungs every 6 hours as needed  14   Historical Provider, MD   lamoTRIgine (LAMICTAL) 200 MG tablet Take 400 mg by mouth daily 2 tabs 14   Historical Provider, MD    Scheduled Meds:  Continuous Infusions:  PRN Meds:.  Past Medical History   has a past medical history of Asthma, Bipolar 1 disorder ([facility] [address] Delta storage pool disease [facility]), Hypertension, and Psychiatric problem. Past Surgical History   has a past surgical history that includes  section (4010,9369); Gastric bypass surgery (); Tonsillectomy and adenoidectomy (); Cholecystectomy (); knee surgery (); Hysterectomy (); laparoscopy (); Colonoscopy (N/A, 2/3/2020); and Upper gastrointestinal endoscopy (N/A, 2/3/2020). Social History   reports that she has never smoked. She has never used smokeless tobacco.   reports current alcohol use. reports no history of drug use.   Family History She was adopted. Family history is unknown by patient. Review of Systems:  CONSTITUTIONAL:  negative for fevers, chills, fatigue and malaise    EYES:  negative for double vision, blurred vision and photophobia     HEENT:  negative for tinnitus, epistaxis and sore throat    RESPIRATORY:  negative for cough, shortness of breath, wheezing    CARDIOVASCULAR:  negative for chest pain, palpitations, syncope, edema    GASTROINTESTINAL:  negative for nausea, vomiting    GENITOURINARY:  negative for incontinence    MUSCULOSKELETAL:  negative for neck or back pain    NEUROLOGICAL:  Negative for weakness and tingling  negative for headaches and dizziness    PSYCHIATRIC:  negative for anxiety      Review of systems otherwise negative. OBJECTIVE:   Vitals: There were no vitals taken for this visit. General appearance: Lying in bed, NAD. HEENT: Atraumatic. Neck: Neck is supple. Lungs: No respiratory distress noted. Heart: normal sinus rhythm on tele. .   Abdomen: Soft nontender. Extremities: No lower limb edema noted. Neurologic:awake, following simple commands appropriately, able to name simple objects, intact comprehension, no dysarthria noted. CN: Has intact extraocular muscles movements, no facial droop noted  MOTOR: Has good strength in both upper and lower extremities, moving both upper and lower extremities against gravity. Left upper extremity weakness at 4/5. SENSORY: Intact sensation to simple touch bilaterally in both upper and lower extremities. LABS:     Recent Labs     03/18/21  0537   WBC 7.3   HGB 12.6   HCT 40.6        No results for input(s): ALKPHOS, ALT, AST, BILITOT, BILIDIR, LABALBU, AMYLASE, LIPASE in the last 72 hours. RADIOLOGY:   Images were personally reviewed including:  Neuroimages reviewed. IMPRESSIONS:     1. History of right parieto-occipital IPH/IVH with subarachnoid hemorrhage. 2. Cerebral angiogram for further evaluation.     Cerebral angiogram with IV moderate sedation. Risks and benefits discussed including but not limited to bruising, stroke, sah, death, retroperitoneal hematoma, femoral pseudoaneurysm, lower ext and renal as well as peripheral vasc compromise discussed - informed consent obtained at bedside from the patient. PLANS:   1. Keep n.p.o.  2. Give platelets. 3. Platelet function test.  4. Proceed for cerebral angiogram.    Thank you for the interesting evaluation.     Mat Tilley MD  Pager [phone]  Stroke, [facility]  [address]  Electronically signed 3/18/2021 at 8:10 AM

## 2021-03-18 NOTE — PROGRESS NOTES
All discharge instructions reviewed, questions answered, paper signed and given copy. Patient discharged per per wheelchair with  and belongings.

## 2021-03-18 NOTE — PROGRESS NOTES
Pt assisted to BR with assistance of writer and  per wheelchair. . Voids but unable to have BM. Returned to bed.

## 2021-03-18 NOTE — PROGRESS NOTES
Received post procedure to [facility] to room 11. Assessment obtained. Restrictions reviewed with patient. Post procedure pathway initiated. Right radial site soft ,TR band   Ntact. Old blood noted under band  No hematoma noted. Family at side. Patient without complaints.

## 2021-03-19 LAB
BLD PROD TYP BPU: NORMAL
DISPENSE STATUS BLOOD BANK: NORMAL
TRANSFUSION STATUS: NORMAL
UNIT DIVISION: 0
UNIT NUMBER: NORMAL

## 2021-03-30 ENCOUNTER — HOSPITAL ENCOUNTER (OUTPATIENT)
Age: 51
Discharge: HOME | End: 2021-03-30
Payer: COMMERCIAL

## 2021-03-30 ENCOUNTER — OFFICE VISIT (OUTPATIENT)
Dept: PHYSICAL MEDICINE AND REHAB | Age: 51
End: 2021-03-30
Payer: COMMERCIAL

## 2021-03-30 VITALS
DIASTOLIC BLOOD PRESSURE: 76 MMHG | TEMPERATURE: 96.8 F | HEART RATE: 92 BPM | BODY MASS INDEX: 38.24 KG/M2 | WEIGHT: 229.8 LBS | SYSTOLIC BLOOD PRESSURE: 131 MMHG

## 2021-03-30 DIAGNOSIS — M62.838 SPASM OF MUSCLE: ICD-10-CM

## 2021-03-30 DIAGNOSIS — D69.1 PLATELET DYSFUNCTION (HCC): ICD-10-CM

## 2021-03-30 DIAGNOSIS — D69.9 BLEEDING DISORDER (HCC): ICD-10-CM

## 2021-03-30 DIAGNOSIS — F31.9 BIPOLAR 1 DISORDER (HCC): ICD-10-CM

## 2021-03-30 DIAGNOSIS — I61.9 INTRAPARENCHYMAL HEMORRHAGE OF BRAIN (HCC): ICD-10-CM

## 2021-03-30 DIAGNOSIS — D69.1 DELTA STORAGE POOL DISEASE (HCC): ICD-10-CM

## 2021-03-30 DIAGNOSIS — Z86.79 H/O INTRACRANIAL HEMORRHAGE: Primary | ICD-10-CM

## 2021-03-30 DIAGNOSIS — R53.1 ACUTE LEFT-SIDED WEAKNESS: ICD-10-CM

## 2021-03-30 DIAGNOSIS — Z86.79 H/O INTRACRANIAL HEMORRHAGE: ICD-10-CM

## 2021-03-30 DIAGNOSIS — I62.9 INTRACRANIAL HEMORRHAGE (HCC): ICD-10-CM

## 2021-03-30 LAB
ALT SERPL-CCNC: 12 U/L (ref 5–33)
AST SERPL-CCNC: 14 U/L

## 2021-03-30 PROCEDURE — 1036F TOBACCO NON-USER: CPT | Performed by: PHYSICAL MEDICINE & REHABILITATION

## 2021-03-30 PROCEDURE — 84460 ALANINE AMINO (ALT) (SGPT): CPT

## 2021-03-30 PROCEDURE — 3017F COLORECTAL CA SCREEN DOC REV: CPT | Performed by: PHYSICAL MEDICINE & REHABILITATION

## 2021-03-30 PROCEDURE — G8427 DOCREV CUR MEDS BY ELIG CLIN: HCPCS | Performed by: PHYSICAL MEDICINE & REHABILITATION

## 2021-03-30 PROCEDURE — 36415 COLL VENOUS BLD VENIPUNCTURE: CPT

## 2021-03-30 PROCEDURE — 99214 OFFICE O/P EST MOD 30 MIN: CPT | Performed by: PHYSICAL MEDICINE & REHABILITATION

## 2021-03-30 PROCEDURE — G8417 CALC BMI ABV UP PARAM F/U: HCPCS | Performed by: PHYSICAL MEDICINE & REHABILITATION

## 2021-03-30 PROCEDURE — G8484 FLU IMMUNIZE NO ADMIN: HCPCS | Performed by: PHYSICAL MEDICINE & REHABILITATION

## 2021-03-30 PROCEDURE — 84450 TRANSFERASE (AST) (SGOT): CPT

## 2021-03-30 NOTE — LETTER
[address]  [address] [place] [address]  Dept: [phone]  Dept Fax: [phone]      3/30/21    Patient: Remberto Paez  YOB: 1970    Dear  Davian Sy MD ,    I had the pleasure of seeing your patient, Remberto Paez today in the office today. Below are the relevant portions of my assessment and plan of care. IMPRESSION:   Diagnosis Orders   1. H/O intracranial hemorrhage  ALT    AST   2. Spasm of muscle  ALT    AST   3. Intracranial hemorrhage ([address]     4. Intraparenchymal hemorrhage of brain ([address]     5. Acute left-sided weakness     6. Bleeding disorder ([address]     7. Bipolar 1 disorder ([address]     8. Platelet dysfunction (HCC)     9. Delta storage pool disease [facility])       PLAN:  1. Hemorrhagic CVA with left hemiparesis. She is making improvements. There is some improvement in strength she continues a left ankle-foot orthoses, skin is intact. She is now ambulating short distances with a narrow-base quad cane, wheelchair for longer distances. She continues with PT/OT. We will continue. If needed reorder they will notify  2. Left hemiparesis with hemisensory deficitscontinues with Neurontin as well as therapies  3. Left frozen shoulderhas improved range of motion able to get 110 degrees of flexion abduction. Decreased pain. She will follow with orthopedics as they are already following her for this. She will continue with therapy  4. Frequent urination chronic for patientrecommend consideration of urology consultation, she will discuss with her PCP  5.  Spasms/spasticityshe was taking 4 mg Zanaflex 3 times a day it made her sleepy during the day it helped at nighttime. We will decrease to 2 mg in the morning and afternoon and 4 mg at nighttime. Will check lab work advised to stop Valium  6. Reviewed falls and precautions, currently no trauma noted  7.  Follow-up with hematology, endovascular and neurology as well as psychiatry  8. Follow-up approximately 6 weeks, reviewed no driving         No follow-ups on file. Orders Placed This Encounter   Procedures    ALT     Standing Status:   Future     Number of Occurrences:   1     Standing Expiration Date:   3/30/2022    AST     Standing Status:   Future     Number of Occurrences:   1     Standing Expiration Date:   3/31/2022     No orders of the defined types were placed in this encounter. Thank you for allowing me to participate in the care of this patient. I will keep you updated on this patient's follow up and I look forward to serving you and your patients again in the future. Kelly Clavert. Willian Phipps MD

## 2021-03-30 NOTE — PROGRESS NOTES
[facility]YSIC[facility]  [facility] PHYSICAL MEDICINE & REHABILITATION  [address] [place] [address]  [place] [address]  Dept: [phone]  Dept Fax: [phone]    Outpatient Followup Note     Erica Lombardi, 46 y.o., female, presents for follow up c/o of   Chief Complaint   Patient presents with    Follow-up     s/p rehab stay due stroke in december 2020   . Patient was lastseen on    HPI:     HPI   She comes in with her  today. She uses a wheelchair. Since discharge she feels she has been doing very well. She is now ambulating short distance with a quad cane long distances uses a wheelchair. She denies any difficulty with her bowels or bladder though she still notes her urgency. She does drink a lot. She goes to the bathroom 2 times an hour which has been chronic. She notes this related to history of hysterectomy C-sections etc.  She also notes some leakage. She continues with sensory deficit left side as well as weakness of the weakness has somewhat improved. She has had falls to the left side with no trauma at this time. She denies any difficulty eating or drinking. She is not driving. She notes some spasms at nighttime she is not using her Zanaflex that makes because it makes her sleepy. She follow with orthopedics of left shoulder discomfort was diagnosed with possible frozen shoulder. She underwent injection approxi-2 months ago which helped significantly. She is in physical therapy and range of motion has improved. She has a follow-up with orthopedics. She has followed up with neurology, no vascular and PCP. She is now off Keppra to monitor blood pressure. She has follow-up with hematology oncology as well. He also follows with psychiatrist for bipolar disorder    Continues with physical and Occupational Therapy      History     Alecia Donaldson  is a 48 y. o. right-handed     female admitted to the [address] [place] [address] unit on 12/9/2020.  She was originally admitted to [address] 2020 for L sided weakness and headache.  She presented originally to [facility] ED. CT confirmed R parietal ICH and moderate SAH. She was treated initially with cardene infusion for hypertension and loaded with Keppra. She was transferred to [facility] for further neurologic care. She was given DDAVP and 1 pack of platelets. ICH score 2. NIHSS 15. Neurosurgery evaluated - managed medically. 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         Inpatient Rehabilitation Course:   Juventino Linton was admitted to inpatient rehabilitation on 2020.     Rehab course:  Progress well from rehabilitation standpoint. Family training with . Very supportive. Obtained an AFO for left lower extremity and a left resting hand splint for left upper extremity. Spasticity medications adjusted. Blood pressure medications adjusted. Added Requip for restless leg syndrome. On melatonin at nighttime and gabapentin for sleep.       Past Medical History:   Diagnosis Date    Asthma     B[facility] 1 disorder ([facility] [address]     Delta storage pool disease ([facility] [address]     Hypertension     Psychiatric problem       Past Surgical History:   Procedure Laterality Date    CARDIAC CATHETERIZATION       SECTION  [id number]    Miscarriage     CHOLECYSTECTOMY      COLONOSCOPY N/A 2020    -random bx(normal)hemorrhoids    GASTRIC BYPASS SURGERY      HYSTERECTOMY      KNEE SURGERY      LAPAROSCOPY      TONSILLECTOMY AND ADENOIDECTOMY      UPPER GASTROINTESTINAL ENDOSCOPY N/A 2020    (normal,neg H-Pylori)normal post-bypass endoscopy          Family History   Adopted: Yes   Family history unknown: Yes          Social History     Tobacco Use    Smoking status: Never Smoker    Smokeless tobacco: Never Used   Substance Use Topics    Alcohol use: Not Currently     Comment: no drinking;  is a recovering alcoholic           Current Outpatient Medications   Medication Sig Dispense Refill    thiamine mononitrate 100 MG tablet Take 1 tablet by mouth daily 30 tablet 5    rOPINIRole (REQUIP) 1 MG tablet Take 1 tablet by mouth nightly 30 tablet 3    tiZANidine (ZANAFLEX) 4 MG tablet Take 1 tablet by mouth 3 times daily 90 tablet 0    Blood Pressure KIT 1 each by Does not apply route daily 1 kit 0    diazePAM (VALIUM) 5 MG tablet Take 5 mg by mouth every 6 hours as needed for Anxiety (Muscle spasms).  folic acid (FOLVITE) 1 MG tablet Take 1 tablet by mouth daily 30 tablet 3    sertraline (ZOLOFT) 50 MG tablet Take 3 tablets by mouth daily 30 tablet 3    buPROPion (WELLBUTRIN XL) 300 MG extended release tablet Take 1 tablet by mouth daily 30 tablet 3    lisinopril (PRINIVIL;ZESTRIL) 20 MG tablet Take 1 tablet by mouth daily 30 tablet 3    amLODIPine (NORVASC) 5 MG tablet Take 1 tablet by mouth daily 30 tablet 3    lamoTRIgine (LAMICTAL) 200 MG tablet Take 400 mg by mouth daily 2 tabs      desmopressin (STIMATE) 1.5 MG/ML SOLN nasal solution 1 spray each nostril the night before procedure (Patient not taking: Reported on 3/30/2021) 1 Bottle 0    gabapentin (NEURONTIN) 300 MG capsule Take 1 capsule by mouth nightly for 30 days. 30 capsule 3    sennosides-docusate sodium (SENOKOT-S) 8.6-50 MG tablet Take 2 tablets by mouth daily (Patient not taking: Reported on 3/30/2021) 30 tablet 0    melatonin 3 MG TABS tablet Take 1 tablet by mouth nightly (Patient not taking: Reported on 3/30/2021) 30 tablet 0    PROAIR  (90 BASE) MCG/ACT inhaler Inhale 2 puffs into the lungs every 6 hours as needed        No current facility-administered medications for this visit.       Allergies   Allergen Reactions    Penicillin G Itching    Pcn [Penicillins]     Sulfa Antibiotics Other (See Comments)     Inflammation in the eye         Subjective:     Review of Systems  CONSTITUTIONAL: Negative for fever, chills, sweat, fatigue and unexpected weightchange. HEENT:  Negativefor diplopia, blind spots, blurred vision, hearing loss, trouble chewing or swallowing,denies coughing while eating or drinking denies nosebleed    RESPIRATORY: Negative for wheezing, coughing or shortness of breath    CARDIOVASCULAR: Negative for chest pain,palpitations, lightheadedness  GASTROINTESTINAL: Negative for heartburn, nausea,constipation, diarrhea, abdominal pain  or incontinence  GENTIOURNIARY: Negative for dysuria, urgency,frequency, incontinence and difficulty urinating. ENDOCRINE: Negative for hot or cold intolerance, deniesany significant weight change  MUSCULOSKELETAL: Negative for focal joint pain,back pain, neck pain and arthralgias. NUEROLOGIIC: Negative for focal weakness, numbness,tingling, balance loss, headaches or falls  BEHAVIOR/PSYCY: Denies depression,anxiety, memory loss or insomnia  SKIN: Denies ulcers, rashes or bruises         Objective:     Physical Exam   /76   Pulse 92   Temp 96.8 °F (36 °C)   Wt 229 lb 12.8 oz (104.2 kg)   BMI 38.24 kg/m²     Nursing notes and vitals reviewed    CONSTITUTIONAL: She is oriented to person, place, and time. She appears well-developed and well-nourished.   HEENT: Pupils equal reactiveto light, exact motion intact, visual fields are full, no facial asymmetry, speech fluent, no dysarthria, comprehension intact, object naming intact, repetition intact,basic cognition intact  CARDIOVASCULAR:Heart regular rate and rhythm with no murmurs rubs or gallops   PULMONARY/CHEST: Clear to auscultation with no wheezes or crackles noted  ABDOMEN: Soft, nontender with positive bowel sounds, no guarding or rebound   NEUROLOGIC:    Orientation: Alert and oriented×3,    cranial nerves:  II through XII are intact,   Strength:5 out of 5 throughout right upper lower extremity, left upper extremity 2/5  difficulty releasing, 3-4/5 proximally, left lower extremity 4/5 proximally. She has the ankle-foot arthrosis. Sensation:Intact to light touch and pinprick right upper and lower extremity, left side decreased sensation left face as well as left upper and lower extremity to light touch and pinprick   Balance: Not tested   Deep tendon reflexes: Bilaterally equal and symmetric, negative Babinski, negativeHoffmann's   Provocative maneuvers:  EXTREMITIES: No calf tenderness, negative Homans, negative warmth, pulses are intact  SKIN: Skin is warm and dry. PSYCIATIRIC: normal mood and affect, behavior is normal. Thought content normal.        Assessment:       Diagnosis Orders   1. H/O intracranial hemorrhage  ALT    AST   2. Spasm of muscle  ALT    AST   3. Intracranial hemorrhage ([facility] [address]     4. Intraparenchymal hemorrhage of brain ([facility] [address]     5. Acute left-sided weakness     6. Bleeding disorder ([facility] [address]     7. Bipolar 1 disorder ([facility] [address]     8. Platelet dysfunction (HCC)     9. Delta storage pool disease [facility])          Plan:      1. Hemorrhagic CVA with left hemiparesis. She is making improvements. There is some improvement in strength she continues a left ankle-foot orthoses, skin is intact. She is now ambulating short distances with a narrow-base quad cane, wheelchair for longer distances. She continues with PT/OT. We will continue. If needed reorder they will notify  2. Left hemiparesis with hemisensory deficitscontinues with Neurontin as well as therapies  3. Left frozen shoulderhas improved range of motion able to get 110 degrees of flexion abduction. Decreased pain. She will follow with orthopedics as they are already following her for this. She will continue with therapy  4. Frequent urination chronic for patientrecommend consideration of urology consultation, she will discuss with her PCP  5.  Spasms/spasticityshe was taking 4 mg Zanaflex 3 times a day it made her sleepy during the day it helped at nighttime.   We will decrease to 2 mg in the morning and afternoon and 4 mg at nighttime. Will check lab work advised to stop Valium  6. Reviewed falls and precautions, currently no trauma noted  7. Follow-up with hematology, endovascular and neurology as well as psychiatry  8. Follow-up approximately 6 weeks, reviewed no driving         No follow-ups on file. Orders Placed This Encounter   Procedures    ALT     Standing Status:   Future     Number of Occurrences:   1     Standing Expiration Date:   3/30/2022    AST     Standing Status:   Future     Number of Occurrences:   1     Standing Expiration Date:   3/31/2022     No orders of the defined types were placed in this encounter. Electronically signed by Luis E Joshi 3/30/2021 at 6:51 PM    Please note that this chart was generated usingvoice recognition Dragon dictation software. Although every effort was made toensure the accuracy of this automated transcription, some errors in transcriptionmay have occurred.

## 2021-04-24 ENCOUNTER — HOSPITAL ENCOUNTER (OUTPATIENT)
Dept: MRI IMAGING | Age: 51
Discharge: HOME | End: 2021-04-26
Payer: COMMERCIAL

## 2021-04-24 DIAGNOSIS — I61.1 NONTRAUMATIC CORTICAL HEMORRHAGE OF RIGHT CEREBRAL HEMISPHERE (HCC): ICD-10-CM

## 2021-04-24 LAB
CREAT SERPL-MCNC: 0.46 MG/DL (ref 0.5–0.9)
GFR AFRICAN AMERICAN: >60 ML/MIN
GFR NON-AFRICAN AMERICAN: >60 ML/MIN
GFR SERPL CREATININE-BSD FRML MDRD: ABNORMAL ML/MIN/{1.73_M2}
GFR SERPL CREATININE-BSD FRML MDRD: ABNORMAL ML/MIN/{1.73_M2}

## 2021-04-24 PROCEDURE — 36415 COLL VENOUS BLD VENIPUNCTURE: CPT

## 2021-04-24 PROCEDURE — 82565 ASSAY OF CREATININE: CPT

## 2021-04-24 PROCEDURE — 6360000004 HC RX CONTRAST MEDICATION: Performed by: PSYCHIATRY & NEUROLOGY

## 2021-04-24 PROCEDURE — 70551 MRI BRAIN STEM W/O DYE: CPT

## 2021-04-24 PROCEDURE — A9579 GAD-BASE MR CONTRAST NOS,1ML: HCPCS | Performed by: PSYCHIATRY & NEUROLOGY

## 2021-05-06 ENCOUNTER — TELEMEDICINE (OUTPATIENT)
Dept: NEUROLOGY | Age: 51
End: 2021-05-06
Payer: COMMERCIAL

## 2021-05-06 DIAGNOSIS — I61.0 NONTRAUMATIC SUBCORTICAL HEMORRHAGE OF CEREBRAL HEMISPHERE, UNSPECIFIED LATERALITY (HCC): ICD-10-CM

## 2021-05-06 DIAGNOSIS — G81.94 LEFT HEMIPARESIS (HCC): ICD-10-CM

## 2021-05-06 DIAGNOSIS — R20.2 NUMBNESS AND TINGLING OF RIGHT HAND: Primary | ICD-10-CM

## 2021-05-06 DIAGNOSIS — D69.1 PLATELET DYSFUNCTION (HCC): ICD-10-CM

## 2021-05-06 DIAGNOSIS — R20.0 NUMBNESS AND TINGLING OF RIGHT HAND: Primary | ICD-10-CM

## 2021-05-06 DIAGNOSIS — I61.1 NONTRAUMATIC CORTICAL HEMORRHAGE OF RIGHT CEREBRAL HEMISPHERE (HCC): ICD-10-CM

## 2021-05-06 PROCEDURE — G8428 CUR MEDS NOT DOCUMENT: HCPCS | Performed by: STUDENT IN AN ORGANIZED HEALTH CARE EDUCATION/TRAINING PROGRAM

## 2021-05-06 PROCEDURE — G8417 CALC BMI ABV UP PARAM F/U: HCPCS | Performed by: STUDENT IN AN ORGANIZED HEALTH CARE EDUCATION/TRAINING PROGRAM

## 2021-05-06 PROCEDURE — 99214 OFFICE O/P EST MOD 30 MIN: CPT | Performed by: STUDENT IN AN ORGANIZED HEALTH CARE EDUCATION/TRAINING PROGRAM

## 2021-05-06 PROCEDURE — 3017F COLORECTAL CA SCREEN DOC REV: CPT | Performed by: STUDENT IN AN ORGANIZED HEALTH CARE EDUCATION/TRAINING PROGRAM

## 2021-05-06 PROCEDURE — 1036F TOBACCO NON-USER: CPT | Performed by: STUDENT IN AN ORGANIZED HEALTH CARE EDUCATION/TRAINING PROGRAM

## 2021-05-06 RX ORDER — MAGNESIUM OXIDE 400 MG/1
400 TABLET ORAL DAILY
Qty: 30 TABLET | Refills: 1 | Status: SHIPPED | OUTPATIENT
Start: 2021-05-06 | End: 2021-10-12

## 2021-05-06 RX ORDER — BUTALBITAL, ACETAMINOPHEN AND CAFFEINE 50; 325; 40 MG/1; MG/1; MG/1
1 TABLET ORAL EVERY 12 HOURS
Qty: 15 TABLET | Refills: 0 | Status: SHIPPED | OUTPATIENT
Start: 2021-05-06 | End: 2021-07-17 | Stop reason: SDUPTHER

## 2021-05-06 ASSESSMENT — ENCOUNTER SYMPTOMS
PHOTOPHOBIA: 1
NAUSEA: 0
VOMITING: 0

## 2021-05-06 NOTE — PROGRESS NOTES
[address] [place] [address],  [address], [facility], [address], [address] [place] [address]  P: [phone]  F: [address] NOTE     PATIENT NAME: Jonelle Lopez  PATIENT MRN: [id number]  PRIMARY CARE PHYSICIAN: Sia Sarmiento, KYLEE - CNP    HPI:      Jonelle Lopez is a 46 y.o. female who presents to clinic today as a follow up for her R frontoparietal IPH. 48 y.o. female who presents with left arm and leg weakness last known well around midnight night prior to her presentation. Patient woke up around 4 AM and noticed her left arm was feeling funny later on she noticed left leg weakness. Pa justina started to have headache as well. Denied any nausea, vomiting, trauma, vision problem, bulbar or any other sensorimotor issues. Patient was taken to [facility] where initial blood pressure was 178. Patient had CT brain done that was consistent with right parieto-occipital ICH with subarachnoid hemorrhage extending into right temporal lobe. Mild midline shift to left. No IVH. Patient was given Cardene infusion, Keppra 1 g, Lopressor 5 mg. Patient was transferred to [address] [place] [address] for further evaluation and management.     PMHx: Delta pool deficiency, bleeding tendency, hypertension     Patient says for last few days she has been out of her lisinopril. But has been taking her Norvasc. Patient is not on any AC/AP at home.     Patient was started on Cardene again [place] [facility] head and CTA head and neck were not done. No LVO or aneurysm or any intracranial vascular abnormalities were apparent. .  Repeat CT was consistent with right temporal horn IVH. GCS 15.     And was given mannitol 25 g x 1 in the ER. She started to become agitated in the ER and was given Ativan 1 mg x2 in  The ER. Later on patient  was started on Precedex drip.     Neuro-surgery was consulted. Patient was transferred to neuro ICU for further evaluation and management. Repeat CT head were stable  MRI brain with and without did not reveal any underlying mass or vascular malformation  Patient received platelet transfusions during hospitalization. Patient's blood pressure was stabilized  Patient had persistent headache which seemed to improve with Medrol Dosepak. Neurontin 300 mg nightly was added for symptom control  She also had spasticity in left upper and lower extremity which seemed to improve with scheduled baclofen. Flexeril 10 mg daily as needed with added for muscle cramping  Patient was discharged to inpatient rehab. Today: Patient was seen and examined. She still has some weakness in left upper extremity left lower extremity. Patient uses a wheelchair/quad cane for ambulation. Patient was evaluated by neurosurgery and recommended to have cerebral angiogram.  Patient had MRV brain done that was unremarkable  Patient is still complaining of mild 1 out of 10 right-sided headache denies any nausea, vomiting photo or phonophobia associated with a headache  says she takes Tylenol that helps her with the headache  Patient says since her stroke she had 5 or 6 ago both episodes    3/4/21: patient was seen and examined. Patient fell 2 days ago, patient was sitting on the floor,when she stood up she fell over. No lightheadedness. Patient fell due to weakness in left leg secondary to stroke. Patient is doing physical therapy 3 times a week. Patient uses a quad cane. Patient is able to walk across the room without any assistance and is able to go up and down the stairs with some one besides her. She does not check her blood pressure at home. Patient did follow up with neuro endovascular in February. Patient is going to have angiogram in March 18th. Patient is going to follow up with Hem/Onc. Patient is occassionally getting headache. Headaches can last upto 3 hours if she does not take tylenol.  She does not have numbness in Left arm/leg but some loss of sensation in Left arm and leg that is improved than before. 21: Patient was seen and examined. Patient had cerebral angiogram done that was negative for any vascular malformations. Repeat MRI Brain was consistent with evolution of the previous R parietal IPH. No underlying lesion. Patient says at home her SBP runs around 130-140. Patient does follow up with pcp and is having meds adjusted for her SBP. Patient says usually she gets 5-6/10 headache when her SBP>140 and for last 2 weeks she has been having headaches every other day,associated with photophobia and phonophobia,no nausea or vomiting associated with the headache. patient takes tylenol for her headaches prn. Patient still has L sided weakness. Patient also c/o R Hand first 3 fingers numbness and tingling. denies any weakness but says due to numbness she sometimes feels weak in that hand. Denies any neck pain or back pain. Patient does have bowel/bladder issues and gait difficulty(weakness due to stroke).         PREVIOUS WORKUP:     Lab Results   Component Value Date    WBC 7.3 2021    HGB 12.6 2021    HCT 40.6 2021    MCV 89.0 2021     2021       Past Medical History:   Diagnosis Date    Asthma     [facility] 1 disorder ([facility] [address]     Delta storage pool disease ([facility] [address] [facility]     Hypertension     Psychiatric problem         Past Surgical History:   Procedure Laterality Date    CARDIAC CATHETERIZATION       SECTION  2589,4895    Miscarriage     CHOLECYSTECTOMY      COLONOSCOPY N/A 2020    -random bx(normal)hemorrhoids    GASTRIC BYPASS SURGERY      HYSTERECTOMY      KNEE SURGERY      LAPAROSCOPY      TONSILLECTOMY AND ADENOIDECTOMY      UPPER GASTROINTESTINAL ENDOSCOPY N/A 2020    (normal,neg H-Pylori)normal post-bypass endoscopy        Social History     Socioeconomic History    Marital status:      Spouse name: Not on file    Number of children: Not on file  Years of education: Not on file    Highest education level: Not on file   Occupational History    Not on file   Social Needs    Financial resource strain: Not on file    Food insecurity     Worry: Not on file     Inability: Not on file    Transportation needs     Medical: Not on file     Non-medical: Not on file   Tobacco Use    Smoking status: Never Smoker    Smokeless tobacco: Never Used   Substance and Sexual Activity    Alcohol use: Not Currently     Comment: no drinking;  is a recovering alcoholic    Drug use: No    Sexual activity: Yes     Partners: Male   Lifestyle    Physical activity     Days per week: Not on file     Minutes per session: Not on file    Stress: Not on file   Relationships    Social connections     Talks on phone: Not on file     Gets together: Not on file     Attends Hindu service: Not on file     Active member of club or organization: Not on file     Attends meetings of clubs or organizations: Not on file     Relationship status: Not on file    Intimate partner violence     Fear of current or ex partner: Not on file     Emotionally abused: Not on file     Physically abused: Not on file     Forced sexual activity: Not on file   Other Topics Concern    Not on file   Social History Narrative    Not on file        Current Outpatient Medications   Medication Sig Dispense Refill    thiamine mononitrate 100 MG tablet Take 1 tablet by mouth daily 30 tablet 5    rOPINIRole (REQUIP) 1 MG tablet Take 1 tablet by mouth nightly 30 tablet 3    tiZANidine (ZANAFLEX) 4 MG tablet Take 1 tablet by mouth 3 times daily 90 tablet 0    Blood Pressure KIT 1 each by Does not apply route daily 1 kit 0    desmopressin (STIMATE) 1.5 MG/ML SOLN nasal solution 1 spray each nostril the night before procedure (Patient not taking: Reported on 3/30/2021) 1 Bottle 0    diazePAM (VALIUM) 5 MG tablet Take 5 mg by mouth every 6 hours as needed for Anxiety (Muscle spasms).       gabapentin (NEURONTIN) 300 MG capsule Take 1 capsule by mouth nightly for 30 days. 30 capsule 3    folic acid (FOLVITE) 1 MG tablet Take 1 tablet by mouth daily 30 tablet 3    sertraline (ZOLOFT) 50 MG tablet Take 3 tablets by mouth daily 30 tablet 3    buPROPion (WELLBUTRIN XL) 300 MG extended release tablet Take 1 tablet by mouth daily 30 tablet 3    lisinopril (PRINIVIL;ZESTRIL) 20 MG tablet Take 1 tablet by mouth daily 30 tablet 3    amLODIPine (NORVASC) 5 MG tablet Take 1 tablet by mouth daily 30 tablet 3    sennosides-docusate sodium (SENOKOT-S) 8.6-50 MG tablet Take 2 tablets by mouth daily (Patient not taking: Reported on 3/30/2021) 30 tablet 0    melatonin 3 MG TABS tablet Take 1 tablet by mouth nightly (Patient not taking: Reported on 3/30/2021) 30 tablet 0    PROAIR  (90 BASE) MCG/ACT inhaler Inhale 2 puffs into the lungs every 6 hours as needed       lamoTRIgine (LAMICTAL) 200 MG tablet Take 400 mg by mouth daily 2 tabs       No current facility-administered medications for this visit. Allergies   Allergen Reactions    Penicillin G Itching    Pcn [Penicillins]     Sulfa Antibiotics Other (See Comments)     Inflammation in the eye        REVIEW OF SYSTEMS:     Review of Systems   Constitutional: Negative for activity change. Eyes: Positive for photophobia. Gastrointestinal: Negative for nausea and vomiting. Neurological: Positive for weakness, numbness and headaches. Negative for seizures, facial asymmetry and speech difficulty. Psychiatric/Behavioral: Negative for behavioral problems. All other systems reviewed and are negative. VITALS  There were no vitals taken for this visit. PHYSICAL EXAMINATION:     Due to covid pandemic the exam was limited and conducted virtually. Patient was awake,alert oriented ,following commands. NEUROLOGICAL EXAMINATION:   Due to covid pandemic the exam was limited and conducted virtually.   Patient was awake,alert oriented ,following commands. ASSESSMENT / PLAN:     //Spontaneous right frontoparietal IPH, subarachnoid hemorrhage, IVH secondary to hypertension, Delta pool storage deficiency    Other comorbidities include delta pool deficiency, bleeding tendency, hypertension,bipolar disorder,RLS,Muscle spasm    Patient was evaluated by neurosurgery  MRV brain negative for any dural venous sinus thrombosis, stable right frontoparietal IPH  Follow-up neuro endovascular for cerebral angiogram to rule out any intracranial vascular abnormalities.-angiogram was negative for any vascular malformations. Continue Tylenol as needed for headache  Has been off of the [address]. Start magnesium oxide 400 mg daily as a prophylactic for headaches. Goal SBP<120  Patient to follow up with PCP for SBP. Patient was counselled regarding the importance of monitoring and having good blood pressure control at home. On lamictal 400 daily for mood disorder. Follow-up in 1-2 months. Duscussed with the attending. Ms. Billie Lujan received counseling on the following healthy behaviors: medical compliance, smoking cessation, blood pressure control, regular follow up with primary doctor.         Electronically signed by Tiara Meza MD on 5/6/2021 at 1:17 PM

## 2021-05-06 NOTE — PATIENT INSTRUCTIONS
Start magneium oxide 400 mg daily for headache prophylaxis  Fioricet every 12 hours prn for headache  Cont. Tylenol otc prn for headaches every 8 hours  F/u EMG Right upper extremity  Wrist splint   F/u in 3 months.

## 2021-05-11 ENCOUNTER — TELEMEDICINE (OUTPATIENT)
Dept: NEUROLOGY | Age: 51
End: 2021-05-11
Payer: COMMERCIAL

## 2021-05-11 DIAGNOSIS — I61.1 NONTRAUMATIC CORTICAL HEMORRHAGE OF RIGHT CEREBRAL HEMISPHERE (HCC): Primary | ICD-10-CM

## 2021-05-11 PROCEDURE — 99215 OFFICE O/P EST HI 40 MIN: CPT | Performed by: PSYCHIATRY & NEUROLOGY

## 2021-05-11 PROCEDURE — 3017F COLORECTAL CA SCREEN DOC REV: CPT | Performed by: PSYCHIATRY & NEUROLOGY

## 2021-05-11 PROCEDURE — G8417 CALC BMI ABV UP PARAM F/U: HCPCS | Performed by: PSYCHIATRY & NEUROLOGY

## 2021-05-11 PROCEDURE — 1036F TOBACCO NON-USER: CPT | Performed by: PSYCHIATRY & NEUROLOGY

## 2021-05-11 PROCEDURE — G8428 CUR MEDS NOT DOCUMENT: HCPCS | Performed by: PSYCHIATRY & NEUROLOGY

## 2021-05-11 NOTE — PROGRESS NOTES
Neurocritical Care, Stroke & Neurointerventional Note    [address]   [facility],  [address] , [address] [place] [address]   P: [phone]  Endovascular Neurosurgery Virtual  Consult    Pt Name: Daron Fothergill  MRN: [id number]  YOB: 1970  Date of evaluation: 5/11/2021  Primary Care Physician: KYLEE Akhtar CNP    Reason for evaluation: spontaneous right     SUBJECTIVE:   History of Chief Complaint:    Daron Fothergill is a 46 y.o. female who presents with hx of delta pool storage disease, hypertension who presented in Nov of 2020 with right parieto-occipital spont ICH with IVH and SAH      May be related to HTN or her platelet disease may have led to more than usual in size    MRI brain and CTA were not revealing    Repeat MRI 2/9/2021    DSA showed no aneurysm or AVM    She is here for further evaluation and management    Allergies  is allergic to penicillin g; pcn [penicillins]; and sulfa antibiotics. Medications  Prior to Admission medications    Medication Sig Start Date End Date Taking?  Authorizing Provider   butalbital-acetaminophen-caffeine (FIORICET, ESGIC) -26 MG per tablet Take 1 tablet by mouth every 12 hours 5/6/21   Monica Lucio MD   magnesium oxide (MAG-OX) 400 MG tablet Take 1 tablet by mouth daily 5/6/21   Monica Lucio MD   thiamine mononitrate 100 MG tablet Take 1 tablet by mouth daily 3/15/21   Baldev Armstrong MD   rOPINIRole (REQUIP) 1 MG tablet Take 1 tablet by mouth nightly 3/4/21   Monica Lucio MD   tiZANidine (ZANAFLEX) 4 MG tablet Take 1 tablet by mouth 3 times daily 3/4/21   Monica Lucio MD   Blood Pressure KIT 1 each by Does not apply route daily 3/4/21   Monica Lucio MD   desmopressin (STIMATE) 1.5 MG/ML SOLN nasal solution 1 spray each nostril the night before procedure  Patient not taking: Reported on 3/30/2021 2/24/21   Rosalind Vale MD   diazePAM (VALIUM) 5 MG tablet Take 5 mg by mouth every 6 hours as needed for Anxiety (Muscle spasms). Historical Provider, MD   gabapentin (NEURONTIN) 300 MG capsule Take 1 capsule by mouth nightly for 30 days. 1/28/21 3/4/21  Rai Matos MD   folic acid (FOLVITE) 1 MG tablet Take 1 tablet by mouth daily 21   Rai Matos MD   sertraline (ZOLOFT) 50 MG tablet Take 3 tablets by mouth daily 21   Brinton Krabbe, MD   buPROPion (WELLBUTRIN XL) 300 MG extended release tablet Take 1 tablet by mouth daily 21   Brinton Krabbe, MD   lisinopril (PRINIVIL;ZESTRIL) 20 MG tablet Take 1 tablet by mouth daily 21   Brinton Krabbe, MD   amLODIPine (NORVASC) 5 MG tablet Take 1 tablet by mouth daily 21   Brinton Krabbe, MD   sennosides-docusate sodium (SENOKOT-S) 8.6-50 MG tablet Take 2 tablets by mouth daily  Patient not taking: Reported on 3/30/2021 1/5/21   Brinton Krabbe, MD   melatonin 3 MG TABS tablet Take 1 tablet by mouth nightly  Patient not taking: Reported on 3/30/2021 1/4/21   Brinton Krabbe, MD   PROAIR  (90 BASE) MCG/ACT inhaler Inhale 2 puffs into the lungs every 6 hours as needed  14   Historical Provider, MD   lamoTRIgine (LAMICTAL) 200 MG tablet Take 400 mg by mouth daily 2 tabs 14   Historical Provider, MD    Scheduled Meds:  Continuous Infusions:  PRN Meds:.  Past Medical History   has a past medical history of Asthma, Bipolar 1 disorder ([facility] [address] Delta storage pool disease [facility]), Hypertension, and Psychiatric problem. Past Surgical History   has a past surgical history that includes  section (0305,6810); Gastric bypass surgery (); Tonsillectomy and adenoidectomy (); Cholecystectomy (); knee surgery (); Hysterectomy (); laparoscopy (); Colonoscopy (N/A, 2020); Upper gastrointestinal endoscopy (N/A, 2020); and Cardiac catheterization. Social History   reports that she has never smoked. She has never used smokeless tobacco.   reports previous alcohol use. reports no history of drug use.   Family History  She was revealing    Repeat MRI 2/9/2021  DSA showed no aneurysm or AVM    She is here for further evaluation and management with an angiogram   does not have any pertinent problems on file. PLANS:   BP control, < 120/80  Will discuss with Hem/Onc  MRI brain and MRA head       Consultation Visit Time:  50 minutes  Patient given educational materials - see patient instructions. Discussed use, benefit, and side effects of prescribed medications. Personally reviewed imaging with patients and all questions answered. Pt voiced understanding. Patient agreed with treatment plan. Follow up as directed below. Greater than 50% of the time was for counseling and providing answer to the patient question. The findings and the plan discussed with the patient and all her questions were answered. Thank you very much for your referral, please do not hesitate to contact me with any questions.     Sofia Victor MD Pager: [phone]  Stroke, [facility] Stroke [address]  Pager [phone]  Electronically signed 5/11/2021 at 3:58 PM

## 2021-06-11 RX ORDER — FOLIC ACID 1 MG/1
TABLET ORAL
Qty: 30 TABLET | Refills: 0 | Status: SHIPPED | OUTPATIENT
Start: 2021-06-11 | End: 2021-07-13

## 2021-07-15 NOTE — TELEPHONE ENCOUNTER
Patient calling for refill of Fioricet.     Date of last fill: 05/06/21    Date of next follow up appointment: 11/16/21    Pt notified response time for refills is 24-48 hours

## 2021-07-17 RX ORDER — BUTALBITAL, ACETAMINOPHEN AND CAFFEINE 50; 325; 40 MG/1; MG/1; MG/1
1 TABLET ORAL EVERY 12 HOURS
Qty: 12 TABLET | Refills: 0 | Status: SHIPPED | OUTPATIENT
Start: 2021-07-17 | End: 2021-11-19 | Stop reason: SDUPTHER

## 2021-07-17 NOTE — TELEPHONE ENCOUNTER
I did prescribe 12 tabs only, if the pt needs more, please contact Dr Shaye Sena, I did not see the pt in the office.

## 2021-09-17 ENCOUNTER — TELEPHONE (OUTPATIENT)
Dept: NEUROLOGY | Age: 51
End: 2021-09-17

## 2021-09-17 NOTE — TELEPHONE ENCOUNTER
Patient states she has been having muscle spasms since the stroke and requip has helped some but the spasms and cramping is miserable. Patient is asking for some relief before November.  Please advise

## 2021-09-24 NOTE — TELEPHONE ENCOUNTER
Review of chart shows that she is currently followed by Dr. Sneha Pham in [address], and he is currently managing her spasm medication. She has a f/u appt with him in 9/29/2021. If she needs immediate medication changes, would recommend reaching out to him.

## 2021-09-28 ENCOUNTER — TELEPHONE (OUTPATIENT)
Dept: NEUROLOGY | Age: 51
End: 2021-09-28

## 2021-09-29 ENCOUNTER — OFFICE VISIT (OUTPATIENT)
Dept: PHYSICAL MEDICINE AND REHAB | Age: 51
End: 2021-09-29
Payer: COMMERCIAL

## 2021-09-29 ENCOUNTER — HOSPITAL ENCOUNTER (OUTPATIENT)
Age: 51
Discharge: HOME | End: 2021-09-29
Payer: COMMERCIAL

## 2021-09-29 VITALS
TEMPERATURE: 98.1 F | HEART RATE: 89 BPM | SYSTOLIC BLOOD PRESSURE: 133 MMHG | DIASTOLIC BLOOD PRESSURE: 64 MMHG | WEIGHT: 252.2 LBS | BODY MASS INDEX: 41.97 KG/M2

## 2021-09-29 DIAGNOSIS — M75.02 ADHESIVE CAPSULITIS OF LEFT SHOULDER: ICD-10-CM

## 2021-09-29 DIAGNOSIS — Z86.79 H/O INTRACRANIAL HEMORRHAGE: ICD-10-CM

## 2021-09-29 DIAGNOSIS — D69.1 PLATELET DYSFUNCTION (HCC): ICD-10-CM

## 2021-09-29 DIAGNOSIS — R25.2 SPASTICITY DUE TO OLD STROKE: ICD-10-CM

## 2021-09-29 DIAGNOSIS — F31.9 BIPOLAR 1 DISORDER (HCC): ICD-10-CM

## 2021-09-29 DIAGNOSIS — D69.9 BLEEDING DISORDER (HCC): ICD-10-CM

## 2021-09-29 DIAGNOSIS — R53.1 ACUTE LEFT-SIDED WEAKNESS: Primary | ICD-10-CM

## 2021-09-29 DIAGNOSIS — M65.4 DE QUERVAIN'S DISEASE (TENOSYNOVITIS): ICD-10-CM

## 2021-09-29 DIAGNOSIS — I69.398 SPASTICITY DUE TO OLD STROKE: ICD-10-CM

## 2021-09-29 DIAGNOSIS — Z98.84 BARIATRIC SURGERY STATUS: ICD-10-CM

## 2021-09-29 LAB
ALBUMIN SERPL-MCNC: 4.4 G/DL (ref 3.5–5.2)
ALBUMIN/GLOBULIN RATIO: ABNORMAL (ref 1–2.5)
ALP BLD-CCNC: 92 U/L (ref 35–104)
ALT SERPL-CCNC: 16 U/L (ref 5–33)
ANION GAP SERPL CALCULATED.3IONS-SCNC: 10 MMOL/L (ref 9–17)
AST SERPL-CCNC: 14 U/L
BILIRUB SERPL-MCNC: <0.15 MG/DL (ref 0.3–1.2)
BUN BLDV-MCNC: 18 MG/DL (ref 6–20)
BUN/CREAT BLD: ABNORMAL (ref 9–20)
CALCIUM SERPL-MCNC: 9.4 MG/DL (ref 8.6–10.4)
CHLORIDE BLD-SCNC: 102 MMOL/L (ref 98–107)
CO2: 24 MMOL/L (ref 20–31)
CREAT SERPL-MCNC: 0.69 MG/DL (ref 0.5–0.9)
GFR AFRICAN AMERICAN: >60 ML/MIN
GFR NON-AFRICAN AMERICAN: >60 ML/MIN
GFR SERPL CREATININE-BSD FRML MDRD: ABNORMAL ML/MIN/{1.73_M2}
GFR SERPL CREATININE-BSD FRML MDRD: ABNORMAL ML/MIN/{1.73_M2}
GLUCOSE BLD-MCNC: 83 MG/DL (ref 70–99)
HCT VFR BLD CALC: 33.1 % (ref 36–46)
HEMOGLOBIN: 11 G/DL (ref 12–16)
MCH RBC QN AUTO: 27.3 PG (ref 26–34)
MCHC RBC AUTO-ENTMCNC: 33.1 G/DL (ref 31–37)
MCV RBC AUTO: 82.5 FL (ref 80–100)
NRBC AUTOMATED: ABNORMAL PER 100 WBC
PDW BLD-RTO: 13.5 % (ref 11.5–14.9)
PLATELET # BLD: 452 K/UL (ref 150–450)
PMV BLD AUTO: 6.7 FL (ref 6–12)
POTASSIUM SERPL-SCNC: 4 MMOL/L (ref 3.7–5.3)
RBC # BLD: 4.01 M/UL (ref 4–5.2)
SODIUM BLD-SCNC: 136 MMOL/L (ref 135–144)
TOTAL PROTEIN: 7.5 G/DL (ref 6.4–8.3)
WBC # BLD: 7.9 K/UL (ref 3.5–11)

## 2021-09-29 PROCEDURE — 99214 OFFICE O/P EST MOD 30 MIN: CPT | Performed by: PHYSICAL MEDICINE & REHABILITATION

## 2021-09-29 PROCEDURE — 36415 COLL VENOUS BLD VENIPUNCTURE: CPT

## 2021-09-29 PROCEDURE — G8427 DOCREV CUR MEDS BY ELIG CLIN: HCPCS | Performed by: PHYSICAL MEDICINE & REHABILITATION

## 2021-09-29 PROCEDURE — 85027 COMPLETE CBC AUTOMATED: CPT

## 2021-09-29 PROCEDURE — 1036F TOBACCO NON-USER: CPT | Performed by: PHYSICAL MEDICINE & REHABILITATION

## 2021-09-29 PROCEDURE — 80053 COMPREHEN METABOLIC PANEL: CPT

## 2021-09-29 PROCEDURE — G8417 CALC BMI ABV UP PARAM F/U: HCPCS | Performed by: PHYSICAL MEDICINE & REHABILITATION

## 2021-09-29 PROCEDURE — 3017F COLORECTAL CA SCREEN DOC REV: CPT | Performed by: PHYSICAL MEDICINE & REHABILITATION

## 2021-09-29 RX ORDER — CYCLOBENZAPRINE HCL 5 MG
5 TABLET ORAL 3 TIMES DAILY PRN
Qty: 90 TABLET | Refills: 0 | Status: SHIPPED | OUTPATIENT
Start: 2021-09-29 | End: 2021-10-21 | Stop reason: SDUPTHER

## 2021-09-29 NOTE — PROGRESS NOTES
[facility]  [facility] PHYSICAL MEDICINE & REHABILITATION  [address] [place] [address]  [place] [address]  Dept: [phone]  Dept Fax: [phone]    Outpatient Followup Note     Nata Sousa, 46 y.o., female, presents for follow up c/o of   Chief Complaint   Patient presents with    Follow-up     c/o increased spasms on the left side of body and sometimes effecting the right   . Patient was lastseen on 3/30/2021    HPI:     HPI     She is comes in today with her daughter-in-law. .  Since last visit she notes she is now ambulating better. She uses a cane for long distances otherwise no assistive device for short distances at home. She continues to left ankle for orthoses but she had a sore on the left lateral leg and the ankle foot orthoses have been adjusted by [facility]. She notes range of motion left shoulder is improved. She continues with spasms left upper and lower extremity, Zanaflex actually increased her symptoms, she tried baclofen has also increased her symptoms. She has not been using her Valium (was told to hold as she was on Zanaflex at the time). She notes the spasms began in the left distal leg and ankle up the extremity down into the left arm and cage on the right side. .  She notes it happens throughout the day but is purely worse at night time. She also notes pain in the right wrist.  She saw urgent care and had x-ray done. Results not available    She continues with occasional leakage from the bladder, possible stress incontinence. She has not follow-up with urology. Her left shoulder, frozen shoulder, has improved. She continues to follow-up with orthopedics. She continues with physical therapy. She states she had an MRI which showed biceps tendinitis. .  She is seeing Dr. Akila Yanez in [facility]     She continues to follow with hematology oncology and psychiatrist for bipolar disorder.   She is no longer on Keppra        History     Alecia Donaldson  is a 50 y.o. right-handed     female admitted to the [address] [place] [address] unit on 2020.  She was originally admitted to [address] [place] [address] Road 2020 for L sided weakness and headache.  She presented originally to [facility] ED. CT confirmed R parietal ICH and moderate SAH. She was treated initially with cardene infusion for hypertension and loaded with Keppra. She was transferred to [address] [place] [address] for further neurologic care. She was given DDAVP and 1 pack of platelets. ICH score 2. NIHSS 15. Neurosurgery evaluated - managed medically. 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         Inpatient Rehabilitation Course:   Babatunde Alan was admitted to inpatient rehabilitation on 2020.     Rehab course:  Progress well from rehabilitation standpoint. Family training with . Very supportive. Obtained an AFO for left lower extremity and a left resting hand splint for left upper extremity. Spasticity medications adjusted. Blood pressure medications adjusted. Added Requip for restless leg syndrome. On melatonin at nighttime and gabapentin for sleep.       Past Medical History:   Diagnosis Date    Asthma     Bipolar 1 [facility]     Delta storage pool disease ([facility]     Hypertension     Psychiatric problem       Past Surgical History:   Procedure Laterality Date    CARDIAC CATHETERIZATION       SECTION  6405,0139    Miscarriage     CHOLECYSTECTOMY      COLONOSCOPY N/A 2020    -random bx(normal)hemorrhoids    GASTRIC BYPASS SURGERY      HYSTERECTOMY      KNEE SURGERY      LAPAROSCOPY      TONSILLECTOMY AND ADENOIDECTOMY      UPPER GASTROINTESTINAL ENDOSCOPY N/A 2020    (normal,neg H-Pylori)normal post-bypass endoscopy          Family History   Adopted: Yes   Family history unknown: Yes          Social History     Tobacco Use    Smoking status: Never Smoker    Smokeless tobacco: Never Used   Substance Use Topics    Alcohol use: Not Currently     Comment: no drinking;  is a recovering alcoholic           Current Outpatient Medications   Medication Sig Dispense Refill    cyclobenzaprine (FLEXERIL) 5 MG tablet Take 1 tablet by mouth 3 times daily as needed for Muscle spasms 90 tablet 0    folic acid (FOLVITE) 1 MG tablet Take 1 tablet by mouth once daily 30 tablet 3    butalbital-acetaminophen-caffeine (FIORICET, ESGIC) -40 MG per tablet Take 1 tablet by mouth every 12 hours 12 tablet 0    magnesium oxide (MAG-OX) 400 MG tablet Take 1 tablet by mouth daily 30 tablet 1    thiamine mononitrate 100 MG tablet Take 1 tablet by mouth daily 30 tablet 5    rOPINIRole (REQUIP) 1 MG tablet Take 1 tablet by mouth nightly (Patient taking differently: Take 1 mg by mouth 3 times daily ) 30 tablet 3    Blood Pressure KIT 1 each by Does not apply route daily 1 kit 0    sertraline (ZOLOFT) 50 MG tablet Take 3 tablets by mouth daily 30 tablet 3    buPROPion (WELLBUTRIN XL) 300 MG extended release tablet Take 1 tablet by mouth daily 30 tablet 3    lisinopril (PRINIVIL;ZESTRIL) 20 MG tablet Take 1 tablet by mouth daily 30 tablet 3    amLODIPine (NORVASC) 5 MG tablet Take 1 tablet by mouth daily 30 tablet 3    melatonin 3 MG TABS tablet Take 1 tablet by mouth nightly 30 tablet 0    lamoTRIgine (LAMICTAL) 200 MG tablet Take 400 mg by mouth daily 2 tabs      desmopressin (STIMATE) 1.5 MG/ML SOLN nasal solution 1 spray each nostril the night before procedure (Patient not taking: Reported on 9/29/2021) 1 Bottle 0    diazePAM (VALIUM) 5 MG tablet Take 5 mg by mouth every 6 hours as needed for Anxiety (Muscle spasms). (Patient not taking: Reported on 9/29/2021)       No current facility-administered medications for this visit.      Allergies   Allergen Reactions    Penicillin G Itching    Pcn [Penicillins]     Sulfa Antibiotics Other (See Comments) Inflammation in the eye         Subjective:     Review of Systems  CONSTITUTIONAL: Negative for fever, chills, sweat, fatigue and unexpected weightchange. HEENT:  Negativefor diplopia, blind spots, blurred vision, hearing loss, trouble chewing or swallowing,denies coughing while eating or drinking denies nosebleed    RESPIRATORY: Negative for wheezing, coughing or shortness of breath    CARDIOVASCULAR: Negative for chest pain,palpitations, lightheadedness  GASTROINTESTINAL: Negative for heartburn, nausea,constipation, diarrhea, abdominal pain  or incontinence  GENTIOURNIARY: Negative for dysuria, urgency,frequency, incontinence and difficulty urinating. ENDOCRINE: Negative for hot or cold intolerance, deniesany significant weight change  MUSCULOSKELETAL: Negative for focal joint pain,back pain, neck pain and arthralgias. NUEROLOGIIC: Negative for focal weakness, numbness,tingling, balance loss, headaches or falls  BEHAVIOR/PSYCY: Denies depression,anxiety, memory loss or insomnia  SKIN: Denies ulcers, rashes or bruises         Objective:     Physical Exam   /64   Pulse 89   Temp 98.1 °F (36.7 °C)   Wt 252 lb 3.2 oz (114.4 kg)   BMI 41.97 kg/m²     Nursing notes and vitals reviewed    CONSTITUTIONAL: She is oriented to person, place, and time. She appears well-developed and well-nourished.   HEENT: Pupils equal reactiveto light, exact motion intact, visual fields are full, no facial asymmetry, speech fluent, no dysarthria, comprehension intact, object naming intact, repetition intact,basic cognition intact  CARDIOVASCULAR:Heart regular rate and rhythm with no murmurs rubs or gallops   PULMONARY/CHEST: Clear to auscultation with no wheezes or crackles noted  ABDOMEN: Soft, nontender with positive bowel sounds, no guarding or rebound   NEUROLOGIC:    Orientation: Alert and oriented×3,    cranial nerves:  II through XII are intact,   Strength:5 out of 5 throughout right upper lower extremity, left upper extremity 2/5  difficulty releasing, 3-4/5 proximallyleft upper extremity closes hand has difficulty extending wrist mild tightness proximally. Range of motion left upper extremities 70 degrees of flexion, 60 degrees abduction, 30 degrees of extension and 0 degrees of adduction. ,  Left lower extremity noted to have internal rotation of foot and plantar flexion, proximal left lower extremity 4/5 proximally-left lateral distal calf with half by half centimeter skin abrasion appears clean. Continues with dressing   Sensation:Intact to light touch and pinprick right upper and lower extremity, left side decreased sensation left face as well as left upper and lower extremity to light touch and pinprick   Balance: Intact   Deep tendon reflexes: Bilaterally equal and symmetric, negative Babinski, negativeHoffmann's   Provocative maneuvers:  EXTREMITIES: No calf tenderness, negative Homans, negative warmth, pulses are intact  SKIN: Skin is warm and dry. PSYCIATIRIC: normal mood and affect, behavior is normal. Thought content normal.  She ambulates with a spastic left hemiparesis gait pattern with left AFO. Left arm held to the side. Assessment:       Diagnosis Orders   1. Acute left-sided weakness     2. 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 H/O intracranial hemorrhage  CBC    Comprehensive Metabolic Panel   4. Adhesive capsulitis of left shoulder     5. Bariatric surgery status     6. Bleeding disorder ([facility] [address]     7. Platelet dysfunction (HCC)     8. De Quervain's disease (tenosynovitis)     9. Bipolar 1 disorder ([facility] [address]          Plan:      1. Hemorrhagic CVA with left hemiparesis. She is making improvementsshe is now ambulating with a cane. No falls. .  There is some improvement in strength she continues a left ankle-foot orthoses, noted skin irritation left distal lateral calfbrace has been adjusted. Continues with dressing. Informed to monitor closely.   If a increasing work CBC/CMP    35 minutes spent with patient       Return in about 6 weeks (around 11/10/2021) for Followup, Botox. Orders Placed This Encounter   Procedures    CBC     Standing Status:   Future     Number of Occurrences:   1     Standing Expiration Date:   9/29/2022    Comprehensive Metabolic Panel     Standing Status:   Future     Number of Occurrences:   1     Standing Expiration Date:   9/29/2022     Orders Placed This Encounter   Medications    cyclobenzaprine (FLEXERIL) 5 MG tablet     Sig: Take 1 tablet by mouth 3 times daily as needed for Muscle spasms     Dispense:  90 tablet     Refill:  0            Electronically signed by Imtiaz Morrell. Juna Elliott 9/29/2021 at 6:29 PM    Please note that this chart was generated usingvoice recognition Dragon dictation software. Although every effort was made toensure the accuracy of this automated transcription, some errors in transcriptionmay have occurred.

## 2021-09-29 NOTE — LETTER
[facility]  [facility] PHYSICAL MEDICINE & REHABILITATION  [address] [place] [address]  Dept: [phone]  Dept Fax: [phone]      9/29/21    Patient: Fayrene Vannesa  YOB: 1970      Karri Preston  [address] [place] [address]  Dept: [phone]  Dept Fax: [phone]      9/29/21    Patient: Fayrene Vannesa  YOB: 1970    Dear  KYLEE Nolasco - CNP ,    I had the pleasure of seeing your patient, Fayrene Vannesa today in the office today. Below are the relevant portions of my assessment and plan of care. IMPRESSION:   Diagnosis Orders   1. Acute left-sided weakness     2. 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 H/O intracranial hemorrhage  CBC    Comprehensive Metabolic Panel   4. Adhesive capsulitis of left shoulder     5. Bariatric surgery status     6. Bleeding disorder ([address]     7. Platelet dysfunction (HCC)     8. De Quervain's disease (tenosynovitis)     9. Bipolar 1 disorder ([address]       PLAN:  1. Hemorrhagic CVA with left hemiparesis. She is making improvementsshe is now ambulating with a cane. No falls. .  There is some improvement in strength she continues a left ankle-foot orthoses, noted skin irritation left distal lateral calfbrace has been adjusted. Continues with dressing. Informed to monitor closely. If a increasing symptoms would hold off on brace and reeval.  She follows with [facility]. She continues with PT/OT. We will continue. If needed reorder they will notify  2.  Spastic left hemiparesisnotes increased spasms, notes baclofen and Zanaflexincrease her symptoms, she does not use Valium however would like to minimize due to history of alcohol abuse, we discussed options of low-dose Valium, Flexeril, Botox etc.  She would like to try Flexeril.   We will also set her up for Botox injectionfeel she would benefit from Botox for left lower daily as needed for Muscle spasms     Dispense:  90 tablet     Refill:  0     Thank you for allowing me to participate in the care of this patient. I will keep you updated on this patient's follow up and I look forward to serving you and your patients again in the future. Bunny Mccurdy. Ravindra Hernandez MD

## 2021-10-01 ENCOUNTER — TELEPHONE (OUTPATIENT)
Dept: ONCOLOGY | Age: 51
End: 2021-10-01

## 2021-10-01 NOTE — TELEPHONE ENCOUNTER
García Reyes left a message on the triage line asking about a botox injection in her legs. She has had severe leg cramping lately and a botox injection was suggested. García Reyes would like to make sure it is ok with Dr Kush Garcia as well. Message put in triage in Dr Purcell's Prem Wong.

## 2021-10-06 ENCOUNTER — TELEPHONE (OUTPATIENT)
Dept: ONCOLOGY | Age: 51
End: 2021-10-06

## 2021-10-06 NOTE — TELEPHONE ENCOUNTER
RECEIVED RESPONSE IN TRIAGE OK FOR THE BOTOX TO LEGS. PREET IS UPDATED VIA PHONE.    Vicky Kelley RESPONSE TO OFFICE TO SCAN.

## 2021-10-12 ENCOUNTER — OFFICE VISIT (OUTPATIENT)
Dept: PHYSICAL MEDICINE AND REHAB | Age: 51
End: 2021-10-12
Payer: COMMERCIAL

## 2021-10-12 VITALS
WEIGHT: 252 LBS | HEART RATE: 90 BPM | TEMPERATURE: 97.3 F | BODY MASS INDEX: 41.93 KG/M2 | DIASTOLIC BLOOD PRESSURE: 75 MMHG | SYSTOLIC BLOOD PRESSURE: 114 MMHG

## 2021-10-12 DIAGNOSIS — M65.4 DE QUERVAIN'S DISEASE (TENOSYNOVITIS): ICD-10-CM

## 2021-10-12 DIAGNOSIS — D69.1 PLATELET DYSFUNCTION (HCC): ICD-10-CM

## 2021-10-12 DIAGNOSIS — M62.838 SPASM OF MUSCLE: ICD-10-CM

## 2021-10-12 DIAGNOSIS — D69.9 BLEEDING DISORDER (HCC): ICD-10-CM

## 2021-10-12 DIAGNOSIS — I10 ESSENTIAL HYPERTENSION: ICD-10-CM

## 2021-10-12 DIAGNOSIS — I69.398 SPASTICITY DUE TO OLD STROKE: Primary | ICD-10-CM

## 2021-10-12 DIAGNOSIS — R25.2 SPASTICITY DUE TO OLD STROKE: Primary | ICD-10-CM

## 2021-10-12 DIAGNOSIS — M75.02 ADHESIVE CAPSULITIS OF LEFT SHOULDER: ICD-10-CM

## 2021-10-12 DIAGNOSIS — Z86.79 H/O INTRACRANIAL HEMORRHAGE: ICD-10-CM

## 2021-10-12 PROCEDURE — 95874 GUIDE NERV DESTR NEEDLE EMG: CPT | Performed by: PHYSICAL MEDICINE & REHABILITATION

## 2021-10-12 PROCEDURE — 99215 OFFICE O/P EST HI 40 MIN: CPT | Performed by: PHYSICAL MEDICINE & REHABILITATION

## 2021-10-12 PROCEDURE — 3017F COLORECTAL CA SCREEN DOC REV: CPT | Performed by: PHYSICAL MEDICINE & REHABILITATION

## 2021-10-12 PROCEDURE — G8484 FLU IMMUNIZE NO ADMIN: HCPCS | Performed by: PHYSICAL MEDICINE & REHABILITATION

## 2021-10-12 PROCEDURE — G8417 CALC BMI ABV UP PARAM F/U: HCPCS | Performed by: PHYSICAL MEDICINE & REHABILITATION

## 2021-10-12 PROCEDURE — G8427 DOCREV CUR MEDS BY ELIG CLIN: HCPCS | Performed by: PHYSICAL MEDICINE & REHABILITATION

## 2021-10-12 PROCEDURE — 64644 CHEMODENERV 1 EXTREM 5/> MUS: CPT | Performed by: PHYSICAL MEDICINE & REHABILITATION

## 2021-10-12 PROCEDURE — 1036F TOBACCO NON-USER: CPT | Performed by: PHYSICAL MEDICINE & REHABILITATION

## 2021-10-12 RX ORDER — LANOLIN ALCOHOL/MO/W.PET/CERES
CREAM (GRAM) TOPICAL
COMMUNITY
Start: 2021-07-15 | End: 2021-10-12

## 2021-10-12 RX ORDER — TRAZODONE HYDROCHLORIDE 50 MG/1
TABLET ORAL
COMMUNITY
Start: 2021-07-15 | End: 2022-01-12

## 2021-10-12 RX ORDER — HYDROCHLOROTHIAZIDE 12.5 MG/1
TABLET ORAL
COMMUNITY
Start: 2021-09-15

## 2021-10-12 RX ORDER — SERTRALINE HYDROCHLORIDE 100 MG/1
TABLET, FILM COATED ORAL
COMMUNITY
Start: 2021-09-15

## 2021-10-12 RX ORDER — LISINOPRIL 40 MG/1
TABLET ORAL
COMMUNITY
Start: 2021-10-05

## 2021-10-12 NOTE — PROGRESS NOTES
[facility] PHYSICIANS  [facility] PHYSICAL MEDICINE & REHABILITATION  [address] [place] [address] Alejandro Redman [address]  Dept: [phone]  Dept Fax: [phone]    Outpatient Followup Note     Ashley Baldwin, 46 y.o., female, presents for follow up c/o of   Chief Complaint   Patient presents with    Follow-up     pt is here today for botox injections;  pt states that her hematologist said that getting botox injections was ok to do   . Patient was lastseen on 3/30/2021    HPI:     HPI     She comes in today with her . Has been cleared by hematology for Botox injections. 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 She continues with spasticity left upper and left lower extremity. She notes Flexeril is helping somewhat but only lasts for a few hours. She tried baclofen Zanaflex which did not help and actually aggravated her symptoms? Since last visit she seen her orthopedic doctor and has diagnosed with DeQuervains tenosynovitis of the right hand. She had an injection and now has a brace. She notes improvement. She continues to uses a cane for long distances otherwise no assistive device for short distances at home. She continues to left ankle for orthoses but she had a sore on the left lateral leg and the ankle foot orthoses have been adjusted by [facility]. She notes range of motion left shoulder is improved. She continues with spasms left upper and lower extremity, Zanaflex actually increased her symptoms, she tried baclofen has also increased her symptoms. She has not been using her Valium (was told to hold as she was on Zanaflex at the time). She continues with occasional leakage from the bladder, possible stress incontinence. She has not follow-up with urology. Her left shoulder, frozen shoulder, has improved. She continues to follow-up with orthopedics. She continues with physical therapy. She states she had an MRI which showed biceps tendinitis. .  She is seeing Dr. Tres Zuniga in [place]     She continues to follow with hematology oncology and psychiatrist for bipolar disorder. She is no longer on Keppra        History     Alecia Donaldson  is 48 y. o. right-handed     female admitted to the [address] [place] [address] unit on 2020.  She was originally admitted to [address] [place] [address] Road 2020 for L sided weakness and headache.  She presented originally to [facility] ED. CT confirmed R parietal ICH and moderate SAH. She was treated initially with cardene infusion for hypertension and loaded with Keppra. She was transferred to [facility] for further neurologic care. She was given DDAVP and 1 pack of platelets. ICH score 2. NIHSS 15. Neurosurgery evaluated - managed medically. 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         Inpatient Rehabilitation Course:   Raul Gonzalez was admitted to inpatient rehabilitation on 2020.     Rehab course:  Progress well from rehabilitation standpoint. Family training with . Very supportive. Obtained an AFO for left lower extremity and a left resting hand splint for left upper extremity. Spasticity medications adjusted. Blood pressure medications adjusted. Added Requip for restless leg syndrome. On melatonin at nighttime and gabapentin for sleep.       Past Medical History:   Diagnosis Date    Asthma     Bipolar 1 disorder ([facility]     Delta storage pool disease ([facility]     Hypertension     Psychiatric problem       Past Surgical History:   Procedure Laterality Date    CARDIAC CATHETERIZATION       SECTION  0274,9041    Miscarriage     CHOLECYSTECTOMY  1996    COLONOSCOPY N/A 2020    -random bx(normal)hemorrhoids    GASTRIC BYPASS SURGERY      HYSTERECTOMY      KNEE SURGERY      LAPAROSCOPY      TONSILLECTOMY AND ADENOIDECTOMY      UPPER GASTROINTESTINAL ENDOSCOPY N/A 2020 -bx(normal,neg H-Pylori)normal post-bypass endoscopy          Family History   Adopted: Yes   Family history unknown: Yes          Social History     Tobacco Use    Smoking status: Never Smoker    Smokeless tobacco: Never Used   Substance Use Topics    Alcohol use: Not Currently     Comment: no drinking;  is a recovering alcoholic           Current Outpatient Medications   Medication Sig Dispense Refill    lisinopril (PRINIVIL;ZESTRIL) 40 MG tablet       sertraline (ZOLOFT) 100 MG tablet TAKE 1 & 1 2 (ONE & ONE HALF) TABLETS BY MOUTH ONCE DAILY      traZODone (DESYREL) 50 MG tablet TAKE 1 TABLET BY MOUTH ONCE DAILY AT BEDTIME AS NEEDED      folic acid (FOLVITE) 1 MG tablet Take 1 tablet by mouth once daily 30 tablet 3    butalbital-acetaminophen-caffeine (FIORICET, ESGIC) -40 MG per tablet Take 1 tablet by mouth every 12 hours 12 tablet 0    thiamine mononitrate 100 MG tablet Take 1 tablet by mouth daily 30 tablet 5    rOPINIRole (REQUIP) 1 MG tablet Take 1 tablet by mouth nightly (Patient taking differently: Take 1 mg by mouth 3 times daily ) 30 tablet 3    Blood Pressure KIT 1 each by Does not apply route daily 1 kit 0    desmopressin (STIMATE) 1.5 MG/ML SOLN nasal solution 1 spray each nostril the night before procedure 1 Bottle 0    diazePAM (VALIUM) 5 MG tablet Take 5 mg by mouth every 6 hours as needed for Anxiety (Muscle spasms).  buPROPion (WELLBUTRIN XL) 300 MG extended release tablet Take 1 tablet by mouth daily 30 tablet 3    amLODIPine (NORVASC) 5 MG tablet Take 1 tablet by mouth daily 30 tablet 3    melatonin 3 MG TABS tablet Take 1 tablet by mouth nightly 30 tablet 0    lamoTRIgine (LAMICTAL) 200 MG tablet Take 400 mg by mouth daily 2 tabs      hydroCHLOROthiazide (HYDRODIURIL) 12.5 MG tablet TAKE 1 TABLET BY MOUTH ONCE DAILY       No current facility-administered medications for this visit.      Allergies   Allergen Reactions    Penicillin G Itching    Pcn [Penicillins]     Sulfa Antibiotics Other (See Comments)     Inflammation in the eye         Subjective:     Review of Systems  CONSTITUTIONAL: Negative for fever, chills, sweat, fatigue and unexpected weightchange. HEENT:  Negativefor diplopia, blind spots, blurred vision, hearing loss, trouble chewing or swallowing,denies coughing while eating or drinking denies nosebleed    RESPIRATORY: Negative for wheezing, coughing or shortness of breath    CARDIOVASCULAR: Negative for chest pain,palpitations, lightheadedness  GASTROINTESTINAL: Negative for heartburn, nausea,constipation, diarrhea, abdominal pain  or incontinence  GENTIOURNIARY: Negative for dysuria, urgency,frequency, incontinence and difficulty urinating. ENDOCRINE: Negative for hot or cold intolerance, deniesany significant weight change  MUSCULOSKELETAL: Negative for focal joint pain,back pain, neck pain and arthralgias. NUEROLOGIIC: Negative for focal weakness, numbness,tingling, balance loss, headaches or falls  BEHAVIOR/PSYCY: Denies depression,anxiety, memory loss or insomnia  SKIN: Denies ulcers, rashes or bruises         Objective:     Physical Exam   /75   Pulse 90   Temp 97.3 °F (36.3 °C)   Wt 252 lb (114.3 kg)   BMI 41.93 kg/m²     Nursing notes and vitals reviewed    CONSTITUTIONAL: She is oriented to person, place, and time. She appears well-developed and well-nourished.   HEENT: Pupils equal reactiveto light, exact motion intact, visual fields are full, no facial asymmetry, speech fluent, no dysarthria, comprehension intact, object naming intact, repetition intact,basic cognition intact  CARDIOVASCULAR:Heart regular rate and rhythm with no murmurs rubs or gallops   PULMONARY/CHEST: Clear to auscultation with no wheezes or crackles noted  ABDOMEN: Soft, nontender with positive bowel sounds, no guarding or rebound   NEUROLOGIC:    Orientation: Alert and oriented×3,    cranial nerves:  II through XII are intact,   Strength:5 out of not use Valium however would like to minimize due to history of alcohol abuse,. Started Flexeril 5 mg 3 times dailyshe notes this is helping somewhat. .    3.  Discussed Botox for left upper and lower extremityshe has been cleared by her hematologist.  She feels her left arm is a bit worse. We discussed starting at lower dose today to make sure she tolerates well. Then in future will make further adjustments /increase as needed. We discussed left upper and lower extremity. They are agreeable to the left upper extremity today. Risks and benefits of Botox were discussed including but not limited to infection, bleeding, bruising, weakness, flulike symptoms, respiratory difficulties, etc.  Also reviewed effects of Botox may vary. She is agreeable and signed consent    200 units of Botox a were reconstituted using preservative free sterile normal saline. Patient site was prepped and sterilized using Betadine and alcohol. Botox a was administered under sterile techniques using EMG guidance for elbow flexion, wrist flexion and finger flexion. We start at lower dose today to make sure she tolerates and will adjust the future. Botox injected with back pressure being applied. She tolerated procedure well no bleeding or bruising noted     flexor carpi radialis 25 units, flexor carpi ulnaris 25 units, FDP 50 units, FDS 50 units and biceps 50 unitsfor total of 200 units. She will continue with occupational therapy. And follow-up in 2 weeks as well  notify if there is any questions or concerns. 3.  Left hemiparesis with hemisensory deficits. Improved, no longer on Neurontin  4. Left frozen shouldercontinues  limited range of motion, she continues therapy and follow-up with orthopedics . 5.   Frequent urination chronic for patientrecommend consideration of urology consultation, informed can make referral to urology however she would like to have somebody in the [place] area in which she will discuss with her primary care physician. 6.  Right thumb hand pain DeQuervains tenosynovitis. She is unable to tolerate anti-inflammatories, she underwent injection and bracing   7. Reviewed falls and precautions, currently no trauma noted  7. Follow-up with hematology,neurology as well as psychiatry  8. Follow-up approximately 2 weeks for evaluation improved with Botox. Then 3 months for repeat Botox. At that time will consider increasing dose for left upper extremity above as well as consider left lower extremity equina varustibialis posterior and gastroc. Jaswinder Stover 9.  CBC/CMP from 9/29/2021 notedshe will follow with PCP/hematology for mild anemia    45 minutes spent with patientprocedures/counseling/etc.       No follow-ups on file. Orders Placed This Encounter   Procedures    VA NEEDLE EMG GUIDANCE FOR CHEMODENERVATION    VA CHEMODENERVATION 1 EXTREMITY 5 OR MORE MUSCLES     Orders Placed This Encounter   Medications    onabotulinumtoxin A (BOTOX) injection 200 Units            Electronically signed by Phil Whelan. Billie Nash 10/12/2021 at 5:53 PM    Please note that this chart was generated usingvoice recognition Dragon dictation software. Although every effort was made toensure the accuracy of this automated transcription, some errors in transcriptionmay have occurred.

## 2021-10-19 ENCOUNTER — TELEPHONE (OUTPATIENT)
Dept: PHYSICAL MEDICINE AND REHAB | Age: 51
End: 2021-10-19

## 2021-10-19 DIAGNOSIS — G81.10 SPASTIC HEMIPARESIS (HCC): Primary | ICD-10-CM

## 2021-10-19 NOTE — TELEPHONE ENCOUNTER
Jaspal Bledsoe from [facility] called and said he saw Zhou Lepe on Friday and he would like to remake her AFO. He is asking for an order for \"tone reducing\" AFO. If in agreement, we will fax to [phone]. Call Jaspal Records [phone]  if you have any questions. Thank you.

## 2021-10-20 ENCOUNTER — TELEPHONE (OUTPATIENT)
Dept: PHYSICAL MEDICINE AND REHAB | Age: 51
End: 2021-10-20

## 2021-10-20 NOTE — TELEPHONE ENCOUNTER
Patient requesting refill of Flexeril. She states she feels that the Botox is helping her arm. She still has bad cramping in legs at night and hard to sleep and wondered about starting Trazadone again. She used to take 50 mg at night. She said she did take up to 150 mg in the past.   She takes Requip.     Next appt is 11/1

## 2021-10-21 RX ORDER — CYCLOBENZAPRINE HCL 5 MG
5 TABLET ORAL 3 TIMES DAILY PRN
Qty: 90 TABLET | Refills: 0 | Status: SHIPPED | OUTPATIENT
Start: 2021-10-21 | End: 2021-11-01 | Stop reason: SDUPTHER

## 2021-10-21 NOTE — TELEPHONE ENCOUNTER
Flexeril prescription ordered and sent. In regards to trazodone-please clarify who was prescribing this previously. And when does she have follow-up regarding post Botox injection.

## 2021-10-21 NOTE — TELEPHONE ENCOUNTER
Pt states that her psychiatrist has rx for her the trazodone 150 mg at bedtime for sleep but she no longer sees this dr. Because of insurance and she does not have a psych dr..     She is wanting you to order trazodone 50 mg at bedtime to help with sleep.    She states that the 50 mg helped her some while she was in the hospital.    Next appt is 11.01.21

## 2021-10-25 NOTE — TELEPHONE ENCOUNTER
Called patient regarding request for trazodone 50 Mg nightly. She notes she is also getting zoloft 150 milligrams daily. This is being prescribed by her primary care physician. Medications were previously prescribed by psychiatrist.  She is attempting to switch psychiatrist  as she can no longer see her current one. Requested patient follow with PCP to prescribe trazodone as she is also on Zoloft. She is agreeable. Discussed Botox-she notes significant improvement cadence eft upper extremity,- improve range of motion, decrease pain. Notes hand isnot as tight. Chest follow-up next week. Will discuss further then.

## 2021-11-01 ENCOUNTER — OFFICE VISIT (OUTPATIENT)
Dept: PHYSICAL MEDICINE AND REHAB | Age: 51
End: 2021-11-01
Payer: COMMERCIAL

## 2021-11-01 VITALS — HEART RATE: 89 BPM | TEMPERATURE: 97.7 F | DIASTOLIC BLOOD PRESSURE: 77 MMHG | SYSTOLIC BLOOD PRESSURE: 126 MMHG

## 2021-11-01 DIAGNOSIS — Z86.79 H/O INTRACRANIAL HEMORRHAGE: ICD-10-CM

## 2021-11-01 DIAGNOSIS — F31.9 BIPOLAR 1 DISORDER (HCC): ICD-10-CM

## 2021-11-01 DIAGNOSIS — I10 ESSENTIAL HYPERTENSION: ICD-10-CM

## 2021-11-01 DIAGNOSIS — D69.9 BLEEDING DISORDER (HCC): ICD-10-CM

## 2021-11-01 DIAGNOSIS — I69.398 SPASTICITY DUE TO OLD STROKE: ICD-10-CM

## 2021-11-01 DIAGNOSIS — F10.930 ALCOHOL WITHDRAWAL SYNDROME WITHOUT COMPLICATION (HCC): ICD-10-CM

## 2021-11-01 DIAGNOSIS — G81.10 SPASTIC HEMIPARESIS (HCC): Primary | ICD-10-CM

## 2021-11-01 DIAGNOSIS — D69.1 PLATELET DYSFUNCTION (HCC): ICD-10-CM

## 2021-11-01 DIAGNOSIS — R25.2 SPASTICITY DUE TO OLD STROKE: ICD-10-CM

## 2021-11-01 PROCEDURE — G8427 DOCREV CUR MEDS BY ELIG CLIN: HCPCS | Performed by: PHYSICAL MEDICINE & REHABILITATION

## 2021-11-01 PROCEDURE — 99213 OFFICE O/P EST LOW 20 MIN: CPT | Performed by: PHYSICAL MEDICINE & REHABILITATION

## 2021-11-01 PROCEDURE — 3017F COLORECTAL CA SCREEN DOC REV: CPT | Performed by: PHYSICAL MEDICINE & REHABILITATION

## 2021-11-01 PROCEDURE — 1036F TOBACCO NON-USER: CPT | Performed by: PHYSICAL MEDICINE & REHABILITATION

## 2021-11-01 PROCEDURE — G8417 CALC BMI ABV UP PARAM F/U: HCPCS | Performed by: PHYSICAL MEDICINE & REHABILITATION

## 2021-11-01 PROCEDURE — G8484 FLU IMMUNIZE NO ADMIN: HCPCS | Performed by: PHYSICAL MEDICINE & REHABILITATION

## 2021-11-01 RX ORDER — CYCLOBENZAPRINE HCL 5 MG
5 TABLET ORAL 3 TIMES DAILY PRN
Qty: 90 TABLET | Refills: 1 | Status: SHIPPED | OUTPATIENT
Start: 2021-11-01 | End: 2021-12-31

## 2021-11-01 NOTE — LETTER
[facility] PHYSICAL MEDICINE & REHABILITATION  [address] [place] [id number]  Dept: [phone]  Dept Fax: [phone]      11/1/21    Patient: Aleisha Perez  YOB: 1970    Dear  KYLEE Brunner CNP ,    I had the pleasure of seeing your patient, Aleisha Perez today in the office today. Below are the relevant portions of my assessment and plan of care. IMPRESSION:   Diagnosis Orders   1. Spastic hemiparesis (HCC)  Hepatic Function Panel   2. 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 Bleeding disorder ([facility]     4. Essential hypertension     5. H/O intracranial hemorrhage     6. Bipolar 1 disorder ([facility]     7. Platelet dysfunction (HCC)     8. Alcohol withdrawal syndrome without complication ([facility]       PLAN:  1. Hemorrhagic CVA with left hemiparesis. She is making improvementsshe is now ambulating with a cane. No falls. There is some improvement in strength she continues a new left ankle-foot orthoses, she is tolerating this well. She continues with PT/OT. 2.  Spastic left hemiparesisnotes increased spasms, notes baclofen and Zanaflexincrease her symptoms, she does use Valium, however, would like to minimize due to history of alcohol abuse. Started Flexeril 5 mg 3 times dailyshe notes this is helping her left lower extremity spasm. 3.  Status post Botox injection to left upper extremity for Elbow flexion, wrist flexion and closed fist ( after clearance by her hematologist) she tolerated the procedure well and notes significant improvement, improve range of motion able to flex elbow and wrist easier and able to open and close the hand better. She notes decreased pain left upper extremity. She did note post Botox injection she was very sleepy but this only lasted a day. We will follow-up approximately 3 months and try to increase to 300 units of Botox to possibly the left upper and left lower extremity.     Risks and benefits of Botox were discussed on last visit including but not limited to infection, bleeding, bruising, weakness, flulike symptoms, respiratory difficulties, etc.  Also reviewed effects of Botox may vary. She was agreeable and signed consent  flexor carpi radialis 25 units, flexor carpi ulnaris 25 units, FDP 50 units, FDS 50 units and biceps 50 unitsfor total of 200 units. 4.  Left hemiparesis with hemisensory deficits. Improved, no longer on Neurontin  5. Left frozen shouldercontinues  limited range of motion, she continues therapy and follow-up with orthopedics . 6.  Frequent urination chronic for patientrecommend consideration of urology consultation, informed can make referral to urology however she would like to have somebody in the [place] area in which she will discuss with her primary care physician. She plans to follow-up soon. Symptoms have been stable  6. Right thumb hand pain DeQuervains tenosynovitis. She is unable to tolerate anti-inflammatories, she underwent injection and bracingfollow-up orthopedics  7. Reviewed falls and precautions, currently no trauma noted  8. She would like trazodone to assist with sleepshe was seeing a psychiatrist, she is also on other antidepressantadvised to follow-up with her PCP or psychiatry for continuation of medication  9. Follow-up with hematology,neurology as well as psychiatry  10. Follow-up approximately 3 months for repeat Botox. At that time will consider increasing dose for left upper extremity above as well as consider left lower extremity equina varustibialis posterior and gastroc. Charo Plunkett 9.  CBC/CMP from 9/29/2021 notedceleste will follow with PCP/hematology for mild anemia, will recheck LFTs in approximately 1 month due to continued use of Flexeril    20 min spent       No follow-ups on file.   Orders Placed This Encounter   Procedures    Hepatic Function Panel     Standing Status:   Future     Standing Expiration Date:   11/1/2022     Orders Placed This Encounter   Medications    cyclobenzaprine (FLEXERIL) 5 MG tablet     Sig: Take 1 tablet by mouth 3 times daily as needed for Muscle spasms     Dispense:  90 tablet     Refill:  1     Thank you for allowing me to participate in the care of this patient. I will keep you updated on this patient's follow up and I look forward to serving you and your patients again in the future. Misti Betancourt. Irineo Mansfield MD

## 2021-11-01 NOTE — PROGRESS NOTES
[facility] PHYSICIANS  [facility] PHYSICAL MEDICINE & REHABILITATION  [address] [place] [address]  [place] [address]  Dept: [phone]  Dept Fax: [phone]    Outpatient Followup Note     Harry Cruz, 46 y.o., female, presents for follow up c/o of   Chief Complaint   Patient presents with    Follow-up   . Patient was lastseen on 3/30/2021    HPI:     HPI     She comes with follow-up today with her sister. She notes significant provement in the spasms and tightness of left upper extremity. He notes inability to range and open fingers much more easily. Therapist also, did improve range of motion and decrease spasticity. She also paints less left upper tremor. She is pleased with the progress. However she did note post injection the same day she was very sleepy. However she went home and slept and was doing better after that. No other side effectsfevers chills respite difficulties etc. were experienced. She also denies any bruising despite her deltopectoral storage disease. She continues with spasticity left lower extremity. Flexeril appears to help. She received a new ankle-foot orthoses which she is tolerating well. She tried baclofen and Zanaflex which did not help and actually aggravated her symptoms? She does not want pursue Valium at this time   she has seen her orthopedic doctor and has diagnosed with DeQuervains tenosynovitis of the right hand. She had an injection and now has a brace. She notes improvement. She continues to uses a cane for long distances otherwise no assistive device for short distances at home. She notes range of motion left shoulder is improved. She continues with occasional leakage from the bladder, possible stress incontinence. She has  follow-up with urology. Her left shoulder, frozen shoulder, has improved. She continues to follow-up with orthopedics. She continues with physical therapy. She states she had an MRI which showed biceps tendinitis. Akash Brand She is seeing Dr. 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 She is no longer on Keppra        History     Alecia Donaldson  is 48 y. o. right-handed     female admitted to the [address] [place] [address] unit on 2020.  She was originally admitted to [address] [place] [address] Road 2020 for L sided weakness and headache.  She presented originally to [facility] ED. CT confirmed R parietal ICH and moderate SAH. She was treated initially with cardene infusion for hypertension and loaded with Keppra. She was transferred to [facility] for further neurologic care. She was given DDAVP and 1 pack of platelets. ICH score 2. NIHSS 15. Neurosurgery evaluated - managed medically. 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         Inpatient Rehabilitation Course:   Ramos Humphrey was admitted to inpatient rehabilitation on 2020.     Rehab course:  Progress well from rehabilitation standpoint. Family training with . Very supportive. Obtained an AFO for left lower extremity and a left resting hand splint for left upper extremity. Spasticity medications adjusted. Blood pressure medications adjusted. Added Requip for restless leg syndrome. On melatonin at nighttime and gabapentin for sleep.       Past Medical History:   Diagnosis Date    Asthma     Bipolar 1 disorder ([facility]     Delta storage pool disease ([facility]     Hypertension     Psychiatric problem       Past Surgical History:   Procedure Laterality Date    CARDIAC CATHETERIZATION       SECTION  1056,4005    Miscarriage     CHOLECYSTECTOMY  1996    COLONOSCOPY N/A 2020    -random bx(normal)hemorrhoids    GASTRIC BYPASS SURGERY      HYSTERECTOMY      KNEE SURGERY  1984    LAPAROSCOPY      TONSILLECTOMY AND ADENOIDECTOMY  1988    UPPER GASTROINTESTINAL ENDOSCOPY N/A 02/03/2020    -bx(normal,neg H-Pylori)normal post-bypass endoscopy          Family History   Adopted: Yes   Family history unknown: Yes          Social History     Tobacco Use    Smoking status: Never Smoker    Smokeless tobacco: Never Used   Substance Use Topics    Alcohol use: Not Currently     Comment: no drinking;  is a recovering alcoholic           Current Outpatient Medications   Medication Sig Dispense Refill    cyclobenzaprine (FLEXERIL) 5 MG tablet Take 1 tablet by mouth 3 times daily as needed for Muscle spasms 90 tablet 1    lisinopril (PRINIVIL;ZESTRIL) 40 MG tablet       hydroCHLOROthiazide (HYDRODIURIL) 12.5 MG tablet TAKE 1 TABLET BY MOUTH ONCE DAILY      sertraline (ZOLOFT) 100 MG tablet TAKE 1 & 1 2 (ONE & ONE HALF) TABLETS BY MOUTH ONCE DAILY      traZODone (DESYREL) 50 MG tablet TAKE 1 TABLET BY MOUTH ONCE DAILY AT BEDTIME AS NEEDED      folic acid (FOLVITE) 1 MG tablet Take 1 tablet by mouth once daily 30 tablet 3    butalbital-acetaminophen-caffeine (FIORICET, ESGIC) -40 MG per tablet Take 1 tablet by mouth every 12 hours 12 tablet 0    thiamine mononitrate 100 MG tablet Take 1 tablet by mouth daily 30 tablet 5    rOPINIRole (REQUIP) 1 MG tablet Take 1 tablet by mouth nightly (Patient taking differently: Take 1 mg by mouth 3 times daily ) 30 tablet 3    Blood Pressure KIT 1 each by Does not apply route daily 1 kit 0    desmopressin (STIMATE) 1.5 MG/ML SOLN nasal solution 1 spray each nostril the night before procedure 1 Bottle 0    diazePAM (VALIUM) 5 MG tablet Take 5 mg by mouth every 6 hours as needed for Anxiety (Muscle spasms).        buPROPion (WELLBUTRIN XL) 300 MG extended release tablet Take 1 tablet by mouth daily 30 tablet 3    amLODIPine (NORVASC) 5 MG tablet Take 1 tablet by mouth daily 30 tablet 3    melatonin 3 MG TABS tablet Take 1 tablet by mouth nightly 30 tablet 0    lamoTRIgine (LAMICTAL) 200 MG tablet Take 400 mg by mouth daily 2 tabs       No current facility-administered medications for this visit. Allergies   Allergen Reactions    Penicillin G Itching    Pcn [Penicillins]     Sulfa Antibiotics Other (See Comments)     Inflammation in the eye         Subjective:     Review of Systems  CONSTITUTIONAL: Negative for fever, chills, sweat, fatigue and unexpected weightchange. HEENT:  Negativefor diplopia, blind spots, blurred vision, hearing loss, trouble chewing or swallowing,denies coughing while eating or drinking denies nosebleed    RESPIRATORY: Negative for wheezing, coughing or shortness of breath    CARDIOVASCULAR: Negative for chest pain,palpitations, lightheadedness  GASTROINTESTINAL: Negative for heartburn, nausea,constipation, diarrhea, abdominal pain  or incontinence  GENTIOURNIARY: Negative for dysuria, urgency,frequency, incontinence and difficulty urinating. ENDOCRINE: Negative for hot or cold intolerance, deniesany significant weight change  MUSCULOSKELETAL: Negative for focal joint pain,back pain, neck pain and arthralgias. Chestine Livings: Negative for focal weakness, numbness,tingling, balance loss, headaches or falls  BEHAVIOR/PSYCY: Denies depression,anxiety, memory loss or insomnia  SKIN: Denies ulcers, rashes or bruises         Objective:     Physical Exam   /77   Pulse 89   Temp 97.7 °F (36.5 °C)     Nursing notes and vitals reviewed    CONSTITUTIONAL: She is oriented to person, place, and time. She appears well-developed and well-nourished.   HEENT: Pupils equal reactiveto light, exact motion intact, visual fields are full, no facial asymmetry, speech fluent, no dysarthria, comprehension intact, object naming intact, repetition intact,basic cognition intact  CARDIOVASCULAR:Heart regular rate and rhythm with no murmurs rubs or gallops   PULMONARY/CHEST: Clear to auscultation with no wheezes or crackles noted  ABDOMEN: Soft, nontender with positive bowel sounds, no guarding or rebound NEUROLOGIC:    Orientation: Alert and oriented×3,    cranial nerves:  II through XII are intact,   Strength:5 out of 5 throughout right upper lower extremity, left upper extremity 3/5  difficulty releasing, 3-4/5 proximallydecreased passive left upper extremity, able to open and close the hand more easily. As well as range the left elbow. .  Range of motion left upper extremities shoulder 70 degrees of flexion, 60 degrees abduction, 30 degrees of extension and 0 degrees of adduction. ,  Left lower extremity noted to have internal rotation of foot and plantar flexion, proximal left lower extremity 4/5 proximally-she is using an ankle-foot orthoses   Sensation:Intact to light touch and pinprick right upper and lower extremity, left side decreased sensation left face as well as left upper and lower extremity to light touch and pinprick   Balance: Intact   Deep tendon reflexes: Bilaterally equal and symmetric, negative Babinski, negativeHoffmann's   Provocative maneuvers:  EXTREMITIES: No calf tenderness, negative Homans, negative warmth, pulses are intact  SKIN: Skin is warm and dry. PSYCIATIRIC: normal mood and affect, behavior is normal. Thought content normal.          Assessment:       Diagnosis Orders   1. Spastic hemiparesis (HCC)  Hepatic Function Panel   2. 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 Bleeding disorder ([address]     4. Essential hypertension     5. H/O intracranial hemorrhage     6. Bipolar 1 disorder ([address]     7. Platelet dysfunction (HCC)     8. Alcohol withdrawal syndrome without complication ([address]          Plan:      1. Hemorrhagic CVA with left hemiparesis. She is making improvementsshe is now ambulating with a cane. No falls. .  There is some improvement in strength she continues a new left ankle-foot orthoses, she is tolerating this well. She continues with PT/OT.     2.  Spastic left hemiparesisnotes increased spasms, notes baclofen and Zanaflexincrease her symptoms, she does not use seeing a psychiatrist, she is also on other antidepressantadvised to follow-up with her PCP or psychiatry for continuation of medication  9. Follow-up with hematology,neurology as well as psychiatry  10. Follow-up approximately 3 months for repeat Botox. At that time will consider increasing dose for left upper extremity above as well as consider left lower extremity equina varustibialis posterior and gastroc. Jaswinder Stover 9.  CBC/CMP from 9/29/2021 notedshe will follow with PCP/hematology for mild anemia, will recheck LFTs in approximately 1 month due to continued use of Flexeril    20 min spent       No follow-ups on file. Orders Placed This Encounter   Procedures    Hepatic Function Panel     Standing Status:   Future     Standing Expiration Date:   11/1/2022     Orders Placed This Encounter   Medications    cyclobenzaprine (FLEXERIL) 5 MG tablet     Sig: Take 1 tablet by mouth 3 times daily as needed for Muscle spasms     Dispense:  90 tablet     Refill:  1            Electronically signed by Phil Whelan. Billie Nash 11/1/2021 at 4:13 PM    Please note that this chart was generated usingvoice recognition Dragon dictation software. Although every effort was made toensure the accuracy of this automated transcription, some errors in transcriptionmay have occurred.

## 2021-11-08 RX ORDER — CYCLOBENZAPRINE HCL 5 MG
TABLET ORAL
Qty: 90 TABLET | Refills: 0 | OUTPATIENT
Start: 2021-11-08

## 2021-11-08 NOTE — TELEPHONE ENCOUNTER
Pharmacy requesting refill of Flexeril 5mg. Last filled 09/29/21. Patient last seen on 11/01/21. Follow up scheduled for 01/12/22.

## 2021-11-09 NOTE — TELEPHONE ENCOUNTER
Called patient to let her know that refill was already sent to the pharmacy on 11/01/21 and there should be one refill on it. She wasn't aware of this and will call the pharmacy to make sure it's correct in the system.

## 2021-11-10 DIAGNOSIS — I61.1 NONTRAUMATIC CORTICAL HEMORRHAGE OF RIGHT CEREBRAL HEMISPHERE (HCC): Primary | ICD-10-CM

## 2021-11-19 DIAGNOSIS — I61.1 NONTRAUMATIC CORTICAL HEMORRHAGE OF RIGHT CEREBRAL HEMISPHERE (HCC): ICD-10-CM

## 2021-11-19 RX ORDER — BUTALBITAL, ACETAMINOPHEN AND CAFFEINE 50; 325; 40 MG/1; MG/1; MG/1
1 TABLET ORAL EVERY 12 HOURS
Qty: 12 TABLET | Refills: 0 | Status: SHIPPED | OUTPATIENT
Start: 2021-11-19

## 2021-12-07 ENCOUNTER — TELEPHONE (OUTPATIENT)
Dept: NEUROLOGY | Age: 51
End: 2021-12-07

## 2021-12-07 NOTE — TELEPHONE ENCOUNTER
Pt called [facility] regarding MRA, MRI orders that needed to be faxed to [phone] and office notes. Writer called pt back to inform her that orders and office notes will be sent today.

## 2021-12-10 ENCOUNTER — TELEPHONE (OUTPATIENT)
Dept: NEUROSURGERY | Age: 51
End: 2021-12-10

## 2021-12-10 DIAGNOSIS — I72.9 ANEURYSM (HCC): Primary | ICD-10-CM

## 2021-12-10 NOTE — TELEPHONE ENCOUNTER
Adia from [employer] called and stated she needed the patients order for the MRA to be without contrast for processing. The current order states with and without. Please update order if appropriate.

## 2022-01-07 LAB
A/G RATIO: 1.3
ALBUMIN SERPL-MCNC: 4.3 G/DL
ALP BLD-CCNC: 91 U/L
ALT SERPL-CCNC: 15 U/L
AST SERPL-CCNC: 17 U/L
BILIRUB SERPL-MCNC: 0.3 MG/DL (ref 0.1–1.4)
BILIRUBIN DIRECT: 0 MG/DL
BILIRUBIN, INDIRECT: 0.3
GLOBULIN: 3.3
PROTEIN TOTAL: 7.6 G/DL

## 2022-01-11 DIAGNOSIS — G81.10 SPASTIC HEMIPARESIS (HCC): ICD-10-CM

## 2022-01-12 ENCOUNTER — HOSPITAL ENCOUNTER (OUTPATIENT)
Dept: MRI IMAGING | Age: 52
Discharge: HOME | End: 2022-01-14
Payer: COMMERCIAL

## 2022-01-12 ENCOUNTER — OFFICE VISIT (OUTPATIENT)
Dept: PHYSICAL MEDICINE AND REHAB | Age: 52
End: 2022-01-12
Payer: COMMERCIAL

## 2022-01-12 VITALS
DIASTOLIC BLOOD PRESSURE: 80 MMHG | BODY MASS INDEX: 43.6 KG/M2 | HEART RATE: 97 BPM | TEMPERATURE: 98.1 F | WEIGHT: 262 LBS | SYSTOLIC BLOOD PRESSURE: 134 MMHG

## 2022-01-12 DIAGNOSIS — M75.02 ADHESIVE CAPSULITIS OF LEFT SHOULDER: ICD-10-CM

## 2022-01-12 DIAGNOSIS — Z86.79 H/O INTRACRANIAL HEMORRHAGE: ICD-10-CM

## 2022-01-12 DIAGNOSIS — I10 ESSENTIAL HYPERTENSION: ICD-10-CM

## 2022-01-12 DIAGNOSIS — I72.9 ANEURYSM (HCC): ICD-10-CM

## 2022-01-12 DIAGNOSIS — F31.9 BIPOLAR 1 DISORDER (HCC): ICD-10-CM

## 2022-01-12 DIAGNOSIS — M65.4 DE QUERVAIN'S DISEASE (TENOSYNOVITIS): ICD-10-CM

## 2022-01-12 DIAGNOSIS — G81.10 SPASTIC HEMIPARESIS (HCC): Primary | ICD-10-CM

## 2022-01-12 DIAGNOSIS — D69.1 DELTA STORAGE POOL DISEASE (HCC): ICD-10-CM

## 2022-01-12 DIAGNOSIS — D69.9 BLEEDING DISORDER (HCC): ICD-10-CM

## 2022-01-12 LAB
CREAT SERPL-MCNC: 0.55 MG/DL (ref 0.5–0.9)
GFR AFRICAN AMERICAN: >60 ML/MIN
GFR NON-AFRICAN AMERICAN: >60 ML/MIN
GFR SERPL CREATININE-BSD FRML MDRD: NORMAL ML/MIN/{1.73_M2}
GFR SERPL CREATININE-BSD FRML MDRD: NORMAL ML/MIN/{1.73_M2}

## 2022-01-12 PROCEDURE — A9579 GAD-BASE MR CONTRAST NOS,1ML: HCPCS | Performed by: PSYCHIATRY & NEUROLOGY

## 2022-01-12 PROCEDURE — 6360000004 HC RX CONTRAST MEDICATION: Performed by: PSYCHIATRY & NEUROLOGY

## 2022-01-12 PROCEDURE — 36415 COLL VENOUS BLD VENIPUNCTURE: CPT

## 2022-01-12 PROCEDURE — 82565 ASSAY OF CREATININE: CPT

## 2022-01-12 PROCEDURE — 3017F COLORECTAL CA SCREEN DOC REV: CPT | Performed by: PHYSICAL MEDICINE & REHABILITATION

## 2022-01-12 PROCEDURE — 64644 CHEMODENERV 1 EXTREM 5/> MUS: CPT | Performed by: PHYSICAL MEDICINE & REHABILITATION

## 2022-01-12 PROCEDURE — 95874 GUIDE NERV DESTR NEEDLE EMG: CPT | Performed by: PHYSICAL MEDICINE & REHABILITATION

## 2022-01-12 PROCEDURE — G8484 FLU IMMUNIZE NO ADMIN: HCPCS | Performed by: PHYSICAL MEDICINE & REHABILITATION

## 2022-01-12 PROCEDURE — G8427 DOCREV CUR MEDS BY ELIG CLIN: HCPCS | Performed by: PHYSICAL MEDICINE & REHABILITATION

## 2022-01-12 PROCEDURE — 70553 MRI BRAIN STEM W/O & W/DYE: CPT

## 2022-01-12 PROCEDURE — 1036F TOBACCO NON-USER: CPT | Performed by: PHYSICAL MEDICINE & REHABILITATION

## 2022-01-12 PROCEDURE — 64643 CHEMODENERV 1 EXTREM 1-4 EA: CPT | Performed by: PHYSICAL MEDICINE & REHABILITATION

## 2022-01-12 PROCEDURE — G8417 CALC BMI ABV UP PARAM F/U: HCPCS | Performed by: PHYSICAL MEDICINE & REHABILITATION

## 2022-01-12 PROCEDURE — 99204 OFFICE O/P NEW MOD 45 MIN: CPT | Performed by: PHYSICAL MEDICINE & REHABILITATION

## 2022-01-12 PROCEDURE — 70544 MR ANGIOGRAPHY HEAD W/O DYE: CPT

## 2022-01-12 RX ORDER — CYCLOBENZAPRINE HCL 5 MG
TABLET ORAL
COMMUNITY
Start: 2022-01-07 | End: 2022-01-12 | Stop reason: SDUPTHER

## 2022-01-12 RX ORDER — CYCLOBENZAPRINE HCL 5 MG
5 TABLET ORAL 3 TIMES DAILY
Qty: 90 TABLET | Refills: 0 | Status: SHIPPED | OUTPATIENT
Start: 2022-01-12 | End: 2022-02-11

## 2022-01-12 RX ADMIN — GADOTERIDOL 20 ML: 279.3 INJECTION, SOLUTION INTRAVENOUS at 08:25

## 2022-01-12 NOTE — LETTER
[facility] [address]  [place] [address]  Dept: [phone]  Dept Fax: [phone]      1/12/22    Patient: Jeri Spence  YOB: 1970    Dear  Luis M Akers DO ,    I had the pleasure of seeing your patient, Jeri Spence today in the office today. Below are the relevant portions of my assessment and plan of care. IMPRESSION:   Diagnosis Orders   1. Spastic hemiparesis (HCC)  WI NEEDLE EMG GUIDANCE FOR CHEMODENERVATION    WI CHEMODENERVATION 1 EXTREMITY 5 OR MORE MUSCLES    WI CHEMODENERVATION  EA ADDL 1-4 MUSCLE(S)   2. Essential hypertension     3. H/O intracranial hemorrhage     4. Bipolar 1 disorder ([address]     5. Adhesive capsulitis of left shoulder     6. De Quervain's disease (tenosynovitis)     7. Delta storage pool disease ([facility]     8. Bleeding disorder ([facility]       PLAN:  1. Hemorrhagic CVA with left hemiparesis. She is making improvementsshe is now ambulating with a cane. No falls. .  There is some improvement in strength she continues a left ankle-foot orthoses, She continues with PT/OT. We will continue. If needed reorder they will notify  2.  Spastic left hemiparesisnotes increased spasms, notes baclofen and Zanaflexincrease her symptoms, she does not use Valium however would like to minimize due to history of alcohol abuse,. Started Flexeril 5 mg 3 times daily she notes this is helping, LFTs are okay 1/6/2022, CMP, CBC okay 9/29/2021  3. Discussed Botox for left upper and lower extremityshe has been cleared by her hematologist.  She tried the Botox injection well last time and noted significant improvement left upper extremity  Risks and benefits of Botox were again discussed including but not limited to infection, bleeding, bruising, weakness, flulike symptoms, respiratory difficulties, etc.  Also reviewed effects of Botox may vary. She is agreeable and signed consent. 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 Today  Botox for left upper extremity fingers/wrist/elbow flexion 150 units, and left lower extremity for equina varus, hitchhiker's toe150 units. Discussed risks and benefits we discussed risk of bleeding particularly with the tibialis posterior muscleshe would like to avoid that muscle at this time. Also discussed risk of increased weakness in the left lower extremity which could affect ability for transfers and ambulation. 300 units of Botox a were reconstituted using preservative free sterile normal saline. Patient site was prepped and sterilized using Betadine and alcohol. Botox a was administered under sterile techniques using EMG guidance for elbow flexion, wrist flexion and finger flexion. We will increase total doses from 200 to 300 units today to make sure she tolerates and will adjust the future. Botox injected with back pressure being applied. She tolerated procedure well no bleeding or bruising noted     Left upper extremity 4 fingers/wrist/elbow flexion  flexor carpi radialis 25 units, flexor carpi ulnaris 25 units, FDP 25 units, FDS 50 units and biceps 256 unitsfor total of 150 units. Left lower extremity 4 equinovarus/hitchhiker's toe  Tibias anterior 50 units, FDL/FHL 75 units, EHL 25 units for total of 150 units, tibias posterior not done due to risk of bleeding and patient request    She will continue with physical and Occupational Therapy. And follow-up in 2 weeks as well  notify if there is any questions or concerns. 3.  Left hemiparesis with hemisensory deficits. Improved, no longer on Neurontin  4. Left frozen shouldercontinues  limited range of motion, she continues therapy and follow-up with orthopedics . 5. Frequent urination chronic for patient improvement  6. Right thumb hand pain DeQuervains tenosynovitis. Improved  7. Reviewed falls and precautions, currently no trauma noted  8. Follow-up with hematology,neurology as well as psychiatry  9. Follow-up approximately 2 weeks for evaluation improved with Botox. Then 3 months for repeat Botox. At that time will consider increasing dose for left lower extremity as well as left upper extremity  10. CBC/CMP from 9/29/2021 notedshe will follow with PCP/hematology for mild anemia    45 minutes spent with patientprocedures/counseling/etc.     No follow-ups on file. Orders Placed This Encounter   Procedures    TN NEEDLE EMG GUIDANCE FOR CHEMODENERVATION    TN CHEMODENERVATION 1 EXTREMITY 5 OR MORE MUSCLES    TN CHEMODENERVATION  EA ADDL 1-4 MUSCLE(S)     Orders Placed This Encounter   Medications    cyclobenzaprine (FLEXERIL) 5 MG tablet     Sig: Take 1 tablet by mouth three times daily     Dispense:  90 tablet     Refill:  0    onabotulinumtoxin A (BOTOX) injection 300 Units     Thank you for allowing me to participate in the care of this patient. I will keep you updated on this patient's follow up and I look forward to serving you and your patients again in the future. Pretty Gannon. Rodney Yang MD

## 2022-01-12 NOTE — PROGRESS NOTES
2020 for L sided weakness and headache.  She presented originally to [facility] ED. CT confirmed R parietal ICH and moderate SAH. She was treated initially with cardene infusion for hypertension and loaded with Keppra. She was transferred to [facility] for further neurologic care. She was given DDAVP and 1 pack of platelets. ICH score 2. NIHSS 15. Neurosurgery evaluated - managed medically. 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         Inpatient Rehabilitation Course:   Trevor Lugo was admitted to inpatient rehabilitation on 2020.     Rehab course:  Progress well from rehabilitation standpoint. Family training with . Very supportive. Obtained an AFO for left lower extremity and a left resting hand splint for left upper extremity. Spasticity medications adjusted. Blood pressure medications adjusted. Added Requip for restless leg syndrome. On melatonin at nighttime and gabapentin for sleep.       Past Medical History:   Diagnosis Date    Asthma     Bipolar 1 disorder ([facility] [facility]     Delta storage pool disease ([facility] [address]     Hypertension     Psychiatric problem       Past Surgical History:   Procedure Laterality Date    CARDIAC CATHETERIZATION       SECTION  5260,5505    Miscarriage     CHOLECYSTECTOMY      COLONOSCOPY N/A 2020    -random bx(normal)hemorrhoids    GASTRIC BYPASS SURGERY      HYSTERECTOMY      KNEE SURGERY      LAPAROSCOPY      TONSILLECTOMY AND ADENOIDECTOMY      UPPER GASTROINTESTINAL ENDOSCOPY N/A 2020    (normal,neg H-Pylori)normal post-bypass endoscopy          Family History   Adopted: Yes   Family history unknown: Yes          Social History     Tobacco Use    Smoking status: Never Smoker    Smokeless tobacco: Never Used   Substance Use Topics    Alcohol use: Not Currently     Comment: no drinking;  is a recovering alcoholic Current Outpatient Medications   Medication Sig Dispense Refill    cyclobenzaprine (FLEXERIL) 5 MG tablet Take 1 tablet by mouth three times daily 90 tablet 0    butalbital-acetaminophen-caffeine (FIORICET, ESGIC) -40 MG per tablet Take 1 tablet by mouth every 12 hours 12 tablet 0    lisinopril (PRINIVIL;ZESTRIL) 40 MG tablet       hydroCHLOROthiazide (HYDRODIURIL) 12.5 MG tablet TAKE 1 TABLET BY MOUTH ONCE DAILY      sertraline (ZOLOFT) 100 MG tablet TAKE 1 & 1 2 (ONE & ONE HALF) TABLETS BY MOUTH ONCE DAILY      folic acid (FOLVITE) 1 MG tablet Take 1 tablet by mouth once daily 30 tablet 3    rOPINIRole (REQUIP) 1 MG tablet Take 1 tablet by mouth nightly (Patient taking differently: Take 1 mg by mouth 3 times daily ) 30 tablet 3    Blood Pressure KIT 1 each by Does not apply route daily 1 kit 0    diazePAM (VALIUM) 5 MG tablet Take 5 mg by mouth every 6 hours as needed for Anxiety (Muscle spasms).  buPROPion (WELLBUTRIN XL) 300 MG extended release tablet Take 1 tablet by mouth daily 30 tablet 3    amLODIPine (NORVASC) 5 MG tablet Take 1 tablet by mouth daily 30 tablet 3    lamoTRIgine (LAMICTAL) 200 MG tablet Take 400 mg by mouth daily 2 tabs       No current facility-administered medications for this visit. Allergies   Allergen Reactions    Penicillin G Itching    Pcn [Penicillins]     Sulfa Antibiotics Other (See Comments)     Inflammation in the eye         Subjective:     Review of Systems  CONSTITUTIONAL: Negative for fever, chills, sweat, fatigue and unexpected weightchange.    HEENT:  Negativefor diplopia, blind spots, blurred vision, hearing loss, trouble chewing or swallowing,denies coughing while eating or drinking denies nosebleed    RESPIRATORY: Negative for wheezing, coughing or shortness of breath    CARDIOVASCULAR: Negative for chest pain,palpitations, lightheadedness  GASTROINTESTINAL: Negative for heartburn, nausea,constipation, diarrhea, abdominal pain  or incontinence  GENTIOURNIARY: Negative for dysuria, urgency,frequency, incontinence and difficulty urinating. ENDOCRINE: Negative for hot or cold intolerance, deniesany significant weight change  MUSCULOSKELETAL: Negative for focal joint pain,back pain, neck pain and arthralgias. NUEROLOGIIC: Negative for focal weakness, numbness,tingling, balance loss, headaches or falls  BEHAVIOR/PSYCY: Denies depression,anxiety, memory loss or insomnia  SKIN: Denies ulcers, rashes or bruises         Objective:     Physical Exam   /80   Pulse 97   Temp 98.1 °F (36.7 °C)   Wt 262 lb (118.8 kg)   BMI 43.60 kg/m²     Nursing notes and vitals reviewed    CONSTITUTIONAL: She is oriented to person, place, and time. She appears well-developed and well-nourished. HEENT: Pupils equal reactiveto light, exact motion intact, visual fields are full, no facial asymmetry, speech fluent, no dysarthria, comprehension intact, object naming intact, repetition intact,basic cognition intact  CARDIOVASCULAR:Heart regular rate and rhythm with no murmurs rubs or gallops   PULMONARY/CHEST: Clear to auscultation with no wheezes or crackles noted  ABDOMEN: Soft, nontender with positive bowel sounds, no guarding or rebound   NEUROLOGIC:    Orientation: Alert and oriented×3,    cranial nerves:  II through XII are intact,   Strength:5 out of 5 throughout right upper lower extremity, left upper extremity 2/5  difficulty releasing, 3-4/5 proximallyleft upper extremity closes hand has difficulty extending wrist, mild tightness proximally for elbow flexors. Range of motion left upper extremities shoulder 70 degrees of flexion, 60 degrees abduction, 30 degrees of extension and 0 degrees of adduction. ,  Left lower extremity noted to have internal rotation of foot and plantar flexionequina varus and hitchhiker still, proximal left lower extremity 4/5 proximally-   Sensation:Intact to light touch and pinprick right upper and lower extremity, left side decreased sensation left face as well as left upper and lower extremity to light touch and pinprick   Balance: Intact   Deep tendon reflexes: Bilaterally equal and symmetric, negative Babinski, negativeHoffmann's   Provocative maneuvers:  EXTREMITIES: No calf tenderness, negative Homans, negative warmth, pulses are intact  SKIN: Skin is warm and dry. PSYCIATIRIC: normal mood and affect, behavior is normal. Thought content normal.  She ambulates with a spastic left hemiparesis gait pattern with left AFO. Left arm held to the side. Assessment:       Diagnosis Orders   1. Spastic hemiparesis (HCC)  IL NEEDLE EMG GUIDANCE FOR CHEMODENERVATION    IL CHEMODENERVATION 1 EXTREMITY 5 OR MORE MUSCLES    IL CHEMODENERVATION  EA ADDL 1-4 MUSCLE(S)   2. Essential hypertension     3. H/O intracranial hemorrhage     4. Bipolar 1 disorder ([facility] [address]     5. Adhesive capsulitis of left shoulder     6. De Quervain's disease (tenosynovitis)     7. Delta storage pool disease ([facility] [address]     8. [facility]          Plan:      1. Hemorrhagic CVA with left hemiparesis. She is making improvementsshe is now ambulating with a cane. No falls. .  There is some improvement in strength she continues a left ankle-foot orthoses, She continues with PT/OT. We will continue. If needed reorder they will notify  2.  Spastic left hemiparesisnotes increased spasms, notes baclofen and Zanaflexincrease her symptoms, she does not use Valium however would like to minimize due to history of alcohol abuse,. Started Flexeril 5 mg 3 times daily she notes this is helping, LFTs are okay 1/6/2022, CMP, CBC okay 9/29/2021  3.   Discussed Botox for left upper and lower extremityshe has been cleared by her hematologist.  She tried the Botox injection well last time and noted significant improvement left upper extremity  Risks and benefits of Botox were again discussed including but not limited to infection, bleeding, bruising, weakness, flulike symptoms, respiratory difficulties, etc.  Also reviewed effects of Botox may vary. She is agreeable and signed consent. 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 Today  Botox for left upper extremity fingers/wrist/elbow flexion 150 units, and left lower extremity for equina varus, hitchhiker's toe150 units. Discussed risks and benefits we discussed risk of bleeding particularly with the tibialis posterior muscleshe would like to avoid that muscle at this time. Also discussed risk of increased weakness in the left lower extremity which could affect ability for transfers and ambulation. 300 units of Botox a were reconstituted using preservative free sterile normal saline. Patient site was prepped and sterilized using Betadine and alcohol. Botox a was administered under sterile techniques using EMG guidance for elbow flexion, wrist flexion and finger flexion. We will increase total doses from 200 to 300 units today to make sure she tolerates and will adjust the future. Botox injected with back pressure being applied. She tolerated procedure well no bleeding or bruising noted     Left upper extremity 4 fingers/wrist/elbow flexion  flexor carpi radialis 25 units, flexor carpi ulnaris 25 units, FDP 25 units, FDS 50 units and biceps 256 unitsfor total of 150 units. Left lower extremity 4 equinovarus/hitchhiker's toe  Tibias anterior 50 units, FDL/FHL 75 units, EHL 25 units for total of 150 units, tibias posterior not done due to risk of bleeding and patient request    Patient evaluated 20 minutes post procedureno bleeding or bruising noted    She will continue with physical and Occupational physical therapy. And follow-up in 2 weeks as well  notify if there is any questions or concerns. 3.  Left hemiparesis with hemisensory deficits. Improved, no longer on Neurontin  4.   Left frozen shouldercontinues  limited range of motion, she continues therapy and follow-up with orthopedics . 5. Frequent urination chronic for patient improvement  6. Right thumb hand pain DeQuervains tenosynovitis. Improved  7. Reviewed falls and precautions, currently no trauma noted  7. Follow-up with hematology,neurology as well as psychiatry  8. Follow-up approximately 2 weeks for evaluation improved with Botox. Then 3 months for repeat Botox. At that time will consider increasing dose for left lower extremity as well as left upper extremity  9. CBC/CMP from 9/29/2021 notedshe will follow with PCP/hematology for mild anemia    45 minutes spent with patientprocedures/counseling/etc.       No follow-ups on file. Orders Placed This Encounter   Procedures    KS NEEDLE EMG GUIDANCE FOR CHEMODENERVATION    KS CHEMODENERVATION 1 EXTREMITY 5 OR MORE MUSCLES    KS CHEMODENERVATION  EA ADDL 1-4 MUSCLE(S)     Orders Placed This Encounter   Medications    cyclobenzaprine (FLEXERIL) 5 MG tablet     Sig: Take 1 tablet by mouth three times daily     Dispense:  90 tablet     Refill:  0    onabotulinumtoxin A (BOTOX) injection 300 Units            Electronically signed by Kim Castelan. Noel Mates 1/12/2022 at 6:12 PM    Please note that this chart was generated usingvoice recognition Dragon dictation software. Although every effort was made toensure the accuracy of this automated transcription, some errors in transcriptionmay have occurred.

## 2022-01-13 ENCOUNTER — PATIENT MESSAGE (OUTPATIENT)
Dept: PHYSICAL MEDICINE AND REHAB | Age: 52
End: 2022-01-13

## 2022-01-13 ENCOUNTER — PATIENT MESSAGE (OUTPATIENT)
Dept: NEUROLOGY | Age: 52
End: 2022-01-13

## 2022-01-13 DIAGNOSIS — E87.6 HYPOKALEMIA: Primary | ICD-10-CM

## 2022-01-13 NOTE — TELEPHONE ENCOUNTER
From: Keshia Voss  To: Dr. Manuel Ax: 1/13/2022 8:26 AM EST  Subject: Potassium    Hi Dr. Arabella Luo, Something I forgot to ask about at my appointment yesterday is potassium. When I was in the hospital last year it was low and I was given it every single day. I am wondering if the cramping could be being exacerbated by low potassium as well as I have been constipated and that is unusual for me. Thank you and have a super day!!   [facility]

## 2022-01-13 NOTE — TELEPHONE ENCOUNTER
Potasium in September 2021 was normal, but will order recheck - [employer] ( Cr noted 1/12/22)  Constipation - can add miralax 1 daily, - is over the counter. Hold for loose stools.

## 2022-01-13 NOTE — TELEPHONE ENCOUNTER
From: Tori Me  To: Dr. Meyers Pullin2022 8:31 AM EST  Subject: MRI/MRA     Hi Dr. Heidi Marquez,  I finally have my MRI and MRAs yesterday, and I see that the results are in. I do not see you until March so I was just wondering if I could have some feedback from those. From what I can tell it looks like there isnt any more blood or bleeding which is good news if that is the case. But it does mention I guess the damage that was done? I am just wondering how significant that is what that means for me now I guess. I know you were extremely busy so if you have a minute to answer some questions about this I would really appreciate it. Thank you and have a super day!     Ariana Pizarro

## 2022-01-19 NOTE — TELEPHONE ENCOUNTER
I talked to Ms. Donaldson over the phone. Explained the results of MRI brain in detail. All the questions were answered. Patient to follow Dr. Henny Webster as scheduled.

## 2022-01-24 ENCOUNTER — TELEPHONE (OUTPATIENT)
Dept: PHYSICAL MEDICINE AND REHAB | Age: 52
End: 2022-01-24

## 2022-01-24 NOTE — TELEPHONE ENCOUNTER
Cecilio Maher cancelled follow up today because she lives in [facility] and doesn't want to come due to weather. She was wondering if you can do the appt for checking on Botox injection via MyChart video visit? Or do you need her to come in person?     Thank you

## 2022-01-25 ENCOUNTER — TELEMEDICINE (OUTPATIENT)
Dept: PHYSICAL MEDICINE AND REHAB | Age: 52
End: 2022-01-25
Payer: COMMERCIAL

## 2022-01-25 DIAGNOSIS — F31.9 BIPOLAR 1 DISORDER (HCC): ICD-10-CM

## 2022-01-25 DIAGNOSIS — Z86.79 H/O INTRACRANIAL HEMORRHAGE: ICD-10-CM

## 2022-01-25 DIAGNOSIS — D69.9 BLEEDING DISORDER (HCC): ICD-10-CM

## 2022-01-25 DIAGNOSIS — D69.1 PLATELET DYSFUNCTION (HCC): ICD-10-CM

## 2022-01-25 DIAGNOSIS — I61.9 INTRAPARENCHYMAL HEMORRHAGE OF BRAIN (HCC): ICD-10-CM

## 2022-01-25 DIAGNOSIS — I10 ESSENTIAL HYPERTENSION: ICD-10-CM

## 2022-01-25 DIAGNOSIS — R25.2 SPASTICITY DUE TO OLD STROKE: Primary | ICD-10-CM

## 2022-01-25 DIAGNOSIS — I69.398 SPASTICITY DUE TO OLD STROKE: Primary | ICD-10-CM

## 2022-01-25 PROCEDURE — 99213 OFFICE O/P EST LOW 20 MIN: CPT | Performed by: PHYSICAL MEDICINE & REHABILITATION

## 2022-01-25 PROCEDURE — 3017F COLORECTAL CA SCREEN DOC REV: CPT | Performed by: PHYSICAL MEDICINE & REHABILITATION

## 2022-01-25 PROCEDURE — G8427 DOCREV CUR MEDS BY ELIG CLIN: HCPCS | Performed by: PHYSICAL MEDICINE & REHABILITATION

## 2022-01-25 RX ORDER — CYCLOBENZAPRINE HCL 5 MG
5 TABLET ORAL 3 TIMES DAILY PRN
Qty: 90 TABLET | Refills: 1 | Status: SHIPPED | OUTPATIENT
Start: 2022-01-25 | End: 2022-03-26

## 2022-01-25 NOTE — PROGRESS NOTES
2022    TELEHEALTH EVALUATION -- Audio/Visual (During NVWOA-75 public health emergency), pt requested virtual visit    HPI:    Barrett Lopez (:  1970) has requested an audio/video evaluation for the following concern(s): post botox evaluation    Went Botox of the left arm and left leg on he denies any weakness/fevers chills bruising or side effects noted post procedure. She underwent Botox for  1.  finger for finger/wrist/elbow flexion150 unit. She notes significant improvement in able to range and open and close her hand and move her thumb. She notes decreased spasticity in the elbow as well. She also notes improvement in function of use of the left upper extremity. She is pleased with the progress  2. Left leg equina varus/hitchhiker's toedid limited Botox due to risk of bleedingshe noted no bleeding or bruising care. She notes some improvement initially with more controlled and less spasticity howeverr over the last few days she has noted some tremors in the left leg. She denies any numbness tingling or increased weakness, denies any bruising or calf tenderness. She denies any discoloration. She is able to walk she uses a brace at all times. She notes the plantarflexion is improved and she feels there is less inversion    She does note some bruising over the left medial proximal calf related to her braceshe has made adjustments. Review of Systems     Review of Systems  CONSTITUTIONAL: Negative for fever, chills, sweat, fatigue and unexpected weightchange.    HEENT:            Negativefor diplopia, blind spots, blurred vision, hearing loss, trouble chewing or swallowing,denies coughing while eating or drinking denies nosebleed    RESPIRATORY: Negative for wheezing, coughing or shortness of breath    CARDIOVASCULAR: Negative for chest pain,palpitations, lightheadedness  GASTROINTESTINAL: Negative for heartburn, nausea,constipation, diarrhea, abdominal pain  or incontinence  GENTIOURNIARY: Negative for dysuria, urgency,frequency, incontinence and difficulty urinating. ENDOCRINE: Negative for hot or cold intolerance, deniesany significant weight change  MUSCULOSKELETAL: Negative for focal joint pain,back pain, neck pain and arthralgias. NUEROLOGIIC: Negative for numbness,tingling,  headaches or falls, she does note some tremors in the left leg  BEHAVIOR/PSYCY: Denies depression,anxiety, memory loss or insomnia  SKIN: Denies ulcers, rashes or bruises     Prior to Visit Medications    Medication Sig Taking? Authorizing Provider   cyclobenzaprine (FLEXERIL) 5 MG tablet Take 1 tablet by mouth 3 times daily as needed for Muscle spasms Yes Charlotte Boland MD   cyclobenzaprine (FLEXERIL) 5 MG tablet Take 1 tablet by mouth three times daily Yes Charlotte Boland MD   butalbital-acetaminophen-caffeine (FIORICET, ESGIC) -63 MG per tablet Take 1 tablet by mouth every 12 hours Yes Kiran Gregorio MD   lisinopril (PRINIVIL;ZESTRIL) 40 MG tablet  Yes Historical Provider, MD   hydroCHLOROthiazide (HYDRODIURIL) 12.5 MG tablet TAKE 1 TABLET BY MOUTH ONCE DAILY Yes Historical Provider, MD   sertraline (ZOLOFT) 100 MG tablet TAKE 1 & 1 2 (ONE & ONE HALF) TABLETS BY MOUTH ONCE DAILY Yes Historical Provider, MD   folic acid (FOLVITE) 1 MG tablet Take 1 tablet by mouth once daily Yes Kiran Gregorio MD   rOPINIRole (REQUIP) 1 MG tablet Take 1 tablet by mouth nightly  Patient taking differently: Take 1 mg by mouth 3 times daily  Yes Lenore Sierra MD   Blood Pressure KIT 1 each by Does not apply route daily Yes Lenore Sierra MD   diazePAM (VALIUM) 5 MG tablet Take 5 mg by mouth every 6 hours as needed for Anxiety (Muscle spasms).   Yes Historical Provider, MD   buPROPion (WELLBUTRIN XL) 300 MG extended release tablet Take 1 tablet by mouth daily Yes Charlotte Boland MD   amLODIPine (NORVASC) 5 MG tablet Take 1 tablet by mouth daily Yes Charlotte Boland MD   lamoTRIgine (LAMICTAL) 200 MG tablet Take 400 mg by mouth daily 2 tabs Yes Historical Provider, MD   folic acid (FOLVITE) 1 MG tablet Take 1 tablet by mouth daily  Nallely Estes MD       Social History     Tobacco Use    Smoking status: Never Smoker    Smokeless tobacco: Never Used   Vaping Use    Vaping Use: Never used   Substance Use Topics    Alcohol use: Not Currently     Comment: no drinking;  is a recovering alcoholic    Drug use: No        Allergies   Allergen Reactions    Penicillin G Itching    Pcn [Penicillins]     Sulfa Antibiotics Other (See Comments)     Inflammation in the eye   ,   Past Medical History:   Diagnosis Date    Asthma     [facility]     Delta storage pool disease ([facility]     Hypertension     Psychiatric problem    ,   Past Surgical History:   Procedure Laterality Date    CARDIAC CATHETERIZATION       SECTION  9506,0540    Miscarriage     CHOLECYSTECTOMY      COLONOSCOPY N/A 2020     bx(normal)hemorrhoids    GASTRIC BYPASS SURGERY      HYSTERECTOMY      KNEE SURGERY      LAPAROSCOPY      TONSILLECTOMY AND ADENOIDECTOMY      UPPER GASTROINTESTINAL ENDOSCOPY N/A 2020    (normal,neg H-Pylori)normal post-bypass endoscopy   ,   Social History     Tobacco Use    Smoking status: Never Smoker    Smokeless tobacco: Never Used   Vaping Use    Vaping Use: Never used   Substance Use Topics    Alcohol use: Not Currently     Comment: no drinking;  is a recovering alcoholic    Drug use: No   ,   Family History   Adopted: Yes   Family history unknown: Yes   ,   Immunization History   Administered Date(s) Administered    COVID-19, Pfizer Purple top, DILUTE for use, 12+ yrs, 30mcg/0.3mL dose 2021    Influenza Virus Vaccine 2014    Tdap (Boostrix, Adacel) 2018   ,   Health Maintenance   Topic Date Due    Pneumococcal 0-64 years Vaccine (1 of 2 - PPSV23) Never done    Depression Monitoring  Never done  Breast cancer screen  02/05/2020    Flu vaccine (1) 09/01/2021    COVID-19 Vaccine (2 - Pfizer 3-dose series) 11/23/2021    Potassium monitoring  09/29/2022    Creatinine monitoring  01/12/2023    Lipid screen  11/30/2025    DTaP/Tdap/Td vaccine (2 - Td or Tdap) 04/22/2028    Colon cancer screen colonoscopy  02/03/2030    Shingles Vaccine  Completed    Hepatitis C screen  Completed    HIV screen  Addressed    Hepatitis A vaccine  Aged Out    Hepatitis B vaccine  Aged Out    Hib vaccine  Aged Out    Meningococcal (ACWY) vaccine  Aged Out       PHYSICAL EXAMINATION:  [ INSTRUCTIONS:  \"[x]\" Indicates a positive item  \"[]\" Indicates a negative item  -- DELETE ALL ITEMS NOT EXAMINED]  Vital Signs: (As obtained by patient/caregiver or practitioner observation)    Blood pressure-she notes her blood pressure has been running 120-130/72-75 down and therapy heart rate-states around 90s which is normal for her  Temperature-states she is afebrile     Constitutional: [x] Appears well-developed and well-nourished [x] No apparent distress      [] Abnormal-   Mental status  [x] Alert and awake  [x] Oriented to person/place/time [x]Able to follow commands      Eyes:  EOM    [x]  Normal  [] Abnormal-  Sclera  [x]  Normal  [] Abnormal -              HENT:   [x] Normocephalic, atraumatic. [] Abnormal       Neck: [] No visualized mass     Pulmonary/Chest: [x] Respiratory effort normal.  [x] No visualized signs of difficulty breathing or respiratory distress        [] Abnormal-      Musculoskeletal:   [] Normal gait with no signs of ataxia         [] Normal range of motion of neck        [x] Abnormal-  -left upper extremity with ability to extend and flex elbow much easier, she is able to open and close her hands and fingers much easier, she is using her hand at times for grabbing objects.  No bruising  Bruising over the left medial proximal calf, otherwise appears less inverted and able to actually range the ankle in dorsiflexion plantarflexion better than previously      Neurological:        [x] No Facial Asymmetry (Cranial nerve 7 motor function) (limited exam to video visit)          [] No gaze palsy        [] Abnormal-         Skin:        [] No significant exanthematous lesions or discoloration noted on facial skin         [x] Abnormal-bruising over the left medial proximal calf as above           Psychiatric:       [x] Normal Affect [] No Hallucinations        [] Abnormal-     Other pertinent observable physical exam findings-she is able to don or doff her AFO without any difficulty    ASSESSMENT  1. Left ara-spasticity due to old CVA and left hemiparesis  2. Platelet dysfunction with tenderness of bleed  3. History of intraparenchymal hemorrhage  4. Essential hypertension  5. History of bleeding disorderdata storage pool disease  6. History of adhesive capsulitis left    Plan  1. She is post Botox evaluationtolerated the procedure well with no side effects no sedation fevers bruising bleeding etc.      A.  she notes improvement of left upper extremity with improved range, decreased strength and improve function. B. She initially noted improvement in the left lower extremity, she does note improvement and decreased plantarflexion and inversion., However she is now noting some tremors in the left leg at times. I again reviewed as noted on last visit that Botox on initial treatment may have high or low affect difficult to predict. Again  weakness can put her at increased risk of falls . Also reviewed today , she should definitely be wearing her AFO anytime she stands or ambulates and be cognition of ambulation safety due to concerns of ankle injury. She reiterates that she always wears her brace as she she was concerned of injury to the ankle even previously. 2. Tremorsquestion neurologypossibly due to Botox causing increased weakness in some muscles and spasticity in  others.  If persist or progress may benefit from neuro evaluation. Patient follow-up in 2 weeks for evaluation or earlier if necessary    3. Regards to bruising of the left proximal medial calfshe notes this could be from bunching up of her stockings. She was advised to monitor brace fitting and notify if need adjustments. 4. She notes Flexeril was only ordered dailyand was taking 3 times a day. Will reorder    5. Follow-up in 2 weeks to reevaluate or earlier if necessary. She was also advised to follow-up with neurology. Return in about 2 weeks (around 2/8/2022) for Followup. Daron Fothergill, was evaluated through a synchronous (real-time) audio-video encounter. The patient (or guardian if applicable) is aware that this is a billable service, which includes applicable co-pays. This Virtual Visit was conducted with patient's (and/or legal guardian's) consent. The visit was conducted pursuant to the emergency declaration under the [address] [place] [address] authority and the [employer] Act. Patient identification was verified, and a caregiver was present when appropriate. The patient was located at home in a state where the provider was licensed to provide care. Total time spent on this encounter: 25 min    --Mino Acevedo MD on 1/25/2022 at 5:45 PM    An electronic signature was used to authenticate this note.

## 2022-02-15 ENCOUNTER — OFFICE VISIT (OUTPATIENT)
Dept: PHYSICAL MEDICINE AND REHAB | Age: 52
End: 2022-02-15
Payer: COMMERCIAL

## 2022-02-15 VITALS
TEMPERATURE: 97 F | DIASTOLIC BLOOD PRESSURE: 73 MMHG | BODY MASS INDEX: 43.99 KG/M2 | HEART RATE: 99 BPM | WEIGHT: 264 LBS | SYSTOLIC BLOOD PRESSURE: 112 MMHG | HEIGHT: 65 IN

## 2022-02-15 DIAGNOSIS — D69.9 BLEEDING DISORDER (HCC): Primary | ICD-10-CM

## 2022-02-15 DIAGNOSIS — G81.10 SPASTIC HEMIPARESIS (HCC): ICD-10-CM

## 2022-02-15 DIAGNOSIS — E87.6 HYPOKALEMIA: ICD-10-CM

## 2022-02-15 DIAGNOSIS — M75.02 ADHESIVE CAPSULITIS OF LEFT SHOULDER: ICD-10-CM

## 2022-02-15 DIAGNOSIS — D69.1 PLATELET DYSFUNCTION (HCC): ICD-10-CM

## 2022-02-15 DIAGNOSIS — I10 ESSENTIAL HYPERTENSION: ICD-10-CM

## 2022-02-15 DIAGNOSIS — F31.9 BIPOLAR 1 DISORDER (HCC): ICD-10-CM

## 2022-02-15 PROCEDURE — G8484 FLU IMMUNIZE NO ADMIN: HCPCS | Performed by: PHYSICAL MEDICINE & REHABILITATION

## 2022-02-15 PROCEDURE — G8417 CALC BMI ABV UP PARAM F/U: HCPCS | Performed by: PHYSICAL MEDICINE & REHABILITATION

## 2022-02-15 PROCEDURE — G8427 DOCREV CUR MEDS BY ELIG CLIN: HCPCS | Performed by: PHYSICAL MEDICINE & REHABILITATION

## 2022-02-15 PROCEDURE — 1036F TOBACCO NON-USER: CPT | Performed by: PHYSICAL MEDICINE & REHABILITATION

## 2022-02-15 PROCEDURE — 3017F COLORECTAL CA SCREEN DOC REV: CPT | Performed by: PHYSICAL MEDICINE & REHABILITATION

## 2022-02-15 PROCEDURE — 99213 OFFICE O/P EST LOW 20 MIN: CPT | Performed by: PHYSICAL MEDICINE & REHABILITATION

## 2022-02-15 NOTE — PROGRESS NOTES
[facility] PHYSICIANS  [facility] PHYSICAL MEDICINE & REHABILITATION  [address] [place] [address] Alejandro Redman [address]  Dept: [phone]  Dept Fax: [phone]    Outpatient Followup Note     Madison Silva, 46 y.o., female, presents for follow up c/o of   Chief Complaint   Patient presents with    Follow-up     Botox follow up   . Patient was lastseen on 1/12/2022 for Botox injection, televisit 1/25/2022    HPI:     HPI     She comes in with her sister today. .  Again discussed with patient. She notes no side effects after previous Botox injection, no sedation, bleeding or bruising or fevers or chills. She felt well. Since last visit she has had no falls. She denies any difficulty with bowels or bladder. She is continues with physical and occupational therapy 3 times a week. Occupational therapy is pleased with the improvement in her left upper extremity with improved range of motion in the wrist and fingers she also has improve range of motion of the elbow although still little bit tight. Physical therapy is also pleased with her improvement left lower extremity, she is ambulating better. There is some improved range of motion. However patient still notes some spasms and inversion. .  She notes she is now ambulating on a treadmill and try to do this at home. Precautions were reviewed. She continues with Flexeril which is helping. LFTs in January were within normal limits. She still awaiting obtain CBC and BMP      Previous:   Has been cleared by hematology for Botox injections. 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 She notes Flexeril is helping. Last Botox injection in October of the left upper extremity was doneshe tolerated well and noted significant improvements approximately 80%. .  She was pleased with her progress     She continues to uses a cane for long distances otherwise no assistive device for short distances at home. She continues to left ankle for orthoses.   Brace [facility] by [facility] . Her left shoulder, frozen shoulder, has improved. She continues to follow-up with orthopedics. She continues with physical therapy. She states she had an MRI which showed biceps tendinitis. .  She is seeing Dr. Joaquín Thakkar in [facility]     She continues to follow with hematology oncology and psychiatrist for bipolar disorder. She is no longer on Keppra        History     Alecia Donaldson  is 48 y. o. right-handed     female admitted to the [address] [place] [address] unit on 2020.  She was originally admitted to [address] [place] [address] 2020 for L sided weakness and headache.  She presented originally to [facility] ED. CT confirmed R parietal ICH and moderate SAH. She was treated initially with cardene infusion for hypertension and loaded with Keppra. She was transferred to [facility] for further neurologic care. She was given DDAVP and 1 pack of platelets. ICH score 2. NIHSS 15. Neurosurgery evaluated - managed medically. 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         Inpatient Rehabilitation Course:   Barrett Lopez was admitted to inpatient rehabilitation on 2020.     Rehab course:  Progress well from rehabilitation standpoint. Family training with . Very supportive. Obtained an AFO for left lower extremity and a left resting hand splint for left upper extremity. Spasticity medications adjusted. Blood pressure medications adjusted. Added Requip for restless leg syndrome. On melatonin at nighttime and gabapentin for sleep.       Past Medical History:   Diagnosis Date    Asthma     [facility] ([facility] [address] [facility]     Delta storage pool disease [facility])     Hypertension     Psychiatric problem       Past Surgical History:   Procedure Laterality Date    CARDIAC CATHETERIZATION       SECTION  5120,6451    Miscarriage     CHOLECYSTECTOMY      COLONOSCOPY N/A 2020    -uriel bx(normal)hemorrhoids    GASTRIC BYPASS SURGERY  2010    HYSTERECTOMY  2010    KNEE SURGERY  1984    LAPAROSCOPY  1993    TONSILLECTOMY AND ADENOIDECTOMY  1988    UPPER GASTROINTESTINAL ENDOSCOPY N/A 02/03/2020    -bx(normal,neg H-Pylori)normal post-bypass endoscopy          Family History   Adopted: Yes   Family history unknown: Yes          Social History     Tobacco Use    Smoking status: Never Smoker    Smokeless tobacco: Never Used   Substance Use Topics    Alcohol use: Not Currently     Comment: no drinking;  is a recovering alcoholic           Current Outpatient Medications   Medication Sig Dispense Refill    cyclobenzaprine (FLEXERIL) 5 MG tablet Take 1 tablet by mouth 3 times daily as needed for Muscle spasms 90 tablet 1    butalbital-acetaminophen-caffeine (FIORICET, ESGIC) -40 MG per tablet Take 1 tablet by mouth every 12 hours 12 tablet 0    lisinopril (PRINIVIL;ZESTRIL) 40 MG tablet       hydroCHLOROthiazide (HYDRODIURIL) 12.5 MG tablet TAKE 1 TABLET BY MOUTH ONCE DAILY      sertraline (ZOLOFT) 100 MG tablet TAKE 1 & 1 2 (ONE & ONE HALF) TABLETS BY MOUTH ONCE DAILY      folic acid (FOLVITE) 1 MG tablet Take 1 tablet by mouth once daily 30 tablet 3    rOPINIRole (REQUIP) 1 MG tablet Take 1 tablet by mouth nightly (Patient taking differently: Take 1 mg by mouth 3 times daily ) 30 tablet 3    Blood Pressure KIT 1 each by Does not apply route daily 1 kit 0    diazePAM (VALIUM) 5 MG tablet Take 5 mg by mouth every 6 hours as needed for Anxiety (Muscle spasms).  buPROPion (WELLBUTRIN XL) 300 MG extended release tablet Take 1 tablet by mouth daily 30 tablet 3    amLODIPine (NORVASC) 5 MG tablet Take 1 tablet by mouth daily 30 tablet 3    lamoTRIgine (LAMICTAL) 200 MG tablet Take 400 mg by mouth daily 2 tabs       No current facility-administered medications for this visit.      Allergies   Allergen Reactions    Penicillin G Itching    Pcn [Penicillins]     Sulfa throughout right upper /lower extremity, left upper extremity 2/5 , 3-4/5 proximallyshe has significant improvement in range of motion of the wrist fingers thumb and elbow although there is some mild tightness in the elbow flexors, range of motion left  shoulder 70 degrees of flexion, 60 degrees abduction, 30 degrees of extension and 0 degrees of adduction. ,  Left lower extremity noted to have internal rotation of foot and plantar flexionequina varus and hitchhikerthough this appears to have some improvement in him some improved range of motion though still with equina varus   Sensation:Intact to light touch and pinprick right upper and lower extremity, left side decreased sensation left face as well as left upper and lower extremity to light touch and pinprick   Balance: Intact   Deep tendon reflexes: Bilaterally equal and symmetric, negative Babinski, negativeHoffmann's   Provocative maneuvers:  EXTREMITIES: No calf tenderness, negative Homans, negative warmth, pulses are intact  SKIN: Skin is warm and dry. PSYCIATIRIC: normal mood and affect, behavior is normal. Thought content normal.  She ambulates with a spastic left hemiparesis gait pattern with left AFO. Left arm held to the side. Though with better extension and range        Assessment:       Diagnosis Orders   1. Bleeding disorder ([facility])  CBC   2. Essential hypertension     3. Platelet dysfunction ([facility])  CBC   4. Hypokalemia  Basic Metabolic Panel   5. Spastic hemiparesis ([facility])  CBC    Basic Metabolic Panel   6. Bipolar 1 disorder ([facility] [address]     7. Adhesive capsulitis of left shoulder          Plan:      1. Hemorrhagic CVA with left hemiparesis. She is making improvementsshe is now ambulating with a cane. No falls. .  There is some improvement in strength she continues a left ankle-foot orthoses, She continues with PT/OT.     2.  Spastic left hemiparesis, notes baclofen and Zanaflexincrease her symptoms, she does not use Valium however would like to minimize due to history of alcohol abuse,. Continue on Flexeril 5 mg 3 times daily she notes this is helping, LFTs are okay 1/6/2022, CMP, CBC okay 9/29/2021.  3.  Status post Botox for left upper and lower extremityshe has been cleared by her hematologist.  She tolerated the Botox injection well with no significant side effects. Botox for left upper extremity fingers/wrist/elbow flexion 150 units, and left lower extremity for equina varus, hitchhiker's toe150 units will complete on last visit. .  She notes significant improvement in the left upper extremity and some improvement of left lower extremity. She continues with ankle-foot orthoses. Still with some spasms in equinovarus in the left leg. She continues with physical and Occupational Therapy as well as ankle-foot arthroses and ambulate with a cane. She has had no falls. She is now continuing home exercises. She is now ambulating on a treadmill though with caution. Risks were discussed at length including fall, trauma, twisting ankle/fracture. She notes she is being very cautious and always has somebody with her as well as emergency shut off. Botox 1/12//22  Left upper extremity 4 fingers/wrist/elbow flexion  flexor carpi radialis 25 units, flexor carpi ulnaris 25 units, FDP 25 units, FDS 50 units and biceps 256 unitsfor total of 150 units. Left lower extremity 4 equinovarus/hitchhiker's toe  Tibias anterior 50 units, FDL/FHL 75 units, EHL 25 units for total of 150 units, tibias posterior not done due to risk of bleeding and patient request  .     3. Left hemiparesis with hemisensory deficits. Improved, no longer on Neurontin  4. Left frozen shouldercontinues  limited range of motion, she continues therapy and follow-up with orthopedics . 5. Frequent urination chronic for patient improvement  6. Right thumb hand pain DeQuervains tenosynovitis. Improved  7. Reviewed falls and precautions, currently no trauma noted  7. Follow-up with hematology,neurology as well as psychiatry  8. Follow-up approximately 2 to 3 months for repeat Botox. Then 3 months for repeat Botox. At that time will consider increasing dose for left lower extremity as well as left upper extremity  9. LFTs from January 2022 noted, CBC/BMP pending    25 minutes spent with patient       Return in about 3 months (around 5/15/2022), or if symptoms worsen or fail to improve, for Followup, Botox. Orders Placed This Encounter   Procedures    CBC     Standing Status:   Future     Standing Expiration Date:   2/15/2023    Basic Metabolic Panel     Standing Status:   Future     Standing Expiration Date:   2/15/2023     No orders of the defined types were placed in this encounter. Electronically signed by Anshu La 2/15/2022 at 2:48 PM    Please note that this chart was generated usingvoice recognition Dragon dictation software. Although every effort was made toensure the accuracy of this automated transcription, some errors in transcriptionmay have occurred.

## 2022-02-15 NOTE — LETTER
[facility] PHYSICAL MEDICINE & REHABILITATION  [address] [place] [address]  [place] [address]  Dept: [phone]  Dept Fax: [phone]    2/15/22    Patient: Barrett Lopez  YOB: 1970    Dear  Antoine Christianson DO ,    I had the pleasure of seeing your patient, Barrett Lopez today in the office today. Below are the relevant portions of my assessment and plan of care. IMPRESSION:   Diagnosis Orders   1. Bleeding disorder (HCC)  CBC   2. Essential hypertension     3. Platelet dysfunction (HCC)  CBC   4. Hypokalemia  Basic Metabolic Panel   5. Spastic hemiparesis (HCC)  CBC    Basic Metabolic Panel   6. Bipolar 1 disorder ([address]     7. Adhesive capsulitis of left shoulder       PLAN:  1. Hemorrhagic CVA with left hemiparesis. She is making improvementsshe is now ambulating with a cane. No falls. .  There is some improvement in strength she continues a left ankle-foot orthoses, She continues with PT/OT. 2.  Spastic left hemiparesis, notes baclofen and Zanaflexincrease her symptoms, she does not use Valium however would like to minimize due to history of alcohol abuse,. Continue on Flexeril 5 mg 3 times daily she notes this is helping, LFTs are okay 1/6/2022, CMP, CBC okay 9/29/2021.  3.  Status post Botox for left upper and lower extremityshe has been cleared by her hematologist.  She tolerated the Botox injection well with no significant side effects. Botox for left upper extremity fingers/wrist/elbow flexion 150 units, and left lower extremity for equina varus, hitchhiker's toe150 units will complete on last visit. .  She notes significant improvement in the left upper extremity and some improvement of left lower extremity. She continues with ankle-foot orthoses. Still with some spasms in equinovarus in the left leg. She continues with physical and Occupational Therapy as well as ankle-foot arthroses and ambulate with a cane.   She has had no falls. She is now improving with exercises. She is now ambulating on a treadmill though with caution. Risks were discussed at length including fall, trauma, twisting ankle/fracture. She notes she is being very cautious and always has somebody with her as well as emergency shut off. Botox 1/12//22  Left upper extremity 4 fingers/wrist/elbow flexion  flexor carpi radialis 25 units, flexor carpi ulnaris 25 units, FDP 25 units, FDS 50 units and biceps 256 unitsfor total of 150 units. Left lower extremity 4 equinovarus/hitchhiker's toe  Tibias anterior 50 units, FDL/FHL 75 units, EHL 25 units for total of 150 units, tibias posterior not done due to risk of bleeding and patient request  3. Left hemiparesis with hemisensory deficits. Improved, no longer on Neurontin  4. Left frozen shouldercontinues  limited range of motion, she continues therapy and follow-up with orthopedics . 5. Frequent urination chronic for patient improvement  6. Right thumb hand pain DeQuervains tenosynovitis. Improved  7. Reviewed falls and precautions, currently no trauma noted  7. Follow-up with hematology,neurology as well as psychiatry  8. Follow-up approximately 2 to 3 months for repeat Botox. At that time will consider increasing dose for left lower extremity as well as left upper extremity  9. LFTs from January 2022 noted, CBC/BMP pending    25 minutes spent with patient  No follow-ups on file. Orders Placed This Encounter   Procedures    CBC     Standing Status:   Future     Standing Expiration Date:   2/15/2023    Basic Metabolic Panel     Standing Status:   Future     Standing Expiration Date:   2/15/2023   No orders of the defined types were placed in this encounter. Thank you for allowing me to participate in the care of this patient. I will keep you updated on this patient's follow up and I look forward to serving you and your patients again in the future. Laney Lynch. Francisco Fernandez MD

## 2022-03-09 ENCOUNTER — TELEMEDICINE (OUTPATIENT)
Dept: NEUROLOGY | Age: 52
End: 2022-03-09
Payer: COMMERCIAL

## 2022-03-09 DIAGNOSIS — I62.9 INTRACRANIAL HEMORRHAGE (HCC): Primary | ICD-10-CM

## 2022-03-09 PROCEDURE — G8417 CALC BMI ABV UP PARAM F/U: HCPCS | Performed by: PSYCHIATRY & NEUROLOGY

## 2022-03-09 PROCEDURE — 99215 OFFICE O/P EST HI 40 MIN: CPT | Performed by: PSYCHIATRY & NEUROLOGY

## 2022-03-09 PROCEDURE — G8428 CUR MEDS NOT DOCUMENT: HCPCS | Performed by: PSYCHIATRY & NEUROLOGY

## 2022-03-09 PROCEDURE — G8484 FLU IMMUNIZE NO ADMIN: HCPCS | Performed by: PSYCHIATRY & NEUROLOGY

## 2022-03-09 PROCEDURE — 3017F COLORECTAL CA SCREEN DOC REV: CPT | Performed by: PSYCHIATRY & NEUROLOGY

## 2022-03-09 PROCEDURE — 1036F TOBACCO NON-USER: CPT | Performed by: PSYCHIATRY & NEUROLOGY

## 2022-03-15 LAB
BASOPHILS ABSOLUTE: NORMAL
BASOPHILS RELATIVE PERCENT: NORMAL
BUN BLDV-MCNC: NORMAL MG/DL
CALCIUM SERPL-MCNC: NORMAL MG/DL
CHLORIDE BLD-SCNC: NORMAL MMOL/L
CO2: NORMAL
CREAT SERPL-MCNC: NORMAL MG/DL
EOSINOPHILS ABSOLUTE: NORMAL
EOSINOPHILS RELATIVE PERCENT: NORMAL
GFR CALCULATED: NORMAL
GLUCOSE BLD-MCNC: NORMAL MG/DL
HCT VFR BLD CALC: NORMAL %
HEMOGLOBIN: NORMAL
LYMPHOCYTES ABSOLUTE: NORMAL
LYMPHOCYTES RELATIVE PERCENT: NORMAL
MCH RBC QN AUTO: NORMAL PG
MCHC RBC AUTO-ENTMCNC: NORMAL G/DL
MCV RBC AUTO: NORMAL FL
MONOCYTES ABSOLUTE: NORMAL
MONOCYTES RELATIVE PERCENT: NORMAL
NEUTROPHILS ABSOLUTE: NORMAL
NEUTROPHILS RELATIVE PERCENT: NORMAL
PLATELET # BLD: NORMAL 10*3/UL
PMV BLD AUTO: NORMAL FL
POTASSIUM SERPL-SCNC: NORMAL MMOL/L
RBC # BLD: NORMAL 10*6/UL
SODIUM BLD-SCNC: NORMAL MMOL/L
WBC # BLD: NORMAL 10*3/UL

## 2022-03-21 DIAGNOSIS — G81.10 SPASTIC HEMIPARESIS (HCC): ICD-10-CM

## 2022-03-21 DIAGNOSIS — D69.1 PLATELET DYSFUNCTION (HCC): ICD-10-CM

## 2022-03-21 DIAGNOSIS — D69.9 BLEEDING DISORDER (HCC): ICD-10-CM

## 2022-03-21 DIAGNOSIS — E87.6 HYPOKALEMIA: ICD-10-CM

## 2022-04-21 ENCOUNTER — OFFICE VISIT (OUTPATIENT)
Dept: PHYSICAL MEDICINE AND REHAB | Age: 52
End: 2022-04-21
Payer: COMMERCIAL

## 2022-04-21 VITALS
SYSTOLIC BLOOD PRESSURE: 118 MMHG | WEIGHT: 269.2 LBS | TEMPERATURE: 97.4 F | HEART RATE: 98 BPM | DIASTOLIC BLOOD PRESSURE: 76 MMHG | BODY MASS INDEX: 44.8 KG/M2

## 2022-04-21 DIAGNOSIS — D69.9 BLEEDING DISORDER (HCC): ICD-10-CM

## 2022-04-21 DIAGNOSIS — M75.02 ADHESIVE CAPSULITIS OF LEFT SHOULDER: ICD-10-CM

## 2022-04-21 DIAGNOSIS — I10 ESSENTIAL HYPERTENSION: ICD-10-CM

## 2022-04-21 DIAGNOSIS — D69.1 PLATELET DYSFUNCTION (HCC): ICD-10-CM

## 2022-04-21 DIAGNOSIS — G81.10 SPASTIC HEMIPARESIS (HCC): ICD-10-CM

## 2022-04-21 DIAGNOSIS — I69.398 SPASTICITY DUE TO OLD STROKE: Primary | ICD-10-CM

## 2022-04-21 DIAGNOSIS — F31.9 BIPOLAR 1 DISORDER (HCC): ICD-10-CM

## 2022-04-21 DIAGNOSIS — R25.2 SPASTICITY DUE TO OLD STROKE: Primary | ICD-10-CM

## 2022-04-21 PROCEDURE — 3017F COLORECTAL CA SCREEN DOC REV: CPT | Performed by: PHYSICAL MEDICINE & REHABILITATION

## 2022-04-21 PROCEDURE — 1036F TOBACCO NON-USER: CPT | Performed by: PHYSICAL MEDICINE & REHABILITATION

## 2022-04-21 PROCEDURE — G8417 CALC BMI ABV UP PARAM F/U: HCPCS | Performed by: PHYSICAL MEDICINE & REHABILITATION

## 2022-04-21 PROCEDURE — 64645 CHEMODENERV 1 EXTREM 5/> EA: CPT | Performed by: PHYSICAL MEDICINE & REHABILITATION

## 2022-04-21 PROCEDURE — G8427 DOCREV CUR MEDS BY ELIG CLIN: HCPCS | Performed by: PHYSICAL MEDICINE & REHABILITATION

## 2022-04-21 PROCEDURE — 99215 OFFICE O/P EST HI 40 MIN: CPT | Performed by: PHYSICAL MEDICINE & REHABILITATION

## 2022-04-21 PROCEDURE — 95874 GUIDE NERV DESTR NEEDLE EMG: CPT | Performed by: PHYSICAL MEDICINE & REHABILITATION

## 2022-04-21 PROCEDURE — 64644 CHEMODENERV 1 EXTREM 5/> MUS: CPT | Performed by: PHYSICAL MEDICINE & REHABILITATION

## 2022-04-21 NOTE — PROGRESS NOTES
[facility] PHYSICIANS  [facility] PHYSICAL MEDICINE & REHABILITATION  [address] [place] [address]  Dept: [phone]  Dept Fax: [phone]    Outpatient Followup Note     Ellen Foster, 46 y.o., female, presents for follow up c/o of   Chief Complaint   Patient presents with    Follow-up     botox injections   . Patient was lastseen on 3/30/2021    HPI:     HPI     She comes in today with her sister. Has been cleared by hematology for Botox injections. 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 She continues with spasticity left upper and left lower extremity. She notes Flexeril is helping. She comes in today for Botox injection left upper and  left lower extremity. Last Botox injection in in January 12, 2022. S at that time mild decrease in the Botoxleft upper extremity in order accommodate the left lower extremity. She noted continued improvement in left upper extremity and elbow flexion wrist flexion etc.  She was pleased with the progress. On left lower extremity there is some improvement though she felt as if she was walking on the outside of her foot at times. Continue with the brace. She said no falls. She comes in today due to significant increase spasticity left upper and left lower extremity. She would like proceed with Botox. She is aware of the risks and benefits which were discussed at length with her again today. She continues to uses a cane for long distances otherwise no assistive device for short distances at home. She continues to left ankle for orthoses. Brace adjusted by OPC . She continues with spasms left upper and lower extremity, Zanaflex and baclofen actually increased her symptoms, she is doing well with Flexeril and Requip. She continues with occasional leakage from the bladder, possible stress incontinence. She has not follow-up with urology. Her left shoulder, frozen shoulder, has improved.   She continues to follow-up with orthopedics. She continues with physical therapy. She states she had an MRI which showed biceps tendinitis. .  She is seeing Dr. David Pugh in [facility]     She continues to follow with hematology oncology and psychiatrist for bipolar disorder. She is no longer on Keppra        History     Alecia Donaldson  is 48 y. o. right-handed     female admitted to the [address] [place] [address] unit on 2020.  She was originally admitted to [address] [place] [address] Road 2020 for L sided weakness and headache.  She presented originally to [facility] ED. CT confirmed R parietal ICH and moderate SAH. She was treated initially with cardene infusion for hypertension and loaded with Keppra. She was transferred to [facility] for further neurologic care. She was given DDAVP and 1 pack of platelets. ICH score 2. NIHSS 15. Neurosurgery evaluated - managed medically. 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         Inpatient Rehabilitation Course:   Babatunde Alan was admitted to inpatient rehabilitation on 2020.     Rehab course:  Progress well from rehabilitation standpoint. Family training with . Very supportive. Obtained an AFO for left lower extremity and a left resting hand splint for left upper extremity. Spasticity medications adjusted. Blood pressure medications adjusted. Added Requip for restless leg syndrome. On melatonin at nighttime and gabapentin for sleep.       Past Medical History:   Diagnosis Date    Asthma     Bipolar 1 disorder ([facility]     Delta storage pool disease ([facility]     Hypertension     Psychiatric problem       Past Surgical History:   Procedure Laterality Date    CARDIAC CATHETERIZATION       SECTION  0986,5745    Miscarriage     CHOLECYSTECTOMY      COLONOSCOPY N/A 2020    -random bx(normal)hemorrhoids    GASTRIC BYPASS SURGERY      HYSTERECTOMY  2010    KNEE SURGERY [facility]    TONSILLECTOMY AND ADENOIDECTOMY  1988    UPPER GASTROINTESTINAL ENDOSCOPY N/A 02/03/2020    -bx(normal,neg H-Pylori)normal post-bypass endoscopy          Family History   Adopted: Yes   Family history unknown: Yes          Social History     Tobacco Use    Smoking status: Never Smoker    Smokeless tobacco: Never Used   Substance Use Topics    Alcohol use: Not Currently     Comment: no drinking;  is a recovering alcoholic           Current Outpatient Medications   Medication Sig Dispense Refill    butalbital-acetaminophen-caffeine (FIORICET, ESGIC) -40 MG per tablet Take 1 tablet by mouth every 12 hours 12 tablet 0    lisinopril (PRINIVIL;ZESTRIL) 40 MG tablet       hydroCHLOROthiazide (HYDRODIURIL) 12.5 MG tablet TAKE 1 TABLET BY MOUTH ONCE DAILY      sertraline (ZOLOFT) 100 MG tablet TAKE 1 & 1 2 (ONE & ONE HALF) TABLETS BY MOUTH ONCE DAILY      folic acid (FOLVITE) 1 MG tablet Take 1 tablet by mouth once daily 30 tablet 3    rOPINIRole (REQUIP) 1 MG tablet Take 1 tablet by mouth nightly (Patient taking differently: Take 1 mg by mouth 3 times daily ) 30 tablet 3    Blood Pressure KIT 1 each by Does not apply route daily 1 kit 0    diazePAM (VALIUM) 5 MG tablet Take 5 mg by mouth every 6 hours as needed for Anxiety (Muscle spasms).  buPROPion (WELLBUTRIN XL) 300 MG extended release tablet Take 1 tablet by mouth daily 30 tablet 3    amLODIPine (NORVASC) 5 MG tablet Take 1 tablet by mouth daily 30 tablet 3    lamoTRIgine (LAMICTAL) 200 MG tablet Take 400 mg by mouth daily 2 tabs       No current facility-administered medications for this visit. Allergies   Allergen Reactions    Penicillin G Itching    Pcn [Penicillins]     Sulfa Antibiotics Other (See Comments)     Inflammation in the eye         Subjective:     Review of Systems  CONSTITUTIONAL: Negative for fever, chills, sweat, fatigue and unexpected weightchange.    HEENT:  Negativefor diplopia, blind spots, blurred vision, hearing loss, trouble chewing or swallowing,denies coughing while eating or drinking denies nosebleed    RESPIRATORY: Negative for wheezing, coughing or shortness of breath    CARDIOVASCULAR: Negative for chest pain,palpitations, lightheadedness  GASTROINTESTINAL: Negative for heartburn, nausea,constipation, diarrhea, abdominal pain  or incontinence  GENTIOURNIARY: Negative for dysuria, urgency,frequency, incontinence and difficulty urinating. ENDOCRINE: Negative for hot or cold intolerance, deniesany significant weight change  MUSCULOSKELETAL: Negative for focal joint pain,back pain, neck pain and arthralgias. NUEROLOGIIC: Negative for focal weakness, numbness,tingling, balance loss, headaches or falls  BEHAVIOR/PSYCY: Denies depression,anxiety, memory loss or insomnia  SKIN: Denies ulcers, rashes or bruises         Objective:     Physical Exam   /76   Pulse 98   Temp 97.4 °F (36.3 °C)   Wt 269 lb 3.2 oz (122.1 kg)   BMI 44.80 kg/m²     Nursing notes and vitals reviewed    CONSTITUTIONAL: She is oriented to person, place, and time. She appears well-developed and well-nourished.   HEENT: Pupils equal reactiveto light, exact motion intact, visual fields are full, no facial asymmetry, speech fluent, no dysarthria, comprehension intact, object naming intact, repetition intact,basic cognition intact  CARDIOVASCULAR:Heart regular rate and rhythm with no murmurs rubs or gallops   PULMONARY/CHEST: Clear to auscultation with no wheezes or crackles noted  ABDOMEN: Soft, nontender with positive bowel sounds, no guarding or rebound   NEUROLOGIC:    Orientation: Alert and oriented×3,    cranial nerves:  II through XII are intact,   Strength:5 out of 5 throughout right upper lower extremity, left upper extremity 2/5 left upper extremity, able to range fingers, thumb, wrist and elbow though noted tightness in elbow flexors and wrist flexors and finger flexorsstrength approximately 2/5 proximally  Range of motion left upper extremities shoulder 70 degrees of flexion, 60 degrees abduction, 30 degrees of extension and 0 degrees of adduction. ,  Left lower extremity noted to have internal rotation of foot and plantar flexionequina varus, exam limited due to significant spasticity   Sensation:Intact to light touch and pinprick right upper and lower extremity, left side decreased sensation left face as well as left upper and lower extremity to light touch and pinprick   Balance: Intact   Deep tendon reflexes: Bilaterally equal and symmetric 2+ right,   Provocative maneuvers:  EXTREMITIES: No calf tenderness,, negative warmth, pulses are intact  SKIN: Skin is warm and dry. PSYCIATIRIC: normal mood and affect, behavior is normal. Thought content normal.  She ambulates with a spastic left hemiparesis gait pattern with left AFO. Left arm held to the side. Assessment:       Diagnosis Orders   1. Spasticity due to old stroke  IN NEEDLE EMG GUIDANCE FOR CHEMODENERVATION    IN CHEMODENERVATION 1 EXTREMITY 5 OR MORE MUSCLES    IN CHEMODENERVATION 1 EXTREMITY EA ADDL 5/> MUSCLES    onabotulinumtoxin A (BOTOX) injection 300 Units   2. Spastic hemiparesis ([address]     3. Bleeding disorder ([address]     4. Platelet dysfunction (HCC)     5. Essential hypertension     6. Bipolar 1 disorder ([address]     7. Adhesive capsulitis of left shoulder          Plan:      1. Hemorrhagic CVA with left hemiparesis. She is making improvementsshe is now ambulating with a cane. No falls. .  she continues a left ankle-foot orthoses, She continues with PT/OT. We will continue. If needed reorder they will notify  2.  Spastic left hemiparesisnotes increased spasms, notes baclofen and Zanaflexincrease her symptoms, she does not use Valium however would like to minimize due to history of alcohol abuse,. We discussed Dantriumhowever she is concerned of using this medication due to risks with the liver. .. Flexeril 5 mg 3 times daily she notes this is helping, LFTs are okay 1/6/2022, CMP, CBC okay 9/29/2021, CBC/BMP okay 3/15, will check ALT and AST on next visit in 2 weeks  3. Discussed Botox for left upper and lower extremityshe has been cleared by her hematologist.  Risks and benefits of Botox were again discussed including but not limited to infection, bleeding, bruising, weakness, flulike symptoms, respiratory difficulties, etc.  Also reviewed effects of Botox may vary. She is agreeable and has signed consent. 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 Today  Botox for left upper extremity fingers/wrist/elbow flexion 150 units, and left lower extremity for equina varus, 150 units. Discussed risks and benefits we discussed risk of bleeding particularly with the tibialis posterior muscleshe would like to avoid that muscle at this time. Also discussed risk of increased weakness in the left lower extremity which could affect ability for transfers and ambulation. 300 units of Botox a were reconstituted using preservative free sterile normal saline. Patient site was prepped and sterilized using Betadine and alcohol. Botox A was administered under sterile techniques using EMG guidance for elbow flexion, wrist flexion and finger flexion and equina varus  300 units today to make sure she tolerates and will adjust the future. Botox injected with back pressure being applied.   She tolerated procedure well no bleeding or bruising noted     Left upper extremity for fingers/wrist/elbow flexiontotal 150 unit  flexor carpi radialis 25 units,   flexor carpi ulnaris 25 units,   FDP 25 units,   FDS 25 units and   biceps 50 units    Left lower extremity for equinovarustotal 150 unit  Tibias anterior 50 units,   FDL 25 units,   Medial head gastroc/lateral gastroc/soleus75 units    tstibias posterior not done due to risk of bleeding and patient request    Patient evaluated 20 minutes post procedureno bleeding or bruising noted  ,   She will continue with physical and Occupational physical therapy. And follow-up in 2 weeks as well  notify if there is any questions or concerns. 3.  Left hemiparesis with hemisensory deficits. Improved, no longer on Neurontin  4. Left frozen shouldercontinues  limited range of motion, she continues therapy and follow-up with orthopedics . 5. Frequent urination chronic for patient improvement  6. Right thumb hand pain DeQuervains tenosynovitis. Improved  7. Reviewed falls and precautions, currently no trauma noted  7. Follow-up with hematology,neurology as well as psychiatry  8. Follow-up approximately 2 weeks for evaluation post Botox. Then 3 months for repeat Botox. At that time will consider increasing dose for left lower extremity as well as left upper extremityif well-tolerated this time  9. CBC/CMP from 9/29/2021 noted CBC/BMP noted from March, will check ALT/AST on next visit in 2 weeks she will follow with PCP/hematology for mild anemia    45 minutes spent with patientprocedures/counseling/etc.       Return in about 2 weeks (around 5/5/2022) for Followup, Botox. Orders Placed This Encounter   Procedures    AR NEEDLE EMG GUIDANCE FOR CHEMODENERVATION    AR CHEMODENERVATION 1 EXTREMITY 5 OR MORE MUSCLES    AR CHEMODENERVATION 1 EXTREMITY EA ADDL 5/> MUSCLES     Orders Placed This Encounter   Medications    onabotulinumtoxin A (BOTOX) injection 300 Units            Electronically signed by Marco Morrell. Rolan Ortiz 4/21/2022 at 5:50 PM    Please note that this chart was generated usingvoice recognition Dragon dictation software. Although every effort was made toensure the accuracy of this automated transcription, some errors in transcriptionmay have occurred.

## 2022-04-21 NOTE — LETTER
[facility]  [place] [address]  Dept: [phone]  Dept Fax: [phone]      4/21/22    Patient: Will Davis  YOB: 1970    Dear  April Gayle,  ,    I had the pleasure of seeing your patient, Will Davis today in the office today. Below are the relevant portions of my assessment and plan of care. IMPRESSION:   Diagnosis Orders   1. Spasticity due to old stroke  LA NEEDLE EMG GUIDANCE FOR CHEMODENERVATION    LA CHEMODENERVATION 1 EXTREMITY 5 OR MORE MUSCLES    LA CHEMODENERVATION 1 EXTREMITY EA ADDL 5/> MUSCLES    onabotulinumtoxin A (BOTOX) injection 300 Units     PLAN:  1. Hemorrhagic CVA with left hemiparesis. She is making improvementsshe is now ambulating with a cane. No falls. .  she continues a left ankle-foot orthoses, She continues with PT/OT. We will continue. If needed reorder they will notify  2.  Spastic left hemiparesisnotes increased spasms, notes baclofen and Zanaflexincrease her symptoms, she does not use Valium however would like to minimize due to history of alcohol abuse,. We discussed Dantriumhowever she is concerned of using this medication due to risks with the liver. .. Flexeril 5 mg 3 times daily she notes this is helping, LFTs are okay 1/6/2022, CMP, CBC okay 9/29/2021, CBC/BMP okay 3/15, will check ALT and AST on next visit in 2 weeks  3. Discussed Botox for left upper and lower extremityshe has been cleared by her hematologist.  Risks and benefits of Botox were again discussed including but not limited to infection, bleeding, bruising, weakness, flulike symptoms, respiratory difficulties, etc.  Also reviewed effects of Botox may vary. She is agreeable and has signed consent. 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   Today  Botox for left upper extremity fingers/wrist/elbow flexion 150 units, and left lower extremity for equina varus, 150 units. Discussed risks and benefits we discussed risk of bleeding particularly with the tibialis posterior muscleshe would like to avoid that muscle at this time. Also discussed risk of increased weakness in the left lower extremity which could affect ability for transfers and ambulation. 300 units of Botox a were reconstituted using preservative free sterile normal saline. Patient site was prepped and sterilized using Betadine and alcohol. Botox A was administered under sterile techniques using EMG guidance for elbow flexion, wrist flexion and finger flexion and equina varus  300 units today to make sure she tolerates and will adjust the future. Botox injected with back pressure being applied. She tolerated procedure well no bleeding or bruising noted     Left upper extremity for fingers/wrist/elbow flexiontotal 150 unit  flexor carpi radialis 25 units,   flexor carpi ulnaris 25 units,   FDP 25 units,   FDS 25 units and   biceps 50 units    Left lower extremity for equinovarustotal 150 unit  Tibias anterior 50 units,   FDL 25 units,   Medial head gastroc/lateral gastroc/soleus75 units    tstibias posterior not done due to risk of bleeding and patient request    Patient evaluated 20 minutes post procedureno bleeding or bruising noted  She will continue with physical and Occupational physical therapy. And follow-up in 2 weeks as well  notify if there is any questions or concerns. 3.  Left hemiparesis with hemisensory deficits. Improved, no longer on Neurontin  4. Left frozen shouldercontinues  limited range of motion, she continues therapy and follow-up with orthopedics . 5. Frequent urination chronic for patient improvement  6. Right thumb hand pain DeQuervains tenosynovitis. Improved  7. Reviewed falls and precautions, currently no trauma noted  7. Follow-up with hematology,neurology as well as psychiatry  8.   Follow-up approximately 2 weeks for evaluation post Botox. Then 3 months for repeat Botox. At that time will consider increasing dose for left lower extremity as well as left upper extremityif well-tolerated this time  9. CBC/CMP from 9/29/2021 noted CBC/BMP noted from March, will check ALT/AST on next visit in 2 weeks she will follow with PCP/hematology for mild anemia    45 minutes spent with patientprocedures/counseling/etc.     No follow-ups on file. Orders Placed This Encounter   Procedures    MD NEEDLE EMG GUIDANCE FOR CHEMODENERVATION    MD CHEMODENERVATION 1 EXTREMITY 5 OR MORE MUSCLES    MD CHEMODENERVATION 1 EXTREMITY EA ADDL 5/> MUSCLES     Orders Placed This Encounter   Medications    onabotulinumtoxin A (BOTOX) injection 300 Units     Thank you for allowing me to participate in the care of this patient. I will keep you updated on this patient's follow up and I look forward to serving you and your patients again in the future. Marco Morrell. Danette Sotelo MD

## 2022-04-29 ENCOUNTER — TELEPHONE (OUTPATIENT)
Dept: PHYSICAL MEDICINE AND REHAB | Age: 52
End: 2022-04-29

## 2022-04-29 NOTE — TELEPHONE ENCOUNTER
Yes, please schedule to f/u, need to eval progress / strength, so as to make adjustments for next Botox.  Thanks

## 2022-04-29 NOTE — TELEPHONE ENCOUNTER
Pt had botox on 04.21.22. She called back today to state that it has started to work and that therapy is a little easier. She is walking better but hand is still a little stiff. I told her that it still might loosen up some it has only been a week. She has another botox appt scheduled for 07.25.22.

## 2022-05-11 RX ORDER — BUTALBITAL, ACETAMINOPHEN AND CAFFEINE 50; 325; 40 MG/1; MG/1; MG/1
1 TABLET ORAL EVERY 12 HOURS
Qty: 12 TABLET | Refills: 0 | OUTPATIENT
Start: 2022-05-11

## 2022-05-11 NOTE — TELEPHONE ENCOUNTER
Patient calling to requesting refill for fioricet. Please review and e-scribe if applicable.      Last Visit Date: 3.4.2021    Next Visit Date:  Future Appointments   Date Time Provider Ruben Walli   5/17/2022  4:30 PM Quinn [facility]   7/25/2022 11:00 AM Quinn Griffin MD [id number]

## 2022-05-17 ENCOUNTER — OFFICE VISIT (OUTPATIENT)
Dept: PHYSICAL MEDICINE AND REHAB | Age: 52
End: 2022-05-17
Payer: COMMERCIAL

## 2022-05-17 VITALS
WEIGHT: 269 LBS | BODY MASS INDEX: 44.76 KG/M2 | DIASTOLIC BLOOD PRESSURE: 81 MMHG | SYSTOLIC BLOOD PRESSURE: 134 MMHG | HEART RATE: 94 BPM | TEMPERATURE: 97.2 F

## 2022-05-17 DIAGNOSIS — D69.9 BLEEDING DISORDER (HCC): ICD-10-CM

## 2022-05-17 DIAGNOSIS — I69.398 SPASTICITY DUE TO OLD STROKE: Primary | ICD-10-CM

## 2022-05-17 DIAGNOSIS — D69.1 PLATELET DYSFUNCTION (HCC): ICD-10-CM

## 2022-05-17 DIAGNOSIS — F31.9 BIPOLAR 1 DISORDER (HCC): ICD-10-CM

## 2022-05-17 DIAGNOSIS — M75.02 ADHESIVE CAPSULITIS OF LEFT SHOULDER: ICD-10-CM

## 2022-05-17 DIAGNOSIS — G81.10 SPASTIC HEMIPARESIS (HCC): ICD-10-CM

## 2022-05-17 DIAGNOSIS — R25.2 SPASTICITY DUE TO OLD STROKE: Primary | ICD-10-CM

## 2022-05-17 DIAGNOSIS — I10 ESSENTIAL HYPERTENSION: ICD-10-CM

## 2022-05-17 PROCEDURE — G8427 DOCREV CUR MEDS BY ELIG CLIN: HCPCS | Performed by: PHYSICAL MEDICINE & REHABILITATION

## 2022-05-17 PROCEDURE — 99213 OFFICE O/P EST LOW 20 MIN: CPT | Performed by: PHYSICAL MEDICINE & REHABILITATION

## 2022-05-17 PROCEDURE — 3017F COLORECTAL CA SCREEN DOC REV: CPT | Performed by: PHYSICAL MEDICINE & REHABILITATION

## 2022-05-17 PROCEDURE — 1036F TOBACCO NON-USER: CPT | Performed by: PHYSICAL MEDICINE & REHABILITATION

## 2022-05-17 PROCEDURE — G8417 CALC BMI ABV UP PARAM F/U: HCPCS | Performed by: PHYSICAL MEDICINE & REHABILITATION

## 2022-05-17 RX ORDER — CYCLOBENZAPRINE HCL 5 MG
5 TABLET ORAL 3 TIMES DAILY PRN
Qty: 90 TABLET | Refills: 0 | Status: SHIPPED | OUTPATIENT
Start: 2022-05-17 | End: 2022-07-26 | Stop reason: SDUPTHER

## 2022-05-17 RX ORDER — CYCLOBENZAPRINE HCL 5 MG
TABLET ORAL
COMMUNITY
Start: 2022-05-15 | End: 2022-05-17 | Stop reason: SDUPTHER

## 2022-05-17 RX ORDER — BUTALBITAL, ACETAMINOPHEN AND CAFFEINE 50; 325; 40 MG/1; MG/1; MG/1
1 TABLET ORAL DAILY PRN
Qty: 6 TABLET | Refills: 0 | Status: SHIPPED | OUTPATIENT
Start: 2022-05-17

## 2022-05-17 RX ORDER — BUTALBITAL, ACETAMINOPHEN AND CAFFEINE 50; 325; 40 MG/1; MG/1; MG/1
1 TABLET ORAL EVERY 12 HOURS
Qty: 12 TABLET | Refills: 0 | OUTPATIENT
Start: 2022-05-17

## 2022-05-17 NOTE — PROGRESS NOTES
[facility] PHYSICIANS  [facility] PHYSICAL MEDICINE & REHABILITATION  [address] [place] [address]  [place] [address]  Dept: [phone]  Dept Fax: [phone]    Outpatient Followup Note     Danyel Washburn, 46 y.o., female, presents for follow up c/o of   No chief complaint on file. .    Patient was lastseen on 1/12/2022 for Botox injection, televisit 1/25/2022    HPI:     HPI     She comes in with her sister today. .  Again discussed with patient. She notes no side effects after previous Botox injection, no sedation, bleeding  or fevers or chills. She does note some mild increased bruising in left arm however this resolved over few days. She felt well. Since last visit she has had no falls. She denies any difficulty with bowels or bladder. She is continues with physical and occupational therapy 3 times a week. She continues with Flexeril which is helping. LFTs in January were within normal limits. CBC/BMP from March was noted. She ambulates without assistive device at home and in therapy. However uses a single-point cane when going outside and uneven terrain. From last Botox injection she notes left lower extremity improved with less inversion and cramping though still occasional cramping. She does feel it is improved from previous Botox injection. She is no longer walking on the side of her foot. She continues with physical therapy for range of motion and continues with use of ankle-foot orthoses. Left upper extremity she noted Botox has helped the fine motor skills however she has difficulty opening her hand at times. She also notes some shoulder discomfort and tightness and difficulty supinating. She continues with occupational therapy for continued range of motion    He also follows with orthopedics has had injection of her right hand for de Quervain's tenosynovitis, also has a trigger finger. She continues to get occasional headache was getting Fioricet by neurology.   However has not seen her for a year-they have asked me to give Fioricet for now. History  Has been cleared by hematology for Botox injections. 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 She notes Flexeril is helping. Last Botox injection in October of the left upper extremity was done-she tolerated well and noted significant improvements approximately 80%. .  She was pleased with her progress     She continues to uses a cane for long distances otherwise no assistive device for short distances at home. She continues to left ankle for orthoses. Brace adjusted by OPC . Her left shoulder, frozen shoulder, has improved. She continues to follow-up with orthopedics. She continues with physical therapy. She states she had an MRI which showed biceps tendinitis. .  She is seeing Dr. Maite Landon in [facility]     She continues to follow with hematology oncology and psychiatrist for bipolar disorder. She is no longer on Keppra        History     Alecia Dnoaldson  is 48 y. o. right-handed     female admitted to the [address] [place] [address] unit on 12/9/2020.  She was originally admitted to [address] [place] [address] 11/30/2020 for L sided weakness and headache.  She presented originally to [facility] ED. CT confirmed R parietal ICH and moderate SAH. She was treated initially with cardene infusion for hypertension and loaded with Keppra. She was transferred to [facility] for further neurologic care. She was given DDAVP and 1 pack of platelets. ICH score 2. NIHSS 15. Neurosurgery evaluated - managed medically. 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         Inpatient Rehabilitation Course:   Mary Ann Gale was admitted to inpatient rehabilitation on 12/9/2020.     Rehab course:  Progress well from rehabilitation standpoint. Family training with . Very supportive.   Obtained an AFO for left lower extremity and a left resting hand splint for left upper extremity. Spasticity medications adjusted. Blood pressure medications adjusted. Added Requip for restless leg syndrome. On melatonin at nighttime and gabapentin for sleep.       Past Medical History:   Diagnosis Date    Asthma     Bipolar 1 disorder ([facility]     Delta storage pool disease ([facility]     Hypertension     Psychiatric problem       Past Surgical History:   Procedure Laterality Date    CARDIAC CATHETERIZATION       SECTION  9094,6196    Miscarriage 2000    CHOLECYSTECTOMY      COLONOSCOPY N/A 2020    -random bx(normal)hemorrhoids    GASTRIC BYPASS SURGERY      HYSTERECTOMY      KNEE SURGERY      LAPAROSCOPY      TONSILLECTOMY AND ADENOIDECTOMY      UPPER GASTROINTESTINAL ENDOSCOPY N/A 2020    bx(normal,neg H-Pylori)normal post-bypass endoscopy          Family History   Adopted: Yes   Family history unknown: Yes          Social History     Tobacco Use    Smoking status: Never Smoker    Smokeless tobacco: Never Used   Substance Use Topics    Alcohol use: Not Currently     Comment: no drinking;  is a recovering alcoholic           Current Outpatient Medications   Medication Sig Dispense Refill    cyclobenzaprine (FLEXERIL) 5 MG tablet Take 1 tablet by mouth 3 times daily as needed for Muscle spasms 90 tablet 0    butalbital-acetaminophen-caffeine (FIORICET, ESGIC) -40 MG per tablet Take 1 tablet by mouth daily as needed for Headaches 6 tablet 0    butalbital-acetaminophen-caffeine (FIORICET, ESGIC) -40 MG per tablet Take 1 tablet by mouth every 12 hours 12 tablet 0    lisinopril (PRINIVIL;ZESTRIL) 40 MG tablet       hydroCHLOROthiazide (HYDRODIURIL) 12.5 MG tablet TAKE 1 TABLET BY MOUTH ONCE DAILY      sertraline (ZOLOFT) 100 MG tablet TAKE 1 & 1 2 (ONE & ONE HALF) TABLETS BY MOUTH ONCE DAILY      folic acid (FOLVITE) 1 MG tablet Take 1 tablet by mouth once daily 30 tablet 3    rOPINIRole (REQUIP) 1 MG tablet Take 1 tablet by mouth nightly (Patient taking differently: Take 1 mg by mouth 3 times daily ) 30 tablet 3    Blood Pressure KIT 1 each by Does not apply route daily 1 kit 0    diazePAM (VALIUM) 5 MG tablet Take 5 mg by mouth every 6 hours as needed for Anxiety (Muscle spasms).  buPROPion (WELLBUTRIN XL) 300 MG extended release tablet Take 1 tablet by mouth daily 30 tablet 3    amLODIPine (NORVASC) 5 MG tablet Take 1 tablet by mouth daily 30 tablet 3    lamoTRIgine (LAMICTAL) 200 MG tablet Take 400 mg by mouth daily 2 tabs       No current facility-administered medications for this visit. Allergies   Allergen Reactions    Penicillin G Itching    Pcn [Penicillins]     Sulfa Antibiotics Other (See Comments)     Inflammation in the eye         Subjective:     Review of Systems  CONSTITUTIONAL: Negative for fever, chills, sweat, fatigue and unexpected weightchange. HEENT:  Negativefor diplopia, blind spots, blurred vision, hearing loss, trouble chewing or swallowing,denies coughing while eating or drinking denies nosebleed    RESPIRATORY: Negative for wheezing, coughing or shortness of breath    CARDIOVASCULAR: Negative for chest pain,palpitations, lightheadedness  GASTROINTESTINAL: Negative for heartburn, nausea,constipation, diarrhea, abdominal pain  or incontinence  GENTIOURNIARY: Negative for dysuria, urgency,frequency, incontinence and difficulty urinating. ENDOCRINE: Negative for hot or cold intolerance, deniesany significant weight change  MUSCULOSKELETAL: Negative for focal joint pain,back pain, neck pain and arthralgias.    NUEROLOGIIC: Negative for focal weakness, numbness,tingling, balance loss, headaches or falls  BEHAVIOR/PSYCY: Denies depression,anxiety, memory loss or insomnia  SKIN: Denies ulcers, rashes or bruises         Objective:     Physical Exam   /81   Pulse 94   Temp 97.2 °F (36.2 °C)   Wt 269 lb (122 kg)   BMI 44.76 kg/m²     Nursing notes and vitals reviewed    CONSTITUTIONAL: She is oriented to person, place, and time. She appears well-developed and well-nourished. HEENT: Pupils equal reactiveto light, exact motion intact, visual fields are full, no facial asymmetry, speech fluent, no dysarthria, comprehension intact, object naming intact, repetition intact,basic cognition intact  CARDIOVASCULAR:Heart regular rate and rhythm with no murmurs rubs or gallops   PULMONARY/CHEST: Clear to auscultation with no wheezes or crackles noted  ABDOMEN: Soft, nontender with positive bowel sounds, no guarding or rebound   NEUROLOGIC:    Orientation: Alert and oriented×3,    cranial nerves:  II through XII are intact,   Strength:5 out of 5 throughout right upper /lower extremity, left upper extremity 2/5 , 3-4/5 proximally    Right upper extremity-able to easily flex elbow when actively done however passively no significant tightness, there is some limitation in supination I am able to range fingers easily however she has difficulty opening on her own. Right lower extremity there is less inversion still some tightness but improved from previous    -, range of motion left  shoulder 70 degrees of flexion, 60 degrees abduction, 30 degrees of extension and 0 degrees of adduction. ,    Sensation:Intact to light touch and pinprick right upper and lower extremity, left side decreased sensation left face as well as left upper and lower extremity to light touch and pinprick   Balance: Intact   Deep tendon reflexes: Bilaterally equal and symmetric, negative Babinski, negativeHoffmann's   Provocative maneuvers:  EXTREMITIES: No calf tenderness, negative Homans, negative warmth, pulses are intact  SKIN: Skin is warm and dry. PSYCIATIRIC: normal mood and affect, behavior is normal. Thought content normal.  She ambulates with a spastic left hemiparesis gait pattern with left AFO. Left arm held to the side.   Though with better extension and range        Assessment:       Diagnosis Orders   1. Spasticity due to old stroke  Hepatic Function Panel   2. Spastic hemiparesis ([facility]     3. Bleeding disorder ([facility]     4. Platelet dysfunction (HCC)     5. Essential hypertension     6. Bipolar 1 disorder ([facility]     7. Adhesive capsulitis of left shoulder          Plan:      1. Hemorrhagic CVA with left hemiparesis. She is making improvements-she is now ambulating with a cane. No falls. .  There is some improvement in strength she continues a left ankle-foot orthoses, She continues with PT/OT. 2.  Spastic left hemiparesis-, notes baclofen and Zanaflex-increase her symptoms, she does not use Valium however would like to minimize due to history of alcohol abuse,. Continue on Flexeril 5 mg 3 times daily- she notes this is helping, LFTs are okay 1/6/2022, CBC/BMP from March reviewed, has mild anemia follow-up with hematology. Recheck LFT still continues to Flexeril  3. Status post Botox for left upper and lower extremity-she has been cleared by her hematologist.  She tolerated the Botox injection well with no significant side effects. .  She continues with physical and Occupational Therapy as well as ankle-foot arthroses and ambulate with a cane. She has had no falls. She is now continuing home exercises. Risks were discussed at length including fall, trauma, twisting ankle/fracture. She notes she is being very cautious and always has somebody with her as well as emergency shut off. Botox 4/21/2022  Left upper extremity for fingers/wrist/elbow flexion-total 150 unit  flexor carpi radialis 25 units,   flexor carpi ulnaris 25 units,   FDP 25 units,   FDS 25 units and   biceps 50 units     Left lower extremity for equinovarus-total 150 unit  Tibias anterior 50 units,   FDL 25 units,   Medial head gastroc/lateral gastroc/soleus-75 units     tstibias posterior not done due to risk of bleeding and patient request     3.   Left hemiparesis with hemisensory deficits-.  Improved, no longer on Electronically signed by Tin Townsend 5/17/2022 at 5:53 PM    Please note that this chart was generated usingvoice recognition Dragon dictation software. Although every effort was made toensure the accuracy of this automated transcription, some errors in transcriptionmay have occurred.

## 2022-05-18 RX ORDER — BUTALBITAL, ACETAMINOPHEN AND CAFFEINE 50; 325; 40 MG/1; MG/1; MG/1
1 TABLET ORAL EVERY 12 HOURS
Qty: 12 TABLET | Refills: 0 | OUTPATIENT
Start: 2022-05-18

## 2022-05-23 ENCOUNTER — TELEMEDICINE (OUTPATIENT)
Dept: NEUROLOGY | Age: 52
End: 2022-05-23

## 2022-05-23 DIAGNOSIS — M62.838 NECK MUSCLE SPASM: Primary | ICD-10-CM

## 2022-05-23 DIAGNOSIS — H53.8 BLURRING OF VISUAL IMAGE OF BOTH EYES: ICD-10-CM

## 2022-05-23 RX ORDER — AMITRIPTYLINE HYDROCHLORIDE 25 MG/1
25 TABLET, FILM COATED ORAL NIGHTLY
Qty: 30 TABLET | Refills: 0 | Status: SHIPPED | OUTPATIENT
Start: 2022-05-23 | End: 2022-06-10 | Stop reason: SINTOL

## 2022-05-23 RX ORDER — BUTALBITAL, ACETAMINOPHEN AND CAFFEINE 50; 325; 40 MG/1; MG/1; MG/1
TABLET ORAL
Qty: 12 TABLET | Refills: 0 | Status: SHIPPED | OUTPATIENT
Start: 2022-05-23 | End: 2022-07-20 | Stop reason: SDUPTHER

## 2022-05-23 ASSESSMENT — ENCOUNTER SYMPTOMS
PHOTOPHOBIA: 0
BACK PAIN: 0
APNEA: 0
ABDOMINAL PAIN: 0
CHEST TIGHTNESS: 0

## 2022-05-23 NOTE — PROGRESS NOTES
[facility] Neurology Telehealth New Visit    Pt Name: Kemal Leiva  MRN: [id number]  YOB: 1970  Date of evaluation: 5/23/2022  Primary Care Physician: Annemarie Ham DO  Reason for Evaluation: TELEHEALTH EVALUATION -- Audio/Visual (During [id number] public health emergency        Kemal Leiva is a 46 y.o. female who presents today for her headaches management. Patient mentioned above with past medical history of hypertension, right parieto-occipital bleed with residual left-sided weakness, mood disorder on Lamictal, restless leg syndrome, was seen by virtually for headaches management. She was following Dr. Williams Romeo in the office. The patient stated that she has headaches 3-4 times per month, duration for at least 10 to 12 hours, severity 5 out of 10, location the back of the head, not radiated, sometimes associated with blurriness in both eyes but no weakness or numbness, no nausea or vomiting. Blood pressure is controlled most of the time under 130/70. The patient saw Dr. Madhuri Chew 2 months ago to follow-up on the right occipitoparietal bleed. Repeated MRI of the brain as well as MRA of the head did not show any underlying lesion. The patient underwent DSA during hospitalization to rule out underlying vascular lesion and came back negative. The patient is on Lamictal 400 mg for history of mood disorder. Magnesium oxide 400 mg at night was started in the last visit but seems not improving the headaches. She was taking Fioricet as needed which seems helping her. The patient stated that she has sleeping issues initiating and maintaining the sleep cycle. The patient is following [facility] physician for Botox injection in the left upper extremity secondary to flexion contracture.     Allergies   Allergen Reactions    Penicillin G Itching    Pcn [Penicillins]     Sulfa Antibiotics Other (See Comments)     Inflammation in the eye     Current Outpatient Medications   Medication Sig Dispense Refill    cyclobenzaprine (FLEXERIL) 5 MG tablet Take 1 tablet by mouth 3 times daily as needed for Muscle spasms 90 tablet 0    butalbital-acetaminophen-caffeine (FIORICET, ESGIC) -40 MG per tablet Take 1 tablet by mouth daily as needed for Headaches 6 tablet 0    butalbital-acetaminophen-caffeine (FIORICET, ESGIC) -40 MG per tablet Take 1 tablet by mouth every 12 hours 12 tablet 0    lisinopril (PRINIVIL;ZESTRIL) 40 MG tablet       hydroCHLOROthiazide (HYDRODIURIL) 12.5 MG tablet TAKE 1 TABLET BY MOUTH ONCE DAILY      sertraline (ZOLOFT) 100 MG tablet TAKE 1 & 1 2 (ONE & ONE HALF) TABLETS BY MOUTH ONCE DAILY      folic acid (FOLVITE) 1 MG tablet Take 1 tablet by mouth once daily 30 tablet 3    rOPINIRole (REQUIP) 1 MG tablet Take 1 tablet by mouth nightly (Patient taking differently: Take 1 mg by mouth 3 times daily ) 30 tablet 3    Blood Pressure KIT 1 each by Does not apply route daily 1 kit 0    diazePAM (VALIUM) 5 MG tablet Take 5 mg by mouth every 6 hours as needed for Anxiety (Muscle spasms).  buPROPion (WELLBUTRIN XL) 300 MG extended release tablet Take 1 tablet by mouth daily 30 tablet 3    amLODIPine (NORVASC) 5 MG tablet Take 1 tablet by mouth daily 30 tablet 3    lamoTRIgine (LAMICTAL) 200 MG tablet Take 400 mg by mouth daily 2 tabs       No current facility-administered medications for this visit.      Past Medical History:   Diagnosis Date    Asthma     B[facility] 1 disorder [facility]     Delta storage pool disease ([facility]     Hypertension     Psychiatric problem       Past Surgical History:   Procedure Laterality Date    CARDIAC CATHETERIZATION       SECTION  0562,8260    Miscarriage     CHOLECYSTECTOMY      COLONOSCOPY N/A 2020    -random bx(normal)hemorrhoids    GASTRIC BYPASS SURGERY      HYSTERECTOMY      KNEE SURGERY  1984    LAPAROSCOPY  1993    TONSILLECTOMY AND ADENOIDECTOMY      UPPER GASTROINTESTINAL ENDOSCOPY N/A 02/03/2020    -bx(normal,neg H-Pylori)normal post-bypass endoscopy     Family History   Adopted: Yes   Family history unknown: Yes     Social History     Tobacco Use    Smoking status: Never Smoker    Smokeless tobacco: Never Used   Substance Use Topics    Alcohol use: Not Currently     Comment: no drinking;  is a recovering alcoholic          Subjective:     Review of Systems   Constitutional: Negative for activity change and appetite change. HENT: Negative for congestion and dental problem. Eyes: Negative for photophobia and visual disturbance. Respiratory: Negative for apnea and chest tightness. Cardiovascular: Negative for chest pain. Gastrointestinal: Negative for abdominal pain. Genitourinary: Negative for dysuria. Musculoskeletal: Negative for back pain. Neurological: Positive for weakness and headaches. Negative for dizziness, tremors, seizures, syncope, facial asymmetry, speech difficulty, light-headedness and numbness. Hematological: Negative for adenopathy. Psychiatric/Behavioral: Negative for agitation.          Objective:   Physical Exam  General Appearance:  Alert, cooperative, no signs of distress, appears stated age   Head:  Normocephalic, no signs of trauma   Eyes:  Conjunctiva/corneas clear;  eyelids intact   Ears:  Normal external ear and canals   Nose: Nares normal, no drainage    Throat: Lips and tongue normal; teeth normal;  gums normal   Extremities: Extremities normal, no cyanosis, no edema   Skin: Skin color, texture normal, no rashes, no lesions                                     NEUROLOGIC EXAMINATION      Mental status    Alert and oriented x 3; able to follow commands, speech and language intact; no hallucinations or delusions  Fund of information appropriate for level of education    Cranial nerves    II - grossly intact  III, IV, VI - extra-ocular muscles full: no nystagmus, no ptosis   V - normal facial sensation VII -subtle left facial droop                                                            VIII - intact hearing                                                                             IX, X - symmetrical palate                                                                  XI - symmetrical shoulder shrug                                                       XII - tongue midline without atrophy      Motor function   3 out of 5 in the left upper extremity with flexion contracture  Was able to antigravity the right upper extremity  The patient was able to walk around without assistance with circumductive of gait in the left side      Sensory function Unable to test      Cerebellar Intact fine motor movement. No involuntary movements or tremors. No ataxia or dysmetria on finger to nose      Reflex function Unable to test      Gait                  The patient was able to walk around without assistance with circumductive of gait in the left side              Assessment:     Patient mentioned above with past medical history of hypertension, right parieto-occipital bleed with residual left-sided weakness, mood disorder on Lamictal, restless leg syndrome, was seen by virtually for headaches management.      -Headaches, tensional and muscle related headaches in the back of the head. Less likely migrainous in nature.  -Left-sided weakness.  -Insomnia    Recommendations:      -Discussed with the patient starting Topamax versus amitriptyline. The patient preferred to go with amitriptyline 25 mg at night since it can help with insomnia and headaches. Discussed the possible side effect of weight gain. She stated that she will try it for couple months and will determine the next visit.  -Headaches, tensional and muscle related headaches in the back of the head. Less likely migrainous in nature.   We will get MRI of the C-spine without contrast.  The patient is on Flexeril for muscle spasm which is helping her.  -Fioricet as needed for severe headaches, max 2 tabs per day with max 4 tabs per week. Discussed with the patient possible side effects.  -To get Eye exam to r/o optic disc edema given eye blurriness and risk factor of obesity.   -To follow-up with PCP in 2 weeks and with neurology clinic in 2 months. Discussed with Dr. Chance Sandra      Consultation Visit Time:  40 minutes   Discussed use, benefit, and side effects of prescribed medications. Personally reviewed imaging with patients and all questions answered. Pt voiced understanding. Patient agreed with treatment plan. Follow up as directed above. If you have any questions, please do not hesitate to call me. I look forward to following Lakia Esposito      Sincerely,    Angelica Car MD  Electronically signed by Angelica Car MD, MD on 5/23/2022 at 1:30 PM       This is a telehealth visit that was performed with the originating site at Patient Location: Patient Home and Provider Location of [place], [place]. Patient ID verified by me prior to start of this visit. Verbal consent to participate in video visit was obtained. Pursuant to the emergency declaration under the [facility] waiver authority and the [employer] Act, this Virtual Visit was conducted, with patient's consent, to reduce the patient's risk of exposure to COVID-19 and provide continuity of care for an established/new patient. Services were provided through a video synchronous discussion virtually to substitute for in-person clinic visit.  I discussed with the patient the nature of our telehealth visits via interactive/real-time audio/video that:  - I would evaluate the patient and recommend diagnostics and treatments based on my assessment  - Our sessions are not being recorded and that personal health information is protected  - Our team would provide follow up care in person if/when the patient needs it. Hollis Horton, was evaluated through a synchronous (real-time) audio-video encounter. The patient (or guardian if applicable) is aware that this is a billable service, which includes applicable co-pays. This Virtual Visit was conducted with patient's (and/or legal guardian's) consent. The visit was conducted pursuant to the emergency declaration under the [address] [place] [address] authority and the [employer] and [employer] General Act. Patient identification was verified, and a caregiver was present when appropriate. The patient was located in a state where the provider was licensed to provide care. --Kimani Hurst MD on 5/23/2022 at 1:30 PM    An electronic signature was used to authenticate this note.

## 2022-06-09 ENCOUNTER — TELEPHONE (OUTPATIENT)
Dept: NEUROLOGY | Age: 52
End: 2022-06-09

## 2022-06-09 LAB
A/G RATIO: NORMAL
ALBUMIN SERPL-MCNC: NORMAL G/DL
ALP BLD-CCNC: NORMAL U/L
ALT SERPL-CCNC: NORMAL U/L
AST SERPL-CCNC: NORMAL U/L
BILIRUB SERPL-MCNC: NORMAL MG/DL
BILIRUBIN DIRECT: NORMAL
BILIRUBIN, INDIRECT: NORMAL
GLOBULIN: NORMAL
PROTEIN TOTAL: NORMAL

## 2022-06-09 NOTE — TELEPHONE ENCOUNTER
Patient called stating she gained 10lbs in one week due to amitriptyline. Patient requesting to be put on medication you suggested if amitriptyline made her gain too much weight.

## 2022-06-10 RX ORDER — TOPIRAMATE 25 MG/1
TABLET ORAL
Qty: 60 TABLET | Refills: 3 | Status: SHIPPED | OUTPATIENT
Start: 2022-06-10

## 2022-06-10 NOTE — TELEPHONE ENCOUNTER
Please ask the pt to stop the medication and to use Topamax instead, medication was sent to the pharmacy. I already discussed with her the possible side effects, but the general side effects are confusion, kidney stones so recommend to drink  more water,numbness and paraesthesia in the distal tips of the fingers. To call us if any of those happens.  Thanks

## 2022-06-20 DIAGNOSIS — R25.2 SPASTICITY DUE TO OLD STROKE: ICD-10-CM

## 2022-06-20 DIAGNOSIS — I69.398 SPASTICITY DUE TO OLD STROKE: ICD-10-CM

## 2022-06-22 ENCOUNTER — TELEPHONE (OUTPATIENT)
Dept: NEUROLOGY | Age: 52
End: 2022-06-22

## 2022-07-20 ENCOUNTER — OFFICE VISIT (OUTPATIENT)
Dept: ONCOLOGY | Age: 52
End: 2022-07-20
Payer: COMMERCIAL

## 2022-07-20 ENCOUNTER — HOSPITAL ENCOUNTER (OUTPATIENT)
Age: 52
Discharge: HOME | End: 2022-07-20
Payer: COMMERCIAL

## 2022-07-20 VITALS
TEMPERATURE: 97.8 F | SYSTOLIC BLOOD PRESSURE: 146 MMHG | RESPIRATION RATE: 18 BRPM | DIASTOLIC BLOOD PRESSURE: 84 MMHG | WEIGHT: 263 LBS | HEART RATE: 89 BPM | BODY MASS INDEX: 43.77 KG/M2

## 2022-07-20 DIAGNOSIS — D69.1 PLATELET DYSFUNCTION (HCC): Primary | ICD-10-CM

## 2022-07-20 DIAGNOSIS — D69.1 PLATELET DYSFUNCTION (HCC): ICD-10-CM

## 2022-07-20 DIAGNOSIS — D64.9 ANEMIA, UNSPECIFIED TYPE: ICD-10-CM

## 2022-07-20 LAB
ABSOLUTE EOS #: 0.12 K/UL (ref 0–0.44)
ABSOLUTE IMMATURE GRANULOCYTE: 0.16 K/UL (ref 0–0.3)
ABSOLUTE LYMPH #: 2.48 K/UL (ref 1.1–3.7)
ABSOLUTE MONO #: 0.83 K/UL (ref 0.1–1.2)
BASOPHILS # BLD: 1 % (ref 0–2)
BASOPHILS ABSOLUTE: 0.08 K/UL (ref 0–0.2)
EOSINOPHILS RELATIVE PERCENT: 1 % (ref 1–4)
FERRITIN: 31 NG/ML (ref 13–150)
HCT VFR BLD CALC: 33.3 % (ref 36.3–47.1)
HEMOGLOBIN: 9.9 G/DL (ref 11.9–15.1)
IMMATURE GRANULOCYTES: 2 %
IRON SATURATION: 10 % (ref 20–55)
IRON: 34 UG/DL (ref 37–145)
LYMPHOCYTES # BLD: 29 % (ref 24–43)
MCH RBC QN AUTO: 24.9 PG (ref 25.2–33.5)
MCHC RBC AUTO-ENTMCNC: 29.7 G/DL (ref 28.4–34.8)
MCV RBC AUTO: 83.7 FL (ref 82.6–102.9)
MONOCYTES # BLD: 10 % (ref 3–12)
NRBC AUTOMATED: 0 PER 100 WBC
PDW BLD-RTO: 15 % (ref 11.8–14.4)
PLATELET # BLD: 549 K/UL (ref 138–453)
PMV BLD AUTO: 8.1 FL (ref 8.1–13.5)
RBC # BLD: 3.98 M/UL (ref 3.95–5.11)
SEG NEUTROPHILS: 57 % (ref 36–65)
SEGMENTED NEUTROPHILS ABSOLUTE COUNT: 4.84 K/UL (ref 1.5–8.1)
TOTAL IRON BINDING CAPACITY: 331 UG/DL (ref 250–450)
UNSATURATED IRON BINDING CAPACITY: 297 UG/DL (ref 112–347)
WBC # BLD: 8.5 K/UL (ref 3.5–11.3)

## 2022-07-20 PROCEDURE — G8428 CUR MEDS NOT DOCUMENT: HCPCS | Performed by: INTERNAL MEDICINE

## 2022-07-20 PROCEDURE — 83550 IRON BINDING TEST: CPT

## 2022-07-20 PROCEDURE — 99205 OFFICE O/P NEW HI 60 MIN: CPT | Performed by: INTERNAL MEDICINE

## 2022-07-20 PROCEDURE — 36415 COLL VENOUS BLD VENIPUNCTURE: CPT

## 2022-07-20 PROCEDURE — 85025 COMPLETE CBC W/AUTO DIFF WBC: CPT

## 2022-07-20 PROCEDURE — 82728 ASSAY OF FERRITIN: CPT

## 2022-07-20 PROCEDURE — G8417 CALC BMI ABV UP PARAM F/U: HCPCS | Performed by: INTERNAL MEDICINE

## 2022-07-20 PROCEDURE — 83540 ASSAY OF IRON: CPT

## 2022-07-20 RX ORDER — METRONIDAZOLE 500 MG/1
TABLET ORAL
COMMUNITY
Start: 2022-07-14

## 2022-07-20 RX ORDER — CIPROFLOXACIN 500 MG/1
TABLET, FILM COATED ORAL
COMMUNITY
Start: 2022-07-14 | End: 2022-10-28 | Stop reason: ALTCHOICE

## 2022-07-20 RX ORDER — SODIUM FLUORIDE 6 MG/ML
PASTE, DENTIFRICE DENTAL
COMMUNITY
Start: 2022-07-06 | End: 2022-07-20 | Stop reason: ALTCHOICE

## 2022-07-25 NOTE — TELEPHONE ENCOUNTER
Pt needs refill of cyclobenzaprine.     She has appt today for botox but had to cancel due to other health issues that need to be taken care of first.  Will reschedule her appt when given the all clear by her hematologist.    She was last seen 05.17.22  Last refill was given 05.17.22 01-Feb-2020 11:18

## 2022-07-26 RX ORDER — CYCLOBENZAPRINE HCL 5 MG
5 TABLET ORAL 3 TIMES DAILY PRN
Qty: 90 TABLET | Refills: 2 | Status: SHIPPED | OUTPATIENT
Start: 2022-07-26 | End: 2022-10-28 | Stop reason: SDUPTHER

## 2022-08-03 RX ORDER — FERROUS SULFATE 325(65) MG
325 TABLET ORAL 2 TIMES DAILY
Qty: 60 TABLET | Refills: 5 | Status: SHIPPED | OUTPATIENT
Start: 2022-08-03

## 2022-08-03 RX ORDER — DESMOPRESSIN ACETATE 0.1 MG/ML
1 SOLUTION NASAL 2 TIMES DAILY
Qty: 5 ML | Refills: 2 | Status: SHIPPED | OUTPATIENT
Start: 2022-08-03

## 2022-09-07 ENCOUNTER — TELEPHONE (OUTPATIENT)
Dept: PHYSICAL MEDICINE AND REHAB | Age: 52
End: 2022-09-07

## 2022-09-07 DIAGNOSIS — D69.1 PLATELET DYSFUNCTION (HCC): ICD-10-CM

## 2022-09-07 DIAGNOSIS — D69.9 BLEEDING DISORDER (HCC): Primary | ICD-10-CM

## 2022-09-07 NOTE — TELEPHONE ENCOUNTER
Ordered CBC and BMP, will need clearance by GI, hematology and PCP prior to proceeding with Botox.   Wiill also need to evaluate-making sure vitals are okay and patient back to baseline with no fatigue, symptoms, etc.

## 2022-09-08 NOTE — TELEPHONE ENCOUNTER
Orders have been faxed, and spoke with patient regarding need for medical clearance. She stated understanding and will get appropriate clearance.

## 2022-10-05 ENCOUNTER — TELEPHONE (OUTPATIENT)
Dept: PHYSICAL MEDICINE AND REHAB | Age: 52
End: 2022-10-05

## 2022-10-05 NOTE — TELEPHONE ENCOUNTER
Patient called and asked if we had received the necessary documentation for clearance for Botox. I told her that I thought Carmen Ana had said she was missing hematology. The clearance we did receive is scanned into the chart. In looking at the documents scanned in it appears that we DO have hematology response. It is  noted on the lab work that was done thru PCP. The fax cover sheet states that is from [facility] and she said that is from her hematologist then. I told her it was hard to read the signature but the assumption was made that it was from PCP but it is not. It is hematology and the dr notes okay for Botox. Evelyn Lynne said she saw her PCP on Friday and he told her that as long as the other physicians approved of having the Botox it was okay with him. Is she okay to schedule for Botox?

## 2022-10-28 ENCOUNTER — PROCEDURE VISIT (OUTPATIENT)
Dept: PHYSICAL MEDICINE AND REHAB | Age: 52
End: 2022-10-28
Payer: COMMERCIAL

## 2022-10-28 VITALS
DIASTOLIC BLOOD PRESSURE: 76 MMHG | SYSTOLIC BLOOD PRESSURE: 123 MMHG | WEIGHT: 258 LBS | HEART RATE: 106 BPM | TEMPERATURE: 97.2 F | BODY MASS INDEX: 42.93 KG/M2

## 2022-10-28 DIAGNOSIS — F31.9 BIPOLAR 1 DISORDER (HCC): ICD-10-CM

## 2022-10-28 DIAGNOSIS — D69.1 PLATELET DYSFUNCTION (HCC): ICD-10-CM

## 2022-10-28 DIAGNOSIS — I69.398 SPASTICITY DUE TO OLD STROKE: Primary | ICD-10-CM

## 2022-10-28 DIAGNOSIS — G56.01 CARPAL TUNNEL SYNDROME OF RIGHT WRIST: ICD-10-CM

## 2022-10-28 DIAGNOSIS — Z86.79 H/O INTRACRANIAL HEMORRHAGE: ICD-10-CM

## 2022-10-28 DIAGNOSIS — R25.2 SPASTICITY DUE TO OLD STROKE: Primary | ICD-10-CM

## 2022-10-28 DIAGNOSIS — G81.10 SPASTIC HEMIPARESIS (HCC): ICD-10-CM

## 2022-10-28 DIAGNOSIS — D69.9 BLEEDING TENDENCY (HCC): ICD-10-CM

## 2022-10-28 PROCEDURE — 64644 CHEMODENERV 1 EXTREM 5/> MUS: CPT | Performed by: PHYSICAL MEDICINE & REHABILITATION

## 2022-10-28 PROCEDURE — 3017F COLORECTAL CA SCREEN DOC REV: CPT | Performed by: PHYSICAL MEDICINE & REHABILITATION

## 2022-10-28 PROCEDURE — 1036F TOBACCO NON-USER: CPT | Performed by: PHYSICAL MEDICINE & REHABILITATION

## 2022-10-28 PROCEDURE — G8427 DOCREV CUR MEDS BY ELIG CLIN: HCPCS | Performed by: PHYSICAL MEDICINE & REHABILITATION

## 2022-10-28 PROCEDURE — G8417 CALC BMI ABV UP PARAM F/U: HCPCS | Performed by: PHYSICAL MEDICINE & REHABILITATION

## 2022-10-28 PROCEDURE — 99214 OFFICE O/P EST MOD 30 MIN: CPT | Performed by: PHYSICAL MEDICINE & REHABILITATION

## 2022-10-28 PROCEDURE — 3074F SYST BP LT 130 MM HG: CPT | Performed by: PHYSICAL MEDICINE & REHABILITATION

## 2022-10-28 PROCEDURE — 64643 CHEMODENERV 1 EXTREM 1-4 EA: CPT | Performed by: PHYSICAL MEDICINE & REHABILITATION

## 2022-10-28 PROCEDURE — 3078F DIAST BP <80 MM HG: CPT | Performed by: PHYSICAL MEDICINE & REHABILITATION

## 2022-10-28 PROCEDURE — G8484 FLU IMMUNIZE NO ADMIN: HCPCS | Performed by: PHYSICAL MEDICINE & REHABILITATION

## 2022-10-28 PROCEDURE — 95874 GUIDE NERV DESTR NEEDLE EMG: CPT | Performed by: PHYSICAL MEDICINE & REHABILITATION

## 2022-10-28 RX ORDER — CYCLOBENZAPRINE HCL 5 MG
5 TABLET ORAL 3 TIMES DAILY PRN
Qty: 90 TABLET | Refills: 2 | Status: SHIPPED | OUTPATIENT
Start: 2022-10-28 | End: 2022-11-23 | Stop reason: ALTCHOICE

## 2022-10-28 NOTE — PROGRESS NOTES
[facility]  [facility] PHYSICAL MEDICINE & REHABILITATION  [address] [place] [address]  [place] [id number]  Dept: [phone]  Dept Fax: [phone]    Outpatient Followup Note     Markell Lebron, 46 y.o., female, presents for follow up c/o of   Chief Complaint   Patient presents with    Other     Spasticity;  getting botox injections today   . Patient was lastseen on 3/30/2021    HPI:     HPI     She comes in today with her sister/mother. Has been cleared by hematology/PCP/GI for Botox injections. 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 Admitted to hospital for few days diagnosed with possible acute cholecystitis. Symptoms improved. She was discharged and eventually had a colonoscopy done by GI-was told this was normal.  She has had follow-up with GI/hematology and PCP-they have all cleared her for Botox injection, her hemoglobin has improved. She continues with spasticity left upper and left lower extremity-significantly progressed due to missing last injection assessment approximately 6 months. Notes spasms in the leg and arm notes difficulty sleeping. She notes Flexeril is helping continues to take this 3 times a day along with Requip. LFTs repeat is pending. Notes having 1 fall few days ago with no residual trauma. Se was advised to notify if there is any changes    Also notes numbness and tingling right hand mostly first second third digit, not paresthesias. Difficulty holding onto things over the last few months. She comes in today for Botox injection left upper and  left lower extremity. Last Botox injection in in January 12, 2022 then 4/21/2022. She noted significant improvement, she now notes significant improvement especially since she has missed her last Botox injection. She was pleased with the progress Botox injection. Juancarlos Champagne Continues with brace left lower extremity/AFO and left upper extremity. She continues doing range of motion. She comes in today due to significant increase spasticity left upper and left lower extremity. She would like proceed with Botox. She is aware of the risks and benefits which were discussed at length with her again today. She continues with spasms left upper and lower extremity, Zanaflex and baclofen actually increased her symptoms, she is doing well with Flexeril and Requip. She continues with occasional leakage from the bladder, possible stress incontinence. She has not follow-up with urology.-She notes she feels this is due to increased fluid intake. Her left shoulder, frozen shoulder, has improved. She continues to follow-up with orthopedics. She is seeing Dr. Kathia Baldwin in [facility]     She continues to follow with hematology /oncology and psychiatrist for bipolar disorder. She is no longer on Keppra        History     Russ Hodgkins  is 48 y.o. right-handed     female admitted to the [address] [place] [address] unit on 12/9/2020. She was originally admitted to [facility] on 11/30/2020 for L sided weakness and headache. She presented originally to [facility] ED. CT confirmed R parietal ICH and moderate SAH. She was treated initially with cardene infusion for hypertension and loaded with Keppra. She was transferred to [facility] for further neurologic care. She was given DDAVP and 1 pack of platelets. ICH score 2. NIHSS 15. Neurosurgery evaluated - managed medically. 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 Inpatient Rehabilitation Course:   Russ Hodgkins was admitted to inpatient rehabilitation on 12/9/2020. Rehab course:  Progress well from rehabilitation standpoint. Family training with . Very supportive.   Obtained an AFO for left lower extremity and a left resting hand splint for left upper extremity. Spasticity medications adjusted. Blood pressure medications adjusted. Added Requip for restless leg syndrome. On melatonin at nighttime and gabapentin for sleep. Past Medical History:   Diagnosis Date    Asthma     Bipolar 1 disorder ([address]     Delta storage pool disease [facility])     Hypertension     Psychiatric problem       Past Surgical History:   Procedure Laterality Date    CARDIAC CATHETERIZATION       SECTION  7699,7124    Miscarriage     CHOLECYSTECTOMY      COLONOSCOPY N/A 2020    -random bx(normal)hemorrhoids    GASTRIC BYPASS SURGERY      HYSTERECTOMY (CERVIX STATUS UNKNOWN)      KNEE SURGERY      LAPAROSCOPY      TONSILLECTOMY AND ADENOIDECTOMY      UPPER GASTROINTESTINAL ENDOSCOPY N/A 2020    (normal,neg H-Pylori)normal post-bypass endoscopy          Family History   Adopted: Yes   Family history unknown: Yes          Social History     Tobacco Use    Smoking status: Never    Smokeless tobacco: Never   Substance Use Topics    Alcohol use: Not Currently     Comment: no drinking;  is a recovering alcoholic           Current Outpatient Medications   Medication Sig Dispense Refill    cyclobenzaprine (FLEXERIL) 5 MG tablet Take 1 tablet by mouth 3 times daily as needed for Muscle spasms 90 tablet 2    desmopressin (DDAVP) 0.01 % solution 1 spray by Nasal route in the morning and 1 spray before bedtime. For 3 days before procedure. . 5 mL 2    ferrous sulfate (IRON 325) 325 (65 Fe) MG tablet Take 1 tablet by mouth in the morning and 1 tablet before bedtime.  60 tablet 5    ciprofloxacin (CIPRO) 500 MG tablet TAKE 1 TABLET BY MOUTH EVERY 12 HOURS FOR 7 DAYS      metroNIDAZOLE (FLAGYL) 500 MG tablet TAKE 1 TABLET BY MOUTH EVERY 8 HOURS FOR 7 DAYS      topiramate (TOPAMAX) 25 MG tablet Week 1; one tab daily in am  Week 2; one tab in am and one tab in pm. 60 tablet 3    butalbital-acetaminophen-caffeine (FIORICET, ESGIC) -40 MG per tablet Take 1 tablet by mouth daily as needed for Headaches 6 tablet 0    butalbital-acetaminophen-caffeine (FIORICET, ESGIC) -40 MG per tablet Take 1 tablet by mouth every 12 hours 12 tablet 0    lisinopril (PRINIVIL;ZESTRIL) 40 MG tablet       hydroCHLOROthiazide (HYDRODIURIL) 12.5 MG tablet TAKE 1 TABLET BY MOUTH ONCE DAILY      sertraline (ZOLOFT) 100 MG tablet TAKE 1 & 1 2 (ONE & ONE HALF) TABLETS BY MOUTH ONCE DAILY      rOPINIRole (REQUIP) 1 MG tablet Take 1 tablet by mouth nightly (Patient taking differently: Take 1 mg by mouth 3 times daily) 30 tablet 3    Blood Pressure KIT 1 each by Does not apply route daily 1 kit 0    buPROPion (WELLBUTRIN XL) 300 MG extended release tablet Take 1 tablet by mouth daily 30 tablet 3    amLODIPine (NORVASC) 5 MG tablet Take 1 tablet by mouth daily 30 tablet 3    lamoTRIgine (LAMICTAL) 200 MG tablet Take 400 mg by mouth daily 2 tabs      folic acid (FOLVITE) 1 MG tablet Take 1 tablet by mouth once daily 30 tablet 3    diazePAM (VALIUM) 5 MG tablet Take 5 mg by mouth every 6 hours as needed for Anxiety (Muscle spasms). No current facility-administered medications for this visit. Allergies   Allergen Reactions    Penicillin G Itching    Pcn [Penicillins]     Sulfa Antibiotics Other (See Comments)     Inflammation in the eye         Subjective:     Review of Systems  CONSTITUTIONAL: Negative for fever, chills, sweat, fatigue and unexpected weightchange.    HEENT:  Negativefor diplopia, blind spots, blurred vision, hearing loss, trouble chewing or swallowing,denies coughing while eating or drinking denies nosebleed    RESPIRATORY: Negative for wheezing, coughing or shortness of breath    CARDIOVASCULAR: Negative for chest pain,palpitations, lightheadedness  GASTROINTESTINAL: Negative for heartburn, nausea,constipation, diarrhea, abdominal pain  or incontinence  GENTIOURNIARY: Negative for dysuria, urgency,frequency, symmetric 2+ right,   Provocative maneuvers:  EXTREMITIES: No calf tenderness,, negative warmth, pulses are intact  SKIN: Skin is warm and dry. PSYCIATIRIC: normal mood and affect, behavior is normal. Thought content normal.  Ambulates with single-point cane and AFO with noted spastic hemiparesis left arm in flexion        Assessment:       Diagnosis Orders   1. Spasticity due to old stroke  onabotulinumtoxin A (BOTOX) injection 300 Units    [place] NEEDLE EMG GUIDANCE FOR CHEMODENERVATION    NH CHEMODENERVATION 1 EXTREMITY 5 OR MORE MUSCLES    [place] CHEMODENERVATION  EA ADDL 1-4 MUSCLE(S)    Hepatic Function Panel    External Referral To Physical Therapy    Amb External Referral To Occupational Therapy      2. Spastic hemiparesis (HCC)  onabotulinumtoxin A (BOTOX) injection 300 Units    [place] NEEDLE EMG GUIDANCE FOR CHEMODENERVATION    NH CHEMODENERVATION 1 EXTREMITY 5 OR MORE MUSCLES    NH CHEMODENERVATION  EA ADDL 1-4 MUSCLE(S)    Hepatic Function Panel    External Referral To Physical Therapy    Amb External Referral To Occupational Therapy      3. Carpal tunnel syndrome of right wrist  EMG      4. H/O intracranial hemorrhage        5. Bipolar 1 disorder ([address]        6. Platelet dysfunction (HCC)        7. Bleeding tendency ([facility]             Plan:      1. Hemorrhagic CVA with left hemiparesis. She is making improvements-she is now ambulating with a cane. No falls. .  she continues a left ankle-foot orthoses, She continues with PT/OT. We will continue. Therapy reordered for both PT and OT post Botox  2. Spastic left hemiparesis-notes increased spasms, notes baclofen and Zanaflex-increase her symptoms, she does not use Valium however would like to minimize due to history of alcohol abuse,. We discussed Dantrium-however she is concerned of using this medication due to risks with the liver. ..    Flexeril 5 mg 3 times daily- she notes this is helping, LFTs are okay 1/6/2022, CMP,  CBC/BMP okay 9/22/22 hgb 11.0, await rechecking LFTs-patient advised to obtain a soonest possible as she continues to take Flexeril on a regular basis  3. Discussed Botox for left upper and lower extremity-she has been cleared by her hematologist/GI/PCP. Risks and benefits of Botox were again discussed including but not limited to infection, bleeding, bruising, weakness, flulike symptoms, respiratory difficulties, etc.  Also reviewed effects of Botox may vary. She is agreeable and has previously signed consent. 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 Today  Botox for left upper extremity fingers/wrist/elbow flexion- 150 units, and left lower extremity for equina varus, -150 units. Discussed risks and benefits- we discussed risk of bleeding particularly with the tibialis posterior muscle-she would like to avoid that muscle at this time. Also discussed risk of increased weakness in the left lower extremity which could affect ability for transfers and ambulation. 300 units of Botox a were reconstituted using preservative free sterile normal saline. Patient site was prepped and sterilized using Betadine and alcohol. Botox A was administered under sterile techniques using EMG guidance for elbow flexion, wrist flexion and finger flexion and equina varus  300 units today . Botox injected with back pressure being applied. No bleeding or blood. .  She tolerated procedure well no bleeding or bruising noted     Left upper extremity for fingers/wrist/elbow flexion-total 150 unit  flexor carpi radialis 25 units,   flexor carpi ulnaris 25 units,   FDP 25 units,   FDS 25 units and   biceps 50 units    Left lower extremity for equinovarus-total 150 unit  Tibias anterior 50 units,   FDL 25 units,   Medial head gastroc/lateral gastroc/soleus-75 units    tstibias posterior not done due to risk of bleeding and patient request    Patient evaluated 20 minutes post procedure-no bleeding or bruising noted  ,   She will continue with physical and Occupational physical therapy. And follow-up in 2 weeks as well  notify if there is any questions or concerns. 3.  Left hemiparesis with hemisensory deficits-.  Improved, no longer on Neurontin  4. Left frozen shoulder-continues  limited range of motion, she continues therapy and follow-up with orthopedics . 5. Frequent urination chronic for patient- improvement, she feels this is related to increased fluid. 6.  Right thumb hand pain- DeQuervains tenosynovitis. Improved  7. Reviewed falls and precautions, currently no trauma noted, she will notify if there is any residual from her previous fall. 8.  Possible carpal tunnel syndrome-numbness and tingling for second and third digits, nocturnal seizures, will check EMG right upper extremity  9. Follow-up with hematology,neurology as well as psychiatry  10. Follow-up approximately 2 weeks for evaluation post Botox. Then 3 months for repeat Botox. At that time will reconsider increasing dose for left lower extremity as well as left upper extremity-if well-tolerated this time    45 minutes spent with patient-procedures/counseling/etc.       Return in about 2 weeks (around 11/11/2022) for Followup. Orders Placed This Encounter   Procedures    Hepatic Function Panel     Standing Status:   Future     Standing Expiration Date:   10/28/2023    External Referral To Physical Therapy     Referral Priority:   Routine     Referral Type:   Eval and Treat     Referral Reason:   Specialty Services Required     Requested Specialty:   Physical Therapist     Number of Visits Requested:   1    Amb External Referral To Occupational Therapy     Referral Priority:   Routine     Referral Type:   Consult for Advice and Opinion     Referral Reason:   Specialty Services Required     Requested Specialty:   Occupational Therapy     Number of Visits Requested:   1    EMG     Order Specific Question:   Which body part?      Answer:   right arm, numbness and tingling, poss CTS    [place] NEEDLE EMG GUIDANCE FOR CHEMODENERVATION    [place] CHEMODENERVATION 1 EXTREMITY 5 OR MORE MUSCLES    [place] CHEMODENERVATION  EA ADDL 1-4 MUSCLE(S)     Orders Placed This Encounter   Medications    onabotulinumtoxin A (BOTOX) injection 300 Units    cyclobenzaprine (FLEXERIL) 5 MG tablet     Sig: Take 1 tablet by mouth 3 times daily as needed for Muscle spasms     Dispense:  90 tablet     Refill:  2            Electronically signed by Magdaleno Nava. Calista Jain 10/28/2022 at 2:14 PM    Please note that this chart was generated usingvoice recognition Dragon dictation software. Although every effort was made toensure the accuracy of this automated transcription, some errors in transcriptionmay have occurred.

## 2022-10-28 NOTE — LETTER
[facility] PHYSICAL MEDICINE & REHABILITATION  [address] [place] [address]  Dept: [phone]  Dept Fax: [phone]      10/28/22    Patient: Alonso Velasco  YOB: 1970    Dear  Debbie Morrison DO ,    I had the pleasure of seeing your patient, Alonso Velasco today in the office today. Below are the relevant portions of my assessment and plan of care. IMPRESSION:   Diagnosis Orders   1. Spasticity due to old stroke  onabotulinumtoxin A (BOTOX) injection 300 Units    [place] NEEDLE EMG GUIDANCE FOR CHEMODENERVATION    OK CHEMODENERVATION 1 EXTREMITY 5 OR MORE MUSCLES    OK CHEMODENERVATION  EA ADDL 1-4 MUSCLE(S)    Hepatic Function Panel    External Referral To Physical Therapy    Amb External Referral To Occupational Therapy      2. Spastic hemiparesis (HCC)  onabotulinumtoxin A (BOTOX) injection 300 Units    [place] NEEDLE EMG GUIDANCE FOR CHEMODENERVATION    OK CHEMODENERVATION 1 EXTREMITY 5 OR MORE MUSCLES    OK CHEMODENERVATION  EA ADDL 1-4 MUSCLE(S)    Hepatic Function Panel    External Referral To Physical Therapy    Amb External Referral To Occupational Therapy      3. Carpal tunnel syndrome of right wrist  EMG        PLAN:  1. Hemorrhagic CVA with left hemiparesis. She is making improvements-she is now ambulating with a cane. No falls. .  she continues a left ankle-foot orthoses, She continues with PT/OT. We will continue. Therapy reordered for both PT and OT post Botox  2. Spastic left hemiparesis-notes increased spasms, notes baclofen and Zanaflex-increase her symptoms, she does not use Valium however would like to minimize due to history of alcohol abuse. We discussed Dantrium-however she is concerned of using this medication due to risks with the liver.    Flexeril 5 mg 3 times daily- she notes this is helping, LFTs are okay 1/6/2022, CMP, CBC/BMP okay 9/22/22 hgb 11.0, await rechecking LFTs-patient advised to obtain a soonest possible as she continues to take Flexeril on a regular basis  3. Discussed Botox for left upper and lower extremity-she has been cleared by her hematologist/GI/PCP. Risks and benefits of Botox were again discussed including but not limited to infection, bleeding, bruising, weakness, flulike symptoms, respiratory difficulties, etc.  Also reviewed effects of Botox may vary. She is agreeable and has previously signed consent. 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 Today  Botox for left upper extremity fingers/wrist/elbow flexion- 150 units, and left lower extremity for equina varus, -150 units. Discussed risks and benefits- we discussed risk of bleeding particularly with the tibialis posterior muscle-she would like to avoid that muscle at this time. Also discussed risk of increased weakness in the left lower extremity which could affect ability for transfers and ambulation. 300 units of Botox a were reconstituted using preservative free sterile normal saline. Patient site was prepped and sterilized using Betadine and alcohol. Botox A was administered under sterile techniques using EMG guidance for elbow flexion, wrist flexion and finger flexion and equina varus  300 units today . Botox injected with back pressure being applied. No bleeding or blood. She tolerated procedure well no bleeding or bruising noted     Left upper extremity for fingers/wrist/elbow flexion-total 150 unit  flexor carpi radialis 25 units,  flexor carpi ulnaris 25 units,   FDP 25 units,  FDS 25 units and   biceps 50 units    Left lower extremity for equinovarus-total 150 unit  Tibias anterior 50 units,  FDL 25 units,   Medial head gastroc/lateral gastroc/soleus-75 units    tibialis posterior not done due to risk of bleeding  Patient evaluated 20 minutes post procedure-no bleeding or bruising noted     She will continue with physical and Occupational physical therapy.   And follow-up in 2 weeks as well  notify if there is any questions or concerns. 3.  Left hemiparesis with hemisensory deficits-.  Improved, no longer on Neurontin  4. Left frozen shoulder-continues  limited range of motion, she continues therapy and follow-up with orthopedics . 5. Frequent urination chronic for patient- improvement, she feels this is related to increased fluid. 6.  Right thumb hand pain- DeQuervains tenosynovitis. Improved  7. Reviewed falls and precautions, currently no trauma noted, she will notify if there is any residual from her previous fall. 8.  Possible carpal tunnel syndrome-numbness and tingling for second and third digits, nocturnal seizures, will check EMG right upper extremity  9. Follow-up with hematology,neurology as well as psychiatry  10. Follow-up approximately 2 weeks for evaluation post Botox. Then 3 months for repeat Botox. At that time will reconsider increasing dose for left lower extremity as well as left upper extremity-if well-tolerated this time    45 minutes spent with patient-procedures/counseling/etc.       No follow-ups on file. Orders Placed This Encounter   Procedures    Hepatic Function Panel     Standing Status:   Future     Standing Expiration Date:   10/28/2023    External Referral To Physical Therapy     Referral Priority:   Routine     Referral Type:   Eval and Treat     Referral Reason:   Specialty Services Required     Requested Specialty:   Physical Therapist     Number of Visits Requested:   1    Amb External Referral To Occupational Therapy     Referral Priority:   Routine     Referral Type:   Consult for Advice and Opinion     Referral Reason:   Specialty Services Required     Requested Specialty:   Occupational Therapy     Number of Visits Requested:   1    EMG     Order Specific Question:   Which body part?      Answer:   right arm, numbness and tingling, poss CTS    [place] NEEDLE EMG GUIDANCE FOR CHEMODENERVATION    [place] CHEMODENERVATION 1 EXTREMITY 5 OR MORE MUSCLES    [place] CHEMODENERVATION  EA ADDL 1-4 MUSCLE(S)     Orders Placed This Encounter   Medications    onabotulinumtoxin A (BOTOX) injection 300 Units    cyclobenzaprine (FLEXERIL) 5 MG tablet     Sig: Take 1 tablet by mouth 3 times daily as needed for Muscle spasms     Dispense:  90 tablet     Refill:  2           Thank you for allowing me to participate in the care of this patient. I will keep you updated on this patient's follow up and I look forward to serving you and your patients again in the future. Marcella Garcia. Cody Duffy MD

## 2022-11-14 ENCOUNTER — HOSPITAL ENCOUNTER (OUTPATIENT)
Dept: NEUROLOGY | Age: 52
Discharge: HOME | End: 2022-11-14
Payer: COMMERCIAL

## 2022-11-14 ENCOUNTER — OFFICE VISIT (OUTPATIENT)
Dept: PHYSICAL MEDICINE AND REHAB | Age: 52
End: 2022-11-14
Payer: COMMERCIAL

## 2022-11-14 VITALS
TEMPERATURE: 97 F | SYSTOLIC BLOOD PRESSURE: 132 MMHG | HEART RATE: 93 BPM | DIASTOLIC BLOOD PRESSURE: 77 MMHG | WEIGHT: 270 LBS | BODY MASS INDEX: 44.93 KG/M2

## 2022-11-14 DIAGNOSIS — D69.9 BLEEDING DISORDER (HCC): ICD-10-CM

## 2022-11-14 DIAGNOSIS — F31.9 BIPOLAR 1 DISORDER (HCC): ICD-10-CM

## 2022-11-14 DIAGNOSIS — I10 ESSENTIAL HYPERTENSION: ICD-10-CM

## 2022-11-14 DIAGNOSIS — D69.1 PLATELET DYSFUNCTION (HCC): ICD-10-CM

## 2022-11-14 DIAGNOSIS — Z86.79 H/O INTRACRANIAL HEMORRHAGE: ICD-10-CM

## 2022-11-14 DIAGNOSIS — Z98.84 BARIATRIC SURGERY STATUS: ICD-10-CM

## 2022-11-14 DIAGNOSIS — R53.1 ACUTE LEFT-SIDED WEAKNESS: ICD-10-CM

## 2022-11-14 DIAGNOSIS — I69.398 SPASTICITY DUE TO OLD STROKE: Primary | ICD-10-CM
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
## 2022-11-14 DIAGNOSIS — R25.2 SPASTICITY DUE TO OLD STROKE: Primary | ICD-10-CM

## 2022-11-14 PROCEDURE — 3078F DIAST BP <80 MM HG: CPT | Performed by: PHYSICAL MEDICINE & REHABILITATION

## 2022-11-14 PROCEDURE — G8427 DOCREV CUR MEDS BY ELIG CLIN: HCPCS | Performed by: PHYSICAL MEDICINE & REHABILITATION

## 2022-11-14 PROCEDURE — G8417 CALC BMI ABV UP PARAM F/U: HCPCS | Performed by: PHYSICAL MEDICINE & REHABILITATION

## 2022-11-14 PROCEDURE — 99213 OFFICE O/P EST LOW 20 MIN: CPT | Performed by: PHYSICAL MEDICINE & REHABILITATION

## 2022-11-14 PROCEDURE — 1036F TOBACCO NON-USER: CPT | Performed by: PHYSICAL MEDICINE & REHABILITATION

## 2022-11-14 PROCEDURE — 3017F COLORECTAL CA SCREEN DOC REV: CPT | Performed by: PHYSICAL MEDICINE & REHABILITATION

## 2022-11-14 PROCEDURE — 95886 MUSC TEST DONE W/N TEST COMP: CPT | Performed by: PHYSICAL MEDICINE & REHABILITATION

## 2022-11-14 PROCEDURE — G8484 FLU IMMUNIZE NO ADMIN: HCPCS | Performed by: PHYSICAL MEDICINE & REHABILITATION

## 2022-11-14 PROCEDURE — 3074F SYST BP LT 130 MM HG: CPT | Performed by: PHYSICAL MEDICINE & REHABILITATION

## 2022-11-14 PROCEDURE — 95909 NRV CNDJ TST 5-6 STUDIES: CPT | Performed by: PHYSICAL MEDICINE & REHABILITATION
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
## 2022-11-18 ENCOUNTER — CLINICAL DOCUMENTATION (OUTPATIENT)
Dept: PHYSICAL MEDICINE AND REHAB | Age: 52
End: 2022-11-18

## 2022-11-18 NOTE — PROGRESS NOTES
Pt called stating that she has coming appt next week with Dr. Trudy Padilla and needed her recent EMG testing report sent to him at fax:  [phone]. This was completed and pt notified.

## 2022-11-23 ENCOUNTER — TELEPHONE (OUTPATIENT)
Dept: PHYSICAL MEDICINE AND REHAB | Age: 52
End: 2022-11-23

## 2022-11-23 RX ORDER — BACLOFEN 5 MG/1
TABLET ORAL
Qty: 90 TABLET | Refills: 0 | Status: SHIPPED | OUTPATIENT
Start: 2022-11-23 | End: 2022-11-28 | Stop reason: SINTOL

## 2022-11-23 NOTE — TELEPHONE ENCOUNTER
Patient called back to see if prescription had been sent in yet, let her know it had not been yet but Dr. Jessika Rodrigez would take care of it as soon as he could. She asked if she should be taking the baclofen with her Requip and Fioricet or is she supposed to stop the Requip and Fioricet when she trials the baclofen? Please advise.

## 2022-11-25 DIAGNOSIS — R25.2 SPASTICITY DUE TO OLD STROKE: ICD-10-CM

## 2022-11-25 DIAGNOSIS — G81.10 SPASTIC HEMIPARESIS (HCC): ICD-10-CM

## 2022-11-25 DIAGNOSIS — I69.398 SPASTICITY DUE TO OLD STROKE: ICD-10-CM

## 2022-11-25 NOTE — TELEPHONE ENCOUNTER
Called patient to inquire if she has seen neurology regarding the headaches as discussed back in May. I had to leave a message on her voice mail to call me back.

## 2022-11-25 NOTE — TELEPHONE ENCOUNTER
Pt called for refill of fioricet to take for her headaches    Last seen 11.14.22  Last refill was given in May 2022  Pt only uses when needed for severe headaches

## 2022-11-28 ENCOUNTER — TELEPHONE (OUTPATIENT)
Dept: PHYSICAL MEDICINE AND REHAB | Age: 52
End: 2022-11-28

## 2022-11-28 RX ORDER — BUTALBITAL, ACETAMINOPHEN AND CAFFEINE 50; 325; 40 MG/1; MG/1; MG/1
1 TABLET ORAL DAILY PRN
Qty: 6 TABLET | Refills: 0 | Status: SHIPPED | OUTPATIENT
Start: 2022-11-28

## 2022-11-28 RX ORDER — CYCLOBENZAPRINE HCL 5 MG
5 TABLET ORAL 3 TIMES DAILY PRN
Qty: 90 TABLET | Refills: 2 | Status: SHIPPED | OUTPATIENT
Start: 2022-11-28 | End: 2023-02-26

## 2022-11-28 NOTE — TELEPHONE ENCOUNTER
Called patient back to clarify about whether or not she's seen neurology. She last saw neurology in March 2022 and they told her they only need to see her on an annual basis. She called them recently for the Fioricet refill but they stated that per the note, her neurologist thought you (Dr. Spencer Whitehead) would be continuing to manage it so they told her to contact you for the refill.

## 2022-11-28 NOTE — TELEPHONE ENCOUNTER
Spoke with neurology office at [facility]. LUCILLE's, explained situation and they will contact patient to get her established with a new provider in the next 4-5 weeks. She was previously seeing Dr. Noelle Price but he is no longer with them. Let them know Dr. Main Marroquin would fill short prescription of Fioricet, but for next refill, neurology would need to manage. Patient was also notified.

## 2022-11-28 NOTE — TELEPHONE ENCOUNTER
Patient called to let you know that baclofen \"is not cutting it. \" She is still having muscle spasms, and is also having some side effects, including itching all over her body. She did not have hives, no facial swelling so doesn't think it was a true allergy but did not want to continue taking it after that. Since baclofen isn't working, she will need refills of fioricet and flexeril 5 mg. Patient also wanted to let you know that she follow up with ortho after the EMG you did and they are going to be getting her scheduled for carpal tunnel release.

## 2022-11-29 NOTE — TELEPHONE ENCOUNTER
Left a message again for patient to give me a call back inquiring about a neurology appt.   Dr. Mainor Pandey would like the Fioricet to be handled by the neurologist

## 2022-12-01 ENCOUNTER — TELEPHONE (OUTPATIENT)
Dept: NEUROSURGERY | Age: 52
End: 2022-12-01

## 2022-12-01 NOTE — TELEPHONE ENCOUNTER
Mikki Oakley  Can you please get patient on in the next 4 weeks with neurology.        LVM for patient to schedule appt

## 2022-12-06 NOTE — PROGRESS NOTES
Date:                           12/6/2020  Patient name:           Germaine Williamson  Date of admission:  11/30/2020  6:38 AM  MRN:   [id number]  YOB: 1970  PCP:                           Meera Aragon MD        Reason for consult: Delta Pool storage deficiency    Subjective:   Patient seen and examined at bedside. Headaches are better. Still having left sided weakness. BRIEF CLINICAL HISTORY  59-year-old  female presented to hospital for acute hemorrhagic CVA. Transferred from [facility] for neurosurgical evaluation. She has a past medical history of delta pool storage disease and has previously been seen by our group. She has previously had a retroperitoneal bleed postoperative surgery in 2011. Von Willebrand disease testing was negative. No prior history of brain bleeds. She reported waking up at night with left upper extremity weakness after which she went back to sleep. She woke up again around 3 AM and noticed left lower extremity weakness and persistence of left upper extremity weakness. Also reported headache and tremors in the right side. CT head without contrast showed a large right parieto-occipital hemorrhage with moderate subarachnoid hemorrhage as well as mild midline shift from right to left. .  Platelet count at presentation was 307. She got a dose of DDAVP and 1 unit of platelets. Neurosurgery followed with serial brain imaging. No acute intervention due to stable hematoma. Patient managed in neuro ICU. REVIEW OF SYSTEMS:  General: no fever or night sweats  ENT: No double or blurred vision, no hearing problem, no dysphagia or sore throat   Respiratory: No chest pain, no shortness of breath, no cough or hemoptysis. Cardiovascular: Denies chest pain, PND or orthopnea. No LE swelling or palpitations. Gastrointestinal: Negative for nausea, no vomiting, abdominal pain, diarrhea or constipation.    Genitourinary: Denies dysuria, hematuria, frequency, urgency or incontinence. Neurological: Positive for headaches, positive for left upper and lower limb weakness, no decreased LOC   Musculoskeletal: Positive for back pain, no joint swelling. Skin: Negative for rash on arms and legs  Psych: Denies hallucinations or intentions to harm self        Objective:     Vitals: BP (!) 112/57   Pulse 76   Temp 98.5 °F (36.9 °C) (Oral)   Resp 20   Ht 5' 5\" (1.651 m)   Wt 235 lb (106.6 kg)   SpO2 100%   BMI 39.11 kg/m²   General appearance -somnolent, in mild painful distress due to headache  Mental status -oriented x3  Eyes - pupils equal and reactive, extraocular eye movements intact  Mouth - mucous membranes moist, pharynx normal without lesions  Neck - supple, no significant adenopathy  Lymphatics - no palpable lymphadenopathy, no hepatosplenomegaly  Chest - clear to auscultation, no wheezes, rales or rhonchi, symmetric air entry  Heart - normal rate, regular rhythm, normal S1, S2, no murmurs  Abdomen - soft, nontender, nondistended, no masses or organomegaly  Neurological -somnolent but oriented, normal speech, left hemiplegia. Extremities - peripheral pulses normal, no pedal edema, no clubbing or cyanosis  Skin - normal coloration and turgor, no rashes, no suspicious skin lesions noted         Data:    No intake/output data recorded.   In: 700 [I.V.:700]  Out: 1400 [Urine:1400]    CBC:   Recent Labs     12/04/20  0345 12/05/20  0429 12/06/20  0431   WBC 8.2 10.3 9.8   HGB 12.2 12.7 12.0    282 353     BMP:    Recent Labs     12/04/20  0345 12/05/20  0429 12/06/20  0431    137 136   K 3.5* 4.0 3.8   CL 99 98 101   CO2 24 24 22   BUN 7 7 13   CREATININE 0.51 0.51 0.60   GLUCOSE 113* 117* 104*             Problem Lists:   Primary Problem:  Acute intracranial hemorrhage  Current Problems:  Active Hospital Problems    Diagnosis Date Noted    Vasogenic edema ([facility] [address] [G93.6]     IVH (intraventricular hemorrhage) ([facility]) Clindamycin Pregnancy And Lactation Text: This medication can be used in pregnancy if certain situations. Clindamycin is also present in breast milk.

## 2023-01-28 LAB
BASOPHILS ABSOLUTE: NORMAL
BASOPHILS RELATIVE PERCENT: NORMAL
BUN BLDV-MCNC: NORMAL MG/DL
CALCIUM SERPL-MCNC: NORMAL MG/DL
CHLORIDE BLD-SCNC: NORMAL MMOL/L
CO2: NORMAL
CREAT SERPL-MCNC: NORMAL MG/DL
EOSINOPHILS ABSOLUTE: NORMAL
EOSINOPHILS RELATIVE PERCENT: NORMAL
GFR SERPL CREATININE-BSD FRML MDRD: NORMAL ML/MIN/{1.73_M2}
GLUCOSE BLD-MCNC: NORMAL MG/DL
HCT VFR BLD CALC: NORMAL %
HEMOGLOBIN: NORMAL
LYMPHOCYTES ABSOLUTE: NORMAL
LYMPHOCYTES RELATIVE PERCENT: NORMAL
MCH RBC QN AUTO: NORMAL PG
MCHC RBC AUTO-ENTMCNC: NORMAL G/DL
MCV RBC AUTO: NORMAL FL
MONOCYTES ABSOLUTE: NORMAL
MONOCYTES RELATIVE PERCENT: NORMAL
NEUTROPHILS ABSOLUTE: NORMAL
NEUTROPHILS RELATIVE PERCENT: NORMAL
PLATELET # BLD: NORMAL 10*3/UL
PMV BLD AUTO: NORMAL FL
POTASSIUM SERPL-SCNC: NORMAL MMOL/L
RBC # BLD: NORMAL 10*6/UL
SODIUM BLD-SCNC: NORMAL MMOL/L
WBC # BLD: NORMAL 10*3/UL

## 2023-01-30 ENCOUNTER — PROCEDURE VISIT (OUTPATIENT)
Dept: PHYSICAL MEDICINE AND REHAB | Age: 53
End: 2023-01-30
Payer: COMMERCIAL

## 2023-01-30 VITALS
WEIGHT: 278 LBS | HEIGHT: 65 IN | DIASTOLIC BLOOD PRESSURE: 79 MMHG | SYSTOLIC BLOOD PRESSURE: 131 MMHG | HEART RATE: 88 BPM | TEMPERATURE: 97.2 F | BODY MASS INDEX: 46.32 KG/M2

## 2023-01-30 DIAGNOSIS — I69.398 SPASTICITY DUE TO OLD STROKE: ICD-10-CM

## 2023-01-30 DIAGNOSIS — R25.2 SPASTICITY DUE TO OLD STROKE: ICD-10-CM

## 2023-01-30 DIAGNOSIS — G81.10 SPASTIC HEMIPARESIS (HCC): Primary | ICD-10-CM

## 2023-01-30 DIAGNOSIS — M62.838 SPASM OF MUSCLE: ICD-10-CM

## 2023-01-30 DIAGNOSIS — Z98.890 HISTORY OF CARPAL TUNNEL RELEASE: ICD-10-CM

## 2023-01-30 DIAGNOSIS — D69.9 BLEEDING DISORDER (HCC): ICD-10-CM

## 2023-01-30 DIAGNOSIS — F31.9 BIPOLAR 1 DISORDER (HCC): ICD-10-CM

## 2023-01-30 DIAGNOSIS — Z98.84 BARIATRIC SURGERY STATUS: ICD-10-CM

## 2023-01-30 DIAGNOSIS — I10 ESSENTIAL HYPERTENSION: ICD-10-CM

## 2023-01-30 DIAGNOSIS — D69.1 PLATELET DYSFUNCTION (HCC): ICD-10-CM

## 2023-01-30 PROCEDURE — 64644 CHEMODENERV 1 EXTREM 5/> MUS: CPT | Performed by: PHYSICAL MEDICINE & REHABILITATION

## 2023-01-30 PROCEDURE — 3078F DIAST BP <80 MM HG: CPT | Performed by: PHYSICAL MEDICINE & REHABILITATION

## 2023-01-30 PROCEDURE — G8427 DOCREV CUR MEDS BY ELIG CLIN: HCPCS | Performed by: PHYSICAL MEDICINE & REHABILITATION

## 2023-01-30 PROCEDURE — 3075F SYST BP GE 130 - 139MM HG: CPT | Performed by: PHYSICAL MEDICINE & REHABILITATION

## 2023-01-30 PROCEDURE — G8417 CALC BMI ABV UP PARAM F/U: HCPCS | Performed by: PHYSICAL MEDICINE & REHABILITATION

## 2023-01-30 PROCEDURE — 99214 OFFICE O/P EST MOD 30 MIN: CPT | Performed by: PHYSICAL MEDICINE & REHABILITATION

## 2023-01-30 PROCEDURE — 1036F TOBACCO NON-USER: CPT | Performed by: PHYSICAL MEDICINE & REHABILITATION

## 2023-01-30 PROCEDURE — 96372 THER/PROPH/DIAG INJ SC/IM: CPT | Performed by: PHYSICAL MEDICINE & REHABILITATION

## 2023-01-30 PROCEDURE — 64643 CHEMODENERV 1 EXTREM 1-4 EA: CPT | Performed by: PHYSICAL MEDICINE & REHABILITATION

## 2023-01-30 PROCEDURE — 3017F COLORECTAL CA SCREEN DOC REV: CPT | Performed by: PHYSICAL MEDICINE & REHABILITATION

## 2023-01-30 PROCEDURE — G8484 FLU IMMUNIZE NO ADMIN: HCPCS | Performed by: PHYSICAL MEDICINE & REHABILITATION

## 2023-01-30 PROCEDURE — 95874 GUIDE NERV DESTR NEEDLE EMG: CPT | Performed by: PHYSICAL MEDICINE & REHABILITATION

## 2023-01-30 NOTE — PROGRESS NOTES
[facility] PHYSICIANS  [facility] PHYSICAL MEDICINE & REHABILITATION  [address] [place] [address]  [place] [address]  Dept: [phone]  Dept Fax: [phone]    Outpatient Followup Note     Alex Christensen, 46 y.o., female, presents for follow up c/o of   Chief Complaint   Patient presents with    Other     Spasticity due to old stroke    Botox Injection   . Patient was lastseen on 11/14 post Botox done on 10/28/2022    HPI:     HPI     She comes in today with her sister/  Has been cleared by hematology/PCP/GI for Botox injections. 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 She previously  had COVID improved quickly however then few days later had severe bleeding from her GI tract and bowels. Admitted to hospital for few days diagnosed with possible acute cholecystitis. Symptoms improved. She was discharged and eventually had a colonoscopy done by GI-was told this was normal.  She has had follow-up with GI/hematology and PCP-they have all cleared her for Botox injection, her hemoglobin has improved. She noted improvement with previous Botox. In the left upper extremity she was pleased with the improvement does notice some continued mild tightness of elbow flexor and thumb and palm. In the left lower extremity she did not initially did not feel there is any significant provement however the last 2 weeks she is noted increased inversion and plantarflexion as the Botox is wearing off. She denies any side effects from medication. She did retry baclofen however did not tolerate no increase spasticity. She is returned back to Flexeril which appears to help. She had carpal tunnel release right upper extremity mid December. Notes pain is improved but still with some numbness and tingling. She has follow-up with orthopedics    She comes in today for Botox injection left upper and  left lower extremity. Last Botox injection in in October 28, 2022 then  .   Continues with brace left lower extremity/AFO and left upper extremity. She continues doing range of motion. She comes in today due to significant increase spasticity left upper and left lower extremity. She would like proceed with Botox. She is aware of the risks and benefits which were discussed at length with her again today. She continues with spasms left upper and lower extremity, Zanaflex and baclofen actually increased her symptoms, she is doing well with Flexeril and Requip. She continues with occasional leakage from the bladder, possible stress incontinence. She has not follow-up with urology.-She notes she feels this is due to increased fluid intake. Her left shoulder, frozen shoulder, has improved. She continues to follow-up with orthopedics. She is seeing Dr. Radha Martin in [facility]     She continues to follow with hematology /oncology and psychiatrist for bipolar disorder. She is no longer on Keppra        History     Nba Pandey  is 48 y.o. right-handed     female admitted to the [address] [place] [address] unit on 12/9/2020. She was originally admitted to [facility] on 11/30/2020 for L sided weakness and headache. She presented originally to [facility] ED. CT confirmed R parietal ICH and moderate SAH. She was treated initially with cardene infusion for hypertension and loaded with Keppra. She was transferred to [facility] for further neurologic care. She was given DDAVP and 1 pack of platelets. ICH score 2. NIHSS 15. Neurosurgery evaluated - managed medically. 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 Inpatient Rehabilitation Course:   Nba Pandey was admitted to inpatient rehabilitation on 12/9/2020. Rehab course:  Progress well from rehabilitation standpoint. Family training with . Very supportive. Obtained an AFO for left lower extremity and a left resting hand splint for left upper extremity. Spasticity medications adjusted. Blood pressure medications adjusted. Added Requip for restless leg syndrome. On melatonin at nighttime and gabapentin for sleep. Past Medical History:   Diagnosis Date    Asthma     Bipolar 1 disorder ([address]     Delta storage pool disease [facility])     Hypertension     Psychiatric problem       Past Surgical History:   Procedure Laterality Date    CARDIAC CATHETERIZATION       SECTION  1615,8019    Miscarriage     CHOLECYSTECTOMY      COLONOSCOPY N/A 2020    -random bx(normal)hemorrhoids    GASTRIC BYPASS SURGERY      HYSTERECTOMY (CERVIX STATUS UNKNOWN)      KNEE SURGERY      LAPAROSCOPY      TONSILLECTOMY AND ADENOIDECTOMY      UPPER GASTROINTESTINAL ENDOSCOPY N/A 2020    bx(normal,neg H-Pylori)normal post-bypass endoscopy          Family History   Adopted: Yes   Family history unknown: Yes          Social History     Tobacco Use    Smoking status: Never    Smokeless tobacco: Never   Substance Use Topics    Alcohol use: Not Currently     Comment: no drinking;  is a recovering alcoholic           Current Outpatient Medications   Medication Sig Dispense Refill    butalbital-acetaminophen-caffeine (FIORICET, ESGIC) -40 MG per tablet Take 1 tablet by mouth daily as needed for Headaches 6 tablet 0    cyclobenzaprine (FLEXERIL) 5 MG tablet Take 1 tablet by mouth 3 times daily as needed for Muscle spasms 90 tablet 2    desmopressin (DDAVP) 0.01 % solution 1 spray by Nasal route in the morning and 1 spray before bedtime. For 3 days before procedure. . 5 mL 2    ferrous sulfate (IRON 325) 325 (65 Fe) MG tablet Take 1 tablet by mouth in the morning and 1 tablet before bedtime.  60 tablet 5    metroNIDAZOLE (FLAGYL) 500 MG tablet TAKE 1 TABLET BY MOUTH EVERY 8 HOURS FOR 7 DAYS      topiramate (TOPAMAX) 25 MG tablet Week 1; one tab daily in am  Week 2; one tab in am and one tab in pm. 60 tablet 3 butalbital-acetaminophen-caffeine (FIORICET, ESGIC) -40 MG per tablet Take 1 tablet by mouth every 12 hours 12 tablet 0    lisinopril (PRINIVIL;ZESTRIL) 40 MG tablet       hydroCHLOROthiazide (HYDRODIURIL) 12.5 MG tablet TAKE 1 TABLET BY MOUTH ONCE DAILY      sertraline (ZOLOFT) 100 MG tablet TAKE 1 & 1 2 (ONE & ONE HALF) TABLETS BY MOUTH ONCE DAILY      rOPINIRole (REQUIP) 1 MG tablet Take 1 tablet by mouth nightly (Patient taking differently: Take 1 mg by mouth 3 times daily) 30 tablet 3    Blood Pressure KIT 1 each by Does not apply route daily 1 kit 0    buPROPion (WELLBUTRIN XL) 300 MG extended release tablet Take 1 tablet by mouth daily 30 tablet 3    amLODIPine (NORVASC) 5 MG tablet Take 1 tablet by mouth daily 30 tablet 3    lamoTRIgine (LAMICTAL) 200 MG tablet Take 400 mg by mouth daily 2 tabs      folic acid (FOLVITE) 1 MG tablet Take 1 tablet by mouth once daily 30 tablet 3    diazePAM (VALIUM) 5 MG tablet Take 5 mg by mouth every 6 hours as needed for Anxiety (Muscle spasms). Current Facility-Administered Medications   Medication Dose Route Frequency Provider Last Rate Last Admin    onabotulinumtoxin A (BOTOX) injection 400 Units  400 Units IntraMUSCular Once Denys Moreno MD         Allergies   Allergen Reactions    Penicillin G Itching    Baclofen Other (See Comments)     Worse muscle spasms (made her feeling like she needed to \"crawl out of her skin\")    Pcn [Penicillins]     Sulfa Antibiotics Other (See Comments)     Inflammation in the eye         Subjective:     Review of Systems  CONSTITUTIONAL: Negative for fever, chills, sweat, fatigue and unexpected weightchange.    HEENT:  Negativefor diplopia, blind spots, blurred vision, hearing loss, trouble chewing or swallowing,denies coughing while eating or drinking denies nosebleed    RESPIRATORY: Negative for wheezing, coughing or shortness of breath    CARDIOVASCULAR: Negative for chest pain,palpitations, lightheadedness  GASTROINTESTINAL: Negative for heartburn, nausea,constipation, diarrhea, abdominal pain  or incontinence  GENTIOURNIARY: Negative for dysuria, urgency,frequency, incontinence and difficulty urinating. ENDOCRINE: Negative for hot or cold intolerance, deniesany significant weight change  MUSCULOSKELETAL: Negative for focal joint pain,back pain, neck pain and arthralgias. Marian Beth: Negative for focal weakness, numbness,tingling, balance loss, headaches or falls  BEHAVIOR/PSYCY: Denies depression,anxiety, memory loss or insomnia  SKIN: Denies ulcers, rashes or bruises         Objective:     Physical Exam   /79   Pulse 88   Temp 97.2 °F (36.2 °C)   Ht 5' 5\" (1.651 m)   Wt 278 lb (126.1 kg) Comment: patient states weight is up due to constipation  BMI 46.26 kg/m²     Nursing notes and vitals reviewed    CONSTITUTIONAL: She is oriented to person, place, and time. She appears well-developed and well-nourished.   HEENT: Pupils equal reactiveto light, exact motion intact, visual fields are full, no facial asymmetry, speech fluent, no dysarthria, comprehension intact, object naming intact, repetition intact,basic cognition intact  CARDIOVASCULAR:Heart regular rate and rhythm with no murmurs rubs or gallops   PULMONARY/CHEST: Clear to auscultation with no wheezes or crackles noted  ABDOMEN: Soft, nontender with positive bowel sounds, no guarding or rebound   NEUROLOGIC:    Orientation: Alert and oriented×3,    cranial nerves:  II through XII are intact,   Strength:5 out of 5 throughout right upper/ lower extremity, left upper extremity 1-2/5 left upper extremity, able to range fingers, thumb, wrist and elbow though noted isignificant tightness in elbow flexors and wrist flexors and finger flexors-strength approximately 1-2/5 proximally- ,  Left lower extremity noted to have internal rotation of foot and plantar flexion-equina varus, exam limited due to significant spasticity   Sensation:Intact to light touch and pinprick right upper and lower extremity, left side decreased sensation left face as well as left upper and lower extremity to light touch and pinprick   Balance: Intact   Deep tendon reflexes: Bilaterally equal and symmetric 2+ right,   Provocative maneuvers:  EXTREMITIES: No calf tenderness,, negative warmth, pulses are intact  SKIN: Skin is warm and dry. PSYCIATIRIC: normal mood and affect, behavior is normal. Thought content normal.  Ambulates with single-point cane and AFO with noted spastic hemiparesis left arm in flexion        Assessment:       Diagnosis Orders   1. Spastic hemiparesis (HCC)  MI NEEDLE EMG GUID W/CHEMODENERVATION    onabotulinumtoxin A (BOTOX) injection 400 Units    MI CHEMODENERVATION 1 EXTREMITY 5 OR MORE MUSCLES    MI CHEMODENERVATION 1 EXTREMITY EA ADDL 1-4 MUSCLE    External Referral To Physical Therapy    External Referral To Occupational Therapy      2. Spasm of muscle  MI NEEDLE EMG GUID W/CHEMODENERVATION    onabotulinumtoxin A (BOTOX) injection 400 Units    MI CHEMODENERVATION 1 EXTREMITY 5 OR MORE MUSCLES    MI CHEMODENERVATION 1 EXTREMITY EA ADDL 1-4 MUSCLE    External Referral To Physical Therapy    External Referral To Occupational Therapy      3. Spasticity due to old stroke        4. Bariatric surgery status        5. Bleeding disorder ([facility] [address]        6. Essential hypertension        7. Bipolar 1 disorder ([facility] [address]        8. Platelet dysfunction (HCC)        9. History of carpal tunnel release             Plan:      1. Hemorrhagic CVA with left hemiparesis. She is making improvements-she is now ambulating with a cane. No falls. .  she continues a left ankle-foot orthoses, e. Therapy reordered for both PT and OT post Botox  2. Spastic left hemiparesis-notes increased spasms, notes baclofen and Zanaflex-increase her symptoms, she does not use Valium however would like to minimize due to history of alcohol abuse,.   We discussed Dantrium-however she is concerned of using this medication due to risks with the liver. .. Flexeril 5 mg 3 times daily- she notes this is helping, LFTs are okay 1/6/2022, retry it back from last visit has not tolerated increase spasticity. She is back on Flexeril. Lab work CBC/CMP from 1/28/2023 are within normal limits except hemoglobin 11.9 and platelets of [id number] she states is good for her  3. Discussed Botox for left upper and lower extremity-she has been cleared by her hematologist/GI/PCP. Risks and benefits of Botox were again discussed including but not limited to infection, bleeding, bruising, weakness, flulike symptoms, respiratory difficulties, etc.  Also reviewed effects of Botox may vary. 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 We discussed increasing metoprolol Botox from 300-400 to help with further spasticity left lower extremity for coronavirus and left upper extremity for elbow flexion and thumb and palm. She is aware of the risks and benefits and is agreeable to increase. Today  Botox for left upper extremity fingers/wrist/elbow flexion-200 units, and left lower extremity for equina varus, - 200 units. Discussed risks and benefits- we discussed risk of bleeding particularly with the tibialis posterior muscle-she would like to avoid that muscle at this time. Also discussed risk of increased weakness in the left lower extremity which could affect ability for transfers and ambulation. 400 units of Botox a were reconstituted using preservative free sterile normal saline. Patient site was prepped and sterilized using Betadine and alcohol. Botox A was administered under sterile techniques using EMG guidance for elbow flexion, wrist flexion and finger flexion and equina varus 400 units today . Botox injected with back pressure being applied. No bleeding or blood. .  She tolerated procedure well no bleeding or bruising noted     Left upper extremity for fingers/wrist/elbow flexion-total 200 unit  flexor carpi radialis 25 units,   flexor carpi ulnaris 25 units,   FDP 25 units,   FDS 25 units and   biceps 50 units  Brachioradialis 25 units  Flexor pollicis longus 25 units    Left lower extremity for equinovarus-total 200 unit  Tibias anterior 75 units,   FDL 50 units,   Medial head gastroc/lateral gastroc/soleus-75 units    tstibias posterior not done due to risk of bleeding and patient request    Patient evaluated 20 minutes post procedure-no bleeding or bruising noted  ,   She will continue with physical and Occupational physical therapy. And follow-up in 2 weeks as well  notify if there is any questions or concerns. 3.  Left hemiparesis with hemisensory deficits-.  Improved, no longer on Neurontin  4. Left frozen shoulder-continues  limited range of motion, she continues therapy and follow-up with orthopedics . 5. Frequent urination chronic for patient- improvement, she feels this is related to increased fluid. 6.  Right thumb hand pain- DeQuervains tenosynovitis. Improved  7. Reviewed falls and precautions, currently no trauma noted, she will notify if there is any residual from her previous fall. 8. carpal tunnel syndrome-noted on EMG, patient followed up with orthopedics and has had carpal tunnel release right hand. Notes improvement in pain however continued with sensory deficits. 9. Follow-up with hematology,neurology as well as psychiatry  10. Occasional headaches-uses Fioricet-she is to follow-up with neurology, has attempted but unable to set up. We will have staff review  11. Follow-up approximately 2 weeks for evaluation post Botox. Then 3 months for repeat Botox. 45 minutes spent with patient-procedures/counseling/etc.       No follow-ups on file.   Orders Placed This Encounter   Procedures    External Referral To Physical Therapy     Referral Priority:   Routine     Referral Type:   Eval and Treat     Referral Reason:   Specialty Services Required     Requested Specialty:   Physical Therapist     Number of Visits Requested:   1    External Referral To Occupational Therapy     Referral Priority:   Routine     Referral Type:   Eval and Treat     Referral Reason:   Specialty Services Required     Requested Specialty:   Occupational Therapy     Number of Visits Requested:   1    OR NEEDLE EMG GUID W/CHEMODENERVATION    OR CHEMODENERVATION 1 EXTREMITY 5 OR MORE MUSCLES    OR CHEMODENERVATION 1 EXTREMITY EA ADDL 1-4 MUSCLE     Orders Placed This Encounter   Medications    onabotulinumtoxin A (BOTOX) injection 400 Units            Electronically signed by Jannetta Cheadle. Loren Niño 1/30/2023 at 5:17 PM    Please note that this chart was generated usingvoice recognition Dragon dictation software. Although every effort was made toensure the accuracy of this automated transcription, some errors in transcriptionmay have occurred.

## 2023-01-30 NOTE — LETTER
[facility] [address]  [place] [address]  Dept: [phone]  Dept Fax: [phone]      1/30/23    Patient: Felicia Davis  YOB: 1970    Dear  Solomon Mcintyre DO ,    I had the pleasure of seeing your patient, Felicia Davis today in the office today. Below are the relevant portions of my assessment and plan of care. IMPRESSION:   Diagnosis Orders   1. Spastic hemiparesis (HCC)  SC NEEDLE EMG GUID W/CHEMODENERVATION    onabotulinumtoxin A (BOTOX) injection 400 Units    SC CHEMODENERVATION 1 EXTREMITY 5 OR MORE MUSCLES    SC CHEMODENERVATION 1 EXTREMITY EA ADDL 1-4 MUSCLE    External Referral To Physical Therapy    External Referral To Occupational Therapy      2. Spasm of muscle  SC NEEDLE EMG GUID W/CHEMODENERVATION    onabotulinumtoxin A (BOTOX) injection 400 Units    SC CHEMODENERVATION 1 EXTREMITY 5 OR MORE MUSCLES    SC CHEMODENERVATION 1 EXTREMITY EA ADDL 1-4 MUSCLE    External Referral To Physical Therapy    External Referral To Occupational Therapy      3. Spasticity due to old stroke        4. Bariatric surgery status        5. Bleeding disorder ([facility]        6. Essential hypertension        7. Bipolar 1 disorder ([facility] [facility]        8. Platelet dysfunction (HCC)        9. History of carpal tunnel release          PLAN:  1. Hemorrhagic CVA with left hemiparesis. She is making improvements-she is now ambulating with a cane. No falls. she continues using a left ankle-foot orthoses. Therapy reordered for both PT and OT post Botox  2. Spastic left hemiparesis-notes increased spasms, notes baclofen and Zanaflex-increase her symptoms, she does not use Valium however would like to minimize due to history of alcohol abuse. We discussed Dantrium-however she is concerned of using this medication due to risks with the liver.    Flexeril 5 mg 3 times daily- she notes this is helping, LFTs are okay 1/6/2022, retry it back from last visit has not tolerated increase spasticity. She is back on Flexeril. Lab work CBC/CMP from 1/28/2023 are within normal limits except hemoglobin 11.9 and platelets of [id number] she states is good for her  3. Discussed Botox for left upper and lower extremity-she has been cleared by her hematologist/GI/PCP. Risks and benefits of Botox were again discussed including but not limited to infection, bleeding, bruising, weakness, flulike symptoms, respiratory difficulties, etc.  Also reviewed effects of Botox may vary. 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 We discussed increasing metoprolol Botox from 300-400 to help with further spasticity left lower extremity for coronavirus and left upper extremity for elbow flexion and thumb and palm. She is aware of the risks and benefits and is agreeable to increase. Today  Botox for left upper extremity fingers/wrist/elbow flexion-200 units, and left lower extremity for equina varus, - 200 units. Discussed risks and benefits- we discussed risk of bleeding particularly with the tibialis posterior muscle-she would like to avoid that muscle at this time. Also discussed risk of increased weakness in the left lower extremity which could affect ability for transfers and ambulation. 400 units of Botox a were reconstituted using preservative free sterile normal saline. Patient site was prepped and sterilized using Betadine and alcohol. Botox A was administered under sterile techniques using EMG guidance for elbow flexion, wrist flexion and finger flexion and equina varus 400 units today . Botox injected with back pressure being applied. No bleeding or blood. .  She tolerated procedure well no bleeding or bruising noted     Left upper extremity for fingers/wrist/elbow flexion-total 200 unit  flexor carpi radialis 25 units,  flexor carpi ulnaris 25 units,   FDP 25 units,  FDS 25 units and   biceps 50 units  Brachioradialis 25 units  Flexor pollicis longus 25 units    Left lower extremity for equinovarus-total 200 unit  Tibias anterior 75 units,  FDL 50 units,   Medial head gastroc/lateral gastroc/soleus-75 units  tibias posterior not done due to risk of bleeding   Patient evaluated 20 minutes post procedure-no bleeding or bruising noted  She will continue with physical and Occupational physical therapy. And follow-up in 2 weeks as well  notify if there is any questions or concerns. 3.  Left hemiparesis with hemisensory deficits-.  Improved, no longer on Neurontin  4. Left frozen shoulder-continues  limited range of motion, she continues therapy and      follow-up with orthopedics . 5. Frequent urination chronic for patient- improvement, she feels this is related to      increased fluid. 6.  Right thumb hand pain- DeQuervains tenosynovitis. Improved  7. Reviewed falls and precautions, currently no trauma noted, she will notify if there is      any residual from her previous fall. 8. carpal tunnel syndrome-noted on EMG, patient followed up with orthopedics and has      had carpal tunnel release right hand. Notes improvement in pain however continued      with sensory deficits. 9. Follow-up with hematology,neurology as well as psychiatry  10. Occasional headaches-uses Fioricet-she is to follow-up with neurology, has        attempted but unable to set up. We will have staff review  11. Follow-up approximately 2 weeks for evaluation post Botox. Then 3 months for repeat Botox. 45 minutes spent with patient-procedures/counseling/etc.       No follow-ups on file.   Orders Placed This Encounter   Procedures    External Referral To Physical Therapy     Referral Priority:   Routine     Referral Type:   Eval and Treat     Referral Reason:   Specialty Services Required     Requested Specialty:   Physical Therapist     Number of Visits Requested:   1    External Referral To Occupational Therapy Referral Priority:   Routine     Referral Type:   Eval and Treat     Referral Reason:   Specialty Services Required     Requested Specialty:   Occupational Therapy     Number of Visits Requested:   1    MN NEEDLE EMG GUID W/CHEMODENERVATION    MN CHEMODENERVATION 1 EXTREMITY 5 OR MORE MUSCLES    MN CHEMODENERVATION 1 EXTREMITY EA ADDL 1-4 MUSCLE     Orders Placed This Encounter   Medications    onabotulinumtoxin A (BOTOX) injection 400 Units           Thank you for allowing me to participate in the care of this patient. I will keep you updated on this patient's follow up and I look forward to serving you and your patients again in the future. Bridget Cantu. Roni Arnold MD

## 2023-01-31 DIAGNOSIS — I69.398 SPASTICITY DUE TO OLD STROKE: ICD-10-CM

## 2023-01-31 DIAGNOSIS — R25.2 SPASTICITY DUE TO OLD STROKE: ICD-10-CM

## 2023-02-21 RX ORDER — CYCLOBENZAPRINE HCL 5 MG
TABLET ORAL
Qty: 90 TABLET | Refills: 0 | Status: SHIPPED | OUTPATIENT
Start: 2023-02-21

## 2023-02-21 NOTE — TELEPHONE ENCOUNTER
Pt request refill of Flexeril. Dr. Татьяна Dai forgot to send in when she came in for there botox visit.     She is scheduled for fu on 02.27.23 with dr. Madie Davies

## 2023-02-27 ENCOUNTER — OFFICE VISIT (OUTPATIENT)
Dept: PHYSICAL MEDICINE AND REHAB | Age: 53
End: 2023-02-27
Payer: COMMERCIAL

## 2023-02-27 VITALS — WEIGHT: 278 LBS | BODY MASS INDEX: 46.26 KG/M2 | TEMPERATURE: 97.8 F

## 2023-02-27 DIAGNOSIS — D69.1 PLATELET DYSFUNCTION (HCC): ICD-10-CM

## 2023-02-27 DIAGNOSIS — Z86.79 H/O INTRACRANIAL HEMORRHAGE: ICD-10-CM

## 2023-02-27 DIAGNOSIS — D69.9 BLEEDING DISORDER (HCC): ICD-10-CM

## 2023-02-27 DIAGNOSIS — R25.2 SPASTICITY DUE TO OLD STROKE: Primary | ICD-10-CM

## 2023-02-27 DIAGNOSIS — I61.9 INTRAPARENCHYMAL HEMORRHAGE OF BRAIN (HCC): ICD-10-CM

## 2023-02-27 DIAGNOSIS — D69.9 BLEEDING TENDENCY (HCC): ICD-10-CM

## 2023-02-27 DIAGNOSIS — I69.398 SPASTICITY DUE TO OLD STROKE: Primary | ICD-10-CM

## 2023-02-27 DIAGNOSIS — Z98.84 BARIATRIC SURGERY STATUS: ICD-10-CM

## 2023-02-27 DIAGNOSIS — F31.9 BIPOLAR 1 DISORDER (HCC): ICD-10-CM

## 2023-02-27 PROCEDURE — 3017F COLORECTAL CA SCREEN DOC REV: CPT | Performed by: PHYSICAL MEDICINE & REHABILITATION

## 2023-02-27 PROCEDURE — G8417 CALC BMI ABV UP PARAM F/U: HCPCS | Performed by: PHYSICAL MEDICINE & REHABILITATION

## 2023-02-27 PROCEDURE — 1036F TOBACCO NON-USER: CPT | Performed by: PHYSICAL MEDICINE & REHABILITATION

## 2023-02-27 PROCEDURE — G8427 DOCREV CUR MEDS BY ELIG CLIN: HCPCS | Performed by: PHYSICAL MEDICINE & REHABILITATION

## 2023-02-27 PROCEDURE — G8484 FLU IMMUNIZE NO ADMIN: HCPCS | Performed by: PHYSICAL MEDICINE & REHABILITATION

## 2023-02-27 PROCEDURE — 99213 OFFICE O/P EST LOW 20 MIN: CPT | Performed by: PHYSICAL MEDICINE & REHABILITATION

## 2023-02-27 NOTE — LETTER
[facility] PHYSICAL MEDICINE & REHABILITATION  [address] [place] [address]  Dept: [phone]  Dept Fax: [phone]      2/27/23    Patient: Author Gongora  YOB: 1970    Dear  Andressa Wiseman DO ,    I had the pleasure of seeing your patient, Author Gongora today in the office today. Below are the relevant portions of my assessment and plan of care. IMPRESSION:   Diagnosis Orders   1. Spasticity due to old stroke        2. Bleeding disorder ([address]        3. H/O intracranial hemorrhage        4. Intraparenchymal hemorrhage of brain ([address]        5. Bariatric surgery status        6. Bipolar 1 disorder ([address]        7. Platelet dysfunction (HCC)        8. Bleeding tendency ([address]          PLAN:  1. Hemorrhagic CVA with left hemiparesis. She is making improvements-she is now ambulating with a cane. No falls. .  she continues a left ankle-foot orthoses, she continues with therapy r both PT and OT post Botox  2. Spastic left hemiparesis-notes increased spasms, notes baclofen and Zanaflex-increase her symptoms, she does not use Valium however would like to minimize due to history of alcohol abuse,. We discussed Dantrium-however she is concerned of using this medication due to risks with the liver. .. Flexeril 5 mg 3 times daily- she notes this is helping, LFTs are okay 1/6/2022, retry it back from last visit has not tolerated increase spasticity. She is back on Flexeril. Lab work CBC/CMP from 1/28/2023 are within normal limits except hemoglobin 11.9 and platelets of [id number] she states is good for her  3. Status post Botox for left upper extremity fingers/wrist/elbow flexion-200 units, and left lower extremity for equina varus, - 200 units. Have discussed risks and benefits- we discussed risk of bleeding particularly with the tibialis posterior muscle-she would like to avoid that muscle at this time.   Also discussed risk of increased weakness in the left lower extremity which could affect ability for transfers and ambulation. On 1/30/23 400 units of Botox a were reconstituted using preservative free sterile normal saline. Patient site was prepped and sterilized using Betadine and alcohol. Botox A was administered under sterile techniques using EMG guidance for elbow flexion, wrist flexion and finger flexion and equina varus 400 units today . Botox injected with back pressure being applied. No bleeding or blood. .  She tolerated procedure well no bleeding or bruising noted     Left upper extremity for fingers/wrist/elbow flexion-total 200 unit  flexor carpi radialis 25 units,   flexor carpi ulnaris 25 units,   FDP 25 units,   FDS 25 units and   biceps 50 units  Brachioradialis 25 units  Flexor pollicis longus 25 units    Left lower extremity for equinovarus-total 200 unit  Tibias anterior 75 units,   FDL 50 units,   Medial head gastroc/lateral gastroc/soleus-75 units    tstibias posterior not done due to risk of bleeding and patient request    ,   She will continue with physical and Occupational physical therapy. 3.  Left hemiparesis with hemisensory deficits-.  Improved, no longer on Neurontin  4. Left frozen shoulder-continues  limited range of motion, she continues therapy and follow-up with orthopedics . 5. Frequent urination chronic for patient- improvement, she feels this is related to increased fluid. 6.  Right thumb hand pain- DeQuervains tenosynovitis. Improved  7. Reviewed falls and precautions, currently no trauma noted, she will notify if there is any residual from her previous fall. 8. carpal tunnel syndrome-noted on EMG, patient followed up with orthopedics and has had carpal tunnel release right hand. Notes improvement in pain however continued with sensory deficits. 9. Follow-up with hematology,neurology as well as psychiatry  10.   Occasional headaches-uses Fioricet-she is to follow-up with neurology, has attempted but unable to set up. We will have staff review  11. Follow-up 3 months for repeat Botox. 20 minutes spent with patient-       Return for Botox. No orders of the defined types were placed in this encounter. No orders of the defined types were placed in this encounter. Thank you for allowing me to participate in the care of this patient. I will keep you updated on this patient's follow up and I look forward to serving you and your patients again in the future. Gabrielle Sanz. Madison Miguel MD

## 2023-02-27 NOTE — PROGRESS NOTES
[facility] PHYSICIANS  [facility] PHYSICAL MEDICINE & REHABILITATION  [address] [place] [address] Alejandro Redman [address]  Dept: [phone]  Dept Fax: [phone]    Outpatient Followup Note     Vesta Barber, 48 y.o., female, presents for follow up c/o of   Chief Complaint   Patient presents with    Spasms     S/p botox from a couple of weeks ago   . Patient was lastseen on 1/30/2023 for Botox injection    HPI:     HPI   She comes in for follow-up post Botox injection. She feels left arm has done significantly better though still with some thumb and palm discomfort. Left leg has not become more spastic may be slight improvement. She did have some bruising left upper extremity but this is all resolved. She tolerated the procedure well with no sedation or respiratory difficulties etc.  She is overall pleased with the progress. She does also note left knee pain for which she feels she has osteoarthritis and plan to see orthopedics for possible injection and further evaluation. She continues with some difficulty right upper extremity post carpal tunnel release. Numbness and tingling is improved but still working on strengthening. She has follow-up with her surgeon. History last visit 1/30/2023  She comes in 1/30/2023 with her sister/  Has been cleared by hematology/PCP/GI for Botox injections. 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 She previously  had COVID improved quickly however then few days later had severe bleeding from her GI tract and bowels. Admitted to hospital for few days diagnosed with possible acute cholecystitis. Symptoms improved. She was discharged and eventually had a colonoscopy done by GI-was told this was normal.  She has had follow-up with GI/hematology and PCP-they have all cleared her for Botox injection, her hemoglobin has improved. She noted improvement with previous Botox.   In the left upper extremity she was pleased with the improvement does notice some continued mild tightness of elbow flexor and thumb and palm. In the left lower extremity she did not initially did not feel there is any significant provement however the last 2 weeks she is noted increased inversion and plantarflexion as the Botox is wearing off. She denies any side effects from medication. She did retry baclofen however did not tolerate no increase spasticity. She is returned back to Flexeril which appears to help. She had carpal tunnel release right upper extremity mid December. Notes pain is improved but still with some numbness and tingling. She has follow-up with orthopedics    She comes in today for Botox injection left upper and  left lower extremity. Last Botox injection in in October 28, 2022 then  . Continues with brace left lower extremity/AFO and left upper extremity. She continues doing range of motion. She comes in today due to significant increase spasticity left upper and left lower extremity. She would like proceed with Botox. She is aware of the risks and benefits which were discussed at length with her again today. She continues with spasms left upper and lower extremity, Zanaflex and baclofen actually increased her symptoms, she is doing well with Flexeril and Requip. She continues with occasional leakage from the bladder, possible stress incontinence. She has not follow-up with urology.-She notes she feels this is due to increased fluid intake. Her left shoulder, frozen shoulder, has improved. She continues to follow-up with orthopedics. She is seeing Dr. Mortimer Paganini in [facility]     She continues to follow with hematology /oncology and psychiatrist for bipolar disorder. She is no longer on Keppra        History     Rodrick Collier  is 48 y.o. right-handed     female admitted to the [address] [place] [address] unit on 12/9/2020. She was originally admitted to [facility]. [facility] on 11/30/2020 for L sided weakness and headache.   She presented originally to [facility] ED. CT confirmed R parietal ICH and moderate SAH. She was treated initially with cardene infusion for hypertension and loaded with Keppra. She was transferred to [facility] for further neurologic care. She was given DDAVP and 1 pack of platelets. ICH score 2. NIHSS 15. Neurosurgery evaluated - managed medically. 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 Inpatient Rehabilitation Course:   Suzette Albarran was admitted to inpatient rehabilitation on 2020. Rehab course:  Progress well from rehabilitation standpoint. Family training with . Very supportive. Obtained an AFO for left lower extremity and a left resting hand splint for left upper extremity. Spasticity medications adjusted. Blood pressure medications adjusted. Added Requip for restless leg syndrome. On melatonin at nighttime and gabapentin for sleep.       Past Medical History:   Diagnosis Date    Asthma     Bipolar 1 disorder ([facility] [address]     Delta storage pool disease [facility])     Hypertension     Psychiatric problem       Past Surgical History:   Procedure Laterality Date    CARDIAC CATHETERIZATION       SECTION  2179,9082    Miscarriage     CHOLECYSTECTOMY      COLONOSCOPY N/A 2020    -random bx(normal)hemorrhoids    GASTRIC BYPASS SURGERY      HYSTERECTOMY (CERVIX STATUS UNKNOWN)      KNEE SURGERY      LAPAROSCOPY      TONSILLECTOMY AND ADENOIDECTOMY      UPPER GASTROINTESTINAL ENDOSCOPY N/A 2020    (normal,neg H-Pylori)normal post-bypass endoscopy          Family History   Adopted: Yes   Family history unknown: Yes          Social History     Tobacco Use    Smoking status: Never    Smokeless tobacco: Never   Substance Use Topics    Alcohol use: Not Currently     Comment: no drinking;  is a recovering alcoholic           Current Outpatient Medications   Medication Sig Dispense Refill    cyclobenzaprine (FLEXERIL) 5 MG tablet Take 1 tablet by mouth three times daily as needed for muscle spasm 90 tablet 0    butalbital-acetaminophen-caffeine (FIORICET, ESGIC) -40 MG per tablet Take 1 tablet by mouth daily as needed for Headaches 6 tablet 0    desmopressin (DDAVP) 0.01 % solution 1 spray by Nasal route in the morning and 1 spray before bedtime. For 3 days before procedure. . 5 mL 2    ferrous sulfate (IRON 325) 325 (65 Fe) MG tablet Take 1 tablet by mouth in the morning and 1 tablet before bedtime. 60 tablet 5    metroNIDAZOLE (FLAGYL) 500 MG tablet TAKE 1 TABLET BY MOUTH EVERY 8 HOURS FOR 7 DAYS      topiramate (TOPAMAX) 25 MG tablet Week 1; one tab daily in am  Week 2; one tab in am and one tab in pm. 60 tablet 3    butalbital-acetaminophen-caffeine (FIORICET, ESGIC) -40 MG per tablet Take 1 tablet by mouth every 12 hours 12 tablet 0    lisinopril (PRINIVIL;ZESTRIL) 40 MG tablet       hydroCHLOROthiazide (HYDRODIURIL) 12.5 MG tablet TAKE 1 TABLET BY MOUTH ONCE DAILY      sertraline (ZOLOFT) 100 MG tablet TAKE 1 & 1 2 (ONE & ONE HALF) TABLETS BY MOUTH ONCE DAILY      rOPINIRole (REQUIP) 1 MG tablet Take 1 tablet by mouth nightly (Patient taking differently: Take 1 mg by mouth 3 times daily) 30 tablet 3    Blood Pressure KIT 1 each by Does not apply route daily 1 kit 0    buPROPion (WELLBUTRIN XL) 300 MG extended release tablet Take 1 tablet by mouth daily 30 tablet 3    amLODIPine (NORVASC) 5 MG tablet Take 1 tablet by mouth daily 30 tablet 3    lamoTRIgine (LAMICTAL) 200 MG tablet Take 400 mg by mouth daily 2 tabs       No current facility-administered medications for this visit.      Allergies   Allergen Reactions    Penicillin G Itching    Baclofen Other (See Comments)     Worse muscle spasms (made her feeling like she needed to \"crawl out of her skin\")    Pcn [Penicillins]     Sulfa Antibiotics Other (See Comments)     Inflammation in the eye         Subjective: Review of Systems  CONSTITUTIONAL: Negative for fever, chills, sweat, fatigue and unexpected weightchange. HEENT:  Negativefor diplopia, blind spots, blurred vision, hearing loss, trouble chewing or swallowing,denies coughing while eating or drinking denies nosebleed    RESPIRATORY: Negative for wheezing, coughing or shortness of breath    CARDIOVASCULAR: Negative for chest pain,palpitations, lightheadedness  GASTROINTESTINAL: Negative for heartburn, nausea,constipation, diarrhea, abdominal pain  or incontinence  GENTIOURNIARY: Negative for dysuria, urgency,frequency, incontinence and difficulty urinating. ENDOCRINE: Negative for hot or cold intolerance, deniesany significant weight change  MUSCULOSKELETAL: Negative for focal joint pain,back pain, neck pain and arthralgias. Robert Rod: Negative for focal weakness, numbness,tingling, balance loss, headaches or falls  BEHAVIOR/PSYCY: Denies depression,anxiety, memory loss or insomnia  SKIN: Denies ulcers, rashes or bruises         Objective:     Physical Exam   Temp 97.8 °F (36.6 °C)   Wt 278 lb (126.1 kg)   BMI 46.26 kg/m²     Nursing notes and vitals reviewed    CONSTITUTIONAL: She is oriented to person, place, and time. She appears well-developed and well-nourished.   HEENT: Pupils equal reactiveto light, exact motion intact, visual fields are full, no facial asymmetry, speech fluent, no dysarthria, comprehension intact, object naming intact, repetition intact,basic cognition intact  CARDIOVASCULAR:Heart regular rate and rhythm with no murmurs rubs or gallops   PULMONARY/CHEST: Clear to auscultation with no wheezes or crackles noted  ABDOMEN: Soft, nontender with positive bowel sounds, no guarding or rebound   NEUROLOGIC:    Orientation: Alert and oriented×3,    cranial nerves:  II through XII are intact,   Strength:5 out of 5 throughout right upper/ lower extremity, left upper extremity 1-2/5 left upper extremity, able to range fingers, thumb, wrist and elbow much more easily, improve range of motion though still some tightness in thumb and palm. -strength approximately 1-2/5 proximally- ,  Left lower extremity noted to have internal rotation of foot and plantar flexion-equina varus, though I am able to range the knee and ankle much better, less equina virus. Sensation:Intact to light touch and pinprick right upper and lower extremity, left side decreased sensation left face as well as left upper and lower extremity to light touch and pinprick   Balance: Intact   Deep tendon reflexes: Bilaterally equal and symmetric 2+ right,   Provocative maneuvers:  EXTREMITIES: No calf tenderness,, negative warmth, pulses are intact  SKIN: Skin is warm and dry. PSYCIATIRIC: normal mood and affect, behavior is normal. Thought content normal.  Ambulates with single-point cane and AFO with noted spastic hemiparesis left arm in flexion-appears less so than previously. Assessment:       Diagnosis Orders   1. Spasticity due to old stroke        2. Bleeding disorder ([address]        3. H/O intracranial hemorrhage        4. Intraparenchymal hemorrhage of brain ([address]        5. Bariatric surgery status        6. Bipolar 1 disorder ([address]        7. Platelet dysfunction (HCC)        8. Bleeding tendency ([address]             Plan:      1. Hemorrhagic CVA with left hemiparesis. She is making improvements-she is now ambulating with a cane. No falls. .  she continues a left ankle-foot orthoses, she continues with therapy r both PT and OT post Botox  2. Spastic left hemiparesis-notes increased spasms, notes baclofen and Zanaflex-increase her symptoms, she does not use Valium however would like to minimize due to history of alcohol abuse,. We discussed Dantrium-however she is concerned of using this medication due to risks with the liver. ..    Flexeril 5 mg 3 times daily- she notes this is helping, LFTs are okay 1/6/2022, retry it back from last visit has not tolerated increase spasticity. She is back on Flexeril. Lab work CBC/CMP from 1/28/2023 are within normal limits except hemoglobin 11.9 and platelets of [id number] she states is good for her  3. Status post Botox for left upper extremity fingers/wrist/elbow flexion-200 units, and left lower extremity for equina varus, - 200 units. Have discussed risks and benefits- we discussed risk of bleeding particularly with the tibialis posterior muscle-she would like to avoid that muscle at this time. Also discussed risk of increased weakness in the left lower extremity which could affect ability for transfers and ambulation. On 1/30/23 400 units of Botox a were reconstituted using preservative free sterile normal saline. Patient site was prepped and sterilized using Betadine and alcohol. Botox A was administered under sterile techniques using EMG guidance for elbow flexion, wrist flexion and finger flexion and equina varus 400 units today . Botox injected with back pressure being applied. No bleeding or blood. .  She tolerated procedure well no bleeding or bruising noted     Left upper extremity for fingers/wrist/elbow flexion-total 200 unit  flexor carpi radialis 25 units,   flexor carpi ulnaris 25 units,   FDP 25 units,   FDS 25 units and   biceps 50 units  Brachioradialis 25 units  Flexor pollicis longus 25 units    Left lower extremity for equinovarus-total 200 unit  Tibias anterior 75 units,   FDL 50 units,   Medial head gastroc/lateral gastroc/soleus-75 units    tstibias posterior not done due to risk of bleeding and patient request    ,   She will continue with physical and Occupational physical therapy. 3.  Left hemiparesis with hemisensory deficits-.  Improved, no longer on Neurontin  4. Left frozen shoulder-continues  limited range of motion, she continues therapy and follow-up with orthopedics . 5. Frequent urination chronic for patient- improvement, she feels this is related to increased fluid.   6.  Right thumb hand pain- DeQuervains tenosynovitis. Improved  7. Reviewed falls and precautions, currently no trauma noted, she will notify if there is any residual from her previous fall. 8. carpal tunnel syndrome-noted on EMG, patient followed up with orthopedics and has had carpal tunnel release right hand. Notes improvement in pain however continued with sensory deficits. 9. Follow-up with hematology,neurology as well as psychiatry  10. Occasional headaches-uses Fioricet-she is to follow-up with neurology, has attempted but unable to set up. We will have staff review  11. Follow-up 3 months for repeat Botox. 20 minutes spent with patient-       Return for Botox. No orders of the defined types were placed in this encounter. No orders of the defined types were placed in this encounter. Electronically signed by Jefry Rizvi. Adriana Ellington 2/27/2023 at 6:24 PM    Please note that this chart was generated usingvoice recognition Dragon dictation software. Although every effort was made toensure the accuracy of this automated transcription, some errors in transcriptionmay have occurred.

## 2023-03-16 DIAGNOSIS — R25.2 SPASTICITY DUE TO OLD STROKE: Primary | ICD-10-CM

## 2023-03-16 DIAGNOSIS — I69.398 SPASTICITY DUE TO OLD STROKE: Primary | ICD-10-CM

## 2023-03-16 DIAGNOSIS — G81.10 SPASTIC HEMIPARESIS (HCC): ICD-10-CM

## 2023-03-16 DIAGNOSIS — D69.1 PLATELET DYSFUNCTION (HCC): ICD-10-CM

## 2023-03-17 RX ORDER — CYCLOBENZAPRINE HCL 5 MG
5 TABLET ORAL 3 TIMES DAILY PRN
Qty: 90 TABLET | Refills: 1 | Status: SHIPPED | OUTPATIENT
Start: 2023-03-17 | End: 2023-05-16

## 2023-03-17 NOTE — TELEPHONE ENCOUNTER
Pharmacy sent for refill of cyclobenzaprine    Pt was last seen 02.27.23  Next fu is 05.02.23  Last refill given by the pharmacy was 02.21.23

## 2023-05-08 NOTE — TELEPHONE ENCOUNTER
Pharmacy and patient called for refill of flexeril. She was last seen 02.27.23  Next botox is not scheduled yet. Needs to be end of may/ beginning of June.   ( Please look at scheduled as to where to put her)    Last refilled by the pharmacy 04.15.23

## 2023-05-09 RX ORDER — CYCLOBENZAPRINE HCL 5 MG
TABLET ORAL
Qty: 90 TABLET | Refills: 1 | Status: SHIPPED | OUTPATIENT
Start: 2023-05-09

## 2023-07-18 DIAGNOSIS — G81.10 SPASTIC HEMIPARESIS (HCC): Primary | ICD-10-CM

## 2023-07-18 NOTE — TELEPHONE ENCOUNTER
Spoke with patient. She is rs for October for fu. Had to canc 07.20.23 at this time due to other issues. Holding off on botox in the leg but in October she might need botox in the arm. She is requesting refill of Flexeril be sent to her pharmacy. Pt is also wanting to know Dr. Franklyn Alvarado thoughts on the use of CBD. She has tried and thinks that it helps a little with her anxiety and the pain in her hamstrings but wanted to know if you knew anything about the use of it. She is taking it orally.

## 2023-07-19 ENCOUNTER — TELEPHONE (OUTPATIENT)
Dept: PHYSICAL MEDICINE AND REHAB | Age: 53
End: 2023-07-19

## 2023-07-19 RX ORDER — CYCLOBENZAPRINE HCL 5 MG
5 TABLET ORAL 3 TIMES DAILY PRN
Qty: 90 TABLET | Refills: 0 | Status: SHIPPED | OUTPATIENT
Start: 2023-07-19 | End: 2023-10-17

## 2023-07-19 NOTE — TELEPHONE ENCOUNTER
Called the patient 7/19/2023    Reviewed medications with Alecia Donaldson-refill of Flexeril. She notes she has been on this and her other psychiatric medications for last 6 -7 years with no difficulties. She notes she plans to follow with psychiatry for make medication adjustments or tapering. Reviewed with her to make sure he is aware of the Flexeril as this can react with other psychiatric medications i.e. possible serotonin type syndrome. Reviewed signs and symptoms of serotonin syndrome and to notify if any changes-mental status change, agitation, confusion, dry mouth, sweating, fevers, hypothermia, shock, diarrhea nausea, tremors, reflex changes, muscle rigidity, etc.. She will notify if she has any changes. Also noted she would like to hold off on Botox at this time. She will follow-up  in October. And will decide if she wants to do Botox at that time.

## 2023-08-14 DIAGNOSIS — G81.10 SPASTIC HEMIPARESIS (HCC): ICD-10-CM

## 2023-08-14 RX ORDER — CYCLOBENZAPRINE HCL 5 MG
5 TABLET ORAL 3 TIMES DAILY PRN
Qty: 90 TABLET | Refills: 1 | Status: SHIPPED | OUTPATIENT
Start: 2023-08-14 | End: 2023-11-12

## 2023-08-14 NOTE — TELEPHONE ENCOUNTER
Alecia needs refill of Flexeril. Last rx was on 7/19 with no refills. Confirmed she is taking TID. Her next appt is October. Thank you.

## 2023-08-15 NOTE — TELEPHONE ENCOUNTER
Left a message on vm informing patient of lab work to be completed( mailing to the patient)next month and refill had been sent in.   Asked her to call me back just to confirm the message
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
## 2023-10-12 ENCOUNTER — OFFICE VISIT (OUTPATIENT)
Dept: PHYSICAL MEDICINE AND REHAB | Age: 53
End: 2023-10-12
Payer: COMMERCIAL

## 2023-10-12 VITALS
SYSTOLIC BLOOD PRESSURE: 132 MMHG | BODY MASS INDEX: 47.06 KG/M2 | DIASTOLIC BLOOD PRESSURE: 80 MMHG | WEIGHT: 282.8 LBS | HEART RATE: 90 BPM | TEMPERATURE: 97.7 F

## 2023-10-12 DIAGNOSIS — D69.1 DELTA STORAGE POOL DISEASE (HCC): ICD-10-CM

## 2023-10-12 DIAGNOSIS — M75.02 ADHESIVE CAPSULITIS OF LEFT SHOULDER: ICD-10-CM

## 2023-10-12 DIAGNOSIS — F31.9 BIPOLAR 1 DISORDER (HCC): ICD-10-CM

## 2023-10-12 DIAGNOSIS — G56.01 CARPAL TUNNEL SYNDROME OF RIGHT WRIST: ICD-10-CM

## 2023-10-12 DIAGNOSIS — I10 ESSENTIAL HYPERTENSION: ICD-10-CM

## 2023-10-12 DIAGNOSIS — G81.10 SPASTIC HEMIPARESIS (HCC): ICD-10-CM

## 2023-10-12 DIAGNOSIS — R25.2 SPASTICITY DUE TO OLD STROKE: Primary | ICD-10-CM

## 2023-10-12 DIAGNOSIS — M62.838 SPASM OF MUSCLE: ICD-10-CM

## 2023-10-12 DIAGNOSIS — D69.1 PLATELET DYSFUNCTION (HCC): ICD-10-CM

## 2023-10-12 DIAGNOSIS — I69.398 SPASTICITY DUE TO OLD STROKE: Primary | ICD-10-CM

## 2023-10-12 DIAGNOSIS — D69.9 BLEEDING DISORDER (HCC): ICD-10-CM

## 2023-10-12 DIAGNOSIS — Z86.79 H/O INTRACRANIAL HEMORRHAGE: ICD-10-CM

## 2023-10-12 PROCEDURE — G8417 CALC BMI ABV UP PARAM F/U: HCPCS | Performed by: PHYSICAL MEDICINE & REHABILITATION

## 2023-10-12 PROCEDURE — G8484 FLU IMMUNIZE NO ADMIN: HCPCS | Performed by: PHYSICAL MEDICINE & REHABILITATION

## 2023-10-12 PROCEDURE — 99214 OFFICE O/P EST MOD 30 MIN: CPT | Performed by: PHYSICAL MEDICINE & REHABILITATION

## 2023-10-12 PROCEDURE — 3075F SYST BP GE 130 - 139MM HG: CPT | Performed by: PHYSICAL MEDICINE & REHABILITATION

## 2023-10-12 PROCEDURE — 3017F COLORECTAL CA SCREEN DOC REV: CPT | Performed by: PHYSICAL MEDICINE & REHABILITATION

## 2023-10-12 PROCEDURE — 1036F TOBACCO NON-USER: CPT | Performed by: PHYSICAL MEDICINE & REHABILITATION

## 2023-10-12 PROCEDURE — 3078F DIAST BP <80 MM HG: CPT | Performed by: PHYSICAL MEDICINE & REHABILITATION

## 2023-10-12 PROCEDURE — G8427 DOCREV CUR MEDS BY ELIG CLIN: HCPCS | Performed by: PHYSICAL MEDICINE & REHABILITATION

## 2023-10-12 RX ORDER — CYCLOBENZAPRINE HCL 5 MG
5 TABLET ORAL 3 TIMES DAILY PRN
Qty: 90 TABLET | Refills: 1 | Status: SHIPPED | OUTPATIENT
Start: 2023-10-12 | End: 2024-01-10

## 2023-10-12 RX ORDER — HYDROXYZINE HYDROCHLORIDE 25 MG/1
25 TABLET, FILM COATED ORAL 3 TIMES DAILY
COMMUNITY
Start: 2023-10-09

## 2023-12-01 DIAGNOSIS — G81.10 SPASTIC HEMIPARESIS (HCC): ICD-10-CM

## 2023-12-01 RX ORDER — CYCLOBENZAPRINE HCL 5 MG
5 TABLET ORAL 3 TIMES DAILY PRN
Qty: 90 TABLET | Refills: 0 | Status: SHIPPED | OUTPATIENT
Start: 2023-12-01

## 2023-12-01 NOTE — TELEPHONE ENCOUNTER
Pharmacy/pt request refill of cyclobenzaprine.     Pt was last seen 10.12.23  Next appt is 02.12.2024  Last refill given 10.12.23

## 2023-12-26 DIAGNOSIS — G81.10 SPASTIC HEMIPARESIS (HCC): ICD-10-CM

## 2023-12-26 NOTE — TELEPHONE ENCOUNTER
Pharmacy sent request for refill of cyclobenzaprine. Last filled by the pharmacy on 12.01.23. Pt next appt is 02.12.24  Pt was last seen 10.12.23      Called patient to ask if lab work was completed and where. No answer.   Had to leave a message for patient to call back    Her last set of labs was in April 2023 (located in [employer])

## 2023-12-27 RX ORDER — CYCLOBENZAPRINE HCL 5 MG
5 TABLET ORAL 3 TIMES DAILY PRN
Qty: 90 TABLET | Refills: 0 | Status: SHIPPED | OUTPATIENT
Start: 2023-12-27

## 2023-12-27 NOTE — TELEPHONE ENCOUNTER
Patient replied via [facility]. Has an appointment to get labs done tomorrow. She is not able to drive herself so had to wait until her  could take her. Please advise.

## 2023-12-27 NOTE — TELEPHONE ENCOUNTER
Patient left voicemail at main office to check status of this refill request. I was on another line when she called. Called patient back right away, but phone went straight to voicemail, so was not able to check the status of labs. I left a detailed message. Additionally sent [employer] message to try to check status of labs. Advised her Dr. Katalina Ayala couldn't sign the refill request until we knew the status of the labs. Have not received call or message back yet.

## 2023-12-28 LAB
ALBUMIN SERPL-MCNC: 4.3 G/DL
ALP BLD-CCNC: 109 U/L
ALT SERPL-CCNC: 12 U/L
ANION GAP SERPL CALCULATED.3IONS-SCNC: NORMAL MMOL/L
AST SERPL-CCNC: 14 U/L
BILIRUB SERPL-MCNC: 0.2 MG/DL (ref 0.1–1.4)
BUN BLDV-MCNC: 14 MG/DL
CALCIUM SERPL-MCNC: 9.4 MG/DL
CHLORIDE BLD-SCNC: 103 MMOL/L
CO2: 21 MMOL/L
CREAT SERPL-MCNC: 0.68 MG/DL
EGFR: 104
GLUCOSE BLD-MCNC: 114 MG/DL
POTASSIUM SERPL-SCNC: 21 MMOL/L
SODIUM BLD-SCNC: 4.1 MMOL/L
TOTAL PROTEIN: 7.6

## 2023-12-29 LAB
BASOPHILS ABSOLUTE: 59 /ΜL
BASOPHILS RELATIVE PERCENT: 0.7 %
EOSINOPHILS ABSOLUTE: 109 /ΜL
EOSINOPHILS RELATIVE PERCENT: 1.3 %
HCT VFR BLD CALC: 31.3 % (ref 36–46)
HEMOGLOBIN: 10.1 G/DL (ref 12–16)
LYMPHOCYTES ABSOLUTE: 2058 /ΜL
LYMPHOCYTES RELATIVE PERCENT: 24.5 %
MCH RBC QN AUTO: 24.5 PG
MCHC RBC AUTO-ENTMCNC: 32.3 G/DL
MCV RBC AUTO: ABNORMAL FL
MONOCYTES ABSOLUTE: 672 /ΜL
MONOCYTES RELATIVE PERCENT: 8 %
NEUTROPHILS ABSOLUTE: 5502 /ΜL
NEUTROPHILS RELATIVE PERCENT: 65.5 %
PLATELET # BLD: 519 K/ΜL
PMV BLD AUTO: 9.5 FL
RBC # BLD: 4.13 10^6/ΜL
WBC # BLD: 8.4 10^3/ML

## 2024-01-09 DIAGNOSIS — G81.10 SPASTIC HEMIPARESIS (HCC): ICD-10-CM

## 2024-01-09 DIAGNOSIS — D69.9 BLEEDING DISORDER (HCC): ICD-10-CM

## 2024-02-05 DIAGNOSIS — G81.10 SPASTIC HEMIPARESIS (HCC): ICD-10-CM

## 2024-02-06 RX ORDER — CYCLOBENZAPRINE HCL 5 MG
5 TABLET ORAL 3 TIMES DAILY PRN
Qty: 90 TABLET | Refills: 0 | Status: SHIPPED | OUTPATIENT
Start: 2024-02-06

## 2024-02-07 ENCOUNTER — TELEMEDICINE (OUTPATIENT)
Dept: ONCOLOGY | Age: 54
End: 2024-02-07
Payer: COMMERCIAL

## 2024-02-07 DIAGNOSIS — D69.1 PLATELET DYSFUNCTION (HCC): ICD-10-CM

## 2024-02-07 DIAGNOSIS — D69.9 BLEEDING TENDENCY (HCC): Primary | ICD-10-CM

## 2024-02-07 DIAGNOSIS — D64.9 ANEMIA, UNSPECIFIED TYPE: ICD-10-CM

## 2024-02-07 PROCEDURE — G8427 DOCREV CUR MEDS BY ELIG CLIN: HCPCS | Performed by: INTERNAL MEDICINE

## 2024-02-07 PROCEDURE — 3017F COLORECTAL CA SCREEN DOC REV: CPT | Performed by: INTERNAL MEDICINE

## 2024-02-07 PROCEDURE — 99213 OFFICE O/P EST LOW 20 MIN: CPT | Performed by: INTERNAL MEDICINE

## 2024-02-07 RX ORDER — DULOXETIN HYDROCHLORIDE 60 MG/1
CAPSULE, DELAYED RELEASE ORAL
COMMUNITY
Start: 2024-01-16

## 2024-02-07 NOTE — PROGRESS NOTES
needed      Junior Vale MD  Hematologist/Medical Oncologist  [facility]    Alecia Donaldson, was evaluated through a synchronous (real-time) audio-video encounter. The patient (or guardian if applicable) is aware that this is a billable service, which includes applicable co-pays. This Virtual Visit was conducted with patient's (and/or legal guardian's) consent. Patient identification was verified, and a caregiver was present when appropriate.   The patient was located at Home: [address] [place] [address]  Provider was located at Facility (Appt Dept): [address] [place] [address]   yes       Total time spent for this encounter: Not billed by time    --Junior Vale MD on 2/7/2024 at 2:33 PM    An electronic signature was used to authenticate this note.   Attestation   The patient  being evaluated by a Virtual Visit (video visit) encounter to address concerns as mentioned above.  A caregiver was present when appropriate. Due to this being a TeleHealth encounter (During COVID-19 public health emergency), evaluation of the following organ systems was limited: Vitals/Constitutional/EENT/Resp/CV/GI//MS/Neuro/Skin/Heme-Lymph-Imm.  Pursuant to the emergency declaration under the Travis Act and the National Emergencies Act, 1135 waiver authority and the Coronavirus Preparedness and Response Supplemental Appropriations Act, this Virtual Visit was conducted with patient's (and/or legal guardian's) consent, to reduce the patient's risk of exposure to COVID-19 and provide necessary medical care.  The patient (and/or legal guardian) has also been advised to contact this office for worsening conditions or problems, and seek emergency medical treatment and/or call 911 if deemed necessary.     Services were provided through a video synchronous discussion virtually to substitute for in-person clinic visit. Patient and provider were located at their individual

## 2024-03-04 DIAGNOSIS — G81.10 SPASTIC HEMIPARESIS (HCC): ICD-10-CM

## 2024-03-04 RX ORDER — CYCLOBENZAPRINE HCL 5 MG
5 TABLET ORAL 3 TIMES DAILY PRN
Qty: 90 TABLET | Refills: 0 | Status: SHIPPED | OUTPATIENT
Start: 2024-03-04

## 2024-03-04 RX ORDER — CYCLOBENZAPRINE HCL 5 MG
5 TABLET ORAL 3 TIMES DAILY PRN
Qty: 90 TABLET | Refills: 0 | OUTPATIENT
Start: 2024-03-04

## 2024-03-04 NOTE — TELEPHONE ENCOUNTER
Pharmacy & patient requesting refill of: Cyclobenzaprine 5MG  Last ordered 2/6/24    Last Appt 10/12/23  Next Appt 03/13/24

## 2024-03-13 ENCOUNTER — OFFICE VISIT (OUTPATIENT)
Dept: PHYSICAL MEDICINE AND REHAB | Age: 54
End: 2024-03-13
Payer: COMMERCIAL

## 2024-03-13 VITALS — SYSTOLIC BLOOD PRESSURE: 100 MMHG | DIASTOLIC BLOOD PRESSURE: 55 MMHG | TEMPERATURE: 97.9 F | HEART RATE: 104 BPM

## 2024-03-13 DIAGNOSIS — M75.02 ADHESIVE CAPSULITIS OF LEFT SHOULDER: ICD-10-CM

## 2024-03-13 DIAGNOSIS — D69.9 BLEEDING TENDENCY (HCC): ICD-10-CM

## 2024-03-13 DIAGNOSIS — I61.9 INTRAPARENCHYMAL HEMORRHAGE OF BRAIN (HCC): ICD-10-CM

## 2024-03-13 DIAGNOSIS — D69.9 BLEEDING DISORDER (HCC): ICD-10-CM

## 2024-03-13 DIAGNOSIS — M62.838 SPASM OF MUSCLE: ICD-10-CM

## 2024-03-13 DIAGNOSIS — I69.398 SPASTICITY DUE TO OLD STROKE: ICD-10-CM

## 2024-03-13 DIAGNOSIS — F31.9 BIPOLAR 1 DISORDER (HCC): ICD-10-CM

## 2024-03-13 DIAGNOSIS — G81.10 SPASTIC HEMIPARESIS (HCC): ICD-10-CM

## 2024-03-13 DIAGNOSIS — R51.9 CHRONIC NONINTRACTABLE HEADACHE, UNSPECIFIED HEADACHE TYPE: ICD-10-CM

## 2024-03-13 DIAGNOSIS — G89.29 CHRONIC NONINTRACTABLE HEADACHE, UNSPECIFIED HEADACHE TYPE: ICD-10-CM

## 2024-03-13 DIAGNOSIS — D69.1 PLATELET DYSFUNCTION (HCC): ICD-10-CM

## 2024-03-13 DIAGNOSIS — R25.2 SPASTICITY DUE TO OLD STROKE: ICD-10-CM

## 2024-03-13 DIAGNOSIS — Z98.890 HISTORY OF CARPAL TUNNEL RELEASE: ICD-10-CM

## 2024-03-13 DIAGNOSIS — R29.6 FALLS FREQUENTLY: ICD-10-CM

## 2024-03-13 DIAGNOSIS — Z86.79 H/O INTRACRANIAL HEMORRHAGE: Primary | ICD-10-CM

## 2024-03-13 DIAGNOSIS — Z98.84 BARIATRIC SURGERY STATUS: ICD-10-CM

## 2024-03-13 DIAGNOSIS — D69.1 DELTA STORAGE POOL DISEASE (HCC): ICD-10-CM

## 2024-03-13 PROCEDURE — 3074F SYST BP LT 130 MM HG: CPT | Performed by: PHYSICAL MEDICINE & REHABILITATION

## 2024-03-13 PROCEDURE — 1036F TOBACCO NON-USER: CPT | Performed by: PHYSICAL MEDICINE & REHABILITATION

## 2024-03-13 PROCEDURE — 3017F COLORECTAL CA SCREEN DOC REV: CPT | Performed by: PHYSICAL MEDICINE & REHABILITATION

## 2024-03-13 PROCEDURE — G8484 FLU IMMUNIZE NO ADMIN: HCPCS | Performed by: PHYSICAL MEDICINE & REHABILITATION

## 2024-03-13 PROCEDURE — G8427 DOCREV CUR MEDS BY ELIG CLIN: HCPCS | Performed by: PHYSICAL MEDICINE & REHABILITATION

## 2024-03-13 PROCEDURE — 3078F DIAST BP <80 MM HG: CPT | Performed by: PHYSICAL MEDICINE & REHABILITATION

## 2024-03-13 PROCEDURE — 99214 OFFICE O/P EST MOD 30 MIN: CPT | Performed by: PHYSICAL MEDICINE & REHABILITATION

## 2024-03-13 PROCEDURE — G8417 CALC BMI ABV UP PARAM F/U: HCPCS | Performed by: PHYSICAL MEDICINE & REHABILITATION

## 2024-03-13 NOTE — PROGRESS NOTES
has returned, carpal tunnel release and trigger fingers have been completed.  She has follow-up with Ortho for repeat injection    Headaches have improved however she is still attempting get into neuro has called multiple times has not been able to make an appointment    She continues to follow with urology her primary care physician and oncology.  She continues with therapy for upper and lower extremity      History     Alecia Donaldson  is 50 y.o. right-handed     female admitted to the [facility] on 2020.  She was originally admitted to [facility] on 2020 for L sided weakness and headache.  She presented originally to [facility] ED. CT confirmed R parietal ICH and moderate SAH. She was treated initially with cardene infusion for hypertension and loaded with Keppra. She was transferred to [facility] for further neurologic care. She was given DDAVP and 1 pack of platelets. ICH score 2. NIHSS 15. Neurosurgery evaluated - managed medically. 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         Inpatient Rehabilitation Course:   Alecia Donaldson was admitted to inpatient rehabilitation on 2020.     Rehab course:  Progress well from rehabilitation standpoint.  Family training with .  Very supportive.  Obtained an AFO for left lower extremity and a left resting hand splint for left upper extremity.  Spasticity medications adjusted.  Blood pressure medications adjusted.  Added Requip for restless leg syndrome.  On melatonin at nighttime and gabapentin for sleep.      Past Medical History:   Diagnosis Date    Asthma     Bipolar 1 disorder (HCC)     COVID-19     Delta storage pool disease (HCC)     Hypertension     also has issues with hypotension    Psychiatric problem       Past Surgical History:   Procedure Laterality Date    CARDIAC CATHETERIZATION       SECTION  ,

## 2024-03-14 LAB
ALBUMIN SERPL-MCNC: NORMAL G/DL
ALP BLD-CCNC: NORMAL U/L
ALT SERPL-CCNC: NORMAL U/L
ANION GAP SERPL CALCULATED.3IONS-SCNC: NORMAL MMOL/L
AST SERPL-CCNC: NORMAL U/L
BASOPHILS ABSOLUTE: NORMAL
BASOPHILS RELATIVE PERCENT: NORMAL
BILIRUB SERPL-MCNC: NORMAL MG/DL
BUN BLDV-MCNC: NORMAL MG/DL
CALCIUM SERPL-MCNC: NORMAL MG/DL
CHLORIDE BLD-SCNC: NORMAL MMOL/L
CO2: NORMAL
CREAT SERPL-MCNC: NORMAL MG/DL
EGFR: NORMAL
EOSINOPHILS ABSOLUTE: NORMAL
EOSINOPHILS RELATIVE PERCENT: NORMAL
GLUCOSE BLD-MCNC: NORMAL MG/DL
HCT VFR BLD CALC: NORMAL %
HEMOGLOBIN: NORMAL
LYMPHOCYTES ABSOLUTE: NORMAL
LYMPHOCYTES RELATIVE PERCENT: NORMAL
MCH RBC QN AUTO: NORMAL PG
MCHC RBC AUTO-ENTMCNC: NORMAL G/DL
MCV RBC AUTO: NORMAL FL
MONOCYTES ABSOLUTE: NORMAL
MONOCYTES RELATIVE PERCENT: NORMAL
NEUTROPHILS ABSOLUTE: NORMAL
NEUTROPHILS RELATIVE PERCENT: NORMAL
PLATELET # BLD: NORMAL 10*3/UL
PMV BLD AUTO: NORMAL FL
POTASSIUM SERPL-SCNC: NORMAL MMOL/L
RBC # BLD: NORMAL 10*6/UL
SODIUM BLD-SCNC: NORMAL MMOL/L
TOTAL PROTEIN: NORMAL
WBC # BLD: NORMAL 10*3/UL

## 2024-03-21 ENCOUNTER — TELEPHONE (OUTPATIENT)
Dept: ONCOLOGY | Age: 54
End: 2024-03-21

## 2024-03-21 ENCOUNTER — TELEPHONE (OUTPATIENT)
Dept: PHYSICAL MEDICINE AND REHAB | Age: 54
End: 2024-03-21

## 2024-03-21 DIAGNOSIS — Z86.79 H/O INTRACRANIAL HEMORRHAGE: ICD-10-CM

## 2024-03-21 DIAGNOSIS — R25.2 SPASTICITY DUE TO OLD STROKE: ICD-10-CM

## 2024-03-21 DIAGNOSIS — D69.1 PLATELET DYSFUNCTION (HCC): ICD-10-CM

## 2024-03-21 DIAGNOSIS — M62.838 SPASM OF MUSCLE: ICD-10-CM

## 2024-03-21 DIAGNOSIS — I69.398 SPASTICITY DUE TO OLD STROKE: ICD-10-CM

## 2024-03-21 DIAGNOSIS — D69.9 BLEEDING TENDENCY (HCC): ICD-10-CM

## 2024-03-21 DIAGNOSIS — I61.9 INTRAPARENCHYMAL HEMORRHAGE OF BRAIN (HCC): ICD-10-CM

## 2024-03-21 NOTE — TELEPHONE ENCOUNTER
Patient had recent labs done in [place] for Dr. Smiley and her pcp. The results were faxed here for Dr. Smiley to review the labs he ordered.   Dr. Smiley did ask that the patient follow up with PCP for any abnormal result.   I left a message for patient with that information and that she would receive a copy in the mail.   Patient requested copy be mailed to her.

## 2024-03-21 NOTE — TELEPHONE ENCOUNTER
Called patient and left voice mail informing that [facility] faxed her lab results here and asked that abnormal iron studies be addressed.  Discussed with Dr. Vale who recommends patient be seen in the next few weeks to discuss.  Requested patient call back here to arrange an appointment.

## 2024-03-26 DIAGNOSIS — G81.10 SPASTIC HEMIPARESIS (HCC): ICD-10-CM

## 2024-03-26 RX ORDER — CYCLOBENZAPRINE HCL 5 MG
5 TABLET ORAL 3 TIMES DAILY PRN
Qty: 90 TABLET | Refills: 2 | Status: SHIPPED | OUTPATIENT
Start: 2024-03-26

## 2024-03-26 NOTE — TELEPHONE ENCOUNTER
Pharmacy sent request for refill of cyclobenzaprine.    Pt was last seen 03.13.24  Next fu is scheduled 06.19.24    Recent labs are scanned into chart

## 2024-04-03 ENCOUNTER — HOSPITAL ENCOUNTER (OUTPATIENT)
Dept: CT IMAGING | Age: 54
Discharge: HOME | End: 2024-04-05
Attending: PHYSICAL MEDICINE & REHABILITATION
Payer: COMMERCIAL

## 2024-04-03 DIAGNOSIS — I61.9 INTRAPARENCHYMAL HEMORRHAGE OF BRAIN (HCC): ICD-10-CM

## 2024-04-03 DIAGNOSIS — D69.1 PLATELET DYSFUNCTION (HCC): ICD-10-CM

## 2024-04-03 DIAGNOSIS — R29.6 FALLS FREQUENTLY: ICD-10-CM

## 2024-04-03 DIAGNOSIS — Z86.79 H/O INTRACRANIAL HEMORRHAGE: ICD-10-CM

## 2024-04-03 DIAGNOSIS — D69.9 BLEEDING TENDENCY (HCC): ICD-10-CM

## 2024-04-03 DIAGNOSIS — G89.29 CHRONIC NONINTRACTABLE HEADACHE, UNSPECIFIED HEADACHE TYPE: ICD-10-CM

## 2024-04-03 DIAGNOSIS — R51.9 CHRONIC NONINTRACTABLE HEADACHE, UNSPECIFIED HEADACHE TYPE: ICD-10-CM

## 2024-04-03 PROCEDURE — 70450 CT HEAD/BRAIN W/O DYE: CPT

## 2024-04-12 NOTE — PLAN OF CARE
Problem: Skin Integrity:  Goal: Will show no infection signs and symptoms  Description: Will show no infection signs and symptoms  12/30/2020 0307 by Portia Patel RN  Outcome: Ongoing     Problem: Skin Integrity:  Goal: Absence of new skin breakdown  Description: Absence of new skin breakdown  12/30/2020 0307 by Portia Patel RN  Outcome: Ongoing     Problem: Falls - Risk of:  Goal: Will remain free from falls  Description: Will remain free from falls  12/30/2020 0307 by Portia Patel RN  Outcome: Ongoing     Problem: Falls - Risk of:  Goal: Absence of physical injury  Description: Absence of physical injury  12/30/2020 0307 by Portia Patel RN  Outcome: Ongoing     Problem: Pain:  Goal: Pain level will decrease  Description: Pain level will decrease  12/30/2020 0307 by Portia Patel RN  Outcome: Ongoing     Problem: Pain:  Goal: Control of chronic pain  Description: Control of chronic pain  12/30/2020 0307 by Portia Patel RN  Outcome: Ongoing     Problem: Mobility - Impaired:  Goal: Mobility will improve  Description: Mobility will improve  12/30/2020 0307 by Portia Patel RN  Outcome: Ongoing [phone]

## 2024-04-17 ENCOUNTER — TELEMEDICINE (OUTPATIENT)
Dept: ONCOLOGY | Age: 54
End: 2024-04-17
Payer: COMMERCIAL

## 2024-04-17 DIAGNOSIS — D69.9 BLEEDING TENDENCY (HCC): Primary | ICD-10-CM

## 2024-04-17 DIAGNOSIS — D50.0 IRON DEFICIENCY ANEMIA DUE TO CHRONIC BLOOD LOSS: ICD-10-CM

## 2024-04-17 DIAGNOSIS — D69.1 PLATELET DYSFUNCTION (HCC): ICD-10-CM

## 2024-04-17 PROCEDURE — 99213 OFFICE O/P EST LOW 20 MIN: CPT | Performed by: INTERNAL MEDICINE

## 2024-04-17 PROCEDURE — G8427 DOCREV CUR MEDS BY ELIG CLIN: HCPCS | Performed by: INTERNAL MEDICINE

## 2024-04-17 PROCEDURE — 3017F COLORECTAL CA SCREEN DOC REV: CPT | Performed by: INTERNAL MEDICINE

## 2024-04-17 RX ORDER — TRAZODONE HYDROCHLORIDE 50 MG/1
TABLET ORAL
COMMUNITY
Start: 2024-03-25

## 2024-04-17 RX ORDER — CEPHALEXIN 500 MG/1
500 CAPSULE ORAL 3 TIMES DAILY
COMMUNITY
Start: 2024-04-11

## 2024-04-17 RX ORDER — MIRABEGRON 50 MG/1
TABLET, FILM COATED, EXTENDED RELEASE ORAL
COMMUNITY
Start: 2024-03-04
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
## 2024-05-14 ENCOUNTER — OFFICE VISIT (OUTPATIENT)
Dept: NEUROLOGY | Age: 54
End: 2024-05-14
Payer: COMMERCIAL

## 2024-05-14 VITALS
SYSTOLIC BLOOD PRESSURE: 133 MMHG | BODY MASS INDEX: 47.48 KG/M2 | HEIGHT: 65 IN | WEIGHT: 285 LBS | DIASTOLIC BLOOD PRESSURE: 80 MMHG | HEART RATE: 96 BPM

## 2024-05-14 DIAGNOSIS — I69.354 HEMIPLEGIA AND HEMIPARESIS FOLLOWING CEREBRAL INFARCTION AFFECTING LEFT NON-DOMINANT SIDE (HCC): ICD-10-CM

## 2024-05-14 DIAGNOSIS — I61.9 INTRAPARENCHYMAL HEMORRHAGE OF BRAIN (HCC): Primary | ICD-10-CM

## 2024-05-14 DIAGNOSIS — R25.2 SPASTICITY DUE TO OLD STROKE: ICD-10-CM

## 2024-05-14 DIAGNOSIS — I69.398 SPASTICITY DUE TO OLD STROKE: ICD-10-CM

## 2024-05-14 DIAGNOSIS — R47.89 WORD FINDING DIFFICULTY: ICD-10-CM

## 2024-05-14 PROCEDURE — 3017F COLORECTAL CA SCREEN DOC REV: CPT | Performed by: PHYSICIAN ASSISTANT

## 2024-05-14 PROCEDURE — G8427 DOCREV CUR MEDS BY ELIG CLIN: HCPCS | Performed by: PHYSICIAN ASSISTANT

## 2024-05-14 PROCEDURE — 3079F DIAST BP 80-89 MM HG: CPT | Performed by: PHYSICIAN ASSISTANT

## 2024-05-14 PROCEDURE — 1036F TOBACCO NON-USER: CPT | Performed by: PHYSICIAN ASSISTANT

## 2024-05-14 PROCEDURE — 99204 OFFICE O/P NEW MOD 45 MIN: CPT | Performed by: PHYSICIAN ASSISTANT

## 2024-05-14 PROCEDURE — 3075F SYST BP GE 130 - 139MM HG: CPT | Performed by: PHYSICIAN ASSISTANT

## 2024-05-14 PROCEDURE — G8417 CALC BMI ABV UP PARAM F/U: HCPCS | Performed by: PHYSICIAN ASSISTANT

## 2024-05-14 RX ORDER — HYDROCHLOROTHIAZIDE 12.5 MG/1
12.5 TABLET ORAL DAILY PRN
COMMUNITY

## 2024-05-14 NOTE — PROGRESS NOTES
[id number] [facility] [address]  Dept: [phone]    PATIENT NAME: Alecia Donaldson  PATIENT MRN: [id number]  PRIMARY CARE PHYSICIAN: Jonny Pollock DO    HPI:      Alecia Donaldson is a 54 y.o. female who presents to clinic today regarding history of right parietal hemorrhagic infarct in 2020; believed to be secondary to hypertension and delta storage pool deficiency. Other medical history is significant for bipolar 1.    Patient repots that generally she has done well in the years since her stroke. Chronic symptoms as sequela of stroke include imbalance, left leg and arm weakness, short term memory difficulties, word-finding issues. There is a left AFO. Utilizes wheelchair when outside of the house. There is spasticity worse for left arm than leg.     She follows with PM&R q3. For spasticity she receives flexeril 5 mg (failed baclofen, tizanidine, valium.). Takes Requip 1 mg TID for RLS. She does not utilize botox for spasticity and is not interested in this.     Remains enrolled in physical therapy. She never received speech/ cognitive therapy.     She has not had progression of of weakness. No concern for seizures, although she takes Lamictal for mood stabilization.  No convulsions, tongue biting, unresponsive episodes, focal weakness, confusion..       PREVIOUS WORKUP:     DSA showed no aneurysm or AVM     CT head wo April 2024: No acute intracranial abnormality.  Chronic encephalomalacia in the right parietal lobe.     MRA head Jan 2022: Unremarkable MRA of the brain.     MRI brain w wo Nov 2021: INTRACRANIAL STRUCTURES/VENTRICLES:  The sellar and suprasellar structures,  optic chiasm, corpus callosum, pineal gland, tectum, and midline brainstem  structures are unremarkable.  The craniocervical junction is unremarkable.  There is a remote right parietal lobe infarct with cystic encephalomalacia.  There is adjacent FLAIR signal abnormality.  There is no acute intracranial  hemorrhage, mass

## 2024-05-16 DIAGNOSIS — G81.10 SPASTIC HEMIPARESIS (HCC): ICD-10-CM

## 2024-05-16 NOTE — TELEPHONE ENCOUNTER
Pt is requesting 90 days for her Flexeril.  Last filled 3/26/24 with 2 refills - 30 day script  Last appt 3/13/24  Next appt 6/19/24

## 2024-05-17 RX ORDER — CYCLOBENZAPRINE HCL 5 MG
TABLET ORAL
Qty: 270 TABLET | Refills: 1 | Status: SHIPPED | OUTPATIENT
Start: 2024-05-17

## 2024-06-14 DIAGNOSIS — D50.0 IRON DEFICIENCY ANEMIA DUE TO CHRONIC BLOOD LOSS: ICD-10-CM

## 2024-06-14 DIAGNOSIS — D69.9 BLEEDING TENDENCY (HCC): ICD-10-CM

## 2024-06-14 DIAGNOSIS — D69.1 PLATELET DYSFUNCTION (HCC): ICD-10-CM

## 2024-06-19 ENCOUNTER — OFFICE VISIT (OUTPATIENT)
Dept: PHYSICAL MEDICINE AND REHAB | Age: 54
End: 2024-06-19
Payer: COMMERCIAL

## 2024-06-19 VITALS
DIASTOLIC BLOOD PRESSURE: 88 MMHG | WEIGHT: 290.2 LBS | OXYGEN SATURATION: 97 % | TEMPERATURE: 97.9 F | SYSTOLIC BLOOD PRESSURE: 138 MMHG | HEART RATE: 97 BPM | BODY MASS INDEX: 48.29 KG/M2

## 2024-06-19 DIAGNOSIS — R25.2 SPASTICITY DUE TO OLD STROKE: ICD-10-CM

## 2024-06-19 DIAGNOSIS — Z86.79 H/O INTRACRANIAL HEMORRHAGE: ICD-10-CM

## 2024-06-19 DIAGNOSIS — Z98.890 HISTORY OF CARPAL TUNNEL RELEASE: ICD-10-CM

## 2024-06-19 DIAGNOSIS — D69.9 BLEEDING DISORDER (HCC): ICD-10-CM

## 2024-06-19 DIAGNOSIS — I69.398 SPASTICITY DUE TO OLD STROKE: ICD-10-CM

## 2024-06-19 DIAGNOSIS — D69.1 DELTA STORAGE POOL DISEASE (HCC): ICD-10-CM

## 2024-06-19 DIAGNOSIS — D69.1 PLATELET DYSFUNCTION (HCC): ICD-10-CM

## 2024-06-19 DIAGNOSIS — I10 ESSENTIAL HYPERTENSION: ICD-10-CM

## 2024-06-19 DIAGNOSIS — I61.9 INTRAPARENCHYMAL HEMORRHAGE OF BRAIN (HCC): ICD-10-CM

## 2024-06-19 DIAGNOSIS — F31.9 BIPOLAR 1 DISORDER (HCC): ICD-10-CM

## 2024-06-19 DIAGNOSIS — Z98.84 BARIATRIC SURGERY STATUS: ICD-10-CM

## 2024-06-19 DIAGNOSIS — G81.10 SPASTIC HEMIPARESIS (HCC): Primary | ICD-10-CM

## 2024-06-19 PROCEDURE — 3075F SYST BP GE 130 - 139MM HG: CPT | Performed by: PHYSICAL MEDICINE & REHABILITATION

## 2024-06-19 PROCEDURE — 99214 OFFICE O/P EST MOD 30 MIN: CPT | Performed by: PHYSICAL MEDICINE & REHABILITATION

## 2024-06-19 PROCEDURE — G8417 CALC BMI ABV UP PARAM F/U: HCPCS | Performed by: PHYSICAL MEDICINE & REHABILITATION

## 2024-06-19 PROCEDURE — G8427 DOCREV CUR MEDS BY ELIG CLIN: HCPCS | Performed by: PHYSICAL MEDICINE & REHABILITATION

## 2024-06-19 PROCEDURE — 1036F TOBACCO NON-USER: CPT | Performed by: PHYSICAL MEDICINE & REHABILITATION

## 2024-06-19 PROCEDURE — 3079F DIAST BP 80-89 MM HG: CPT | Performed by: PHYSICAL MEDICINE & REHABILITATION

## 2024-06-19 PROCEDURE — 3017F COLORECTAL CA SCREEN DOC REV: CPT | Performed by: PHYSICAL MEDICINE & REHABILITATION

## 2024-06-19 NOTE — PROGRESS NOTES
TONSILLECTOMY AND ADENOIDECTOMY  1988    UPPER GASTROINTESTINAL ENDOSCOPY N/A 02/03/2020    -bx(normal,neg H-Pylori)normal post-bypass endoscopy          Family History   Adopted: Yes   Family history unknown: Yes          Social History     Tobacco Use    Smoking status: Never    Smokeless tobacco: Never   Substance Use Topics    Alcohol use: Not Currently     Comment: no drinking;  is a recovering alcoholic           Current Outpatient Medications   Medication Sig Dispense Refill    cyclobenzaprine (FLEXERIL) 5 MG tablet Take 1 tablet by mouth three times daily as needed for muscle spasm 270 tablet 1    hydroCHLOROthiazide 12.5 MG tablet Take 1 tablet by mouth daily as needed      MYRBETRIQ 50 MG TB24 TAKE 1 TABLET BY MOUTH ONCE DAILY DO NOT CRUSH OR CHEW      traZODone (DESYREL) 50 MG tablet TAKE 1/2 TO 1 (ONE-HALF TO ONE) TABLET BY MOUTH AT BEDTIME AS NEEDED FOR INSOMNIA      DULoxetine (CYMBALTA) 60 MG extended release capsule TAKE 1 CAPSULE BY MOUTH ONCE DAILY REDUCE SERTRALINE  MG FOR 3 DAYS, THEN REDUCE  MG FOR 3 DAYS THEN REDUCE TO 50 MG FOR 3 DAYS THEN STOP. THEN START DULOXETINE 60 MG ON THE DAY 10      ferrous sulfate (IRON 325) 325 (65 Fe) MG tablet Take 1 tablet by mouth in the morning and 1 tablet before bedtime. 60 tablet 5    lisinopril (PRINIVIL;ZESTRIL) 40 MG tablet Take 0.5 tablets by mouth daily      rOPINIRole (REQUIP) 1 MG tablet Take 1 tablet by mouth nightly (Patient taking differently: Take 1 tablet by mouth 3 times daily) 30 tablet 3    Blood Pressure KIT 1 each by Does not apply route daily 1 kit 0    amLODIPine (NORVASC) 5 MG tablet Take 1 tablet by mouth daily 30 tablet 3    lamoTRIgine (LAMICTAL) 200 MG tablet Take 2 tablets by mouth daily 2 tabs      mupirocin (BACTROBAN) 2 % ointment  (Patient not taking: Reported on 6/19/2024)      desmopressin (DDAVP) 0.01 % solution 1 spray by Nasal route in the morning and 1 spray before bedtime. For 3 days before procedure..

## 2024-07-10 ENCOUNTER — TELEMEDICINE (OUTPATIENT)
Dept: ONCOLOGY | Age: 54
End: 2024-07-10
Payer: COMMERCIAL

## 2024-07-10 DIAGNOSIS — K90.9 IRON MALABSORPTION: ICD-10-CM

## 2024-07-10 DIAGNOSIS — D50.0 IRON DEFICIENCY ANEMIA DUE TO CHRONIC BLOOD LOSS: Primary | ICD-10-CM

## 2024-07-10 PROCEDURE — 3017F COLORECTAL CA SCREEN DOC REV: CPT | Performed by: INTERNAL MEDICINE

## 2024-07-10 PROCEDURE — G8428 CUR MEDS NOT DOCUMENT: HCPCS | Performed by: INTERNAL MEDICINE

## 2024-07-10 PROCEDURE — 99214 OFFICE O/P EST MOD 30 MIN: CPT | Performed by: INTERNAL MEDICINE

## 2024-07-10 RX ORDER — SODIUM CHLORIDE 0.9 % (FLUSH) 0.9 %
5-40 SYRINGE (ML) INJECTION PRN
OUTPATIENT
Start: 2024-07-11

## 2024-07-10 RX ORDER — EPINEPHRINE 1 MG/ML
0.3 INJECTION, SOLUTION, CONCENTRATE INTRAVENOUS PRN
OUTPATIENT
Start: 2024-07-11

## 2024-07-10 RX ORDER — HEPARIN SODIUM (PORCINE) LOCK FLUSH IV SOLN 100 UNIT/ML 100 UNIT/ML
500 SOLUTION INTRAVENOUS PRN
OUTPATIENT
Start: 2024-07-11

## 2024-07-10 RX ORDER — DIPHENHYDRAMINE HYDROCHLORIDE 50 MG/ML
50 INJECTION INTRAMUSCULAR; INTRAVENOUS
OUTPATIENT
Start: 2024-07-11

## 2024-07-10 RX ORDER — ONDANSETRON 2 MG/ML
8 INJECTION INTRAMUSCULAR; INTRAVENOUS
OUTPATIENT
Start: 2024-07-11

## 2024-07-10 RX ORDER — MEPERIDINE HYDROCHLORIDE 50 MG/ML
12.5 INJECTION INTRAMUSCULAR; INTRAVENOUS; SUBCUTANEOUS PRN
OUTPATIENT
Start: 2024-07-11

## 2024-07-10 RX ORDER — ACETAMINOPHEN 325 MG/1
650 TABLET ORAL ONCE
OUTPATIENT
Start: 2024-07-11 | End: 2024-07-11

## 2024-07-10 RX ORDER — ALBUTEROL SULFATE 90 UG/1
4 AEROSOL, METERED RESPIRATORY (INHALATION) PRN
OUTPATIENT
Start: 2024-07-11

## 2024-07-10 RX ORDER — SODIUM CHLORIDE 9 MG/ML
5-250 INJECTION, SOLUTION INTRAVENOUS PRN
OUTPATIENT
Start: 2024-07-11

## 2024-07-10 RX ORDER — FAMOTIDINE 10 MG/ML
20 INJECTION, SOLUTION INTRAVENOUS
OUTPATIENT
Start: 2024-07-11

## 2024-07-10 RX ORDER — ACETAMINOPHEN 325 MG/1
650 TABLET ORAL
OUTPATIENT
Start: 2024-07-11

## 2024-07-10 RX ORDER — SODIUM CHLORIDE 9 MG/ML
INJECTION, SOLUTION INTRAVENOUS CONTINUOUS
OUTPATIENT
Start: 2024-07-11

## 2024-07-10 NOTE — PATIENT INSTRUCTIONS
See orders for IV iron, patient wants that to be done in [place].  Plan INFeD.  Return in 3 months, virtual visit is okay.  Needs CBC and iron studies before

## 2024-07-10 NOTE — PROGRESS NOTES
Chief Complaint   Patient presents with    Follow-up     Bleeding tendency       DIAGNOSIS:       Platelet dysfunction  Delta granule storage pool deficiency.   CVA  with residual left-sided weakness      CURRENT THERAPY:         Treatment for platelet dysfunction before surgeries and procedure.    BRIEF CASE HISTORY:       Alecia Donaldson is a very pleasant 54 y.o. female who is referred to us for bleeding tendency.  Since she has ongoing problems with bleeding and bruising.  She was evaluated in  and von Willebrand testing was negative.  However, we could never explain why the patient continues to have bruising.  She is concerned about that and demanded referral to hematology  She is sent to us for a consultation, she has diffuse bruising.  She mentions a previous history of ductal storage pool disease but I cannot get any records.  She does not remember where the diagnosis was made  Before previous surgery she used desmopressin and Amicar in the combination has helped significantly.      INTERIM HISTORY:   This is a virtual visit.   Patient is feeling very tired.  Studies showed worsening anemia and iron deficiency.  Despite oral iron supplementation, her hemoglobin and ferritin are dropping        PAST MEDICAL HISTORY: has a past medical history of Asthma, Bipolar 1 disorder (HCC), COVID-19, Delta storage pool disease (HCC), Hypertension, and Psychiatric problem.    PAST SURGICAL HISTORY: has a past surgical history that includes  section (,); Gastric bypass surgery (); Tonsillectomy and adenoidectomy (); Cholecystectomy (); knee surgery (); Hysterectomy (); laparoscopy (); Colonoscopy (N/A, 2020); Upper gastrointestinal endoscopy (N/A, 2020); Cardiac catheterization; Finger trigger release (Right); and Carpal tunnel release (Right).     CURRENT MEDICATIONS:  has a current medication list which includes the following prescription(s):

## 2024-07-12 ENCOUNTER — CLINICAL DOCUMENTATION (OUTPATIENT)
Facility: HOSPITAL | Age: 54
End: 2024-07-12

## 2024-07-15 ENCOUNTER — TELEPHONE (OUTPATIENT)
Dept: INFUSION THERAPY | Age: 54
End: 2024-07-15

## 2024-07-22 ENCOUNTER — HOSPITAL ENCOUNTER (OUTPATIENT)
Dept: INFUSION THERAPY | Age: 54
Discharge: HOME | End: 2024-07-22
Payer: COMMERCIAL

## 2024-07-22 VITALS — HEART RATE: 97 BPM | SYSTOLIC BLOOD PRESSURE: 153 MMHG | DIASTOLIC BLOOD PRESSURE: 82 MMHG | RESPIRATION RATE: 16 BRPM

## 2024-07-22 DIAGNOSIS — K90.9 IRON MALABSORPTION: Primary | ICD-10-CM

## 2024-07-22 DIAGNOSIS — D50.0 IRON DEFICIENCY ANEMIA DUE TO CHRONIC BLOOD LOSS: ICD-10-CM

## 2024-07-22 PROCEDURE — 6360000002 HC RX W HCPCS: Performed by: INTERNAL MEDICINE

## 2024-07-22 PROCEDURE — 96376 TX/PRO/DX INJ SAME DRUG ADON: CPT

## 2024-07-22 PROCEDURE — 96375 TX/PRO/DX INJ NEW DRUG ADDON: CPT

## 2024-07-22 PROCEDURE — 2580000003 HC RX 258: Performed by: INTERNAL MEDICINE

## 2024-07-22 PROCEDURE — 96366 THER/PROPH/DIAG IV INF ADDON: CPT

## 2024-07-22 PROCEDURE — 96365 THER/PROPH/DIAG IV INF INIT: CPT

## 2024-07-22 PROCEDURE — 6370000000 HC RX 637 (ALT 250 FOR IP): Performed by: INTERNAL MEDICINE

## 2024-07-22 RX ORDER — SODIUM CHLORIDE 9 MG/ML
5-250 INJECTION, SOLUTION INTRAVENOUS PRN
Status: DISCONTINUED | OUTPATIENT
Start: 2024-07-22 | End: 2024-07-23 | Stop reason: HOSPADM

## 2024-07-22 RX ORDER — HEPARIN 100 UNIT/ML
500 SYRINGE INTRAVENOUS PRN
OUTPATIENT
Start: 2024-07-22

## 2024-07-22 RX ORDER — FAMOTIDINE 10 MG/ML
20 INJECTION, SOLUTION INTRAVENOUS
OUTPATIENT
Start: 2024-07-22

## 2024-07-22 RX ORDER — DIPHENHYDRAMINE HYDROCHLORIDE 50 MG/ML
50 INJECTION INTRAMUSCULAR; INTRAVENOUS
OUTPATIENT
Start: 2024-07-22

## 2024-07-22 RX ORDER — ACETAMINOPHEN 325 MG/1
650 TABLET ORAL ONCE
Status: COMPLETED | OUTPATIENT
Start: 2024-07-22 | End: 2024-07-22

## 2024-07-22 RX ORDER — ACETAMINOPHEN 325 MG/1
650 TABLET ORAL ONCE
Status: CANCELLED | OUTPATIENT
Start: 2024-07-22 | End: 2024-07-22

## 2024-07-22 RX ORDER — ALBUTEROL SULFATE 90 UG/1
4 AEROSOL, METERED RESPIRATORY (INHALATION) PRN
OUTPATIENT
Start: 2024-07-22

## 2024-07-22 RX ORDER — MEPERIDINE HYDROCHLORIDE 50 MG/ML
12.5 INJECTION INTRAMUSCULAR; INTRAVENOUS; SUBCUTANEOUS PRN
OUTPATIENT
Start: 2024-07-22

## 2024-07-22 RX ORDER — DEXAMETHASONE SODIUM PHOSPHATE 10 MG/ML
10 INJECTION INTRAMUSCULAR; INTRAVENOUS ONCE
Status: CANCELLED | OUTPATIENT
Start: 2024-07-22 | End: 2024-07-22

## 2024-07-22 RX ORDER — DEXAMETHASONE SODIUM PHOSPHATE 10 MG/ML
10 INJECTION INTRAMUSCULAR; INTRAVENOUS ONCE
Status: COMPLETED | OUTPATIENT
Start: 2024-07-22 | End: 2024-07-22

## 2024-07-22 RX ORDER — SODIUM CHLORIDE 9 MG/ML
INJECTION, SOLUTION INTRAVENOUS CONTINUOUS
OUTPATIENT
Start: 2024-07-22

## 2024-07-22 RX ORDER — SODIUM CHLORIDE 0.9 % (FLUSH) 0.9 %
5-40 SYRINGE (ML) INJECTION PRN
OUTPATIENT
Start: 2024-07-22

## 2024-07-22 RX ORDER — SODIUM CHLORIDE 9 MG/ML
5-250 INJECTION, SOLUTION INTRAVENOUS PRN
OUTPATIENT
Start: 2024-07-22

## 2024-07-22 RX ORDER — ACETAMINOPHEN 325 MG/1
650 TABLET ORAL
OUTPATIENT
Start: 2024-07-22

## 2024-07-22 RX ORDER — EPINEPHRINE 1 MG/ML
0.3 INJECTION, SOLUTION, CONCENTRATE INTRAVENOUS PRN
OUTPATIENT
Start: 2024-07-22

## 2024-07-22 RX ORDER — SODIUM CHLORIDE 9 MG/ML
5-250 INJECTION, SOLUTION INTRAVENOUS PRN
Status: CANCELLED | OUTPATIENT
Start: 2024-07-22

## 2024-07-22 RX ORDER — ONDANSETRON 2 MG/ML
8 INJECTION INTRAMUSCULAR; INTRAVENOUS
OUTPATIENT
Start: 2024-07-22

## 2024-07-22 RX ADMIN — SODIUM CHLORIDE 25 ML/HR: 9 INJECTION, SOLUTION INTRAVENOUS at 10:31

## 2024-07-22 RX ADMIN — SODIUM CHLORIDE 25 MG: 9 INJECTION, SOLUTION INTRAVENOUS at 10:46

## 2024-07-22 RX ADMIN — ACETAMINOPHEN 650 MG: 325 TABLET ORAL at 11:45

## 2024-07-22 RX ADMIN — SODIUM CHLORIDE 975 MG: 9 INJECTION, SOLUTION INTRAVENOUS at 11:59

## 2024-07-22 RX ADMIN — DEXAMETHASONE SODIUM PHOSPHATE 10 MG: 10 INJECTION INTRAMUSCULAR; INTRAVENOUS at 11:45

## 2024-07-22 NOTE — PROGRESS NOTES
Pt. Arrived for infed  Denies any complaints  Test dose given et tolerated without symptoms et side effects  Tx given without incident  Return as needed

## 2024-09-09 DIAGNOSIS — D50.0 IRON DEFICIENCY ANEMIA DUE TO CHRONIC BLOOD LOSS: ICD-10-CM

## 2024-09-09 DIAGNOSIS — K90.9 IRON MALABSORPTION: ICD-10-CM

## 2024-10-03 ENCOUNTER — HOSPITAL ENCOUNTER (OUTPATIENT)
Dept: CT IMAGING | Age: 54
Discharge: HOME | End: 2024-10-05
Payer: COMMERCIAL

## 2024-10-03 ENCOUNTER — HOSPITAL ENCOUNTER (OUTPATIENT)
Age: 54
Setting detail: SPECIMEN
Discharge: HOME | End: 2024-10-03
Payer: COMMERCIAL

## 2024-10-03 DIAGNOSIS — I61.9 INTRAPARENCHYMAL HEMORRHAGE OF BRAIN (HCC): ICD-10-CM

## 2024-10-03 DIAGNOSIS — D50.0 IRON DEFICIENCY ANEMIA DUE TO CHRONIC BLOOD LOSS: ICD-10-CM

## 2024-10-03 DIAGNOSIS — R51.9 NEW ONSET HEADACHE: ICD-10-CM

## 2024-10-03 DIAGNOSIS — K90.9 IRON MALABSORPTION: ICD-10-CM

## 2024-10-03 LAB
ALBUMIN SERPL-MCNC: 4.3 G/DL (ref 3.5–5.2)
ALBUMIN/GLOB SERPL: 1 {RATIO} (ref 1–2.5)
ALP SERPL-CCNC: 108 U/L (ref 35–104)
ALT SERPL-CCNC: 18 U/L (ref 10–35)
ANION GAP SERPL CALCULATED.3IONS-SCNC: 12 MMOL/L (ref 9–16)
AST SERPL-CCNC: 20 U/L (ref 10–35)
BILIRUB SERPL-MCNC: 0.2 MG/DL (ref 0–1.2)
BUN SERPL-MCNC: 13 MG/DL (ref 6–20)
CALCIUM SERPL-MCNC: 9.1 MG/DL (ref 8.6–10.4)
CHLORIDE SERPL-SCNC: 102 MMOL/L (ref 98–107)
CO2 SERPL-SCNC: 21 MMOL/L (ref 20–31)
CREAT SERPL-MCNC: 0.5 MG/DL (ref 0.5–0.9)
FERRITIN SERPL-MCNC: 78 NG/ML (ref 13–150)
GFR, ESTIMATED: >90 ML/MIN/1.73M2
GLUCOSE SERPL-MCNC: 88 MG/DL (ref 74–99)
IRON SATN MFR SERPL: 14 % (ref 20–55)
IRON SERPL-MCNC: 42 UG/DL (ref 37–145)
POTASSIUM SERPL-SCNC: 3.9 MMOL/L (ref 3.7–5.3)
PROT SERPL-MCNC: 7.6 G/DL (ref 6.6–8.7)
SODIUM SERPL-SCNC: 135 MMOL/L (ref 136–145)
TIBC SERPL-MCNC: 302 UG/DL (ref 250–450)
UNSATURATED IRON BINDING CAPACITY: 260 UG/DL (ref 112–347)

## 2024-10-03 PROCEDURE — 83550 IRON BINDING TEST: CPT

## 2024-10-03 PROCEDURE — 70450 CT HEAD/BRAIN W/O DYE: CPT

## 2024-10-03 PROCEDURE — 82728 ASSAY OF FERRITIN: CPT

## 2024-10-03 PROCEDURE — 83540 ASSAY OF IRON: CPT

## 2024-10-03 PROCEDURE — 36415 COLL VENOUS BLD VENIPUNCTURE: CPT

## 2024-10-03 PROCEDURE — 80053 COMPREHEN METABOLIC PANEL: CPT

## 2024-10-23 ENCOUNTER — TELEPHONE (OUTPATIENT)
Dept: PHYSICAL MEDICINE AND REHAB | Age: 54
End: 2024-10-23

## 2024-10-23 NOTE — TELEPHONE ENCOUNTER
Pt called for fu appt with dr. Smiley.  Please find a time/date to see her for me.    She also wanted to know if you seen the lab work that was completed.  CMP.

## 2024-10-30 ENCOUNTER — TELEMEDICINE (OUTPATIENT)
Dept: ONCOLOGY | Age: 54
End: 2024-10-30
Payer: COMMERCIAL

## 2024-10-30 DIAGNOSIS — D69.9 BLEEDING TENDENCY (HCC): ICD-10-CM

## 2024-10-30 DIAGNOSIS — K90.9 IRON MALABSORPTION: ICD-10-CM

## 2024-10-30 DIAGNOSIS — D69.1 PLATELET DYSFUNCTION (HCC): ICD-10-CM

## 2024-10-30 DIAGNOSIS — D50.0 IRON DEFICIENCY ANEMIA DUE TO CHRONIC BLOOD LOSS: Primary | ICD-10-CM

## 2024-10-30 PROCEDURE — 3017F COLORECTAL CA SCREEN DOC REV: CPT | Performed by: INTERNAL MEDICINE

## 2024-10-30 PROCEDURE — 99211 OFF/OP EST MAY X REQ PHY/QHP: CPT | Performed by: INTERNAL MEDICINE

## 2024-10-30 PROCEDURE — G8427 DOCREV CUR MEDS BY ELIG CLIN: HCPCS | Performed by: INTERNAL MEDICINE

## 2024-10-30 PROCEDURE — 99214 OFFICE O/P EST MOD 30 MIN: CPT | Performed by: INTERNAL MEDICINE

## 2024-10-30 NOTE — PROGRESS NOTES
Chief Complaint   Patient presents with    Follow-up     Iron deficiency anemia due to chronic blood loss       DIAGNOSIS:       Platelet dysfunction  Delta granule storage pool deficiency.   CVA  with residual left-sided weakness      CURRENT THERAPY:         Treatment for platelet dysfunction before surgeries and procedure.    BRIEF CASE HISTORY:       Alecia Donaldson is a very pleasant 54 y.o. female who is referred to us for bleeding tendency.  Since she has ongoing problems with bleeding and bruising.  She was evaluated in  and von Willebrand testing was negative.  However, we could never explain why the patient continues to have bruising.  She is concerned about that and demanded referral to hematology  She is sent to us for a consultation, she has diffuse bruising.  She mentions a previous history of ductal storage pool disease but I cannot get any records.  She does not remember where the diagnosis was made  Before previous surgery she used desmopressin and Amicar in the combination has helped significantly.      INTERIM HISTORY:   This is a virtual visit.  The patient was seen previously and was diagnosed with recurrent iron deficiency.  She received iron infusion with improvement in symptoms.  She is feeling much better.  She had a bruise in her foot because of a trauma but otherwise she has no bleeding.        PAST MEDICAL HISTORY: has a past medical history of Asthma, Bipolar 1 disorder (HCC), COVID-19, Delta storage pool disease (HCC), Hypertension, and Psychiatric problem.    PAST SURGICAL HISTORY: has a past surgical history that includes  section (,); Gastric bypass surgery (); Tonsillectomy and adenoidectomy (); Cholecystectomy (); knee surgery (); Hysterectomy (); laparoscopy (); Colonoscopy (N/A, 2020); Upper gastrointestinal endoscopy (N/A, 2020); Cardiac catheterization; Finger trigger release (Right); and Carpal tunnel release

## 2024-10-30 NOTE — TELEPHONE ENCOUNTER
Problem: Pain:  Description: Pain management should include both nonpharmacologic and pharmacologic interventions. Goal: Pain level will decrease  Description: Pain level will decrease  2/25/2022 1116 by Elroy Mariscal RN  Outcome: Ongoing  Note: Pain level assessment complete. Patient educated on pain scale and control interventions  PRN pain medication given per patient request  Patient instructed to call out with new onset of pain or unrelieved pain    2/25/2022 0255 by Terri Rodriguez RN  Outcome: Ongoing  Goal: Control of acute pain  Description: Control of acute pain  Outcome: Ongoing  Goal: Control of chronic pain  Description: Control of chronic pain  2/25/2022 1116 by Elroy Mariscal RN  Outcome: Ongoing  2/25/2022 0255 by Terri Rodriguez RN  Outcome: Ongoing     Problem: Skin Integrity:  Goal: Will show no infection signs and symptoms  Description: Will show no infection signs and symptoms  2/25/2022 1116 by Elroy Mariscal RN  Outcome: Ongoing  2/25/2022 0255 by Terri Rodriguez RN  Outcome: Ongoing  Goal: Absence of new skin breakdown  Description: Absence of new skin breakdown  2/25/2022 1116 by Elroy Mariscal RN  Outcome: Ongoing  2/25/2022 0255 by Terri Rodriguez RN  Outcome: Ongoing     Problem: Falls - Risk of:  Goal: Will remain free from falls  Description: Will remain free from falls  2/25/2022 1116 by Elroy Mariscal RN  Outcome: Ongoing  Note: Siderails up x 2  Hourly rounding  Call light in reach  Instructed to call for assist before attempting out of bed.   Remains free from falls and accidental injury at this time   Floor free from obstacles  Bed is locked and in lowest position  Adequate lighting provided  Bed alarm on, [place] star and Stay with Me signs posted      2/25/2022 0255 by Terri Rodriguez RN  Outcome: Ongoing  Goal: Absence of physical injury  Description: Absence of physical injury  Outcome: Ongoing     Problem: Nutrition  Goal: Optimal nutrition therapy  Outcome: Ongoing     Problem: Serum Glucose Pt fu appt made for January 13.  She can have refills until her appt per dr. Smiley.  He already discussed lab work with her.   Level - Abnormal:  Goal: Ability to maintain appropriate glucose levels will improve  Description: Ability to maintain appropriate glucose levels will improve  Outcome: Ongoing     Problem: Discharge Planning:  Goal: Discharged to appropriate level of care  Description: Discharged to appropriate level of care  Outcome: Ongoing     Problem: Fluid Volume - Deficit:  Goal: Absence of fluid volume deficit signs and symptoms  Description: Absence of fluid volume deficit signs and symptoms  Outcome: Ongoing     Problem: Nutrition Deficit:  Goal: Ability to achieve adequate nutritional intake will improve  Description: Ability to achieve adequate nutritional intake will improve  Outcome: Ongoing     Problem: Sleep Pattern Disturbance:  Goal: Appears well-rested  Description: Appears well-rested  Outcome: Ongoing     Problem: Violence - Risk of, Self/Other-Directed:  Goal: Knowledge of developmental care interventions  Description: Absence of violence  Outcome: Ongoing

## 2024-11-21 DIAGNOSIS — G81.10 SPASTIC HEMIPARESIS (HCC): ICD-10-CM

## 2024-11-21 RX ORDER — CYCLOBENZAPRINE HCL 5 MG
TABLET ORAL
Qty: 270 TABLET | Refills: 1 | Status: SHIPPED | OUTPATIENT
Start: 2024-11-21

## 2025-01-13 ENCOUNTER — OFFICE VISIT (OUTPATIENT)
Dept: PHYSICAL MEDICINE AND REHAB | Age: 55
End: 2025-01-13
Payer: COMMERCIAL

## 2025-01-13 VITALS
DIASTOLIC BLOOD PRESSURE: 84 MMHG | SYSTOLIC BLOOD PRESSURE: 138 MMHG | WEIGHT: 293 LBS | HEART RATE: 84 BPM | BODY MASS INDEX: 48.82 KG/M2 | HEIGHT: 65 IN

## 2025-01-13 DIAGNOSIS — I10 ESSENTIAL HYPERTENSION: ICD-10-CM

## 2025-01-13 DIAGNOSIS — G81.10 SPASTIC HEMIPARESIS (HCC): Primary | ICD-10-CM

## 2025-01-13 DIAGNOSIS — M75.02 ADHESIVE CAPSULITIS OF LEFT SHOULDER: ICD-10-CM

## 2025-01-13 DIAGNOSIS — Z86.79 H/O INTRACRANIAL HEMORRHAGE: ICD-10-CM

## 2025-01-13 DIAGNOSIS — D69.1 DELTA STORAGE POOL DISEASE (HCC): ICD-10-CM

## 2025-01-13 DIAGNOSIS — Z98.84 BARIATRIC SURGERY STATUS: ICD-10-CM

## 2025-01-13 DIAGNOSIS — D69.1 PLATELET DYSFUNCTION (HCC): ICD-10-CM

## 2025-01-13 DIAGNOSIS — D50.0 IRON DEFICIENCY ANEMIA DUE TO CHRONIC BLOOD LOSS: ICD-10-CM

## 2025-01-13 DIAGNOSIS — Z98.890 HISTORY OF CARPAL TUNNEL RELEASE: ICD-10-CM

## 2025-01-13 DIAGNOSIS — K90.9 IRON MALABSORPTION: ICD-10-CM

## 2025-01-13 DIAGNOSIS — D69.9 BLEEDING DISORDER (HCC): ICD-10-CM

## 2025-01-13 PROCEDURE — 1036F TOBACCO NON-USER: CPT | Performed by: PHYSICAL MEDICINE & REHABILITATION

## 2025-01-13 PROCEDURE — 3017F COLORECTAL CA SCREEN DOC REV: CPT | Performed by: PHYSICAL MEDICINE & REHABILITATION

## 2025-01-13 PROCEDURE — 99214 OFFICE O/P EST MOD 30 MIN: CPT | Performed by: PHYSICAL MEDICINE & REHABILITATION

## 2025-01-13 PROCEDURE — 3079F DIAST BP 80-89 MM HG: CPT | Performed by: PHYSICAL MEDICINE & REHABILITATION

## 2025-01-13 PROCEDURE — G8427 DOCREV CUR MEDS BY ELIG CLIN: HCPCS | Performed by: PHYSICAL MEDICINE & REHABILITATION

## 2025-01-13 PROCEDURE — 3075F SYST BP GE 130 - 139MM HG: CPT | Performed by: PHYSICAL MEDICINE & REHABILITATION

## 2025-01-13 PROCEDURE — G8417 CALC BMI ABV UP PARAM F/U: HCPCS | Performed by: PHYSICAL MEDICINE & REHABILITATION

## 2025-01-13 RX ORDER — LISINOPRIL 20 MG/1
20 TABLET ORAL DAILY
COMMUNITY
Start: 2025-01-02

## 2025-01-13 RX ORDER — CYCLOBENZAPRINE HCL 5 MG
TABLET ORAL
Qty: 270 TABLET | Refills: 1 | Status: SHIPPED | OUTPATIENT
Start: 2025-01-13

## 2025-01-13 NOTE — PROGRESS NOTES
liver...   Flexeril 5 mg 2 to 3 times daily- she notes this is helping, refill done today    she knows  had improvement in range of motion and decrease spasticity with continued therapies and home exercise program and she again does not want to pursue Botox,  CMP from October 3 , 2024 was reviewed order repeat CMP to be done within the next 2 to 3-month  3.   orthostasis-this has improved with blood pressure medication adjusted.    4.  Left hemiparesis with hemisensory deficits-.  Still has sensory deficit but no pain ,no longer on Neurontin  5.  Left frozen shoulder-patient is no longer any pain notes  improved range of motion.    6.  -urination improved follow-up with urology  7.  Right thumb hand pain- DeQuervains tenosynovitis.  Symptoms have improved.  Will follow with Ortho if needed  8.  Reviewed falls and precautions, and no driving, she agrees and is currently not driving  9. carpal tunnel syndrome-noted on EMG, patient followed up with orthopedics and has had carpal tunnel release right hand.  Currently asymptomatic  10.  Left knee possible internal derangement-seen by Ortho Dr. Christie had injected Notes pain is improved.  Will follow-up as needed  11. Follow-up with hematology, PCP  12.  Occasional headaches-uses Fioricet- follow-up with neurology,   13.  Follow-up 4 to 6-month patient is requesting televisit    35 minutes spent with patient-       Return in about 4 months (around 5/13/2025) for Followup.  Orders Placed This Encounter   Procedures    Comprehensive Metabolic Panel     Standing Status:   Future     Standing Expiration Date:   3/13/2025                Electronically signed by Phoebe Camacho 1/13/2025 at 5:03 PM    Please note that this chart was generated usingvoice recognition Dragon dictation software.  Although every effort was made toensure the accuracy of this automated transcription, some errors in transcriptionmay have occurred.

## 2025-04-22 DIAGNOSIS — K90.9 IRON MALABSORPTION: ICD-10-CM

## 2025-04-22 DIAGNOSIS — D50.0 IRON DEFICIENCY ANEMIA DUE TO CHRONIC BLOOD LOSS: Primary | ICD-10-CM

## 2025-04-30 ENCOUNTER — TELEMEDICINE (OUTPATIENT)
Dept: ONCOLOGY | Age: 55
End: 2025-04-30
Payer: COMMERCIAL

## 2025-04-30 DIAGNOSIS — K90.9 IRON MALABSORPTION: ICD-10-CM

## 2025-04-30 DIAGNOSIS — D69.9 BLEEDING TENDENCY: ICD-10-CM

## 2025-04-30 DIAGNOSIS — D50.0 IRON DEFICIENCY ANEMIA DUE TO CHRONIC BLOOD LOSS: ICD-10-CM

## 2025-04-30 DIAGNOSIS — D69.1 PLATELET DYSFUNCTION (HCC): ICD-10-CM

## 2025-04-30 DIAGNOSIS — D69.1 PLATELET DYSFUNCTION (HCC): Primary | ICD-10-CM

## 2025-04-30 DIAGNOSIS — D50.0 IRON DEFICIENCY ANEMIA DUE TO CHRONIC BLOOD LOSS: Primary | ICD-10-CM

## 2025-04-30 PROCEDURE — 99214 OFFICE O/P EST MOD 30 MIN: CPT | Performed by: INTERNAL MEDICINE

## 2025-04-30 PROCEDURE — 3017F COLORECTAL CA SCREEN DOC REV: CPT | Performed by: INTERNAL MEDICINE

## 2025-04-30 PROCEDURE — 99211 OFF/OP EST MAY X REQ PHY/QHP: CPT | Performed by: INTERNAL MEDICINE

## 2025-04-30 PROCEDURE — G8427 DOCREV CUR MEDS BY ELIG CLIN: HCPCS | Performed by: INTERNAL MEDICINE

## 2025-04-30 RX ORDER — DESMOPRESSIN ACETATE 0.1 MG/ML
1 SOLUTION NASAL 2 TIMES DAILY
Qty: 5 ML | Refills: 2 | Status: SHIPPED | OUTPATIENT
Start: 2025-04-30

## 2025-04-30 NOTE — PROGRESS NOTES
tunnel release (Right).     CURRENT MEDICATIONS:  has a current medication list which includes the following prescription(s): lisinopril, cyclobenzaprine, hydrochlorothiazide, trazodone, duloxetine, desmopressin, ferrous sulfate, ropinirole, blood pressure, amlodipine, myrbetriq, and lamotrigine.    ALLERGIES:  is allergic to penicillin g, baclofen, pcn [penicillins], and sulfa antibiotics.    FAMILY HISTORY: Negative for any hematological or oncological conditions.     SOCIAL HISTORY:  reports that she has never smoked. She has never used smokeless tobacco. She reports that she does not currently use alcohol. She reports that she does not use drugs.    REVIEW OF SYSTEMS:   General: no fever or night sweats, Weight is stable.  Significant fatigue  ENT: No double or blurred vision, no tinnitus or hearing problem, no dysphagia or sore throat   Respiratory: No chest pain, no shortness of breath, no cough or hemoptysis.  Cardiovascular: Denies chest pain, PND or orthopnea. No L E swelling or palpitations.   Gastrointestinal:    No nausea or vomiting, abdominal pain, diarrhea or constipation.    Genitourinary: Denies dysuria, hematuria, frequency, urgency or incontinence.    Neurological: As above.   Musculoskeletal:  No arthralgia no back pain or joint swelling.   Skin: There are no rashes or bleeding.  She has some bruising on the shoulder areas and the lower extremities   Psychiatric:  No anxiety, no depression.   Endocrine: no diabetes or thyroid disease.   Hematologic: no bleeding , no adenopathy.    PHYSICAL EXAM:     PHYSICAL EXAMINATION:    Vital Signs: (As obtained by patient/caregiver or practitioner observation)    Blood pressure-  Heart rate-    Respiratory rate-    Temperature-  Pulse oximetry-     Constitutional: [x] Appears well-developed and well-nourished [x] No apparent distress      [] Abnormal-   Mental status  [x] Alert and awake  [x] Oriented to person/place/time [x]Able to follow commands

## 2025-04-30 NOTE — PATIENT INSTRUCTIONS
Rv in 6 months VV is ok, need cbc,  iron studies prior to Rv   Patient will use DDAVP nasal spray before trigger finger release surgery  Clear for the surgery

## 2025-05-05 ENCOUNTER — TELEMEDICINE (OUTPATIENT)
Dept: PHYSICAL MEDICINE AND REHAB | Age: 55
End: 2025-05-05
Payer: COMMERCIAL

## 2025-05-05 DIAGNOSIS — Z98.890 HISTORY OF CARPAL TUNNEL RELEASE: ICD-10-CM

## 2025-05-05 DIAGNOSIS — G81.10 SPASTIC HEMIPARESIS (HCC): Primary | ICD-10-CM

## 2025-05-05 DIAGNOSIS — D69.1 DELTA STORAGE POOL DISEASE (HCC): ICD-10-CM

## 2025-05-05 DIAGNOSIS — M75.02 ADHESIVE CAPSULITIS OF LEFT SHOULDER: ICD-10-CM

## 2025-05-05 DIAGNOSIS — D69.9 BLEEDING DISORDER: ICD-10-CM

## 2025-05-05 DIAGNOSIS — Z86.79 H/O INTRACRANIAL HEMORRHAGE: ICD-10-CM

## 2025-05-05 DIAGNOSIS — I10 ESSENTIAL HYPERTENSION: ICD-10-CM

## 2025-05-05 DIAGNOSIS — D50.0 IRON DEFICIENCY ANEMIA DUE TO CHRONIC BLOOD LOSS: ICD-10-CM

## 2025-05-05 DIAGNOSIS — K90.9 IRON MALABSORPTION: ICD-10-CM

## 2025-05-05 PROCEDURE — 99214 OFFICE O/P EST MOD 30 MIN: CPT | Performed by: PHYSICAL MEDICINE & REHABILITATION

## 2025-05-05 NOTE — PROGRESS NOTES
2025    TELEHEALTH EVALUATION -- Audio/Visual    HPI:    Alecia Donaldson (:  1970) has requested an audio/video evaluation for the following concern(s):    Since last visit in 2025 she has been doing well.  She continues with physical and occupational therapy.  She did have some increased edema.  But improved with use of hydrochlorothiazide.  Brace is not fitting better.  She denies any skin breakdown.  At the time she was not feeling well she had 2 falls in 1 day.  No residual.  Ambulation is improved.    He had a trigger finger right hand and plan to get surgery this Friday.    She has follow-up with Dr. Jim note blood work is significant proved no iron infusion.    She continues with therapy notes significant improvement she is able to now zip her sweatshirt she is able to open a bag easier as well.  She is pleased with her progress    She has history of lymphedema continues to use lymphedema pump 6 out of 7 days.    Left knee pain improved with injection by Dr. Christie's group.  She has noted some return of the pain now that the injection is wearing off.  She plans to follow-up.    Regards to spasticity she notes this is controlled on Flexeril and Requip    She continues see urology for bladder and consider possible Botox      CT head 24  IMPRESSION:  No acute intracranial abnormality.     Chronic encephalomalacia in the right parietal lobe     History     Alecia Donaldson  is 50 y.o. right-handed     female admitted to the [facility] on 2020.  She was originally admitted to [facility] on 2020 for L sided weakness and headache.  She presented originally to [facility] ED. CT confirmed R parietal ICH and moderate SAH. She was treated initially with cardene infusion for hypertension and loaded with Keppra. She was transferred to [facility] for further neurologic care. She was given DDAVP and 1 pack of platelets. ICH score 2.

## 2025-07-07 DIAGNOSIS — G81.10 SPASTIC HEMIPARESIS (HCC): ICD-10-CM

## 2025-07-08 RX ORDER — CYCLOBENZAPRINE HCL 5 MG
TABLET ORAL
Qty: 270 TABLET | Refills: 1 | Status: SHIPPED | OUTPATIENT
Start: 2025-07-08

## 2025-07-09 NOTE — TELEPHONE ENCOUNTER
Pt thinks she had this done during a recent hospital stay. She is going to call and have it faxed to us.

## 2025-07-14 ENCOUNTER — OFFICE VISIT (OUTPATIENT)
Dept: FAMILY MEDICINE CLINIC | Age: 55
End: 2025-07-14
Payer: COMMERCIAL

## 2025-07-14 VITALS
WEIGHT: 293 LBS | TEMPERATURE: 97.9 F | HEART RATE: 100 BPM | DIASTOLIC BLOOD PRESSURE: 64 MMHG | SYSTOLIC BLOOD PRESSURE: 128 MMHG | OXYGEN SATURATION: 98 % | BODY MASS INDEX: 51.15 KG/M2

## 2025-07-14 DIAGNOSIS — J40 BRONCHITIS: Primary | ICD-10-CM

## 2025-07-14 DIAGNOSIS — R06.2 WHEEZE: ICD-10-CM

## 2025-07-14 DIAGNOSIS — R05.1 ACUTE COUGH: ICD-10-CM

## 2025-07-14 PROCEDURE — 3017F COLORECTAL CA SCREEN DOC REV: CPT | Performed by: NURSE PRACTITIONER

## 2025-07-14 PROCEDURE — 1036F TOBACCO NON-USER: CPT | Performed by: NURSE PRACTITIONER

## 2025-07-14 PROCEDURE — G8427 DOCREV CUR MEDS BY ELIG CLIN: HCPCS | Performed by: NURSE PRACTITIONER

## 2025-07-14 PROCEDURE — 3074F SYST BP LT 130 MM HG: CPT | Performed by: NURSE PRACTITIONER

## 2025-07-14 PROCEDURE — 99213 OFFICE O/P EST LOW 20 MIN: CPT | Performed by: NURSE PRACTITIONER

## 2025-07-14 PROCEDURE — G8417 CALC BMI ABV UP PARAM F/U: HCPCS | Performed by: NURSE PRACTITIONER

## 2025-07-14 PROCEDURE — 3078F DIAST BP <80 MM HG: CPT | Performed by: NURSE PRACTITIONER

## 2025-07-14 RX ORDER — FLUCONAZOLE 150 MG/1
TABLET ORAL
COMMUNITY
Start: 2025-05-20 | End: 2025-07-18

## 2025-07-14 RX ORDER — MUPIROCIN 2 %
OINTMENT (GRAM) TOPICAL
COMMUNITY
Start: 2025-07-02

## 2025-07-14 RX ORDER — DESMOPRESSIN ACETATE 0.1 MG/ML
SOLUTION NASAL
COMMUNITY
Start: 2025-05-02

## 2025-07-14 RX ORDER — AZITHROMYCIN 250 MG/1
TABLET, FILM COATED ORAL
Qty: 6 TABLET | Refills: 0 | Status: SHIPPED | OUTPATIENT
Start: 2025-07-14

## 2025-07-14 RX ORDER — BENZONATATE 200 MG/1
200 CAPSULE ORAL 3 TIMES DAILY PRN
Qty: 30 CAPSULE | Refills: 1 | Status: SHIPPED | OUTPATIENT
Start: 2025-07-14 | End: 2025-08-03

## 2025-07-14 RX ORDER — PREDNISONE 20 MG/1
20 TABLET ORAL 2 TIMES DAILY
Qty: 10 TABLET | Refills: 0 | Status: SHIPPED | OUTPATIENT
Start: 2025-07-14 | End: 2025-07-19

## 2025-07-14 RX ORDER — ALBUTEROL SULFATE 90 UG/1
2 INHALANT RESPIRATORY (INHALATION) EVERY 4 HOURS PRN
Qty: 18 G | Refills: 1 | Status: SHIPPED | OUTPATIENT
Start: 2025-07-14

## 2025-07-14 SDOH — ECONOMIC STABILITY: FOOD INSECURITY: WITHIN THE PAST 12 MONTHS, THE FOOD YOU BOUGHT JUST DIDN'T LAST AND YOU DIDN'T HAVE MONEY TO GET MORE.: NEVER TRUE

## 2025-07-14 SDOH — ECONOMIC STABILITY: FOOD INSECURITY: WITHIN THE PAST 12 MONTHS, YOU WORRIED THAT YOUR FOOD WOULD RUN OUT BEFORE YOU GOT MONEY TO BUY MORE.: NEVER TRUE

## 2025-07-14 ASSESSMENT — PATIENT HEALTH QUESTIONNAIRE - PHQ9
7. TROUBLE CONCENTRATING ON THINGS, SUCH AS READING THE NEWSPAPER OR WATCHING TELEVISION: NOT AT ALL
8. MOVING OR SPEAKING SO SLOWLY THAT OTHER PEOPLE COULD HAVE NOTICED. OR THE OPPOSITE, BEING SO FIGETY OR RESTLESS THAT YOU HAVE BEEN MOVING AROUND A LOT MORE THAN USUAL: NOT AT ALL
6. FEELING BAD ABOUT YOURSELF - OR THAT YOU ARE A FAILURE OR HAVE LET YOURSELF OR YOUR FAMILY DOWN: NOT AT ALL
3. TROUBLE FALLING OR STAYING ASLEEP: NOT AT ALL
SUM OF ALL RESPONSES TO PHQ QUESTIONS 1-9: 0
2. FEELING DOWN, DEPRESSED OR HOPELESS: NOT AT ALL
4. FEELING TIRED OR HAVING LITTLE ENERGY: NOT AT ALL
5. POOR APPETITE OR OVEREATING: NOT AT ALL
9. THOUGHTS THAT YOU WOULD BE BETTER OFF DEAD, OR OF HURTING YOURSELF: NOT AT ALL
SUM OF ALL RESPONSES TO PHQ QUESTIONS 1-9: 0
10. IF YOU CHECKED OFF ANY PROBLEMS, HOW DIFFICULT HAVE THESE PROBLEMS MADE IT FOR YOU TO DO YOUR WORK, TAKE CARE OF THINGS AT HOME, OR GET ALONG WITH OTHER PEOPLE: NOT DIFFICULT AT ALL
1. LITTLE INTEREST OR PLEASURE IN DOING THINGS: NOT AT ALL

## 2025-07-14 ASSESSMENT — ENCOUNTER SYMPTOMS
NAUSEA: 0
SHORTNESS OF BREATH: 1
COLOR CHANGE: 0
WHEEZING: 1
ABDOMINAL PAIN: 0
EYE DISCHARGE: 0
SINUS PAIN: 1
COUGH: 1
SINUS PRESSURE: 1
CONSTIPATION: 0
DIARRHEA: 0
CHEST TIGHTNESS: 0
VOMITING: 0
EYE REDNESS: 0

## 2025-07-14 NOTE — PROGRESS NOTES
Alecia Donaldson (: 1970) is a 55 y.o. female, Established patient, who presents today for:    Chief Complaint   Patient presents with    Cough     Sinus congestion, fevers, would like an inhaler, started Saturday but worse today, at home covid negative         Subjective     HPI:  History of Present Illness  The patient presents for evaluation of sinus congestion and fever.    She has been experiencing a fever for the past 3 days, with her highest recorded temperature being 100 degrees in the evening. Her usual body temperature is around 97.2 degrees. She has been managing her symptoms with acetaminophen due to a clotting disorder that prevents her from taking NSAIDs. A home COVID-19 test was conducted, which returned negative results. She reports no shortness of breath or wheezing. She has also been using a saline mist for nasal relief and taking Mucinex Sinus-Max.     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 During these episodes, she was prescribed an inhaler, although she does not typically require one. She has used albuterol in the past.    She has recently developed a sore throat and is experiencing sinus pressure in her cheeks and forehead. She has been using essential oils for relief, which she finds helpful.    She also reports muscle soreness throughout her body.    Social History:  Occupations: Former teacher  Living Condition: Lives in [facility], currently helping parents transition to assisted living here in [place]            Results         Review of Systems   Constitutional:  Positive for fever. Negative for chills and unexpected weight change.   HENT:  Positive for sinus pressure and sinus pain. Negative for congestion and ear pain.    Eyes:  Negative for discharge, redness and visual disturbance.   Respiratory:  Positive for cough, shortness of breath and wheezing. Negative for

## 2025-07-18 DIAGNOSIS — B37.31 CANDIDIASIS OF FEMALE GENITALIA: Primary | ICD-10-CM

## 2025-07-18 RX ORDER — FLUCONAZOLE 150 MG/1
150 TABLET ORAL
Qty: 2 TABLET | Refills: 1 | Status: SHIPPED | OUTPATIENT
Start: 2025-07-18 | End: 2025-07-24

## 2025-07-21 DIAGNOSIS — G81.10 SPASTIC HEMIPARESIS (HCC): ICD-10-CM

## 2025-08-03 ENCOUNTER — PATIENT MESSAGE (OUTPATIENT)
Dept: PHYSICAL MEDICINE AND REHAB | Age: 55
End: 2025-08-03

## 2025-08-03 DIAGNOSIS — G81.10 SPASTIC HEMIPARESIS (HCC): ICD-10-CM

## 2025-08-04 RX ORDER — CYCLOBENZAPRINE HCL 5 MG
TABLET ORAL
Qty: 21 TABLET | Refills: 0 | Status: SHIPPED | OUTPATIENT
Start: 2025-08-04

## (undated) DEVICE — SOLUTION IV IRRIG POUR BRL 0.9% SODIUM CHL 2F7124

## (undated) DEVICE — FORCEPS BX L240CM JAW DIA2.4MM ORNG L CAP W/ NDL DISP RAD

## (undated) DEVICE — MEDI-VAC NON-CONDUCTIVE TUBING7MM X 30.5 (100FT): Brand: CARDINAL HEALTH

## (undated) DEVICE — CANNULA ORAL NSL AD CO2 N INTUB O2 DEL DISP TRU LNK